# Patient Record
Sex: MALE | Race: WHITE | NOT HISPANIC OR LATINO | URBAN - METROPOLITAN AREA
[De-identification: names, ages, dates, MRNs, and addresses within clinical notes are randomized per-mention and may not be internally consistent; named-entity substitution may affect disease eponyms.]

---

## 2021-06-16 ENCOUNTER — EMERGENCY (EMERGENCY)
Facility: HOSPITAL | Age: 51
LOS: 0 days | Discharge: HOME | End: 2021-06-16
Attending: EMERGENCY MEDICINE
Payer: COMMERCIAL

## 2021-06-16 VITALS
SYSTOLIC BLOOD PRESSURE: 111 MMHG | TEMPERATURE: 98 F | RESPIRATION RATE: 18 BRPM | OXYGEN SATURATION: 99 % | DIASTOLIC BLOOD PRESSURE: 84 MMHG | HEART RATE: 86 BPM

## 2021-06-16 VITALS — HEIGHT: 75 IN | WEIGHT: 143.08 LBS
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
## 2021-06-16 DIAGNOSIS — S60.451A SUPERFICIAL FOREIGN BODY OF LEFT INDEX FINGER, INITIAL ENCOUNTER: ICD-10-CM
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
## 2021-06-16 PROCEDURE — 73140 X-RAY EXAM OF FINGER(S): CPT | Mod: LT

## 2021-06-16 PROCEDURE — 99284 EMERGENCY DEPT VISIT MOD MDM: CPT

## 2021-06-16 PROCEDURE — 99284 EMERGENCY DEPT VISIT MOD MDM: CPT | Mod: 25

## 2021-06-16 PROCEDURE — 73140 X-RAY EXAM OF FINGER(S): CPT | Mod: 26,LT

## 2021-06-16 PROCEDURE — 99283 EMERGENCY DEPT VISIT LOW MDM: CPT

## 2021-06-16 RX ORDER — CEPHALEXIN 500 MG
500 CAPSULE ORAL ONCE
Refills: 0 | Status: COMPLETED | OUTPATIENT
Start: 2021-06-16 | End: 2021-06-16

## 2021-06-16 RX ORDER — CEPHALEXIN 500 MG
1 CAPSULE ORAL
Qty: 28 | Refills: 0
Start: 2021-06-16 | End: 2021-06-22

## 2021-06-16 RX ADMIN — Medication 500 MILLIGRAM(S): at 15:36
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
## 2021-06-16 NOTE — ED STATDOCS - NSFOLLOWUPINSTRUCTIONS_ED_ALL_ED_FT
Follow instruction given to you by the orthopedic team that saw you in the ER.   Take motrin or tylenol for pain.  Take the antibiotic as directed.     Return to the ER for any redness, swelling, fever, purulent discharge or any new or other concerns.       Skin Foreign Body    A skin foreign body is an object that is stuck in the skin. Common objects that get stuck in the skin include:  •Wood (splinter).      •Glass.      •Rock.      •Nails.      •Needles.      •Thorns or cactus spines.      •Fiberglass slivers.      •Fish hooks.      •BBs.      Foreign bodies may damage tissue or cause infection. If the foreign body does not cause any pain or infection, it may be okay to leave it in the skin.    A growth called a granuloma may form around a foreign body that is left in the skin.      What are the causes?    This condition is caused by an object getting lodged under the skin, usually by accident. Children may get a skin foreign body while playing outside. Adults may get a skin foreign body after breaking glass or while working with wood, fiberglass, or stone material. In some cases, the object may get stuck in an open wound after an injury.      What are the signs or symptoms?  Symptoms of this condition include:  •Pain.      •A feeling of something being stuck under the skin.        How is this diagnosed?  This condition is diagnosed based on:  •Your medical history and symptoms.      •A physical exam.    •Imaging tests, such as:  •X-rays.      •CT scans.      •Ultrasounds.          How is this treated?  Treatment for this condition depends on what the foreign body is, where it is, and whether it is causing infection or other symptoms. Treatment may involve:  •Flushing the affected area with a salt-water solution to remove dirt or debris.      •Removing all or part of the object with a needle and metal tweezers. In some cases, an incision may be made in the skin to allow access to the object.      •Waiting to remove the object until it moves closer to the surface of the skin. This may take several days.      •Leaving the object in place. This may be done if the object is not causing any symptoms or if removal will cause more damage to the skin or tissue.      •Taking antibiotic pills or using antibiotic ointment to treat or prevent infection.      •Having a surgical procedure to remove a foreign body that is deep inside the tissue or that has been covered by a granuloma.        Follow these instructions at home:      Wound or incision care    •If the foreign body was removed, follow instructions from your health care provider about how to take care of your wound or incision. Make sure you:  •Wash your hands with soap and water before and after you change your bandage (dressing). If soap and water are not available, use hand .      •Change your dressing as told by your health care provider.      •Leave stitches (sutures), skin glue, or adhesive strips in place. These skin closures may need to stay in place for 2 weeks or longer. If adhesive strip edges start to loosen and curl up, you may trim the loose edges. Do not remove adhesive strips completely unless your health care provider tells you to do that.      •Check your wound or incision every day for signs of infection. This is especially important if the foreign body was left in place in the skin. Check for:  •Redness, swelling, or pain.      •Fluid or blood.      •Pus or a bad smell.      •Warmth.        •If the foreign body was in your lip, you may be directed to rinse your mouth with a salt-water mixture 3–4 times per day or as needed. To make a salt–water mixture, completely dissolve ½–1 tsp (3–6 g) of salt in 1 cup (237 mL) of warm water.      General instructions     •Take over-the-counter and prescription medicines only as told by your health care provider.      •If you were prescribed an antibiotic medicine or ointment, use it as told by your health care provider. Do not stop using the antibiotic even if you start to feel better.      •Keep all follow-up visits as told by your health care provider. This is important.        Contact a health care provider if:    •You develop more pain or other new symptoms around the area where the object entered the skin.      •You have redness, swelling, or pain around your wound or incision.      •You have fluid or blood coming from your wound or incision.      •Your wound or incision feels warm to the touch.      •You have pus or a bad smell coming from your wound or incision.      •You have a fever.        Get help right away if:    •You have severe pain that does not get better with medicine.        Summary    •A skin foreign body is an object that is stuck in the skin. Common objects that get stuck in the skin include wood, glass, rock, thorns, and fiberglass slivers.      •Treatment for this condition depends on what the foreign body is, where it is, and whether it is causing infection or other symptoms.      •Treatment may include removing the foreign body or leaving it in place. It is important to watch the wound or incision for signs of infection, especially if the object was left in place in the skin.      This information is not intended to replace advice given to you by your health care provider. Make sure you discuss any questions you have with your health care provider.
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
## 2021-06-16 NOTE — ED STATDOCS - PATIENT PORTAL LINK FT
You can access the FollowMyHealth Patient Portal offered by [facility] by registering at the following website: [email or web address]. By joining "[employer]"’s FollowMyHealth portal, you will also be able to view your health information using other applications (apps) compatible with our system.
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
## 2021-06-16 NOTE — ED STATDOCS - CARE PROVIDER_API CALL
Deena Mazariegos)  [facility]  [address] [place] [address]  Phone: [phone]  Fax: [phone]  Follow Up Time:

## 2021-06-16 NOTE — ED STATDOCS - CPE ED SKIN NORM
S/P PCI of LAD per Dr Cooper  Continue ASA, Statin, Plavix, BB, ARB, Aldactone  Medical therapy adjusted for OMT for CAD, HTN control  ECHO revealed EF 40%, -WMAs  Dash diet, 2 gm sodium restriction  1500 ml fluid restriction  Will need OP Sleep study, weight loss  FLP pending  Keep K+>4 and Mag >2  Reassess in AM    7/11  -Continue Coreg, Losartan, Bidil, Aldactone  -transition to Effient 10 mg daily from Plavix given risk factors  -Will need OP Sleep study  -FLP pending  -Continue ASA, Statin, Effient, BB, ARB, Bidil, Aldactone  -Continue to optimize medical regimen  -Transfer to telemetry today  -Assess response in AM    7/12  -OK to discharge home today  -Continue no smoking and no etoh use  -Cardiac rehab to be discussed as OP  -DO not return to work x 1 week, likely will need 1 month off of work for now  -Continue ASA, Statin, Coreg, Losartan, Effient, aldactone, Bidil  -Will need OP Sleep study arranged  -Monitor BP at home  -Encourage daily light walking for 30 minutes per day until seen in clinic next week  -NO heavy lifting  -Clinic will arrange follow up next week    normal...

## 2021-06-16 NOTE — ED STATDOCS - SKIN, MLM
+entrance wound to anterior L pointer finger between PIP and DIP with +restricted ROM, +edema, and +palpable pellet

## 2021-06-23 ENCOUNTER — APPOINTMENT (OUTPATIENT)
Dept: ORTHOPEDIC SURGERY | Facility: CLINIC | Age: 51
End: 2021-06-23

## 2021-12-08 NOTE — ED ADULT TRIAGE NOTE - NS ED NURSE DIRECT TO ROOM YN
Patient was given an SSRI/SNRI medication and warned of possible side effects of the medication including potential for increased risk of suicidal thoughts and feelings.  Patient was instructed that if they begin to exhibit any of these effects that they will discontinue the medication immediately and contact our office or the ER ASAP.     Yes

## 2022-01-01 ENCOUNTER — APPOINTMENT (OUTPATIENT)
Dept: RADIATION ONCOLOGY | Facility: CLINIC | Age: 52
End: 2022-01-01

## 2022-01-01 ENCOUNTER — TRANSCRIPTION ENCOUNTER (OUTPATIENT)
Age: 52
End: 2022-01-01

## 2022-01-01 ENCOUNTER — NON-APPOINTMENT (OUTPATIENT)
Age: 52
End: 2022-01-01

## 2022-01-01 ENCOUNTER — OUTPATIENT (OUTPATIENT)
Dept: OUTPATIENT SERVICES | Facility: HOSPITAL | Age: 52
LOS: 1 days | End: 2022-01-01
Payer: COMMERCIAL

## 2022-01-01 ENCOUNTER — APPOINTMENT (OUTPATIENT)
Dept: ORTHOPEDIC SURGERY | Facility: HOSPITAL | Age: 52
End: 2022-01-01

## 2022-01-01 ENCOUNTER — APPOINTMENT (OUTPATIENT)
Dept: THORACIC SURGERY | Facility: CLINIC | Age: 52
End: 2022-01-01

## 2022-01-01 ENCOUNTER — APPOINTMENT (OUTPATIENT)
Dept: THORACIC SURGERY | Facility: HOSPITAL | Age: 52
End: 2022-01-01

## 2022-01-01 ENCOUNTER — OUTPATIENT (OUTPATIENT)
Dept: OUTPATIENT SERVICES | Facility: HOSPITAL | Age: 52
LOS: 1 days | Discharge: HOME | End: 2022-01-01
Payer: COMMERCIAL

## 2022-01-01 ENCOUNTER — INPATIENT (INPATIENT)
Facility: HOSPITAL | Age: 52
LOS: 3 days | Discharge: HOME | DRG: 181 | End: 2022-07-22
Attending: INTERNAL MEDICINE | Admitting: FAMILY MEDICINE
Payer: COMMERCIAL

## 2022-01-01 ENCOUNTER — APPOINTMENT (OUTPATIENT)
Dept: NUCLEAR MEDICINE | Facility: CLINIC | Age: 52
End: 2022-01-01

## 2022-01-01 ENCOUNTER — APPOINTMENT (OUTPATIENT)
Dept: ORTHOPEDIC SURGERY | Facility: CLINIC | Age: 52
End: 2022-01-01

## 2022-01-01 ENCOUNTER — OUTPATIENT (OUTPATIENT)
Dept: INPATIENT UNIT | Facility: HOSPITAL | Age: 52
LOS: 1 days | Discharge: HOME | End: 2022-01-01
Payer: OTHER MISCELLANEOUS

## 2022-01-01 ENCOUNTER — INPATIENT (INPATIENT)
Facility: HOSPITAL | Age: 52
LOS: 11 days | Discharge: HOME | DRG: 180 | End: 2022-09-26
Attending: INTERNAL MEDICINE | Admitting: HOSPITALIST
Payer: COMMERCIAL

## 2022-01-01 ENCOUNTER — INPATIENT (INPATIENT)
Facility: HOSPITAL | Age: 52
LOS: 0 days | Discharge: HOME | DRG: 897 | End: 2022-11-22
Attending: FAMILY MEDICINE | Admitting: FAMILY MEDICINE
Payer: COMMERCIAL

## 2022-01-01 ENCOUNTER — APPOINTMENT (OUTPATIENT)
Dept: ORTHOPEDIC SURGERY | Facility: CLINIC | Age: 52
End: 2022-01-01
Payer: OTHER MISCELLANEOUS

## 2022-01-01 ENCOUNTER — RESULT REVIEW (OUTPATIENT)
Age: 52
End: 2022-01-01

## 2022-01-01 ENCOUNTER — RESULT CHARGE (OUTPATIENT)
Age: 52
End: 2022-01-01

## 2022-01-01 VITALS
RESPIRATION RATE: 18 BRPM | DIASTOLIC BLOOD PRESSURE: 80 MMHG | HEART RATE: 91 BPM | WEIGHT: 140 LBS | OXYGEN SATURATION: 98 % | SYSTOLIC BLOOD PRESSURE: 118 MMHG

## 2022-01-01 VITALS
SYSTOLIC BLOOD PRESSURE: 103 MMHG | TEMPERATURE: 99 F | RESPIRATION RATE: 18 BRPM | OXYGEN SATURATION: 97 % | HEART RATE: 105 BPM | DIASTOLIC BLOOD PRESSURE: 72 MMHG

## 2022-01-01 VITALS
DIASTOLIC BLOOD PRESSURE: 86 MMHG | RESPIRATION RATE: 18 BRPM | HEIGHT: 72 IN | TEMPERATURE: 98 F | OXYGEN SATURATION: 99 % | WEIGHT: 141.32 LBS | HEART RATE: 90 BPM | SYSTOLIC BLOOD PRESSURE: 130 MMHG

## 2022-01-01 VITALS
HEART RATE: 112 BPM | WEIGHT: 154.1 LBS | SYSTOLIC BLOOD PRESSURE: 121 MMHG | OXYGEN SATURATION: 93 % | RESPIRATION RATE: 16 BRPM | HEIGHT: 72 IN | TEMPERATURE: 99 F | DIASTOLIC BLOOD PRESSURE: 78 MMHG

## 2022-01-01 VITALS
HEART RATE: 85 BPM | TEMPERATURE: 98 F | RESPIRATION RATE: 18 BRPM | SYSTOLIC BLOOD PRESSURE: 161 MMHG | OXYGEN SATURATION: 99 % | DIASTOLIC BLOOD PRESSURE: 95 MMHG

## 2022-01-01 VITALS
RESPIRATION RATE: 20 BRPM | DIASTOLIC BLOOD PRESSURE: 98 MMHG | HEART RATE: 102 BPM | SYSTOLIC BLOOD PRESSURE: 162 MMHG | TEMPERATURE: 98 F | OXYGEN SATURATION: 97 % | HEIGHT: 72 IN

## 2022-01-01 VITALS
DIASTOLIC BLOOD PRESSURE: 98 MMHG | OXYGEN SATURATION: 96 % | TEMPERATURE: 97 F | RESPIRATION RATE: 16 BRPM | HEART RATE: 102 BPM | SYSTOLIC BLOOD PRESSURE: 157 MMHG

## 2022-01-01 VITALS
HEART RATE: 94 BPM | OXYGEN SATURATION: 100 % | SYSTOLIC BLOOD PRESSURE: 121 MMHG | DIASTOLIC BLOOD PRESSURE: 78 MMHG | RESPIRATION RATE: 18 BRPM

## 2022-01-01 VITALS — HEIGHT: 72 IN | WEIGHT: 139.99 LBS

## 2022-01-01 VITALS — WEIGHT: 179.9 LBS | HEIGHT: 72 IN

## 2022-01-01 VITALS
OXYGEN SATURATION: 95 % | TEMPERATURE: 98 F | SYSTOLIC BLOOD PRESSURE: 125 MMHG | HEART RATE: 99 BPM | DIASTOLIC BLOOD PRESSURE: 75 MMHG

## 2022-01-01 DIAGNOSIS — F10.90 ALCOHOL USE, UNSPECIFIED, UNCOMPLICATED: ICD-10-CM

## 2022-01-01 DIAGNOSIS — M65.4 RADIAL STYLOID TENOSYNOVITIS [DE QUERVAIN]: ICD-10-CM

## 2022-01-01 DIAGNOSIS — E55.9 VITAMIN D DEFICIENCY, UNSPECIFIED: ICD-10-CM

## 2022-01-01 DIAGNOSIS — F13.939 SEDATIVE, HYPNOTIC OR ANXIOLYTIC USE, UNSPECIFIED WITH WITHDRAWAL, UNSPECIFIED: ICD-10-CM

## 2022-01-01 DIAGNOSIS — E87.6 HYPOKALEMIA: ICD-10-CM

## 2022-01-01 DIAGNOSIS — F17.200 NICOTINE DEPENDENCE, UNSPECIFIED, UNCOMPLICATED: ICD-10-CM

## 2022-01-01 DIAGNOSIS — G89.3 NEOPLASM RELATED PAIN (ACUTE) (CHRONIC): ICD-10-CM

## 2022-01-01 DIAGNOSIS — F10.239 ALCOHOL DEPENDENCE WITH WITHDRAWAL, UNSPECIFIED: ICD-10-CM

## 2022-01-01 DIAGNOSIS — T14.8XXA OTHER INJURY OF UNSPECIFIED BODY REGION, INITIAL ENCOUNTER: Chronic | ICD-10-CM

## 2022-01-01 DIAGNOSIS — C34.90 MALIGNANT NEOPLASM OF UNSPECIFIED PART OF UNSPECIFIED BRONCHUS OR LUNG: ICD-10-CM

## 2022-01-01 DIAGNOSIS — E83.42 HYPOMAGNESEMIA: ICD-10-CM

## 2022-01-01 DIAGNOSIS — F10.10 ALCOHOL ABUSE, UNCOMPLICATED: ICD-10-CM

## 2022-01-01 DIAGNOSIS — F17.210 NICOTINE DEPENDENCE, CIGARETTES, UNCOMPLICATED: ICD-10-CM

## 2022-01-01 DIAGNOSIS — E88.09 OTHER DISORDERS OF PLASMA-PROTEIN METABOLISM, NOT ELSEWHERE CLASSIFIED: ICD-10-CM

## 2022-01-01 DIAGNOSIS — R04.2 HEMOPTYSIS: ICD-10-CM

## 2022-01-01 DIAGNOSIS — R59.1 GENERALIZED ENLARGED LYMPH NODES: ICD-10-CM

## 2022-01-01 DIAGNOSIS — R09.89 OTHER SPECIFIED SYMPTOMS AND SIGNS INVOLVING THE CIRCULATORY AND RESPIRATORY SYSTEMS: ICD-10-CM
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
## 2022-01-01 DIAGNOSIS — I10 ESSENTIAL (PRIMARY) HYPERTENSION: ICD-10-CM

## 2022-01-01 DIAGNOSIS — G90.2 HORNER'S SYNDROME: ICD-10-CM

## 2022-01-01 DIAGNOSIS — Z87.898 PERSONAL HISTORY OF OTHER SPECIFIED CONDITIONS: ICD-10-CM

## 2022-01-01 DIAGNOSIS — E03.9 HYPOTHYROIDISM, UNSPECIFIED: ICD-10-CM

## 2022-01-01 DIAGNOSIS — F19.19 OTHER PSYCHOACTIVE SUBSTANCE ABUSE WITH UNSPECIFIED PSYCHOACTIVE SUBSTANCE-INDUCED DISORDER: ICD-10-CM

## 2022-01-01 DIAGNOSIS — F13.20 SEDATIVE, HYPNOTIC OR ANXIOLYTIC DEPENDENCE, UNCOMPLICATED: ICD-10-CM

## 2022-01-01 DIAGNOSIS — C77.1 SECONDARY AND UNSPECIFIED MALIGNANT NEOPLASM OF INTRATHORACIC LYMPH NODES: ICD-10-CM

## 2022-01-01 DIAGNOSIS — F13.239 SEDATIVE, HYPNOTIC OR ANXIOLYTIC DEPENDENCE WITH WITHDRAWAL, UNSPECIFIED: ICD-10-CM

## 2022-01-01 DIAGNOSIS — Z79.899 OTHER LONG TERM (CURRENT) DRUG THERAPY: ICD-10-CM
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
## 2022-01-01 DIAGNOSIS — E86.0 DEHYDRATION: ICD-10-CM

## 2022-01-01 DIAGNOSIS — F11.23 OPIOID DEPENDENCE WITH WITHDRAWAL: ICD-10-CM

## 2022-01-01 DIAGNOSIS — E87.1 HYPO-OSMOLALITY AND HYPONATREMIA: ICD-10-CM

## 2022-01-01 DIAGNOSIS — Z01.810 ENCOUNTER FOR PREPROCEDURAL CARDIOVASCULAR EXAMINATION: ICD-10-CM

## 2022-01-01 DIAGNOSIS — Z01.818 ENCOUNTER FOR OTHER PREPROCEDURAL EXAMINATION: ICD-10-CM

## 2022-01-01 DIAGNOSIS — Z72.89 OTHER PROBLEMS RELATED TO LIFESTYLE: ICD-10-CM

## 2022-01-01 DIAGNOSIS — C78.6 SECONDARY MALIGNANT NEOPLASM OF RETROPERITONEUM AND PERITONEUM: ICD-10-CM

## 2022-01-01 DIAGNOSIS — T14.8XXA OTHER INJURY OF UNSPECIFIED BODY REGION, INITIAL ENCOUNTER: ICD-10-CM

## 2022-01-01 DIAGNOSIS — D50.9 IRON DEFICIENCY ANEMIA, UNSPECIFIED: ICD-10-CM
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
## 2022-01-01 DIAGNOSIS — D64.9 ANEMIA, UNSPECIFIED: ICD-10-CM

## 2022-01-01 DIAGNOSIS — D84.9 IMMUNODEFICIENCY, UNSPECIFIED: ICD-10-CM
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
## 2022-01-01 DIAGNOSIS — Z92.89 PERSONAL HISTORY OF OTHER MEDICAL TREATMENT: ICD-10-CM

## 2022-01-01 DIAGNOSIS — F41.9 ANXIETY DISORDER, UNSPECIFIED: ICD-10-CM

## 2022-01-01 DIAGNOSIS — K56.609 UNSPECIFIED INTESTINAL OBSTRUCTION, UNSPECIFIED AS TO PARTIAL VERSUS COMPLETE OBSTRUCTION: ICD-10-CM

## 2022-01-01 DIAGNOSIS — Z79.890 HORMONE REPLACEMENT THERAPY: ICD-10-CM

## 2022-01-01 DIAGNOSIS — F11.20 OPIOID DEPENDENCE, UNCOMPLICATED: ICD-10-CM

## 2022-01-01 DIAGNOSIS — J96.01 ACUTE RESPIRATORY FAILURE WITH HYPOXIA: ICD-10-CM

## 2022-01-01 DIAGNOSIS — Z80.7 FAMILY HISTORY OF OTHER MALIGNANT NEOPLASMS OF LYMPHOID, HEMATOPOIETIC AND RELATED TISSUES: ICD-10-CM

## 2022-01-01 DIAGNOSIS — J98.11 ATELECTASIS: ICD-10-CM

## 2022-01-01 DIAGNOSIS — E43 UNSPECIFIED SEVERE PROTEIN-CALORIE MALNUTRITION: ICD-10-CM

## 2022-01-01 DIAGNOSIS — L85.3 XEROSIS CUTIS: ICD-10-CM

## 2022-01-01 DIAGNOSIS — R60.0 LOCALIZED EDEMA: ICD-10-CM

## 2022-01-01 DIAGNOSIS — J18.9 PNEUMONIA, UNSPECIFIED ORGANISM: ICD-10-CM

## 2022-01-01 DIAGNOSIS — M25.532 PAIN IN LEFT WRIST: ICD-10-CM

## 2022-01-01 DIAGNOSIS — R73.9 HYPERGLYCEMIA, UNSPECIFIED: ICD-10-CM

## 2022-01-01 DIAGNOSIS — E86.1 HYPOVOLEMIA: ICD-10-CM

## 2022-01-01 DIAGNOSIS — Z78.9 OTHER SPECIFIED HEALTH STATUS: ICD-10-CM

## 2022-01-01 DIAGNOSIS — F43.20 ADJUSTMENT DISORDER, UNSPECIFIED: ICD-10-CM

## 2022-01-01 DIAGNOSIS — R91.8 OTHER NONSPECIFIC ABNORMAL FINDING OF LUNG FIELD: ICD-10-CM

## 2022-01-01 LAB
24R-OH-CALCIDIOL SERPL-MCNC: <5 NG/ML — LOW (ref 30–80)
A1C WITH ESTIMATED AVERAGE GLUCOSE RESULT: 5.1 % — SIGNIFICANT CHANGE UP (ref 4–5.6)
ADD ON TEST-SPECIMEN IN LAB: SIGNIFICANT CHANGE UP
ALBUMIN SERPL ELPH-MCNC: 2.2 G/DL — LOW (ref 3.3–5)
ALBUMIN SERPL ELPH-MCNC: 2.3 G/DL — LOW (ref 3.3–5)
ALBUMIN SERPL ELPH-MCNC: 2.4 G/DL — LOW (ref 3.3–5)
ALBUMIN SERPL ELPH-MCNC: 2.4 G/DL — LOW (ref 3.3–5)
ALBUMIN SERPL ELPH-MCNC: 2.5 G/DL — LOW (ref 3.3–5)
ALBUMIN SERPL ELPH-MCNC: 2.6 G/DL — LOW (ref 3.3–5)
ALBUMIN SERPL ELPH-MCNC: 2.7 G/DL — LOW (ref 3.3–5)
ALBUMIN SERPL ELPH-MCNC: 2.8 G/DL — LOW (ref 3.3–5)
ALBUMIN SERPL ELPH-MCNC: 3.7 G/DL — SIGNIFICANT CHANGE UP (ref 3.3–5)
ALP SERPL-CCNC: 124 U/L — HIGH (ref 40–120)
ALP SERPL-CCNC: 128 U/L — HIGH (ref 40–120)
ALP SERPL-CCNC: 155 U/L — HIGH (ref 40–120)
ALP SERPL-CCNC: 157 U/L — HIGH (ref 40–120)
ALP SERPL-CCNC: 204 U/L — HIGH (ref 40–120)
ALP SERPL-CCNC: 238 U/L — HIGH (ref 40–120)
ALP SERPL-CCNC: 264 U/L — HIGH (ref 40–120)
ALP SERPL-CCNC: 265 U/L — HIGH (ref 40–120)
ALP SERPL-CCNC: 325 U/L — HIGH (ref 40–120)
ALP SERPL-CCNC: 343 U/L — HIGH (ref 40–120)
ALP SERPL-CCNC: 377 U/L — HIGH (ref 40–120)
ALP SERPL-CCNC: 442 U/L — HIGH (ref 40–120)
ALP SERPL-CCNC: 98 U/L — SIGNIFICANT CHANGE UP (ref 40–120)
ALT FLD-CCNC: 108 U/L — HIGH (ref 12–78)
ALT FLD-CCNC: 20 U/L — SIGNIFICANT CHANGE UP (ref 12–78)
ALT FLD-CCNC: 24 U/L — SIGNIFICANT CHANGE UP (ref 12–78)
ALT FLD-CCNC: 24 U/L — SIGNIFICANT CHANGE UP (ref 12–78)
ALT FLD-CCNC: 25 U/L — SIGNIFICANT CHANGE UP (ref 12–78)
ALT FLD-CCNC: 26 U/L — SIGNIFICANT CHANGE UP (ref 12–78)
ALT FLD-CCNC: 37 U/L — SIGNIFICANT CHANGE UP (ref 12–78)
ALT FLD-CCNC: 39 U/L — SIGNIFICANT CHANGE UP (ref 12–78)
ALT FLD-CCNC: 40 U/L — SIGNIFICANT CHANGE UP (ref 12–78)
ALT FLD-CCNC: 49 U/L — SIGNIFICANT CHANGE UP (ref 12–78)
ALT FLD-CCNC: 51 U/L — SIGNIFICANT CHANGE UP (ref 12–78)
ALT FLD-CCNC: 66 U/L — SIGNIFICANT CHANGE UP (ref 12–78)
ALT FLD-CCNC: 67 U/L — SIGNIFICANT CHANGE UP (ref 12–78)
AMMONIA BLD-MCNC: 19 UMOL/L — SIGNIFICANT CHANGE UP (ref 11–32)
ANION GAP SERPL CALC-SCNC: 10 MMOL/L — SIGNIFICANT CHANGE UP (ref 5–17)
ANION GAP SERPL CALC-SCNC: 12 MMOL/L — SIGNIFICANT CHANGE UP (ref 5–17)
ANION GAP SERPL CALC-SCNC: 13 MMOL/L — SIGNIFICANT CHANGE UP (ref 5–17)
ANION GAP SERPL CALC-SCNC: 19 MMOL/L — HIGH (ref 5–17)
ANION GAP SERPL CALC-SCNC: 3 MMOL/L — LOW (ref 5–17)
ANION GAP SERPL CALC-SCNC: 5 MMOL/L — SIGNIFICANT CHANGE UP (ref 5–17)
ANION GAP SERPL CALC-SCNC: 6 MMOL/L — SIGNIFICANT CHANGE UP (ref 5–17)
ANION GAP SERPL CALC-SCNC: 7 MMOL/L — SIGNIFICANT CHANGE UP (ref 5–17)
APPEARANCE UR: CLEAR — SIGNIFICANT CHANGE UP
APPEARANCE UR: CLEAR — SIGNIFICANT CHANGE UP
APTT BLD: 27.3 SEC — LOW (ref 27.5–35.5)
APTT BLD: 30.1 SEC — SIGNIFICANT CHANGE UP (ref 27.5–35.5)
AST SERPL-CCNC: 12 U/L — LOW (ref 15–37)
AST SERPL-CCNC: 13 U/L — LOW (ref 15–37)
AST SERPL-CCNC: 151 U/L — HIGH (ref 15–37)
AST SERPL-CCNC: 17 U/L — SIGNIFICANT CHANGE UP (ref 15–37)
AST SERPL-CCNC: 18 U/L — SIGNIFICANT CHANGE UP (ref 15–37)
AST SERPL-CCNC: 33 U/L — SIGNIFICANT CHANGE UP (ref 15–37)
AST SERPL-CCNC: 360 U/L — HIGH (ref 15–37)
AST SERPL-CCNC: 39 U/L — HIGH (ref 15–37)
AST SERPL-CCNC: 48 U/L — HIGH (ref 15–37)
AST SERPL-CCNC: 533 U/L — HIGH (ref 15–37)
AST SERPL-CCNC: 63 U/L — HIGH (ref 15–37)
AST SERPL-CCNC: 65 U/L — HIGH (ref 15–37)
AST SERPL-CCNC: 82 U/L — HIGH (ref 15–37)
BASOPHILS # BLD AUTO: 0.01 K/UL — SIGNIFICANT CHANGE UP (ref 0–0.2)
BASOPHILS # BLD AUTO: 0.02 K/UL — SIGNIFICANT CHANGE UP (ref 0–0.2)
BASOPHILS # BLD AUTO: 0.03 K/UL — SIGNIFICANT CHANGE UP (ref 0–0.2)
BASOPHILS # BLD AUTO: 0.06 K/UL — SIGNIFICANT CHANGE UP (ref 0–0.2)
BASOPHILS NFR BLD AUTO: 0.1 % — SIGNIFICANT CHANGE UP (ref 0–2)
BASOPHILS NFR BLD AUTO: 0.2 % — SIGNIFICANT CHANGE UP (ref 0–2)
BASOPHILS NFR BLD AUTO: 0.3 % — SIGNIFICANT CHANGE UP (ref 0–2)
BASOPHILS NFR BLD AUTO: 0.3 % — SIGNIFICANT CHANGE UP (ref 0–2)
BASOPHILS NFR BLD AUTO: 0.4 % — SIGNIFICANT CHANGE UP (ref 0–2)
BASOPHILS NFR BLD AUTO: 0.7 % — SIGNIFICANT CHANGE UP (ref 0–2)
BILIRUB DIRECT SERPL-MCNC: 0.9 MG/DL — HIGH (ref 0–0.3)
BILIRUB DIRECT SERPL-MCNC: 2 MG/DL — HIGH (ref 0–0.3)
BILIRUB INDIRECT FLD-MCNC: 0.6 MG/DL — SIGNIFICANT CHANGE UP (ref 0.2–1)
BILIRUB INDIRECT FLD-MCNC: 0.7 MG/DL — SIGNIFICANT CHANGE UP (ref 0.2–1)
BILIRUB SERPL-MCNC: 0.6 MG/DL — SIGNIFICANT CHANGE UP (ref 0.2–1.2)
BILIRUB SERPL-MCNC: 0.7 MG/DL — SIGNIFICANT CHANGE UP (ref 0.2–1.2)
BILIRUB SERPL-MCNC: 0.8 MG/DL — SIGNIFICANT CHANGE UP (ref 0.2–1.2)
BILIRUB SERPL-MCNC: 0.8 MG/DL — SIGNIFICANT CHANGE UP (ref 0.2–1.2)
BILIRUB SERPL-MCNC: 0.9 MG/DL — SIGNIFICANT CHANGE UP (ref 0.2–1.2)
BILIRUB SERPL-MCNC: 0.9 MG/DL — SIGNIFICANT CHANGE UP (ref 0.2–1.2)
BILIRUB SERPL-MCNC: 1.1 MG/DL — SIGNIFICANT CHANGE UP (ref 0.2–1.2)
BILIRUB SERPL-MCNC: 1.3 MG/DL — HIGH (ref 0.2–1.2)
BILIRUB SERPL-MCNC: 1.5 MG/DL — HIGH (ref 0.2–1.2)
BILIRUB SERPL-MCNC: 2.4 MG/DL — HIGH (ref 0.2–1.2)
BILIRUB SERPL-MCNC: 2.7 MG/DL — HIGH (ref 0.2–1.2)
BILIRUB UR-MCNC: ABNORMAL
BILIRUB UR-MCNC: ABNORMAL
BUN SERPL-MCNC: 10 MG/DL — SIGNIFICANT CHANGE UP (ref 7–23)
BUN SERPL-MCNC: 11 MG/DL — SIGNIFICANT CHANGE UP (ref 7–23)
BUN SERPL-MCNC: 11 MG/DL — SIGNIFICANT CHANGE UP (ref 7–23)
BUN SERPL-MCNC: 12 MG/DL — SIGNIFICANT CHANGE UP (ref 7–23)
BUN SERPL-MCNC: 14 MG/DL — SIGNIFICANT CHANGE UP (ref 7–23)
BUN SERPL-MCNC: 15 MG/DL — SIGNIFICANT CHANGE UP (ref 7–23)
BUN SERPL-MCNC: 15 MG/DL — SIGNIFICANT CHANGE UP (ref 7–23)
BUN SERPL-MCNC: 17 MG/DL — SIGNIFICANT CHANGE UP (ref 7–23)
BUN SERPL-MCNC: 20 MG/DL — SIGNIFICANT CHANGE UP (ref 7–23)
BUN SERPL-MCNC: 22 MG/DL — SIGNIFICANT CHANGE UP (ref 7–23)
BUN SERPL-MCNC: 5 MG/DL — LOW (ref 7–23)
BUN SERPL-MCNC: 6 MG/DL — LOW (ref 7–23)
BUN SERPL-MCNC: 7 MG/DL — SIGNIFICANT CHANGE UP (ref 7–23)
BUN SERPL-MCNC: 8 MG/DL — SIGNIFICANT CHANGE UP (ref 7–23)
BUN SERPL-MCNC: 9 MG/DL — SIGNIFICANT CHANGE UP (ref 7–23)
BUN SERPL-MCNC: 9 MG/DL — SIGNIFICANT CHANGE UP (ref 7–23)
CALCIUM SERPL-MCNC: 8.2 MG/DL — LOW (ref 8.5–10.1)
CALCIUM SERPL-MCNC: 8.3 MG/DL — LOW (ref 8.5–10.1)
CALCIUM SERPL-MCNC: 8.3 MG/DL — LOW (ref 8.5–10.1)
CALCIUM SERPL-MCNC: 8.4 MG/DL — LOW (ref 8.5–10.1)
CALCIUM SERPL-MCNC: 8.5 MG/DL — SIGNIFICANT CHANGE UP (ref 8.5–10.1)
CALCIUM SERPL-MCNC: 8.6 MG/DL — SIGNIFICANT CHANGE UP (ref 8.5–10.1)
CALCIUM SERPL-MCNC: 8.7 MG/DL — SIGNIFICANT CHANGE UP (ref 8.5–10.1)
CALCIUM SERPL-MCNC: 8.8 MG/DL — SIGNIFICANT CHANGE UP (ref 8.5–10.1)
CALCIUM SERPL-MCNC: 8.9 MG/DL — SIGNIFICANT CHANGE UP (ref 8.5–10.1)
CALCIUM SERPL-MCNC: 9 MG/DL — SIGNIFICANT CHANGE UP (ref 8.5–10.1)
CALCIUM SERPL-MCNC: 9.1 MG/DL — SIGNIFICANT CHANGE UP (ref 8.5–10.1)
CALCIUM SERPL-MCNC: 9.8 MG/DL — SIGNIFICANT CHANGE UP (ref 8.5–10.1)
CHLORIDE SERPL-SCNC: 100 MMOL/L — SIGNIFICANT CHANGE UP (ref 96–108)
CHLORIDE SERPL-SCNC: 101 MMOL/L — SIGNIFICANT CHANGE UP (ref 96–108)
CHLORIDE SERPL-SCNC: 102 MMOL/L — SIGNIFICANT CHANGE UP (ref 96–108)
CHLORIDE SERPL-SCNC: 102 MMOL/L — SIGNIFICANT CHANGE UP (ref 96–108)
CHLORIDE SERPL-SCNC: 103 MMOL/L — SIGNIFICANT CHANGE UP (ref 96–108)
CHLORIDE SERPL-SCNC: 104 MMOL/L — SIGNIFICANT CHANGE UP (ref 96–108)
CHLORIDE SERPL-SCNC: 89 MMOL/L — LOW (ref 96–108)
CHLORIDE SERPL-SCNC: 89 MMOL/L — LOW (ref 96–108)
CHLORIDE SERPL-SCNC: 90 MMOL/L — LOW (ref 96–108)
CHLORIDE SERPL-SCNC: 91 MMOL/L — LOW (ref 96–108)
CHLORIDE SERPL-SCNC: 91 MMOL/L — LOW (ref 96–108)
CHLORIDE SERPL-SCNC: 92 MMOL/L — LOW (ref 96–108)
CHLORIDE SERPL-SCNC: 94 MMOL/L — LOW (ref 96–108)
CHLORIDE SERPL-SCNC: 94 MMOL/L — LOW (ref 96–108)
CHLORIDE SERPL-SCNC: 95 MMOL/L — LOW (ref 96–108)
CHLORIDE SERPL-SCNC: 96 MMOL/L — SIGNIFICANT CHANGE UP (ref 96–108)
CHLORIDE SERPL-SCNC: 96 MMOL/L — SIGNIFICANT CHANGE UP (ref 96–108)
CHLORIDE SERPL-SCNC: 99 MMOL/L — SIGNIFICANT CHANGE UP (ref 96–108)
CHOLEST SERPL-MCNC: 150 MG/DL — SIGNIFICANT CHANGE UP
CO2 SERPL-SCNC: 23 MMOL/L — SIGNIFICANT CHANGE UP (ref 22–31)
CO2 SERPL-SCNC: 25 MMOL/L — SIGNIFICANT CHANGE UP (ref 22–31)
CO2 SERPL-SCNC: 26 MMOL/L — SIGNIFICANT CHANGE UP (ref 22–31)
CO2 SERPL-SCNC: 26 MMOL/L — SIGNIFICANT CHANGE UP (ref 22–31)
CO2 SERPL-SCNC: 27 MMOL/L — SIGNIFICANT CHANGE UP (ref 22–31)
CO2 SERPL-SCNC: 28 MMOL/L — SIGNIFICANT CHANGE UP (ref 22–31)
CO2 SERPL-SCNC: 28 MMOL/L — SIGNIFICANT CHANGE UP (ref 22–31)
CO2 SERPL-SCNC: 29 MMOL/L — SIGNIFICANT CHANGE UP (ref 22–31)
CO2 SERPL-SCNC: 30 MMOL/L — SIGNIFICANT CHANGE UP (ref 22–31)
CO2 SERPL-SCNC: 31 MMOL/L — SIGNIFICANT CHANGE UP (ref 22–31)
CO2 SERPL-SCNC: 32 MMOL/L — HIGH (ref 22–31)
CO2 SERPL-SCNC: 33 MMOL/L — HIGH (ref 22–31)
COLOR SPEC: ABNORMAL
COLOR SPEC: YELLOW — SIGNIFICANT CHANGE UP
CREAT SERPL-MCNC: 0.47 MG/DL — LOW (ref 0.5–1.3)
CREAT SERPL-MCNC: 0.5 MG/DL — SIGNIFICANT CHANGE UP (ref 0.5–1.3)
CREAT SERPL-MCNC: 0.52 MG/DL — SIGNIFICANT CHANGE UP (ref 0.5–1.3)
CREAT SERPL-MCNC: 0.53 MG/DL — SIGNIFICANT CHANGE UP (ref 0.5–1.3)
CREAT SERPL-MCNC: 0.54 MG/DL — SIGNIFICANT CHANGE UP (ref 0.5–1.3)
CREAT SERPL-MCNC: 0.55 MG/DL — SIGNIFICANT CHANGE UP (ref 0.5–1.3)
CREAT SERPL-MCNC: 0.56 MG/DL — SIGNIFICANT CHANGE UP (ref 0.5–1.3)
CREAT SERPL-MCNC: 0.59 MG/DL — SIGNIFICANT CHANGE UP (ref 0.5–1.3)
CREAT SERPL-MCNC: 0.62 MG/DL — SIGNIFICANT CHANGE UP (ref 0.5–1.3)
CREAT SERPL-MCNC: 0.63 MG/DL — SIGNIFICANT CHANGE UP (ref 0.5–1.3)
CREAT SERPL-MCNC: 0.63 MG/DL — SIGNIFICANT CHANGE UP (ref 0.5–1.3)
CREAT SERPL-MCNC: 0.66 MG/DL — SIGNIFICANT CHANGE UP (ref 0.5–1.3)
CREAT SERPL-MCNC: 0.67 MG/DL — SIGNIFICANT CHANGE UP (ref 0.5–1.3)
CREAT SERPL-MCNC: 0.68 MG/DL — SIGNIFICANT CHANGE UP (ref 0.5–1.3)
CREAT SERPL-MCNC: 0.77 MG/DL — SIGNIFICANT CHANGE UP (ref 0.5–1.3)
CREAT SERPL-MCNC: 0.79 MG/DL — SIGNIFICANT CHANGE UP (ref 0.5–1.3)
CREAT SERPL-MCNC: 0.81 MG/DL — SIGNIFICANT CHANGE UP (ref 0.5–1.3)
CREAT SERPL-MCNC: 0.82 MG/DL — SIGNIFICANT CHANGE UP (ref 0.5–1.3)
CREAT SERPL-MCNC: 0.84 MG/DL — SIGNIFICANT CHANGE UP (ref 0.5–1.3)
CREAT SERPL-MCNC: 0.86 MG/DL — SIGNIFICANT CHANGE UP (ref 0.5–1.3)
CREAT SERPL-MCNC: 0.92 MG/DL — SIGNIFICANT CHANGE UP (ref 0.5–1.3)
CREAT SERPL-MCNC: 0.92 MG/DL — SIGNIFICANT CHANGE UP (ref 0.5–1.3)
CULTURE RESULTS: SIGNIFICANT CHANGE UP
D DIMER BLD IA.RAPID-MCNC: 440 NG/ML DDU — HIGH
DIFF PNL FLD: ABNORMAL
DIFF PNL FLD: NEGATIVE — SIGNIFICANT CHANGE UP
EGFR: 101 ML/MIN/1.73M2 — SIGNIFICANT CHANGE UP
EGFR: 101 ML/MIN/1.73M2 — SIGNIFICANT CHANGE UP
EGFR: 105 ML/MIN/1.73M2 — SIGNIFICANT CHANGE UP
EGFR: 106 ML/MIN/1.73M2 — SIGNIFICANT CHANGE UP
EGFR: 107 ML/MIN/1.73M2 — SIGNIFICANT CHANGE UP
EGFR: 107 ML/MIN/1.73M2 — SIGNIFICANT CHANGE UP
EGFR: 108 ML/MIN/1.73M2 — SIGNIFICANT CHANGE UP
EGFR: 109 ML/MIN/1.73M2 — SIGNIFICANT CHANGE UP
EGFR: 113 ML/MIN/1.73M2 — SIGNIFICANT CHANGE UP
EGFR: 114 ML/MIN/1.73M2 — SIGNIFICANT CHANGE UP
EGFR: 114 ML/MIN/1.73M2 — SIGNIFICANT CHANGE UP
EGFR: 116 ML/MIN/1.73M2 — SIGNIFICANT CHANGE UP
EGFR: 118 ML/MIN/1.73M2 — SIGNIFICANT CHANGE UP
EGFR: 120 ML/MIN/1.73M2 — SIGNIFICANT CHANGE UP
EGFR: 121 ML/MIN/1.73M2 — SIGNIFICANT CHANGE UP
EGFR: 121 ML/MIN/1.73M2 — SIGNIFICANT CHANGE UP
EGFR: 122 ML/MIN/1.73M2 — SIGNIFICANT CHANGE UP
EGFR: 123 ML/MIN/1.73M2 — SIGNIFICANT CHANGE UP
EGFR: 124 ML/MIN/1.73M2 — SIGNIFICANT CHANGE UP
EGFR: 127 ML/MIN/1.73M2 — SIGNIFICANT CHANGE UP
EOSINOPHIL # BLD AUTO: 0 K/UL — SIGNIFICANT CHANGE UP (ref 0–0.5)
EOSINOPHIL # BLD AUTO: 0.01 K/UL — SIGNIFICANT CHANGE UP (ref 0–0.5)
EOSINOPHIL # BLD AUTO: 0.17 K/UL — SIGNIFICANT CHANGE UP (ref 0–0.5)
EOSINOPHIL # BLD AUTO: 0.22 K/UL — SIGNIFICANT CHANGE UP (ref 0–0.5)
EOSINOPHIL # BLD AUTO: 0.36 K/UL — SIGNIFICANT CHANGE UP (ref 0–0.5)
EOSINOPHIL # BLD AUTO: 0.41 K/UL — SIGNIFICANT CHANGE UP (ref 0–0.5)
EOSINOPHIL NFR BLD AUTO: 0 % — SIGNIFICANT CHANGE UP (ref 0–6)
EOSINOPHIL NFR BLD AUTO: 0.1 % — SIGNIFICANT CHANGE UP (ref 0–6)
EOSINOPHIL NFR BLD AUTO: 2.7 % — SIGNIFICANT CHANGE UP (ref 0–6)
EOSINOPHIL NFR BLD AUTO: 3 % — SIGNIFICANT CHANGE UP (ref 0–6)
EOSINOPHIL NFR BLD AUTO: 4.3 % — SIGNIFICANT CHANGE UP (ref 0–6)
EOSINOPHIL NFR BLD AUTO: 5.5 % — SIGNIFICANT CHANGE UP (ref 0–6)
ESTIMATED AVERAGE GLUCOSE: 100 MG/DL — SIGNIFICANT CHANGE UP (ref 68–114)
ETHANOL SERPL-MCNC: 11 MG/DL — HIGH (ref 0–10)
FLUAV AG NPH QL: SIGNIFICANT CHANGE UP
FLUBV AG NPH QL: SIGNIFICANT CHANGE UP
FOLATE SERPL-MCNC: 13.5 NG/ML — SIGNIFICANT CHANGE UP
GLUCOSE SERPL-MCNC: 100 MG/DL — HIGH (ref 70–99)
GLUCOSE SERPL-MCNC: 102 MG/DL — HIGH (ref 70–99)
GLUCOSE SERPL-MCNC: 106 MG/DL — HIGH (ref 70–99)
GLUCOSE SERPL-MCNC: 110 MG/DL — HIGH (ref 70–99)
GLUCOSE SERPL-MCNC: 110 MG/DL — HIGH (ref 70–99)
GLUCOSE SERPL-MCNC: 116 MG/DL — HIGH (ref 70–99)
GLUCOSE SERPL-MCNC: 119 MG/DL — HIGH (ref 70–99)
GLUCOSE SERPL-MCNC: 120 MG/DL — HIGH (ref 70–99)
GLUCOSE SERPL-MCNC: 120 MG/DL — HIGH (ref 70–99)
GLUCOSE SERPL-MCNC: 122 MG/DL — HIGH (ref 70–99)
GLUCOSE SERPL-MCNC: 124 MG/DL — HIGH (ref 70–99)
GLUCOSE SERPL-MCNC: 125 MG/DL — HIGH (ref 70–99)
GLUCOSE SERPL-MCNC: 133 MG/DL — HIGH (ref 70–99)
GLUCOSE SERPL-MCNC: 135 MG/DL — HIGH (ref 70–99)
GLUCOSE SERPL-MCNC: 136 MG/DL — HIGH (ref 70–99)
GLUCOSE SERPL-MCNC: 142 MG/DL — HIGH (ref 70–99)
GLUCOSE SERPL-MCNC: 143 MG/DL — HIGH (ref 70–99)
GLUCOSE SERPL-MCNC: 150 MG/DL — HIGH (ref 70–99)
GLUCOSE SERPL-MCNC: 158 MG/DL — HIGH (ref 70–99)
GLUCOSE SERPL-MCNC: 170 MG/DL — HIGH (ref 70–99)
GLUCOSE SERPL-MCNC: 208 MG/DL — HIGH (ref 70–99)
GLUCOSE SERPL-MCNC: 91 MG/DL — SIGNIFICANT CHANGE UP (ref 70–99)
GLUCOSE UR QL: NEGATIVE — SIGNIFICANT CHANGE UP
GLUCOSE UR QL: NEGATIVE — SIGNIFICANT CHANGE UP
GRAM STN FLD: SIGNIFICANT CHANGE UP
HCT VFR BLD CALC: 23.5 % — LOW (ref 39–50)
HCT VFR BLD CALC: 24.8 % — LOW (ref 39–50)
HCT VFR BLD CALC: 25 % — LOW (ref 39–50)
HCT VFR BLD CALC: 25.1 % — LOW (ref 39–50)
HCT VFR BLD CALC: 25.3 % — LOW (ref 39–50)
HCT VFR BLD CALC: 25.8 % — LOW (ref 39–50)
HCT VFR BLD CALC: 26.1 % — LOW (ref 39–50)
HCT VFR BLD CALC: 26.1 % — LOW (ref 39–50)
HCT VFR BLD CALC: 27 % — LOW (ref 39–50)
HCT VFR BLD CALC: 27.4 % — LOW (ref 39–50)
HCT VFR BLD CALC: 27.5 % — LOW (ref 39–50)
HCT VFR BLD CALC: 29.8 % — LOW (ref 39–50)
HCT VFR BLD CALC: 30.2 % — LOW (ref 39–50)
HCT VFR BLD CALC: 32.8 % — LOW (ref 39–50)
HCT VFR BLD CALC: 33.5 % — LOW (ref 39–50)
HCT VFR BLD CALC: 34.9 % — LOW (ref 39–50)
HCT VFR BLD CALC: 35 % — LOW (ref 39–50)
HCT VFR BLD CALC: 35.5 % — LOW (ref 39–50)
HCT VFR BLD CALC: 38.6 % — LOW (ref 39–50)
HDLC SERPL-MCNC: 43 MG/DL — SIGNIFICANT CHANGE UP
HGB BLD-MCNC: 10.2 G/DL — LOW (ref 13–17)
HGB BLD-MCNC: 10.8 G/DL — LOW (ref 13–17)
HGB BLD-MCNC: 10.8 G/DL — LOW (ref 13–17)
HGB BLD-MCNC: 11.2 G/DL — LOW (ref 13–17)
HGB BLD-MCNC: 11.8 G/DL — LOW (ref 13–17)
HGB BLD-MCNC: 11.8 G/DL — LOW (ref 13–17)
HGB BLD-MCNC: 13.2 G/DL — SIGNIFICANT CHANGE UP (ref 13–17)
HGB BLD-MCNC: 7.8 G/DL — LOW (ref 13–17)
HGB BLD-MCNC: 7.8 G/DL — LOW (ref 13–17)
HGB BLD-MCNC: 7.9 G/DL — LOW (ref 13–17)
HGB BLD-MCNC: 8.1 G/DL — LOW (ref 13–17)
HGB BLD-MCNC: 8.3 G/DL — LOW (ref 13–17)
HGB BLD-MCNC: 8.4 G/DL — LOW (ref 13–17)
HGB BLD-MCNC: 8.6 G/DL — LOW (ref 13–17)
HGB BLD-MCNC: 8.8 G/DL — LOW (ref 13–17)
HGB BLD-MCNC: 9.1 G/DL — LOW (ref 13–17)
HGB BLD-MCNC: 9.8 G/DL — LOW (ref 13–17)
IMM GRANULOCYTES NFR BLD AUTO: 0.2 % — SIGNIFICANT CHANGE UP (ref 0–0.9)
IMM GRANULOCYTES NFR BLD AUTO: 0.2 % — SIGNIFICANT CHANGE UP (ref 0–0.9)
IMM GRANULOCYTES NFR BLD AUTO: 0.3 % — SIGNIFICANT CHANGE UP (ref 0–1.5)
IMM GRANULOCYTES NFR BLD AUTO: 0.3 % — SIGNIFICANT CHANGE UP (ref 0–1.5)
IMM GRANULOCYTES NFR BLD AUTO: 1 % — SIGNIFICANT CHANGE UP (ref 0–1.5)
IMM GRANULOCYTES NFR BLD AUTO: 1.1 % — SIGNIFICANT CHANGE UP (ref 0–1.5)
INR BLD: 1.01 RATIO — SIGNIFICANT CHANGE UP (ref 0.88–1.16)
INR BLD: 1.09 RATIO — SIGNIFICANT CHANGE UP (ref 0.88–1.16)
IRON SATN MFR SERPL: 11 % — LOW (ref 16–55)
IRON SATN MFR SERPL: 22 UG/DL — LOW (ref 45–165)
KETONES UR-MCNC: ABNORMAL
KETONES UR-MCNC: ABNORMAL
LACTATE SERPL-SCNC: 1 MMOL/L — SIGNIFICANT CHANGE UP (ref 0.7–2)
LACTATE SERPL-SCNC: 3.2 MMOL/L — HIGH (ref 0.7–2)
LEGIONELLA AG UR QL: NEGATIVE — SIGNIFICANT CHANGE UP
LEUKOCYTE ESTERASE UR-ACNC: ABNORMAL
LEUKOCYTE ESTERASE UR-ACNC: ABNORMAL
LIDOCAIN IGE QN: 398 U/L — HIGH (ref 73–393)
LIDOCAIN IGE QN: 98 U/L — SIGNIFICANT CHANGE UP (ref 73–393)
LIPID PNL WITH DIRECT LDL SERPL: 76 MG/DL — SIGNIFICANT CHANGE UP
LYMPHOCYTES # BLD AUTO: 1.11 K/UL — SIGNIFICANT CHANGE UP (ref 1–3.3)
LYMPHOCYTES # BLD AUTO: 1.15 K/UL — SIGNIFICANT CHANGE UP (ref 1–3.3)
LYMPHOCYTES # BLD AUTO: 1.26 K/UL — SIGNIFICANT CHANGE UP (ref 1–3.3)
LYMPHOCYTES # BLD AUTO: 1.45 K/UL — SIGNIFICANT CHANGE UP (ref 1–3.3)
LYMPHOCYTES # BLD AUTO: 1.6 K/UL — SIGNIFICANT CHANGE UP (ref 1–3.3)
LYMPHOCYTES # BLD AUTO: 14.1 % — SIGNIFICANT CHANGE UP (ref 13–44)
LYMPHOCYTES # BLD AUTO: 16 % — SIGNIFICANT CHANGE UP (ref 13–44)
LYMPHOCYTES # BLD AUTO: 16.8 % — SIGNIFICANT CHANGE UP (ref 13–44)
LYMPHOCYTES # BLD AUTO: 18.1 % — SIGNIFICANT CHANGE UP (ref 13–44)
LYMPHOCYTES # BLD AUTO: 2.14 K/UL — SIGNIFICANT CHANGE UP (ref 1–3.3)
LYMPHOCYTES # BLD AUTO: 22 % — SIGNIFICANT CHANGE UP (ref 13–44)
LYMPHOCYTES # BLD AUTO: 25.4 % — SIGNIFICANT CHANGE UP (ref 13–44)
MAGNESIUM SERPL-MCNC: 1.4 MG/DL — LOW (ref 1.6–2.6)
MAGNESIUM SERPL-MCNC: 1.5 MG/DL — LOW (ref 1.6–2.6)
MAGNESIUM SERPL-MCNC: 1.6 MG/DL — SIGNIFICANT CHANGE UP (ref 1.6–2.6)
MAGNESIUM SERPL-MCNC: 1.7 MG/DL — SIGNIFICANT CHANGE UP (ref 1.6–2.6)
MAGNESIUM SERPL-MCNC: 1.8 MG/DL — SIGNIFICANT CHANGE UP (ref 1.6–2.6)
MAGNESIUM SERPL-MCNC: 2 MG/DL — SIGNIFICANT CHANGE UP (ref 1.6–2.6)
MAGNESIUM SERPL-MCNC: 2 MG/DL — SIGNIFICANT CHANGE UP (ref 1.6–2.6)
MAGNESIUM SERPL-MCNC: 2.1 MG/DL — SIGNIFICANT CHANGE UP (ref 1.6–2.6)
MAGNESIUM SERPL-MCNC: 2.2 MG/DL — SIGNIFICANT CHANGE UP (ref 1.6–2.6)
MAGNESIUM SERPL-MCNC: 2.4 MG/DL — SIGNIFICANT CHANGE UP (ref 1.6–2.6)
MAGNESIUM SERPL-MCNC: 2.4 MG/DL — SIGNIFICANT CHANGE UP (ref 1.6–2.6)
MAGNESIUM SERPL-MCNC: 2.6 MG/DL — SIGNIFICANT CHANGE UP (ref 1.6–2.6)
MAGNESIUM SERPL-MCNC: 2.7 MG/DL — HIGH (ref 1.6–2.6)
MCHC RBC-ENTMCNC: 27 PG — SIGNIFICANT CHANGE UP (ref 27–34)
MCHC RBC-ENTMCNC: 27.2 PG — SIGNIFICANT CHANGE UP (ref 27–34)
MCHC RBC-ENTMCNC: 27.5 PG — SIGNIFICANT CHANGE UP (ref 27–34)
MCHC RBC-ENTMCNC: 27.7 PG — SIGNIFICANT CHANGE UP (ref 27–34)
MCHC RBC-ENTMCNC: 27.7 PG — SIGNIFICANT CHANGE UP (ref 27–34)
MCHC RBC-ENTMCNC: 27.9 PG — SIGNIFICANT CHANGE UP (ref 27–34)
MCHC RBC-ENTMCNC: 28 PG — SIGNIFICANT CHANGE UP (ref 27–34)
MCHC RBC-ENTMCNC: 28.1 PG — SIGNIFICANT CHANGE UP (ref 27–34)
MCHC RBC-ENTMCNC: 28.2 PG — SIGNIFICANT CHANGE UP (ref 27–34)
MCHC RBC-ENTMCNC: 28.4 PG — SIGNIFICANT CHANGE UP (ref 27–34)
MCHC RBC-ENTMCNC: 28.4 PG — SIGNIFICANT CHANGE UP (ref 27–34)
MCHC RBC-ENTMCNC: 28.5 PG — SIGNIFICANT CHANGE UP (ref 27–34)
MCHC RBC-ENTMCNC: 28.6 PG — SIGNIFICANT CHANGE UP (ref 27–34)
MCHC RBC-ENTMCNC: 30 PG — SIGNIFICANT CHANGE UP (ref 27–34)
MCHC RBC-ENTMCNC: 31.5 GM/DL — LOW (ref 32–36)
MCHC RBC-ENTMCNC: 31.6 GM/DL — LOW (ref 32–36)
MCHC RBC-ENTMCNC: 32 GM/DL — SIGNIFICANT CHANGE UP (ref 32–36)
MCHC RBC-ENTMCNC: 32 GM/DL — SIGNIFICANT CHANGE UP (ref 32–36)
MCHC RBC-ENTMCNC: 32.2 GM/DL — SIGNIFICANT CHANGE UP (ref 32–36)
MCHC RBC-ENTMCNC: 32.2 GM/DL — SIGNIFICANT CHANGE UP (ref 32–36)
MCHC RBC-ENTMCNC: 32.6 GM/DL — SIGNIFICANT CHANGE UP (ref 32–36)
MCHC RBC-ENTMCNC: 32.9 GM/DL — SIGNIFICANT CHANGE UP (ref 32–36)
MCHC RBC-ENTMCNC: 32.9 GM/DL — SIGNIFICANT CHANGE UP (ref 32–36)
MCHC RBC-ENTMCNC: 33 GM/DL — SIGNIFICANT CHANGE UP (ref 32–36)
MCHC RBC-ENTMCNC: 33.1 GM/DL — SIGNIFICANT CHANGE UP (ref 32–36)
MCHC RBC-ENTMCNC: 33.1 GM/DL — SIGNIFICANT CHANGE UP (ref 32–36)
MCHC RBC-ENTMCNC: 33.2 GM/DL — SIGNIFICANT CHANGE UP (ref 32–36)
MCHC RBC-ENTMCNC: 33.8 GM/DL — SIGNIFICANT CHANGE UP (ref 32–36)
MCHC RBC-ENTMCNC: 33.8 GM/DL — SIGNIFICANT CHANGE UP (ref 32–36)
MCHC RBC-ENTMCNC: 34.2 GM/DL — SIGNIFICANT CHANGE UP (ref 32–36)
MCHC RBC-ENTMCNC: 35.3 GM/DL — SIGNIFICANT CHANGE UP (ref 32–36)
MCV RBC AUTO: 78.9 FL — LOW (ref 80–100)
MCV RBC AUTO: 80.4 FL — SIGNIFICANT CHANGE UP (ref 80–100)
MCV RBC AUTO: 83.9 FL — SIGNIFICANT CHANGE UP (ref 80–100)
MCV RBC AUTO: 84.1 FL — SIGNIFICANT CHANGE UP (ref 80–100)
MCV RBC AUTO: 84.2 FL — SIGNIFICANT CHANGE UP (ref 80–100)
MCV RBC AUTO: 84.3 FL — SIGNIFICANT CHANGE UP (ref 80–100)
MCV RBC AUTO: 84.4 FL — SIGNIFICANT CHANGE UP (ref 80–100)
MCV RBC AUTO: 85.1 FL — SIGNIFICANT CHANGE UP (ref 80–100)
MCV RBC AUTO: 85.1 FL — SIGNIFICANT CHANGE UP (ref 80–100)
MCV RBC AUTO: 85.4 FL — SIGNIFICANT CHANGE UP (ref 80–100)
MCV RBC AUTO: 85.6 FL — SIGNIFICANT CHANGE UP (ref 80–100)
MCV RBC AUTO: 86.1 FL — SIGNIFICANT CHANGE UP (ref 80–100)
MCV RBC AUTO: 86.4 FL — SIGNIFICANT CHANGE UP (ref 80–100)
MCV RBC AUTO: 86.4 FL — SIGNIFICANT CHANGE UP (ref 80–100)
MCV RBC AUTO: 86.7 FL — SIGNIFICANT CHANGE UP (ref 80–100)
MCV RBC AUTO: 87.8 FL — SIGNIFICANT CHANGE UP (ref 80–100)
MCV RBC AUTO: 88.2 FL — SIGNIFICANT CHANGE UP (ref 80–100)
MCV RBC AUTO: 88.3 FL — SIGNIFICANT CHANGE UP (ref 80–100)
MCV RBC AUTO: 88.6 FL — SIGNIFICANT CHANGE UP (ref 80–100)
MONOCYTES # BLD AUTO: 0.42 K/UL — SIGNIFICANT CHANGE UP (ref 0–0.9)
MONOCYTES # BLD AUTO: 0.5 K/UL — SIGNIFICANT CHANGE UP (ref 0–0.9)
MONOCYTES # BLD AUTO: 0.6 K/UL — SIGNIFICANT CHANGE UP (ref 0–0.9)
MONOCYTES # BLD AUTO: 0.83 K/UL — SIGNIFICANT CHANGE UP (ref 0–0.9)
MONOCYTES # BLD AUTO: 0.86 K/UL — SIGNIFICANT CHANGE UP (ref 0–0.9)
MONOCYTES # BLD AUTO: 0.89 K/UL — SIGNIFICANT CHANGE UP (ref 0–0.9)
MONOCYTES NFR BLD AUTO: 10.2 % — SIGNIFICANT CHANGE UP (ref 2–14)
MONOCYTES NFR BLD AUTO: 10.5 % — SIGNIFICANT CHANGE UP (ref 2–14)
MONOCYTES NFR BLD AUTO: 6.6 % — SIGNIFICANT CHANGE UP (ref 2–14)
MONOCYTES NFR BLD AUTO: 6.9 % — SIGNIFICANT CHANGE UP (ref 2–14)
MONOCYTES NFR BLD AUTO: 8 % — SIGNIFICANT CHANGE UP (ref 2–14)
MONOCYTES NFR BLD AUTO: 9.8 % — SIGNIFICANT CHANGE UP (ref 2–14)
NEUTROPHILS # BLD AUTO: 4.54 K/UL — SIGNIFICANT CHANGE UP (ref 1.8–7.4)
NEUTROPHILS # BLD AUTO: 4.9 K/UL — SIGNIFICANT CHANGE UP (ref 1.8–7.4)
NEUTROPHILS # BLD AUTO: 4.97 K/UL — SIGNIFICANT CHANGE UP (ref 1.8–7.4)
NEUTROPHILS # BLD AUTO: 5.18 K/UL — SIGNIFICANT CHANGE UP (ref 1.8–7.4)
NEUTROPHILS # BLD AUTO: 5.85 K/UL — SIGNIFICANT CHANGE UP (ref 1.8–7.4)
NEUTROPHILS # BLD AUTO: 6.67 K/UL — SIGNIFICANT CHANGE UP (ref 1.8–7.4)
NEUTROPHILS NFR BLD AUTO: 59.2 % — SIGNIFICANT CHANGE UP (ref 43–77)
NEUTROPHILS NFR BLD AUTO: 67.4 % — SIGNIFICANT CHANGE UP (ref 43–77)
NEUTROPHILS NFR BLD AUTO: 69.1 % — SIGNIFICANT CHANGE UP (ref 43–77)
NEUTROPHILS NFR BLD AUTO: 71.2 % — SIGNIFICANT CHANGE UP (ref 43–77)
NEUTROPHILS NFR BLD AUTO: 73.8 % — SIGNIFICANT CHANGE UP (ref 43–77)
NEUTROPHILS NFR BLD AUTO: 74.2 % — SIGNIFICANT CHANGE UP (ref 43–77)
NITRITE UR-MCNC: NEGATIVE — SIGNIFICANT CHANGE UP
NITRITE UR-MCNC: NEGATIVE — SIGNIFICANT CHANGE UP
NON HDL CHOLESTEROL: 107 MG/DL — SIGNIFICANT CHANGE UP
NT-PROBNP SERPL-SCNC: 112 PG/ML — SIGNIFICANT CHANGE UP (ref 0–125)
NT-PROBNP SERPL-SCNC: 1980 PG/ML — HIGH (ref 0–125)
PCP SPEC-MCNC: SIGNIFICANT CHANGE UP
PH UR: 6 — SIGNIFICANT CHANGE UP (ref 5–8)
PH UR: 8 — SIGNIFICANT CHANGE UP (ref 5–8)
PHOSPHATE SERPL-MCNC: 1.5 MG/DL — LOW (ref 2.5–4.5)
PHOSPHATE SERPL-MCNC: 1.6 MG/DL — LOW (ref 2.5–4.5)
PHOSPHATE SERPL-MCNC: 2 MG/DL — LOW (ref 2.5–4.5)
PHOSPHATE SERPL-MCNC: 2 MG/DL — LOW (ref 2.5–4.5)
PHOSPHATE SERPL-MCNC: 2.4 MG/DL — LOW (ref 2.5–4.5)
PHOSPHATE SERPL-MCNC: 2.5 MG/DL — SIGNIFICANT CHANGE UP (ref 2.5–4.5)
PHOSPHATE SERPL-MCNC: 2.6 MG/DL — SIGNIFICANT CHANGE UP (ref 2.5–4.5)
PHOSPHATE SERPL-MCNC: 2.7 MG/DL — SIGNIFICANT CHANGE UP (ref 2.5–4.5)
PHOSPHATE SERPL-MCNC: 2.8 MG/DL — SIGNIFICANT CHANGE UP (ref 2.5–4.5)
PHOSPHATE SERPL-MCNC: 3.1 MG/DL — SIGNIFICANT CHANGE UP (ref 2.5–4.5)
PHOSPHATE SERPL-MCNC: 3.2 MG/DL — SIGNIFICANT CHANGE UP (ref 2.5–4.5)
PHOSPHATE SERPL-MCNC: 3.4 MG/DL — SIGNIFICANT CHANGE UP (ref 2.5–4.5)
PLATELET # BLD AUTO: 190 K/UL — SIGNIFICANT CHANGE UP (ref 150–400)
PLATELET # BLD AUTO: 192 K/UL — SIGNIFICANT CHANGE UP (ref 150–400)
PLATELET # BLD AUTO: 195 K/UL — SIGNIFICANT CHANGE UP (ref 150–400)
PLATELET # BLD AUTO: 211 K/UL — SIGNIFICANT CHANGE UP (ref 150–400)
PLATELET # BLD AUTO: 216 K/UL — SIGNIFICANT CHANGE UP (ref 150–400)
PLATELET # BLD AUTO: 219 K/UL — SIGNIFICANT CHANGE UP (ref 150–400)
PLATELET # BLD AUTO: 225 K/UL — SIGNIFICANT CHANGE UP (ref 150–400)
PLATELET # BLD AUTO: 245 K/UL — SIGNIFICANT CHANGE UP (ref 150–400)
PLATELET # BLD AUTO: 272 K/UL — SIGNIFICANT CHANGE UP (ref 150–400)
PLATELET # BLD AUTO: 284 K/UL — SIGNIFICANT CHANGE UP (ref 150–400)
PLATELET # BLD AUTO: 330 K/UL — SIGNIFICANT CHANGE UP (ref 150–400)
PLATELET # BLD AUTO: 336 K/UL — SIGNIFICANT CHANGE UP (ref 150–400)
PLATELET # BLD AUTO: 409 K/UL — HIGH (ref 150–400)
PLATELET # BLD AUTO: 419 K/UL — HIGH (ref 150–400)
PLATELET # BLD AUTO: 489 K/UL — HIGH (ref 150–400)
PLATELET # BLD AUTO: 508 K/UL — HIGH (ref 150–400)
PLATELET # BLD AUTO: 577 K/UL — HIGH (ref 150–400)
POTASSIUM SERPL-MCNC: 3.1 MMOL/L — LOW (ref 3.5–5.3)
POTASSIUM SERPL-MCNC: 3.2 MMOL/L — LOW (ref 3.5–5.3)
POTASSIUM SERPL-MCNC: 3.2 MMOL/L — LOW (ref 3.5–5.3)
POTASSIUM SERPL-MCNC: 3.3 MMOL/L — LOW (ref 3.5–5.3)
POTASSIUM SERPL-MCNC: 3.3 MMOL/L — LOW (ref 3.5–5.3)
POTASSIUM SERPL-MCNC: 3.4 MMOL/L — LOW (ref 3.5–5.3)
POTASSIUM SERPL-MCNC: 3.5 MMOL/L — SIGNIFICANT CHANGE UP (ref 3.5–5.3)
POTASSIUM SERPL-MCNC: 3.6 MMOL/L — SIGNIFICANT CHANGE UP (ref 3.5–5.3)
POTASSIUM SERPL-MCNC: 3.7 MMOL/L — SIGNIFICANT CHANGE UP (ref 3.5–5.3)
POTASSIUM SERPL-MCNC: 3.7 MMOL/L — SIGNIFICANT CHANGE UP (ref 3.5–5.3)
POTASSIUM SERPL-MCNC: 3.8 MMOL/L — SIGNIFICANT CHANGE UP (ref 3.5–5.3)
POTASSIUM SERPL-MCNC: 3.8 MMOL/L — SIGNIFICANT CHANGE UP (ref 3.5–5.3)
POTASSIUM SERPL-MCNC: 4 MMOL/L — SIGNIFICANT CHANGE UP (ref 3.5–5.3)
POTASSIUM SERPL-MCNC: 4.1 MMOL/L — SIGNIFICANT CHANGE UP (ref 3.5–5.3)
POTASSIUM SERPL-MCNC: 4.2 MMOL/L — SIGNIFICANT CHANGE UP (ref 3.5–5.3)
POTASSIUM SERPL-MCNC: 4.3 MMOL/L — SIGNIFICANT CHANGE UP (ref 3.5–5.3)
POTASSIUM SERPL-MCNC: 4.3 MMOL/L — SIGNIFICANT CHANGE UP (ref 3.5–5.3)
POTASSIUM SERPL-MCNC: 4.4 MMOL/L — SIGNIFICANT CHANGE UP (ref 3.5–5.3)
POTASSIUM SERPL-SCNC: 3.1 MMOL/L — LOW (ref 3.5–5.3)
POTASSIUM SERPL-SCNC: 3.2 MMOL/L — LOW (ref 3.5–5.3)
POTASSIUM SERPL-SCNC: 3.2 MMOL/L — LOW (ref 3.5–5.3)
POTASSIUM SERPL-SCNC: 3.3 MMOL/L — LOW (ref 3.5–5.3)
POTASSIUM SERPL-SCNC: 3.3 MMOL/L — LOW (ref 3.5–5.3)
POTASSIUM SERPL-SCNC: 3.4 MMOL/L — LOW (ref 3.5–5.3)
POTASSIUM SERPL-SCNC: 3.5 MMOL/L — SIGNIFICANT CHANGE UP (ref 3.5–5.3)
POTASSIUM SERPL-SCNC: 3.6 MMOL/L — SIGNIFICANT CHANGE UP (ref 3.5–5.3)
POTASSIUM SERPL-SCNC: 3.7 MMOL/L — SIGNIFICANT CHANGE UP (ref 3.5–5.3)
POTASSIUM SERPL-SCNC: 3.7 MMOL/L — SIGNIFICANT CHANGE UP (ref 3.5–5.3)
POTASSIUM SERPL-SCNC: 3.8 MMOL/L — SIGNIFICANT CHANGE UP (ref 3.5–5.3)
POTASSIUM SERPL-SCNC: 3.8 MMOL/L — SIGNIFICANT CHANGE UP (ref 3.5–5.3)
POTASSIUM SERPL-SCNC: 4 MMOL/L — SIGNIFICANT CHANGE UP (ref 3.5–5.3)
POTASSIUM SERPL-SCNC: 4.1 MMOL/L — SIGNIFICANT CHANGE UP (ref 3.5–5.3)
POTASSIUM SERPL-SCNC: 4.2 MMOL/L — SIGNIFICANT CHANGE UP (ref 3.5–5.3)
POTASSIUM SERPL-SCNC: 4.3 MMOL/L — SIGNIFICANT CHANGE UP (ref 3.5–5.3)
POTASSIUM SERPL-SCNC: 4.3 MMOL/L — SIGNIFICANT CHANGE UP (ref 3.5–5.3)
POTASSIUM SERPL-SCNC: 4.4 MMOL/L — SIGNIFICANT CHANGE UP (ref 3.5–5.3)
PROCALCITONIN SERPL-MCNC: 0.18 NG/ML — HIGH (ref 0.02–0.1)
PROT SERPL-MCNC: 5.4 GM/DL — LOW (ref 6–8.3)
PROT SERPL-MCNC: 5.6 GM/DL — LOW (ref 6–8.3)
PROT SERPL-MCNC: 5.8 GM/DL — LOW (ref 6–8.3)
PROT SERPL-MCNC: 6 GM/DL — SIGNIFICANT CHANGE UP (ref 6–8.3)
PROT SERPL-MCNC: 6.1 GM/DL — SIGNIFICANT CHANGE UP (ref 6–8.3)
PROT SERPL-MCNC: 6.2 GM/DL — SIGNIFICANT CHANGE UP (ref 6–8.3)
PROT SERPL-MCNC: 6.2 GM/DL — SIGNIFICANT CHANGE UP (ref 6–8.3)
PROT SERPL-MCNC: 6.4 GM/DL — SIGNIFICANT CHANGE UP (ref 6–8.3)
PROT SERPL-MCNC: 6.5 GM/DL — SIGNIFICANT CHANGE UP (ref 6–8.3)
PROT SERPL-MCNC: 6.6 GM/DL — SIGNIFICANT CHANGE UP (ref 6–8.3)
PROT SERPL-MCNC: 7.1 GM/DL — SIGNIFICANT CHANGE UP (ref 6–8.3)
PROT SERPL-MCNC: 7.4 GM/DL — SIGNIFICANT CHANGE UP (ref 6–8.3)
PROT SERPL-MCNC: 8.2 GM/DL — SIGNIFICANT CHANGE UP (ref 6–8.3)
PROT UR-MCNC: 30 MG/DL
PROT UR-MCNC: NEGATIVE — SIGNIFICANT CHANGE UP
PROTHROM AB SERPL-ACNC: 11.7 SEC — SIGNIFICANT CHANGE UP (ref 10.5–13.4)
PROTHROM AB SERPL-ACNC: 12.6 SEC — SIGNIFICANT CHANGE UP (ref 10.5–13.4)
RAPID RVP RESULT: SIGNIFICANT CHANGE UP
RAPID RVP RESULT: SIGNIFICANT CHANGE UP
RBC # BLD: 2.73 M/UL — LOW (ref 4.2–5.8)
RBC # BLD: 2.8 M/UL — LOW (ref 4.2–5.8)
RBC # BLD: 2.83 M/UL — LOW (ref 4.2–5.8)
RBC # BLD: 2.88 M/UL — LOW (ref 4.2–5.8)
RBC # BLD: 2.94 M/UL — LOW (ref 4.2–5.8)
RBC # BLD: 3.01 M/UL — LOW (ref 4.2–5.8)
RBC # BLD: 3.02 M/UL — LOW (ref 4.2–5.8)
RBC # BLD: 3.03 M/UL — LOW (ref 4.2–5.8)
RBC # BLD: 3.2 M/UL — LOW (ref 4.2–5.8)
RBC # BLD: 3.23 M/UL — LOW (ref 4.2–5.8)
RBC # BLD: 3.25 M/UL — LOW (ref 4.2–5.8)
RBC # BLD: 3.54 M/UL — LOW (ref 4.2–5.8)
RBC # BLD: 3.59 M/UL — LOW (ref 4.2–5.8)
RBC # BLD: 3.8 M/UL — LOW (ref 4.2–5.8)
RBC # BLD: 3.83 M/UL — LOW (ref 4.2–5.8)
RBC # BLD: 4.05 M/UL — LOW (ref 4.2–5.8)
RBC # BLD: 4.23 M/UL — SIGNIFICANT CHANGE UP (ref 4.2–5.8)
RBC # BLD: 4.34 M/UL — SIGNIFICANT CHANGE UP (ref 4.2–5.8)
RBC # BLD: 4.89 M/UL — SIGNIFICANT CHANGE UP (ref 4.2–5.8)
RBC # FLD: 14.8 % — HIGH (ref 10.3–14.5)
RBC # FLD: 14.9 % — HIGH (ref 10.3–14.5)
RBC # FLD: 15 % — HIGH (ref 10.3–14.5)
RBC # FLD: 15.1 % — HIGH (ref 10.3–14.5)
RBC # FLD: 15.3 % — HIGH (ref 10.3–14.5)
RBC # FLD: 15.3 % — HIGH (ref 10.3–14.5)
RBC # FLD: 15.5 % — HIGH (ref 10.3–14.5)
RBC # FLD: 15.6 % — HIGH (ref 10.3–14.5)
RBC # FLD: 15.7 % — HIGH (ref 10.3–14.5)
RBC # FLD: 15.7 % — HIGH (ref 10.3–14.5)
RBC # FLD: 15.8 % — HIGH (ref 10.3–14.5)
RBC # FLD: 15.9 % — HIGH (ref 10.3–14.5)
RBC # FLD: 15.9 % — HIGH (ref 10.3–14.5)
RBC # FLD: 16 % — HIGH (ref 10.3–14.5)
RBC # FLD: 16 % — HIGH (ref 10.3–14.5)
RBC # FLD: 17 % — HIGH (ref 10.3–14.5)
RBC # FLD: 17.2 % — HIGH (ref 10.3–14.5)
RSV RNA NPH QL NAA+NON-PROBE: SIGNIFICANT CHANGE UP
S PNEUM AG UR QL: NEGATIVE — SIGNIFICANT CHANGE UP
SARS-COV-2 RNA SPEC QL NAA+PROBE: SIGNIFICANT CHANGE UP
SODIUM SERPL-SCNC: 130 MMOL/L — LOW (ref 135–145)
SODIUM SERPL-SCNC: 131 MMOL/L — LOW (ref 135–145)
SODIUM SERPL-SCNC: 131 MMOL/L — LOW (ref 135–145)
SODIUM SERPL-SCNC: 132 MMOL/L — LOW (ref 135–145)
SODIUM SERPL-SCNC: 133 MMOL/L — LOW (ref 135–145)
SODIUM SERPL-SCNC: 133 MMOL/L — LOW (ref 135–145)
SODIUM SERPL-SCNC: 134 MMOL/L — LOW (ref 135–145)
SODIUM SERPL-SCNC: 135 MMOL/L — SIGNIFICANT CHANGE UP (ref 135–145)
SODIUM SERPL-SCNC: 136 MMOL/L — SIGNIFICANT CHANGE UP (ref 135–145)
SODIUM SERPL-SCNC: 136 MMOL/L — SIGNIFICANT CHANGE UP (ref 135–145)
SODIUM SERPL-SCNC: 137 MMOL/L — SIGNIFICANT CHANGE UP (ref 135–145)
SP GR SPEC: 1.01 — SIGNIFICANT CHANGE UP (ref 1.01–1.02)
SP GR SPEC: 1.01 — SIGNIFICANT CHANGE UP (ref 1.01–1.02)
SPECIMEN SOURCE: SIGNIFICANT CHANGE UP
T3FREE SERPL-MCNC: 0.55 PG/ML — LOW (ref 2–4.4)
T4 FREE SERPL-MCNC: 0.24 NG/DL — LOW (ref 0.76–1.46)
TIBC SERPL-MCNC: 208 UG/DL — LOW (ref 220–430)
TRIGL SERPL-MCNC: 152 MG/DL — HIGH
TRIGL SERPL-MCNC: 156 MG/DL — HIGH
TROPONIN I, HIGH SENSITIVITY RESULT: 18.15 NG/L — SIGNIFICANT CHANGE UP
TROPONIN I, HIGH SENSITIVITY RESULT: 22.08 NG/L — SIGNIFICANT CHANGE UP
TSH SERPL-MCNC: 99.1 UU/ML — HIGH (ref 0.34–4.82)
UIBC SERPL-MCNC: 186 UG/DL — SIGNIFICANT CHANGE UP (ref 110–370)
UROBILINOGEN FLD QL: 8
UROBILINOGEN FLD QL: 8
VIT B12 SERPL-MCNC: >2000 PG/ML — HIGH (ref 232–1245)
WBC # BLD: 6.1 K/UL — SIGNIFICANT CHANGE UP (ref 3.8–10.5)
WBC # BLD: 6.25 K/UL — SIGNIFICANT CHANGE UP (ref 3.8–10.5)
WBC # BLD: 6.37 K/UL — SIGNIFICANT CHANGE UP (ref 3.8–10.5)
WBC # BLD: 6.43 K/UL — SIGNIFICANT CHANGE UP (ref 3.8–10.5)
WBC # BLD: 6.47 K/UL — SIGNIFICANT CHANGE UP (ref 3.8–10.5)
WBC # BLD: 6.66 K/UL — SIGNIFICANT CHANGE UP (ref 3.8–10.5)
WBC # BLD: 6.86 K/UL — SIGNIFICANT CHANGE UP (ref 3.8–10.5)
WBC # BLD: 6.87 K/UL — SIGNIFICANT CHANGE UP (ref 3.8–10.5)
WBC # BLD: 7.1 K/UL — SIGNIFICANT CHANGE UP (ref 3.8–10.5)
WBC # BLD: 7.27 K/UL — SIGNIFICANT CHANGE UP (ref 3.8–10.5)
WBC # BLD: 7.49 K/UL — SIGNIFICANT CHANGE UP (ref 3.8–10.5)
WBC # BLD: 7.69 K/UL — SIGNIFICANT CHANGE UP (ref 3.8–10.5)
WBC # BLD: 7.72 K/UL — SIGNIFICANT CHANGE UP (ref 3.8–10.5)
WBC # BLD: 7.89 K/UL — SIGNIFICANT CHANGE UP (ref 3.8–10.5)
WBC # BLD: 8.41 K/UL — SIGNIFICANT CHANGE UP (ref 3.8–10.5)
WBC # BLD: 9.03 K/UL — SIGNIFICANT CHANGE UP (ref 3.8–10.5)
WBC # BLD: 9.05 K/UL — SIGNIFICANT CHANGE UP (ref 3.8–10.5)
WBC # BLD: 9.54 K/UL — SIGNIFICANT CHANGE UP (ref 3.8–10.5)
WBC # BLD: 9.57 K/UL — SIGNIFICANT CHANGE UP (ref 3.8–10.5)
WBC # FLD AUTO: 6.1 K/UL — SIGNIFICANT CHANGE UP (ref 3.8–10.5)
WBC # FLD AUTO: 6.25 K/UL — SIGNIFICANT CHANGE UP (ref 3.8–10.5)
WBC # FLD AUTO: 6.37 K/UL — SIGNIFICANT CHANGE UP (ref 3.8–10.5)
WBC # FLD AUTO: 6.43 K/UL — SIGNIFICANT CHANGE UP (ref 3.8–10.5)
WBC # FLD AUTO: 6.47 K/UL — SIGNIFICANT CHANGE UP (ref 3.8–10.5)
WBC # FLD AUTO: 6.66 K/UL — SIGNIFICANT CHANGE UP (ref 3.8–10.5)
WBC # FLD AUTO: 6.86 K/UL — SIGNIFICANT CHANGE UP (ref 3.8–10.5)
WBC # FLD AUTO: 6.87 K/UL — SIGNIFICANT CHANGE UP (ref 3.8–10.5)
WBC # FLD AUTO: 7.1 K/UL — SIGNIFICANT CHANGE UP (ref 3.8–10.5)
WBC # FLD AUTO: 7.27 K/UL — SIGNIFICANT CHANGE UP (ref 3.8–10.5)
WBC # FLD AUTO: 7.49 K/UL — SIGNIFICANT CHANGE UP (ref 3.8–10.5)
WBC # FLD AUTO: 7.69 K/UL — SIGNIFICANT CHANGE UP (ref 3.8–10.5)
WBC # FLD AUTO: 7.72 K/UL — SIGNIFICANT CHANGE UP (ref 3.8–10.5)
WBC # FLD AUTO: 7.89 K/UL — SIGNIFICANT CHANGE UP (ref 3.8–10.5)
WBC # FLD AUTO: 8.41 K/UL — SIGNIFICANT CHANGE UP (ref 3.8–10.5)
WBC # FLD AUTO: 9.03 K/UL — SIGNIFICANT CHANGE UP (ref 3.8–10.5)
WBC # FLD AUTO: 9.05 K/UL — SIGNIFICANT CHANGE UP (ref 3.8–10.5)
WBC # FLD AUTO: 9.54 K/UL — SIGNIFICANT CHANGE UP (ref 3.8–10.5)
WBC # FLD AUTO: 9.57 K/UL — SIGNIFICANT CHANGE UP (ref 3.8–10.5)

## 2022-01-01 PROCEDURE — 99232 SBSQ HOSP IP/OBS MODERATE 35: CPT

## 2022-01-01 PROCEDURE — 84478 ASSAY OF TRIGLYCERIDES: CPT

## 2022-01-01 PROCEDURE — 77334 RADIATION TREATMENT AID(S): CPT

## 2022-01-01 PROCEDURE — 85025 COMPLETE CBC W/AUTO DIFF WBC: CPT

## 2022-01-01 PROCEDURE — 84100 ASSAY OF PHOSPHORUS: CPT

## 2022-01-01 PROCEDURE — 70553 MRI BRAIN STEM W/O & W/DYE: CPT | Mod: 26

## 2022-01-01 PROCEDURE — 36415 COLL VENOUS BLD VENIPUNCTURE: CPT

## 2022-01-01 PROCEDURE — 99239 HOSP IP/OBS DSCHRG MGMT >30: CPT

## 2022-01-01 PROCEDURE — 70498 CT ANGIOGRAPHY NECK: CPT | Mod: 26

## 2022-01-01 PROCEDURE — 99221 1ST HOSP IP/OBS SF/LOW 40: CPT

## 2022-01-01 PROCEDURE — 99285 EMERGENCY DEPT VISIT HI MDM: CPT

## 2022-01-01 PROCEDURE — 81001 URINALYSIS AUTO W/SCOPE: CPT

## 2022-01-01 PROCEDURE — 83735 ASSAY OF MAGNESIUM: CPT

## 2022-01-01 PROCEDURE — 82962 GLUCOSE BLOOD TEST: CPT

## 2022-01-01 PROCEDURE — 93306 TTE W/DOPPLER COMPLETE: CPT

## 2022-01-01 PROCEDURE — 80061 LIPID PANEL: CPT

## 2022-01-01 PROCEDURE — C9113: CPT

## 2022-01-01 PROCEDURE — 77263 THER RADIOLOGY TX PLNG CPLX: CPT

## 2022-01-01 PROCEDURE — 93010 ELECTROCARDIOGRAM REPORT: CPT

## 2022-01-01 PROCEDURE — 80076 HEPATIC FUNCTION PANEL: CPT

## 2022-01-01 PROCEDURE — 71045 X-RAY EXAM CHEST 1 VIEW: CPT | Mod: 26

## 2022-01-01 PROCEDURE — 86923 COMPATIBILITY TEST ELECTRIC: CPT

## 2022-01-01 PROCEDURE — 99406 BEHAV CHNG SMOKING 3-10 MIN: CPT

## 2022-01-01 PROCEDURE — 77295 3-D RADIOTHERAPY PLAN: CPT

## 2022-01-01 PROCEDURE — 82306 VITAMIN D 25 HYDROXY: CPT

## 2022-01-01 PROCEDURE — 83540 ASSAY OF IRON: CPT

## 2022-01-01 PROCEDURE — 94640 AIRWAY INHALATION TREATMENT: CPT

## 2022-01-01 PROCEDURE — 99024 POSTOP FOLLOW-UP VISIT: CPT

## 2022-01-01 PROCEDURE — 93005 ELECTROCARDIOGRAM TRACING: CPT

## 2022-01-01 PROCEDURE — 36430 TRANSFUSION BLD/BLD COMPNT: CPT

## 2022-01-01 PROCEDURE — 83690 ASSAY OF LIPASE: CPT

## 2022-01-01 PROCEDURE — 77290 THER RAD SIMULAJ FIELD CPLX: CPT

## 2022-01-01 PROCEDURE — 99231 SBSQ HOSP IP/OBS SF/LOW 25: CPT

## 2022-01-01 PROCEDURE — 80053 COMPREHEN METABOLIC PANEL: CPT

## 2022-01-01 PROCEDURE — P9016: CPT

## 2022-01-01 PROCEDURE — 99072 ADDL SUPL MATRL&STAF TM PHE: CPT

## 2022-01-01 PROCEDURE — 86850 RBC ANTIBODY SCREEN: CPT

## 2022-01-01 PROCEDURE — 25000 INCISION OF TENDON SHEATH: CPT | Mod: LT

## 2022-01-01 PROCEDURE — 71260 CT THORAX DX C+: CPT | Mod: 26,MA

## 2022-01-01 PROCEDURE — 70450 CT HEAD/BRAIN W/O DYE: CPT | Mod: 26,MD

## 2022-01-01 PROCEDURE — 74183 MRI ABD W/O CNTR FLWD CNTR: CPT | Mod: 26

## 2022-01-01 PROCEDURE — 99222 1ST HOSP IP/OBS MODERATE 55: CPT

## 2022-01-01 PROCEDURE — 99233 SBSQ HOSP IP/OBS HIGH 50: CPT

## 2022-01-01 PROCEDURE — 86901 BLOOD TYPING SEROLOGIC RH(D): CPT

## 2022-01-01 PROCEDURE — 84145 PROCALCITONIN (PCT): CPT

## 2022-01-01 PROCEDURE — 99214 OFFICE O/P EST MOD 30 MIN: CPT

## 2022-01-01 PROCEDURE — 80048 BASIC METABOLIC PNL TOTAL CA: CPT

## 2022-01-01 PROCEDURE — 70450 CT HEAD/BRAIN W/O DYE: CPT | Mod: 26

## 2022-01-01 PROCEDURE — 88341 IMHCHEM/IMCYTCHM EA ADD ANTB: CPT

## 2022-01-01 PROCEDURE — 93970 EXTREMITY STUDY: CPT

## 2022-01-01 PROCEDURE — 86900 BLOOD TYPING SEROLOGIC ABO: CPT

## 2022-01-01 PROCEDURE — 88305 TISSUE EXAM BY PATHOLOGIST: CPT | Mod: 26

## 2022-01-01 PROCEDURE — 70450 CT HEAD/BRAIN W/O DYE: CPT

## 2022-01-01 PROCEDURE — ZZZZZ: CPT

## 2022-01-01 PROCEDURE — 94760 N-INVAS EAR/PLS OXIMETRY 1: CPT

## 2022-01-01 PROCEDURE — 87899 AGENT NOS ASSAY W/OPTIC: CPT

## 2022-01-01 PROCEDURE — 78815 PET IMAGE W/CT SKULL-THIGH: CPT | Mod: 26,PI

## 2022-01-01 PROCEDURE — 12345: CPT | Mod: NC

## 2022-01-01 PROCEDURE — 83036 HEMOGLOBIN GLYCOSYLATED A1C: CPT

## 2022-01-01 PROCEDURE — 0225U NFCT DS DNA&RNA 21 SARSCOV2: CPT

## 2022-01-01 PROCEDURE — 76700 US EXAM ABDOM COMPLETE: CPT | Mod: 26

## 2022-01-01 PROCEDURE — 70496 CT ANGIOGRAPHY HEAD: CPT | Mod: 26

## 2022-01-01 PROCEDURE — 78815 PET IMAGE W/CT SKULL-THIGH: CPT

## 2022-01-01 PROCEDURE — 83550 IRON BINDING TEST: CPT

## 2022-01-01 PROCEDURE — 76700 US EXAM ABDOM COMPLETE: CPT

## 2022-01-01 PROCEDURE — 99223 1ST HOSP IP/OBS HIGH 75: CPT

## 2022-01-01 PROCEDURE — 74018 RADEX ABDOMEN 1 VIEW: CPT | Mod: 26

## 2022-01-01 PROCEDURE — A9579: CPT

## 2022-01-01 PROCEDURE — 84439 ASSAY OF FREE THYROXINE: CPT

## 2022-01-01 PROCEDURE — 84443 ASSAY THYROID STIM HORMONE: CPT

## 2022-01-01 PROCEDURE — 88341 IMHCHEM/IMCYTCHM EA ADD ANTB: CPT | Mod: 26

## 2022-01-01 PROCEDURE — 74177 CT ABD & PELVIS W/CONTRAST: CPT

## 2022-01-01 PROCEDURE — 77470 SPECIAL RADIATION TREATMENT: CPT

## 2022-01-01 PROCEDURE — 77333 RADIATION TREATMENT AID(S): CPT

## 2022-01-01 PROCEDURE — 99215 OFFICE O/P EST HI 40 MIN: CPT | Mod: 25

## 2022-01-01 PROCEDURE — 74018 RADEX ABDOMEN 1 VIEW: CPT

## 2022-01-01 PROCEDURE — 82607 VITAMIN B-12: CPT

## 2022-01-01 PROCEDURE — 82140 ASSAY OF AMMONIA: CPT

## 2022-01-01 PROCEDURE — 99213 OFFICE O/P EST LOW 20 MIN: CPT

## 2022-01-01 PROCEDURE — 85027 COMPLETE CBC AUTOMATED: CPT

## 2022-01-01 PROCEDURE — 71045 X-RAY EXAM CHEST 1 VIEW: CPT

## 2022-01-01 PROCEDURE — U0003: CPT

## 2022-01-01 PROCEDURE — 74177 CT ABD & PELVIS W/CONTRAST: CPT | Mod: 26

## 2022-01-01 PROCEDURE — 88305 TISSUE EXAM BY PATHOLOGIST: CPT

## 2022-01-01 PROCEDURE — 70553 MRI BRAIN STEM W/O & W/DYE: CPT

## 2022-01-01 PROCEDURE — 70498 CT ANGIOGRAPHY NECK: CPT

## 2022-01-01 PROCEDURE — 87040 BLOOD CULTURE FOR BACTERIA: CPT

## 2022-01-01 PROCEDURE — 93970 EXTREMITY STUDY: CPT | Mod: 26

## 2022-01-01 PROCEDURE — 83605 ASSAY OF LACTIC ACID: CPT

## 2022-01-01 PROCEDURE — 71275 CT ANGIOGRAPHY CHEST: CPT | Mod: 26,MA

## 2022-01-01 PROCEDURE — 74183 MRI ABD W/O CNTR FLWD CNTR: CPT

## 2022-01-01 PROCEDURE — 70496 CT ANGIOGRAPHY HEAD: CPT

## 2022-01-01 PROCEDURE — 82248 BILIRUBIN DIRECT: CPT

## 2022-01-01 PROCEDURE — 71275 CT ANGIOGRAPHY CHEST: CPT | Mod: 26,MD

## 2022-01-01 PROCEDURE — 84484 ASSAY OF TROPONIN QUANT: CPT

## 2022-01-01 PROCEDURE — U0005: CPT

## 2022-01-01 PROCEDURE — 87449 NOS EACH ORGANISM AG IA: CPT

## 2022-01-01 PROCEDURE — 84481 FREE ASSAY (FT-3): CPT

## 2022-01-01 PROCEDURE — A9552: CPT

## 2022-01-01 PROCEDURE — 87070 CULTURE OTHR SPECIMN AEROBIC: CPT

## 2022-01-01 PROCEDURE — 77300 RADIATION THERAPY DOSE PLAN: CPT

## 2022-01-01 PROCEDURE — 93306 TTE W/DOPPLER COMPLETE: CPT | Mod: 26

## 2022-01-01 PROCEDURE — C9399: CPT

## 2022-01-01 PROCEDURE — 88342 IMHCHEM/IMCYTCHM 1ST ANTB: CPT | Mod: 26

## 2022-01-01 PROCEDURE — 82746 ASSAY OF FOLIC ACID SERUM: CPT

## 2022-01-01 PROCEDURE — 88342 IMHCHEM/IMCYTCHM 1ST ANTB: CPT

## 2022-01-01 PROCEDURE — 88172 CYTP DX EVAL FNA 1ST EA SITE: CPT

## 2022-01-01 RX ORDER — METOCLOPRAMIDE HCL 10 MG
10 TABLET ORAL EVERY 8 HOURS
Refills: 0 | Status: DISCONTINUED | OUTPATIENT
Start: 2022-01-01 | End: 2022-01-01

## 2022-01-01 RX ORDER — ALPRAZOLAM 0.25 MG
1 TABLET ORAL
Qty: 0 | Refills: 0 | DISCHARGE

## 2022-01-01 RX ORDER — SODIUM CHLORIDE 9 MG/ML
2000 INJECTION INTRAMUSCULAR; INTRAVENOUS; SUBCUTANEOUS ONCE
Refills: 0 | Status: COMPLETED | OUTPATIENT
Start: 2022-01-01 | End: 2022-01-01

## 2022-01-01 RX ORDER — ALBUTEROL 90 UG/1
2 AEROSOL, METERED ORAL EVERY 6 HOURS
Refills: 0 | Status: DISCONTINUED | OUTPATIENT
Start: 2022-01-01 | End: 2022-01-01

## 2022-01-01 RX ORDER — LEVOTHYROXINE SODIUM 125 MCG
25 TABLET ORAL DAILY
Refills: 0 | Status: DISCONTINUED | OUTPATIENT
Start: 2022-01-01 | End: 2022-01-01

## 2022-01-01 RX ORDER — ALPRAZOLAM 0.25 MG
0 TABLET ORAL
Qty: 0 | Refills: 0 | DISCHARGE

## 2022-01-01 RX ORDER — PROCHLORPERAZINE MALEATE 5 MG
1 TABLET ORAL
Qty: 0 | Refills: 0 | DISCHARGE

## 2022-01-01 RX ORDER — PREGABALIN 225 MG/1
1 CAPSULE ORAL
Qty: 0 | Refills: 0 | DISCHARGE

## 2022-01-01 RX ORDER — BUPRENORPHINE AND NALOXONE 2; .5 MG/1; MG/1
1 TABLET SUBLINGUAL DAILY
Refills: 0 | Status: DISCONTINUED | OUTPATIENT
Start: 2022-01-01 | End: 2022-01-01

## 2022-01-01 RX ORDER — LANOLIN ALCOHOL/MO/W.PET/CERES
3 CREAM (GRAM) TOPICAL AT BEDTIME
Refills: 0 | Status: DISCONTINUED | OUTPATIENT
Start: 2022-01-01 | End: 2022-01-01

## 2022-01-01 RX ORDER — SODIUM,POTASSIUM PHOSPHATES 278-250MG
1 POWDER IN PACKET (EA) ORAL DAILY
Refills: 0 | Status: COMPLETED | OUTPATIENT
Start: 2022-01-01 | End: 2022-01-01

## 2022-01-01 RX ORDER — DEXAMETHASONE 0.5 MG/5ML
2 ELIXIR ORAL
Qty: 0 | Refills: 0 | DISCHARGE

## 2022-01-01 RX ORDER — MAGNESIUM OXIDE 400 MG ORAL TABLET 241.3 MG
400 TABLET ORAL EVERY 12 HOURS
Refills: 0 | Status: COMPLETED | OUTPATIENT
Start: 2022-01-01 | End: 2022-01-01

## 2022-01-01 RX ORDER — POTASSIUM PHOSPHATE, MONOBASIC POTASSIUM PHOSPHATE, DIBASIC 236; 224 MG/ML; MG/ML
15 INJECTION, SOLUTION INTRAVENOUS ONCE
Refills: 0 | Status: COMPLETED | OUTPATIENT
Start: 2022-01-01 | End: 2022-01-01

## 2022-01-01 RX ORDER — LISINOPRIL 2.5 MG/1
40 TABLET ORAL DAILY
Refills: 0 | Status: DISCONTINUED | OUTPATIENT
Start: 2022-01-01 | End: 2022-01-01

## 2022-01-01 RX ORDER — SUCRALFATE 1 G
1 TABLET ORAL
Refills: 0 | Status: DISCONTINUED | OUTPATIENT
Start: 2022-01-01 | End: 2022-01-01

## 2022-01-01 RX ORDER — FENTANYL CITRATE 50 UG/ML
50 INJECTION INTRAVENOUS
Refills: 0 | Status: DISCONTINUED | OUTPATIENT
Start: 2022-01-01 | End: 2022-01-01

## 2022-01-01 RX ORDER — ALBUTEROL SULFATE 90 UG/1
108 (90 BASE) INHALANT RESPIRATORY (INHALATION)
Qty: 8 | Refills: 0 | Status: ACTIVE | COMMUNITY
Start: 2022-01-01

## 2022-01-01 RX ORDER — NICOTINE POLACRILEX 2 MG
1 GUM BUCCAL ONCE
Refills: 0 | Status: COMPLETED | OUTPATIENT
Start: 2022-01-01 | End: 2022-01-01

## 2022-01-01 RX ORDER — SODIUM CHLORIDE 9 MG/ML
2000 INJECTION, SOLUTION INTRAVENOUS ONCE
Refills: 0 | Status: COMPLETED | OUTPATIENT
Start: 2022-01-01 | End: 2022-01-01

## 2022-01-01 RX ORDER — NICOTINE POLACRILEX 2 MG
1 GUM BUCCAL DAILY
Refills: 0 | Status: DISCONTINUED | OUTPATIENT
Start: 2022-01-01 | End: 2022-01-01

## 2022-01-01 RX ORDER — KETOROLAC TROMETHAMINE 30 MG/ML
15 SYRINGE (ML) INJECTION EVERY 6 HOURS
Refills: 0 | Status: DISCONTINUED | OUTPATIENT
Start: 2022-01-01 | End: 2022-01-01

## 2022-01-01 RX ORDER — ONDANSETRON 8 MG/1
4 TABLET, FILM COATED ORAL EVERY 8 HOURS
Refills: 0 | Status: DISCONTINUED | OUTPATIENT
Start: 2022-01-01 | End: 2022-01-01

## 2022-01-01 RX ORDER — OXYCODONE HYDROCHLORIDE 5 MG/1
10 TABLET ORAL ONCE
Refills: 0 | Status: DISCONTINUED | OUTPATIENT
Start: 2022-01-01 | End: 2022-01-01

## 2022-01-01 RX ORDER — BUDESONIDE AND FORMOTEROL FUMARATE DIHYDRATE 160; 4.5 UG/1; UG/1
2 AEROSOL RESPIRATORY (INHALATION)
Refills: 0 | Status: DISCONTINUED | OUTPATIENT
Start: 2022-01-01 | End: 2022-01-01

## 2022-01-01 RX ORDER — LEVOTHYROXINE SODIUM 125 MCG
1 TABLET ORAL
Qty: 30 | Refills: 0
Start: 2022-01-01 | End: 2022-01-01

## 2022-01-01 RX ORDER — ELECTROLYTE SOLUTION,INJ
1 VIAL (ML) INTRAVENOUS
Refills: 0 | Status: DISCONTINUED | OUTPATIENT
Start: 2022-01-01 | End: 2022-01-01

## 2022-01-01 RX ORDER — PROCHLORPERAZINE MALEATE 5 MG
10 TABLET ORAL EVERY 6 HOURS
Refills: 0 | Status: DISCONTINUED | OUTPATIENT
Start: 2022-01-01 | End: 2022-01-01

## 2022-01-01 RX ORDER — MAGNESIUM SULFATE 500 MG/ML
2 VIAL (ML) INJECTION ONCE
Refills: 0 | Status: COMPLETED | OUTPATIENT
Start: 2022-01-01 | End: 2022-01-01

## 2022-01-01 RX ORDER — LIDOCAINE 4 G/100G
1 CREAM TOPICAL
Qty: 30 | Refills: 0
Start: 2022-01-01 | End: 2022-01-01

## 2022-01-01 RX ORDER — FERROUS SULFATE 325(65) MG
325 TABLET ORAL DAILY
Refills: 0 | Status: DISCONTINUED | OUTPATIENT
Start: 2022-01-01 | End: 2022-01-01

## 2022-01-01 RX ORDER — IBUPROFEN 200 MG
1 TABLET ORAL
Qty: 0 | Refills: 0 | DISCHARGE

## 2022-01-01 RX ORDER — LISINOPRIL 2.5 MG/1
1 TABLET ORAL
Qty: 0 | Refills: 0 | DISCHARGE

## 2022-01-01 RX ORDER — QUETIAPINE FUMARATE 200 MG/1
1 TABLET, FILM COATED ORAL
Qty: 0 | Refills: 0 | DISCHARGE

## 2022-01-01 RX ORDER — PROCHLORPERAZINE MALEATE 5 MG
10 TABLET ORAL THREE TIMES A DAY
Refills: 0 | Status: DISCONTINUED | OUTPATIENT
Start: 2022-01-01 | End: 2022-01-01

## 2022-01-01 RX ORDER — ONDANSETRON 8 MG/1
4 TABLET, FILM COATED ORAL ONCE
Refills: 0 | Status: COMPLETED | OUTPATIENT
Start: 2022-01-01 | End: 2022-01-01

## 2022-01-01 RX ORDER — ENOXAPARIN SODIUM 100 MG/ML
40 INJECTION SUBCUTANEOUS EVERY 24 HOURS
Refills: 0 | Status: DISCONTINUED | OUTPATIENT
Start: 2022-01-01 | End: 2022-01-01

## 2022-01-01 RX ORDER — BUPRENORPHINE AND NALOXONE 2; .5 MG/1; MG/1
1 TABLET SUBLINGUAL
Qty: 0 | Refills: 0 | DISCHARGE

## 2022-01-01 RX ORDER — ONDANSETRON 8 MG/1
8 TABLET, FILM COATED ORAL
Refills: 0 | Status: DISCONTINUED | OUTPATIENT
Start: 2022-01-01 | End: 2022-01-01

## 2022-01-01 RX ORDER — ALPRAZOLAM 2 MG/1
TABLET ORAL
Refills: 0 | Status: ACTIVE | COMMUNITY

## 2022-01-01 RX ORDER — POTASSIUM CHLORIDE 20 MEQ
10 PACKET (EA) ORAL
Refills: 0 | Status: COMPLETED | OUTPATIENT
Start: 2022-01-01 | End: 2022-01-01

## 2022-01-01 RX ORDER — ACETAMINOPHEN 500 MG
650 TABLET ORAL EVERY 6 HOURS
Refills: 0 | Status: DISCONTINUED | OUTPATIENT
Start: 2022-01-01 | End: 2022-01-01

## 2022-01-01 RX ORDER — POTASSIUM CHLORIDE 20 MEQ
40 PACKET (EA) ORAL ONCE
Refills: 0 | Status: COMPLETED | OUTPATIENT
Start: 2022-01-01 | End: 2022-01-01

## 2022-01-01 RX ORDER — BUPRENORPHINE AND NALOXONE 2; .5 MG/1; MG/1
2 TABLET SUBLINGUAL
Qty: 0 | Refills: 0 | DISCHARGE

## 2022-01-01 RX ORDER — ALPRAZOLAM 0.25 MG
1 TABLET ORAL
Refills: 0 | Status: DISCONTINUED | OUTPATIENT
Start: 2022-01-01 | End: 2022-01-01

## 2022-01-01 RX ORDER — GABAPENTIN 400 MG/1
100 CAPSULE ORAL THREE TIMES A DAY
Refills: 0 | Status: DISCONTINUED | OUTPATIENT
Start: 2022-01-01 | End: 2022-01-01

## 2022-01-01 RX ORDER — SUCRALFATE 1 G
10 TABLET ORAL
Qty: 600 | Refills: 0
Start: 2022-01-01 | End: 2022-01-01

## 2022-01-01 RX ORDER — NICOTINE POLACRILEX 2 MG
1 GUM BUCCAL
Qty: 0 | Refills: 0 | DISCHARGE
Start: 2022-01-01

## 2022-01-01 RX ORDER — I.V. FAT EMULSION 20 G/100ML
0.79 EMULSION INTRAVENOUS
Qty: 50 | Refills: 0 | Status: DISCONTINUED | OUTPATIENT
Start: 2022-01-01 | End: 2022-01-01

## 2022-01-01 RX ORDER — OXYCODONE HYDROCHLORIDE 5 MG/1
5 TABLET ORAL ONCE
Refills: 0 | Status: DISCONTINUED | OUTPATIENT
Start: 2022-01-01 | End: 2022-01-01

## 2022-01-01 RX ORDER — THIAMINE MONONITRATE (VIT B1) 100 MG
100 TABLET ORAL DAILY
Refills: 0 | Status: COMPLETED | OUTPATIENT
Start: 2022-01-01 | End: 2022-01-01

## 2022-01-01 RX ORDER — ASCORBIC ACID 60 MG
500 TABLET,CHEWABLE ORAL DAILY
Refills: 0 | Status: DISCONTINUED | OUTPATIENT
Start: 2022-01-01 | End: 2022-01-01

## 2022-01-01 RX ORDER — LISINOPRIL 2.5 MG/1
5 TABLET ORAL DAILY
Refills: 0 | Status: DISCONTINUED | OUTPATIENT
Start: 2022-01-01 | End: 2022-01-01

## 2022-01-01 RX ORDER — SODIUM CHLORIDE 9 MG/ML
500 INJECTION INTRAMUSCULAR; INTRAVENOUS; SUBCUTANEOUS ONCE
Refills: 0 | Status: COMPLETED | OUTPATIENT
Start: 2022-01-01 | End: 2022-01-01

## 2022-01-01 RX ORDER — BUPRENORPHINE HYDROCHLORIDE, NALOXONE HYDROCHLORIDE 8; 2 MG/1; MG/1
8-2 FILM, SOLUBLE BUCCAL; SUBLINGUAL
Refills: 0 | Status: ACTIVE | COMMUNITY

## 2022-01-01 RX ORDER — SOD,AMMONIUM,POTASSIUM LACTATE
1 CREAM (GRAM) TOPICAL
Refills: 0 | Status: DISCONTINUED | OUTPATIENT
Start: 2022-01-01 | End: 2022-01-01

## 2022-01-01 RX ORDER — FOLIC ACID 0.8 MG
1 TABLET ORAL DAILY
Refills: 0 | Status: DISCONTINUED | OUTPATIENT
Start: 2022-01-01 | End: 2022-01-01

## 2022-01-01 RX ORDER — POTASSIUM PHOSPHATE, MONOBASIC POTASSIUM PHOSPHATE, DIBASIC 236; 224 MG/ML; MG/ML
30 INJECTION, SOLUTION INTRAVENOUS ONCE
Refills: 0 | Status: COMPLETED | OUTPATIENT
Start: 2022-01-01 | End: 2022-01-01

## 2022-01-01 RX ORDER — BUPRENORPHINE AND NALOXONE 2; .5 MG/1; MG/1
1 TABLET SUBLINGUAL
Refills: 0 | Status: DISCONTINUED | OUTPATIENT
Start: 2022-01-01 | End: 2022-01-01

## 2022-01-01 RX ORDER — ASCORBIC ACID 60 MG
1 TABLET,CHEWABLE ORAL
Qty: 0 | Refills: 0 | DISCHARGE

## 2022-01-01 RX ORDER — CHOLECALCIFEROL (VITAMIN D3) 125 MCG
1000 CAPSULE ORAL DAILY
Refills: 0 | Status: DISCONTINUED | OUTPATIENT
Start: 2022-01-01 | End: 2022-01-01

## 2022-01-01 RX ORDER — LACTULOSE 10 G/15ML
10 SOLUTION ORAL ONCE
Refills: 0 | Status: COMPLETED | OUTPATIENT
Start: 2022-01-01 | End: 2022-01-01

## 2022-01-01 RX ORDER — ONDANSETRON 8 MG/1
4 TABLET, FILM COATED ORAL ONCE
Refills: 0 | Status: DISCONTINUED | OUTPATIENT
Start: 2022-01-01 | End: 2022-01-01

## 2022-01-01 RX ORDER — OCTREOTIDE ACETATE 200 UG/ML
150 INJECTION, SOLUTION INTRAVENOUS; SUBCUTANEOUS EVERY 8 HOURS
Refills: 0 | Status: DISCONTINUED | OUTPATIENT
Start: 2022-01-01 | End: 2022-01-01

## 2022-01-01 RX ORDER — ACETAMINOPHEN 500 MG
650 TABLET ORAL ONCE
Refills: 0 | Status: COMPLETED | OUTPATIENT
Start: 2022-01-01 | End: 2022-01-01

## 2022-01-01 RX ORDER — QUETIAPINE 400 MG/1
TABLET, FILM COATED ORAL
Refills: 0 | Status: ACTIVE | COMMUNITY

## 2022-01-01 RX ORDER — LEVOTHYROXINE SODIUM 125 MCG
50 TABLET ORAL DAILY
Refills: 0 | Status: DISCONTINUED | OUTPATIENT
Start: 2022-01-01 | End: 2022-01-01

## 2022-01-01 RX ORDER — OXYCODONE HYDROCHLORIDE 5 MG/1
1 TABLET ORAL
Qty: 5 | Refills: 0
Start: 2022-01-01 | End: 2022-01-01

## 2022-01-01 RX ORDER — INFLUENZA VIRUS VACCINE 15; 15; 15; 15 UG/.5ML; UG/.5ML; UG/.5ML; UG/.5ML
0.5 SUSPENSION INTRAMUSCULAR ONCE
Refills: 0 | Status: DISCONTINUED | OUTPATIENT
Start: 2022-01-01 | End: 2022-01-01

## 2022-01-01 RX ORDER — NICOTINE POLACRILEX 2 MG
2 GUM BUCCAL
Refills: 0 | Status: DISCONTINUED | OUTPATIENT
Start: 2022-01-01 | End: 2022-01-01

## 2022-01-01 RX ORDER — PIPERACILLIN AND TAZOBACTAM 4; .5 G/20ML; G/20ML
3.38 INJECTION, POWDER, LYOPHILIZED, FOR SOLUTION INTRAVENOUS EVERY 8 HOURS
Refills: 0 | Status: COMPLETED | OUTPATIENT
Start: 2022-01-01 | End: 2022-01-01

## 2022-01-01 RX ORDER — DEXTROSE MONOHYDRATE, SODIUM CHLORIDE, AND POTASSIUM CHLORIDE 50; .745; 4.5 G/1000ML; G/1000ML; G/1000ML
1000 INJECTION, SOLUTION INTRAVENOUS
Refills: 0 | Status: DISCONTINUED | OUTPATIENT
Start: 2022-01-01 | End: 2022-01-01

## 2022-01-01 RX ORDER — ALPRAZOLAM 1 MG/1
1 TABLET ORAL
Qty: 90 | Refills: 0 | Status: ACTIVE | COMMUNITY
Start: 2022-01-01

## 2022-01-01 RX ORDER — ALBUTEROL 90 UG/1
2 AEROSOL, METERED ORAL
Qty: 1 | Refills: 0
Start: 2022-01-01 | End: 2022-01-01

## 2022-01-01 RX ORDER — ONDANSETRON 8 MG/1
1 TABLET, FILM COATED ORAL
Qty: 0 | Refills: 0 | DISCHARGE

## 2022-01-01 RX ORDER — LEVOTHYROXINE SODIUM 125 MCG
1 TABLET ORAL
Qty: 0 | Refills: 0 | DISCHARGE

## 2022-01-01 RX ORDER — SODIUM CHLORIDE 9 MG/ML
1000 INJECTION, SOLUTION INTRAVENOUS
Refills: 0 | Status: DISCONTINUED | OUTPATIENT
Start: 2022-01-01 | End: 2022-01-01

## 2022-01-01 RX ORDER — QUETIAPINE FUMARATE 200 MG/1
50 TABLET, FILM COATED ORAL AT BEDTIME
Refills: 0 | Status: DISCONTINUED | OUTPATIENT
Start: 2022-01-01 | End: 2022-01-01

## 2022-01-01 RX ORDER — DEXAMETHASONE 2 MG/1
2 TABLET ORAL
Qty: 15 | Refills: 0 | Status: ACTIVE | COMMUNITY
Start: 2022-01-01

## 2022-01-01 RX ORDER — PREGABALIN 225 MG/1
1000 CAPSULE ORAL DAILY
Refills: 0 | Status: DISCONTINUED | OUTPATIENT
Start: 2022-01-01 | End: 2022-01-01

## 2022-01-01 RX ORDER — ONDANSETRON 8 MG/1
4 TABLET, FILM COATED ORAL EVERY 6 HOURS
Refills: 0 | Status: DISCONTINUED | OUTPATIENT
Start: 2022-01-01 | End: 2022-01-01

## 2022-01-01 RX ORDER — IBUPROFEN 800 MG/1
800 TABLET, FILM COATED ORAL
Qty: 40 | Refills: 0 | Status: ACTIVE | COMMUNITY
Start: 2022-01-01 | End: 1900-01-01

## 2022-01-01 RX ORDER — SODIUM CHLORIDE 9 MG/ML
1000 INJECTION INTRAMUSCULAR; INTRAVENOUS; SUBCUTANEOUS
Refills: 0 | Status: DISCONTINUED | OUTPATIENT
Start: 2022-01-01 | End: 2022-01-01

## 2022-01-01 RX ORDER — LISINOPRIL 2.5 MG/1
10 TABLET ORAL ONCE
Refills: 0 | Status: COMPLETED | OUTPATIENT
Start: 2022-01-01 | End: 2022-01-01

## 2022-01-01 RX ORDER — GABAPENTIN 400 MG/1
300 CAPSULE ORAL EVERY 8 HOURS
Refills: 0 | Status: DISCONTINUED | OUTPATIENT
Start: 2022-01-01 | End: 2022-01-01

## 2022-01-01 RX ORDER — CYCLOBENZAPRINE HYDROCHLORIDE 10 MG/1
10 TABLET, FILM COATED ORAL THREE TIMES A DAY
Refills: 0 | Status: DISCONTINUED | OUTPATIENT
Start: 2022-01-01 | End: 2022-01-01

## 2022-01-01 RX ORDER — TIOTROPIUM BROMIDE 18 UG/1
1 CAPSULE ORAL; RESPIRATORY (INHALATION) DAILY
Refills: 0 | Status: DISCONTINUED | OUTPATIENT
Start: 2022-01-01 | End: 2022-01-01

## 2022-01-01 RX ORDER — GABAPENTIN 400 MG/1
200 CAPSULE ORAL THREE TIMES A DAY
Refills: 0 | Status: DISCONTINUED | OUTPATIENT
Start: 2022-01-01 | End: 2022-01-01

## 2022-01-01 RX ORDER — OXYCODONE AND ACETAMINOPHEN 5; 325 MG/1; MG/1
5-325 TABLET ORAL EVERY 6 HOURS
Qty: 10 | Refills: 0 | Status: COMPLETED | COMMUNITY
Start: 2022-01-01 | End: 2022-01-01

## 2022-01-01 RX ORDER — GABAPENTIN 400 MG/1
300 CAPSULE ORAL THREE TIMES A DAY
Refills: 0 | Status: DISCONTINUED | OUTPATIENT
Start: 2022-01-01 | End: 2022-01-01

## 2022-01-01 RX ORDER — LISINOPRIL 30 MG/1
TABLET ORAL
Refills: 0 | Status: ACTIVE | COMMUNITY

## 2022-01-01 RX ORDER — FOLIC ACID 0.8 MG
1 TABLET ORAL
Qty: 0 | Refills: 0 | DISCHARGE

## 2022-01-01 RX ORDER — POTASSIUM CHLORIDE 20 MEQ
20 PACKET (EA) ORAL
Refills: 0 | Status: COMPLETED | OUTPATIENT
Start: 2022-01-01 | End: 2022-01-01

## 2022-01-01 RX ORDER — KETOROLAC TROMETHAMINE 30 MG/ML
15 SYRINGE (ML) INJECTION ONCE
Refills: 0 | Status: DISCONTINUED | OUTPATIENT
Start: 2022-01-01 | End: 2022-01-01

## 2022-01-01 RX ORDER — LIDOCAINE 4 G/100G
1 CREAM TOPICAL DAILY
Refills: 0 | Status: DISCONTINUED | OUTPATIENT
Start: 2022-01-01 | End: 2022-01-01

## 2022-01-01 RX ORDER — METOCLOPRAMIDE HCL 10 MG
1 TABLET ORAL
Qty: 60 | Refills: 0
Start: 2022-01-01 | End: 2022-01-01

## 2022-01-01 RX ORDER — POTASSIUM CHLORIDE 20 MEQ
40 PACKET (EA) ORAL EVERY 4 HOURS
Refills: 0 | Status: DISCONTINUED | OUTPATIENT
Start: 2022-01-01 | End: 2022-01-01

## 2022-01-01 RX ORDER — ALPRAZOLAM 0.25 MG
1 TABLET ORAL THREE TIMES A DAY
Refills: 0 | Status: DISCONTINUED | OUTPATIENT
Start: 2022-01-01 | End: 2022-01-01

## 2022-01-01 RX ORDER — GABAPENTIN 400 MG/1
1 CAPSULE ORAL
Qty: 60 | Refills: 0
Start: 2022-01-01 | End: 2022-01-01

## 2022-01-01 RX ORDER — BUPRENORPHINE AND NALOXONE 2; .5 MG/1; MG/1
0 TABLET SUBLINGUAL
Qty: 0 | Refills: 0 | DISCHARGE

## 2022-01-01 RX ORDER — ALPRAZOLAM 0.25 MG
0.5 TABLET ORAL EVERY 12 HOURS
Refills: 0 | Status: DISCONTINUED | OUTPATIENT
Start: 2022-01-01 | End: 2022-01-01

## 2022-01-01 RX ORDER — IBUPROFEN 200 MG
200 TABLET ORAL EVERY 8 HOURS
Refills: 0 | Status: DISCONTINUED | OUTPATIENT
Start: 2022-01-01 | End: 2022-01-01

## 2022-01-01 RX ORDER — PIPERACILLIN AND TAZOBACTAM 4; .5 G/20ML; G/20ML
3.38 INJECTION, POWDER, LYOPHILIZED, FOR SOLUTION INTRAVENOUS ONCE
Refills: 0 | Status: COMPLETED | OUTPATIENT
Start: 2022-01-01 | End: 2022-01-01

## 2022-01-01 RX ORDER — AZITHROMYCIN 500 MG/1
500 TABLET, FILM COATED ORAL DAILY
Refills: 0 | Status: COMPLETED | OUTPATIENT
Start: 2022-01-01 | End: 2022-01-01

## 2022-01-01 RX ORDER — NICOTINE POLACRILEX 2 MG
1 GUM BUCCAL
Qty: 30 | Refills: 0
Start: 2022-01-01 | End: 2022-01-01

## 2022-01-01 RX ORDER — VANCOMYCIN HCL 1 G
1000 VIAL (EA) INTRAVENOUS EVERY 8 HOURS
Refills: 0 | Status: DISCONTINUED | OUTPATIENT
Start: 2022-01-01 | End: 2022-01-01

## 2022-01-01 RX ORDER — GABAPENTIN 400 MG/1
1 CAPSULE ORAL
Qty: 0 | Refills: 0 | DISCHARGE
Start: 2022-01-01 | End: 2022-01-01

## 2022-01-01 RX ORDER — POTASSIUM CHLORIDE 20 MEQ
40 PACKET (EA) ORAL EVERY 4 HOURS
Refills: 0 | Status: COMPLETED | OUTPATIENT
Start: 2022-01-01 | End: 2022-01-01

## 2022-01-01 RX ORDER — DIPHENHYDRAMINE HCL 50 MG
25 CAPSULE ORAL ONCE
Refills: 0 | Status: COMPLETED | OUTPATIENT
Start: 2022-01-01 | End: 2022-01-01

## 2022-01-01 RX ORDER — PANTOPRAZOLE SODIUM 20 MG/1
40 TABLET, DELAYED RELEASE ORAL EVERY 12 HOURS
Refills: 0 | Status: DISCONTINUED | OUTPATIENT
Start: 2022-01-01 | End: 2022-01-01

## 2022-01-01 RX ORDER — DEXAMETHASONE 0.5 MG/5ML
1 ELIXIR ORAL
Qty: 15 | Refills: 0
Start: 2022-01-01 | End: 2022-01-01

## 2022-01-01 RX ORDER — POTASSIUM PHOSPHATE, MONOBASIC POTASSIUM PHOSPHATE, DIBASIC 236; 224 MG/ML; MG/ML
30 INJECTION, SOLUTION INTRAVENOUS ONCE
Refills: 0 | Status: DISCONTINUED | OUTPATIENT
Start: 2022-01-01 | End: 2022-01-01

## 2022-01-01 RX ORDER — ERGOCALCIFEROL 1.25 MG/1
50000 CAPSULE ORAL
Refills: 0 | Status: DISCONTINUED | OUTPATIENT
Start: 2022-01-01 | End: 2022-01-01

## 2022-01-01 RX ORDER — DEXAMETHASONE 0.5 MG/5ML
6 ELIXIR ORAL DAILY
Refills: 0 | Status: DISCONTINUED | OUTPATIENT
Start: 2022-01-01 | End: 2022-01-01

## 2022-01-01 RX ADMIN — PIPERACILLIN AND TAZOBACTAM 25 GRAM(S): 4; .5 INJECTION, POWDER, LYOPHILIZED, FOR SOLUTION INTRAVENOUS at 06:19

## 2022-01-01 RX ADMIN — Medication 1 PATCH: at 21:48

## 2022-01-01 RX ADMIN — ENOXAPARIN SODIUM 40 MILLIGRAM(S): 100 INJECTION SUBCUTANEOUS at 17:53

## 2022-01-01 RX ADMIN — Medication 0.5 MILLIGRAM(S): at 04:13

## 2022-01-01 RX ADMIN — BUDESONIDE AND FORMOTEROL FUMARATE DIHYDRATE 2 PUFF(S): 160; 4.5 AEROSOL RESPIRATORY (INHALATION) at 09:12

## 2022-01-01 RX ADMIN — Medication 1 MILLIGRAM(S): at 21:44

## 2022-01-01 RX ADMIN — OCTREOTIDE ACETATE 150 MICROGRAM(S): 200 INJECTION, SOLUTION INTRAVENOUS; SUBCUTANEOUS at 06:07

## 2022-01-01 RX ADMIN — Medication 1 PATCH: at 22:02

## 2022-01-01 RX ADMIN — Medication 325 MILLIGRAM(S): at 09:39

## 2022-01-01 RX ADMIN — QUETIAPINE FUMARATE 50 MILLIGRAM(S): 200 TABLET, FILM COATED ORAL at 21:25

## 2022-01-01 RX ADMIN — PANTOPRAZOLE SODIUM 40 MILLIGRAM(S): 20 TABLET, DELAYED RELEASE ORAL at 22:58

## 2022-01-01 RX ADMIN — TIOTROPIUM BROMIDE 1 CAPSULE(S): 18 CAPSULE ORAL; RESPIRATORY (INHALATION) at 07:52

## 2022-01-01 RX ADMIN — Medication 1 MILLIGRAM(S): at 15:49

## 2022-01-01 RX ADMIN — Medication 15 MILLIGRAM(S): at 05:54

## 2022-01-01 RX ADMIN — Medication 250 MILLIGRAM(S): at 05:05

## 2022-01-01 RX ADMIN — Medication 1 PATCH: at 11:02

## 2022-01-01 RX ADMIN — Medication 650 MILLIGRAM(S): at 04:04

## 2022-01-01 RX ADMIN — Medication 1 MILLIGRAM(S): at 13:54

## 2022-01-01 RX ADMIN — LISINOPRIL 40 MILLIGRAM(S): 2.5 TABLET ORAL at 11:02

## 2022-01-01 RX ADMIN — LIDOCAINE 1 PATCH: 4 CREAM TOPICAL at 09:52

## 2022-01-01 RX ADMIN — CYCLOBENZAPRINE HYDROCHLORIDE 10 MILLIGRAM(S): 10 TABLET, FILM COATED ORAL at 05:29

## 2022-01-01 RX ADMIN — GABAPENTIN 300 MILLIGRAM(S): 400 CAPSULE ORAL at 05:48

## 2022-01-01 RX ADMIN — Medication 50 MILLIGRAM(S): at 22:01

## 2022-01-01 RX ADMIN — Medication 1 MILLIGRAM(S): at 22:26

## 2022-01-01 RX ADMIN — PANTOPRAZOLE SODIUM 40 MILLIGRAM(S): 20 TABLET, DELAYED RELEASE ORAL at 21:55

## 2022-01-01 RX ADMIN — Medication 1.5 MILLIGRAM(S): at 21:53

## 2022-01-01 RX ADMIN — Medication 1 TABLET(S): at 10:46

## 2022-01-01 RX ADMIN — LISINOPRIL 40 MILLIGRAM(S): 2.5 TABLET ORAL at 10:43

## 2022-01-01 RX ADMIN — Medication 1 PATCH: at 09:51

## 2022-01-01 RX ADMIN — Medication 500 MILLIGRAM(S): at 10:26

## 2022-01-01 RX ADMIN — Medication 1 MILLIGRAM(S): at 10:32

## 2022-01-01 RX ADMIN — Medication 1 MILLIGRAM(S): at 09:46

## 2022-01-01 RX ADMIN — DEXTROSE MONOHYDRATE, SODIUM CHLORIDE, AND POTASSIUM CHLORIDE 75 MILLILITER(S): 50; .745; 4.5 INJECTION, SOLUTION INTRAVENOUS at 21:18

## 2022-01-01 RX ADMIN — Medication 1 PATCH: at 11:22

## 2022-01-01 RX ADMIN — PIPERACILLIN AND TAZOBACTAM 25 GRAM(S): 4; .5 INJECTION, POWDER, LYOPHILIZED, FOR SOLUTION INTRAVENOUS at 13:22

## 2022-01-01 RX ADMIN — DEXTROSE MONOHYDRATE, SODIUM CHLORIDE, AND POTASSIUM CHLORIDE 75 MILLILITER(S): 50; .745; 4.5 INJECTION, SOLUTION INTRAVENOUS at 05:16

## 2022-01-01 RX ADMIN — Medication 1.5 MILLIGRAM(S): at 17:24

## 2022-01-01 RX ADMIN — PIPERACILLIN AND TAZOBACTAM 25 GRAM(S): 4; .5 INJECTION, POWDER, LYOPHILIZED, FOR SOLUTION INTRAVENOUS at 06:37

## 2022-01-01 RX ADMIN — CYCLOBENZAPRINE HYDROCHLORIDE 10 MILLIGRAM(S): 10 TABLET, FILM COATED ORAL at 22:48

## 2022-01-01 RX ADMIN — Medication 0.5 MILLIGRAM(S): at 17:52

## 2022-01-01 RX ADMIN — BUPRENORPHINE AND NALOXONE 1 FILM(S): 2; .5 TABLET SUBLINGUAL at 09:14

## 2022-01-01 RX ADMIN — PANTOPRAZOLE SODIUM 40 MILLIGRAM(S): 20 TABLET, DELAYED RELEASE ORAL at 18:22

## 2022-01-01 RX ADMIN — Medication 1 PATCH: at 10:20

## 2022-01-01 RX ADMIN — Medication 1 MILLIGRAM(S): at 10:46

## 2022-01-01 RX ADMIN — Medication 1 PACKET(S): at 14:03

## 2022-01-01 RX ADMIN — Medication 10 MILLIGRAM(S): at 22:57

## 2022-01-01 RX ADMIN — Medication 325 MILLIGRAM(S): at 10:45

## 2022-01-01 RX ADMIN — BUDESONIDE AND FORMOTEROL FUMARATE DIHYDRATE 2 PUFF(S): 160; 4.5 AEROSOL RESPIRATORY (INHALATION) at 20:31

## 2022-01-01 RX ADMIN — Medication 25 MICROGRAM(S): at 05:48

## 2022-01-01 RX ADMIN — Medication 1 PATCH: at 10:43

## 2022-01-01 RX ADMIN — Medication 40 MILLIEQUIVALENT(S): at 17:43

## 2022-01-01 RX ADMIN — BUPRENORPHINE AND NALOXONE 1 FILM(S): 2; .5 TABLET SUBLINGUAL at 10:10

## 2022-01-01 RX ADMIN — Medication 1 GRAM(S): at 05:55

## 2022-01-01 RX ADMIN — GABAPENTIN 300 MILLIGRAM(S): 400 CAPSULE ORAL at 22:53

## 2022-01-01 RX ADMIN — Medication 25 MILLIGRAM(S): at 08:51

## 2022-01-01 RX ADMIN — Medication 15 MILLIGRAM(S): at 20:35

## 2022-01-01 RX ADMIN — Medication 1 PATCH: at 08:32

## 2022-01-01 RX ADMIN — Medication 650 MILLIGRAM(S): at 12:41

## 2022-01-01 RX ADMIN — Medication 1 PATCH: at 10:32

## 2022-01-01 RX ADMIN — PREGABALIN 1000 MICROGRAM(S): 225 CAPSULE ORAL at 10:11

## 2022-01-01 RX ADMIN — OCTREOTIDE ACETATE 150 MICROGRAM(S): 200 INJECTION, SOLUTION INTRAVENOUS; SUBCUTANEOUS at 14:11

## 2022-01-01 RX ADMIN — Medication 1 GRAM(S): at 22:59

## 2022-01-01 RX ADMIN — TIOTROPIUM BROMIDE 1 CAPSULE(S): 18 CAPSULE ORAL; RESPIRATORY (INHALATION) at 08:14

## 2022-01-01 RX ADMIN — Medication 6 MILLIGRAM(S): at 09:51

## 2022-01-01 RX ADMIN — Medication 1 PATCH: at 08:00

## 2022-01-01 RX ADMIN — Medication 25 GRAM(S): at 16:23

## 2022-01-01 RX ADMIN — LIDOCAINE 1 PATCH: 4 CREAM TOPICAL at 08:00

## 2022-01-01 RX ADMIN — Medication 10 MILLIGRAM(S): at 13:40

## 2022-01-01 RX ADMIN — BUDESONIDE AND FORMOTEROL FUMARATE DIHYDRATE 2 PUFF(S): 160; 4.5 AEROSOL RESPIRATORY (INHALATION) at 21:08

## 2022-01-01 RX ADMIN — Medication 50 MILLIGRAM(S): at 13:07

## 2022-01-01 RX ADMIN — QUETIAPINE FUMARATE 50 MILLIGRAM(S): 200 TABLET, FILM COATED ORAL at 21:34

## 2022-01-01 RX ADMIN — Medication 1 PATCH: at 21:53

## 2022-01-01 RX ADMIN — CYCLOBENZAPRINE HYDROCHLORIDE 10 MILLIGRAM(S): 10 TABLET, FILM COATED ORAL at 05:09

## 2022-01-01 RX ADMIN — Medication 325 MILLIGRAM(S): at 09:40

## 2022-01-01 RX ADMIN — Medication 15 MILLIGRAM(S): at 22:53

## 2022-01-01 RX ADMIN — Medication 10 MILLIGRAM(S): at 05:47

## 2022-01-01 RX ADMIN — LIDOCAINE 1 PATCH: 4 CREAM TOPICAL at 10:42

## 2022-01-01 RX ADMIN — Medication 1 GRAM(S): at 22:46

## 2022-01-01 RX ADMIN — Medication 1 GRAM(S): at 18:22

## 2022-01-01 RX ADMIN — Medication 1 TABLET(S): at 11:20

## 2022-01-01 RX ADMIN — LIDOCAINE 1 PATCH: 4 CREAM TOPICAL at 21:57

## 2022-01-01 RX ADMIN — DEXTROSE MONOHYDRATE, SODIUM CHLORIDE, AND POTASSIUM CHLORIDE 75 MILLILITER(S): 50; .745; 4.5 INJECTION, SOLUTION INTRAVENOUS at 10:47

## 2022-01-01 RX ADMIN — BUPRENORPHINE AND NALOXONE 1 FILM(S): 2; .5 TABLET SUBLINGUAL at 09:15

## 2022-01-01 RX ADMIN — Medication 1 MILLIGRAM(S): at 21:34

## 2022-01-01 RX ADMIN — LISINOPRIL 40 MILLIGRAM(S): 2.5 TABLET ORAL at 09:07

## 2022-01-01 RX ADMIN — Medication 15 MILLIGRAM(S): at 11:30

## 2022-01-01 RX ADMIN — LISINOPRIL 40 MILLIGRAM(S): 2.5 TABLET ORAL at 09:38

## 2022-01-01 RX ADMIN — BUDESONIDE AND FORMOTEROL FUMARATE DIHYDRATE 2 PUFF(S): 160; 4.5 AEROSOL RESPIRATORY (INHALATION) at 08:44

## 2022-01-01 RX ADMIN — Medication 2 GRAM(S): at 18:30

## 2022-01-01 RX ADMIN — Medication 50 MILLIGRAM(S): at 08:07

## 2022-01-01 RX ADMIN — Medication 50 MILLIGRAM(S): at 21:43

## 2022-01-01 RX ADMIN — Medication 2 MILLIGRAM(S): at 05:06

## 2022-01-01 RX ADMIN — Medication 500 MILLIGRAM(S): at 09:38

## 2022-01-01 RX ADMIN — Medication 100 MILLIGRAM(S): at 10:10

## 2022-01-01 RX ADMIN — BUPRENORPHINE AND NALOXONE 1 FILM(S): 2; .5 TABLET SUBLINGUAL at 09:38

## 2022-01-01 RX ADMIN — Medication 5 MILLIGRAM(S): at 21:56

## 2022-01-01 RX ADMIN — Medication 1 TABLET(S): at 09:28

## 2022-01-01 RX ADMIN — OCTREOTIDE ACETATE 150 MICROGRAM(S): 200 INJECTION, SOLUTION INTRAVENOUS; SUBCUTANEOUS at 15:19

## 2022-01-01 RX ADMIN — PIPERACILLIN AND TAZOBACTAM 25 GRAM(S): 4; .5 INJECTION, POWDER, LYOPHILIZED, FOR SOLUTION INTRAVENOUS at 22:04

## 2022-01-01 RX ADMIN — Medication 6 MILLIGRAM(S): at 09:52

## 2022-01-01 RX ADMIN — Medication 500 MILLIGRAM(S): at 10:10

## 2022-01-01 RX ADMIN — Medication 500 MILLIGRAM(S): at 09:29

## 2022-01-01 RX ADMIN — BUPRENORPHINE AND NALOXONE 1 FILM(S): 2; .5 TABLET SUBLINGUAL at 09:37

## 2022-01-01 RX ADMIN — PANTOPRAZOLE SODIUM 40 MILLIGRAM(S): 20 TABLET, DELAYED RELEASE ORAL at 11:03

## 2022-01-01 RX ADMIN — PANTOPRAZOLE SODIUM 40 MILLIGRAM(S): 20 TABLET, DELAYED RELEASE ORAL at 10:38

## 2022-01-01 RX ADMIN — Medication 15 MILLIGRAM(S): at 16:04

## 2022-01-01 RX ADMIN — Medication 1 MILLIGRAM(S): at 10:01

## 2022-01-01 RX ADMIN — OCTREOTIDE ACETATE 150 MICROGRAM(S): 200 INJECTION, SOLUTION INTRAVENOUS; SUBCUTANEOUS at 13:03

## 2022-01-01 RX ADMIN — Medication 1 TABLET(S): at 09:42

## 2022-01-01 RX ADMIN — Medication 650 MILLIGRAM(S): at 06:34

## 2022-01-01 RX ADMIN — Medication 50 MILLIGRAM(S): at 05:41

## 2022-01-01 RX ADMIN — Medication 25 MICROGRAM(S): at 05:05

## 2022-01-01 RX ADMIN — Medication 650 MILLIGRAM(S): at 01:16

## 2022-01-01 RX ADMIN — Medication 1 MILLIGRAM(S): at 09:28

## 2022-01-01 RX ADMIN — Medication 1 MILLIGRAM(S): at 15:16

## 2022-01-01 RX ADMIN — POTASSIUM PHOSPHATE, MONOBASIC POTASSIUM PHOSPHATE, DIBASIC 62.5 MILLIMOLE(S): 236; 224 INJECTION, SOLUTION INTRAVENOUS at 17:52

## 2022-01-01 RX ADMIN — Medication 1 PATCH: at 19:50

## 2022-01-01 RX ADMIN — QUETIAPINE FUMARATE 50 MILLIGRAM(S): 200 TABLET, FILM COATED ORAL at 21:18

## 2022-01-01 RX ADMIN — I.V. FAT EMULSION 20.8 GM/KG/DAY: 20 EMULSION INTRAVENOUS at 23:14

## 2022-01-01 RX ADMIN — Medication 15 MILLIGRAM(S): at 14:16

## 2022-01-01 RX ADMIN — Medication 100 MILLIGRAM(S): at 09:40

## 2022-01-01 RX ADMIN — ENOXAPARIN SODIUM 40 MILLIGRAM(S): 100 INJECTION SUBCUTANEOUS at 18:04

## 2022-01-01 RX ADMIN — I.V. FAT EMULSION 20.83 GM/KG/DAY: 20 EMULSION INTRAVENOUS at 22:57

## 2022-01-01 RX ADMIN — Medication 15 MILLIGRAM(S): at 20:20

## 2022-01-01 RX ADMIN — GABAPENTIN 300 MILLIGRAM(S): 400 CAPSULE ORAL at 15:19

## 2022-01-01 RX ADMIN — Medication 30 MILLILITER(S): at 01:00

## 2022-01-01 RX ADMIN — Medication 1 GRAM(S): at 13:40

## 2022-01-01 RX ADMIN — LISINOPRIL 10 MILLIGRAM(S): 2.5 TABLET ORAL at 21:34

## 2022-01-01 RX ADMIN — PIPERACILLIN AND TAZOBACTAM 25 GRAM(S): 4; .5 INJECTION, POWDER, LYOPHILIZED, FOR SOLUTION INTRAVENOUS at 15:49

## 2022-01-01 RX ADMIN — CYCLOBENZAPRINE HYDROCHLORIDE 10 MILLIGRAM(S): 10 TABLET, FILM COATED ORAL at 22:37

## 2022-01-01 RX ADMIN — Medication 10 MILLIGRAM(S): at 13:01

## 2022-01-01 RX ADMIN — Medication 1 MILLIGRAM(S): at 17:51

## 2022-01-01 RX ADMIN — Medication 1 PATCH: at 09:29

## 2022-01-01 RX ADMIN — Medication 1 APPLICATION(S): at 17:53

## 2022-01-01 RX ADMIN — PANTOPRAZOLE SODIUM 40 MILLIGRAM(S): 20 TABLET, DELAYED RELEASE ORAL at 09:52

## 2022-01-01 RX ADMIN — Medication 1 MILLIGRAM(S): at 11:20

## 2022-01-01 RX ADMIN — Medication 1 PATCH: at 08:43

## 2022-01-01 RX ADMIN — Medication 1.5 MILLIGRAM(S): at 13:22

## 2022-01-01 RX ADMIN — QUETIAPINE FUMARATE 50 MILLIGRAM(S): 200 TABLET, FILM COATED ORAL at 23:15

## 2022-01-01 RX ADMIN — Medication 1 TABLET(S): at 09:39

## 2022-01-01 RX ADMIN — BUDESONIDE AND FORMOTEROL FUMARATE DIHYDRATE 2 PUFF(S): 160; 4.5 AEROSOL RESPIRATORY (INHALATION) at 08:31

## 2022-01-01 RX ADMIN — CYCLOBENZAPRINE HYDROCHLORIDE 10 MILLIGRAM(S): 10 TABLET, FILM COATED ORAL at 04:20

## 2022-01-01 RX ADMIN — TIOTROPIUM BROMIDE 1 CAPSULE(S): 18 CAPSULE ORAL; RESPIRATORY (INHALATION) at 08:13

## 2022-01-01 RX ADMIN — PIPERACILLIN AND TAZOBACTAM 25 GRAM(S): 4; .5 INJECTION, POWDER, LYOPHILIZED, FOR SOLUTION INTRAVENOUS at 04:12

## 2022-01-01 RX ADMIN — QUETIAPINE FUMARATE 50 MILLIGRAM(S): 200 TABLET, FILM COATED ORAL at 21:13

## 2022-01-01 RX ADMIN — Medication 1 PATCH: at 19:00

## 2022-01-01 RX ADMIN — CYCLOBENZAPRINE HYDROCHLORIDE 10 MILLIGRAM(S): 10 TABLET, FILM COATED ORAL at 17:14

## 2022-01-01 RX ADMIN — BUDESONIDE AND FORMOTEROL FUMARATE DIHYDRATE 2 PUFF(S): 160; 4.5 AEROSOL RESPIRATORY (INHALATION) at 20:43

## 2022-01-01 RX ADMIN — Medication 1 PATCH: at 07:00

## 2022-01-01 RX ADMIN — Medication 1 MILLIGRAM(S): at 21:56

## 2022-01-01 RX ADMIN — GABAPENTIN 200 MILLIGRAM(S): 400 CAPSULE ORAL at 14:07

## 2022-01-01 RX ADMIN — Medication 1 MILLIGRAM(S): at 22:57

## 2022-01-01 RX ADMIN — BUDESONIDE AND FORMOTEROL FUMARATE DIHYDRATE 2 PUFF(S): 160; 4.5 AEROSOL RESPIRATORY (INHALATION) at 07:57

## 2022-01-01 RX ADMIN — Medication 1 TABLET(S): at 10:24

## 2022-01-01 RX ADMIN — OCTREOTIDE ACETATE 150 MICROGRAM(S): 200 INJECTION, SOLUTION INTRAVENOUS; SUBCUTANEOUS at 08:10

## 2022-01-01 RX ADMIN — TIOTROPIUM BROMIDE 1 CAPSULE(S): 18 CAPSULE ORAL; RESPIRATORY (INHALATION) at 08:44

## 2022-01-01 RX ADMIN — AZITHROMYCIN 500 MILLIGRAM(S): 500 TABLET, FILM COATED ORAL at 09:07

## 2022-01-01 RX ADMIN — OCTREOTIDE ACETATE 150 MICROGRAM(S): 200 INJECTION, SOLUTION INTRAVENOUS; SUBCUTANEOUS at 13:41

## 2022-01-01 RX ADMIN — LIDOCAINE 1 PATCH: 4 CREAM TOPICAL at 09:56

## 2022-01-01 RX ADMIN — Medication 1 MILLIGRAM(S): at 09:50

## 2022-01-01 RX ADMIN — ENOXAPARIN SODIUM 40 MILLIGRAM(S): 100 INJECTION SUBCUTANEOUS at 17:24

## 2022-01-01 RX ADMIN — Medication 650 MILLIGRAM(S): at 18:02

## 2022-01-01 RX ADMIN — Medication 0.5 MILLIGRAM(S): at 13:59

## 2022-01-01 RX ADMIN — LIDOCAINE 1 PATCH: 4 CREAM TOPICAL at 05:57

## 2022-01-01 RX ADMIN — Medication 1 GRAM(S): at 21:56

## 2022-01-01 RX ADMIN — BUDESONIDE AND FORMOTEROL FUMARATE DIHYDRATE 2 PUFF(S): 160; 4.5 AEROSOL RESPIRATORY (INHALATION) at 20:28

## 2022-01-01 RX ADMIN — Medication 1 PATCH: at 09:12

## 2022-01-01 RX ADMIN — Medication 1 PATCH: at 20:00

## 2022-01-01 RX ADMIN — ENOXAPARIN SODIUM 40 MILLIGRAM(S): 100 INJECTION SUBCUTANEOUS at 18:32

## 2022-01-01 RX ADMIN — ONDANSETRON 4 MILLIGRAM(S): 8 TABLET, FILM COATED ORAL at 18:31

## 2022-01-01 RX ADMIN — LIDOCAINE 1 PATCH: 4 CREAM TOPICAL at 20:00

## 2022-01-01 RX ADMIN — Medication 1 PATCH: at 22:20

## 2022-01-01 RX ADMIN — LIDOCAINE 1 PATCH: 4 CREAM TOPICAL at 11:25

## 2022-01-01 RX ADMIN — AZITHROMYCIN 500 MILLIGRAM(S): 500 TABLET, FILM COATED ORAL at 09:59

## 2022-01-01 RX ADMIN — PIPERACILLIN AND TAZOBACTAM 25 GRAM(S): 4; .5 INJECTION, POWDER, LYOPHILIZED, FOR SOLUTION INTRAVENOUS at 05:19

## 2022-01-01 RX ADMIN — PIPERACILLIN AND TAZOBACTAM 200 GRAM(S): 4; .5 INJECTION, POWDER, LYOPHILIZED, FOR SOLUTION INTRAVENOUS at 15:29

## 2022-01-01 RX ADMIN — SODIUM CHLORIDE 2000 MILLILITER(S): 9 INJECTION INTRAMUSCULAR; INTRAVENOUS; SUBCUTANEOUS at 17:37

## 2022-01-01 RX ADMIN — Medication 1 MILLIGRAM(S): at 22:52

## 2022-01-01 RX ADMIN — Medication 325 MILLIGRAM(S): at 10:02

## 2022-01-01 RX ADMIN — Medication 650 MILLIGRAM(S): at 21:44

## 2022-01-01 RX ADMIN — MAGNESIUM OXIDE 400 MG ORAL TABLET 400 MILLIGRAM(S): 241.3 TABLET ORAL at 09:16

## 2022-01-01 RX ADMIN — Medication 1 MILLIGRAM(S): at 22:08

## 2022-01-01 RX ADMIN — GABAPENTIN 300 MILLIGRAM(S): 400 CAPSULE ORAL at 05:27

## 2022-01-01 RX ADMIN — BUPRENORPHINE AND NALOXONE 1 FILM(S): 2; .5 TABLET SUBLINGUAL at 10:48

## 2022-01-01 RX ADMIN — Medication 0.5 MILLIGRAM(S): at 15:18

## 2022-01-01 RX ADMIN — TIOTROPIUM BROMIDE 1 CAPSULE(S): 18 CAPSULE ORAL; RESPIRATORY (INHALATION) at 09:39

## 2022-01-01 RX ADMIN — Medication 25 MILLIGRAM(S): at 13:53

## 2022-01-01 RX ADMIN — Medication 6 MILLIGRAM(S): at 09:43

## 2022-01-01 RX ADMIN — Medication 1 MILLIGRAM(S): at 01:50

## 2022-01-01 RX ADMIN — Medication 40 MILLIEQUIVALENT(S): at 11:54

## 2022-01-01 RX ADMIN — AZITHROMYCIN 500 MILLIGRAM(S): 500 TABLET, FILM COATED ORAL at 09:16

## 2022-01-01 RX ADMIN — PANTOPRAZOLE SODIUM 40 MILLIGRAM(S): 20 TABLET, DELAYED RELEASE ORAL at 10:26

## 2022-01-01 RX ADMIN — Medication 1 PATCH: at 09:41

## 2022-01-01 RX ADMIN — Medication 1 MILLIGRAM(S): at 10:10

## 2022-01-01 RX ADMIN — PIPERACILLIN AND TAZOBACTAM 25 GRAM(S): 4; .5 INJECTION, POWDER, LYOPHILIZED, FOR SOLUTION INTRAVENOUS at 15:36

## 2022-01-01 RX ADMIN — Medication 10 MILLIGRAM(S): at 05:16

## 2022-01-01 RX ADMIN — Medication 0.5 MILLIGRAM(S): at 00:12

## 2022-01-01 RX ADMIN — Medication 1 APPLICATION(S): at 05:41

## 2022-01-01 RX ADMIN — Medication 500 MILLIGRAM(S): at 09:39

## 2022-01-01 RX ADMIN — SODIUM CHLORIDE 75 MILLILITER(S): 9 INJECTION INTRAMUSCULAR; INTRAVENOUS; SUBCUTANEOUS at 21:50

## 2022-01-01 RX ADMIN — AZITHROMYCIN 500 MILLIGRAM(S): 500 TABLET, FILM COATED ORAL at 11:21

## 2022-01-01 RX ADMIN — LIDOCAINE 1 PATCH: 4 CREAM TOPICAL at 23:00

## 2022-01-01 RX ADMIN — Medication 1 APPLICATION(S): at 17:14

## 2022-01-01 RX ADMIN — CYCLOBENZAPRINE HYDROCHLORIDE 10 MILLIGRAM(S): 10 TABLET, FILM COATED ORAL at 04:12

## 2022-01-01 RX ADMIN — OCTREOTIDE ACETATE 150 MICROGRAM(S): 200 INJECTION, SOLUTION INTRAVENOUS; SUBCUTANEOUS at 05:48

## 2022-01-01 RX ADMIN — PREGABALIN 1000 MICROGRAM(S): 225 CAPSULE ORAL at 09:40

## 2022-01-01 RX ADMIN — TIOTROPIUM BROMIDE 1 CAPSULE(S): 18 CAPSULE ORAL; RESPIRATORY (INHALATION) at 08:01

## 2022-01-01 RX ADMIN — CYCLOBENZAPRINE HYDROCHLORIDE 10 MILLIGRAM(S): 10 TABLET, FILM COATED ORAL at 06:37

## 2022-01-01 RX ADMIN — OCTREOTIDE ACETATE 150 MICROGRAM(S): 200 INJECTION, SOLUTION INTRAVENOUS; SUBCUTANEOUS at 21:35

## 2022-01-01 RX ADMIN — GABAPENTIN 100 MILLIGRAM(S): 400 CAPSULE ORAL at 14:20

## 2022-01-01 RX ADMIN — LIDOCAINE 1 PATCH: 4 CREAM TOPICAL at 19:00

## 2022-01-01 RX ADMIN — OCTREOTIDE ACETATE 150 MICROGRAM(S): 200 INJECTION, SOLUTION INTRAVENOUS; SUBCUTANEOUS at 21:58

## 2022-01-01 RX ADMIN — PANTOPRAZOLE SODIUM 40 MILLIGRAM(S): 20 TABLET, DELAYED RELEASE ORAL at 10:44

## 2022-01-01 RX ADMIN — Medication 25 GRAM(S): at 11:53

## 2022-01-01 RX ADMIN — Medication 500 MILLIGRAM(S): at 10:00

## 2022-01-01 RX ADMIN — MAGNESIUM OXIDE 400 MG ORAL TABLET 400 MILLIGRAM(S): 241.3 TABLET ORAL at 10:27

## 2022-01-01 RX ADMIN — PIPERACILLIN AND TAZOBACTAM 25 GRAM(S): 4; .5 INJECTION, POWDER, LYOPHILIZED, FOR SOLUTION INTRAVENOUS at 14:21

## 2022-01-01 RX ADMIN — Medication 5 MILLIGRAM(S): at 09:43

## 2022-01-01 RX ADMIN — Medication 5 MILLIGRAM(S): at 22:54

## 2022-01-01 RX ADMIN — Medication 50 MILLIGRAM(S): at 11:20

## 2022-01-01 RX ADMIN — BUDESONIDE AND FORMOTEROL FUMARATE DIHYDRATE 2 PUFF(S): 160; 4.5 AEROSOL RESPIRATORY (INHALATION) at 09:01

## 2022-01-01 RX ADMIN — QUETIAPINE FUMARATE 50 MILLIGRAM(S): 200 TABLET, FILM COATED ORAL at 21:45

## 2022-01-01 RX ADMIN — LISINOPRIL 40 MILLIGRAM(S): 2.5 TABLET ORAL at 09:39

## 2022-01-01 RX ADMIN — Medication 500 MILLIGRAM(S): at 09:46

## 2022-01-01 RX ADMIN — Medication 1 GRAM(S): at 18:34

## 2022-01-01 RX ADMIN — BUPRENORPHINE AND NALOXONE 1 FILM(S): 2; .5 TABLET SUBLINGUAL at 09:50

## 2022-01-01 RX ADMIN — SODIUM CHLORIDE 500 MILLILITER(S): 9 INJECTION INTRAMUSCULAR; INTRAVENOUS; SUBCUTANEOUS at 13:51

## 2022-01-01 RX ADMIN — Medication 40 MILLIEQUIVALENT(S): at 14:03

## 2022-01-01 RX ADMIN — Medication 1 TABLET(S): at 10:12

## 2022-01-01 RX ADMIN — Medication 20 MILLIEQUIVALENT(S): at 11:45

## 2022-01-01 RX ADMIN — LIDOCAINE 1 PATCH: 4 CREAM TOPICAL at 21:58

## 2022-01-01 RX ADMIN — Medication 15 MILLIGRAM(S): at 15:59

## 2022-01-01 RX ADMIN — GABAPENTIN 300 MILLIGRAM(S): 400 CAPSULE ORAL at 22:57

## 2022-01-01 RX ADMIN — LIDOCAINE 1 PATCH: 4 CREAM TOPICAL at 20:28

## 2022-01-01 RX ADMIN — Medication 2 MILLIGRAM(S): at 09:07

## 2022-01-01 RX ADMIN — PANTOPRAZOLE SODIUM 40 MILLIGRAM(S): 20 TABLET, DELAYED RELEASE ORAL at 22:53

## 2022-01-01 RX ADMIN — Medication 1 MILLIGRAM(S): at 09:39

## 2022-01-01 RX ADMIN — BUDESONIDE AND FORMOTEROL FUMARATE DIHYDRATE 2 PUFF(S): 160; 4.5 AEROSOL RESPIRATORY (INHALATION) at 09:17

## 2022-01-01 RX ADMIN — Medication 25 MICROGRAM(S): at 06:06

## 2022-01-01 RX ADMIN — Medication 2 MILLIGRAM(S): at 01:12

## 2022-01-01 RX ADMIN — MAGNESIUM OXIDE 400 MG ORAL TABLET 400 MILLIGRAM(S): 241.3 TABLET ORAL at 21:23

## 2022-01-01 RX ADMIN — PIPERACILLIN AND TAZOBACTAM 25 GRAM(S): 4; .5 INJECTION, POWDER, LYOPHILIZED, FOR SOLUTION INTRAVENOUS at 22:57

## 2022-01-01 RX ADMIN — Medication 500 MILLIGRAM(S): at 11:16

## 2022-01-01 RX ADMIN — GABAPENTIN 100 MILLIGRAM(S): 400 CAPSULE ORAL at 22:08

## 2022-01-01 RX ADMIN — Medication 1 PACKET(S): at 09:40

## 2022-01-01 RX ADMIN — Medication 650 MILLIGRAM(S): at 13:15

## 2022-01-01 RX ADMIN — Medication 20 MILLIEQUIVALENT(S): at 09:38

## 2022-01-01 RX ADMIN — Medication 200 MILLIGRAM(S): at 22:22

## 2022-01-01 RX ADMIN — Medication 1 APPLICATION(S): at 17:43

## 2022-01-01 RX ADMIN — CYCLOBENZAPRINE HYDROCHLORIDE 10 MILLIGRAM(S): 10 TABLET, FILM COATED ORAL at 21:48

## 2022-01-01 RX ADMIN — Medication 650 MILLIGRAM(S): at 22:44

## 2022-01-01 RX ADMIN — Medication 1 PATCH: at 11:00

## 2022-01-01 RX ADMIN — GABAPENTIN 200 MILLIGRAM(S): 400 CAPSULE ORAL at 00:22

## 2022-01-01 RX ADMIN — Medication 650 MILLIGRAM(S): at 10:32

## 2022-01-01 RX ADMIN — Medication 1 MILLIGRAM(S): at 09:40

## 2022-01-01 RX ADMIN — Medication 0.5 MILLIGRAM(S): at 23:55

## 2022-01-01 RX ADMIN — Medication 1 MILLIGRAM(S): at 14:27

## 2022-01-01 RX ADMIN — QUETIAPINE FUMARATE 50 MILLIGRAM(S): 200 TABLET, FILM COATED ORAL at 21:43

## 2022-01-01 RX ADMIN — Medication 6 MILLIGRAM(S): at 11:03

## 2022-01-01 RX ADMIN — DEXTROSE MONOHYDRATE, SODIUM CHLORIDE, AND POTASSIUM CHLORIDE 75 MILLILITER(S): 50; .745; 4.5 INJECTION, SOLUTION INTRAVENOUS at 09:14

## 2022-01-01 RX ADMIN — Medication 1 PATCH: at 09:43

## 2022-01-01 RX ADMIN — LIDOCAINE 1 PATCH: 4 CREAM TOPICAL at 22:20

## 2022-01-01 RX ADMIN — ENOXAPARIN SODIUM 40 MILLIGRAM(S): 100 INJECTION SUBCUTANEOUS at 17:13

## 2022-01-01 RX ADMIN — Medication 0.5 MILLIGRAM(S): at 21:22

## 2022-01-01 RX ADMIN — Medication 10 MILLIGRAM(S): at 21:35

## 2022-01-01 RX ADMIN — Medication 25 MICROGRAM(S): at 06:37

## 2022-01-01 RX ADMIN — Medication 15 MILLIGRAM(S): at 21:55

## 2022-01-01 RX ADMIN — Medication 1 MILLIGRAM(S): at 21:50

## 2022-01-01 RX ADMIN — Medication 1 APPLICATION(S): at 06:02

## 2022-01-01 RX ADMIN — BUPRENORPHINE AND NALOXONE 1 FILM(S): 2; .5 TABLET SUBLINGUAL at 06:59

## 2022-01-01 RX ADMIN — QUETIAPINE FUMARATE 50 MILLIGRAM(S): 200 TABLET, FILM COATED ORAL at 22:08

## 2022-01-01 RX ADMIN — Medication 25 MICROGRAM(S): at 05:12

## 2022-01-01 RX ADMIN — ENOXAPARIN SODIUM 40 MILLIGRAM(S): 100 INJECTION SUBCUTANEOUS at 17:23

## 2022-01-01 RX ADMIN — DEXTROSE MONOHYDRATE, SODIUM CHLORIDE, AND POTASSIUM CHLORIDE 75 MILLILITER(S): 50; .745; 4.5 INJECTION, SOLUTION INTRAVENOUS at 15:28

## 2022-01-01 RX ADMIN — GABAPENTIN 300 MILLIGRAM(S): 400 CAPSULE ORAL at 05:53

## 2022-01-01 RX ADMIN — Medication 5 MILLIGRAM(S): at 21:34

## 2022-01-01 RX ADMIN — TIOTROPIUM BROMIDE 1 CAPSULE(S): 18 CAPSULE ORAL; RESPIRATORY (INHALATION) at 08:30

## 2022-01-01 RX ADMIN — Medication 1 MILLIGRAM(S): at 10:24

## 2022-01-01 RX ADMIN — SODIUM CHLORIDE 75 MILLILITER(S): 9 INJECTION INTRAMUSCULAR; INTRAVENOUS; SUBCUTANEOUS at 04:20

## 2022-01-01 RX ADMIN — BUPRENORPHINE AND NALOXONE 1 TABLET(S): 2; .5 TABLET SUBLINGUAL at 05:09

## 2022-01-01 RX ADMIN — OCTREOTIDE ACETATE 150 MICROGRAM(S): 200 INJECTION, SOLUTION INTRAVENOUS; SUBCUTANEOUS at 00:22

## 2022-01-01 RX ADMIN — GABAPENTIN 300 MILLIGRAM(S): 400 CAPSULE ORAL at 22:21

## 2022-01-01 RX ADMIN — GABAPENTIN 300 MILLIGRAM(S): 400 CAPSULE ORAL at 13:01

## 2022-01-01 RX ADMIN — BUPRENORPHINE AND NALOXONE 1 TABLET(S): 2; .5 TABLET SUBLINGUAL at 05:29

## 2022-01-01 RX ADMIN — Medication 1 MILLIGRAM(S): at 10:25

## 2022-01-01 RX ADMIN — TIOTROPIUM BROMIDE 1 CAPSULE(S): 18 CAPSULE ORAL; RESPIRATORY (INHALATION) at 09:02

## 2022-01-01 RX ADMIN — Medication 500 MILLIGRAM(S): at 09:50

## 2022-01-01 RX ADMIN — SODIUM CHLORIDE 100 MILLILITER(S): 9 INJECTION INTRAMUSCULAR; INTRAVENOUS; SUBCUTANEOUS at 22:00

## 2022-01-01 RX ADMIN — BUDESONIDE AND FORMOTEROL FUMARATE DIHYDRATE 2 PUFF(S): 160; 4.5 AEROSOL RESPIRATORY (INHALATION) at 20:44

## 2022-01-01 RX ADMIN — SODIUM CHLORIDE 2000 MILLILITER(S): 9 INJECTION, SOLUTION INTRAVENOUS at 16:35

## 2022-01-01 RX ADMIN — QUETIAPINE FUMARATE 50 MILLIGRAM(S): 200 TABLET, FILM COATED ORAL at 21:01

## 2022-01-01 RX ADMIN — GABAPENTIN 300 MILLIGRAM(S): 400 CAPSULE ORAL at 06:06

## 2022-01-01 RX ADMIN — Medication 1 PATCH: at 09:00

## 2022-01-01 RX ADMIN — Medication 50 MILLIGRAM(S): at 05:09

## 2022-01-01 RX ADMIN — ONDANSETRON 4 MILLIGRAM(S): 8 TABLET, FILM COATED ORAL at 15:34

## 2022-01-01 RX ADMIN — Medication 10 MILLIGRAM(S): at 14:13

## 2022-01-01 RX ADMIN — Medication 650 MILLIGRAM(S): at 10:10

## 2022-01-01 RX ADMIN — PREGABALIN 1000 MICROGRAM(S): 225 CAPSULE ORAL at 11:30

## 2022-01-01 RX ADMIN — QUETIAPINE FUMARATE 50 MILLIGRAM(S): 200 TABLET, FILM COATED ORAL at 22:25

## 2022-01-01 RX ADMIN — LIDOCAINE 1 PATCH: 4 CREAM TOPICAL at 10:44

## 2022-01-01 RX ADMIN — Medication 1 PATCH: at 21:47

## 2022-01-01 RX ADMIN — Medication 1 PATCH: at 10:23

## 2022-01-01 RX ADMIN — DEXTROSE MONOHYDRATE, SODIUM CHLORIDE, AND POTASSIUM CHLORIDE 75 MILLILITER(S): 50; .745; 4.5 INJECTION, SOLUTION INTRAVENOUS at 21:23

## 2022-01-01 RX ADMIN — QUETIAPINE FUMARATE 50 MILLIGRAM(S): 200 TABLET, FILM COATED ORAL at 21:53

## 2022-01-01 RX ADMIN — LISINOPRIL 40 MILLIGRAM(S): 2.5 TABLET ORAL at 11:17

## 2022-01-01 RX ADMIN — ERGOCALCIFEROL 50000 UNIT(S): 1.25 CAPSULE ORAL at 09:38

## 2022-01-01 RX ADMIN — Medication 1 MILLIGRAM(S): at 11:19

## 2022-01-01 RX ADMIN — BUDESONIDE AND FORMOTEROL FUMARATE DIHYDRATE 2 PUFF(S): 160; 4.5 AEROSOL RESPIRATORY (INHALATION) at 08:13

## 2022-01-01 RX ADMIN — Medication 1 PATCH: at 10:44

## 2022-01-01 RX ADMIN — BUDESONIDE AND FORMOTEROL FUMARATE DIHYDRATE 2 PUFF(S): 160; 4.5 AEROSOL RESPIRATORY (INHALATION) at 08:01

## 2022-01-01 RX ADMIN — TIOTROPIUM BROMIDE 1 CAPSULE(S): 18 CAPSULE ORAL; RESPIRATORY (INHALATION) at 08:37

## 2022-01-01 RX ADMIN — Medication 15 MILLIGRAM(S): at 21:43

## 2022-01-01 RX ADMIN — PIPERACILLIN AND TAZOBACTAM 25 GRAM(S): 4; .5 INJECTION, POWDER, LYOPHILIZED, FOR SOLUTION INTRAVENOUS at 05:53

## 2022-01-01 RX ADMIN — Medication 10 MILLIGRAM(S): at 21:55

## 2022-01-01 RX ADMIN — LISINOPRIL 40 MILLIGRAM(S): 2.5 TABLET ORAL at 09:47

## 2022-01-01 RX ADMIN — AZITHROMYCIN 500 MILLIGRAM(S): 500 TABLET, FILM COATED ORAL at 09:38

## 2022-01-01 RX ADMIN — Medication 1 MILLIGRAM(S): at 10:44

## 2022-01-01 RX ADMIN — LISINOPRIL 40 MILLIGRAM(S): 2.5 TABLET ORAL at 10:01

## 2022-01-01 RX ADMIN — Medication 6 MILLIGRAM(S): at 10:27

## 2022-01-01 RX ADMIN — Medication 25 MICROGRAM(S): at 05:28

## 2022-01-01 RX ADMIN — TIOTROPIUM BROMIDE 1 CAPSULE(S): 18 CAPSULE ORAL; RESPIRATORY (INHALATION) at 07:56

## 2022-01-01 RX ADMIN — GABAPENTIN 300 MILLIGRAM(S): 400 CAPSULE ORAL at 21:34

## 2022-01-01 RX ADMIN — Medication 1 TABLET(S): at 09:17

## 2022-01-01 RX ADMIN — SODIUM CHLORIDE 100 MILLILITER(S): 9 INJECTION INTRAMUSCULAR; INTRAVENOUS; SUBCUTANEOUS at 23:17

## 2022-01-01 RX ADMIN — Medication 1 PATCH: at 10:11

## 2022-01-01 RX ADMIN — PIPERACILLIN AND TAZOBACTAM 25 GRAM(S): 4; .5 INJECTION, POWDER, LYOPHILIZED, FOR SOLUTION INTRAVENOUS at 21:23

## 2022-01-01 RX ADMIN — Medication 1 EACH: at 22:56

## 2022-01-01 RX ADMIN — BUDESONIDE AND FORMOTEROL FUMARATE DIHYDRATE 2 PUFF(S): 160; 4.5 AEROSOL RESPIRATORY (INHALATION) at 09:41

## 2022-01-01 RX ADMIN — Medication 5 MILLIGRAM(S): at 11:02

## 2022-01-01 RX ADMIN — Medication 10 MILLIGRAM(S): at 22:52

## 2022-01-01 RX ADMIN — Medication 1 PATCH: at 11:24

## 2022-01-01 RX ADMIN — PANTOPRAZOLE SODIUM 40 MILLIGRAM(S): 20 TABLET, DELAYED RELEASE ORAL at 22:56

## 2022-01-01 RX ADMIN — GABAPENTIN 300 MILLIGRAM(S): 400 CAPSULE ORAL at 14:27

## 2022-01-01 RX ADMIN — Medication 15 MILLIGRAM(S): at 12:00

## 2022-01-01 RX ADMIN — Medication 200 MILLIGRAM(S): at 23:10

## 2022-01-01 RX ADMIN — Medication 1 PATCH: at 19:53

## 2022-01-01 RX ADMIN — LISINOPRIL 5 MILLIGRAM(S): 2.5 TABLET ORAL at 17:03

## 2022-01-01 RX ADMIN — Medication 15 MILLIGRAM(S): at 06:06

## 2022-01-01 RX ADMIN — Medication 5 MILLIGRAM(S): at 22:09

## 2022-01-01 RX ADMIN — Medication 1 TABLET(S): at 11:02

## 2022-01-01 RX ADMIN — PIPERACILLIN AND TAZOBACTAM 25 GRAM(S): 4; .5 INJECTION, POWDER, LYOPHILIZED, FOR SOLUTION INTRAVENOUS at 13:59

## 2022-01-01 RX ADMIN — GABAPENTIN 300 MILLIGRAM(S): 400 CAPSULE ORAL at 21:56

## 2022-01-01 RX ADMIN — SODIUM CHLORIDE 1000 MILLILITER(S): 9 INJECTION INTRAMUSCULAR; INTRAVENOUS; SUBCUTANEOUS at 15:49

## 2022-01-01 RX ADMIN — Medication 1 MILLIGRAM(S): at 09:15

## 2022-01-01 RX ADMIN — Medication 650 MILLIGRAM(S): at 17:52

## 2022-01-01 RX ADMIN — LIDOCAINE 1 PATCH: 4 CREAM TOPICAL at 09:14

## 2022-01-01 RX ADMIN — Medication 15 MILLIGRAM(S): at 13:01

## 2022-01-01 RX ADMIN — BUPRENORPHINE AND NALOXONE 1 FILM(S): 2; .5 TABLET SUBLINGUAL at 09:06

## 2022-01-01 RX ADMIN — PIPERACILLIN AND TAZOBACTAM 25 GRAM(S): 4; .5 INJECTION, POWDER, LYOPHILIZED, FOR SOLUTION INTRAVENOUS at 21:22

## 2022-01-01 RX ADMIN — QUETIAPINE FUMARATE 50 MILLIGRAM(S): 200 TABLET, FILM COATED ORAL at 22:53

## 2022-01-01 RX ADMIN — QUETIAPINE FUMARATE 50 MILLIGRAM(S): 200 TABLET, FILM COATED ORAL at 22:58

## 2022-01-01 RX ADMIN — PIPERACILLIN AND TAZOBACTAM 25 GRAM(S): 4; .5 INJECTION, POWDER, LYOPHILIZED, FOR SOLUTION INTRAVENOUS at 21:53

## 2022-01-01 RX ADMIN — CYCLOBENZAPRINE HYDROCHLORIDE 10 MILLIGRAM(S): 10 TABLET, FILM COATED ORAL at 19:35

## 2022-01-01 RX ADMIN — LISINOPRIL 40 MILLIGRAM(S): 2.5 TABLET ORAL at 12:08

## 2022-01-01 RX ADMIN — QUETIAPINE FUMARATE 50 MILLIGRAM(S): 200 TABLET, FILM COATED ORAL at 21:48

## 2022-01-01 RX ADMIN — DEXTROSE MONOHYDRATE, SODIUM CHLORIDE, AND POTASSIUM CHLORIDE 75 MILLILITER(S): 50; .745; 4.5 INJECTION, SOLUTION INTRAVENOUS at 13:25

## 2022-01-01 RX ADMIN — Medication 10 MILLIGRAM(S): at 05:53

## 2022-01-01 RX ADMIN — LISINOPRIL 40 MILLIGRAM(S): 2.5 TABLET ORAL at 15:16

## 2022-01-01 RX ADMIN — Medication 5 MILLIGRAM(S): at 09:50

## 2022-01-01 RX ADMIN — Medication 30 MILLILITER(S): at 16:29

## 2022-01-01 RX ADMIN — Medication 325 MILLIGRAM(S): at 09:17

## 2022-01-01 RX ADMIN — Medication 650 MILLIGRAM(S): at 11:45

## 2022-01-01 RX ADMIN — GABAPENTIN 300 MILLIGRAM(S): 400 CAPSULE ORAL at 13:40

## 2022-01-01 RX ADMIN — Medication 0.5 MILLIGRAM(S): at 01:58

## 2022-01-01 RX ADMIN — BUDESONIDE AND FORMOTEROL FUMARATE DIHYDRATE 2 PUFF(S): 160; 4.5 AEROSOL RESPIRATORY (INHALATION) at 20:12

## 2022-01-01 RX ADMIN — Medication 1 PATCH: at 20:26

## 2022-01-01 RX ADMIN — Medication 1 PATCH: at 09:06

## 2022-01-01 RX ADMIN — GABAPENTIN 100 MILLIGRAM(S): 400 CAPSULE ORAL at 06:37

## 2022-01-01 RX ADMIN — Medication 25 MICROGRAM(S): at 04:50

## 2022-01-01 RX ADMIN — PIPERACILLIN AND TAZOBACTAM 25 GRAM(S): 4; .5 INJECTION, POWDER, LYOPHILIZED, FOR SOLUTION INTRAVENOUS at 05:05

## 2022-01-01 RX ADMIN — Medication 0.5 MILLIGRAM(S): at 09:14

## 2022-01-01 RX ADMIN — Medication 100 MILLIGRAM(S): at 11:18

## 2022-01-01 RX ADMIN — Medication 1 MILLIGRAM(S): at 23:15

## 2022-01-01 RX ADMIN — Medication 15 MILLIGRAM(S): at 05:40

## 2022-01-01 RX ADMIN — Medication 1200 MILLIGRAM(S): at 22:08

## 2022-01-01 RX ADMIN — Medication 25 MICROGRAM(S): at 06:00

## 2022-01-01 RX ADMIN — Medication 1 APPLICATION(S): at 05:30

## 2022-01-01 RX ADMIN — Medication 10 MILLIGRAM(S): at 22:58

## 2022-01-01 RX ADMIN — Medication 1 PATCH: at 09:38

## 2022-01-01 RX ADMIN — OCTREOTIDE ACETATE 150 MICROGRAM(S): 200 INJECTION, SOLUTION INTRAVENOUS; SUBCUTANEOUS at 05:27

## 2022-01-01 RX ADMIN — Medication 25 MICROGRAM(S): at 05:53

## 2022-01-01 RX ADMIN — Medication 1 PATCH: at 09:46

## 2022-01-01 RX ADMIN — Medication 1 PATCH: at 09:40

## 2022-01-01 RX ADMIN — BUPRENORPHINE AND NALOXONE 1 TABLET(S): 2; .5 TABLET SUBLINGUAL at 07:28

## 2022-01-01 RX ADMIN — Medication 1 MILLIGRAM(S): at 09:43

## 2022-01-01 RX ADMIN — Medication 15 MILLIGRAM(S): at 07:04

## 2022-01-01 RX ADMIN — ONDANSETRON 4 MILLIGRAM(S): 8 TABLET, FILM COATED ORAL at 15:57

## 2022-01-01 RX ADMIN — LISINOPRIL 40 MILLIGRAM(S): 2.5 TABLET ORAL at 09:17

## 2022-01-01 RX ADMIN — Medication 1 MILLIGRAM(S): at 09:38

## 2022-01-01 RX ADMIN — OCTREOTIDE ACETATE 150 MICROGRAM(S): 200 INJECTION, SOLUTION INTRAVENOUS; SUBCUTANEOUS at 22:58

## 2022-01-01 RX ADMIN — BUDESONIDE AND FORMOTEROL FUMARATE DIHYDRATE 2 PUFF(S): 160; 4.5 AEROSOL RESPIRATORY (INHALATION) at 07:52

## 2022-01-01 RX ADMIN — Medication 25 MICROGRAM(S): at 04:13

## 2022-01-01 RX ADMIN — BUDESONIDE AND FORMOTEROL FUMARATE DIHYDRATE 2 PUFF(S): 160; 4.5 AEROSOL RESPIRATORY (INHALATION) at 08:28

## 2022-01-01 RX ADMIN — Medication 500 MILLIGRAM(S): at 09:16

## 2022-01-01 RX ADMIN — Medication 50 MICROGRAM(S): at 05:37

## 2022-01-01 RX ADMIN — TIOTROPIUM BROMIDE 1 CAPSULE(S): 18 CAPSULE ORAL; RESPIRATORY (INHALATION) at 09:19

## 2022-01-01 RX ADMIN — Medication 1 PATCH: at 08:06

## 2022-01-01 RX ADMIN — LIDOCAINE 1 PATCH: 4 CREAM TOPICAL at 22:00

## 2022-01-01 RX ADMIN — Medication 100 MILLIEQUIVALENT(S): at 22:02

## 2022-01-01 RX ADMIN — Medication 15 MILLIGRAM(S): at 22:15

## 2022-01-01 RX ADMIN — OCTREOTIDE ACETATE 150 MICROGRAM(S): 200 INJECTION, SOLUTION INTRAVENOUS; SUBCUTANEOUS at 23:13

## 2022-01-01 RX ADMIN — LIDOCAINE 1 PATCH: 4 CREAM TOPICAL at 09:39

## 2022-01-01 RX ADMIN — Medication 2 MILLIGRAM(S): at 11:42

## 2022-01-01 RX ADMIN — GABAPENTIN 300 MILLIGRAM(S): 400 CAPSULE ORAL at 05:37

## 2022-01-01 RX ADMIN — ENOXAPARIN SODIUM 40 MILLIGRAM(S): 100 INJECTION SUBCUTANEOUS at 18:33

## 2022-01-01 RX ADMIN — Medication 1 PATCH: at 09:57

## 2022-01-01 RX ADMIN — LISINOPRIL 40 MILLIGRAM(S): 2.5 TABLET ORAL at 10:24

## 2022-01-01 RX ADMIN — PANTOPRAZOLE SODIUM 40 MILLIGRAM(S): 20 TABLET, DELAYED RELEASE ORAL at 22:09

## 2022-01-01 RX ADMIN — POTASSIUM PHOSPHATE, MONOBASIC POTASSIUM PHOSPHATE, DIBASIC 83.33 MILLIMOLE(S): 236; 224 INJECTION, SOLUTION INTRAVENOUS at 11:42

## 2022-01-01 RX ADMIN — Medication 1 TABLET(S): at 09:46

## 2022-01-01 RX ADMIN — Medication 1 MILLIGRAM(S): at 11:01

## 2022-01-01 RX ADMIN — Medication 650 MILLIGRAM(S): at 00:12

## 2022-01-01 RX ADMIN — Medication 1 PATCH: at 09:35

## 2022-01-01 RX ADMIN — Medication 100 MILLIGRAM(S): at 09:39

## 2022-01-01 RX ADMIN — Medication 1 PATCH: at 19:04

## 2022-01-01 RX ADMIN — Medication 1 TABLET(S): at 09:59

## 2022-01-01 RX ADMIN — PIPERACILLIN AND TAZOBACTAM 25 GRAM(S): 4; .5 INJECTION, POWDER, LYOPHILIZED, FOR SOLUTION INTRAVENOUS at 13:54

## 2022-01-01 RX ADMIN — Medication 0.5 MILLIGRAM(S): at 05:23

## 2022-01-01 RX ADMIN — Medication 1 MILLIGRAM(S): at 13:59

## 2022-01-01 RX ADMIN — GABAPENTIN 300 MILLIGRAM(S): 400 CAPSULE ORAL at 13:59

## 2022-01-01 RX ADMIN — Medication 1 MILLIGRAM(S): at 05:37

## 2022-01-01 RX ADMIN — GABAPENTIN 300 MILLIGRAM(S): 400 CAPSULE ORAL at 14:11

## 2022-01-01 RX ADMIN — Medication 5 MILLIGRAM(S): at 10:44

## 2022-01-01 RX ADMIN — Medication 1 MILLIGRAM(S): at 05:47

## 2022-01-01 RX ADMIN — QUETIAPINE FUMARATE 50 MILLIGRAM(S): 200 TABLET, FILM COATED ORAL at 21:23

## 2022-01-01 RX ADMIN — PIPERACILLIN AND TAZOBACTAM 25 GRAM(S): 4; .5 INJECTION, POWDER, LYOPHILIZED, FOR SOLUTION INTRAVENOUS at 21:18

## 2022-01-01 RX ADMIN — Medication 500 MILLIGRAM(S): at 11:02

## 2022-01-01 RX ADMIN — Medication 1 PATCH: at 09:27

## 2022-01-01 RX ADMIN — Medication 5 MILLIGRAM(S): at 22:57

## 2022-01-01 RX ADMIN — BUPRENORPHINE AND NALOXONE 1 TABLET(S): 2; .5 TABLET SUBLINGUAL at 08:06

## 2022-01-01 RX ADMIN — Medication 5 MILLIGRAM(S): at 10:25

## 2022-01-01 RX ADMIN — Medication 1 MILLIGRAM(S): at 21:43

## 2022-01-01 RX ADMIN — Medication 10 MILLIGRAM(S): at 06:07

## 2022-01-01 RX ADMIN — Medication 15 MILLIGRAM(S): at 06:53

## 2022-01-01 RX ADMIN — Medication 1 PATCH: at 09:47

## 2022-01-01 RX ADMIN — BUDESONIDE AND FORMOTEROL FUMARATE DIHYDRATE 2 PUFF(S): 160; 4.5 AEROSOL RESPIRATORY (INHALATION) at 20:23

## 2022-01-01 RX ADMIN — PIPERACILLIN AND TAZOBACTAM 25 GRAM(S): 4; .5 INJECTION, POWDER, LYOPHILIZED, FOR SOLUTION INTRAVENOUS at 05:59

## 2022-01-01 RX ADMIN — BUPRENORPHINE AND NALOXONE 1 FILM(S): 2; .5 TABLET SUBLINGUAL at 10:09

## 2022-01-01 RX ADMIN — LIDOCAINE 1 PATCH: 4 CREAM TOPICAL at 10:19

## 2022-01-01 RX ADMIN — Medication 15 MILLIGRAM(S): at 22:30

## 2022-01-01 RX ADMIN — ONDANSETRON 4 MILLIGRAM(S): 8 TABLET, FILM COATED ORAL at 03:33

## 2022-01-01 RX ADMIN — Medication 1 MILLIGRAM(S): at 11:02

## 2022-01-01 RX ADMIN — Medication 100 MILLIGRAM(S): at 09:31

## 2022-01-01 RX ADMIN — POTASSIUM PHOSPHATE, MONOBASIC POTASSIUM PHOSPHATE, DIBASIC 62.5 MILLIMOLE(S): 236; 224 INJECTION, SOLUTION INTRAVENOUS at 12:19

## 2022-01-01 RX ADMIN — PREGABALIN 1000 MICROGRAM(S): 225 CAPSULE ORAL at 09:46

## 2022-01-01 RX ADMIN — ENOXAPARIN SODIUM 40 MILLIGRAM(S): 100 INJECTION SUBCUTANEOUS at 18:44

## 2022-01-01 RX ADMIN — GABAPENTIN 200 MILLIGRAM(S): 400 CAPSULE ORAL at 08:10

## 2022-01-01 RX ADMIN — Medication 650 MILLIGRAM(S): at 06:01

## 2022-01-01 RX ADMIN — BUDESONIDE AND FORMOTEROL FUMARATE DIHYDRATE 2 PUFF(S): 160; 4.5 AEROSOL RESPIRATORY (INHALATION) at 08:37

## 2022-01-01 RX ADMIN — ENOXAPARIN SODIUM 40 MILLIGRAM(S): 100 INJECTION SUBCUTANEOUS at 18:35

## 2022-01-01 RX ADMIN — Medication 100 MILLIEQUIVALENT(S): at 00:05

## 2022-01-01 RX ADMIN — Medication 650 MILLIGRAM(S): at 23:00

## 2022-01-01 RX ADMIN — Medication 1 PACKET(S): at 15:16

## 2022-01-01 RX ADMIN — Medication 250 MILLIGRAM(S): at 21:45

## 2022-01-01 RX ADMIN — LACTULOSE 10 GRAM(S): 10 SOLUTION ORAL at 22:09

## 2022-01-01 RX ADMIN — Medication 650 MILLIGRAM(S): at 19:47

## 2022-01-01 RX ADMIN — BUPRENORPHINE AND NALOXONE 1 FILM(S): 2; .5 TABLET SUBLINGUAL at 08:23

## 2022-01-01 RX ADMIN — Medication 1 PATCH: at 19:45

## 2022-01-01 RX ADMIN — PIPERACILLIN AND TAZOBACTAM 25 GRAM(S): 4; .5 INJECTION, POWDER, LYOPHILIZED, FOR SOLUTION INTRAVENOUS at 21:01

## 2022-01-01 RX ADMIN — Medication 1 EACH: at 23:14

## 2022-01-01 RX ADMIN — Medication 1 MILLIGRAM(S): at 20:23

## 2022-01-01 RX ADMIN — Medication 2 MILLIGRAM(S): at 16:51

## 2022-01-01 RX ADMIN — BUPRENORPHINE AND NALOXONE 1 FILM(S): 2; .5 TABLET SUBLINGUAL at 05:59

## 2022-01-01 RX ADMIN — Medication 1 PATCH: at 09:42

## 2022-01-01 RX ADMIN — Medication 1 PATCH: at 18:14

## 2022-01-01 RX ADMIN — Medication 100 MILLIEQUIVALENT(S): at 03:05

## 2022-01-01 RX ADMIN — Medication 1 APPLICATION(S): at 05:09

## 2022-01-01 RX ADMIN — ENOXAPARIN SODIUM 40 MILLIGRAM(S): 100 INJECTION SUBCUTANEOUS at 22:53

## 2022-01-01 RX ADMIN — Medication 15 MILLIGRAM(S): at 23:08

## 2022-01-01 RX ADMIN — OCTREOTIDE ACETATE 150 MICROGRAM(S): 200 INJECTION, SOLUTION INTRAVENOUS; SUBCUTANEOUS at 06:01

## 2022-01-01 RX ADMIN — Medication 1 PATCH: at 09:17

## 2022-01-01 RX ADMIN — Medication 10 MILLIGRAM(S): at 05:27

## 2022-01-01 RX ADMIN — Medication 0.5 MILLIGRAM(S): at 04:50

## 2022-01-01 RX ADMIN — Medication 1 MILLIGRAM(S): at 21:23

## 2022-01-01 RX ADMIN — PREGABALIN 1000 MICROGRAM(S): 225 CAPSULE ORAL at 09:28

## 2022-01-01 RX ADMIN — Medication 650 MILLIGRAM(S): at 10:40

## 2022-01-01 RX ADMIN — Medication 5 MILLIGRAM(S): at 09:38

## 2022-01-01 RX ADMIN — QUETIAPINE FUMARATE 50 MILLIGRAM(S): 200 TABLET, FILM COATED ORAL at 21:56

## 2022-01-01 RX ADMIN — Medication 500 MILLIGRAM(S): at 10:44

## 2022-01-01 RX ADMIN — LISINOPRIL 40 MILLIGRAM(S): 2.5 TABLET ORAL at 09:40

## 2022-01-01 RX ADMIN — Medication 1 MILLIGRAM(S): at 09:48

## 2022-01-01 RX ADMIN — Medication 1 MILLIGRAM(S): at 21:13

## 2022-01-01 RX ADMIN — PANTOPRAZOLE SODIUM 40 MILLIGRAM(S): 20 TABLET, DELAYED RELEASE ORAL at 09:50

## 2022-01-01 RX ADMIN — Medication 1 TABLET(S): at 09:50

## 2022-01-01 RX ADMIN — Medication 2 MILLIGRAM(S): at 21:18

## 2022-01-01 RX ADMIN — Medication 1 MILLIGRAM(S): at 05:16

## 2022-01-01 RX ADMIN — Medication 1.5 MILLIGRAM(S): at 05:55

## 2022-01-01 RX ADMIN — Medication 1 MILLIGRAM(S): at 08:06

## 2022-01-01 RX ADMIN — Medication 40 MILLIEQUIVALENT(S): at 18:35

## 2022-01-01 RX ADMIN — Medication 1000 UNIT(S): at 09:40

## 2022-01-01 RX ADMIN — Medication 15 MILLIGRAM(S): at 05:28

## 2022-01-01 RX ADMIN — PANTOPRAZOLE SODIUM 40 MILLIGRAM(S): 20 TABLET, DELAYED RELEASE ORAL at 21:35

## 2022-01-01 RX ADMIN — Medication 1.5 MILLIGRAM(S): at 01:36

## 2022-01-11 ENCOUNTER — EMERGENCY (EMERGENCY)
Facility: HOSPITAL | Age: 52
LOS: 0 days | Discharge: HOME | End: 2022-01-11
Attending: EMERGENCY MEDICINE
Payer: COMMERCIAL

## 2022-01-11 VITALS — HEIGHT: 75 IN | WEIGHT: 145.06 LBS

## 2022-01-11 VITALS
RESPIRATION RATE: 20 BRPM | OXYGEN SATURATION: 100 % | HEART RATE: 101 BPM | DIASTOLIC BLOOD PRESSURE: 88 MMHG | SYSTOLIC BLOOD PRESSURE: 152 MMHG

## 2022-01-11 DIAGNOSIS — I10 ESSENTIAL (PRIMARY) HYPERTENSION: ICD-10-CM

## 2022-01-11 DIAGNOSIS — M25.539 PAIN IN UNSPECIFIED WRIST: ICD-10-CM

## 2022-01-11 DIAGNOSIS — M65.222 CALCIFIC TENDINITIS, LEFT UPPER ARM: ICD-10-CM

## 2022-01-11 DIAGNOSIS — M25.532 PAIN IN LEFT WRIST: ICD-10-CM

## 2022-01-11 DIAGNOSIS — R00.0 TACHYCARDIA, UNSPECIFIED: ICD-10-CM

## 2022-01-11 PROCEDURE — 93010 ELECTROCARDIOGRAM REPORT: CPT

## 2022-01-11 PROCEDURE — 73110 X-RAY EXAM OF WRIST: CPT | Mod: LT

## 2022-01-11 PROCEDURE — 93005 ELECTROCARDIOGRAM TRACING: CPT

## 2022-01-11 PROCEDURE — 73110 X-RAY EXAM OF WRIST: CPT | Mod: 26,LT

## 2022-01-11 PROCEDURE — 99283 EMERGENCY DEPT VISIT LOW MDM: CPT | Mod: 25

## 2022-01-11 PROCEDURE — 99284 EMERGENCY DEPT VISIT MOD MDM: CPT

## 2022-01-11 RX ORDER — IBUPROFEN 200 MG
1 TABLET ORAL
Qty: 20 | Refills: 0
Start: 2022-01-11 | End: 2022-01-15

## 2022-01-11 RX ORDER — LISINOPRIL 2.5 MG/1
40 TABLET ORAL ONCE
Refills: 0 | Status: COMPLETED | OUTPATIENT
Start: 2022-01-11 | End: 2022-01-11

## 2022-01-11 RX ADMIN — LISINOPRIL 40 MILLIGRAM(S): 2.5 TABLET ORAL at 10:40

## 2022-01-11 NOTE — ED STATDOCS - PATIENT PORTAL LINK FT
You can access the FollowMyHealth Patient Portal offered by [facility] by registering at the following website: [email or web address]. By joining [employer]’s FollowMyHealth portal, you will also be able to view your health information using other applications (apps) compatible with our system.

## 2022-01-11 NOTE — ED STATDOCS - CARE PLAN
Principal Discharge DX:	Wrist pain   1 Principal Discharge DX:	Wrist pain  Secondary Diagnosis:	Tendonitis of wrist, left

## 2022-01-11 NOTE — ED STATDOCS - OBJECTIVE STATEMENT
50 yo M with PMHx of HTN, on lisinopril 50mg, with c/o hit left wirst with a chain 3 weeks ago. went to , no xray, but put on steroid course. No better. No ortho F/U. still huts to flex at the wrist. Pt is rt handed. Did not take his lisinopril today, smoked 3 cigs in way in, thinks that's why his HR is fast.

## 2022-01-11 NOTE — ED STATDOCS - PROGRESS NOTE DETAILS
50 y/o Male presents to ED c/o pain in left wrist for 3 weeks s/p metal chain hit it at work.  Neg open wound.  Pain and swelling increased in area s/p Trauma.  Difficult to lift things, open doors and drive at work.  Went to UC and was given 5 days of Prednisone s relief.  Xrays without Fx.  (+) Mild swelling to proximal L thumb / radial aspect of wrist.  (+) Dequervain's test.  NVI left UE.  Applied bulky dressing to thumb to decrease ROM and ACE.  Refer to Hand.  LYNDA.  Maty Cui PA-C

## 2022-01-11 NOTE — ED STATDOCS - NSFOLLOWUPINSTRUCTIONS_ED_ALL_ED_FT
TENDINITIS - AfterCare(R) Instructions(ER/ED)           Tendinitis    WHAT YOU NEED TO KNOW:    Tendinitis is painful inflammation or breakdown of your tendons. It may also be called tendinopathy. Tendinitis often occurs in the knee, shoulder, ankle, hip, or elbow.    DISCHARGE INSTRUCTIONS:    Medicines:   •Pain medicines such as acetaminophen or NSAIDs may decrease swelling and pain or fever. These medicines are available without a doctor's order. Ask which medicine to take. Ask how much to take and when to take it. Follow directions. Acetaminophen and NSAIDs can cause liver or kidney damage if not taken correctly. If you take blood thinner medicine, always ask your healthcare provider if NSAIDs are safe for you. Always read the medicine label and follow the directions on it before using these medicine.       •Take your medicine as directed. Contact your healthcare provider if you think your medicine is not helping or if you have side effects. Tell him if you are allergic to any medicine. Keep a list of the medicines, vitamins, and herbs you take. Include the amounts, and when and why you take them. Bring the list or the pill bottles to follow-up visits. Carry your medicine list with you in case of an emergency.      Management:   •Rest your tendon as directed to help it heal. Ask your healthcare provider if you need to stop putting weight on your affected area.      •Ice helps decrease swelling and pain. Ice may also help prevent tissue damage. Use an ice pack, or put crushed ice in a plastic bag. Cover it with a towel and place it on the affected area for 10 to 15 minutes every hour or as directed.      •Support devices such as a cane, splint, shoe insert, or brace may help reduce your pain.      •Physical therapy may be ordered by your healthcare provider. This may be used to teach you exercises to help improve movement and strength, and to decrease pain. You may also learn how to improve your posture, and how to lift or exercise correctly.      Prevention:   •Stretch and warm up before you exercise.       •Exercise regularly to strengthen the muscles around your joint. Ease into an exercise routine for the first 3 weeks to prevent another injury. Ask your healthcare provider about the best exercise plan for you. Rest fully between activities.      •Use the right equipment for sports and exercise. Wear braces or tape around weak joints as directed.      Follow up with your healthcare provider as directed: Write down your questions so you remember to ask them during your visits.     Contact your healthcare provider if:   •You have increased pain even after you take medicine.      •You have questions or concerns about your condition or care.      Return to the emergency department if:   •You have increased redness over the joint, or swelling in the joint.      •You suddenly cannot move your joint.         © Copyright [employer] 2022           back to top                          © Copyright [employer] 2022

## 2022-01-11 NOTE — ED ADULT NURSE NOTE - OBJECTIVE STATEMENT
pt presents for evaluation of worsening wrist pain after injury unrelieved by outpatient treatment. reports negative xray by PMD.

## 2022-02-14 ENCOUNTER — APPOINTMENT (OUTPATIENT)
Dept: ORTHOPEDIC SURGERY | Facility: CLINIC | Age: 52
End: 2022-02-14
Payer: OTHER MISCELLANEOUS

## 2022-02-14 ENCOUNTER — NON-APPOINTMENT (OUTPATIENT)
Age: 52
End: 2022-02-14

## 2022-02-14 PROCEDURE — 99204 OFFICE O/P NEW MOD 45 MIN: CPT

## 2022-02-14 NOTE — HISTORY OF PRESENT ILLNESS
[FreeTextEntry1] : 51 year male presents for evaluation of left wrist pain following a work related injury about one month ago. He reports while working, a 50lb chain landed onto the left wrist. He reports significant pain and swelling following the injury. He was seen in urgent care following the injury and recommended conservative tx. He reports worsening of his pain and swelling, which led him to be seen in the ED on 1/11/22. xrays were taken revealing no acute fx. He was recommended use of a wrist immobilizer and provided pain medications/nsaids. He reports since the injury his symptoms have been worsening, with increased pain and swelling localized to the 1st dorsal extensor compartment. He has been working.  [FreeTextEntry2] :  -  - Rigging.  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 Hooking up chains, straps.

## 2022-02-14 NOTE — ASSESSMENT
[Indicate if, in your opinion, the incident that the patient described was the competent medical cause of this injury/illness.] : The incident that the patient described was the competent medical cause of this injury/illness: Yes [Indicate if the patient's complaints are consistent with his/her history of the injury/illness.] : Indicate if the patient's complaints are consistent with his/her history of the injury/illness: Yes [Yes] : Yes, it is consistent [FreeTextEntry1] : 51 year old male presents s/p traumatic injury to the left wrist while working about one month ago, exam findings consistent with dequervains tenosynovitis s/p injury. \par Patient has been informed of the findings, and recommended for a steroid injection today. He received the injection and tolerated the procedure well. He has been recommended for continued use of a wrist immobilizer at this time. He has been recommended to remain out of work for the next week to allow for rest, with return to work effective 2/21/22. He will continue with light dexterity activity. He will follow up four weeks for reevaluation.  [FreeTextEntry5] : 100

## 2022-02-14 NOTE — PHYSICAL EXAM
[de-identified] : Left wrist: \par \par There is a positive Finkelstein on the left wrist which is negative on the opposite wrist. There is swelling and tenderness localized over the first dorsal compartment.of the same wrist without evidence of infection.\par There is a negative grind test bilaterally. Negative tenderness or instability of the MP or IP joint of the thumbs bilaterally. Negative tenderness over the A1 pulleys bilaterally. 5 over 5 strength bilaterally. [de-identified] : The following radiographs were taken at an outside imaging source, read again by me during this patients visit. I reviewed each radiograph in detail with the patient and discussed the findings as highlighted below. \par 3 xray views of the left wrist reveal no acute fx.

## 2022-02-14 NOTE — PHYSICAL EXAM
[de-identified] : Left wrist: \par \par There is a positive Finkelstein on the left wrist which is negative on the opposite wrist. There is swelling and tenderness localized over the first dorsal compartment.of the same wrist without evidence of infection.\par There is a negative grind test bilaterally. Negative tenderness or instability of the MP or IP joint of the thumbs bilaterally. Negative tenderness over the A1 pulleys bilaterally. 5 over 5 strength bilaterally. [de-identified] : The following radiographs were taken at an outside imaging source, read again by me during this patients visit. I reviewed each radiograph in detail with the patient and discussed the findings as highlighted below. \par 3 xray views of the left wrist reveal no acute fx.

## 2022-02-14 NOTE — PROCEDURE
[FreeTextEntry1] : Left wrist steroid injection\par Indication: dequervains\par \par My impression is that the patient is suffering from de Quervain's tenosynovitis. I therefore recommended that the patient undergo a first dorsal compartment injection. The risks benefits and alternatives were discussed with the patient. This included (but was not limited to) subcutaneous atrophy, depigmentation, nerve injury, RSD, infection etc... the patient agreed and under informed consent and sterile conditions 1/2 cc of 1% plain lidocaine and 1 cc of dexamethasone (4mg)  was precisely injected into the first dorsal compartment. It is my hope that this significantly alleviates the symptoms. Should the symptoms not disappear or should they recur then the patient should contact me. All questions were answered.\par

## 2022-03-14 ENCOUNTER — APPOINTMENT (OUTPATIENT)
Dept: ORTHOPEDIC SURGERY | Facility: CLINIC | Age: 52
End: 2022-03-14
Payer: OTHER MISCELLANEOUS

## 2022-03-14 PROCEDURE — 99213 OFFICE O/P EST LOW 20 MIN: CPT

## 2022-03-14 PROCEDURE — 99072 ADDL SUPL MATRL&STAF TM PHE: CPT

## 2022-03-14 NOTE — HISTORY OF PRESENT ILLNESS
[FreeTextEntry1] : 51 year male presents for evaluation of left wrist pain following a work related injury about one month ago. He reports while working, a 50lb chain landed onto the left wrist. He reports significant pain and swelling following the injury. He was seen in urgent care following the injury and recommended conservative tx. He reports worsening of his pain and swelling, which led him to be seen in the ED on 1/11/22. xrays were taken revealing no acute fx. He was recommended use of a wrist immobilizer and provided pain medications/nsaids. He reports since the injury his symptoms have been worsening, with increased pain and swelling localized to the 1st dorsal extensor compartment. He has been working. \par \par 03/14/2022: Patient presents for reevaluation of his left wrist pain following a work related injury. He reports only a 3-day history of improvement following steroid injection he received last visit.  He reports the pain returned after 3 days following the injection while he was out of work at rest.  He denies subsequent injury.  He reports continued swelling localized to the first dorsal extensor compartment of the wrist.  He notes his pain is exacerbated by gripping a steering wheel and turning/twisting activity.  He presents for reevaluation and options for further treatment. [FreeTextEntry2] :  -  - Rigging.  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 Hooking up chains, straps.

## 2022-03-14 NOTE — PHYSICAL EXAM
[de-identified] : Left wrist: \par \par There is a positive Finkelstein on the left wrist which is negative on the opposite wrist. There is moderate to severe swelling and tenderness localized over the first dorsal compartment.of the same wrist without evidence of infection.\par There is a negative grind test bilaterally. Negative tenderness or instability of the MP or IP joint of the thumbs bilaterally. Negative tenderness over the A1 pulleys bilaterally. 5 over 5 strength bilaterally. [de-identified] : The following radiographs were taken at an outside imaging source, read again by me during this patients visit. I reviewed each radiograph in detail with the patient and discussed the findings as highlighted below. \par 3 xray views of the left wrist reveal no acute fx.

## 2022-03-14 NOTE — ASSESSMENT
[Indicate if, in your opinion, the incident that the patient described was the competent medical cause of this injury/illness.] : The incident that the patient described was the competent medical cause of this injury/illness: Yes [Indicate if the patient's complaints are consistent with his/her history of the injury/illness.] : Indicate if the patient's complaints are consistent with his/her history of the injury/illness: Yes [Yes] : Yes, it is consistent [FreeTextEntry1] : 51 year old male presents s/p traumatic injury to the left wrist while working, exam findings consistent with dequervains tenosynovitis s/p injury. \par Patient reports only short term benefit from the steroid injection he received at his last visit.  He continues to have pain and significant swelling of the first dorsal extensor compartment of the wrist.  At this time the patient has been recommended for MRI of the wrist to further evaluate.  He will follow up in the office after the MRI for review of findings and further options for treatment.  In the interim he will continue with bracing and light activity as tolerated.  He will continue with use of anti-inflammatories for pain relief. All questions and concerns have been addressed, and the patient is in agreement with the above plan.  [FreeTextEntry5] : 100

## 2022-03-16 ENCOUNTER — FORM ENCOUNTER (OUTPATIENT)
Age: 52
End: 2022-03-16

## 2022-03-30 NOTE — ADDENDUM
[FreeTextEntry1] : IDestiny assisted with documentation on 03/30/2022  acting as scribe for Dr. Deena Mazariegos.

## 2022-03-30 NOTE — ASSESSMENT
[Indicate if, in your opinion, the incident that the patient described was the competent medical cause of this injury/illness.] : The incident that the patient described was the competent medical cause of this injury/illness: Yes [Indicate if the patient's complaints are consistent with his/her history of the injury/illness.] : Indicate if the patient's complaints are consistent with his/her history of the injury/illness: Yes [Yes] : Yes, it is consistent [FreeTextEntry1] : 51 year old male presents s/p traumatic injury to the left wrist while working, exam findings consistent with dequervains tenosynovitis s/p injury. Patient reports only short term benefit from the steroid injection he received on 2/14/2022. \par \par Nature and history of the condition was discussed at length. Risks and benefits of conservative observation and treatment versus surgical release were discussed in detail.  The patient has elected to proceed with surgical intervention and will be booked accordingly for a left wrist dequervains tenosynovitis tendon release. He will continue with activity as tolerated in the interim. All questions and concerns have been addressed, and the patient is in agreement with the above plan.\par \par All questions and concerns have been addressed, and the patient is in agreement with the above plan. \par   [FreeTextEntry5] : 100

## 2022-03-30 NOTE — HISTORY OF PRESENT ILLNESS
[FreeTextEntry1] : 51 year male presents for evaluation of left wrist pain following a work related injury about one month ago. He reports while working, a 50lb chain landed onto the left wrist. He reports significant pain and swelling following the injury. He was seen in urgent care following the injury and recommended conservative tx. He reports worsening of his pain and swelling, which led him to be seen in the ED on 1/11/22. xrays were taken revealing no acute fx. He was recommended use of a wrist immobilizer and provided pain medications/nsaids. He reports since the injury his symptoms have been worsening, with increased pain and swelling localized to the 1st dorsal extensor compartment. He has been working. \par \par 03/14/2022: Patient presents for reevaluation of his left wrist pain following a work related injury. He reports only a 3-day history of improvement following steroid injection he received last visit.  He reports the pain returned after 3 days following the injection while he was out of work at rest.  He denies subsequent injury.  He reports continued swelling localized to the first dorsal extensor compartment of the wrist.  He notes his pain is exacerbated by gripping a steering wheel and turning/twisting activity.  He presents for reevaluation and options for further treatment.\par \par 03/30/2022: Patient returns for reevaluation of his left wrist pain 2/2 work related injury.  [FreeTextEntry2] :  -  - Rigging.  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 Hooking up chains, straps.

## 2022-03-30 NOTE — PHYSICAL EXAM
[de-identified] : Left wrist: \par \par There is a positive Finkelstein on the left wrist which is negative on the opposite wrist. There is moderate to severe swelling and tenderness localized over the first dorsal compartment.of the same wrist without evidence of infection.\par There is a negative grind test bilaterally. Negative tenderness or instability of the MP or IP joint of the thumbs bilaterally. Negative tenderness over the A1 pulleys bilaterally. 5 over 5 strength bilaterally. [de-identified] :  The following MRI was read today by me during this patients visit. I reviewed the imaging in detail with the patient and discussed the findings as highlighted below.\par MRI of the left wrist dated 3/25/2022 reveals severe de Quervain's tenosynovitis no evidence of flexor or extensor tendon tears.  There is mild partial-thickness tearing of the TFCC with small joint effusion.

## 2022-05-26 NOTE — ASU DISCHARGE PLAN (ADULT/PEDIATRIC) - NS MD DC FALL RISK RISK
For information on Fall & Injury Prevention, visit: [email or web address] OR  [email or web address] OR  https://www.cdc.gov/steadi/patient.html

## 2022-05-26 NOTE — ASU PATIENT PROFILE, ADULT - FALL HARM RISK - UNIVERSAL INTERVENTIONS
Bed in lowest position, wheels locked, appropriate side rails in place/Call bell, personal items and telephone in reach/Instruct patient to call for assistance before getting out of bed or chair/Non-slip footwear when patient is out of bed/[place] to call system/Physically safe environment - no spills, clutter or unnecessary equipment/Purposeful Proactive Rounding/Room/bathroom lighting operational, light cord in reach

## 2022-05-26 NOTE — ASU DISCHARGE PLAN (ADULT/PEDIATRIC) - CARE PROVIDER_API CALL
Deena Mazariegos)  [facility]  [address] [place] [address]  Phone: [phone]  Fax: [phone]  Follow Up Time:

## 2022-05-26 NOTE — ASU DISCHARGE PLAN (ADULT/PEDIATRIC) - ASU DC SPECIAL INSTRUCTIONSFT
1. Keep bandage on for 5 days. Then you can remove and get it wet. Otherwise cover hand with plastic bag for showering.    2. If you have a splint on, keep it on until you see Dr. Mazariegos in the office. Do not get the splint wet at all.    3. Elevate hand as much as possible and wiggle fingers as much as you can, as often as you think of it (wiggle 10 times every commercial  if you are watching TV, or reading a book after every 5 pages read). The exception is if you have a splint you will not be able to wiggle the affected digit. Try to wiggle the free ones though.    4. Walk plenty, no sitting around.    5. See Dr. Maazriegos in the office in about 7-10 days. Call to schedule. You will have you wound checked then, any sutures will be removed, and your splint (if you have one on) will be changed.

## 2022-06-16 PROBLEM — F17.200 NICOTINE DEPENDENCE, UNSPECIFIED, UNCOMPLICATED: Chronic | Status: ACTIVE | Noted: 2022-01-01

## 2022-06-16 PROBLEM — I10 ESSENTIAL (PRIMARY) HYPERTENSION: Chronic | Status: ACTIVE | Noted: 2022-01-01

## 2022-06-21 NOTE — ADDENDUM
[FreeTextEntry1] : IMichelle assisted with documentation on 06/21/2022 acting as scribe for Dr. Deena Mazariegos.

## 2022-06-21 NOTE — HISTORY OF PRESENT ILLNESS
[Clean/Dry/Intact] : clean, dry and intact [Swelling] : swollen [Doing Well] : is doing well [Excellent Pain Control] : has excellent pain control [No Sign of Infection] : is showing no signs of infection [Erythema] : not erythematous [Dehiscence] : not dehisced [de-identified] : Procedure: Left wrist de Quervain's release\par DOS: 5/26/2022 [de-identified] : Patient presents 4 weeks status post left wrist de Quervain's release, doing well. He complains of occasional soreness of the wrist. He reports he has not returned to work. [de-identified] : ROM of the wrist and digits intact.  [de-identified] : No imaging done today. [de-identified] : Patient presents 4 weeks status post left wrist de Quervain's release. He was cleared to return to work on 7/5. Note given today. He will follow up with me in 3 weeks for reassessment.

## 2022-07-18 NOTE — H&P ADULT - NSHPPHYSICALEXAM_GEN_ALL_CORE
Vital Signs Last 24 Hrs  T(C): 36.8 (18 Jul 2022 15:34), Max: 36.8 (18 Jul 2022 15:34)  T(F): 98.3 (18 Jul 2022 15:34), Max: 98.3 (18 Jul 2022 15:34)  HR: 113 (18 Jul 2022 15:34) (113 - 113)  BP: 156/97 (18 Jul 2022 15:34) (156/97 - 156/97)  BP(mean): 115 (18 Jul 2022 15:34) (115 - 115)  RR: 18 (18 Jul 2022 15:34) (18 - 18)  SpO2: 97% (18 Jul 2022 15:34) (97% - 97%)    Parameters below as of 18 Jul 2022 15:34  Patient On (Oxygen Delivery Method): room air

## 2022-07-18 NOTE — ED STATDOCS - CLINICAL SUMMARY MEDICAL DECISION MAKING FREE TEXT BOX
52 M hx HTN on Lisinopril here c/o b/l LE edema for the past 1 month, worsening over the past 2 weeks. pt also notes a lump to the b/l lateral neck.  pt also c/o coughing up blood for the past 2 weeks; states that the phlegm started out as white with some streaks of blood but now mostly bloody about the size of a quarter. pt mildly anxious, slight tremors. VSS. tachycardia, normal BP. not hypoxic. lungs clear. + peripheral edema. plan:  EKG, CXR, labs including d dimer alcohol tox screen, BNP, RVP / covid, coags, Ativan PO, monitor, observe, reassess. consider CT scan, CT vs. CTA chest. 52 M hx HTN on Lisinopril here c/o b/l LE edema for the past 1 month, worsening over the past 2 weeks. pt also notes a lump to the b/l lateral neck.  pt also c/o coughing up blood for the past 2 weeks; states that the phlegm started out as white with some streaks of blood but now mostly bloody about the size of a quarter. pt mildly anxious, slight tremors. VSS. tachycardia, normal BP. not hypoxic. lungs clear. + peripheral edema.   Plan:  EKG, CXR, labs including d dimer alcohol tox screen, BNP, RVP / covid, coags, Ativan PO, monitor, observe, reassess. consider CT scan, CT vs. CTA chest.

## 2022-07-18 NOTE — ED STATDOCS - MUSCULOSKELETAL, MLM
range of motion is not limited and there is no muscle tenderness. 1 to 2+ b/l pretibial edema, no focal tenderness, skin no discoloration

## 2022-07-18 NOTE — ED STATDOCS - OBJECTIVE STATEMENT
52 M hx HTN on Lisinopril here c/o b/l LE edema for the past 1 month, worsening over the past 2 weeks. pt also notes a lump to the b/l lateral neck.  pt also c/o coughing up blood for the past 2 weeks; states that the phlegm started out as white with some streaks of blood but now mostly bloody about the size of a quarter. denies having pain in the legs. no chest pain. no shortness of breath.  pt also notes recent surgery done to left wrist performed by Dr. Mazariegos. no PCP, recent insurance change.  smoker, smoke 1 ppd. last EtOH drink 10 AM today, drinks 1.5 pints a day.

## 2022-07-18 NOTE — H&P ADULT - NSHPRISKHIVSCREEN_GEN_ALL_CORE
Could you put this patient as a f/u on Dr. CHARLES's schedule for next week? He saw this patient with me
Offered and patient declined

## 2022-07-18 NOTE — ED STATDOCS - ATTENDING APP SHARED VISIT CONTRIBUTION OF CARE
right normal/left normal
JOLIE Maddox MD, ED Attending physician:  This was a shared visit with TODD.  I reviewed and verified the documentation and independently performed the documented history/exam/mdm.

## 2022-07-18 NOTE — H&P ADULT - ASSESSMENT
52 year old male w hx HTN, substance use on suboxone, alcohol use, smoker came to ED c/o coughing up blood and bilateral leg swelling 52 year old male w hx HTN, substance use on suboxone, alcohol use, smoker came to ED c/o coughing up blood and bilateral leg swelling    #Hemoptysis  #Lung mass SHAILA  30 pack year smoking hx  #Palpable supraclavicular LN  #Weight loss  Brother , paternal aunt and pt's friend w lung CA hx  1. admit to med  2. trend CBC  3. pulm consult as pt requested rather than CT surgery at this time  4. monitor pulse ox  5. discussed need for tissue dx through bx either using supraclavicular nodes or ough intra thoracic bx      #Alcohol abuse  recently restarted drinking  prior hx Detox and DTs  requested Librium  1. CIWA w high risk and symptom directed  2. Sz precautions  3. Thiamine/folate /MVI  4. check Mg, P  5. SBIRT  6. trend LFTS      #Dehydration/hypovolemia  w contraction alkalosis  #Ketones in urine  1. IV  cc/hr  2. encourage PO      #Hypokalemia 3.1  1. KCl 10 meq IV x 3  2. BMP in AM      #HTN  1. lisinopril 40 mg qd w parameters      #Hx opiate abuse in remission x 16 years  1. continue suboxone BID  2. seroquel q HS      #Tobacco use  1. nicotine patch  2. gum prn      #VTE  document ambulation  no medical Tx as active bleeding        Tried to reach PCP above at 21:00 and was unable to leave a voicemail or contact the service 52 year old male w hx HTN, substance use on suboxone, alcohol use, smoker came to ED c/o coughing up blood and bilateral leg swelling    #Hemoptysis  #Lung mass SHAILA  30 pack year smoking hx  #Palpable supraclavicular LN  #Weight loss  Brother , paternal aunt and pt's friend w lung CA hx  1. admit to med  2. trend CBC  3. pulm consult as pt requested rather than CT surgery at this time  4. monitor pulse ox  5. discussed need for tissue dx through bx either using supraclavicular nodes or ough intra thoracic bx      #Alcohol abuse  recently restarted drinking  prior hx Detox and DTs  requested Librium  1. CIWA w high risk and symptom directed  2. Sz precautions  3. Thiamine/folate /MVI  4. check Mg, P  5. SBIRT  6. trend LFTS      #Dehydration/hypovolemia  w contraction alkalosis  #Ketones in urine  1. IV  cc/hr  2. encourage PO      #Hypokalemia 3.1  1. KCl 10 meq IV x 3  2. BMP in AM      #HTN  1. lisinopril 40 mg qd w parameters      #Hx opiate abuse in remission x 16 years  1. continue suboxone BID  2. seroquel q HS  3. alprazolam BID prn (from home)      #Tobacco use  1. nicotine patch  2. gum prn      #VTE  document ambulation  no medical Tx as active bleeding        Tried to reach PCP above at 21:00 and was unable to leave a voicemail or contact the service 52 year old male w hx HTN, substance use on suboxone, alcohol use, smoker came to ED c/o coughing up blood and bilateral leg swelling    #Hemoptysis  #Lung mass SHAILA  30 pack year smoking hx  #Palpable supraclavicular LN  #Weight loss  Brother , paternal aunt and pt's friend w lung CA hx  1. admit to med  2. trend CBC  3. pulm consult as pt requested rather than CT surgery at this time  4. monitor pulse ox  5. discussed need for tissue dx through bx either using supraclavicular nodes or ough intra thoracic bx      #Alcohol abuse  recently restarted drinking  prior hx Detox and DTs  requested Librium  1. CIWA w high risk and symptom directed  2. Sz precautions  3. Thiamine/folate /MVI  4. check Mg, P  5. SBIRT  6. trend LFTS      #Dehydration/hypovolemia  w contraction alkalosis  #Ketones in urine  1. IV  cc/hr  2. encourage PO      #Hypokalemia 3.1  1. KCl 10 meq IV x 3  2. BMP in AM    #Bilateral leg edema  1. dopplers      #Xerosis  1. lachydrin      #HTN  1. lisinopril 40 mg qd w parameters      #Hx opiate abuse in remission x 16 years  1. continue suboxone BID  2. seroquel q HS  3. alprazolam BID prn (from home)      #Tobacco use  1. nicotine patch  2. gum prn      #VTE  document ambulation  no medical Tx as active bleeding        Tried to reach PCP above at 21:00 and was unable to leave a voicemail or contact the service

## 2022-07-18 NOTE — ED ADULT NURSE NOTE - OBJECTIVE STATEMENT
Patient presents to ED c/o bilateral swelling of the neck, occasional hemoptysis, and bilateral lower extremity edema. Patient denies any pain or discomfort at this time. Patient breathing even and unlabored. Patient denies chest pain, SOB. Patient reports feeling fatigued at times. Safety maintained.

## 2022-07-18 NOTE — H&P ADULT - RESPIRATORY
no respiratory distress/respirations non-labored/diminished breath sounds, L/diminished breath sounds, R

## 2022-07-18 NOTE — ED STATDOCS - PROGRESS NOTE DETAILS
pt comes tot he ER for productive cough 3 weeks with blood noted yesterday  not SOB, positive weight loss of 10-15 pounds over the past month and swelling to both legs for the past year. Positive smoker of 1 ppd and heavy drinker

## 2022-07-18 NOTE — PHARMACOTHERAPY INTERVENTION NOTE - COMMENTS
Medication history complete reviewed medications with patient and confirmed with doctor first med hx.

## 2022-07-18 NOTE — H&P ADULT - NSHPLABSRESULTS_GEN_ALL_CORE
CT w IV contrast   INTERPRETATION:  Clinical information: Cough.    CT scan of the chest was obtained following administration of intravenous   contrast. Approximately 90 cc of Omnipaque 350 was administered and 10 cc   was discarded.    Multiple lymph nodes are present in the neck bilaterally, left   supraclavicular region and the left axilla. One of the largest lymph node   is noted in the left supraclavicular region and measures approximately   3.2 x 2.2 cm.    Numerous lymph nodes are present in the anterior mediastinum, pretracheal   space, AP window, subcarinal region and the hilar regions bilaterally.   Few of the lymph nodes demonstrate central low attenuation suggestive of   necrosis. One of the largest lymph node is noted in the subcarinal region   and measures approximately 2.3 x 2 cm.    Heart is normal in size. No pericardial effusion is noted.    No endobronchial lesions are noted. An approximately 7 x 3.7 cm mass is   noted in the left upper lobe. An additional 1.4 cm nodule is noted in the   right middle lobe. Few ill-defined opacities in the right upper lobe are   of uncertain etiology. No pleural effusions are noted.    Below the diaphragm, visualized portions of the abdomen demonstrate   diffuse decreased attenuation of liver suggestive of fatty infiltration.    Degenerative changes of the spine are noted.    IMPRESSION: There is a 7 cm mass in the left upper lobe. Primary   consideration is lung carcinoma.    Extensive adenopathy is noted within the mediastinum, left hilum, neck,   left supraclavicular region and the left axilla as described above.    1.4 cm nodule is noted in the right middle lobe.    --- End of Report ---

## 2022-07-18 NOTE — H&P ADULT - HISTORY OF PRESENT ILLNESS
52 year old male w hx HTN, substance use on suboxone, alcohol use, smoker came to ED c/o coughing blood    Pt states that he started to cough up blood mixed w mucous for past 2 weeks  size is about quarter  denied fever or chills or SOB  denied pain  has lost 10 lbs in past 2 weeks    Last drank yesterday.  Restarted drinking after comp injury and surgery to L wrist .  "I had the time".  Before coming to ED he felt shaky so took alprazolam.  Has had hx of alcohol detox in the past w DTs. 52 year old male w hx HTN, substance use on suboxone, alcohol use, smoker came to ED c/o coughing up blood and bilateral leg swelling    Pt states that he started to cough up blood mixed w mucous for past 2-3 weeks  amount is about quarter-sized  denied fever or chills or SOB  denied pain  has lost 10 lbs in past 3 weeks  appetite is good when he eats usual 2 meals a day; other times not as good and eats only once a day  +developed lower leg swelling that has been constant    Last drank yesterday.  Restarted drinking after comp injury and surgery to L wrist .  "I had the time".  Before coming to ED he felt shaky so took alprazolam.  Has had hx of alcohol detox in the past w DTs. Librium was used at the time      n ED /97       T 98.3     97%      PAST MEDICAL HX  Alcohol abuse hx of detox  HTN (hypertension)   Opiate/substance abuse hx on suboxone  .     PAST SURGICAL HX  Injury due to foreign body right wrist.  Left wrist de Quervain's release 2022    FAMILY HX  +HTN : Brother  Lymph node CA : Mother who  when pt was 6 months old  Lung CA : Brother who smoked:  age 58, Paternal aunt who smoked      SOCIAL HX  Smokes 1 ppd x 30 = 30 pack year hx  Alcohol : had stopped drinking x 5 years and then resumed during time off from initial comp injury then surgery  Opiate hx : sober x 16 years on suboxone    Works doing road work construction

## 2022-07-19 NOTE — CONSULT NOTE ADULT - SUBJECTIVE AND OBJECTIVE BOX
Surgeon: Mukul     Consult requesting by: Theresa     HISTORY OF PRESENT ILLNESS:  52 year old male w hx HTN, substance use on suboxone, alcohol use, smoker came to ED c/o coughing up blood and bilateral leg swelling    Pt states that he started to cough up blood mixed w mucous for past 2-3 weeks  amount is about quarter-sized  denied fever or chills or SOB  denied pain  has lost 10 lbs in past 3 weeks  appetite is good when he eats usual 2 meals a day; other times not as good and eats only once a day  +developed lower leg swelling that has been constant    Last drank yesterday.  Restarted drinking after comp injury and surgery to L wrist .  "I had the time".  Before coming to ED he felt shaky so took alprazolam.  Has had hx of alcohol detox in the past w DTs. Librium was used at the time  H&P        Pt seen at bedside.  No issues.  Pt denies any hemoptysis.  Plan for FB EBUS Thursday      PAST MEDICAL & SURGICAL HISTORY:  HTN (hypertension)      Smoker      Injury due to foreign body  right wrist          MEDICATIONS  (STANDING):  ammonium lactate 12% Lotion 1 Application(s) Topical two times a day  ascorbic acid 500 milliGRAM(s) Oral daily  buprenorphine 8 mG/naloxone 2 mG SL  Tablet 1 Tablet(s) SubLingual <User Schedule>  chlordiazePOXIDE 50 milliGRAM(s) Oral every 8 hours  chlordiazePOXIDE   Oral   cyanocobalamin 1000 MICROGram(s) Oral daily  folic acid 1 milliGRAM(s) Oral daily  lisinopril 40 milliGRAM(s) Oral daily  multivitamin 1 Tablet(s) Oral daily  nicotine - 21 mG/24Hr(s) Patch 1 Patch Transdermal daily  ondansetron Injectable 8 milliGRAM(s) IV Push two times a day  potassium chloride   Powder 40 milliEquivalent(s) Oral every 4 hours  potassium phosphate / sodium phosphate Powder (PHOS-NaK) 1 Packet(s) Oral daily  QUEtiapine 50 milliGRAM(s) Oral at bedtime  sodium chloride 0.9%. 1000 milliLiter(s) (100 mL/Hr) IV Continuous <Continuous>  thiamine 100 milliGRAM(s) Oral daily    MEDICATIONS  (PRN):  acetaminophen     Tablet .. 650 milliGRAM(s) Oral every 6 hours PRN Temp greater or equal to 38C (100.4F), Mild Pain (1 - 3)  ALPRAZolam 1 milliGRAM(s) Oral two times a day PRN anxiety  aluminum hydroxide/magnesium hydroxide/simethicone Suspension 30 milliLiter(s) Oral every 4 hours PRN Dyspepsia  chlordiazePOXIDE 50 milliGRAM(s) Oral every 1 hour PRN Symptom-triggered: each CIWA -Ar score 8 or GREATER  LORazepam   Injectable 2 milliGRAM(s) IV Push once PRN To control seizure activity  nicotine  Polacrilex Gum 2 milliGRAM(s) Oral every 3 hours PRN craving    Antiplatelet therapy:    none                        Last dose/amt:    Allergies    No Known Allergies    Intolerances        SOCIAL HISTORY:  Smoker: [x ] Yes  [ ] No        PACK YEARS:    1 PPD                      WHEN QUIT?  ETOH use: [ x] Yes  [ ] No              FREQUENCY / QUANTITY:  Ilicit Drug use:  [ x] Yes  [ ] No currently on Suboxone   Occupation:    Live with: self   Assisted device use:    FAMILY HISTORY:      Review of Systems  CONSTITUTIONAL:  Fevers[ ] chills[ ] sweats[ ] fatigue[ ] weight loss[x ] weight gain [ ]                                     NEGATIVE [ ]   NEURO:  parathesias[ ] seizures [ ]  syncope [ ]  confusion [ ]                                                                                NEGATIVE[x ]   EYES: glasses[ ]  blurry vision[ ]  discharge[ ] pain[ ] glaucoma [ ]                                                                          NEGATIVE[x ]   ENMT:  difficulty hearing [ ]  vertigo[x ]  dysphagia[ ] epistaxis[ ] recent dental work [ ]                                    NEGATIVE[ ]   CV:  chest pain[ ] palpitations[ ] DRUMMOND [ ] diaphoresis [ ]                                                                                           NEGATIVE[ ]   RESPIRATORY:  wheezing[ ] SOB[ ] cough [x ] sputum[ x] hemoptysis[x ]                                                                  NEGATIVE[ ]   GI:  nausea[ ]  vomiting [ ]  diarrhea[ ] constipation [ ] melena [ ]                                                                      NEGATIVE[ x]   : hematuria[ ]  dysuria[ ] urgency[ ] incontinence[ ]                                                                                            NEGATIVE[x ]   MUSCULOSKELETAL  arthritis[ ]  joint swelling [ ] muscle weakness [ ]                                                                NEGATIVEx[x ]   SKIN/BREAST:  rash[ ] itching [ ]  hair loss[ ] masses[ ]                                                                                              NEGATIVE[x ]   PSYCH:  dementia [ ] depression [ ] anxiety[ ]                                                                                                               NEGATIVE[x ]   HEME/LYMPH:  bruises easily[ ] enlarged lymph nodes[ ] tender lymph nodes[ ]                                               NEGATIVE[x ]   ENDOCRINE:  cold intolerance[ ] heat intolerance[ ] polydipsia[ ]                                                                          NEGATIVE[x ]     PHYSICAL EXAM  Vital Signs Last 24 Hrs  T(C): 36.4 (2022 12:06), Max: 37.1 (2022 22:25)  T(F): 97.6 (2022 12:06), Max: 98.8 (2022 22:25)  HR: 104 (2022 12:06) (101 - 120)  BP: 128/85 (2022 12:06) (109/68 - 157/95)  BP(mean): 115 (2022 15:34) (115 - 115)  RR: 18 (2022 12:06) (18 - 18)  SpO2: 92% (2022 12:06) (92% - 98%)    Parameters below as of 2022 12:06  Patient On (Oxygen Delivery Method): room air        CONSTITUTIONAL:                                                                          WNL[ ] unkept   Neuro: WNL[x ] Normal exam oriented to person/place & time with no focal motor or sensory  deficits. Other                     Eyes: WNL[ x]   Normal exam of conjunctiva & lids, pupils equally reactive. Other     ENT: WNL[ ]    Normal exam of nasal/oral mucosa with absence of cyanosis. Other dry mucosa, rotten teeth   Neck: WNL[x  Normal exam of jugular veins, trachea & thyroid. Other  Chest: WNL[ ] Normal lung exam with good air movement absence of wheezes, rales, or rhonchi: Other     decreased b/l with mild crackles                                                                      CV:  Auscultation: normal [x ] S3[ ] S4[ ] Irregular [ ] Rub[ ] Clicks[ ]    Murmurs none:[ x]systolic [ ]  diastolic [ ] holosystolic [ ]  Carotids: No Bruits[ x] Other                 Abdominal Aorta: normal [ ] nonpalpable[ ]Other                                                                                      GI:           WNL[x ] Normal exam of abdomen, liver & spleen with no noted masses or tenderness. Other                                                                                                        Extremities: WNL[ ] Normal no evidence of cyanosis or deformity Edema: none[x ]trace[ ]1+[ ]2+[ ]3+[ ]4+[ ]                                                            LABS:                        11.2   7.49  )-----------( 211      ( 2022 08:54 )             35.0     07-19    134<L>  |  95<L>  |  6<L>  ----------------------------<  100<H>  3.3<L>   |  29  |  0.47<L>    Ca    8.9      2022 08:54  Phos  2.4     07  Mg     1.5         TPro  7.4  /  Alb  2.8<L>  /  TBili  0.8  /  DBili  x   /  AST  48<H>  /  ALT  37  /  AlkPhos  204<H>  -18    PT/INR - ( 2022 17:07 )   PT: 12.6 sec;   INR: 1.09 ratio         PTT - ( 2022 17:07 )  PTT:30.1 sec  Urinalysis Basic - ( 2022 17:07 )    Color: Naila / Appearance: Clear / S.015 / pH: x  Gluc: x / Ketone: Large  / Bili: Small / Urobili: 8   Blood: x / Protein: 30 mg/dL / Nitrite: Negative   Leuk Esterase: Trace / RBC: 3-5 /HPF / WBC 3-5   Sq Epi: x / Non Sq Epi: Occasional / Bacteria: Occasional        < from: CT Chest w/ IV Cont (22 @ 17:31) >  CT scan of the chest was obtained following administration of intravenous   contrast. Approximately 90 cc of Omnipaque 350 was administered and 10 cc   was discarded.    Multiple lymph nodes are present in the neck bilaterally, left   supraclavicular region and the left axilla. One of the largest lymph node   is noted in the left supraclavicular region and measures approximately   3.2 x 2.2 cm.    Numerous lymph nodes are present in the anterior mediastinum, pretracheal   space, AP window, subcarinal region and the hilar regions bilaterally.   Few of the lymph nodes demonstrate central low attenuation suggestive of   necrosis. One of the largest lymph node is noted in the subcarinal region   and measures approximately 2.3 x 2 cm.    Heart is normal in size. No pericardial effusion is noted.    No endobronchial lesions are noted. An approximately7 x 3.7 cm mass is   noted in the left upper lobe. An additional 1.4 cm nodule is noted in the   right middle lobe. Few ill-defined opacities in the right upper lobe are   of uncertain etiology. No pleural effusions are noted.    Below the diaphragm,visualized portions of the abdomen demonstrate   diffuse decreased attenuation of liver suggestive of fatty infiltration.    Degenerative changes of the spine are noted.    IMPRESSION: There is a 7 cm mass in the left upper lobe. Primary   consideration is lung carcinoma.    Extensive adenopathy is noted within the mediastinum, left hilum, neck,   left supraclavicular region and the left axilla as described above.    1.4 cm nodule is noted in the right middle lobe.    < end of copied text >

## 2022-07-19 NOTE — CONSULT NOTE ADULT - SUBJECTIVE AND OBJECTIVE BOX
Patient is a 52y old  Male who presents with a chief complaint of Hemoptysis (18 Jul 2022 20:56)      HPI:  52 year old male w hx HTN, substance use on suboxone, alcohol use, smoker came to ED c/o coughing up blood and bilateral leg swelling  Pt states that he started to cough up blood mixed w mucous for past 2-3 weeks  amount is about quarter-sized  denied fever or chills or SOB  denied pain  has lost 10 lbs in past 3 weeks  appetite is good when he eats usual 2 meals a day; other times not as good and eats only once a day  +developed lower leg swelling that has been constant  CXR revealed SHAILA mass, followed by CT with contrast-  There is a 7 cm mass in the left upper lobe. Extensive adenopathy is noted within the mediastinum, left hilum, neck,   left supraclavicular region and the left axilla as described above. 1.4 cm nodule is noted in the right middle lobe.  Strong family history of lung cancer  Not seen pulmonology in the past      Patient is a 52y old  Male who presents with a chief complaint of Hemoptysis (2022 20:56)      HPI:  52 year old male w hx HTN, substance use on suboxone, alcohol use, smoker came to ED c/o coughing up blood and bilateral leg swelling  Pt states that he started to cough up blood mixed w mucous for past 2-3 weeks  amount is about quarter-sized  denied fever or chills or SOB  denied pain  has lost 10 lbs in past 3 weeks  appetite is good when he eats usual 2 meals a day; other times not as good and eats only once a day  +developed lower leg swelling that has been constant  CXR revealed SHAILA mass, followed by CT with contrast-  There is a 7 cm mass in the left upper lobe. Extensive adenopathy is noted within the mediastinum, left hilum, neck,   left supraclavicular region and the left axilla as described above. 1.4 cm nodule is noted in the right middle lobe.  Strong family history of lung cancer  Not seen pulmonology in the past   To be evaluated by CTS       n ED /97       T 98.3     97%      PAST MEDICAL HX  Alcohol abuse hx of detox  HTN (hypertension)   Opiate/substance abuse hx on suboxone  .     PAST SURGICAL HX  Injury due to foreign body right wrist.  Left wrist de Quervain's release 2022    FAMILY HX  +HTN : Brother  Lymph node CA : Mother who  when pt was 6 months old  Lung CA : Brother who smoked:  age 58, Paternal aunt who smoked      SOCIAL HX  Smokes 1 ppd x 30 = 30 pack year hx  Alcohol : had stopped drinking x 5 years and then resumed during time off from initial comp injury then surgery  Opiate hx : sober x 16 years on suboxone    Works doing road work construction   (2022 20:56)      PAST MEDICAL & SURGICAL HISTORY:  HTN (hypertension)      Smoker      Injury due to foreign body  right wrist          PREVIOUS DIAGNOSTIC TESTING:      MEDICATIONS  (STANDING):  ammonium lactate 12% Lotion 1 Application(s) Topical two times a day  ascorbic acid 500 milliGRAM(s) Oral daily  buprenorphine 8 mG/naloxone 2 mG SL  Tablet 1 Tablet(s) SubLingual <User Schedule>  chlordiazePOXIDE 50 milliGRAM(s) Oral every 8 hours  chlordiazePOXIDE   Oral   cyanocobalamin 1000 MICROGram(s) Oral daily  folic acid 1 milliGRAM(s) Oral daily  lisinopril 40 milliGRAM(s) Oral daily  multivitamin 1 Tablet(s) Oral daily  nicotine - 21 mG/24Hr(s) Patch 1 Patch Transdermal daily  ondansetron Injectable 8 milliGRAM(s) IV Push two times a day  potassium phosphate / sodium phosphate Powder (PHOS-NaK) 1 Packet(s) Oral daily  QUEtiapine 50 milliGRAM(s) Oral at bedtime  sodium chloride 0.9%. 1000 milliLiter(s) (100 mL/Hr) IV Continuous <Continuous>  thiamine 100 milliGRAM(s) Oral daily    MEDICATIONS  (PRN):  acetaminophen     Tablet .. 650 milliGRAM(s) Oral every 6 hours PRN Temp greater or equal to 38C (100.4F), Mild Pain (1 - 3)  ALPRAZolam 1 milliGRAM(s) Oral two times a day PRN anxiety  aluminum hydroxide/magnesium hydroxide/simethicone Suspension 30 milliLiter(s) Oral every 4 hours PRN Dyspepsia  chlordiazePOXIDE 50 milliGRAM(s) Oral every 1 hour PRN Symptom-triggered: each CIWA -Ar score 8 or GREATER  cyclobenzaprine 10 milliGRAM(s) Oral three times a day PRN Muscle Spasm  LORazepam   Injectable 2 milliGRAM(s) IV Push once PRN To control seizure activity  nicotine  Polacrilex Gum 2 milliGRAM(s) Oral every 3 hours PRN craving      FAMILY HISTORY:      SOCIAL HISTORY:  ***    REVIEW OF SYSTEM:  Pertinent items are noted in HPI.  Constitutional negative for chills, fevers, sweats and weight loss  throat, and face:  negative for epistaxis, nasal congestion, sore throat and   tinnitus  Respiratory: negative for cough, dyspnea on exertion, pleuritic chest pain  and wheezing  Cardiovascular:  negative for chest pain, dyspnea and palpitations  Gastrointestinal: negative for abdominal pain, diarrhea, nausea and vomiting  Genitourinary: negative for dysuria, frequency and urinary incontinence  Skin:  negative for redness, rash, pruritus, swelling, dryness and   fissures  Hematologic/lymphatic: negative for bleeding and easy bruising  Musculoskeletal: negative for arthralgias, back pain and muscle weakness  Neurological: negative for dizziness, headaches, seizures and tremors  Behavioral/Psych:  negative for mood change, depression, suicidal attempts    Allergic/Immunologic: negative for anaphylaxis, angioedema and urticaria    Vital Signs Last 24 Hrs  T(C): 36.7 (:41), Max: 36.8 (2022 08:00)  T(F): 98 (:41), Max: 98.2 (2022 08:00)  HR: 93 (:41) (92 - 120)  BP: 138/82 (:41) (109/68 - 138/82)  BP(mean): --  RR: 18 (:41) (18 - 18)  SpO2: 94% (:) (92% - 96%)    Parameters below as of :41  Patient On (Oxygen Delivery Method): room air        I&O's Summary    PHYSICAL EXAM  General Appearance: cooperative, no acute distress,   HEENT: PERRL, conjunctiva clear, EOM's intact, non injected pharynx, no exudate, TM   normal  Neck: Supple, , no adenopathy, thyroid: not enlarged, no carotid bruit or JVD  Back: Symmetric, no  tenderness,no soft tissue tenderness  Lungs: Clear to auscultation bilateral,no adventitious breath sounds, normal   expiratory phase  Heart: Regular rate and rhythm, S1, S2 normal, no murmur, rub or gallop  Abdomen: Soft, non-tender, bowel sounds active , no hepatosplenomegaly  Extremities: no cyanosis or edema, no joint swelling  Skin: Skin color, texture normal, no rashes   Neurologic: Alert and oriented X3 , cranial nerves intact, sensory and motor normal,    ECG:    LABS:                          11.2   7.49  )-----------( 211      ( 2022 08:54 )             35.0     0719    134<L>  |  95<L>  |  6<L>  ----------------------------<  100<H>  3.3<L>   |  29  |  0.47<L>    Ca    8.9      2022 08:54  Phos  2.4     07-  Mg     1.5     -    TPro  7.4  /  Alb  2.8<L>  /  TBili  0.8  /  DBili  x   /  AST  48<H>  /  ALT  37  /  AlkPhos  204<H>  07-18          Pro BNP  112 07-18 @ 17:07  D Dimer  440 07-18 @ 17:07    PT/INR - ( 2022 17:07 )   PT: 12.6 sec;   INR: 1.09 ratio         PTT - ( 2022 17:07 )  PTT:30.1 sec  Urinalysis Basic - ( 2022 17:07 )    Color: Naila / Appearance: Clear / S.015 / pH: x  Gluc: x / Ketone: Large  / Bili: Small / Urobili: 8   Blood: x / Protein: 30 mg/dL / Nitrite: Negative   Leuk Esterase: Trace / RBC: 3-5 /HPF / WBC 3-5   Sq Epi: x / Non Sq Epi: Occasional / Bacteria: Occasional            RADIOLOGY & ADDITIONAL STUDIES:

## 2022-07-19 NOTE — PATIENT PROFILE ADULT - FUNCTIONAL ASSESSMENT - BASIC MOBILITY 6.
4-calculated by average/Not able to assess (calculate score using [facility] averaging method)

## 2022-07-19 NOTE — PATIENT PROFILE ADULT - FALL HARM RISK - HARM RISK INTERVENTIONS
Assistance with ambulation/Assistance OOB with selected safe patient handling equipment/Communicate Risk of Fall with Harm to all staff/Discuss with provider need for PT consult/Monitor for mental status changes/Monitor gait and stability/Provide patient with walking aids - walker, cane, crutches/Reinforce activity limits and safety measures with patient and family/Tailored Fall Risk Interventions/Toileting schedule using arm’s reach rule for commode and bathroom/Use of alarms - bed, chair and/or voice tab/Visual Cue: Yellow wristband and red socks/Bed in lowest position, wheels locked, appropriate side rails in place/Call bell, personal items and telephone in reach/Instruct patient to call for assistance before getting out of bed or chair/Non-slip footwear when patient is out of bed/[place] to call system/Physically safe environment - no spills, clutter or unnecessary equipment/Purposeful Proactive Rounding/Room/bathroom lighting operational, light cord in reach

## 2022-07-20 NOTE — CONSULT NOTE ADULT - ASSESSMENT
52 year old male w hx HTN, substance use on suboxone, alcohol use, smoker came to ED c/o coughing up blood and bilateral leg swelling found to have a 7 cm mass SHAILA and RML nodule    Plan   FB/EBUS on Thursday   will preop tomorrow   cont care as per primary team   DW Dr Gilman 
Lung cancer, most likely non-small cell, presenting as hemoptysis in a 51 y/o with a 30-40 pack-year smoking history, on suboxone for substance abuse disorder, with 7 cm SHAILA mass and extensive mediastinal, hilar, cervical, supraclavicular and axillary adenopathy.  Baseline serum albumin 2.8, alk phos 204. No evidence of liver mets on abd CT.  For bronch/EBUS 7/21 to establish tissue diagnosis.  Foundation and PDL-1 testing will be requested.  Patient is not a surgical candidate based on  initial CT results - a PET/CT will be required as outpatient to complete staging.    Discussed in a broad sense the use of chemotherapy and immunotherapy agents in the treatment of lung cancer - further details of treatment plan will follow once we have a path report.  
1) Lung Mass  2) Lymphadenopathy  3) 30 Pack year smoking history  4) Hemoptysis  5) Abnormal CT Chest    52 year old male w hx HTN, substance use on suboxone, alcohol use, smoker came to ED c/o coughing up blood and bilateral leg swelling  Pt states that he started to cough up blood mixed w mucous for past 2-3 weeks  amount is about quarter-sized  denied fever or chills or SOB  denied pain  has lost 10 lbs in past 3 weeks  appetite is good when he eats usual 2 meals a day; other times not as good and eats only once a day  +developed lower leg swelling that has been constant  CXR revealed SHAILA mass, followed by CT with contrast-  There is a 7 cm mass in the left upper lobe. Extensive adenopathy is noted within the mediastinum, left hilum, neck,   left supraclavicular region and the left axilla as described above. 1.4 cm nodule is noted in the right middle lobe.  Strong family history of lung cancer  Not seen pulmonology in the past   To be evaluated by CTS - discussed with them to undergo bronchoscopy +/- EBUS  Will need supraclavicular LN biopsy as well since this would give us the furthest staging if positive (Stage IIIB)  Follow up CT Abdomen/Pelvis- discussed with patient, primary service, CTS.

## 2022-07-20 NOTE — PROVIDER CONTACT NOTE (OTHER) - SITUATION
notified Dr Ospina's office of admission please fax over discharge paperwork once patient is discharged to [phone]
Spoke to Liliam from the office. They are aware

## 2022-07-20 NOTE — CONSULT NOTE ADULT - SUBJECTIVE AND OBJECTIVE BOX
52 year old male w hx HTN, substance use on suboxone, alcohol use, smoker came to ED c/o coughing up blood and bilateral leg swelling  Pt states that he started to cough up blood mixed w mucous for past 2-3 weeks  amount is about quarter-sized  has lost 10 lbs in past 3 weeks  appetite is good   +developed lower leg swelling that has been constant  Restarted drinking after comp injury and surgery to L wrist .  "I had the time".  Before coming to ED he felt shaky so took alprazolam.  Has had hx of alcohol detox in the past w DTs. Librium was used at the time    Admission chest CT:  IMPRESSION: There is a 7 cm mass in the left upper lobe. Primary   consideration is lung carcinoma.    Extensive adenopathy is noted within the mediastinum, left hilum, neck,   left supraclavicular region and the left axilla as described above.    1.4 cm nodule is noted in the right middle lobe.    Scheduled for bronch/EBUS with Dr. Gimlan 7/21.    Meds Notes  · 	ALPRAZolam 1 mg oral tablet: Last Dose Taken: 18-Jul-2022 AM, 1 tab(s) orally 2 times a day, As Needed  · 	buprenorphine-naloxone 8 mg-2 mg sublingual tablet: Last Dose Taken: 18-Jul-2022              Lisinopril    EXAM:    Alert, oriented  Nodes - bilateral firm suproclavicular adenopathy  Chest - decreased BS SHAILA  Cor - WNL  Abd - no organomegaly  Neuro - nonfocal    LAB:                           10.8   6.47  )-----------( 195      ( 20 Jul 2022 08:42 )             32.8     20 Jul 2022 08:42    136    |  102    |  5      ----------------------------<  120    3.8     |  27     |  0.52     Ca    8.6        20 Jul 2022 08:42  Phos  2.0       20 Jul 2022 08:42  Mg     1.8       20 Jul 2022 08:42    TPro  7.4    /  Alb  2.8    /  TBili  0.8    /  DBili  x      /  AST  48     /  ALT  37     /  AlkPhos  204    18 Jul 2022 17:07    LIVER FUNCTIONS - ( 18 Jul 2022 17:07 )  Alb: 2.8 g/dL / Pro: 7.4 gm/dL / ALK PHOS: 204 U/L / ALT: 37 U/L / AST: 48 U/L / GGT: x           PT/INR - ( 18 Jul 2022 17:07 )   PT: 12.6 sec;   INR: 1.09 ratio         PTT - ( 18 Jul 2022 17:07 )  PTT:30.1 sec  CAPILLARY BLOOD GLUCOSE

## 2022-07-21 NOTE — BRIEF OPERATIVE NOTE - NSICDXBRIEFPROCEDURE_GEN_ALL_CORE_FT
PROCEDURES:  Flexible bronchoscopy 21-Jul-2022 11:54:09  Luis Knowles, with fine needle aspiration 21-Jul-2022 11:54:19  Luis Knowles

## 2022-07-22 PROBLEM — M65.4 RADIAL STYLOID TENOSYNOVITIS [DE QUERVAIN]: Status: RESOLVED | Noted: 2022-02-14 | Resolved: 2022-01-01

## 2022-07-22 PROBLEM — Z01.818 PREOPERATIVE CLEARANCE: Status: RESOLVED | Noted: 2022-01-01 | Resolved: 2022-01-01

## 2022-07-22 NOTE — PROGRESS NOTE ADULT - ASSESSMENT
52 year old male w hx HTN, substance use on suboxone, alcohol use, smoker came to ED c/o coughing up blood and bilateral leg swelling found to have a 7 cm mass SHAILA and RML nodule    Plan   FB/EBUS tomorrow  NPO P midnight   COVID neg   cont care as per primary team   DW Dr Gilman 
52 year old male w hx HTN, substance use on suboxone, alcohol use, smoker came to ED c/o coughing up blood and bilateral leg swelling found to have a 7 cm mass SHAILA and RML nodule    Plan   s/p FB/EBUS  today   pt may still experience some hemoptysis   cleared from a thoracic standpoint for discharge  Pt  will follow up with Dr Gilman on 8/3  - Office will call patient   cont care as per primary team   DW Dr Gilman 
52 year old male w hx HTN, substance use on suboxone, alcohol use, smoker came to ED c/o coughing up blood and bilateral leg swelling found to have a 7 cm mass SHAILA and RML nodule 7/22 FB EBUS    Plan   OR Path pending   pt may still experience some hemoptysis   cleared from a thoracic standpoint for discharge  Pt  will follow up with Dr Gilman on 8/3  - Office will call patient   cont care as per primary team   heme/onc, pulm consult appreciated   DW Dr Gilman

## 2022-07-22 NOTE — DISCHARGE NOTE NURSING/CASE MANAGEMENT/SOCIAL WORK - PATIENT PORTAL LINK FT
You can access the FollowMyHealth Patient Portal offered by [facility] by registering at the following website: [email or web address]. By joining [employer]’s FollowMyHealth portal, you will also be able to view your health information using other applications (apps) compatible with our system.

## 2022-07-22 NOTE — DISCHARGE NOTE PROVIDER - NSDCCPCAREPLAN_GEN_ALL_CORE_FT
PRINCIPAL DISCHARGE DIAGNOSIS  Diagnosis: Lung mass  Assessment and Plan of Treatment: Follow up with:  Dr. Gilman thoracic surgery  Dr. Shepherd oncologist  Dr Hidalgo (PCP)  Stop smoking  nicotine patch sent to pharmacy  Avoid alcohol use due to fatty liver  No need for "Water pill" at this time.

## 2022-07-22 NOTE — DISCHARGE NOTE NURSING/CASE MANAGEMENT/SOCIAL WORK - NSDCPEWEB_GEN_ALL_CORE
[facility] for Tobacco Control website --- [email or web address] website --- [email or web address]

## 2022-07-22 NOTE — DISCHARGE NOTE PROVIDER - CARE PROVIDERS DIRECT ADDRESSES
[email or web address],DirectAddress_Unknown,DirectAddress_Unknown

## 2022-07-22 NOTE — DISCHARGE NOTE PROVIDER - PROVIDER TOKENS
PROVIDER:[TOKEN:[[id number]],FOLLOWUP:[1 week]],PROVIDER:[TOKEN:[[id number]],FOLLOWUP:[1 week]],FREE:[LAST:[Rocky],FIRST:[Thomas],PHONE:[(   )    -],FAX:[(   )    -],ADDRESS:[[place] [address]]],PROVIDER:[TOKEN:[[id number]],FOLLOWUP:[1 week]]

## 2022-07-22 NOTE — DISCHARGE NOTE PROVIDER - CARE PROVIDER_API CALL
Rosa Gilman)  Thoracic and Cardiac Surgery  [address] [place] [address]  Phone: [phone]  Fax: [phone]  Follow Up Time: 1 week    Srinivas Shepherd)  Hematology; Internal Medicine; Medical Oncology  [address] [place] [address]  Phone: [phone]  Fax: [phone]  Follow Up Time: 1 week    [place] [address]  Phone: (   )    -  Fax: (   )    -  Follow Up Time:     Bhaskar Akbar  INTERNAL MEDICINE  [address] [place] [address]  Phone: [phone]  Fax: [phone]  Follow Up Time: 1 week

## 2022-07-22 NOTE — PROGRESS NOTE ADULT - SUBJECTIVE AND OBJECTIVE BOX
52 year old male w hx HTN, substance use on suboxone, alcohol use, smoker came to ED c/o coughing up blood and bilateral leg swelling  Pt states that he started to cough up blood mixed w mucous for past 2-3 weeks  amount is about quarter-sized  has lost 10 lbs in past 3 weeks  appetite is good   +developed lower leg swelling that has been constant  Restarted drinking after comp injury and surgery to L wrist .  "I had the time".  Before coming to ED he felt shaky so took alprazolam.  Has had hx of alcohol detox in the past w DTs. Librium was used at the time      22: Patient seen and examined. No hemoptysis since admission.   22: No new complaints. Bronchoscopy tomorrow        Vital Signs Last 24 Hrs  T(C): 36.2 (2022 13:16), Max: 36.8 (2022 16:03)  T(F): 97.2 (2022 13:16), Max: 98.2 (2022 16:03)  HR: 101 (2022 13:16) (92 - 107)  BP: 115/72 (2022 13:16) (112/70 - 138/82)  BP(mean): --  RR: 18 (2022 13:16) (18 - 18)  SpO2: 94% (2022 13:16) (93% - 96%)    Parameters below as of 2022 13:16  Patient On (Oxygen Delivery Method): room air            Physical Exam:  · Constitutional	no distress  · Constitutional Comments	pt examined while sitting in results waiting chair 10  · Eyes	PERRL; conjunctiva clear  · ENMT Comments	dry lips  · Respiratory	no respiratory distress; respirations non-labored; diminished breath sounds, L; diminished breath sounds, R  · Cardiovascular	regular rate and rhythm; no murmur; pedal edema  · Pedal Edema Severity	2+  · Pedal Edema Type	non-pitting  · Gastrointestinal	soft; nontender; nondistended; normal active bowel sounds  · Genitourinary Comments	did not evaluate  · Mental Status	alert O x 3  · Motor	REEVES  · Neurological Comments	tremulous  · Skin	warm and dry                                        10.8   6.47  )-----------( 195      ( 2022 08:42 )             32.8     2022 08:42    136    |  102    |  5      ----------------------------<  120    3.8     |  27     |  0.52     Ca    8.6        2022 08:42  Phos  2.0       2022 08:42  Mg     1.8       2022 08:42    TPro  7.4    /  Alb  2.8    /  TBili  0.8    /  DBili  x      /  AST  48     /  ALT  37     /  AlkPhos  204    2022 17:07    LIVER FUNCTIONS - ( 2022 17:07 )  Alb: 2.8 g/dL / Pro: 7.4 gm/dL / ALK PHOS: 204 U/L / ALT: 37 U/L / AST: 48 U/L / GGT: x           PT/INR - ( 2022 17:07 )   PT: 12.6 sec;   INR: 1.09 ratio         PTT - ( 2022 17:07 )  PTT:30.1 sec  CAPILLARY BLOOD GLUCOSE            Urinalysis Basic - ( 2022 17:07 )    Color: Naila / Appearance: Clear / S.015 / pH: x  Gluc: x / Ketone: Large  / Bili: Small / Urobili: 8   Blood: x / Protein: 30 mg/dL / Nitrite: Negative   Leuk Esterase: Trace / RBC: 3-5 /HPF / WBC 3-5   Sq Epi: x / Non Sq Epi: Occasional / Bacteria: Occasional            MEDICATIONS  (STANDING):  ammonium lactate 12% Lotion 1 Application(s) Topical two times a day  ascorbic acid 500 milliGRAM(s) Oral daily  buprenorphine 8 mG/naloxone 2 mG SL  Tablet 1 Tablet(s) SubLingual <User Schedule>  chlordiazePOXIDE 50 milliGRAM(s) Oral every 8 hours  chlordiazePOXIDE   Oral   cyanocobalamin 1000 MICROGram(s) Oral daily  folic acid 1 milliGRAM(s) Oral daily  lisinopril 40 milliGRAM(s) Oral daily  multivitamin 1 Tablet(s) Oral daily  nicotine - 21 mG/24Hr(s) Patch 1 Patch Transdermal daily  ondansetron Injectable 8 milliGRAM(s) IV Push two times a day  potassium chloride   Powder 40 milliEquivalent(s) Oral every 4 hours  potassium phosphate / sodium phosphate Powder (PHOS-NaK) 1 Packet(s) Oral daily  QUEtiapine 50 milliGRAM(s) Oral at bedtime  sodium chloride 0.9%. 1000 milliLiter(s) (100 mL/Hr) IV Continuous <Continuous>  thiamine 100 milliGRAM(s) Oral daily    MEDICATIONS  (PRN):  acetaminophen     Tablet .. 650 milliGRAM(s) Oral every 6 hours PRN Temp greater or equal to 38C (100.4F), Mild Pain (1 - 3)  ALPRAZolam 1 milliGRAM(s) Oral two times a day PRN anxiety  aluminum hydroxide/magnesium hydroxide/simethicone Suspension 30 milliLiter(s) Oral every 4 hours PRN Dyspepsia  chlordiazePOXIDE 50 milliGRAM(s) Oral every 1 hour PRN Symptom-triggered: each CIWA -Ar score 8 or GREATER  LORazepam   Injectable 2 milliGRAM(s) IV Push once PRN To control seizure activity  nicotine  Polacrilex Gum 2 milliGRAM(s) Oral every 3 hours PRN craving            Assessment and Plan:   · VTE Risk Assessment	VTE Assessment already completed for this visit    Assessment:  · Assessment	  52 year old male w hx HTN, substance use on suboxone, alcohol use, smoker came to ED c/o coughing up blood and bilateral leg swelling    #Hemoptysis  #Lung mass SHAILA  30 pack year smoking hx  #Palpable supraclavicular LN  #Weight loss  Brother , paternal aunt and pt's friend w lung CA hx  -Pulmonary eval appreciated  -Needs tissue biopsy  -CTS eval appreciated. Possible FB, EBUS tomorrow  -Oncology eval, Dr Aragon    #Alcohol abuse  recently restarted drinking  prior hx Detox and DTs  requested Librium  1. CIWA w high risk and symptom directed  2. Sz precautions  3. Thiamine/folate /MVI    #Dehydration/hypovolemia  w contraction alkalosis  #Ketones in urine  Encourage PO    #Hypokalemia   Replaced    #Bilateral leg edema  1. dopplers negative for DVT    #HTN  1. lisinopril 40 mg qd w parameters    #Hx opiate abuse in remission x 16 years  1. continue suboxone BID  2. seroquel q HS  3. alprazolam BID prn (from home)    #Tobacco use  1. nicotine patch  2. gum prn    #VTE  document ambulation    
  Subjective:  pt in bed NAD no issues overnight .  Denied hemoptysis     T(C): 36.7 (07-22-22 @ 08:25), Max: 36.8 (07-21-22 @ 12:30)  HR: 99 (07-22-22 @ 08:25) (95 - 127)  BP: 125/75 (07-22-22 @ 08:25) (111/79 - 131/81)  ABP: --  ABP(mean): --  RR: 18 (07-21-22 @ 23:36) (14 - 32)  SpO2: 95% (07-22-22 @ 08:25) (91% - 100%) RA   Wt(kg): --  CVP(mm Hg): --  CO: --  CI: --  PA: --                                              Tele: SR             07-22    137  |  104  |  6<L>  ----------------------------<  110<H>  3.7   |  28  |  0.66    Ca    8.7      22 Jul 2022 08:48                                 10.8   6.87  )-----------( 219      ( 22 Jul 2022 08:48 )             33.5                 CAPILLARY BLOOD GLUCOSE               Exam   Neuro:  Alert Awake NAD  Pulm:  decreased at bases b/l   CV: RRR S1 S2   Abd:  Soft NT ND   Extremities: warm             Assessment:  52yMale    with PAST MEDICAL & SURGICAL HISTORY:  HTN (hypertension)      Smoker      Injury due to foreign body  right wrist            Plan:  
52 year old male w hx HTN, substance use on suboxone, alcohol use, smoker came to ED c/o coughing up blood and bilateral leg swelling  Pt states that he started to cough up blood mixed w mucous for past 2-3 weeks  amount is about quarter-sized  has lost 10 lbs in past 3 weeks  appetite is good   +developed lower leg swelling that has been constant  Restarted drinking after comp injury and surgery to L wrist .  "I had the time".  Before coming to ED he felt shaky so took alprazolam.  Has had hx of alcohol detox in the past w DTs. Librium was used at the time      07/19/22: Patient seen and examined. No hemoptysis since admission.         Vital Signs Last 24 Hrs  T(C): 36.4 (19 Jul 2022 12:06), Max: 37.1 (18 Jul 2022 22:25)  T(F): 97.6 (19 Jul 2022 12:06), Max: 98.8 (18 Jul 2022 22:25)  HR: 104 (19 Jul 2022 12:06) (101 - 120)  BP: 128/85 (19 Jul 2022 12:06) (109/68 - 157/95)  BP(mean): 115 (18 Jul 2022 15:34) (115 - 115)  RR: 18 (19 Jul 2022 12:06) (18 - 18)  SpO2: 92% (19 Jul 2022 12:06) (92% - 98%)    Parameters below as of 19 Jul 2022 12:06  Patient On (Oxygen Delivery Method): room air            Physical Exam:  · Constitutional	no distress  · Constitutional Comments	pt examined while sitting in results waiting chair 10  · Eyes	PERRL; conjunctiva clear  · ENMT Comments	dry lips  · Respiratory	no respiratory distress; respirations non-labored; diminished breath sounds, L; diminished breath sounds, R  · Cardiovascular	regular rate and rhythm; no murmur; pedal edema  · Pedal Edema Severity	2+  · Pedal Edema Type	non-pitting  · Gastrointestinal	soft; nontender; nondistended; normal active bowel sounds  · Genitourinary Comments	did not evaluate  · Mental Status	alert O x 3  · Motor	REEVES  · Neurological Comments	tremulous  · Skin	warm and dry                            11.2   7.49  )-----------( 211      ( 19 Jul 2022 08:54 )             35.0     19 Jul 2022 08:54    134    |  95     |  6      ----------------------------<  100    3.3     |  29     |  0.47     Ca    8.9        19 Jul 2022 08:54  Phos  2.4       19 Jul 2022 08:54  Mg     1.5       19 Jul 2022 08:54    TPro  7.4    /  Alb  2.8    /  TBili  0.8    /  DBili  x      /  AST  48     /  ALT  37     /  AlkPhos  204    18 Jul 2022 17:07    LIVER FUNCTIONS - ( 18 Jul 2022 17:07 )  Alb: 2.8 g/dL / Pro: 7.4 gm/dL / ALK PHOS: 204 U/L / ALT: 37 U/L / AST: 48 U/L / GGT: x           PT/INR - ( 18 Jul 2022 17:07 )   PT: 12.6 sec;   INR: 1.09 ratio         PTT - ( 18 Jul 2022 17:07 )  PTT:30.1 sec  CAPILLARY BLOOD GLUCOSE        MEDICATIONS  (STANDING):  ammonium lactate 12% Lotion 1 Application(s) Topical two times a day  ascorbic acid 500 milliGRAM(s) Oral daily  buprenorphine 8 mG/naloxone 2 mG SL  Tablet 1 Tablet(s) SubLingual <User Schedule>  chlordiazePOXIDE 50 milliGRAM(s) Oral every 8 hours  chlordiazePOXIDE   Oral   cyanocobalamin 1000 MICROGram(s) Oral daily  folic acid 1 milliGRAM(s) Oral daily  lisinopril 40 milliGRAM(s) Oral daily  multivitamin 1 Tablet(s) Oral daily  nicotine - 21 mG/24Hr(s) Patch 1 Patch Transdermal daily  ondansetron Injectable 8 milliGRAM(s) IV Push two times a day  potassium chloride   Powder 40 milliEquivalent(s) Oral every 4 hours  potassium phosphate / sodium phosphate Powder (PHOS-NaK) 1 Packet(s) Oral daily  QUEtiapine 50 milliGRAM(s) Oral at bedtime  sodium chloride 0.9%. 1000 milliLiter(s) (100 mL/Hr) IV Continuous <Continuous>  thiamine 100 milliGRAM(s) Oral daily    MEDICATIONS  (PRN):  acetaminophen     Tablet .. 650 milliGRAM(s) Oral every 6 hours PRN Temp greater or equal to 38C (100.4F), Mild Pain (1 - 3)  ALPRAZolam 1 milliGRAM(s) Oral two times a day PRN anxiety  aluminum hydroxide/magnesium hydroxide/simethicone Suspension 30 milliLiter(s) Oral every 4 hours PRN Dyspepsia  chlordiazePOXIDE 50 milliGRAM(s) Oral every 1 hour PRN Symptom-triggered: each CIWA -Ar score 8 or GREATER  LORazepam   Injectable 2 milliGRAM(s) IV Push once PRN To control seizure activity  nicotine  Polacrilex Gum 2 milliGRAM(s) Oral every 3 hours PRN craving            Assessment and Plan:   · VTE Risk Assessment	VTE Assessment already completed for this visit    Assessment:  · Assessment	  52 year old male w hx HTN, substance use on suboxone, alcohol use, smoker came to ED c/o coughing up blood and bilateral leg swelling    #Hemoptysis  #Lung mass SHAILA  30 pack year smoking hx  #Palpable supraclavicular LN  #Weight loss  Brother , paternal aunt and pt's friend w lung CA hx  -Pulmonary eval appreciated  -Needs tissue biopsy  -CTS eval  -Oncology eval    #Alcohol abuse  recently restarted drinking  prior hx Detox and DTs  requested Librium  1. CIWA w high risk and symptom directed  2. Sz precautions  3. Thiamine/folate /MVI    #Dehydration/hypovolemia  w contraction alkalosis  #Ketones in urine  1. IV  cc/hr  2. encourage PO    #Hypokalemia   Replacing, follow  #Bilateral leg edema  1. dopplers negative for DVT    #HTN  1. lisinopril 40 mg qd w parameters    #Hx opiate abuse in remission x 16 years  1. continue suboxone BID  2. seroquel q HS  3. alprazolam BID prn (from home)    #Tobacco use  1. nicotine patch  2. gum prn    #VTE  document ambulation    
52 year old male w hx HTN, substance use on suboxone, alcohol use, smoker came to ED c/o coughing up blood and bilateral leg swelling  Pt states that he started to cough up blood mixed w mucous for past 2-3 weeks  amount is about quarter-sized  has lost 10 lbs in past 3 weeks  appetite is good   +developed lower leg swelling that has been constant  Restarted drinking after comp injury and surgery to L wrist .  "I had the time".  Before coming to ED he felt shaky so took alprazolam.  Has had hx of alcohol detox in the past w DTs. Librium was used at the time      07/19/22: Patient seen and examined. No hemoptysis since admission.   07/20/22: No new complaints. Bronchoscopy tomorrow.  07/21/22: S/P FB and EBUS in am. Discussed with Dr Gilman, have enough tissue for biopsy.       Vital Signs Last 24 Hrs  T(C): 36.6 (21 Jul 2022 12:49), Max: 36.8 (21 Jul 2022 12:30)  T(F): 97.8 (21 Jul 2022 12:49), Max: 98.2 (21 Jul 2022 12:30)  HR: 96 (21 Jul 2022 12:49) (93 - 127)  BP: 126/87 (21 Jul 2022 12:49) (111/79 - 148/92)  BP(mean): --  RR: 17 (21 Jul 2022 12:49) (14 - 32)  SpO2: 96% (21 Jul 2022 12:49) (91% - 100%)    Parameters below as of 21 Jul 2022 12:49  Patient On (Oxygen Delivery Method): room air                Physical Exam:  · Constitutional	no distress  · Constitutional Comments	pt examined while sitting in results waiting chair 10  · Eyes	PERRL; conjunctiva clear  · ENMT Comments	dry lips  · Respiratory	no respiratory distress; respirations non-labored; diminished breath sounds, L; diminished breath sounds, R  · Cardiovascular	regular rate and rhythm; no murmur; pedal edema  · Pedal Edema Severity	2+  · Pedal Edema Type	non-pitting  · Gastrointestinal	soft; nontender; nondistended; normal active bowel sounds  · Genitourinary Comments	did not evaluate  · Mental Status	alert O x 3  · Motor	REEVES  · Neurological Comments	tremulous  · Skin	warm and dry                                                       10.8   6.47  )-----------( 195      ( 20 Jul 2022 08:42 )             32.8     20 Jul 2022 08:42    136    |  102    |  5      ----------------------------<  120    3.8     |  27     |  0.52     Ca    8.6        20 Jul 2022 08:42  Phos  2.0       20 Jul 2022 08:42  Mg     1.8       20 Jul 2022 08:42              MEDICATIONS  (STANDING):  ammonium lactate 12% Lotion 1 Application(s) Topical two times a day  ascorbic acid 500 milliGRAM(s) Oral daily  buprenorphine 8 mG/naloxone 2 mG SL  Tablet 1 Tablet(s) SubLingual <User Schedule>  chlordiazePOXIDE 50 milliGRAM(s) Oral every 8 hours  chlordiazePOXIDE   Oral   cyanocobalamin 1000 MICROGram(s) Oral daily  folic acid 1 milliGRAM(s) Oral daily  lisinopril 40 milliGRAM(s) Oral daily  multivitamin 1 Tablet(s) Oral daily  nicotine - 21 mG/24Hr(s) Patch 1 Patch Transdermal daily  ondansetron Injectable 8 milliGRAM(s) IV Push two times a day  potassium chloride   Powder 40 milliEquivalent(s) Oral every 4 hours  potassium phosphate / sodium phosphate Powder (PHOS-NaK) 1 Packet(s) Oral daily  QUEtiapine 50 milliGRAM(s) Oral at bedtime  sodium chloride 0.9%. 1000 milliLiter(s) (100 mL/Hr) IV Continuous <Continuous>  thiamine 100 milliGRAM(s) Oral daily    MEDICATIONS  (PRN):  acetaminophen     Tablet .. 650 milliGRAM(s) Oral every 6 hours PRN Temp greater or equal to 38C (100.4F), Mild Pain (1 - 3)  ALPRAZolam 1 milliGRAM(s) Oral two times a day PRN anxiety  aluminum hydroxide/magnesium hydroxide/simethicone Suspension 30 milliLiter(s) Oral every 4 hours PRN Dyspepsia  chlordiazePOXIDE 50 milliGRAM(s) Oral every 1 hour PRN Symptom-triggered: each CIWA -Ar score 8 or GREATER  LORazepam   Injectable 2 milliGRAM(s) IV Push once PRN To control seizure activity  nicotine  Polacrilex Gum 2 milliGRAM(s) Oral every 3 hours PRN craving            Assessment and Plan:   · VTE Risk Assessment	VTE Assessment already completed for this visit    Assessment:  · Assessment	  52 year old male w hx HTN, substance use on suboxone, alcohol use, smoker came to ED c/o coughing up blood and bilateral leg swelling    #Hemoptysis  #Lung mass SHAILA  30 pack year smoking hx  #Palpable supraclavicular LN  #Weight loss    -Pulmonary eval appreciated  -CTS eval appreciated. S/P  FB, EBUS today, follow biopsy   -Oncology eval appreciated  -Needs outpatient PET scan for staging     #Alcohol abuse  recently restarted drinking  prior hx Detox and DTs  requested Librium  1. CIWA w high risk and symptom directed  2. Sz precautions  3. Thiamine/folate /MVI    #Dehydration/hypovolemia  w contraction alkalosis  Resolved with IV fluid  Encourage PO    #Hypokalemia   Replaced    #Bilateral leg edema  1. dopplers negative for DVT    #HTN  1. lisinopril 40 mg qd w parameters    #Hx opiate abuse in remission x 16 years  1. continue suboxone BID  2. seroquel q HS  3. alprazolam BID prn (from home)    #Tobacco use  1. nicotine patch  2. gum prn    #VTE  document ambulation    Possible home tomorrow  Follow up with Dr Gilman, Dr Akbar and Dr capps      
  Subjective:  Pt in bed NAD no issues overnight Plan for FB/EBUS tomorrow     T(C): 36.7 (07-20-22 @ 07:41), Max: 36.8 (07-19-22 @ 16:03)  HR: 93 (07-20-22 @ 07:41) (92 - 107)  BP: 138/82 (07-20-22 @ 07:41) (112/70 - 138/82)  ABP: --  ABP(mean): --  RR: 18 (07-20-22 @ 07:41) (18 - 18)  SpO2: 94% (07-20-22 @ 07:41) (92% - 96%) RA   Wt(kg): --  CVP(mm Hg): --  CO: --  CI: --  PA: --                                              Tele: SR             07-20    136  |  102  |  5<L>  ----------------------------<  120<H>  3.8   |  27  |  0.52    Ca    8.6      20 Jul 2022 08:42  Phos  2.0     07-20  Mg     1.8     07-20    TPro  7.4  /  Alb  2.8<L>  /  TBili  0.8  /  DBili  x   /  AST  48<H>  /  ALT  37  /  AlkPhos  204<H>  07-18                               10.8   6.47  )-----------( 195      ( 20 Jul 2022 08:42 )             32.8        PT/INR - ( 18 Jul 2022 17:07 )   PT: 12.6 sec;   INR: 1.09 ratio         PTT - ( 18 Jul 2022 17:07 )  PTT:30.1 sec         CAPILLARY BLOOD GLUCOSE               CXR: < from: Xray Chest 1 View- PORTABLE-Urgent (Xray Chest 1 View- PORTABLE-Urgent .) (07.18.22 @ 16:36) >  COMPARISON: None available.    Heart size is within normal limits.    There is a sizable mass in the left upper lobe merging with the left   hilum. Findings are likely consistent with cancer.    Consider CT chest with IV dye.    IMPRESSION: Left upper lobe mass merging the left hilum likely cancer.   Consider CT with IV dye. A note was posted on the [facility] ED discrepancy   site at 4:41 PM.    < end of copied text >          Exam   Neuro: Alert Awake NAD  Pulm: decreased at bases b/l   CV: RRR   Abd: soft   Extremities:  warm          Assessment:  52yMale    with PAST MEDICAL & SURGICAL HISTORY:  HTN (hypertension)      Smoker      Injury due to foreign body  right wrist            Plan:  
  Subjective:  Pt in bed. s/p FB EBUS today     T(C): 36.6 (07-21-22 @ 12:49), Max: 36.7 (07-20-22 @ 20:04)  HR: 96 (07-21-22 @ 12:49) (93 - 127)  BP: 126/87 (07-21-22 @ 12:49) (111/79 - 148/92)  ABP: --  ABP(mean): --  RR: 15 (07-21-22 @ 12:00) (14 - 32)  SpO2: 96% (07-21-22 @ 12:49) (91% - 98%) 2 L NC   Wt(kg): --  CVP(mm Hg): --  CO: --  CI: --  PA: --                                              Tele: SR             07-20    136  |  102  |  5<L>  ----------------------------<  120<H>  3.8   |  27  |  0.52    Ca    8.6      20 Jul 2022 08:42  Phos  2.0     07-20  Mg     1.8     07-20                                 10.8   6.47  )-----------( 195      ( 20 Jul 2022 08:42 )             32.8                 CAPILLARY BLOOD GLUCOSE               CXR:  < from: CT Chest w/ IV Cont (07.18.22 @ 17:31) >    IMPRESSION: There is a 7 cm mass in the left upper lobe. Primary   consideration is lung carcinoma.    Extensive adenopathy is noted within the mediastinum, left hilum, neck,   left supraclavicular region and the left axilla as described above.    1.4 cm nodule is noted in the right middle lobe.    < end of copied text >          Exam   Neuro:  Alert Awake NAD  Pulm:  decreased at bases b/l   CV: RRR S1 S2   Abd:  Soft NT ND   Extremities: warm         Assessment:  52yMale    with PAST MEDICAL & SURGICAL HISTORY:  HTN (hypertension)      Smoker      Injury due to foreign body  right wrist            Plan:

## 2022-07-22 NOTE — DISCHARGE NOTE PROVIDER - NSDCMRMEDTOKEN_GEN_ALL_CORE_FT
ALPRAZolam 1 mg oral tablet: 1 tab(s) orally 2 times a day, As Needed  buprenorphine-naloxone 8 mg-2 mg sublingual tablet: 1 tab(s) sublingual 2 times a day  ibuprofen 200 mg oral tablet: 1 tab(s) orally every 6 hours, As Needed  lisinopril 40 mg oral tablet: 1 tab(s) orally once a day  nicotine 21 mg/24 hr transdermal film, extended release: 1 patch transdermal once a day   QUEtiapine 50 mg oral tablet: 1 tab(s) orally once a day (at bedtime)  Vitamin B-12 1000 mcg oral tablet: 1 tab(s) orally once a day  Vitamin C 500 mg oral tablet: 1 tab(s) orally once a day

## 2022-07-22 NOTE — DISCHARGE NOTE PROVIDER - HOSPITAL COURSE
CC  Hemoptysis    HPI:   52 year old male w hx HTN, substance use on suboxone, alcohol use, smoker came to ED c/o coughing up blood and bilateral leg swelling.  Pt states that he started to cough up blood mixed w mucous for past 2-3 weeks, amount is about quarter-sized. Lost 10 lbs in past 3 weeks.  Developed lower leg swelling that has been constant.  Restarted drinking after comp injury and surgery to L wrist .  "I had the time".  Before coming to ED he felt shaky so took alprazolam. Has had hx of alcohol detox in the past w DTs. Librium was used at the time.    Hospital course:  Pt admitted for hemoptysis, found to have left upper lobe lung mass with significant adenopathy.  Pt worked up further, underwent Flexible bronchoscopy, EBUS, with fine needle aspiration 21-Jul-2022 11:54:19.  While hospitalized pt placed on CIWA protocol for history of alcohol abuse.  He was given IVFs for dehydration.  In terms of leg edema his dopplers were negative for DVT.  Pt otherwise maintained on home meds.  Leg edema improved during hospitalization.  Pt to follow up closely with a new PCP, thoracic and oncologist as out patient.  Resources were provided to patient.    REVIEW OF SYSTEMS: All other review of systems is negative unless indicated above.    Vital Signs Last 24 Hrs  T(C): 36.7 (22 Jul 2022 08:25), Max: 36.8 (21 Jul 2022 23:36)  T(F): 98.1 (22 Jul 2022 08:25), Max: 98.3 (21 Jul 2022 23:36)  HR: 99 (22 Jul 2022 08:25) (95 - 99)  BP: 125/75 (22 Jul 2022 08:25) (125/75 - 131/81)  BP(mean): --  RR: 18 (21 Jul 2022 23:36) (17 - 18)  SpO2: 95% (22 Jul 2022 08:25) (95% - 97%)    Parameters below as of 22 Jul 2022 08:25  Patient On (Oxygen Delivery Method): room air      PHYSICAL EXAM:    Constitutional: NAD, awake and alert, well-developed  HEENT: PERR, EOMI, Normal Hearing, MMM  Neck: Soft and supple  Respiratory: Breath sounds are clear bilaterally, No wheezing, rales or rhonchi  Cardiovascular: S1 and S2, regular rate and rhythm, no Murmurs, gallops or rubs  Gastrointestinal: Bowel Sounds present, soft, nontender, nondistended, no guarding, no rebound  Extremities: No peripheral edema  Neurological: A/O x 3, no focal deficits in my limited exam      med/labs: Reviewed and interpreted       Assessment/Plan: 52 year old male w hx HTN, substance use on suboxone, alcohol use, smoker came to ED c/o coughing up blood and bilateral leg swelling    #Hemoptysis due to Lung mass SHAILA, probable lung cancer.   30 pack year smoking hx  #Palpable supraclavicular LN  #Weight loss    -Pulmonary eval appreciated  -CTS eval appreciated. S/P  FB, EBUS today, follow biopsy outpatient   -Oncology eval appreciated  -Needs outpatient PET scan for staging     #Alcohol abuse: Stable  -s/p CIWA  -s/p thiamine/folate /MVI for probable alcohol induced thiamine deficiency     #Dehydration/hypovolemia/hypokalemia: Resolved s/p IVFs and repletion    #Bilateral leg edema: Multifactorial due to poor nutrition, possible malignancy, alcohol abuse: Swelling better. dopplers negative for DVT.    #HTN  1. lisinopril 40 mg qd w parameters    #Hx opiate abuse in remission x 16 years  1. continue suboxone BID  2. seroquel q HS  3. alprazolam BID prn (from home)    #Tobacco use  1. nicotine patch  2. gum prn    #VTE  document ambulation    d/c home with close out patient follow up    Attending Statement: 40 minutes spent on total encounter and discharge planning.

## 2022-07-22 NOTE — DISCHARGE NOTE NURSING/CASE MANAGEMENT/SOCIAL WORK - NSDCPEFALRISK_GEN_ALL_CORE
For information on Fall & Injury Prevention, visit: [email or web address] OR  [email or web address] OR  https://www.cdc.gov/steadi/patient.html

## 2022-07-22 NOTE — DISCHARGE NOTE NURSING/CASE MANAGEMENT/SOCIAL WORK - NSDCPEEMAIL_GEN_ALL_CORE
[facility] email [email or web address]

## 2022-08-17 PROBLEM — F17.210 CIGARETTE SMOKER: Status: ACTIVE | Noted: 2022-01-01

## 2022-08-17 PROBLEM — Z72.89 ALCOHOL USE: Status: ACTIVE | Noted: 2022-01-01

## 2022-08-17 PROBLEM — Z80.7 FAMILY HISTORY OF HODGKIN'S LYMPHOMA: Status: ACTIVE | Noted: 2022-01-01

## 2022-08-17 PROBLEM — Z78.9 DOES NOT USE ILLICIT DRUGS: Status: ACTIVE | Noted: 2022-01-01

## 2022-08-17 NOTE — REVIEW OF SYSTEMS
[Cough] : cough [Negative] : Heme/Lymph [Feeling Poorly] : not feeling poorly [Feeling Tired] : not feeling tired [Chest Pain] : no chest pain [Palpitations] : no palpitations [Shortness Of Breath] : no shortness of breath [SOB on Exertion] : no shortness of breath during exertion

## 2022-08-17 NOTE — DATA REVIEWED
[FreeTextEntry1] : PET Imaging from [facility] on 08/13/22\par \par \par \par \par \par \par \par \par COSTA MELO\par \par Accession:                             [id number]\par \par Collected Date/Time:                   7/21/2022 09:33 EDT\par Received Date/Time:                    7/21/2022 10:56 EDT\par \par Surgical Pathology Report - Auth (Verified)\par \par Specimen(s) Submitted\par 1  EBUS biopsy subcarina level 7\par \par Final Diagnosis\par Tissue designated "Subcarina level 7 (EBUS biopsy):\par - Scanty fragments of poorly differentiated non-small cell carcinoma consistent with adenocarcinoma.\par - See comment .\par \par Verified by: KURTIS STEEN M.D.\par (Electronic Signature)\par Reported on: 07/25/22 15:09 EDT, [address] [facility]\par Phone: [phone]   Fax: [phone]\par

## 2022-08-17 NOTE — CONSULT LETTER
[Dear  ___] : Dear  [unfilled], [Courtesy Letter:] : I had the pleasure of seeing your patient, [unfilled], in my office today. [Please see my note below.] : Please see my note below. [Consult Closing:] : Thank you very much for allowing me to participate in the care of this patient.  If you have any questions, please do not hesitate to contact me. [Sincerely,] : Sincerely, [FreeTextEntry2] : Dr Hagan [FreeTextEntry3] : Rosa Gilman MD\par Cardiothoracic Surgery\par [facility]\par [address] \[place] [address]\par \par

## 2022-08-17 NOTE — ASSESSMENT
[FreeTextEntry1] : PLAN:\par - Oncology Referral to Dr Srinivas Shepherd (has been seen as a inpatient) \par - Encourage smoking cessation\par - Encourage Nutrition and supplementation\par - MRI of the brain with and with out contrast (patient requested Martha) \par \par \par \par \par \par \par \par \par I, Luis Gerber NP am scribing for and in the presence of Dr. Gilman the following sections HISTORY OF PRESENT ILLNESS, PAST MEDICAL/FAMILY/SOCIAL HISTORY; REVIEW OF SYSTEMS; VITAL SIGNS; PHYSICAL EXAM; DISPOSITION.\par \par "I personally performed the services described in the documentation, reviewed the documentation recorded by the scribe in my presence and accurately and completely records my words and actions."\par

## 2022-08-17 NOTE — HISTORY OF PRESENT ILLNESS
[FreeTextEntry1] : Mr. MELO is a 52 year old male who presented to [facility] with hemoptysis x 3 weeks as well as a 10 pound weight loss. CT Chest demonstrates a large left upper lobe mass with enlarged  AP window lymph nodes and subcarinal lymph nodeHis past medical history includes HTN, substance use on suboxone, alcohol use s/p detox with DTs, smoker.\par \par He is status post Flexible Bronchoscopy, EBUS with FNA Biopsy of subcarinal lymph node on 7/18/22. Pathology reveals scanty fragments of poorly differentiated non-small cell carcinoma consistent with adenocarcinoma.\par \par Mr. Melo presents today for hospital follow up and review of pathology. Unfortunately, PET scan shows what appears to be metastatic disease. Findings include: left upper lobe mass, right pulmonary nodule, multiple mediastinal and supraclavicular nodes that are positive. He denies any shortness of breath or hemoptysis. \par \par

## 2022-08-18 NOTE — ED ADULT NURSE NOTE - BRAND OF COVID-19 VACCINATION
Physical Therapy  Visit Type: treatment  Precautions:  Medical precautions:  fall risk; standard precautions.  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 Patient seen on 3N nursing unit.    Lines:     Basic: telemetry and external urinary catheter      Lines in chart and on patient reviewed, precautions maintained throughout session.  Lower Extremity:    Left:  weight bearing: as tolerated.  Safety Measures: bed alarm and chair alarm    SUBJECTIVE  Patient agreed to participate in therapy this date.  Patient verbally agrees to allow the following to be present during session: spouse  Pt states that her spouse has admitted himself to the ED at [facility] for food poisoning and she is not sure if her will be able to take her home today.  Patient / Family Goal: maximize function and return home    Pain     Location: left hip    At onset of session (out of 10): 5  Ice applied at end of session.      OBJECTIVE   Level of consciousness: alert    Oriented to person, place, time and situation     Arousal alertness: appropriate responses to stimuli    Affect/Behavior: cooperative  Patient activity tolerance: 1 to 1 activity to rest  Functional Communication/Cognition    Overall status:  Within functional limits  Balance (trials measured in sec unless otherwise indicted)    Sitting: Static: stand by assist and contact guard/touching/steadying assist back support and back unsupported    Standing - Firm Surface - Eyes Open: Static: contact guard/touching/steadying assist and minimal assist double upper extremity support    Bed Mobility:        Supine to sit: supervision  Training completed:    Tasks: supine to sit    Education details: body mechanics, patient safety and patient requires additional training    Extra time for bed mobility; head of bed elevated and pt used bed railings to assist.  (Pt's bed at home is adjustable).  Transfers:    Assistive devices: 2-wheeled walker, gait belt and 1 person    Sit to stand: with verbal cues and supervision    Stand to sit: with verbal cues and supervision    Toilet: stand by assist and with verbal cues  Training completed:    Tasks: sit to stand, stand to sit and toilet    Education details: body mechanics, patient safety and patient requires additional training    Cues for safe sequencing with approaching the bed or toilet to sit.    Gait/Ambulation:     Assistance: supervision   Assistive device: 2-wheeled walker, gait belt and 1 person    Distance (ft): 200; 20    Pattern: step through    Type: antalgic, decreased chava and step through    Deviation in foot placement: wide base of support    Surface: even  Training Completed:    Tasks: gait training on level surfaces    Education details: body mechanics, patient safety and patient requires additional training    Very decreased chava.  Pt needs verbal cues to look forward, correct flexed hip posture and to keep attention to ambulation task as she can be easily distracted with stopping to talk while walking.  Stair Mobility:      Curb ambulation: stand by assist  Training completed:    Tasks: curb training    Education details: patient safety, body mechanics and patient requires additional training          Interventions     Supine    Lower Extremity: Left: heel slides, ankle pumps and quad sets, AAROM, 10 reps, 1 sets  Seated    Lower Extremity: Bilateral: knee extensions and knee flexion, AROM, 10 reps, 1 sets  Training provided: activity tolerance, bed mobility training, functional ambulation, transfer training, gait training, HEP training, safety training and use of assistive device    Rehab Safety  Therapist donned the following PPE for this patient encounter:  Gloves, Surgical Mask    Therapy aide or other healthcare professional present during this patient encounter:   Yes with same PPE as therapist  noted above    The patient was wearing a mask during this session:  No    Therapist with patient for approx 59 minutes.    Skilled input: Verbal instruction/cues, tactile instruction/cues and posture correction  Verbal Consent: Writer verbally educated and received verbal consent for hand placement, positioning of patient, and techniques to be performed today from patient        ASSESSMENT   Impairments: activity tolerance, strength, pain, safety awareness, endurance and range of motion  Functional Limitations: all functional mobility      Today's treatment focused on gait and curb training.  The patient is demonstrating good progress as evidenced by increased ambulation distance and safe curb demonstration by the patient. If the pt goes home with a friend, PT will give the pt a RW to take home.         Discharge Recommendations    Recommendation for Discharge: PT IL: Patient is appropriate for Physical Therapy 1-3 times per week, Patient requires  intermittent assistance to perform mobility and/or ADLs safely           PT/OT Mobility Equipment for Discharge: two commodes for home (for bathroom and another for bedside)         Progress: progressing toward goals    • Skilled therapy is required to address these limitations in attempt to maximize the patient's independence.     • Predicted patient presentation: Low (stable) - Patient comorbidities and complexities, as defined above, will have little effect on progress for prescribed plan of care.    Education Provided:   Learning Assessment:  - Primary learner: patient  - Are they ready to learn: yes  - Preferred learning style: verbal and demonstration  - Barriers to learning: physical handicap  Education provided during session:  - Receiving Education: patient  - Results of above outlined education: Needs reinforcement and Demonstrates understanding    Patient at End of Session:   Location: in chair  Safety measures: alarm system in place/re-engaged, call light  within reach and lines intact  Handoff to: nurse    PLAN   Suggestions for next session as indicated:     PT Frequency: Twice a day       Interventions: balance, bed mobility, gait training, functional transfer training, strengthening, stairs retraining and safety education  Agreement to plan and goals: patient agrees with goals and treatment plan        GOALS  Review Date: 8/26/2022  Long Term Goals: (to be met by time of discharge from hospital)  Sit to supine: Patient will complete sit to supine supervision.  Status: progressing/ongoing  Supine to sit: Patient will complete supine to sit supervision.  Status: progressing/ongoing  Sit to stand: Patient will complete sit to stand transfer with gait belt and 2-wheeled walker, modified independent.   Status: progressing/ongoing  Stand to sit: Patient will complete stand to sit transfer with gait belt and 2-wheeled walker, modified independent.   Status: progressing/ongoing  Ambulation (even): Patient will ambulate on even surface for 200 feet with gait belt and 2-wheeled walker, modified independent.   Status: progressing/ongoing  Curb step: Patient will ambulate curb step with gait belt and 2-wheeled walker, modified independent.   Status: progressing/ongoing  Home program: patient independent with home program as instructed.   Status: progressing/ongoing    Documented in the chart in the following areas: Prior Level of Function. Assessment.      Therapy procedure time and total treatment time can be found documented on the Time Entry flowsheet   Zachariah

## 2022-09-14 NOTE — ED ADULT NURSE NOTE - OBJECTIVE STATEMENT
pt presents to ED s/p vomiting blood, decreased Po intake x3 days. pt recently dx with stage 4 lung ca a few weeks ago. pt in no acute distress. will ctm

## 2022-09-14 NOTE — PHARMACOTHERAPY INTERVENTION NOTE - COMMENTS
Medication history complete. Medications and allergies reviewed with patient and confirmed with . Patient states that he received a B12 shot twoweeks ago for scheduled alimta, that was scheduled for 9/16. B12 shot directed to be given seven days prior to alimta per .

## 2022-09-14 NOTE — H&P ADULT - NSICDXPASTMEDICALHX_GEN_ALL_CORE_FT
PAST MEDICAL HISTORY:  Admits to alcohol use     HTN (hypertension)     Lung cancer 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)    Smoker     Substance abuse       Lung cancer

## 2022-09-14 NOTE — H&P ADULT - NSHPREVIEWOFSYSTEMS_GEN_ALL_CORE
Constitutional: negative for fatigue, negative for fever, negative for chills, negative for decreased appetite.  Skin: negative for rashes, negative for open wounds, negative for jaundice.   Eyes: negative for blurry vision, negative for double vision.   Ears, nose, throat: negative for ear pain, negative for nasal congestion, negative for sore throat, negative for lymph node swelling.   Cardiovascular: negative for chest pain, negative for palpitations, negative for lower extremity swelling.   Respiratory: negative for shortness of breath, negative for wheezing, positive for cough, positive for hemoptysis   Gastrointestinal: negative for abdominal pain, negative for nausea, negative for vomiting, negative for diarrhea, negative for constipation, negative for blood in the stool, negative for black tarry stools.   Genitourinary: negative for burning on urination, negative for urinary urgency or frequency, negative for blood in the urine.   Endocrine: negative for cold intolerance, negative for heat intolerance, negative for increased thirst.   Hematologic: negative for easy bruising or bleeding.   Musculoskeletal: negative for muscle/joint pain, negative for decreased range of motion.   Neurological: negative for dizziness, negative for headaches, negative for loss of consciousness, negative for motor weakness, negative for sensory deficits.   Psychiatric: negative for depression, negative for anxiety.

## 2022-09-14 NOTE — ED PROVIDER NOTE - CARE PLAN
1 Principal Discharge DX:	Hemoptysis  Secondary Diagnosis:	PNA (pneumonia)  Secondary Diagnosis:	Acute respiratory failure with hypoxia

## 2022-09-14 NOTE — ED PROVIDER NOTE - CLINICAL SUMMARY MEDICAL DECISION MAKING FREE TEXT BOX
51 y/o male with PMHx of HTN and lung ca presents to ED with hemocytics. Will obtain CT chest, blood work, and reassess closely.

## 2022-09-14 NOTE — ED PROVIDER NOTE - PHYSICAL EXAMINATION
Constitutional: cachectic appearing male laying in bed  Eyes: PERRLA EOMI  Head: Normocephalic atraumatic  Mouth: MMM  Cardiac: regular rate   Resp: Lungs CTAB  GI: Abd s/nt/nd, no rebound or guarding.  Neuro: awake, alert, moving all extremities, cranial nerves 2-12 intact, sensation intact, no dysmetria.  Skin: No rashes

## 2022-09-14 NOTE — ED PROVIDER NOTE - OBJECTIVE STATEMENT
51 y/o male with PMHx of HTN and lung ca presents to ED with complaints of coughing up blood for the past 3 days with stage 4 lung ca, ,nausea, loss of appetite, and vomiting.

## 2022-09-14 NOTE — PATIENT PROFILE ADULT - NSPROPOAURINARYCATHETER_GEN_A_NUR
What Type Of Note Output Would You Prefer (Optional)?: Bullet Format
What Is The Reason For Today's Visit?: Full Body Skin Examination
What Is The Reason For Today's Visit? (Being Monitored For X): concerning skin lesions on an annual basis
no

## 2022-09-14 NOTE — H&P ADULT - NSHPPHYSICALEXAM_GEN_ALL_CORE
ICU Vital Signs Last 24 Hrs  T(C): 37.2 (14 Sep 2022 20:19), Max: 37.2 (14 Sep 2022 20:19)  T(F): 98.9 (14 Sep 2022 20:19), Max: 98.9 (14 Sep 2022 20:19)  HR: 98 (14 Sep 2022 20:19) (85 - 98)  BP: 148/98 (14 Sep 2022 20:19) (125/78 - 148/98)  BP(mean): 110 (14 Sep 2022 19:36) (110 - 110)  RR: 17 (14 Sep 2022 20:19) (17 - 20)  SpO2: 93% (14 Sep 2022 20:19) (91% - 94%)    O2 Parameters below as of 14 Sep 2022 20:19  Patient On (Oxygen Delivery Method): nasal cannula  O2 Flow (L/min): 2    General: Awake and alert, cooperative with exam. No acute distress.   Skin: Warm, dry, and pink.   Eyes: Pupils equal and reactive to light. Extraocular eye movements intact. No conjunctival injection, discharge, or scleral icterus.   HEENT: Atraumatic, normocephalic. Moist mucus membranes.   Cardiology: Normal S1, S2. No murmurs, rubs, or gallops. Regular rate and rhythm.   Respiratory: Rhonchi of the left lung fields. No accessory muscle use. Normal chest expansion.   Gastrointestinal: Positive bowel sounds. Soft, non-tender, non-distended. No guarding, rigidity, or rebound tenderness. No hepatosplenomegaly.   Musculoskeletal: 5/5 motor strength in all extremities. Normal range of motion.   Extremities: No peripheral edema bilaterally. Dorsalis pedis pulses 2+ bilaterally.   Neurological: A+Ox3 (person, place, and time). Cranial nerves 2-12 intact. Normal speech. No facial droop. No focal neurological deficits.   Psychiatric: Normal affect. Normal mood.

## 2022-09-14 NOTE — H&P ADULT - NSICDXFAMILYHX_GEN_ALL_CORE_FT
FAMILY HISTORY:  Father  Still living? Unknown  FH: rheumatic fever, Age at diagnosis: Age Unknown

## 2022-09-14 NOTE — H&P ADULT - ASSESSMENT
51 y/o M presents with hemoptysis     1. Acute hypoxic respiratory distress secondary to PNA vs. pneumonitis/alevolar hemorrhage  - Admit to med/surg   - SpO2 91% on room air  - c/w supplemental O2 to maintain SpO2 > 92%   - Does not meet SIRS criteria   - s/p Zosyn in the ER; c/w Zosyn and Vancomycin   - Trend WBC, monitor for temperatures  - c/w NS at 75 ml/hr overnight (monitor for fluid overload)   - ID consult - Dr. Archibald   - Pulmonology consult - Dr. Akbar     2. Normocytic anemia   - Ramon 10.2, monitor     3. Elevated lactic acid   - Lactic acid 3.2 -> will reflex     4. Hypoaklemia   - K+ 3.1, s/p 40 meq of KCl   - f/u AM K+ and Mg levels     5. Hyperglycemia   - Glucose 135, monitor closely   - Ordered HbA1c     6. Elevated alkaline phosphatase, elevated AST likely secondary to alcohol use   - Alkaline phosphatase 325, AST 82, trend   - CIWA scale   - Symptom triggered librium   - Thiamine, Folate, Multivitamin   - c/w IVF at 75 ml/hr     7. Hypoalbuminemia   - Albumin 2.7  - Monitor closely     8. History of HTN, substance use on suboxone, alcohol use, smoker, 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)   - c/w home medications; verified with pt at the bedside   - Counseled on smoking cessation, nicotine patch provided     DVT ppx: Lovenox 40 mg subcutaneous daily   Code status: Full code (Pt agrees to chest compressions and intubation if required).

## 2022-09-14 NOTE — ED PROVIDER NOTE - NS ED ROS FT
Constitutional: No fever or chills  Eyes: No visual changes  HEENT: No throat pain  CV: No chest pain  Resp: No SOB. + coughing up blood  GI: No abd pain. +nausea, +vomiting, +loss of appetite  : No dysuria  MSK: No musculoskeletal pain  Skin: No rash  Neuro: No headache

## 2022-09-14 NOTE — H&P ADULT - HISTORY OF PRESENT ILLNESS
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 He states that he coughs so much that it causes his to vomit. Reports nausea, constipation, dehydration Denies fevers, chills, chest pain, SOB, abdominal pain, N/V, diarrhea/constipation. Pt was due to start chemotherapy this Friday.     Prior admission:   - 7/22/22: coughing up blood -> left upper lobe lung mass -> EBUS, was on CIWA for alcohol withdrawal     ER course: SpO2 91% on room air. Labs: Hb 10.2, lactate 3.2, Na 134, K+ 3.1, glucose 135, albumin 2.7, alkaline phosphatase 325, AST 82. EKG: NSR with HR 82 bpm,  ms, no ST segment changes, T wave inversions in II/III/AVF present on prior EKG (personally reviewed).     Imaging:   - CTA: No pulmonary embolus. No contrast extravasation. Approximately 7.7 x 4.6 cm left upper lobe mass is stable. Increased interlobular septal thickening, patchy groundglass and solid opacities within left upper lobe and left lower lobe which can represent pneumonia, pneumonitis/alveolar hemorrhage. Stable 1.6 cm spiculated nodule within right middle lobe.     Pt was given Zosyn, 2500 ml of NS, Libirum, Zofran. He is being admitted to med/surg for further management.

## 2022-09-14 NOTE — ED ADULT NURSE NOTE - NSIMPLEMENTINTERV_GEN_ALL_ED
Implemented All Universal Safety Interventions:  [place] to call system. Call bell, personal items and telephone within reach. Instruct patient to call for assistance. Room bathroom lighting operational. Non-slip footwear when patient is off stretcher. Physically safe environment: no spills, clutter or unnecessary equipment. Stretcher in lowest position, wheels locked, appropriate side rails in place.

## 2022-09-14 NOTE — ED ADULT TRIAGE NOTE - CHIEF COMPLAINT QUOTE
PT presents to er with complaints of coughing up blood for the past 3 days with stage 4 lung ca, decreased appetite.

## 2022-09-14 NOTE — H&P ADULT - NSHPSOCIALHISTORY_GEN_ALL_CORE
Lives by himself. Independent with ADLs and IADLs. Has not smoked cigarettes in the last 7 days, pt trying to quit. Drinks 3-4 beers a night. Has a past history of drug abuse on pain killers.

## 2022-09-14 NOTE — ED ADULT NURSE REASSESSMENT NOTE - NS ED NURSE REASSESS COMMENT FT1
Assumed care from Te FRANK. monitors and oxygen in place. Safety measures maintained and established. Plan od care discussed with patient. No acute distress noted at this time. Pt is clear for admission.

## 2022-09-14 NOTE — PATIENT PROFILE ADULT - FALL HARM RISK - HARM RISK INTERVENTIONS
Assistance with ambulation/Assistance OOB with selected safe patient handling equipment/Communicate Risk of Fall with Harm to all staff/Discuss with provider need for PT consult/Monitor for mental status changes/Monitor gait and stability/Provide patient with walking aids - walker, cane, crutches/Reinforce activity limits and safety measures with patient and family/Tailored Fall Risk Interventions/Toileting schedule using arm’s reach rule for commode and bathroom/Use of alarms - bed, chair and/or voice tab/Visual Cue: Yellow wristband and red socks/Bed in lowest position, wheels locked, appropriate side rails in place/Call bell, personal items and telephone in reach/Instruct patient to call for assistance before getting out of bed or chair/Non-slip footwear when patient is out of bed/[place] to call system/Physically safe environment - no spills, clutter or unnecessary equipment/Purposeful Proactive Rounding/Room/bathroom lighting operational, light cord in reach

## 2022-09-15 NOTE — DIETITIAN INITIAL EVALUATION ADULT - HEIGHT FOR BMI (INCHES)
Patient:   ELBERT SCALES            MRN: [id number]            FIN: [id number]              Age:   69 years     Sex:  FEMALE     :  54   Associated Diagnoses:   None   Author:   ARIC HUGGINS     Subjective:   Ms. Scales was seen and examined this morning.  Objective:  Vitals   Vitals between:   2020 08:19:48   TO   2020 08:19:48                   LAST RESULT MINIMUM MAXIMUM  Temperature 36.5 36.2 36.8  Heart Rate 62 60 76  Respiratory Rate 16 16 16  NISBP           123 120 157  NIDBP           79 59 94  NIMBP           92 75 110  SpO2                    98 98 100   Physical exam:  General: Obese female in no acute distress  Head: Normocephalic atraumatic  Eyes: Tramaine  Ears, nose, throat: Throat with prominent tonsils, narrow airway  Neck: No masses appreciated  Chest: Normal breathing sounds, no stridor noted  Cardiac:  S1-S2  Abdomen: Soft nontender normal bowel sounds  Musculoskeletal: Normal range of motion  Neurologic: No focal neurological deficits  Vascular: No edema no rashes  Skin: no rashes appreciated  Psychiatric: Normal mood and affect  Labs between:  2020 08:19 to 2020 08:19  CBC:                 WBC  HgB  Hct  Plt  MCV  RDW   2020 (H) 11.5  13.0  40.5  270  91.6  12.4   2020 6.9  13.1  41.1  246  91.9  12.4   DIFF:                 Seg  Neutroph//ABS  Lymph//ABS  Mono//ABS  EOS/ABS  2020 NOT APPLICABLE  81 // (H) 9.2  14 // 1.7 5 // 0.6 0 // (L) 0.0  2020 NOT APPLICABLE  39 // 2.7 43 // 3.0 10 // 0.7 7 // 0.5  BMP:                 Na  Cl  BUN  Glu   2020 139  104  20  (H) 118                              K  CO2  Cr  Ca                              4.8  25  0.82  9.1   BMP:                 Na  Cl  BUN  Glu   2020 139  104  17  95                              K  CO2  Cr  Ca                              4.5  27  0.88  9.7   Other Chem:             Mg  Phos  Triglycerides  GGTP  DirectBili                                                             Radiology:    Result title:  CT NECK SOFT TISSUE W CON  Result status:  Final  Verified by:  MIKE, RANDA on 02/13/2020 3:51  IMPRESSION:1.  Prominent bilateral palatine tonsils with as well as the bilateral vocal folds with narrowing of the airway.  No fluid collection.        Assessment and Plan:  #Peritonsillar swelling with narrowing of the airway  #odynophagia  -No signs of respiratory compromise  -CT soft tissue neck with the above findings  -crp 1.3  -ENT to see  -IV Benadryl and IV dexamethasone given  -will start Unasyn 3g qph  -Per ED NP strep negative  -does not meet sepsis criteria, but will get blood cutures as a precaution prior to starting abx.  # Mild Intermittent Asthma-stable  -prn albuterol  #Rheumatoid arthritis  #fibromyalgia  - appears stable  -tramdol for pain  #GERD  -PPI ordered  DVT prophylaxis -  Notified PCP,    CODE STATUS: FC  Discussed with Dr. Mavis Apple MD   Internal Medicine PGY-3  Please message through [employer]   0

## 2022-09-15 NOTE — CONSULT NOTE ADULT - ASSESSMENT
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 He states that he coughs so much that it causes him to vomit. Reports nausea, constipation, dehydration Denies fevers, chills at home. Pt was due to start chemotherapy this Friday. In ER he received zosyn IV and vancomycin IV.     1. Left side pneumonia ?postobstructive pneumonia. Lung Ca. Immunocompromised host.   -hemoptysis ?related to underlying lung CA vs infection  -obtain BC x 2, urine c.s, sputum c/s  -start zosyn 3.375 gm IV q8h  -reason for abx use and side effects reviewed with patient; monitor BMP and vancomycin trough levels   -respiratory care  -old chart reviewed to assess prior cultures  -monitor temps  -f/u CBC  -supportive care  2. 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 He states that he coughs so much that it causes him to vomit. Reports nausea, constipation, dehydration Denies fevers, chills at home. Pt was due to start chemotherapy this Friday. In ER he received zosyn IV and vancomycin IV.     1. Left side pneumonia ?postobstructive pneumonia. Lung Ca. Immunocompromised host.   -hemoptysis ?related to underlying lung CA vs infection  -obtain BC x 2, urine c.s, sputum c/s  -start zosyn 3.375 gm IV q8h nad azithromycin 500 mg PO qd  -reason for abx use and side effects reviewed with patient; monitor BMP and vancomycin trough levels   -respiratory care  -old chart reviewed to assess prior cultures  -monitor temps  -f/u CBC  -supportive care  2. Other issues:   -care per medicine    d/w medicine

## 2022-09-15 NOTE — DIETITIAN NUTRITION RISK NOTIFICATION - ADDITIONAL COMMENTS/DIETITIAN RECOMMENDATIONS
1) add ensure plus TID 2) consider checking vitamin D 25OH level and supplement prn 3) monitor BM: if > 3 days without BM, add bowel regimen 4) in v/o malnutrition, add 300mg thiamine 5) daily wt checks to track/trend changes 6) add MVI with minerals daily to ensure 100% RDI met

## 2022-09-15 NOTE — CONSULT NOTE ADULT - SUBJECTIVE AND OBJECTIVE BOX
52-year-old male with  - longstanding history tobacco, alcohol abuse Substance abuse on Suboxone  -Had apparently stopped drinking and was routinely attending  -July 18, 2022 presented with 2 to 3 weeks of intermittent hemoptysis, significant bilateral lower extremity edema  -CT scan chest with 7 cm mass left upper lobe, extensive mediastinal and left hilar, supraclavicular, Left axillary lymphadenopathy,  - July 21 bronchoscopy/EBUS confirmed poorly differentiated adenocarcinoma of lung primary    PET CT scan also obtained and confirmed extensive disease;  Was not a surgical candidate    Plan was to initiate palliative treatment with carboplatin, Alimta, and immunotherapy/Keytruda    Due to stress over diagnosis patient has started abusing alcohol again and admits to at least 2 pints of vodka a day    Now presents to emergency room with recurrent hemoptysis And clearly under the influence of alcohol          CT ANGIO CHEST PULM ART [facility]                          PROCEDURE DATE:  09/14/2022    INDICATION, CLINICAL INFORMATION: metastatic poorly differentiated   non-small cell carcinoma, Hemoptysis  TECHNIQUE: CT pulmonary angiogram of the chest . Uneventful   administration of 90 cc Omnipaque 350. Coronal, sagittal and 3-D/MIP   images were reconstructed/reviewed.  COMPARISON: 7/18/2022 chest CT. 8/13/2022 PET CT.    FINDINGS:    PULMONARY VESSELS: No pulmonary embolus. No evidence of contrast   extravasation represent active hemorrhage.    HEART AND VASCULATURE: Heart size is normal. No pericardial effusion. No   aortic aneurysm or dissection.    LUNGS, AIRWAYS, PLEURA: Patent central airways. Approximately 7.7 x 4.6   cm left upper lobe cavitary mass is stable. Increased interlobular septal   thickening, patchy groundglass and solid opacities within left upper lobe   and left lower lobe which can represent pneumonia or pneumonitis.Stable   1.6 cm spiculated nodule within right middle lobe. No pleural effusions   or pneumothorax.    MEDIASTINUM: Stable mediastinal and hilar lymph nodes for reference: 1.8   cm left hilar lymph node and 2 cm short axis subcarinal lymph node.    UPPER ABDOMEN: Hepatic steatosis.    BONES AND SOFT TISSUES: No aggressive osseous lesion. Stable enlarged   left axillary lymph nodes.    LOWER NECK: Within normal limits.    IMPRESSION:    No pulmonary embolus. No contrast extravasation.    Approximately 7.7 x 4.6 cm left upper lobe mass is stable. Increased   interlobular septal thickening, patchy groundglass and solid opacities   within left upper lobe and left lower lobe which can represent pneumonia,   pneumonitis/alveolar hemorrhage.

## 2022-09-15 NOTE — DIETITIAN INITIAL EVALUATION ADULT - PERTINENT MEDS FT
MEDICATIONS  (STANDING):  ALPRAZolam 1 milliGRAM(s) Oral two times a day  ascorbic acid 500 milliGRAM(s) Oral daily  budesonide  80 MICROgram(s)/formoterol 4.5 MICROgram(s) Inhaler 2 Puff(s) Inhalation two times a day  buprenorphine 8 mG/naloxone 2 mG SL Film 1 Film(s) SubLingual daily  cyanocobalamin 1000 MICROGram(s) Oral daily  enoxaparin Injectable 40 milliGRAM(s) SubCutaneous every 24 hours  folic acid 1 milliGRAM(s) Oral daily  influenza   Vaccine 0.5 milliLiter(s) IntraMuscular once  lidocaine   4% Patch 1 Patch Transdermal daily  lisinopril 40 milliGRAM(s) Oral daily  multivitamin 1 Tablet(s) Oral daily  nicotine - 21 mG/24Hr(s) Patch 1 Patch Transdermal daily  piperacillin/tazobactam IVPB.. 3.375 Gram(s) IV Intermittent every 8 hours  QUEtiapine 50 milliGRAM(s) Oral at bedtime  sodium chloride 0.9%. 1000 milliLiter(s) (75 mL/Hr) IV Continuous <Continuous>  thiamine 100 milliGRAM(s) Oral daily  tiotropium 18 MICROgram(s) Capsule 1 Capsule(s) Inhalation daily  vancomycin  IVPB 1000 milliGRAM(s) IV Intermittent every 8 hours    MEDICATIONS  (PRN):  acetaminophen     Tablet .. 650 milliGRAM(s) Oral every 6 hours PRN Temp greater or equal to 38C (100.4F), Mild Pain (1 - 3)  ALBUTerol    90 MICROgram(s) HFA Inhaler 2 Puff(s) Inhalation every 6 hours PRN Bronchospasm  aluminum hydroxide/magnesium hydroxide/simethicone Suspension 30 milliLiter(s) Oral every 4 hours PRN Dyspepsia  benzonatate 100 milliGRAM(s) Oral every 8 hours PRN Cough  chlordiazePOXIDE 50 milliGRAM(s) Oral every 1 hour PRN CIWA-Ar score 8 or greater  chlordiazePOXIDE 100 milliGRAM(s) Oral every 1 hour PRN Symptom-triggered: each CIWA -Ar score 8 or GREATER  cyclobenzaprine 10 milliGRAM(s) Oral three times a day PRN Muscle Spasm  guaiFENesin ER 1200 milliGRAM(s) Oral every 12 hours PRN Cough  melatonin 3 milliGRAM(s) Oral at bedtime PRN Insomnia  ondansetron Injectable 4 milliGRAM(s) IV Push every 8 hours PRN Nausea and/or Vomiting  prochlorperazine   Tablet 10 milliGRAM(s) Oral three times a day PRN nausea

## 2022-09-15 NOTE — DIETITIAN INITIAL EVALUATION ADULT - PERTINENT LABORATORY DATA
09-15    131<L>  |  92<L>  |  12  ----------------------------<  158<H>  3.2<L>   |  33<H>  |  0.50    Ca    8.6      15 Sep 2022 06:32  Phos  1.6     09-15  Mg     1.8     09-15    TPro  5.8<L>  /  Alb  2.2<L>  /  TBili  2.4<H>  /  DBili  x   /  AST  360<H>  /  ALT  66  /  AlkPhos  377<H>  09-15   09-15    131<L>  |  92<L>  |  12  ----------------------------<  158<H>  3.2<L>   |  33<H>  |  0.50    Ca    8.6      15 Sep 2022 06:32  Phos  1.6     09-15  Mg     1.8     09-15    TPro  5.8<L>  /  Alb  2.2<L>  /  TBili  2.4<H>  /  DBili  x   /  AST  360<H>  /  ALT  66  /  AlkPhos  377<H>  09-15

## 2022-09-15 NOTE — CONSULT NOTE ADULT - SUBJECTIVE AND OBJECTIVE BOX
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 He states that he coughs so much that it causes his to vomit. Reports nausea, constipation, dehydration Denies fevers, chills, chest pain, SOB, abdominal pain, N/V, diarrhea/constipation. Pt was due to start chemotherapy this Friday.   7/22/22: coughing up blood -> left upper lobe lung mass -> EBUS, was on CIWA for alcohol withdrawal   CTA: No pulmonary embolus. No contrast extravasation. Approximately 7.7 x 4.6 cm left upper lobe mass is stable. Increased interlobular septal thickening, patchy groundglass and solid opacities within left upper lobe and left lower lobe which can represent pneumonia, pneumonitis/alveolar hemorrhage. Stable 1.6 cm spiculated nodule within right middle lobe.   Pt was given Zosyn, 2500 ml of NS, Libirum, Zofran. He is being admitted to med/surg for further management.  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)      Injury due to foreign body  right wrist          PREVIOUS DIAGNOSTIC TESTING:      MEDICATIONS  (STANDING):  ALPRAZolam 1 milliGRAM(s) Oral two times a day  ascorbic acid 500 milliGRAM(s) Oral daily  budesonide  80 MICROgram(s)/formoterol 4.5 MICROgram(s) Inhaler 2 Puff(s) Inhalation two times a day  buprenorphine 8 mG/naloxone 2 mG SL Film 1 Film(s) SubLingual daily  cyanocobalamin 1000 MICROGram(s) Oral daily  enoxaparin Injectable 40 milliGRAM(s) SubCutaneous every 24 hours  folic acid 1 milliGRAM(s) Oral daily  influenza   Vaccine 0.5 milliLiter(s) IntraMuscular once  lidocaine   4% Patch 1 Patch Transdermal daily  lisinopril 40 milliGRAM(s) Oral daily  multivitamin 1 Tablet(s) Oral daily  nicotine - 21 mG/24Hr(s) Patch 1 Patch Transdermal daily  piperacillin/tazobactam IVPB.. 3.375 Gram(s) IV Intermittent every 8 hours  potassium phosphate IVPB 30 milliMole(s) IV Intermittent once  QUEtiapine 50 milliGRAM(s) Oral at bedtime  sodium chloride 0.9%. 1000 milliLiter(s) (75 mL/Hr) IV Continuous <Continuous>  thiamine 100 milliGRAM(s) Oral daily  tiotropium 18 MICROgram(s) Capsule 1 Capsule(s) Inhalation daily  vancomycin  IVPB 1000 milliGRAM(s) IV Intermittent every 8 hours    MEDICATIONS  (PRN):  acetaminophen     Tablet .. 650 milliGRAM(s) Oral every 6 hours PRN Temp greater or equal to 38C (100.4F), Mild Pain (1 - 3)  ALBUTerol    90 MICROgram(s) HFA Inhaler 2 Puff(s) Inhalation every 6 hours PRN Bronchospasm  aluminum hydroxide/magnesium hydroxide/simethicone Suspension 30 milliLiter(s) Oral every 4 hours PRN Dyspepsia  benzonatate 100 milliGRAM(s) Oral every 8 hours PRN Cough  chlordiazePOXIDE 50 milliGRAM(s) Oral every 1 hour PRN CIWA-Ar score 8 or greater  chlordiazePOXIDE 100 milliGRAM(s) Oral every 1 hour PRN Symptom-triggered: each CIWA -Ar score 8 or GREATER  cyclobenzaprine 10 milliGRAM(s) Oral three times a day PRN Muscle Spasm  guaiFENesin ER 1200 milliGRAM(s) Oral every 12 hours PRN Cough  melatonin 3 milliGRAM(s) Oral at bedtime PRN Insomnia  ondansetron Injectable 4 milliGRAM(s) IV Push every 8 hours PRN Nausea and/or Vomiting  prochlorperazine   Tablet 10 milliGRAM(s) Oral three times a day PRN nausea      FAMILY HISTORY:  FH: rheumatic fever (Father)        SOCIAL HISTORY:  ***    REVIEW OF SYSTEM:  Pertinent items are noted in HPI.  Constitutional negative for chills, fevers, sweats and weight loss  throat, and face:  negative for epistaxis, nasal congestion, sore throat and   tinnitus  Respiratory: negative for cough, dyspnea on exertion, pleuritic chest pain  and wheezing  Cardiovascular:  negative for chest pain, dyspnea and palpitations  Gastrointestinal: negative for abdominal pain, diarrhea, nausea and vomiting  Genitourinary: negative for dysuria, frequency and urinary incontinence  Skin:  negative for redness, rash, pruritus, swelling, dryness and   fissures  Hematologic/lymphatic: negative for bleeding and easy bruising  Musculoskeletal: negative for arthralgias, back pain and muscle weakness  Neurological: negative for dizziness, headaches, seizures and tremors  Behavioral/Psych:  negative for mood change, depression, suicidal attempts    Allergic/Immunologic: negative for anaphylaxis, angioedema and urticaria    Vital Signs Last 24 Hrs  T(C): 37 (15 Sep 2022 08:28), Max: 37.2 (14 Sep 2022 20:19)  T(F): 98.6 (15 Sep 2022 08:28), Max: 98.9 (14 Sep 2022 20:19)  HR: 86 (15 Sep 2022 08:28) (84 - 99)  BP: 132/89 (15 Sep 2022 08:28) (110/72 - 149/91)  BP(mean): 110 (14 Sep 2022 19:36) (110 - 110)  RR: 16 (15 Sep 2022 08:28) (16 - 20)  SpO2: 92% (15 Sep 2022 08:28) (91% - 94%)    Parameters below as of 15 Sep 2022 08:28  Patient On (Oxygen Delivery Method): nasal cannula  O2 Flow (L/min): 3      I&O's Summary    PHYSICAL EXAM  General Appearance: cooperative, no acute distress,   HEENT: PERRL, conjunctiva clear, EOM's intact, non injected pharynx, no exudate, TM   normal  Neck: Supple, , no adenopathy, thyroid: not enlarged, no carotid bruit or JVD  Back: Symmetric, no  tenderness,no soft tissue tenderness  Lungs: Clear to auscultation bilateral,no adventitious breath sounds, normal   expiratory phase  Heart: Regular rate and rhythm, S1, S2 normal, no murmur, rub or gallop  Abdomen: Soft, non-tender, bowel sounds active , no hepatosplenomegaly  Extremities: no cyanosis or edema, no joint swelling  Skin: Skin color, texture normal, no rashes   Neurologic: Alert and oriented X3 , cranial nerves intact, sensory and motor normal,    ECG:    LABS:                          9.1    6.37  )-----------( 219      ( 15 Sep 2022 06:32 )             27.4     09-15    131<L>  |  92<L>  |  12  ----------------------------<  158<H>  3.2<L>   |  33<H>  |  0.50    Ca    8.6      15 Sep 2022 06:32  Phos  1.6     09-15  Mg     1.8     09-15    TPro  5.8<L>  /  Alb  2.2<L>  /  TBili  2.4<H>  /  DBili  x   /  AST  360<H>  /  ALT  66  /  AlkPhos  377<H>  09-15            PT/INR - ( 14 Sep 2022 13:15 )   PT: 11.7 sec;   INR: 1.01 ratio         PTT - ( 14 Sep 2022 13:15 )  PTT:27.3 sec          RADIOLOGY & ADDITIONAL STUDIES:     51 y/o M with PMH HTN, substance use on suboxone, alcohol use, smoker, 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 He states that he coughs so much that it causes his to vomit. Reports nausea, constipation, dehydration Denies fevers, chills, chest pain, SOB, abdominal pain, N/V, diarrhea/constipation. Pt was due to start chemotherapy this Friday.   7/22/22: coughing up blood -> left upper lobe lung mass -> EBUS, was on CIWA for alcohol withdrawal   CTA: No pulmonary embolus. No contrast extravasation. Approximately 7.7 x 4.6 cm left upper lobe mass is stable. Increased interlobular septal thickening, patchy groundglass and solid opacities within left upper lobe and left lower lobe which can represent pneumonia, pneumonitis/alveolar hemorrhage. Stable 1.6 cm spiculated nodule within right middle lobe.   Pt was given Zosyn, 2500 ml of NS, Libirum, Zofran. He is being admitted to med/surg for further management.  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)      Injury due to foreign body  right wrist          PREVIOUS DIAGNOSTIC TESTING:      MEDICATIONS  (STANDING):  ALPRAZolam 1 milliGRAM(s) Oral two times a day  ascorbic acid 500 milliGRAM(s) Oral daily  budesonide  80 MICROgram(s)/formoterol 4.5 MICROgram(s) Inhaler 2 Puff(s) Inhalation two times a day  buprenorphine 8 mG/naloxone 2 mG SL Film 1 Film(s) SubLingual daily  cyanocobalamin 1000 MICROGram(s) Oral daily  enoxaparin Injectable 40 milliGRAM(s) SubCutaneous every 24 hours  folic acid 1 milliGRAM(s) Oral daily  influenza   Vaccine 0.5 milliLiter(s) IntraMuscular once  lidocaine   4% Patch 1 Patch Transdermal daily  lisinopril 40 milliGRAM(s) Oral daily  multivitamin 1 Tablet(s) Oral daily  nicotine - 21 mG/24Hr(s) Patch 1 Patch Transdermal daily  piperacillin/tazobactam IVPB.. 3.375 Gram(s) IV Intermittent every 8 hours  potassium phosphate IVPB 30 milliMole(s) IV Intermittent once  QUEtiapine 50 milliGRAM(s) Oral at bedtime  sodium chloride 0.9%. 1000 milliLiter(s) (75 mL/Hr) IV Continuous <Continuous>  thiamine 100 milliGRAM(s) Oral daily  tiotropium 18 MICROgram(s) Capsule 1 Capsule(s) Inhalation daily  vancomycin  IVPB 1000 milliGRAM(s) IV Intermittent every 8 hours    MEDICATIONS  (PRN):  acetaminophen     Tablet .. 650 milliGRAM(s) Oral every 6 hours PRN Temp greater or equal to 38C (100.4F), Mild Pain (1 - 3)  ALBUTerol    90 MICROgram(s) HFA Inhaler 2 Puff(s) Inhalation every 6 hours PRN Bronchospasm  aluminum hydroxide/magnesium hydroxide/simethicone Suspension 30 milliLiter(s) Oral every 4 hours PRN Dyspepsia  benzonatate 100 milliGRAM(s) Oral every 8 hours PRN Cough  chlordiazePOXIDE 50 milliGRAM(s) Oral every 1 hour PRN CIWA-Ar score 8 or greater  chlordiazePOXIDE 100 milliGRAM(s) Oral every 1 hour PRN Symptom-triggered: each CIWA -Ar score 8 or GREATER  cyclobenzaprine 10 milliGRAM(s) Oral three times a day PRN Muscle Spasm  guaiFENesin ER 1200 milliGRAM(s) Oral every 12 hours PRN Cough  melatonin 3 milliGRAM(s) Oral at bedtime PRN Insomnia  ondansetron Injectable 4 milliGRAM(s) IV Push every 8 hours PRN Nausea and/or Vomiting  prochlorperazine   Tablet 10 milliGRAM(s) Oral three times a day PRN nausea      FAMILY HISTORY:  FH: rheumatic fever (Father)        SOCIAL HISTORY:  ***    REVIEW OF SYSTEM:  hemoptysis   Vital Signs Last 24 Hrs  T(C): 37 (15 Sep 2022 08:28), Max: 37.2 (14 Sep 2022 20:19)  T(F): 98.6 (15 Sep 2022 08:28), Max: 98.9 (14 Sep 2022 20:19)  HR: 86 (15 Sep 2022 08:28) (84 - 99)  BP: 132/89 (15 Sep 2022 08:28) (110/72 - 149/91)  BP(mean): 110 (14 Sep 2022 19:36) (110 - 110)  RR: 16 (15 Sep 2022 08:28) (16 - 20)  SpO2: 92% (15 Sep 2022 08:28) (91% - 94%)    Parameters below as of 15 Sep 2022 08:28  Patient On (Oxygen Delivery Method): nasal cannula  O2 Flow (L/min): 3      I&O's Summary    PHYSICAL EXAM  General Appearance: cooperative, no acute distress,   HEENT: PERRL, conjunctiva clear, EOM's intact, non injected pharynx, no exudate, TM   normal  Neck: Supple, , no adenopathy, thyroid: not enlarged, no carotid bruit or JVD  Back: Symmetric, no  tenderness,no soft tissue tenderness  Lungs: Coarse bilaterally   Heart: Regular rate and rhythm, S1, S2 normal, no murmur, rub or gallop  Abdomen: Soft, non-tender, bowel sounds active , no hepatosplenomegaly  Extremities: no cyanosis or edema, no joint swelling  Skin: Skin color, texture normal, no rashes   Neurologic: Alert and oriented X3 , cranial nerves intact, sensory and motor normal,    ECG:    LABS:                          9.1    6.37  )-----------( 219      ( 15 Sep 2022 06:32 )             27.4     09-15    131<L>  |  92<L>  |  12  ----------------------------<  158<H>  3.2<L>   |  33<H>  |  0.50    Ca    8.6      15 Sep 2022 06:32  Phos  1.6     09-15  Mg     1.8     09-15    TPro  5.8<L>  /  Alb  2.2<L>  /  TBili  2.4<H>  /  DBili  x   /  AST  360<H>  /  ALT  66  /  AlkPhos  377<H>  09-15            PT/INR - ( 14 Sep 2022 13:15 )   PT: 11.7 sec;   INR: 1.01 ratio         PTT - ( 14 Sep 2022 13:15 )  PTT:27.3 sec          RADIOLOGY & ADDITIONAL STUDIES:

## 2022-09-15 NOTE — DIETITIAN INITIAL EVALUATION ADULT - OTHER INFO
51yo male with PMH significant for HTN, substance use on suboxone, ETOH abuse, smoker, non-small cell adenocarcinoma of the lung (dx 7/21/22) p/w coughing up blood for 3 days, coughing so much it causes him to vomit.  admitted for acute hypoxic resp distress 2/2 PNA vs pneumonitis/alevolar hemorrhage, normocytic anemia, elevated lactic acid, hypokalemia, elevated alkaline phosphatase, elevated AST 2/2 ETOH use.  full code.    *wt hx per EMR; 154.1# (5/26/22); wt per bedscale on 9/15/22 of 132#.  wt loss of 22# (14%) x 4 mo's; clinically significant.  NFPE revealed severe fat, moderate muscle wasting.  continues to have coughing and vomiting with blood, unable to tolerate much PO diet.  requesting ensure vanilla.

## 2022-09-15 NOTE — CONSULT NOTE ADULT - SUBJECTIVE AND OBJECTIVE BOX
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 He states that he coughs so much that it causes him to vomit. Reports nausea, constipation, dehydration Denies fevers, chills at home. Pt was due to start chemotherapy this Friday. In ER he received zosyn IV and vancomycin IV.     Prior admission:   - 7/22/22: coughing up blood -> left upper lobe lung mass -> EBUS, was on CIWA for alcohol withdrawal     ER course: SpO2 91% on room air. Labs: Hb 10.2, lactate 3.2, Na 134, K+ 3.1, glucose 135, albumin 2.7, alkaline phosphatase 325, AST 82. EKG: NSR with HR 82 bpm,  ms, no ST segment changes, T wave inversions in II/III/AVF present on prior EKG (personally reviewed).     PMH: as above  PSH: as above  Meds: per reconciliation sheet, noted below  MEDICATIONS  (STANDING):  ALPRAZolam 1 milliGRAM(s) Oral two times a day  ascorbic acid 500 milliGRAM(s) Oral daily  budesonide  80 MICROgram(s)/formoterol 4.5 MICROgram(s) Inhaler 2 Puff(s) Inhalation two times a day  buprenorphine 8 mG/naloxone 2 mG SL Film 1 Film(s) SubLingual daily  cyanocobalamin 1000 MICROGram(s) Oral daily  enoxaparin Injectable 40 milliGRAM(s) SubCutaneous every 24 hours  folic acid 1 milliGRAM(s) Oral daily  influenza   Vaccine 0.5 milliLiter(s) IntraMuscular once  lidocaine   4% Patch 1 Patch Transdermal daily  lisinopril 40 milliGRAM(s) Oral daily  multivitamin 1 Tablet(s) Oral daily  nicotine - 21 mG/24Hr(s) Patch 1 Patch Transdermal daily  piperacillin/tazobactam IVPB.. 3.375 Gram(s) IV Intermittent every 8 hours  potassium phosphate IVPB 30 milliMole(s) IV Intermittent once  QUEtiapine 50 milliGRAM(s) Oral at bedtime  sodium chloride 0.9%. 1000 milliLiter(s) (75 mL/Hr) IV Continuous <Continuous>  thiamine 100 milliGRAM(s) Oral daily  tiotropium 18 MICROgram(s) Capsule 1 Capsule(s) Inhalation daily  vancomycin  IVPB 1000 milliGRAM(s) IV Intermittent every 8 hours    MEDICATIONS  (PRN):  acetaminophen     Tablet .. 650 milliGRAM(s) Oral every 6 hours PRN Temp greater or equal to 38C (100.4F), Mild Pain (1 - 3)  ALBUTerol    90 MICROgram(s) HFA Inhaler 2 Puff(s) Inhalation every 6 hours PRN Bronchospasm  aluminum hydroxide/magnesium hydroxide/simethicone Suspension 30 milliLiter(s) Oral every 4 hours PRN Dyspepsia  benzonatate 100 milliGRAM(s) Oral every 8 hours PRN Cough  chlordiazePOXIDE 50 milliGRAM(s) Oral every 1 hour PRN CIWA-Ar score 8 or greater  chlordiazePOXIDE 100 milliGRAM(s) Oral every 1 hour PRN Symptom-triggered: each CIWA -Ar score 8 or GREATER  cyclobenzaprine 10 milliGRAM(s) Oral three times a day PRN Muscle Spasm  guaiFENesin ER 1200 milliGRAM(s) Oral every 12 hours PRN Cough  melatonin 3 milliGRAM(s) Oral at bedtime PRN Insomnia  ondansetron Injectable 4 milliGRAM(s) IV Push every 8 hours PRN Nausea and/or Vomiting  prochlorperazine   Tablet 10 milliGRAM(s) Oral three times a day PRN nausea    Allergies    No Known Allergies    Intolerances      Social: smoking 1/2 ppd, alcohol socially, no illegal drugs; no recent travel, no exposure to TB  FAMILY HISTORY:  FH: rheumatic fever (Father)      no history of premature cardiovascular disease in first degree relatives    ROS: the patient denies fever, no chills, no HA, no seizures, no dizziness, no sore throat, no nasal congestion, no blurry vision, no CP, no palpitations, has SOB, has cough, no abdominal pain, no diarrhea, no N/V, no dysuria, no leg pain, no claudication, no rash, no joint aches, no rectal pain or bleeding, no night sweats; has increased weakness  All other systems reviewed and are negative    Vital Signs Last 24 Hrs  T(C): 37 (15 Sep 2022 08:28), Max: 37.2 (14 Sep 2022 20:19)  T(F): 98.6 (15 Sep 2022 08:28), Max: 98.9 (14 Sep 2022 20:19)  HR: 86 (15 Sep 2022 08:28) (84 - 99)  BP: 132/89 (15 Sep 2022 08:28) (110/72 - 149/91)  BP(mean): 110 (14 Sep 2022 19:36) (110 - 110)  RR: 16 (15 Sep 2022 08:28) (16 - 20)  SpO2: 92% (15 Sep 2022 08:28) (91% - 94%)    Parameters below as of 15 Sep 2022 08:28  Patient On (Oxygen Delivery Method): nasal cannula  O2 Flow (L/min): 3    Daily Height in cm: 182.88 (14 Sep 2022 12:20)    Daily     PE:    Constitutional:  No acute distress  HEENT: NC/AT, EOMI, PERRLA, conjunctivae clear; ears and nose atraumatic; pharynx benign  Neck: supple; thyroid not palpable  Back: no tenderness  Respiratory: respiratory effort normal; crackles at bases  Cardiovascular: S1S2 regular, no murmurs  Abdomen: soft, not tender, not distended, positive BS; no liver or spleen organomegaly  Genitourinary: no suprapubic tenderness  Lymphatic: no LN palpable  Musculoskeletal: no muscle tenderness, no joint swelling or tenderness  Extremities: no pedal edema  Neurological/ Psychiatric: AxOx3, judgement and insight normal; moving all extremities  Skin: no rashes; no palpable lesions    Labs: all available labs reviewed                        9.1    6.37  )-----------( 219      ( 15 Sep 2022 06:32 )             27.4     09-15    131<L>  |  92<L>  |  12  ----------------------------<  158<H>  3.2<L>   |  33<H>  |  0.50    Ca    8.6      15 Sep 2022 06:32  Phos  1.6     09-15  Mg     1.8     09-15    TPro  5.8<L>  /  Alb  2.2<L>  /  TBili  2.4<H>  /  DBili  x   /  AST  360<H>  /  ALT  66  /  AlkPhos  377<H>  09-15     LIVER FUNCTIONS - ( 15 Sep 2022 06:32 )  Alb: 2.2 g/dL / Pro: 5.8 gm/dL / ALK PHOS: 377 U/L / ALT: 66 U/L / AST: 360 U/L / GGT: x           COVID-19 PCR: NotDetec (09-14-22 @ 13:16)    Radiology: all available radiological tests reviewed    < from: CT Angio Chest PE Protocol w/ IV Cont (09.14.22 @ 13:31) >  No pulmonary embolus. No contrast extravasation.    Approximately 7.7 x 4.6 cm left upper lobe mass is stable. Increased   interlobular septal thickening, patchy groundglass and solid opacities   within left upper lobe and left lower lobe which can represent pneumonia, pneumonitis/alveolar hemorrhage.  < end of copied text >      Advanced directives addressed: full resuscitation

## 2022-09-15 NOTE — CONSULT NOTE ADULT - ASSESSMENT
Impression  Young/ 52-year-old male With stage IV adenocarcinoma of the lung  History of substance abuse alcohol abuse  Now admitted with recurrent hemoptysis, possible pneumonia, alcohol relapse    Plan  Treatment of pneumonia as per pulmonary medicine, infectious disease, medicine    If bleeding is determined to be from endobronchial cancer may require limited radiation therapy    Aggressive alcoholic detox program    Once he is stopped abusing alcohol, Can then proceed with palliative systemic chemo/immunotherapy.  All of these treatments have potential significant toxicities,All of which could be Even worse and life-threatening in someone in an alcoholic stupor unable to seek appropriate supportive care.    Above reviewed briefly with the patient and hospitalist who agree with above assessment

## 2022-09-15 NOTE — CONSULT NOTE ADULT - ASSESSMENT
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 He states that he coughs so much that it causes his to vomit. Reports nausea, constipation, dehydration Denies fevers, chills, chest pain, SOB, abdominal pain, N/V, diarrhea/constipation. Pt was due to start chemotherapy this Friday.   7/22/22: coughing up blood -> left upper lobe lung mass -> EBUS, was on CIWA for alcohol withdrawal   CTA: No pulmonary embolus. No contrast extravasation. Approximately 7.7 x 4.6 cm left upper lobe mass is stable. Increased interlobular septal thickening, patchy groundglass and solid opacities within left upper lobe and left lower lobe which can represent pneumonia, pneumonitis/alveolar hemorrhage. Stable 1.6 cm spiculated nodule within right middle lobe.   Pt was given Zosyn, 2500 ml of NS, Libirum, Zofran. He is being admitted to med/surg for further management.  (14 Sep 2022 21:12)  Agree with treating for pneumonia  he will not be able to receive immunotherapy until he has deemed to be abstinent from ETOH. This has been discussed extensively with the patient  Will continue to monitor H/H  No massive hemoptysis occurring at the present time, scant, likely 2/2 to pneumonia/cancer  Will monitor closely

## 2022-09-15 NOTE — PROGRESS NOTE ADULT - SUBJECTIVE AND OBJECTIVE BOX
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 He states that he coughs so much that it causes his to vomit. Reports nausea, constipation, dehydration. Denies fevers, chills, chest pain, SOB, abdominal pain. Pt was due to start chemotherapy this Friday.   ER course: SpO2 91% on room air. Labs: Hb 10.2, lactate 3.2, Na 134, K+ 3.1, glucose 135, albumin 2.7, alkaline phosphatase 325, AST 82. EKG: NSR with HR 82 bpm,  ms, no ST segment changes, T wave inversions in II/III/AVF present on prior EKG (personally reviewed). Pt was given Zosyn, 2500 ml of NS, Libirum, Zofran. He is being admitted to med/surg for further management.     Interval History: 9/15/22 pt seen and examined at bedside, diaphoretic, slightly tremulous, anxious. Last drink 9/14/22 at 6am, admits to drinking 2 pints of Vodka daily. + cough, + DRUMMOND, + hemoptysis. + weakness and fatigue. Denies CP, palps, HA, dizziness, lightheadedness, n/v/d, constipation, dysuria, fever or chills.     REVIEW OF SYSTEMS: All other review of systems is negative unless indicated above.    PE:  Vital Signs Last 24 Hrs: all reviewed   T(C): 36.7 (15 Sep 2022 12:30), Max: 37.2 (14 Sep 2022 20:19)  T(F): 98 (15 Sep 2022 12:30), Max: 98.9 (14 Sep 2022 20:19)  HR: 86 (15 Sep 2022 12:30) (84 - 99)  BP: 107/82 (15 Sep 2022 12:30) (107/82 - 149/91)  BP(mean): 110 (14 Sep 2022 19:36) (110 - 110)  RR: 17 (15 Sep 2022 12:30) (16 - 18)  SpO2: 96% (15 Sep 2022 12:30) (92% - 96%)  Parameters below as of 15 Sep 2022 12:30  Patient On (Oxygen Delivery Method): nasal cannula  O2 Flow (L/min): 2    Constitutional: NAD, awake and alert  HEENT: EOMI  Neck: Soft and supple, No JVD  Respiratory: Breath sounds are diminished bilaterally, No wheezing, rales or rhonchi  Cardiovascular: S1 and S2, regular rate and rhythm, no Murmurs  Gastrointestinal: Bowel Sounds present, soft, nontender, nondistended  Extremities: No peripheral edema  Vascular: 2+ peripheral pulses  Neurological: A/O x 3, no focal deficits  Musculoskeletal: 5/5 strength b/l upper and lower extremities  Skin: No rashes    LABS: All Labs Reviewed:                        9.1    6.37  )-----------( 219      ( 15 Sep 2022 06:32 )             27.4     09-15    130<L>  |  90<L>  |  10  ----------------------------<  150<H>  3.2<L>   |  33<H>  |  0.56    Ca    8.3<L>      15 Sep 2022 12:07  Phos  1.5     09-15  Mg     1.6     09-15    TPro  6.0  /  Alb  2.4<L>  /  TBili  2.7<H>  /  DBili  2.0<H>  /  AST  533<H>  /  ALT  108<H>  /  AlkPhos  442<H>  09-15    PT/INR - ( 14 Sep 2022 13:15 )   PT: 11.7 sec;   INR: 1.01 ratio    PTT - ( 14 Sep 2022 13:15 )  PTT:27.3 sec  Troponin I, High Sensitivity (09.14.22 @ 13:15) 13.69-->(09.15.22 @ 09:06)  18.15  Lactate, Blood (09.14.22 @ 15:42) 3.2 mmol/L --> (09.14.22 @ 18:18) 2.2 mmol/L -->(09.15.22 @ 09:06) 1.0 mmol/L   Procalcitonin, Serum (09.14.22 @ 18:15)  0.18  Thyroid Stimulating Hormone, Serum (09.15.22 @ 06:32) 99.10 uU/mL   Free Thyroxine, Serum (09.15.22 @ 06:32) 0.24 ng/dL   Micro:    Legionella pneumophila Antigen, Urine (09.15.22 @ 02:00) Negative    Blood Culture Pending    RADIOLOGY/EKG: all reviewed     12 Lead ECG (09.14.22 @ 13:00)   Ventricular Rate 82 BPM  Atrial Rate 82 BPM  P-R Interval 122 ms  QRS Duration 100 ms  Q-T Interval 420 ms  QTC Calculation(Bazett) 490 ms  P Axis 81 degrees  R Axis 86 degrees  T Axis -64 degrees  Normal sinus rhythm  ST & T wave abnormality, consider inferior ischemia  Prolonged QT  Abnormal ECG    CT Angio Chest PE Protocol w/ IV Cont (09.14.22 @ 13:31)   FINDINGS:  PULMONARY VESSELS: No pulmonary embolus. No evidence of contrast extravasation represent active hemorrhage.  HEART AND VASCULATURE: Heart size is normal. No pericardial effusion. No aortic aneurysm or dissection.  LUNGS, AIRWAYS, PLEURA: Patent central airways. Approximately 7.7 x 4.6 cm left upper lobe cavitary mass is stable. Increased interlobular septal thickening, patchy groundglass and solid opacities within left upper lobe and left lower lobe which can represent pneumonia or pneumonitis. Stable 1.6 cm spiculated nodule within right middle lobe. No pleural effusions or pneumothorax.  MEDIASTINUM: Stable mediastinal and hilar lymph nodes for reference: 1.8 cm left hilar lymph node and 2 cm short axis subcarinal lymph node.  UPPER ABDOMEN: Hepatic steatosis.  BONES AND SOFT TISSUES: No aggressive osseous lesion. Stable enlarged left axillary lymph nodes.  LOWER NECK: Within normal limits.  IMPRESSION:  No pulmonary embolus. No contrast extravasation.  Approximately 7.7 x 4.6 cm left upper lobe mass is stable. Increased interlobular septal thickening, patchy groundglass and solid opacities within left upper lobe and left lower lobe which can represent pneumonia, pneumonitis/alveolar hemorrhage.    Meds:  MEDICATIONS  (STANDING):  ascorbic acid 500 milliGRAM(s) Oral daily  azithromycin   Tablet 500 milliGRAM(s) Oral daily  budesonide  80 MICROgram(s)/formoterol 4.5 MICROgram(s) Inhaler 2 Puff(s) Inhalation two times a day  buprenorphine 8 mG/naloxone 2 mG SL Film 1 Film(s) SubLingual daily  cyanocobalamin 1000 MICROGram(s) Oral daily  enoxaparin Injectable 40 milliGRAM(s) SubCutaneous every 24 hours  folic acid 1 milliGRAM(s) Oral daily  influenza   Vaccine 0.5 milliLiter(s) IntraMuscular once  lidocaine   4% Patch 1 Patch Transdermal daily  lisinopril 40 milliGRAM(s) Oral daily  LORazepam     Tablet   Oral   LORazepam     Tablet 2 milliGRAM(s) Oral every 4 hours  multivitamin 1 Tablet(s) Oral daily  nicotine - 21 mG/24Hr(s) Patch 1 Patch Transdermal daily  piperacillin/tazobactam IVPB.. 3.375 Gram(s) IV Intermittent every 8 hours  QUEtiapine 50 milliGRAM(s) Oral at bedtime  sodium chloride 0.9%. 1000 milliLiter(s) (75 mL/Hr) IV Continuous <Continuous>  thiamine 100 milliGRAM(s) Oral daily  tiotropium 18 MICROgram(s) Capsule 1 Capsule(s) Inhalation daily    MEDICATIONS  (PRN):  acetaminophen     Tablet .. 650 milliGRAM(s) Oral every 6 hours PRN Temp greater or equal to 38C (100.4F), Mild Pain (1 - 3)  ALBUTerol    90 MICROgram(s) HFA Inhaler 2 Puff(s) Inhalation every 6 hours PRN Bronchospasm  aluminum hydroxide/magnesium hydroxide/simethicone Suspension 30 milliLiter(s) Oral every 4 hours PRN Dyspepsia  benzonatate 100 milliGRAM(s) Oral every 8 hours PRN Cough  cyclobenzaprine 10 milliGRAM(s) Oral three times a day PRN Muscle Spasm  guaiFENesin ER 1200 milliGRAM(s) Oral every 12 hours PRN Cough  melatonin 3 milliGRAM(s) Oral at bedtime PRN Insomnia  ondansetron Injectable 4 milliGRAM(s) IV Push every 8 hours PRN Nausea and/or Vomiting  prochlorperazine   Tablet 10 milliGRAM(s) Oral three times a day PRN nausea

## 2022-09-15 NOTE — DIETITIAN NUTRITION RISK NOTIFICATION - TREATMENT: THE FOLLOWING DIET HAS BEEN RECOMMENDED
Diet, DASH/TLC:   Sodium & Cholesterol Restricted  Supplement Feeding Modality:  Oral  Ensure Enlive Cans or Servings Per Day:  1       Frequency:  Three Times a day (09-15-22 @ 08:45) [Pending Verification By Attending]  Diet, DASH/TLC:   Sodium & Cholesterol Restricted (09-14-22 @ 18:15) [Active]

## 2022-09-16 NOTE — CONSULT NOTE ADULT - ASSESSMENT
53yo male with lung cancer    elevated liver enzymes from etoh  do not think biliary disease is causing his elevated liver enzymes - he has not pain    would not pursue further evaluation for biliary disease    expect liver enzymes will continue to improve  will not follow daily  please call with questions

## 2022-09-16 NOTE — PROGRESS NOTE ADULT - SUBJECTIVE AND OBJECTIVE BOX
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 He states that he coughs so much that it causes his to vomit. Reports nausea, constipation, dehydration. Denies fevers, chills, chest pain, SOB, abdominal pain. Pt was due to start chemotherapy this Friday.   ER course: SpO2 91% on room air. Labs: Hb 10.2, lactate 3.2, Na 134, K+ 3.1, glucose 135, albumin 2.7, alkaline phosphatase 325, AST 82. EKG: NSR with HR 82 bpm,  ms, no ST segment changes, T wave inversions in II/III/AVF present on prior EKG (personally reviewed). Pt was given Zosyn, 2500 ml of NS, Libirum, Zofran. He is being admitted to med/surg for further management.     Interval History: 9/16/22 pt seen and examined at bedside, EtOH withdrawal symptoms improved.+ cough, + DRUMMOND, improving, no longer having hemoptysis. + weakness and fatigue. Denies CP, palps, HA, dizziness, lightheadedness, n/v/d, constipation, dysuria, fever or chills.     REVIEW OF SYSTEMS: All other review of systems is negative unless indicated above.    PE:  Vital Signs Last 24 Hrs: all reviewed   T(C): 37.1 (16 Sep 2022 12:52), Max: 37.2 (15 Sep 2022 23:59)  T(F): 98.7 (16 Sep 2022 12:52), Max: 99 (15 Sep 2022 23:59)  HR: 96 (16 Sep 2022 12:52) (74 - 106)  BP: 123/90 (16 Sep 2022 12:52) (102/73 - 133/96)  BP(mean): 92 (15 Sep 2022 23:59) (92 - 92)  RR: 18 (16 Sep 2022 12:52) (16 - 18)  SpO2: 97% (16 Sep 2022 12:52) (92% - 97%)  Parameters below as of 16 Sep 2022 12:52  Patient On (Oxygen Delivery Method): nasal cannula    Constitutional: NAD, awake and alert  HEENT: EOMI  Neck: Soft and supple, No JVD  Respiratory: Breath sounds are diminished bilaterally, No wheezing, rales or rhonchi  Cardiovascular: S1 and S2, regular rate and rhythm, no Murmurs  Gastrointestinal: Bowel Sounds present, soft, nontender, nondistended  Extremities: No peripheral edema  Vascular: 2+ peripheral pulses  Neurological: A/O x 3, no focal deficits  Musculoskeletal: 5/5 strength b/l upper and lower extremities  Skin: No rashes    LABS: All Labs Reviewed:                        8.8    6.86  )-----------( 190      ( 16 Sep 2022 07:01 )             27.0   09-16    135  |  96  |  8   ----------------------------<  122<H>  3.1<L>   |  32<H>  |  0.53    Ca    8.4<L>      16 Sep 2022 07:01  Phos  2.0     09-16  Mg     1.8     09-16    TPro  5.4<L>  /  Alb  2.2<L>  /  TBili  0.9  /  DBili  x   /  AST  151<H>  /  ALT  67  /  AlkPhos  343<H>  09-16    PT/INR - ( 14 Sep 2022 13:15 )   PT: 11.7 sec;   INR: 1.01 ratio    PTT - ( 14 Sep 2022 13:15 )  PTT:27.3 sec  Troponin I, High Sensitivity (09.14.22 @ 13:15) 13.69-->(09.15.22 @ 09:06)  18.15  Lactate, Blood (09.14.22 @ 15:42) 3.2 mmol/L --> (09.14.22 @ 18:18) 2.2 mmol/L -->(09.15.22 @ 09:06) 1.0 mmol/L   Procalcitonin, Serum (09.14.22 @ 18:15)  0.18  Thyroid Stimulating Hormone, Serum (09.15.22 @ 06:32) 99.10 uU/mL   Free Thyroxine, Serum (09.15.22 @ 06:32) 0.24 ng/dL   Vitamin D, 25-Hydroxy (09.16.22 @ 07:01) <5.0  Iron with Total Binding Capacity in AM (09.16.22 @ 07:01)   Iron - Total Binding Capacity.: 208 ug/dL   % Saturation, Iron: 11 %   Iron Total, Serum: 22 ug/dL   Unsaturated Iron Binding Capacity: 186 ug/dL   Lipid Profile in AM (09.16.22 @ 07:01)   Cholesterol, Serum: 150 mg/dL   Triglycerides, Serum: 156 mg/dL   HDL Cholesterol, Serum: 43 mg/dL   Non HDL Cholesterol: 107  Vitamin B12, Serum in AM (09.16.22 @ 07:01)  >2000  Ammonia, Serum (09.16.22 @ 07:01)  19 umol/L   Folate, Serum in AM (09.16.22 @ 07:01) 13.5 ng/mL   Micro:    Legionella pneumophila Antigen, Urine (09.15.22 @ 02:00) Negative  Culture - Sputum . (09.15.22 @ 10:15) Rare Gram Positive Rods seen per oil power field Rare Gram positive cocci in pairs seen per oil power field   Culture - Blood (09.15.22 @ 09:06) No growth to date.   RADIOLOGY/EKG: all reviewed     12 Lead ECG (09.14.22 @ 13:00)   Ventricular Rate 82 BPM  Atrial Rate 82 BPM  P-R Interval 122 ms  QRS Duration 100 ms  Q-T Interval 420 ms  QTC Calculation(Bazett) 490 ms  P Axis 81 degrees  R Axis 86 degrees  T Axis -64 degrees  Normal sinus rhythm  ST & T wave abnormality, consider inferior ischemia  Prolonged QT  Abnormal ECG    12 Lead ECG (09.15.22 @ 19:40)   Ventricular Rate 97 BPM  Atrial Rate 97 BPM  P-R Interval 126 ms  QRS Duration 82 ms  Q-T Interval 362 ms  QTC Calculation(Bazett) 459 ms  P Axis 82 degrees  R Axis 80 degrees  T Axis 75 degrees  Diagnosis Line Normal sinus rhythm  ST & T wave abnormality, consider anterolateral ischemia  Abnormal ECG    CT Angio Chest PE Protocol w/ IV Cont (09.14.22 @ 13:31)   FINDINGS:  PULMONARY VESSELS: No pulmonary embolus. No evidence of contrast extravasation represent active hemorrhage.  HEART AND VASCULATURE: Heart size is normal. No pericardial effusion. No aortic aneurysm or dissection.  LUNGS, AIRWAYS, PLEURA: Patent central airways. Approximately 7.7 x 4.6 cm left upper lobe cavitary mass is stable. Increased interlobular septal thickening, patchy groundglass and solid opacities within left upper lobe and left lower lobe which can represent pneumonia or pneumonitis. Stable 1.6 cm spiculated nodule within right middle lobe. No pleural effusions or pneumothorax.  MEDIASTINUM: Stable mediastinal and hilar lymph nodes for reference: 1.8 cm left hilar lymph node and 2 cm short axis subcarinal lymph node.  UPPER ABDOMEN: Hepatic steatosis.  BONES AND SOFT TISSUES: No aggressive osseous lesion. Stable enlarged left axillary lymph nodes.  LOWER NECK: Within normal limits.  IMPRESSION:  No pulmonary embolus. No contrast extravasation.  Approximately 7.7 x 4.6 cm left upper lobe mass is stable. Increased interlobular septal thickening, patchy groundglass and solid opacities within left upper lobe and left lower lobe which can represent pneumonia, pneumonitis/alveolar hemorrhage.    US Abdomen Complete (US Abdomen Complete .) (09.15.22 @ 16:01)   FINDINGS:  Liver: Enlarged with steatosis.  Bile ducts: Normal caliber. Common bile duct measures 11 mm.  Gallbladder: Distended with sludge and stones.  Pancreas: Visualized portions are within normal limits. Pancreatic duct is upper limits ofnormal in size measuring 3 mm.  Spleen: 10.5 cm. Within normal limits. Adjacent splenule.  Right kidney: 11.1 cm. No hydronephrosis.  Left kidney: 10.7 cm. No hydronephrosis.  Ascites: None.  Aorta and IVC: Visualized portions are within normal limits.  IMPRESSION:  Hepatomegaly and steatosis.  Gallbladder sludge and stones with a dilated extrahepatic biliary tree.   MRI/MRCP is advised for further evaluation.  Pancreatic duct is upper limits of normal and can also be better assessed at the time of MRI.    Meds:  MEDICATIONS  (STANDING):  ascorbic acid 500 milliGRAM(s) Oral daily  azithromycin   Tablet 500 milliGRAM(s) Oral daily  budesonide  80 MICROgram(s)/formoterol 4.5 MICROgram(s) Inhaler 2 Puff(s) Inhalation two times a day  buprenorphine 8 mG/naloxone 2 mG SL Film 1 Film(s) SubLingual daily  cyanocobalamin 1000 MICROGram(s) Oral daily  enoxaparin Injectable 40 milliGRAM(s) SubCutaneous every 24 hours  folic acid 1 milliGRAM(s) Oral daily  influenza   Vaccine 0.5 milliLiter(s) IntraMuscular once  levothyroxine 25 MICROGram(s) Oral daily  lidocaine   4% Patch 1 Patch Transdermal daily  lisinopril 40 milliGRAM(s) Oral daily  LORazepam     Tablet   Oral   LORazepam     Tablet 1.5 milliGRAM(s) Oral every 4 hours  multivitamin 1 Tablet(s) Oral daily  nicotine - 21 mG/24Hr(s) Patch 1 Patch Transdermal daily  piperacillin/tazobactam IVPB.. 3.375 Gram(s) IV Intermittent every 8 hours  QUEtiapine 50 milliGRAM(s) Oral at bedtime  sodium chloride 0.9% with potassium chloride 20 mEq/L 1000 milliLiter(s) (75 mL/Hr) IV Continuous <Continuous>  thiamine 100 milliGRAM(s) Oral daily  tiotropium 18 MICROgram(s) Capsule 1 Capsule(s) Inhalation daily    MEDICATIONS  (PRN):  acetaminophen     Tablet .. 650 milliGRAM(s) Oral every 6 hours PRN Temp greater or equal to 38C (100.4F), Mild Pain (1 - 3)  ALBUTerol    90 MICROgram(s) HFA Inhaler 2 Puff(s) Inhalation every 6 hours PRN Bronchospasm  aluminum hydroxide/magnesium hydroxide/simethicone Suspension 30 milliLiter(s) Oral every 4 hours PRN Dyspepsia  benzonatate 100 milliGRAM(s) Oral every 8 hours PRN Cough  cyclobenzaprine 10 milliGRAM(s) Oral three times a day PRN Muscle Spasm  guaiFENesin ER 1200 milliGRAM(s) Oral every 12 hours PRN Cough  melatonin 3 milliGRAM(s) Oral at bedtime PRN Insomnia  ondansetron Injectable 4 milliGRAM(s) IV Push every 8 hours PRN Nausea and/or Vomiting  prochlorperazine   Tablet 10 milliGRAM(s) Oral three times a day PRN nausea

## 2022-09-16 NOTE — PROGRESS NOTE ADULT - ASSESSMENT
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 He states that he coughs so much that it causes him to vomit. Reports nausea, constipation, dehydration Denies fevers, chills at home. Pt was due to start chemotherapy this Friday. In ER he received zosyn IV and vancomycin IV.     1. Left side pneumonia ?postobstructive pneumonia. Lung Ca. Immunocompromised host.   -hemoptysis ?related to underlying lung CA vs infection  -BC x 2, urine c.s, sputum c/s noted  -on zosyn 3.375 gm IV q8h nad azithromycin 500 mg PO qd # 2  -obtain vancomycin trough level   -respiratory care  -continue abx coverage   -f/u cultures  -monitor temps  -f/u CBC  -supportive care  2. Other issues:   -care per medicine    d/w medicine

## 2022-09-16 NOTE — PROGRESS NOTE ADULT - SUBJECTIVE AND OBJECTIVE BOX
Date of service: 09-16-22 @ 08:33    Sitting in bed in NAD  Has dry cough  Hemoptysis improving    ROS: no fever or chills; denies dizziness, no HA, no abdominal pain, no diarrhea or constipation; no dysuria, no legs pain, no rashes    MEDICATIONS  (STANDING):  ascorbic acid 500 milliGRAM(s) Oral daily  azithromycin   Tablet 500 milliGRAM(s) Oral daily  budesonide  80 MICROgram(s)/formoterol 4.5 MICROgram(s) Inhaler 2 Puff(s) Inhalation two times a day  buprenorphine 8 mG/naloxone 2 mG SL Film 1 Film(s) SubLingual daily  cyanocobalamin 1000 MICROGram(s) Oral daily  enoxaparin Injectable 40 milliGRAM(s) SubCutaneous every 24 hours  folic acid 1 milliGRAM(s) Oral daily  influenza   Vaccine 0.5 milliLiter(s) IntraMuscular once  levothyroxine 25 MICROGram(s) Oral daily  lidocaine   4% Patch 1 Patch Transdermal daily  lisinopril 40 milliGRAM(s) Oral daily  LORazepam     Tablet   Oral   LORazepam     Tablet 2 milliGRAM(s) Oral every 4 hours  LORazepam     Tablet 1.5 milliGRAM(s) Oral every 4 hours  multivitamin 1 Tablet(s) Oral daily  nicotine - 21 mG/24Hr(s) Patch 1 Patch Transdermal daily  piperacillin/tazobactam IVPB.. 3.375 Gram(s) IV Intermittent every 8 hours  QUEtiapine 50 milliGRAM(s) Oral at bedtime  sodium chloride 0.9% with potassium chloride 20 mEq/L 1000 milliLiter(s) (75 mL/Hr) IV Continuous <Continuous>  thiamine 100 milliGRAM(s) Oral daily  tiotropium 18 MICROgram(s) Capsule 1 Capsule(s) Inhalation daily    Vital Signs Last 24 Hrs  T(C): 37 (16 Sep 2022 05:10), Max: 37.2 (15 Sep 2022 23:59)  T(F): 98.6 (16 Sep 2022 05:10), Max: 99 (15 Sep 2022 23:59)  HR: 99 (16 Sep 2022 05:10) (74 - 106)  BP: 115/88 (16 Sep 2022 05:10) (102/73 - 135/96)  BP(mean): 92 (15 Sep 2022 23:59) (92 - 92)  RR: 16 (16 Sep 2022 05:10) (16 - 18)  SpO2: 95% (16 Sep 2022 05:10) (92% - 96%)    Parameters below as of 16 Sep 2022 05:10  Patient On (Oxygen Delivery Method): nasal cannula  O2 Flow (L/min): 2       Physical exam:    Constitutional:  No acute distress  HEENT: NC/AT, EOMI, PERRLA, conjunctivae clear; ears and nose atraumatic; pharynx benign  Neck: supple; thyroid not palpable  Back: no tenderness  Respiratory: respiratory effort normal; crackles at bases  Cardiovascular: S1S2 regular, no murmurs  Abdomen: soft, not tender, not distended, positive BS; no liver or spleen organomegaly  Genitourinary: no suprapubic tenderness  Lymphatic: no LN palpable  Musculoskeletal: no muscle tenderness, no joint swelling or tenderness  Extremities: no pedal edema  Neurological/ Psychiatric: AxOx3, judgement and insight normal; moving all extremities  Skin: no rashes; no palpable lesions    Labs: all available labs reviewed                        9.1    6.37  )-----------( 219      ( 15 Sep 2022 06:32 )             27.4     09-15    131<L>  |  92<L>  |  12  ----------------------------<  158<H>  3.2<L>   |  33<H>  |  0.50    Ca    8.6      15 Sep 2022 06:32  Phos  1.6     09-15  Mg     1.8     09-15    TPro  5.8<L>  /  Alb  2.2<L>  /  TBili  2.4<H>  /  DBili  x   /  AST  360<H>  /  ALT  66  /  AlkPhos  377<H>  09-15     LIVER FUNCTIONS - ( 15 Sep 2022 06:32 )  Alb: 2.2 g/dL / Pro: 5.8 gm/dL / ALK PHOS: 377 U/L / ALT: 66 U/L / AST: 360 U/L / GGT: x           COVID-19 PCR: NotDetec (09-14-22 @ 13:16)    Radiology: all available radiological tests reviewed    < from: CT Angio Chest PE Protocol w/ IV Cont (09.14.22 @ 13:31) >  No pulmonary embolus. No contrast extravasation.    Approximately 7.7 x 4.6 cm left upper lobe mass is stable. Increased   interlobular septal thickening, patchy groundglass and solid opacities   within left upper lobe and left lower lobe which can represent pneumonia, pneumonitis/alveolar hemorrhage.  < end of copied text >      Advanced directives addressed: full resuscitation

## 2022-09-16 NOTE — PROGRESS NOTE ADULT - SUBJECTIVE AND OBJECTIVE BOX
52-year-old male with metastatic lung cancer  Alcohol intoxication    Admitted with 3 days hemoptysis, inebriated  Upper lobe infiltrate      Left upper lobe infiltrate with   multiple possible causes cancer  Pneumonia  Bleeding    Given Zosyn for possible pneumonia    Plan  Continue treatment of pneumonia as per medicine/pulmonary/ ID Service    Continue alcohol detoxification program  Once off alcohol for at least 2 weeks, Will address systemic immunotherapy As outpatient    Consider radiation therapy consultation, As hemoptysis may be related to endobronchial lesion from lung cancer  And if endobronchial lesion present this could also lead to postobstructive pneumonia  This needs to be reviewed with pulmonary  service as well service.     as well will need social work consultation/intervention to help with support system during alcohol withdrawal    Above reviewed with patient who understands importance of alcohol abstinence

## 2022-09-16 NOTE — PROGRESS NOTE ADULT - ASSESSMENT
51 y/o M presents with hemoptysis     Hemoptysis & Dyspnea  Acute hypoxemic respiratory distress secondary to CAP vs. Pneumonitis / alveolar hemorrhage  NSMLC Adenocarcinoma   - CTA: No pulmonary embolus; Approximately 7.7 x 4.6 cm left upper lobe mass is stable; Increased interlobular septal thickening, patchy groundglass and solid opacities within left upper lobe and left lower lobe which can represent pneumonia, pneumonitis/alveolar hemorrhage  - Lactate 3.2-->2.2-->1.0  - Continue supplemental O2 to maintain SpO2 > 92%   - s/p Zosyn & Vanco in the ER; Continue Zosyn, Started on Azithromycin by ID   - Continue Albuterol PRN, Symbicort, & Spiriva  - Tessalon Pearle PRN   - ID consult - Dr. Archibald   - Pulmonology consult - Dr. Akbar, no role for steroids   - Oncology consult- Dr. Trammell     Acute EtOH Withdrawal  - Drinks 2 pints of Vodka Daily, last drink 9/14/22 at 6am  - Ativan Taper/CIWA Protocol  -  Consult  - May require IP Substance Abuse Rehab Prior to Cancer Tx.  - HOLD Xanax while on Ativan Taper   - Thiamine, Folate, Multivitamin     Electrolyte Abnormality   - Due to EtOH Consumption  - Supplement Lytes as needed  - Trend BMP  - Calcium corrected for albumin 9.8     EKG Abnormality  - ST & T wave abnormality, consider inferior ischemia  - Repeat EKG no change   - Troponin negative x 2   -     Hypothyroidism, new  - TSH low, Free T4 high  - Start low dose Synthroid  - F/U OP in 4 weeks for repeat TFTs   - From NM PET/CT Onc FDG Skull to Thigh, Initial (08.13.22 @ 13:45): There is an approximately symmetric enlargement of bilateral thyroid lobes with heterogeneous FDG avidity, significantly more prominent on the left with SUV 37.2 (image 70). Physiologic FDG activity in visualized brain, major salivary glands, and neck muscles.    Elevated alkaline phosphatase, Transaminitis, Hyperbilirubinemia, likely secondary to alcohol use   Hx of Liver Steatosis   Hypertriglyceridemia   - US abdomen demonstrates Hepatomegaly and steatosis; Gallbladder sludge and stones with a dilated extrahepatic biliary tree  - MRI/MRCP completed, pending official read  - Seen by GI, Dr. Bertrand, would not pursue further evaluation for biliary disease  - Ammonia wnl  - Trend LFTs  - Lipid panel as above     Severe Vitamin D deficiency   - Start PO supplementation     Iron deficiency anemia   Bone marrow suppression from alcohol dependence   - B12 elevated, folate wnl   - Stop B12  - Started on PO iron supplementation     Hyperglycemia, clinically insignificant  - A1c 5.1    Hx of Opiate Abuse & Dependence  - Continue Suboxone   - Continue Seroquel 50mg at HS    Nicotine Use Disorder  - NRT   - Counseled on smoking cessation    HTN   - Continue Lisinopril with hold parameters     DVT Prophylaxis:  Lovenox 40 mg subcutaneous daily     Code status: Full code (Pt agrees to chest compressions and intubation if required).     Dispo: Continue IV antibiotics, f/u MRCP

## 2022-09-16 NOTE — CONSULT NOTE ADULT - SUBJECTIVE AND OBJECTIVE BOX
Patient is a 52y old  Male who presents with a chief complaint of Hemoptysis (16 Sep 2022 08:32)      HPI:  51 y/o M with PMH HTN, substance use on suboxone, alcohol use, smoker, 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 He states that he coughs so much that it causes his to vomit. Reports nausea, constipation, dehydration Denies fevers, chills, chest pain, SOB, abdominal pain, N/V, diarrhea/constipation. Pt was due to start chemotherapy this Friday.     Prior admission:   - 7/22/22: coughing up blood -> left upper lobe lung mass -> EBUS, was on CIWA for alcohol withdrawal     ER course: SpO2 91% on room air. Labs: Hb 10.2, lactate 3.2, Na 134, K+ 3.1, glucose 135, albumin 2.7, alkaline phosphatase 325, AST 82. EKG: NSR with HR 82 bpm,  ms, no ST segment changes, T wave inversions in II/III/AVF present on prior EKG (personally reviewed).     Imaging:   - CTA: No pulmonary embolus. No contrast extravasation. Approximately 7.7 x 4.6 cm left upper lobe mass is stable. Increased interlobular septal thickening, patchy groundglass and solid opacities within left upper lobe and left lower lobe which can represent pneumonia, pneumonitis/alveolar hemorrhage. Stable 1.6 cm spiculated nodule within right middle lobe.   Pt was given Zosyn, 2500 ml of NS, Libirum, Zofran. He is being admitted to med/surg for further management.  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)      Injury due to foreign body  right wrist          MEDICATIONS  (STANDING):  ascorbic acid 500 milliGRAM(s) Oral daily  azithromycin   Tablet 500 milliGRAM(s) Oral daily  budesonide  80 MICROgram(s)/formoterol 4.5 MICROgram(s) Inhaler 2 Puff(s) Inhalation two times a day  buprenorphine 8 mG/naloxone 2 mG SL Film 1 Film(s) SubLingual daily  cyanocobalamin 1000 MICROGram(s) Oral daily  enoxaparin Injectable 40 milliGRAM(s) SubCutaneous every 24 hours  folic acid 1 milliGRAM(s) Oral daily  influenza   Vaccine 0.5 milliLiter(s) IntraMuscular once  levothyroxine 25 MICROGram(s) Oral daily  lidocaine   4% Patch 1 Patch Transdermal daily  lisinopril 40 milliGRAM(s) Oral daily  LORazepam     Tablet   Oral   LORazepam     Tablet 1.5 milliGRAM(s) Oral every 4 hours  multivitamin 1 Tablet(s) Oral daily  nicotine - 21 mG/24Hr(s) Patch 1 Patch Transdermal daily  piperacillin/tazobactam IVPB.. 3.375 Gram(s) IV Intermittent every 8 hours  potassium phosphate IVPB 15 milliMole(s) IV Intermittent once  QUEtiapine 50 milliGRAM(s) Oral at bedtime  sodium chloride 0.9% with potassium chloride 20 mEq/L 1000 milliLiter(s) (75 mL/Hr) IV Continuous <Continuous>  thiamine 100 milliGRAM(s) Oral daily  tiotropium 18 MICROgram(s) Capsule 1 Capsule(s) Inhalation daily    MEDICATIONS  (PRN):  acetaminophen     Tablet .. 650 milliGRAM(s) Oral every 6 hours PRN Temp greater or equal to 38C (100.4F), Mild Pain (1 - 3)  ALBUTerol    90 MICROgram(s) HFA Inhaler 2 Puff(s) Inhalation every 6 hours PRN Bronchospasm  aluminum hydroxide/magnesium hydroxide/simethicone Suspension 30 milliLiter(s) Oral every 4 hours PRN Dyspepsia  benzonatate 100 milliGRAM(s) Oral every 8 hours PRN Cough  cyclobenzaprine 10 milliGRAM(s) Oral three times a day PRN Muscle Spasm  guaiFENesin ER 1200 milliGRAM(s) Oral every 12 hours PRN Cough  melatonin 3 milliGRAM(s) Oral at bedtime PRN Insomnia  ondansetron Injectable 4 milliGRAM(s) IV Push every 8 hours PRN Nausea and/or Vomiting  prochlorperazine   Tablet 10 milliGRAM(s) Oral three times a day PRN nausea      Allergies    No Known Allergies    Intolerances        SOCIAL HISTORY:  etoh abuse as above, tob as above  FAMILY HISTORY:  FH: rheumatic fever (Father)        REVIEW OF SYSTEMS:    CONSTITUTIONAL: No weakness, fevers or chills  EYES/ENT: No visual changes;  No vertigo or throat pain   NECK: No pain or stiffness  RESPIRATORY: No cough, wheezing, hemoptysis; No shortness of breath  CARDIOVASCULAR: No chest pain or palpitations  GASTROINTESTINAL: No abdominal or epigastric pain. No nausea, vomiting, or hematemesis; No diarrhea or constipation. No melena or hematochezia.  GENITOURINARY: No dysuria, frequency or hematuria  NEUROLOGICAL: No numbness or weakness  SKIN: No itching, burning, rashes, or lesions   PSYCH: Normal mood and affect  All other review of systems is negative unless indicated above.    Vital Signs Last 24 Hrs  T(C): 37.1 (16 Sep 2022 08:45), Max: 37.2 (15 Sep 2022 23:59)  T(F): 98.8 (16 Sep 2022 08:45), Max: 99 (15 Sep 2022 23:59)  HR: 97 (16 Sep 2022 08:45) (74 - 106)  BP: 127/89 (16 Sep 2022 08:45) (102/73 - 133/96)  BP(mean): 92 (15 Sep 2022 23:59) (92 - 92)  RR: 18 (16 Sep 2022 08:45) (16 - 18)  SpO2: 94% (16 Sep 2022 08:45) (92% - 96%)    Parameters below as of 16 Sep 2022 08:45  Patient On (Oxygen Delivery Method): nasal cannula  O2 Flow (L/min): 2      PHYSICAL EXAM:    Constitutional: NAD  HEENT: MMM  Neck: No LAD  Respiratory: CTA  Cardiovascular: S1 and S2  Gastrointestinal: BS+, soft, NT/ND  Extremities: No peripheral edema  Neurological: no focal deficits  Psychiatric: Normal mood, normal affect  Skin: No rashes    LABS:                        8.8    6.86  )-----------( 190      ( 16 Sep 2022 07:01 )             27.0     09-16    135  |  96  |  8   ----------------------------<  122<H>  3.1<L>   |  32<H>  |  0.53    Ca    8.4<L>      16 Sep 2022 07:01  Phos  2.0     09-16  Mg     1.8     09-16    TPro  5.4<L>  /  Alb  2.2<L>  /  TBili  0.9  /  DBili  x   /  AST  151<H>  /  ALT  67  /  AlkPhos  343<H>  09-16    PT/INR - ( 14 Sep 2022 13:15 )   PT: 11.7 sec;   INR: 1.01 ratio         PTT - ( 14 Sep 2022 13:15 )  PTT:27.3 sec  LIVER FUNCTIONS - ( 16 Sep 2022 07:01 )  Alb: 2.2 g/dL / Pro: 5.4 gm/dL / ALK PHOS: 343 U/L / ALT: 67 U/L / AST: 151 U/L / GGT: x             RADIOLOGY & ADDITIONAL STUDIES:

## 2022-09-17 NOTE — PROGRESS NOTE ADULT - SUBJECTIVE AND OBJECTIVE BOX
Patient comfortable in bed  Still with cough with shahrzad hemoptysis/small amounts  blood/ dark red sputum    Afebrile    Decreased breath sounds left lung     CT Angio Chest PE Protocol w/ IV Cont (09.14.22 @ 13:31) >    IMPRESSION:  No pulmonary embolus. No contrast extravasation.    Approximately 7.7 x 4.6 cm left upper lobe mass is stable. Increased   interlobular septal thickening, patchy groundglass and solid opacities   within left upper lobe and left lower lobe which can represent pneumonia,   pneumonitis/alveolar hemorrhage.       MR MRCP w/wo IV Cont (09.16.22 @ 11:44) >    IMPRESSION:  Borderline common duct without obstructing lesion.  Pancreas divisum.  Marked hepatic steatosis.        < end of copied text >

## 2022-09-17 NOTE — PROGRESS NOTE ADULT - SUBJECTIVE AND OBJECTIVE BOX
CHIEF COMPLAINT: alcohol withdrawal, hemoptysis     HPI: 52M w/ PMHx of HTN, substance use on Suboxone, EtOH abuse and dependence, active smoker, 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) presented to [facility] for coughing up blood for the last 3 days (size of a quarter). He states that he coughs so much that it causes his to vomit. Reports nausea, constipation, dehydration. Denies fevers, chills, chest pain, SOB, abdominal pain. Pt was due to start chemotherapy this Friday.   ER course: SpO2 91% on room air. Labs: Hb 10.2, lactate 3.2, Na 134, K+ 3.1, glucose 135, albumin 2.7, alkaline phosphatase 325, AST 82. EKG: NSR with HR 82 bpm,  ms, no ST segment changes, T wave inversions in II/III/AVF present on prior EKG (personally reviewed). Pt was given Zosyn, 2500 ml of NS, Libirum, Zofran. He is being admitted to med/surg for further management.     Interval History: 9/17/22 pt seen and examined at bedside, EtOH withdrawal symptoms improved.+ cough, + DRUMMOND, + scant episodes of hemoptysis. + weakness and fatigue. Denies CP, palps, HA, dizziness, lightheadedness, n/v/d, constipation, dysuria, fever or chills.     REVIEW OF SYSTEMS: All other review of systems is negative unless indicated above.    PE:  Vitals reviewed for last 24 hours  T(C): 36.8 (09-17-22 @ 15:47), Max: 36.9 (09-17-22 @ 14:17)  T(F): 98.3 (09-17-22 @ 15:47), Max: 98.4 (09-17-22 @ 14:17)  HR: 89 (09-17-22 @ 15:47) (80 - 109)  BP: 138/88 (09-17-22 @ 15:47) (105/73 - 145/105)  RR: 18 (09-17-22 @ 15:47) (17 - 18)  SpO2: 99% (09-17-22 @ 15:47) (95% - 99%)  Wt(kg): --    Constitutional: NAD, awake and alert  HEENT: EOMI  Neck: Soft and supple, No JVD  Respiratory: Breath sounds are diminished bilaterally, No wheezing, rales or rhonchi  Cardiovascular: S1 and S2, regular rate and rhythm, no Murmurs  Gastrointestinal: Bowel Sounds present, soft, nontender, nondistended  Extremities: No peripheral edema  Vascular: 2+ peripheral pulses  Neurological: A/O x 3, no focal deficits  Musculoskeletal: 5/5 strength b/l upper and lower extremities  Skin: No rashes    LABS: All Labs Reviewed:  09-17    135  |  99  |  5<L>  ----------------------------<  106<H>  3.4<L>   |  29  |  0.54    Ca    8.2<L>      17 Sep 2022 06:16  Phos  2.0     09-16  Mg     1.8     09-17    TPro  5.6<L>  /  Alb  2.2<L>  /  TBili  0.8  /  DBili  x   /  AST  65<H>  /  ALT  51  /  AlkPhos  264<H>  09-17                            8.3    6.25  )-----------( 192      ( 17 Sep 2022 06:16 )             25.1             LIVER FUNCTIONS - ( 17 Sep 2022 06:16 )  Alb: 2.2 g/dL / Pro: 5.6 gm/dL / ALK PHOS: 264 U/L / ALT: 51 U/L / AST: 65 U/L / GGT: x                    Troponin I, High Sensitivity (09.14.22 @ 13:15) 13.69-->(09.15.22 @ 09:06)  18.15  Lactate, Blood (09.14.22 @ 15:42) 3.2 mmol/L --> (09.14.22 @ 18:18) 2.2 mmol/L -->(09.15.22 @ 09:06) 1.0 mmol/L   Procalcitonin, Serum (09.14.22 @ 18:15)  0.18  Thyroid Stimulating Hormone, Serum (09.15.22 @ 06:32) 99.10 uU/mL   Free Thyroxine, Serum (09.15.22 @ 06:32) 0.24 ng/dL   Vitamin D, 25-Hydroxy (09.16.22 @ 07:01) <5.0  Iron with Total Binding Capacity in AM (09.16.22 @ 07:01)   Iron - Total Binding Capacity.: 208 ug/dL   % Saturation, Iron: 11 %   Iron Total, Serum: 22 ug/dL   Unsaturated Iron Binding Capacity: 186 ug/dL   Lipid Profile in AM (09.16.22 @ 07:01)   Cholesterol, Serum: 150 mg/dL   Triglycerides, Serum: 156 mg/dL   HDL Cholesterol, Serum: 43 mg/dL   Non HDL Cholesterol: 107  Vitamin B12, Serum in AM (09.16.22 @ 07:01)  >2000  Ammonia, Serum (09.16.22 @ 07:01)  19 umol/L   Folate, Serum in AM (09.16.22 @ 07:01) 13.5 ng/mL   Micro:    Legionella pneumophila Antigen, Urine (09.15.22 @ 02:00) Negative  Culture - Sputum . (09.15.22 @ 10:15) Rare Gram Positive Rods seen per oil power field Rare Gram positive cocci in pairs seen per oil power field   Culture - Blood (09.15.22 @ 09:06) No growth to date.   RADIOLOGY/EKG: all reviewed     12 Lead ECG (09.14.22 @ 13:00)   Ventricular Rate 82 BPM  Atrial Rate 82 BPM  P-R Interval 122 ms  QRS Duration 100 ms  Q-T Interval 420 ms  QTC Calculation(Bazett) 490 ms  P Axis 81 degrees  R Axis 86 degrees  T Axis -64 degrees  Normal sinus rhythm  ST & T wave abnormality, consider inferior ischemia  Prolonged QT  Abnormal ECG    12 Lead ECG (09.15.22 @ 19:40)   Ventricular Rate 97 BPM  Atrial Rate 97 BPM  P-R Interval 126 ms  QRS Duration 82 ms  Q-T Interval 362 ms  QTC Calculation(Bazett) 459 ms  P Axis 82 degrees  R Axis 80 degrees  T Axis 75 degrees  Diagnosis Line Normal sinus rhythm  ST & T wave abnormality, consider anterolateral ischemia  Abnormal ECG    CT Angio Chest PE Protocol w/ IV Cont (09.14.22 @ 13:31)   FINDINGS:  PULMONARY VESSELS: No pulmonary embolus. No evidence of contrast extravasation represent active hemorrhage.  HEART AND VASCULATURE: Heart size is normal. No pericardial effusion. No aortic aneurysm or dissection.  LUNGS, AIRWAYS, PLEURA: Patent central airways. Approximately 7.7 x 4.6 cm left upper lobe cavitary mass is stable. Increased interlobular septal thickening, patchy groundglass and solid opacities within left upper lobe and left lower lobe which can represent pneumonia or pneumonitis. Stable 1.6 cm spiculated nodule within right middle lobe. No pleural effusions or pneumothorax.  MEDIASTINUM: Stable mediastinal and hilar lymph nodes for reference: 1.8 cm left hilar lymph node and 2 cm short axis subcarinal lymph node.  UPPER ABDOMEN: Hepatic steatosis.  BONES AND SOFT TISSUES: No aggressive osseous lesion. Stable enlarged left axillary lymph nodes.  LOWER NECK: Within normal limits.  IMPRESSION:  No pulmonary embolus. No contrast extravasation.  Approximately 7.7 x 4.6 cm left upper lobe mass is stable. Increased interlobular septal thickening, patchy groundglass and solid opacities within left upper lobe and left lower lobe which can represent pneumonia, pneumonitis/alveolar hemorrhage.    US Abdomen Complete (US Abdomen Complete .) (09.15.22 @ 16:01)   FINDINGS:  Liver: Enlarged with steatosis.  Bile ducts: Normal caliber. Common bile duct measures 11 mm.  Gallbladder: Distended with sludge and stones.  Pancreas: Visualized portions are within normal limits. Pancreatic duct is upper limits ofnormal in size measuring 3 mm.  Spleen: 10.5 cm. Within normal limits. Adjacent splenule.  Right kidney: 11.1 cm. No hydronephrosis.  Left kidney: 10.7 cm. No hydronephrosis.  Ascites: None.  Aorta and IVC: Visualized portions are within normal limits.  IMPRESSION:  Hepatomegaly and steatosis.  Gallbladder sludge and stones with a dilated extrahepatic biliary tree.   MRI/MRCP is advised for further evaluation.  Pancreatic duct is upper limits of normal and can also be better assessed at the time of MRI.    Meds:  MEDICATIONS  (STANDING):  ascorbic acid 500 milliGRAM(s) Oral daily  azithromycin   Tablet 500 milliGRAM(s) Oral daily  budesonide  80 MICROgram(s)/formoterol 4.5 MICROgram(s) Inhaler 2 Puff(s) Inhalation two times a day  buprenorphine 8 mG/naloxone 2 mG SL Film 1 Film(s) SubLingual daily  cyanocobalamin 1000 MICROGram(s) Oral daily  enoxaparin Injectable 40 milliGRAM(s) SubCutaneous every 24 hours  folic acid 1 milliGRAM(s) Oral daily  influenza   Vaccine 0.5 milliLiter(s) IntraMuscular once  levothyroxine 25 MICROGram(s) Oral daily  lidocaine   4% Patch 1 Patch Transdermal daily  lisinopril 40 milliGRAM(s) Oral daily  LORazepam     Tablet   Oral   LORazepam     Tablet 1.5 milliGRAM(s) Oral every 4 hours  multivitamin 1 Tablet(s) Oral daily  nicotine - 21 mG/24Hr(s) Patch 1 Patch Transdermal daily  piperacillin/tazobactam IVPB.. 3.375 Gram(s) IV Intermittent every 8 hours  QUEtiapine 50 milliGRAM(s) Oral at bedtime  sodium chloride 0.9% with potassium chloride 20 mEq/L 1000 milliLiter(s) (75 mL/Hr) IV Continuous <Continuous>  thiamine 100 milliGRAM(s) Oral daily  tiotropium 18 MICROgram(s) Capsule 1 Capsule(s) Inhalation daily    MEDICATIONS  (PRN):  acetaminophen     Tablet .. 650 milliGRAM(s) Oral every 6 hours PRN Temp greater or equal to 38C (100.4F), Mild Pain (1 - 3)  ALBUTerol    90 MICROgram(s) HFA Inhaler 2 Puff(s) Inhalation every 6 hours PRN Bronchospasm  aluminum hydroxide/magnesium hydroxide/simethicone Suspension 30 milliLiter(s) Oral every 4 hours PRN Dyspepsia  benzonatate 100 milliGRAM(s) Oral every 8 hours PRN Cough  cyclobenzaprine 10 milliGRAM(s) Oral three times a day PRN Muscle Spasm  guaiFENesin ER 1200 milliGRAM(s) Oral every 12 hours PRN Cough  melatonin 3 milliGRAM(s) Oral at bedtime PRN Insomnia  ondansetron Injectable 4 milliGRAM(s) IV Push every 8 hours PRN Nausea and/or Vomiting  prochlorperazine   Tablet 10 milliGRAM(s) Oral three times a day PRN nausea

## 2022-09-17 NOTE — PROGRESS NOTE ADULT - ASSESSMENT
IMP   metastatic non-small cell carcinoma of the lung  Alcohol abuse    Hemoptysis  Increased LFTs    Plan  Continue alcoholic detox    Review ongoing hemoptysis, Possible obstructive pneumonia with pulmonary service;    Is there endobronchial lesions leading to persistent hemoptysis and would he benefit from radiation therapy?      Once he has  stopped alcohol for 1 to 2 weeks can then initiate systemic immunotherapy    Reviewed with hospitalist, and then with the patient

## 2022-09-17 NOTE — PROGRESS NOTE ADULT - SUBJECTIVE AND OBJECTIVE BOX
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 He states that he coughs so much that it causes his to vomit. Reports nausea, constipation, dehydration Denies fevers, chills, chest pain, SOB, abdominal pain, N/V, diarrhea/constipation. Pt was due to start chemotherapy this Friday.   7/22/22: coughing up blood -> left upper lobe lung mass -> EBUS, was on CIWA for alcohol withdrawal   CTA: No pulmonary embolus. No contrast extravasation. Approximately 7.7 x 4.6 cm left upper lobe mass is stable. Increased interlobular septal thickening, patchy groundglass and solid opacities within left upper lobe and left lower lobe which can represent pneumonia, pneumonitis/alveolar hemorrhage. Stable 1.6 cm spiculated nodule within right middle lobe.   Pt was given Zosyn, 2500 ml of NS, Libirum, Zofran. He is being admitted to med/surg for further management.  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)      Injury due to foreign body  right wrist          MEDICATIONS  (STANDING):  ascorbic acid 500 milliGRAM(s) Oral daily  azithromycin   Tablet 500 milliGRAM(s) Oral daily  budesonide  80 MICROgram(s)/formoterol 4.5 MICROgram(s) Inhaler 2 Puff(s) Inhalation two times a day  buprenorphine 8 mG/naloxone 2 mG SL Film 1 Film(s) SubLingual daily  enoxaparin Injectable 40 milliGRAM(s) SubCutaneous every 24 hours  ergocalciferol 89745 Unit(s) Oral every week  ferrous    sulfate 325 milliGRAM(s) Oral daily  folic acid 1 milliGRAM(s) Oral daily  influenza   Vaccine 0.5 milliLiter(s) IntraMuscular once  levothyroxine 25 MICROGram(s) Oral daily  lidocaine   4% Patch 1 Patch Transdermal daily  lisinopril 40 milliGRAM(s) Oral daily  LORazepam     Tablet   Oral   LORazepam     Tablet 0.5 milliGRAM(s) Oral every 4 hours  magnesium oxide 400 milliGRAM(s) Oral every 12 hours  multivitamin 1 Tablet(s) Oral daily  nicotine - 21 mG/24Hr(s) Patch 1 Patch Transdermal daily  piperacillin/tazobactam IVPB.. 3.375 Gram(s) IV Intermittent every 8 hours  QUEtiapine 50 milliGRAM(s) Oral at bedtime  sodium chloride 0.9% with potassium chloride 20 mEq/L 1000 milliLiter(s) (75 mL/Hr) IV Continuous <Continuous>  tiotropium 18 MICROgram(s) Capsule 1 Capsule(s) Inhalation daily    MEDICATIONS  (PRN):  acetaminophen     Tablet .. 650 milliGRAM(s) Oral every 6 hours PRN Temp greater or equal to 38C (100.4F), Mild Pain (1 - 3)  ALBUTerol    90 MICROgram(s) HFA Inhaler 2 Puff(s) Inhalation every 6 hours PRN Bronchospasm  aluminum hydroxide/magnesium hydroxide/simethicone Suspension 30 milliLiter(s) Oral every 4 hours PRN Dyspepsia  benzonatate 100 milliGRAM(s) Oral every 8 hours PRN Cough  cyclobenzaprine 10 milliGRAM(s) Oral three times a day PRN Muscle Spasm  guaiFENesin ER 1200 milliGRAM(s) Oral every 12 hours PRN Cough  melatonin 3 milliGRAM(s) Oral at bedtime PRN Insomnia  ondansetron Injectable 4 milliGRAM(s) IV Push every 8 hours PRN Nausea and/or Vomiting  prochlorperazine   Tablet 10 milliGRAM(s) Oral three times a day PRN nausea      FAMILY HISTORY:  FH: rheumatic fever (Father)        SOCIAL HISTORY:  ***    REVIEW OF SYSTEM:  hemoptysis   Vital Signs Last 24 Hrs  T(C): 37 (15 Sep 2022 08:28), Max: 37.2 (14 Sep 2022 20:19)  T(F): 98.6 (15 Sep 2022 08:28), Max: 98.9 (14 Sep 2022 20:19)  HR: 86 (15 Sep 2022 08:28) (84 - 99)  BP: 132/89 (15 Sep 2022 08:28) (110/72 - 149/91)  BP(mean): 110 (14 Sep 2022 19:36) (110 - 110)  RR: 16 (15 Sep 2022 08:28) (16 - 20)  SpO2: 92% (15 Sep 2022 08:28) (91% - 94%)    Parameters below as of 15 Sep 2022 08:28  Patient On (Oxygen Delivery Method): nasal cannula  O2 Flow (L/min): 3      I&O's Summary    PHYSICAL EXAM  General Appearance: cooperative, no acute distress,   HEENT: PERRL, conjunctiva clear, EOM's intact, non injected pharynx, no exudate, TM   normal  Neck: Supple, , no adenopathy, thyroid: not enlarged, no carotid bruit or JVD  Back: Symmetric, no  tenderness,no soft tissue tenderness  Lungs: Coarse bilaterally   Heart: Regular rate and rhythm, S1, S2 normal, no murmur, rub or gallop  Abdomen: Soft, non-tender, bowel sounds active , no hepatosplenomegaly  Extremities: no cyanosis or edema, no joint swelling  Skin: Skin color, texture normal, no rashes   Neurologic: Alert and oriented X3 , cranial nerves intact, sensory and motor normal,    ECG:    LABS:                          9.1    6.37  )-----------( 219      ( 15 Sep 2022 06:32 )             27.4     09-15    131<L>  |  92<L>  |  12  ----------------------------<  158<H>  3.2<L>   |  33<H>  |  0.50    Ca    8.6      15 Sep 2022 06:32  Phos  1.6     09-15  Mg     1.8     09-15    TPro  5.8<L>  /  Alb  2.2<L>  /  TBili  2.4<H>  /  DBili  x   /  AST  360<H>  /  ALT  66  /  AlkPhos  377<H>  09-15            PT/INR - ( 14 Sep 2022 13:15 )   PT: 11.7 sec;   INR: 1.01 ratio         PTT - ( 14 Sep 2022 13:15 )  PTT:27.3 sec          RADIOLOGY & ADDITIONAL STUDIES:

## 2022-09-17 NOTE — PROGRESS NOTE ADULT - ASSESSMENT
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 He states that he coughs so much that it causes his to vomit. Reports nausea, constipation, dehydration Denies fevers, chills, chest pain, SOB, abdominal pain, N/V, diarrhea/constipation. Pt was due to start chemotherapy this Friday.   7/22/22: coughing up blood -> left upper lobe lung mass -> EBUS, was on CIWA for alcohol withdrawal   CTA: No pulmonary embolus. No contrast extravasation. Approximately 7.7 x 4.6 cm left upper lobe mass is stable. Increased interlobular septal thickening, patchy groundglass and solid opacities within left upper lobe and left lower lobe which can represent pneumonia, pneumonitis/alveolar hemorrhage. Stable 1.6 cm spiculated nodule within right middle lobe.   Pt was given Zosyn, 2500 ml of NS, Libirum, Zofran. He is being admitted to med/surg for further management.  (14 Sep 2022 21:12)  Agree with treating for pneumonia  he will not be able to receive immunotherapy until he has deemed to be abstinent from ETOH. This has been discussed extensively with the patient  Will continue to monitor H/H  No massive hemoptysis occurring at the present time, scant, likely 2/2 to pneumonia/cancer  Will discuss possibility of radiation with oncology service. No bronchoscopic intervention recommended but would discuss further with CTS.   Will monitor closely

## 2022-09-17 NOTE — PROGRESS NOTE ADULT - ASSESSMENT
51 y/o M presents with hemoptysis     Acute hypoxemic respiratory failure  and Hemoptysis due to   probable post-obstructive Pneumonia , alveolar hemorrhage, Pneumonitis ,  suspected gram negative rods super-imposed on   SHAILA NSCLC Adenocarcinoma   - CTA: No pulmonary embolus; Approximately 7.7 x 4.6 cm left upper lobe mass is stable; Increased interlobular septal thickening, patchy groundglass and solid opacities within left upper lobe and left lower lobe which can represent pneumonia, pneumonitis/alveolar hemorrhage  - Lactate 3.2-->2.2-->1.0  - Continue supplemental O2 to maintain SpO2 > 92%   - s/p Zosyn & Vanco in the ER; Continue Zosyn, Started on Azithromycin by ID   - Continue Albuterol PRN, Symbicort, & Spiriva  - Tessalon Pearle PRN   - ID consult - Dr. Archibald   - Pulmonology consult - Dr. Akbar, no role for steroids   - Oncology consult- Dr. Trammell - ? role of radiation therapy   - rad onc consult    Acute EtOH Withdrawal  - Drinks 2 pints of Vodka Daily, last drink 9/14/22 at 6am  - Ativan Taper/CIWA Protocol - CIWA score low 0-1  - SW Consult  - May require IP Substance Abuse Rehab Prior to Cancer Tx.  - HOLD Xanax while on Ativan Taper   - Thiamine, Folate, Multivitamin     Hypokalemia, Hypophosphatemia    - Due to EtOH Consumption  - Supplement Lytes as needed  - Trend BMP  - Calcium corrected for albumin 9.8     Abnormal EKG    - ST & T wave abnormality, consider inferior ischemia  - Repeat EKG no change   - Troponin negative x 2 , repeat in am  -   - 2 d echo - EF wnl     Hypothyroidism, new  - TSH low, Free T4 high  - Start low dose Synthroid  - F/U OP in 4 weeks for repeat TFTs   - From NM PET/CT Onc FDG Skull to Thigh, Initial (08.13.22 @ 13:45): There is an approximately symmetric enlargement of bilateral thyroid lobes with heterogeneous FDG avidity, significantly more prominent on the left with SUV 37.2 (image 70). Physiologic FDG activity in visualized brain, major salivary glands, and neck muscles.    Abnormal LFTs due to Hepatic steatosis and ETOH use   mild hypertriglyceridemia   - US abdomen demonstrates Hepatomegaly and steatosis; Gallbladder sludge and stones with a dilated extrahepatic biliary tree  - MRI/MRCP Borderline common duct without obstructing lesion. Pancreas divisum. Marked hepatic steatosis.  - Seen by GI, Dr. Bertrand, would not pursue further evaluation for biliary disease  - Ammonia wnl  - Trend LFTs  - Lipid panel as above     Severe Vitamin D deficiency   - Start PO supplementation     Iron deficiency anemia   Bone marrow suppression from alcohol dependence   - B12 elevated, folate wnl   - Stop B12  - Started on PO iron supplementation     Hx of Opiate Abuse & Dependence  - Continue Suboxone   - Continue Seroquel 50mg at HS    Nicotine Use Disorder  - NRT   - Counseled on smoking cessation    HTN   - Continue Lisinopril with hold parameters     DVT Prophylaxis:  Lovenox 40 mg subcutaneous daily     Code status: Full code (Pt agrees to chest compressions and intubation if required).     Dispo: Continue IV antibiotics,  PT daily, d/c planning

## 2022-09-18 NOTE — PROGRESS NOTE ADULT - SUBJECTIVE AND OBJECTIVE BOX
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 He states that he coughs so much that it causes his to vomit. Reports nausea, constipation, dehydration Denies fevers, chills, chest pain, SOB, abdominal pain, N/V, diarrhea/constipation. Pt was due to start chemotherapy this Friday.   7/22/22: coughing up blood -> left upper lobe lung mass -> EBUS, was on CIWA for alcohol withdrawal   CTA: No pulmonary embolus. No contrast extravasation. Approximately 7.7 x 4.6 cm left upper lobe mass is stable. Increased interlobular septal thickening, patchy groundglass and solid opacities within left upper lobe and left lower lobe which can represent pneumonia, pneumonitis/alveolar hemorrhage. Stable 1.6 cm spiculated nodule within right middle lobe.   Pt was given Zosyn, 2500 ml of NS, Libirum, Zofran. He is being admitted to med/surg for further management.  (14 Sep 2022 21:12)    9/18  No acute pulmonary events occurred overnight  Hemoptysis episodes have not occurred in the last 24 hours       MEDICATIONS  (STANDING):  ascorbic acid 500 milliGRAM(s) Oral daily  azithromycin   Tablet 500 milliGRAM(s) Oral daily  budesonide  80 MICROgram(s)/formoterol 4.5 MICROgram(s) Inhaler 2 Puff(s) Inhalation two times a day  buprenorphine 8 mG/naloxone 2 mG SL Film 1 Film(s) SubLingual daily  enoxaparin Injectable 40 milliGRAM(s) SubCutaneous every 24 hours  ergocalciferol 64315 Unit(s) Oral every week  ferrous    sulfate 325 milliGRAM(s) Oral daily  folic acid 1 milliGRAM(s) Oral daily  influenza   Vaccine 0.5 milliLiter(s) IntraMuscular once  levothyroxine 25 MICROGram(s) Oral daily  lidocaine   4% Patch 1 Patch Transdermal daily  lisinopril 40 milliGRAM(s) Oral daily  LORazepam     Tablet   Oral   LORazepam     Tablet 0.5 milliGRAM(s) Oral every 4 hours  magnesium oxide 400 milliGRAM(s) Oral every 12 hours  multivitamin 1 Tablet(s) Oral daily  nicotine - 21 mG/24Hr(s) Patch 1 Patch Transdermal daily  piperacillin/tazobactam IVPB.. 3.375 Gram(s) IV Intermittent every 8 hours  QUEtiapine 50 milliGRAM(s) Oral at bedtime  sodium chloride 0.9% with potassium chloride 20 mEq/L 1000 milliLiter(s) (75 mL/Hr) IV Continuous <Continuous>  tiotropium 18 MICROgram(s) Capsule 1 Capsule(s) Inhalation daily    MEDICATIONS  (PRN):  acetaminophen     Tablet .. 650 milliGRAM(s) Oral every 6 hours PRN Temp greater or equal to 38C (100.4F), Mild Pain (1 - 3)  ALBUTerol    90 MICROgram(s) HFA Inhaler 2 Puff(s) Inhalation every 6 hours PRN Bronchospasm  aluminum hydroxide/magnesium hydroxide/simethicone Suspension 30 milliLiter(s) Oral every 4 hours PRN Dyspepsia  benzonatate 100 milliGRAM(s) Oral every 8 hours PRN Cough  cyclobenzaprine 10 milliGRAM(s) Oral three times a day PRN Muscle Spasm  guaiFENesin ER 1200 milliGRAM(s) Oral every 12 hours PRN Cough  melatonin 3 milliGRAM(s) Oral at bedtime PRN Insomnia  ondansetron Injectable 4 milliGRAM(s) IV Push every 8 hours PRN Nausea and/or Vomiting  prochlorperazine   Tablet 10 milliGRAM(s) Oral three times a day PRN nausea      Vital Signs Last 24 Hrs  T(C): 36.7 (17 Sep 2022 21:05), Max: 36.9 (17 Sep 2022 14:17)  T(F): 98.1 (17 Sep 2022 21:05), Max: 98.4 (17 Sep 2022 14:17)  HR: 87 (17 Sep 2022 21:05) (87 - 95)  BP: 139/97 (17 Sep 2022 21:05) (128/85 - 145/105)  BP(mean): 101 (17 Sep 2022 10:19) (101 - 114)  RR: 18 (17 Sep 2022 21:05) (17 - 18)  SpO2: 99% (17 Sep 2022 21:05) (96% - 99%)    Parameters below as of 17 Sep 2022 21:05  Patient On (Oxygen Delivery Method): nasal cannula  O2 Flow (L/min): 2      I&O's Summary    PHYSICAL EXAM  General Appearance: cooperative, no acute distress,   HEENT: PERRL, conjunctiva clear, EOM's intact, non injected pharynx, no exudate, TM   normal  Neck: Supple, , no adenopathy, thyroid: not enlarged, no carotid bruit or JVD  Back: Symmetric, no  tenderness,no soft tissue tenderness  Lungs: Coarse bilaterally   Heart: Regular rate and rhythm, S1, S2 normal, no murmur, rub or gallop  Abdomen: Soft, non-tender, bowel sounds active , no hepatosplenomegaly  Extremities: no cyanosis or edema, no joint swelling  Skin: Skin color, texture normal, no rashes   Neurologic: Alert and oriented X3 , cranial nerves intact, sensory and motor normal,    ECG:    LABS:                          9.1    6.37  )-----------( 219      ( 15 Sep 2022 06:32 )             27.4     09-15    131<L>  |  92<L>  |  12  ----------------------------<  158<H>  3.2<L>   |  33<H>  |  0.50    Ca    8.6      15 Sep 2022 06:32  Phos  1.6     09-15  Mg     1.8     09-15    TPro  5.8<L>  /  Alb  2.2<L>  /  TBili  2.4<H>  /  DBili  x   /  AST  360<H>  /  ALT  66  /  AlkPhos  377<H>  09-15            PT/INR - ( 14 Sep 2022 13:15 )   PT: 11.7 sec;   INR: 1.01 ratio         PTT - ( 14 Sep 2022 13:15 )  PTT:27.3 sec          RADIOLOGY & ADDITIONAL STUDIES:

## 2022-09-18 NOTE — PROGRESS NOTE ADULT - SUBJECTIVE AND OBJECTIVE BOX
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 He states that he coughs so much that it causes his to vomit. Reports nausea, constipation, dehydration. Denies fevers, chills, chest pain, SOB, abdominal pain. Pt was due to start chemotherapy this Friday.   ER course: SpO2 91% on room air. Labs: Hb 10.2, lactate 3.2, Na 134, K+ 3.1, glucose 135, albumin 2.7, alkaline phosphatase 325, AST 82. EKG: NSR with HR 82 bpm,  ms, no ST segment changes, T wave inversions in II/III/AVF present on prior EKG (personally reviewed). Pt was given Zosyn, 2500 ml of NS, Libirum, Zofran. He is being admitted to med/surg for further management.     Interval History: 9/18/22 pt seen and examined at bedside, EtOH withdrawal symptoms improved.+ cough, + DRUMMOND, + scant episodes of hemoptysis. + weakness and fatigue. Denies CP, palps, HA, dizziness, lightheadedness, n/v/d, constipation, dysuria, fever or chills.     REVIEW OF SYSTEMS: All other review of systems is negative unless indicated above.    PE:  Vitals reviewed for last 24 hours  T(C): 36.4 (09-18-22 @ 15:15), Max: 36.8 (09-18-22 @ 13:18)  T(F): 97.6 (09-18-22 @ 15:15), Max: 98.3 (09-18-22 @ 13:18)  HR: 87 (09-18-22 @ 15:15) (87 - 99)  BP: 134/97 (09-18-22 @ 15:15) (127/82 - 139/97)  RR: 18 (09-18-22 @ 15:15) (18 - 18)  SpO2: 99% (09-18-22 @ 15:15) (98% - 100%)  Wt(kg): --    Constitutional: NAD, awake and alert  HEENT: EOMI  Neck: Soft and supple, No JVD  Respiratory: Breath sounds are diminished bilaterally, No wheezing, rales or rhonchi  Cardiovascular: S1 and S2, regular rate and rhythm, no Murmurs  Gastrointestinal: Bowel Sounds present, soft, nontender, nondistended  Extremities: No peripheral edema  Vascular: 2+ peripheral pulses  Neurological: A/O x 3, no focal deficits  Musculoskeletal: 5/5 strength b/l upper and lower extremities  Skin: No rashes    LABS: All Labs Reviewed:  09-18    132<L>  |  102  |  6<L>  ----------------------------<  142<H>  4.0   |  23  |  0.63    Ca    8.9      18 Sep 2022 08:36  Phos  2.7     09-18  Mg     2.0     09-18    TPro  6.4  /  Alb  2.5<L>  /  TBili  0.9  /  DBili  x   /  AST  39<H>  /  ALT  40  /  AlkPhos  238<H>  09-18                            9.1    9.57  )-----------( 216      ( 18 Sep 2022 08:36 )             27.5             LIVER FUNCTIONS - ( 18 Sep 2022 08:36 )  Alb: 2.5 g/dL / Pro: 6.4 gm/dL / ALK PHOS: 238 U/L / ALT: 40 U/L / AST: 39 U/L / GGT: x                   09-17    135  |  99  |  5<L>  ----------------------------<  106<H>  3.4<L>   |  29  |  0.54    Ca    8.2<L>      17 Sep 2022 06:16  Phos  2.0     09-16  Mg     1.8     09-17    TPro  5.6<L>  /  Alb  2.2<L>  /  TBili  0.8  /  DBili  x   /  AST  65<H>  /  ALT  51  /  AlkPhos  264<H>  09-17                            8.3    6.25  )-----------( 192      ( 17 Sep 2022 06:16 )             25.1             LIVER FUNCTIONS - ( 17 Sep 2022 06:16 )  Alb: 2.2 g/dL / Pro: 5.6 gm/dL / ALK PHOS: 264 U/L / ALT: 51 U/L / AST: 65 U/L / GGT: x                    Troponin I, High Sensitivity (09.14.22 @ 13:15) 13.69-->(09.15.22 @ 09:06)  18.15  Lactate, Blood (09.14.22 @ 15:42) 3.2 mmol/L --> (09.14.22 @ 18:18) 2.2 mmol/L -->(09.15.22 @ 09:06) 1.0 mmol/L   Procalcitonin, Serum (09.14.22 @ 18:15)  0.18  Thyroid Stimulating Hormone, Serum (09.15.22 @ 06:32) 99.10 uU/mL   Free Thyroxine, Serum (09.15.22 @ 06:32) 0.24 ng/dL   Vitamin D, 25-Hydroxy (09.16.22 @ 07:01) <5.0  Iron with Total Binding Capacity in AM (09.16.22 @ 07:01)   Iron - Total Binding Capacity.: 208 ug/dL   % Saturation, Iron: 11 %   Iron Total, Serum: 22 ug/dL   Unsaturated Iron Binding Capacity: 186 ug/dL   Lipid Profile in AM (09.16.22 @ 07:01)   Cholesterol, Serum: 150 mg/dL   Triglycerides, Serum: 156 mg/dL   HDL Cholesterol, Serum: 43 mg/dL   Non HDL Cholesterol: 107  Vitamin B12, Serum in AM (09.16.22 @ 07:01)  >2000  Ammonia, Serum (09.16.22 @ 07:01)  19 umol/L   Folate, Serum in AM (09.16.22 @ 07:01) 13.5 ng/mL   Micro:    Legionella pneumophila Antigen, Urine (09.15.22 @ 02:00) Negative  Culture - Sputum . (09.15.22 @ 10:15) Rare Gram Positive Rods seen per oil power field Rare Gram positive cocci in pairs seen per oil power field   Culture - Blood (09.15.22 @ 09:06) No growth to date.   RADIOLOGY/EKG: all reviewed     12 Lead ECG (09.14.22 @ 13:00)   Ventricular Rate 82 BPM  Atrial Rate 82 BPM  P-R Interval 122 ms  QRS Duration 100 ms  Q-T Interval 420 ms  QTC Calculation(Bazett) 490 ms  P Axis 81 degrees  R Axis 86 degrees  T Axis -64 degrees  Normal sinus rhythm  ST & T wave abnormality, consider inferior ischemia  Prolonged QT  Abnormal ECG    12 Lead ECG (09.15.22 @ 19:40)   Ventricular Rate 97 BPM  Atrial Rate 97 BPM  P-R Interval 126 ms  QRS Duration 82 ms  Q-T Interval 362 ms  QTC Calculation(Bazett) 459 ms  P Axis 82 degrees  R Axis 80 degrees  T Axis 75 degrees  Diagnosis Line Normal sinus rhythm  ST & T wave abnormality, consider anterolateral ischemia  Abnormal ECG    CT Angio Chest PE Protocol w/ IV Cont (09.14.22 @ 13:31)   FINDINGS:  PULMONARY VESSELS: No pulmonary embolus. No evidence of contrast extravasation represent active hemorrhage.  HEART AND VASCULATURE: Heart size is normal. No pericardial effusion. No aortic aneurysm or dissection.  LUNGS, AIRWAYS, PLEURA: Patent central airways. Approximately 7.7 x 4.6 cm left upper lobe cavitary mass is stable. Increased interlobular septal thickening, patchy groundglass and solid opacities within left upper lobe and left lower lobe which can represent pneumonia or pneumonitis. Stable 1.6 cm spiculated nodule within right middle lobe. No pleural effusions or pneumothorax.  MEDIASTINUM: Stable mediastinal and hilar lymph nodes for reference: 1.8 cm left hilar lymph node and 2 cm short axis subcarinal lymph node.  UPPER ABDOMEN: Hepatic steatosis.  BONES AND SOFT TISSUES: No aggressive osseous lesion. Stable enlarged left axillary lymph nodes.  LOWER NECK: Within normal limits.  IMPRESSION:  No pulmonary embolus. No contrast extravasation.  Approximately 7.7 x 4.6 cm left upper lobe mass is stable. Increased interlobular septal thickening, patchy groundglass and solid opacities within left upper lobe and left lower lobe which can represent pneumonia, pneumonitis/alveolar hemorrhage.    US Abdomen Complete (US Abdomen Complete .) (09.15.22 @ 16:01)   FINDINGS:  Liver: Enlarged with steatosis.  Bile ducts: Normal caliber. Common bile duct measures 11 mm.  Gallbladder: Distended with sludge and stones.  Pancreas: Visualized portions are within normal limits. Pancreatic duct is upper limits ofnormal in size measuring 3 mm.  Spleen: 10.5 cm. Within normal limits. Adjacent splenule.  Right kidney: 11.1 cm. No hydronephrosis.  Left kidney: 10.7 cm. No hydronephrosis.  Ascites: None.  Aorta and IVC: Visualized portions are within normal limits.  IMPRESSION:  Hepatomegaly and steatosis.  Gallbladder sludge and stones with a dilated extrahepatic biliary tree.   MRI/MRCP is advised for further evaluation.  Pancreatic duct is upper limits of normal and can also be better assessed at the time of MRI.    Meds:  MEDICATIONS  (STANDING):  ascorbic acid 500 milliGRAM(s) Oral daily  azithromycin   Tablet 500 milliGRAM(s) Oral daily  budesonide  80 MICROgram(s)/formoterol 4.5 MICROgram(s) Inhaler 2 Puff(s) Inhalation two times a day  buprenorphine 8 mG/naloxone 2 mG SL Film 1 Film(s) SubLingual daily  cyanocobalamin 1000 MICROGram(s) Oral daily  enoxaparin Injectable 40 milliGRAM(s) SubCutaneous every 24 hours  folic acid 1 milliGRAM(s) Oral daily  influenza   Vaccine 0.5 milliLiter(s) IntraMuscular once  levothyroxine 25 MICROGram(s) Oral daily  lidocaine   4% Patch 1 Patch Transdermal daily  lisinopril 40 milliGRAM(s) Oral daily  LORazepam     Tablet   Oral   LORazepam     Tablet 1.5 milliGRAM(s) Oral every 4 hours  multivitamin 1 Tablet(s) Oral daily  nicotine - 21 mG/24Hr(s) Patch 1 Patch Transdermal daily  piperacillin/tazobactam IVPB.. 3.375 Gram(s) IV Intermittent every 8 hours  QUEtiapine 50 milliGRAM(s) Oral at bedtime  sodium chloride 0.9% with potassium chloride 20 mEq/L 1000 milliLiter(s) (75 mL/Hr) IV Continuous <Continuous>  thiamine 100 milliGRAM(s) Oral daily  tiotropium 18 MICROgram(s) Capsule 1 Capsule(s) Inhalation daily    MEDICATIONS  (PRN):  acetaminophen     Tablet .. 650 milliGRAM(s) Oral every 6 hours PRN Temp greater or equal to 38C (100.4F), Mild Pain (1 - 3)  ALBUTerol    90 MICROgram(s) HFA Inhaler 2 Puff(s) Inhalation every 6 hours PRN Bronchospasm  aluminum hydroxide/magnesium hydroxide/simethicone Suspension 30 milliLiter(s) Oral every 4 hours PRN Dyspepsia  benzonatate 100 milliGRAM(s) Oral every 8 hours PRN Cough  cyclobenzaprine 10 milliGRAM(s) Oral three times a day PRN Muscle Spasm  guaiFENesin ER 1200 milliGRAM(s) Oral every 12 hours PRN Cough  melatonin 3 milliGRAM(s) Oral at bedtime PRN Insomnia  ondansetron Injectable 4 milliGRAM(s) IV Push every 8 hours PRN Nausea and/or Vomiting  prochlorperazine   Tablet 10 milliGRAM(s) Oral three times a day PRN nausea

## 2022-09-18 NOTE — PROGRESS NOTE ADULT - ASSESSMENT
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 He states that he coughs so much that it causes his to vomit. Reports nausea, constipation, dehydration Denies fevers, chills, chest pain, SOB, abdominal pain, N/V, diarrhea/constipation. Pt was due to start chemotherapy this Friday.   7/22/22: coughing up blood -> left upper lobe lung mass -> EBUS, was on CIWA for alcohol withdrawal   CTA: No pulmonary embolus. No contrast extravasation. Approximately 7.7 x 4.6 cm left upper lobe mass is stable. Increased interlobular septal thickening, patchy groundglass and solid opacities within left upper lobe and left lower lobe which can represent pneumonia, pneumonitis/alveolar hemorrhage. Stable 1.6 cm spiculated nodule within right middle lobe.   Pt was given Zosyn, 2500 ml of NS, Libirum, Zofran. He is being admitted to med/surg for further management.  (14 Sep 2022 21:12)  Agree with treating for pneumonia  he will not be able to receive immunotherapy until he has deemed to be abstinent from ETOH. This has been discussed extensively with the patient  Will continue to monitor H/H  No massive hemoptysis occurring at the present time, scant, likely 2/2 to pneumonia/cancer  Will discuss possibility of radiation with oncology service. No bronchoscopic intervention recommended but would discuss further with CTS if hemoptysis persists or Hgb drops further .   Will monitor closely

## 2022-09-18 NOTE — PROGRESS NOTE ADULT - ASSESSMENT
51 y/o M presents with hemoptysis     Acute hypoxemic respiratory failure  and Hemoptysis due to   probable post-obstructive Pneumonia , alveolar hemorrhage, Pneumonitis ,  suspected gram negative rods super-imposed on   SHAILA NSCLC Adenocarcinoma   - CTA: No pulmonary embolus; Approximately 7.7 x 4.6 cm left upper lobe mass is stable; Increased interlobular septal thickening, patchy groundglass and solid opacities within left upper lobe and left lower lobe which can represent pneumonia, pneumonitis/alveolar hemorrhage  - Lactate 3.2-->2.2-->1.0  - Continue supplemental O2 to maintain SpO2 > 92%   - s/p Zosyn & Vanco in the ER; Continue Zosyn, Started on Azithromycin by ID   - Continue Albuterol PRN, Symbicort, & Spiriva  - Tessalon Pearle PRN   - ID consult - Dr. Archibald   - Pulmonology consult - Dr. Akbar, no role for steroids   - Oncology consult- Dr. Trammell - ? role of radiation therapy   - rad onc consult - pending   - O2 on ambulation 9/18 - no need for home O2    Acute EtOH Withdrawal  - Drinks 2 pints of Vodka Daily, last drink 9/14/22 at 6am  - Ativan Taper/CIWA Protocol - CIWA score low 0-1  - SW Consult  - May require IP Substance Abuse Rehab Prior to Cancer Tx.  - HOLD Xanax while on Ativan Taper   - Thiamine, Folate, Multivitamin     Hypokalemia, Hypophosphatemia  , resolved   - Due to EtOH Consumption  - Supplement Lytes as needed  - Trend BMP  - Calcium corrected for albumin 9.8     Abnormal EKG    - ST & T wave abnormality, consider inferior ischemia  - Repeat EKG no change   - Troponin negative x 2 , repeat in am  -   - 2 d echo - EF wnl     Hypothyroidism, new  - TSH low, Free T4 high  - Start low dose Synthroid  - F/U OP in 4 weeks for repeat TFTs   - From NM PET/CT Onc FDG Skull to Thigh, Initial (08.13.22 @ 13:45): There is an approximately symmetric enlargement of bilateral thyroid lobes with heterogeneous FDG avidity, significantly more prominent on the left with SUV 37.2 (image 70). Physiologic FDG activity in visualized brain, major salivary glands, and neck muscles.    Abnormal LFTs due to Hepatic steatosis and ETOH use   mild hypertriglyceridemia   - US abdomen demonstrates Hepatomegaly and steatosis; Gallbladder sludge and stones with a dilated extrahepatic biliary tree  - MRI/MRCP Borderline common duct without obstructing lesion. Pancreas divisum. Marked hepatic steatosis.  - Seen by GI, Dr. Bertrand, would not pursue further evaluation for biliary disease  - Ammonia wnl  - Trend LFTs  - Lipid panel as above     Severe Vitamin D deficiency   - Start PO supplementation     Iron deficiency anemia   Bone marrow suppression from alcohol dependence   - B12 elevated, folate wnl   - Stop B12  - Started on PO iron supplementation     Hx of Opiate Abuse & Dependence  - Continue Suboxone   - Continue Seroquel 50mg at HS    Nicotine Use Disorder  - NRT   - Counseled on smoking cessation    HTN   - Continue Lisinopril with hold parameters     DVT Prophylaxis:  Lovenox 40 mg subcutaneous daily     Code status: Full code (Pt agrees to chest compressions and intubation if required).     Dispo: Continue IV antibiotics,  PT daily, d/c planning

## 2022-09-19 NOTE — PROGRESS NOTE ADULT - SUBJECTIVE AND OBJECTIVE BOX
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 He states that he coughs so much that it causes his to vomit. Reports nausea, constipation, dehydration. Denies fevers, chills, chest pain, SOB, abdominal pain. Pt was due to start chemotherapy this Friday.   ER course: SpO2 91% on room air. Labs: Hb 10.2, lactate 3.2, Na 134, K+ 3.1, glucose 135, albumin 2.7, alkaline phosphatase 325, AST 82. EKG: NSR with HR 82 bpm,  ms, no ST segment changes, T wave inversions in II/III/AVF present on prior EKG (personally reviewed). Pt was given Zosyn, 2500 ml of NS, Libirum, Zofran. He is being admitted to med/surg for further management.     Interval History: 9/19/22 pt seen and examined at bedside, EtOH withdrawal symptoms improved, although still anxious. + cough, no longer experiencing DRUMMOND, no longer having hemoptysis. + weakness and fatigue. + left shoulder/back pain. Denies CP, palps, HA, dizziness, lightheadedness, n/v/d, constipation, dysuria, fever or chills.     REVIEW OF SYSTEMS: All other review of systems is negative unless indicated above.    PE:  Vital Signs Last 24 Hrs: all reviewed   T(C): 36.9 (19 Sep 2022 08:18), Max: 36.9 (18 Sep 2022 17:46)  T(F): 98.4 (19 Sep 2022 08:18), Max: 98.4 (18 Sep 2022 17:46)  HR: 90 (19 Sep 2022 13:00) (87 - 99)  BP: 139/96 (19 Sep 2022 13:00) (134/97 - 145/98)  RR: 17 (19 Sep 2022 13:00) (17 - 18)  SpO2: 99% (19 Sep 2022 13:00) (95% - 99%)  Parameters below as of 19 Sep 2022 13:00  Patient On (Oxygen Delivery Method): room air    Constitutional: NAD, awake and alert  HEENT: EOMI  Neck: Soft and supple, No JVD  Respiratory: Breath sounds are diminished bilaterally, No wheezing, rales or rhonchi  Cardiovascular: S1 and S2, regular rate and rhythm, no Murmurs  Gastrointestinal: Bowel Sounds present, soft, nontender, nondistended  Extremities: No peripheral edema  Vascular: 2+ peripheral pulses  Neurological: A/O x 3, no focal deficits  Musculoskeletal: 5/5 strength b/l upper and lower extremities  Skin: No rashes    LABS: All Labs Reviewed:                        9.1    9.03  )-----------( 245      ( 19 Sep 2022 06:24 )             25.8   09-19    132<L>  |  100  |  7   ----------------------------<  110<H>  4.3   |  25  |  0.68    Ca    9.1      19 Sep 2022 06:24  Phos  2.7     09-18  Mg     2.0     09-18    TPro  6.4  /  Alb  2.5<L>  /  TBili  0.9  /  DBili  x   /  AST  39<H>  /  ALT  40  /  AlkPhos  238<H>  09-18    PT/INR - ( 14 Sep 2022 13:15 )   PT: 11.7 sec;   INR: 1.01 ratio    PTT - ( 14 Sep 2022 13:15 )  PTT:27.3 sec  Troponin I, High Sensitivity (09.14.22 @ 13:15) 13.69-->(09.15.22 @ 09:06)  18.15  Lactate, Blood (09.14.22 @ 15:42) 3.2 mmol/L --> (09.14.22 @ 18:18) 2.2 mmol/L -->(09.15.22 @ 09:06) 1.0 mmol/L   Procalcitonin, Serum (09.14.22 @ 18:15)  0.18  Thyroid Stimulating Hormone, Serum (09.15.22 @ 06:32) 99.10 uU/mL   Free Thyroxine, Serum (09.15.22 @ 06:32) 0.24 ng/dL   Vitamin D, 25-Hydroxy (09.16.22 @ 07:01) <5.0  Iron with Total Binding Capacity in AM (09.16.22 @ 07:01)   Iron - Total Binding Capacity.: 208 ug/dL   % Saturation, Iron: 11 %   Iron Total, Serum: 22 ug/dL   Unsaturated Iron Binding Capacity: 186 ug/dL   Lipid Profile in AM (09.16.22 @ 07:01)   Cholesterol, Serum: 150 mg/dL   Triglycerides, Serum: 156 mg/dL   HDL Cholesterol, Serum: 43 mg/dL   Non HDL Cholesterol: 107  Vitamin B12, Serum in AM (09.16.22 @ 07:01)  >2000  Ammonia, Serum (09.16.22 @ 07:01)  19 umol/L   Folate, Serum in AM (09.16.22 @ 07:01) 13.5 ng/mL   Micro:    Legionella pneumophila Antigen, Urine (09.15.22 @ 02:00) Negative  Culture - Sputum . (09.15.22 @ 10:15) Rare Gram Positive Rods seen per oil power field Rare Gram positive cocci in pairs seen per oil power field   Culture - Blood (09.15.22 @ 09:06) No growth to date.   RADIOLOGY/EKG: all reviewed     12 Lead ECG (09.14.22 @ 13:00)   Ventricular Rate 82 BPM  Atrial Rate 82 BPM  P-R Interval 122 ms  QRS Duration 100 ms  Q-T Interval 420 ms  QTC Calculation(Bazett) 490 ms  P Axis 81 degrees  R Axis 86 degrees  T Axis -64 degrees  Normal sinus rhythm  ST & T wave abnormality, consider inferior ischemia  Prolonged QT  Abnormal ECG    12 Lead ECG (09.15.22 @ 19:40)   Ventricular Rate 97 BPM  Atrial Rate 97 BPM  P-R Interval 126 ms  QRS Duration 82 ms  Q-T Interval 362 ms  QTC Calculation(Bazett) 459 ms  P Axis 82 degrees  R Axis 80 degrees  T Axis 75 degrees  Diagnosis Line Normal sinus rhythm  ST & T wave abnormality, consider anterolateral ischemia  Abnormal ECG    CT Angio Chest PE Protocol w/ IV Cont (09.14.22 @ 13:31)   FINDINGS:  PULMONARY VESSELS: No pulmonary embolus. No evidence of contrast extravasation represent active hemorrhage.  HEART AND VASCULATURE: Heart size is normal. No pericardial effusion. No aortic aneurysm or dissection.  LUNGS, AIRWAYS, PLEURA: Patent central airways. Approximately 7.7 x 4.6 cm left upper lobe cavitary mass is stable. Increased interlobular septal thickening, patchy groundglass and solid opacities within left upper lobe and left lower lobe which can represent pneumonia or pneumonitis. Stable 1.6 cm spiculated nodule within right middle lobe. No pleural effusions or pneumothorax.  MEDIASTINUM: Stable mediastinal and hilar lymph nodes for reference: 1.8 cm left hilar lymph node and 2 cm short axis subcarinal lymph node.  UPPER ABDOMEN: Hepatic steatosis.  BONES AND SOFT TISSUES: No aggressive osseous lesion. Stable enlarged left axillary lymph nodes.  LOWER NECK: Within normal limits.  IMPRESSION:  No pulmonary embolus. No contrast extravasation.  Approximately 7.7 x 4.6 cm left upper lobe mass is stable. Increased interlobular septal thickening, patchy groundglass and solid opacities within left upper lobe and left lower lobe which can represent pneumonia, pneumonitis/alveolar hemorrhage.    US Abdomen Complete (US Abdomen Complete .) (09.15.22 @ 16:01)   FINDINGS:  Liver: Enlarged with steatosis.  Bile ducts: Normal caliber. Common bile duct measures 11 mm.  Gallbladder: Distended with sludge and stones.  Pancreas: Visualized portions are within normal limits. Pancreatic duct is upper limits ofnormal in size measuring 3 mm.  Spleen: 10.5 cm. Within normal limits. Adjacent splenule.  Right kidney: 11.1 cm. No hydronephrosis.  Left kidney: 10.7 cm. No hydronephrosis.  Ascites: None.  Aorta and IVC: Visualized portions are within normal limits.  IMPRESSION:  Hepatomegaly and steatosis.  Gallbladder sludge and stones with a dilated extrahepatic biliary tree.   MRI/MRCP is advised for further evaluation.  Pancreatic duct is upper limits of normal and can also be better assessed at the time of MRI.    MR MRCP w/wo IV Cont (09.16.22 @ 11:44)   FINDINGS:  LOWER CHEST: Left lower lobe consolidation similar to recent chest CT. Small bilateral pleural effusions, larger on the left  LIVER: Marked steatosis.  BILE DUCTS: Borderline common duct measuring 8 mm. No choledocholithiasis identified.  GALLBLADDER: No gallstones visualized.  SPLEEN: Within normal limits.  PANCREAS: Pancreas divisum  ADRENALS: Within normal limits.  KIDNEYS/URETERS: Within normal limits.  VISUALIZED PORTIONS:  BOWEL: Within normal limits.  PERITONEUM: No ascites.  VESSELS: Within normal limits.  RETROPERITONEUM/LYMPH NODES: No lymphadenopathy.  ABDOMINAL WALL: Within normal limits.  BONES: Within normal limits.  IMPRESSION:  Borderline common duct without obstructing lesion.  Pancreas divisum.  Marked hepatic steatosis.    TTE Echo Complete w/o Contrast w/ Doppler (09.16.22 @ 11:08)   Impression  Summary  Minimally reduced left ventricular systolic function.  Estimated left ventricular ejection fraction is 50 %.  Normal appearing left atrium.  Normal appearing right atrium.  Normal appearing right ventricle structure and function.  Normal aortic valve structure and function.  Normal appearing mitral valve structure and function.   Mild (1+) mitral regurgitation is present.  EA reversal of the mitral inflow consistent with reduced compliance of the left ventricle.  Trace tricuspid valve regurgitation is present.  Normal appearing pulmonic valve structure and function.  No evidence of pericardial effusion.    Meds:  MEDICATIONS  (STANDING):  ascorbic acid 500 milliGRAM(s) Oral daily  budesonide  80 MICROgram(s)/formoterol 4.5 MICROgram(s) Inhaler 2 Puff(s) Inhalation two times a day  buprenorphine 8 mG/naloxone 2 mG SL Film 1 Film(s) SubLingual daily  enoxaparin Injectable 40 milliGRAM(s) SubCutaneous every 24 hours  ergocalciferol 28824 Unit(s) Oral every week  ferrous    sulfate 325 milliGRAM(s) Oral daily  folic acid 1 milliGRAM(s) Oral daily  influenza   Vaccine 0.5 milliLiter(s) IntraMuscular once  levothyroxine 25 MICROGram(s) Oral daily  lidocaine   4% Patch 1 Patch Transdermal daily  lisinopril 40 milliGRAM(s) Oral daily  multivitamin 1 Tablet(s) Oral daily  nicotine - 21 mG/24Hr(s) Patch 1 Patch Transdermal daily  piperacillin/tazobactam IVPB.. 3.375 Gram(s) IV Intermittent every 8 hours  QUEtiapine 50 milliGRAM(s) Oral at bedtime  tiotropium 18 MICROgram(s) Capsule 1 Capsule(s) Inhalation daily    MEDICATIONS  (PRN):  acetaminophen     Tablet .. 650 milliGRAM(s) Oral every 6 hours PRN Temp greater or equal to 38C (100.4F), Mild Pain (1 - 3)  ALBUTerol    90 MICROgram(s) HFA Inhaler 2 Puff(s) Inhalation every 6 hours PRN Bronchospasm  ALPRAZolam 0.5 milliGRAM(s) Oral every 12 hours PRN anxiety  aluminum hydroxide/magnesium hydroxide/simethicone Suspension 30 milliLiter(s) Oral every 4 hours PRN Dyspepsia  benzonatate 100 milliGRAM(s) Oral every 8 hours PRN Cough  cyclobenzaprine 10 milliGRAM(s) Oral three times a day PRN Muscle Spasm  guaiFENesin ER 1200 milliGRAM(s) Oral every 12 hours PRN Cough  melatonin 3 milliGRAM(s) Oral at bedtime PRN Insomnia  ondansetron Injectable 4 milliGRAM(s) IV Push every 8 hours PRN Nausea and/or Vomiting  prochlorperazine   Tablet 10 milliGRAM(s) Oral three times a day PRN nausea

## 2022-09-19 NOTE — PROGRESS NOTE ADULT - ASSESSMENT
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 He states that he coughs so much that it causes him to vomit. Reports nausea, constipation, dehydration Denies fevers, chills at home. Pt was due to start chemotherapy this Friday. In ER he received zosyn IV and vancomycin IV.     1. Left side pneumonia ?postobstructive pneumonia. Lung Ca. Immunocompromised host.   -hemoptysis ?related to underlying lung CA vs infection  -BC x 2, urine c.s, sputum c/s noted  -on zosyn 3.375 gm IV q8h nad azithromycin 500 mg PO qd # 5  -obtain vancomycin trough level   -respiratory care  -d/c azithromycin  -continue abx coverage; plan to change to PO if ready for discharge  -f/u cultures  -monitor temps  -f/u CBC  -supportive care  2. Other issues:   -care per medicine    d/w medicine

## 2022-09-19 NOTE — CONSULT NOTE ADULT - ASSESSMENT
53 y/o male, hx of substance abuse, with newly diagnosed locally advanced adenoca of left lung with metastases to hilar, mediastinal, supraclavicular nodes, multiple neck nodes, and contralateral lung.  He initially presented with hemoptysis in July 2022 and continues to have intermittent hemoptysis.  Currently also has postobstructive pneumonia being treated with antibiotics.  Has not yet started systemic therapy as he was undergoing staging workup.  Recommend palliative RT to intrathoracic disease (primary tumor and likely endobronchial component) given the multiple recurring episodes of hemoptysis.  RT can palliate both the bleeding and the obstruction.   Elio was given my card to contact me upon discharge so we can start RT as an outpatient.  Will discuss and coordinate with Dr. Trammell.

## 2022-09-19 NOTE — CONSULT NOTE ADULT - SUBJECTIVE AND OBJECTIVE BOX
52 year old male who presented to [facility] with hemoptysis x 3 weeks as well as a 10 pound weight loss in July 2022.  CT Chest demonstrated a large 7 cm left upper lobe mass with enlarged AP window lymph nodes and subcarinal lymph node.  His past medical history includes HTN, substance use on suboxone, alcohol use s/p detox with DTs, smoker.  He is status post Flexible Bronchoscopy, EBUS with FNA Biopsy of subcarinal lymph node on 7/18/22. Pathology reveals scanty fragments of poorly differentiated non-small cell carcinoma consistent with adenocarcinoma.  Patient was d/c’d and followed up with Dr. Trammell for Medical Oncology eval.  PET scan 8-13-22 shows left upper lobe mass, right pulmonary nodule, multiple mediastinal and supraclavicular nodes that are positive.   Patient was scheduled to start chemotx last week, but was admitted once again to  on 9-14-22 because of cough, SOB, and hemoptysis.  He was found to have postobstructive pneumonia in [place] and started on Zosyn.  He notes a 10 lb weight loss over the past one month.  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]  MEDICATIONS  (STANDING):  ascorbic acid 500 milliGRAM(s) Oral daily  azithromycin   Tablet 500 milliGRAM(s) Oral daily  budesonide  80 MICROgram(s)/formoterol 4.5 MICROgram(s) Inhaler 2 Puff(s) Inhalation two times a day  buprenorphine 8 mG/naloxone 2 mG SL Film 1 Film(s) SubLingual daily  cyanocobalamin 1000 MICROGram(s) Oral daily  enoxaparin Injectable 40 milliGRAM(s) SubCutaneous every 24 hours  folic acid 1 milliGRAM(s) Oral daily  influenza   Vaccine 0.5 milliLiter(s) IntraMuscular once  levothyroxine 25 MICROGram(s) Oral daily  lidocaine   4% Patch 1 Patch Transdermal daily  lisinopril 40 milliGRAM(s) Oral daily  LORazepam     Tablet   Oral   LORazepam     Tablet 1.5 milliGRAM(s) Oral every 4 hours  multivitamin 1 Tablet(s) Oral daily  nicotine - 21 mG/24Hr(s) Patch 1 Patch Transdermal daily  piperacillin/tazobactam IVPB.. 3.375 Gram(s) IV Intermittent every 8 hours  QUEtiapine 50 milliGRAM(s) Oral at bedtime  sodium chloride 0.9% with potassium chloride 20 mEq/L 1000 milliLiter(s) (75 mL/Hr) IV Continuous <Continuous>  thiamine 100 milliGRAM(s) Oral daily  tiotropium 18 MICROgram(s) Capsule 1 Capsule(s) Inhalation daily    MEDICATIONS  (PRN):  acetaminophen     Tablet .. 650 milliGRAM(s) Oral every 6 hours PRN Temp greater or equal to 38C (100.4F), Mild Pain (1 - 3)  ALBUTerol    90 MICROgram(s) HFA Inhaler 2 Puff(s) Inhalation every 6 hours PRN Bronchospasm  aluminum hydroxide/magnesium hydroxide/simethicone Suspension 30 milliLiter(s) Oral every 4 hours PRN Dyspepsia  benzonatate 100 milliGRAM(s) Oral every 8 hours PRN Cough  cyclobenzaprine 10 milliGRAM(s) Oral three times a day PRN Muscle Spasm  guaiFENesin ER 1200 milliGRAM(s) Oral every 12 hours PRN Cough  melatonin 3 milliGRAM(s) Oral at bedtime PRN Insomnia  ondansetron Injectable 4 milliGRAM(s) IV Push every 8 hours PRN Nausea and/or Vomiting  prochlorperazine   Tablet 10 milliGRAM(s) Oral three times a day PRN nausea    T(C): 36.4 (09-18-22 @ 15:15), Max: 36.8 (09-18-22 @ 13:18)  T(F): 97.6 (09-18-22 @ 15:15), Max: 98.3 (09-18-22 @ 13:18)  HR: 87 (09-18-22 @ 15:15) (87 - 99)  BP: 134/97 (09-18-22 @ 15:15) (127/82 - 139/97)  RR: 18 (09-18-22 @ 15:15) (18 - 18)  SpO2: 99% (09-18-22 @ 15:15) (98% - 100%)  Wt(kg): --    EXAM:  Constitutional: NAD, awake and alert.  KS is 80.  HEENT: EOMI  Neck: Soft and supple, No JVD  Respiratory: Breath sounds are diminished bilaterally, No wheezing, rales or rhonchi  Cardiovascular: S1 and S2, regular rate and rhythm, no Murmurs  Gastrointestinal: Bowel Sounds present, soft, nontender, nondistended  Extremities: No peripheral edema  Vascular: 2+ peripheral pulses  Neurological: A/O x 3, no focal deficits  Musculoskeletal: 5/5 strength b/l upper and lower extremities  Skin: No rashes    LABS:  132<L>  |  102  |  6<L>  ----------------------------<  142<H>  4.0   |  23  |  0.63    Ca    8.9      18 Sep 2022 08:36  Phos  2.7     09-18  Mg     2.0     09-18  TPro  6.4  /  Alb  2.5<L>  /  TBili  0.9  /  DBili  x   /  AST  39<H>  /  ALT  40  /  AlkPhos  238<H>                          9.1    9.57  )-----------( 216      ( 18 Sep 2022 08:36 )             27.5     CT Angio Chest PE Protocol w/ IV Cont (09.14.22 @ 13:31)   FINDINGS:  PULMONARY VESSELS: No pulmonary embolus. No evidence of contrast extravasation represent active hemorrhage.  HEART AND VASCULATURE: Heart size is normal. No pericardial effusion. No aortic aneurysm or dissection.  LUNGS, AIRWAYS, PLEURA: Patent central airways. Approximately 7.7 x 4.6 cm left upper lobe cavitary mass is stable. Increased interlobular septal thickening, patchy groundglass and solid opacities within left upper lobe and left lower lobe which can represent pneumonia or pneumonitis. Stable 1.6 cm spiculated nodule within right middle lobe. No pleural effusions or pneumothorax.  MEDIASTINUM: Stable mediastinal and hilar lymph nodes for reference: 1.8 cm left hilar lymph node and 2 cm short axis subcarinal lymph node.  UPPER ABDOMEN: Hepatic steatosis.  BONES AND SOFT TISSUES: No aggressive osseous lesion. Stable enlarged left axillary lymph nodes.  LOWER NECK: Within normal limits.  IMPRESSION:  No pulmonary embolus. No contrast extravasation.  Approximately 7.7 x 4.6 cm left upper lobe mass is stable. Increased interlobular septal thickening, patchy groundglass and solid opacities within left upper lobe and left lower lobe which can represent pneumonia, pneumonitis/alveolar hemorrhage.

## 2022-09-19 NOTE — PROGRESS NOTE ADULT - ASSESSMENT
53 y/o M presents with hemoptysis     Acute hypoxemic respiratory failure and Hemoptysis secondary to probable post-obstructive pneumonia, alveolar hemorrhage, Pneumonitis superimposed on  SHAILA NSMLC Adenocarcinoma   - CTA: No pulmonary embolus; Approximately 7.7 x 4.6 cm left upper lobe mass is stable; Increased interlobular septal thickening, patchy groundglass and solid opacities within left upper lobe and left lower lobe which can represent pneumonia, pneumonitis/alveolar hemorrhage  - Lactate 3.2-->2.2-->1.0  - Continue supplemental O2 to maintain SpO2 > 92%   - s/p Zosyn & Vanco in the ER; s/p  Azithromycin; Continue Zosyn  - Continue Albuterol PRN, Symbicort, & Spiriva  - Tessalon Pearle PRN   - ID consult - Dr. Archibald   - Pulmonology consult - Dr. Akbar, no role for steroids   - Oncology consult- Dr. Trammell   - Rad/Onc consult pending     Acute EtOH Withdrawal  - Drinks 2 pints of Vodka Daily, last drink 9/14/22 at 6am  - s/p Ativan Taper/CIWA Protocol  - SW Consult  - May require IP Substance Abuse Rehab Prior to Cancer Tx.  - Resume Xanax PRN at lower dose, 0.5mg PO q12h   - Folate and Multivitamin     Electrolyte Abnormality   - Due to EtOH Consumption  - Supplement Lytes as needed  - Trend BMP  - Calcium corrected for albumin 9.8     EKG Abnormality  - ST & T wave abnormality, consider inferior ischemia  - Repeat EKG no change   - Troponin negative x 2   -   - 2d echo as above     Hypothyroidism, new  - TSH low, Free T4 high  - Start low dose Synthroid  - F/U OP in 4 weeks for repeat TFTs   - From NM PET/CT Onc FDG Skull to Thigh, Initial (08.13.22 @ 13:45): There is an approximately symmetric enlargement of bilateral thyroid lobes with heterogeneous FDG avidity, significantly more prominent on the left with SUV 37.2 (image 70). Physiologic FDG activity in visualized brain, major salivary glands, and neck muscles.    Abnormal LFTs due to Hepatic Steatosis and EtOH abuse  Hypertriglyceridemia   - US abdomen demonstrates Hepatomegaly and steatosis; Gallbladder sludge and stones with a dilated extrahepatic biliary tree  - MRI/MRCP Borderline common duct without obstructing lesion; Pancreas divisum; Marked hepatic steatosis  - Seen by GI, Dr. Purow, would not pursue further evaluation for biliary disease  - Ammonia wnl  - Trend LFTs  - Lipid panel as above     Severe Vitamin D deficiency   - Start PO supplementation     Iron deficiency anemia   Bone marrow suppression from alcohol dependence   - B12 elevated, folate wnl   - Stop B12  - Started on PO iron supplementation     Hyperglycemia, clinically insignificant  - A1c 5.1    Hx of Opiate Abuse & Dependence  - Continue Suboxone   - Continue Seroquel 50mg at HS    Nicotine Use Disorder  - NRT   - Counseled on smoking cessation    HTN   - Continue Lisinopril with hold parameters     DVT Prophylaxis:  Lovenox 40 mg subcutaneous daily     Code status: Full code (Pt agrees to chest compressions and intubation if required).     Dispo: Continue IV antibiotics, PT, f/u rad/onc recs, likely DC home tomorrow.

## 2022-09-20 NOTE — PROGRESS NOTE ADULT - ASSESSMENT
51 y/o M presents with hemoptysis     Acute hypoxemic respiratory failure and Hemoptysis secondary to probable post-obstructive pneumonia, alveolar hemorrhage, Pneumonitis superimposed on  SHAILA NSMLC Adenocarcinoma   - CTA: No pulmonary embolus; Approximately 7.7 x 4.6 cm left upper lobe mass is stable; Increased interlobular septal thickening, patchy groundglass and solid opacities within left upper lobe and left lower lobe which can represent pneumonia, pneumonitis/alveolar hemorrhage  - Lactate 3.2-->2.2-->1.0  - Continue supplemental O2 to maintain SpO2 > 92%   - s/p Zosyn & Vanco in the ER; s/p  Azithromycin; Continue Zosyn; when ready for DC Ceftin 500mg BID x 5 days   - Continue Albuterol PRN, Symbicort, & Spiriva  - Tessalon Pearle PRN   - ID consult - Dr. Archibald   - Pulmonology consult - Dr. Akbar, no role for steroids   - Oncology consult- Dr. Trammell   - Rad/Onc consult Dr. Rose, pt to f/u OP    Acute EtOH Withdrawal  - Drinks 2 pints of Vodka Daily, last drink 9/14/22 at 6am  - s/p Ativan Taper/CIWA Protocol  - SW Consult  - May require IP Substance Abuse Rehab Prior to Cancer Tx.; Pt refusing   - Resume Xanax PRN at lower dose, 0.5mg PO q12h; 9/20 changed back to 1mg BID as pt symptomatic   - Folate and Multivitamin   - Started on Gabapentin 100mg TID  - Psych consulted      Hyponatremia   - Likely due to hypovolemia from vomiting   - IVF NS at 65ml/hr x 24h     Electrolyte Abnormality   - Due to EtOH Consumption  - Supplement Lytes as needed  - Trend BMP  - Calcium corrected for albumin 9.8     EKG Abnormality  - ST & T wave abnormality, consider inferior ischemia  - Repeat EKG no change   - Troponin negative x 2   -   - 2d echo as above     Hypothyroidism, new  - TSH low, Free T4 high  - Start low dose Synthroid  - F/U OP in 4 weeks for repeat TFTs   - From NM PET/CT Onc FDG Skull to Thigh, Initial (08.13.22 @ 13:45): There is an approximately symmetric enlargement of bilateral thyroid lobes with heterogeneous FDG avidity, significantly more prominent on the left with SUV 37.2 (image 70). Physiologic FDG activity in visualized brain, major salivary glands, and neck muscles.    Abnormal LFTs due to Hepatic Steatosis and EtOH abuse  Hypertriglyceridemia   - US abdomen demonstrates Hepatomegaly and steatosis; Gallbladder sludge and stones with a dilated extrahepatic biliary tree  - MRI/MRCP Borderline common duct without obstructing lesion; Pancreas divisum; Marked hepatic steatosis  - Seen by GI, Dr. Bertrand, would not pursue further evaluation for biliary disease  - Ammonia wnl  - Trend LFTs  - Lipid panel as above     Severe Vitamin D deficiency   - Start PO supplementation     Iron deficiency anemia   Bone marrow suppression from alcohol dependence   - B12 elevated, folate wnl   - Stop B12  - Started on PO iron supplementation     Hyperglycemia, clinically insignificant  - A1c 5.1    Hx of Opiate Abuse & Dependence  - Continue Suboxone   - Continue Seroquel 50mg at HS    Nicotine Use Disorder  - NRT   - Counseled on smoking cessation    HTN   - Continue Lisinopril with hold parameters     DVT Prophylaxis:  Lovenox 40 mg subcutaneous daily     Code status: Full code (Pt agrees to chest compressions and intubation if required).     Dispo: Continue IV antibiotics, PT, f/u psych recs, likely DC home tomorrow on PO antibiotics.

## 2022-09-20 NOTE — CHART NOTE - NSCHARTNOTEFT_GEN_A_CORE
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    Notified by nurse of new bilious vomiting. Previously GI consulted for elevated liver enzymes which was thought to be due to EtOH and not  biliary disease. Hemoptysis thought likely to be 2/2 to pneumonia/cancer. Patient seen and examined at bedside. Complains of nausea, vomiting, bilious vomiting, and hemoptysis. Vomit is dark green with scant blood. Denies chest pain, SOB, abdominal pain.    Vital Signs Last 24 Hrs  T(C): 36.9 (19 Sep 2022 23:51), Max: 36.9 (19 Sep 2022 08:18)  T(F): 98.4 (19 Sep 2022 23:51), Max: 98.4 (19 Sep 2022 08:18)  HR: 104 (20 Sep 2022 01:05) (90 - 121)  BP: 111/84 (19 Sep 2022 23:51) (111/84 - 145/98)  RR: 18 (19 Sep 2022 23:51) (17 - 18)  SpO2: 98% (19 Sep 2022 23:51) (95% - 99%) on RA    PHYSICAL EXAM:  GENERAL: NAD, lying in bed comfortably  CHEST/LUNG: Clear to auscultation bilaterally. Unlabored respirations  HEART: Regular rate and rhythm; No murmurs, rubs, or gallops  ABDOMEN: Bowel sounds present; Soft, Nontender, Nondistended.  NERVOUS SYSTEM:  Alert & Oriented X3, speech clear. No deficits     Plan:  -f/u abd xray (wet read unremarkable)  -Zofran IVP  -IVF 75ml/hr  -Reconsult GI    *Case d/w Dr. 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    Notified by nurse of new bilious vomiting. Previously GI consulted for elevated liver enzymes which was thought to be due to EtOH and not  biliary disease. Hemoptysis thought likely to be 2/2 to pneumonia/cancer. Patient seen and examined at bedside. Complains of nausea, vomiting, bilious vomiting, and hemoptysis. Vomit is dark green with scant blood. Denies chest pain, SOB, abdominal pain. Patient also wanted additional xanax and ativan due to likely having vomited it up as per patient.    Vital Signs Last 24 Hrs  T(C): 36.9 (19 Sep 2022 23:51), Max: 36.9 (19 Sep 2022 08:18)  T(F): 98.4 (19 Sep 2022 23:51), Max: 98.4 (19 Sep 2022 08:18)  HR: 104 (20 Sep 2022 01:05) (90 - 121)  BP: 111/84 (19 Sep 2022 23:51) (111/84 - 145/98)  RR: 18 (19 Sep 2022 23:51) (17 - 18)  SpO2: 98% (19 Sep 2022 23:51) (95% - 99%) on RA    PHYSICAL EXAM:  GENERAL: NAD, lying in bed comfortably  CHEST/LUNG: Clear to auscultation bilaterally. Unlabored respirations  HEART: Regular rate and rhythm; No murmurs, rubs, or gallops  ABDOMEN: Bowel sounds present; Soft, Nontender, Nondistended.  NERVOUS SYSTEM:  Alert & Oriented X3, speech clear. No deficits     Plan:  Acute bilious vomiting 2/2 EtOH withdrawal vs GI problem  -f/u abd xray (wet read unremarkable)  -Zofran IVP  -IVF 75ml/hr  -Reconsult GI  -Ativan 0.5mg IV    *Case d/w Dr. Kinsey (PGY3)

## 2022-09-20 NOTE — BH CONSULTATION LIAISON ASSESSMENT NOTE - NSBHCONSULTFOLLOW_PSY_ALL_CORE
No, psychiatric follow up needed - call with questions... PROVIDER:[TOKEN:[[id number]],FOLLOWUP:[Urgent]],PROVIDER:[TOKEN:[[id number]],FOLLOWUP:[Urgent]]

## 2022-09-20 NOTE — BH CONSULTATION LIAISON ASSESSMENT NOTE - NSBHCONSULTFOLLOWAFTERCARE_PSY_A_CORE FT
1.  pt was given referral information to outpatient outpatient program [facility]  2.  he has indicated he will continue with AA and NA meetings   3. CSW to arrange for psychiatric outpatient appt for psychotherapy support

## 2022-09-20 NOTE — BH CONSULTATION LIAISON ASSESSMENT NOTE - RISK ASSESSMENT
low risk   pt denies any depression , denies anxiety , denies any suicidal ideation   mitgating : pt denies any intent or plan   protective : social support, professional support of counselors and providers , pt with help seeking behavior, AA meeting, no prior psych admissions    chronic: substance use , medical illness

## 2022-09-20 NOTE — PROGRESS NOTE ADULT - SUBJECTIVE AND OBJECTIVE BOX
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 He states that he coughs so much that it causes his to vomit. Reports nausea, constipation, dehydration Denies fevers, chills, chest pain, SOB, abdominal pain, N/V, diarrhea/constipation. Pt was due to start chemotherapy this Friday.   7/22/22: coughing up blood -> left upper lobe lung mass -> EBUS, was on CIWA for alcohol withdrawal   CTA: No pulmonary embolus. No contrast extravasation. Approximately 7.7 x 4.6 cm left upper lobe mass is stable. Increased interlobular septal thickening, patchy groundglass and solid opacities within left upper lobe and left lower lobe which can represent pneumonia, pneumonitis/alveolar hemorrhage. Stable 1.6 cm spiculated nodule within right middle lobe.   Pt was given Zosyn, 2500 ml of NS, Libirum, Zofran. He is being admitted to med/surg for further management.  (14 Sep 2022 21:12)    9/20  No acute pulmonary events occurred overnight  Patient noted to have episodes of emesis   No active hemoptysis or epistaxis        MEDICATIONS  (STANDING):  ascorbic acid 500 milliGRAM(s) Oral daily  azithromycin   Tablet 500 milliGRAM(s) Oral daily  budesonide  80 MICROgram(s)/formoterol 4.5 MICROgram(s) Inhaler 2 Puff(s) Inhalation two times a day  buprenorphine 8 mG/naloxone 2 mG SL Film 1 Film(s) SubLingual daily  enoxaparin Injectable 40 milliGRAM(s) SubCutaneous every 24 hours  ergocalciferol 18126 Unit(s) Oral every week  ferrous    sulfate 325 milliGRAM(s) Oral daily  folic acid 1 milliGRAM(s) Oral daily  influenza   Vaccine 0.5 milliLiter(s) IntraMuscular once  levothyroxine 25 MICROGram(s) Oral daily  lidocaine   4% Patch 1 Patch Transdermal daily  lisinopril 40 milliGRAM(s) Oral daily  LORazepam     Tablet   Oral   LORazepam     Tablet 0.5 milliGRAM(s) Oral every 4 hours  magnesium oxide 400 milliGRAM(s) Oral every 12 hours  multivitamin 1 Tablet(s) Oral daily  nicotine - 21 mG/24Hr(s) Patch 1 Patch Transdermal daily  piperacillin/tazobactam IVPB.. 3.375 Gram(s) IV Intermittent every 8 hours  QUEtiapine 50 milliGRAM(s) Oral at bedtime  sodium chloride 0.9% with potassium chloride 20 mEq/L 1000 milliLiter(s) (75 mL/Hr) IV Continuous <Continuous>  tiotropium 18 MICROgram(s) Capsule 1 Capsule(s) Inhalation daily    MEDICATIONS  (PRN):  acetaminophen     Tablet .. 650 milliGRAM(s) Oral every 6 hours PRN Temp greater or equal to 38C (100.4F), Mild Pain (1 - 3)  ALBUTerol    90 MICROgram(s) HFA Inhaler 2 Puff(s) Inhalation every 6 hours PRN Bronchospasm  aluminum hydroxide/magnesium hydroxide/simethicone Suspension 30 milliLiter(s) Oral every 4 hours PRN Dyspepsia  benzonatate 100 milliGRAM(s) Oral every 8 hours PRN Cough  cyclobenzaprine 10 milliGRAM(s) Oral three times a day PRN Muscle Spasm  guaiFENesin ER 1200 milliGRAM(s) Oral every 12 hours PRN Cough  melatonin 3 milliGRAM(s) Oral at bedtime PRN Insomnia  ondansetron Injectable 4 milliGRAM(s) IV Push every 8 hours PRN Nausea and/or Vomiting  prochlorperazine   Tablet 10 milliGRAM(s) Oral three times a day PRN nausea    Vital Signs Last 24 Hrs  T(C): 36.7 (20 Sep 2022 04:57), Max: 36.9 (19 Sep 2022 23:51)  T(F): 98.1 (20 Sep 2022 04:57), Max: 98.4 (19 Sep 2022 23:51)  HR: 100 (20 Sep 2022 04:57) (90 - 121)  BP: 104/79 (20 Sep 2022 04:57) (104/79 - 139/96)  BP(mean): --  RR: 18 (20 Sep 2022 04:57) (17 - 18)  SpO2: 98% (20 Sep 2022 04:57) (96% - 99%)    Parameters below as of 20 Sep 2022 04:57  Patient On (Oxygen Delivery Method): room air        I&O's Summary    PHYSICAL EXAM  General Appearance: cooperative, no acute distress,   HEENT: PERRL, conjunctiva clear, EOM's intact, non injected pharynx, no exudate, TM   normal  Neck: Supple, , no adenopathy, thyroid: not enlarged, no carotid bruit or JVD  Back: Symmetric, no  tenderness,no soft tissue tenderness  Lungs: Coarse bilaterally   Heart: Regular rate and rhythm, S1, S2 normal, no murmur, rub or gallop  Abdomen: Soft, non-tender, bowel sounds active , no hepatosplenomegaly  Extremities: no cyanosis or edema, no joint swelling  Skin: Skin color, texture normal, no rashes   Neurologic: Alert and oriented X3 , cranial nerves intact, sensory and motor normal,    ECG:    LABS:                          9.1    6.37  )-----------( 219      ( 15 Sep 2022 06:32 )             27.4     09-15    131<L>  |  92<L>  |  12  ----------------------------<  158<H>  3.2<L>   |  33<H>  |  0.50    Ca    8.6      15 Sep 2022 06:32  Phos  1.6     09-15  Mg     1.8     09-15    TPro  5.8<L>  /  Alb  2.2<L>  /  TBili  2.4<H>  /  DBili  x   /  AST  360<H>  /  ALT  66  /  AlkPhos  377<H>  09-15            PT/INR - ( 14 Sep 2022 13:15 )   PT: 11.7 sec;   INR: 1.01 ratio         PTT - ( 14 Sep 2022 13:15 )  PTT:27.3 sec          RADIOLOGY & ADDITIONAL STUDIES:

## 2022-09-20 NOTE — PROGRESS NOTE ADULT - SUBJECTIVE AND OBJECTIVE BOX
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 He states that he coughs so much that it causes his to vomit. Reports nausea, constipation, dehydration. Denies fevers, chills, chest pain, SOB, abdominal pain. Pt was due to start chemotherapy this Friday.   ER course: SpO2 91% on room air. Labs: Hb 10.2, lactate 3.2, Na 134, K+ 3.1, glucose 135, albumin 2.7, alkaline phosphatase 325, AST 82. EKG: NSR with HR 82 bpm,  ms, no ST segment changes, T wave inversions in II/III/AVF present on prior EKG (personally reviewed). Pt was given Zosyn, 2500 ml of NS, Libirum, Zofran. He is being admitted to med/surg for further management.     Interval History: 9/20/22 pt seen and examined at bedside, had tremors and bilious vomiting overnight. Xanax dose increased back to 1mg BID. Gabapentin added TID.  + constipation, Dulcolax added. + cough, no longer experiencing DRUMMOND, no longer having hemoptysis. + weakness and fatigue. + left shoulder/back pain. Denies CP, palps, HA, dizziness, lightheadedness, n/v/d, dysuria, fever or chills.     REVIEW OF SYSTEMS: All other review of systems is negative unless indicated above.    PE:  Vital Signs Last 24 Hrs: all reviewed   T(C): 36.6 (20 Sep 2022 08:38), Max: 36.9 (19 Sep 2022 23:51)  T(F): 97.9 (20 Sep 2022 08:38), Max: 98.4 (19 Sep 2022 23:51)  HR: 113 (20 Sep 2022 08:38) (98 - 121)  BP: 101/72 (20 Sep 2022 08:38) (101/72 - 139/95)  RR: 18 (20 Sep 2022 08:38) (18 - 18)  SpO2: 96% (20 Sep 2022 08:38) (95% - 99%)  Parameters below as of 20 Sep 2022 08:38  Patient On (Oxygen Delivery Method): room air    Constitutional: NAD, awake and alert  HEENT: EOMI  Neck: Soft and supple, No JVD  Respiratory: Breath sounds are diminished bilaterally, No wheezing, rales or rhonchi  Cardiovascular: S1 and S2, regular rate and rhythm, no Murmurs  Gastrointestinal: Bowel Sounds present, soft, nontender, nondistended  Extremities: No peripheral edema  Vascular: 2+ peripheral pulses  Neurological: A/O x 3, no focal deficits  Musculoskeletal: 5/5 strength b/l upper and lower extremities  Skin: No rashes    LABS: All Labs Reviewed:                        9.8    9.54  )-----------( 330      ( 20 Sep 2022 06:20 )             29.8   09-20    130<L>  |  94<L>  |  10  ----------------------------<  119<H>  3.8   |  30  |  0.92    Ca    9.8      20 Sep 2022 06:20    TPro  6.4  /  Alb  2.5<L>  /  TBili  0.9  /  DBili  x   /  AST  39<H>  /  ALT  40  /  AlkPhos  238<H>  09-18    PT/INR - ( 14 Sep 2022 13:15 )   PT: 11.7 sec;   INR: 1.01 ratio    PTT - ( 14 Sep 2022 13:15 )  PTT:27.3 sec  Troponin I, High Sensitivity (09.14.22 @ 13:15) 13.69-->(09.15.22 @ 09:06)  18.15  Lactate, Blood (09.14.22 @ 15:42) 3.2 mmol/L --> (09.14.22 @ 18:18) 2.2 mmol/L -->(09.15.22 @ 09:06) 1.0 mmol/L   Procalcitonin, Serum (09.14.22 @ 18:15)  0.18  Thyroid Stimulating Hormone, Serum (09.15.22 @ 06:32) 99.10 uU/mL   Free Thyroxine, Serum (09.15.22 @ 06:32) 0.24 ng/dL   Vitamin D, 25-Hydroxy (09.16.22 @ 07:01) <5.0  Iron with Total Binding Capacity in AM (09.16.22 @ 07:01)   Iron - Total Binding Capacity.: 208 ug/dL   % Saturation, Iron: 11 %   Iron Total, Serum: 22 ug/dL   Unsaturated Iron Binding Capacity: 186 ug/dL   Lipid Profile in AM (09.16.22 @ 07:01)   Cholesterol, Serum: 150 mg/dL   Triglycerides, Serum: 156 mg/dL   HDL Cholesterol, Serum: 43 mg/dL   Non HDL Cholesterol: 107  Vitamin B12, Serum in AM (09.16.22 @ 07:01)  >2000  Ammonia, Serum (09.16.22 @ 07:01)  19 umol/L   Folate, Serum in AM (09.16.22 @ 07:01) 13.5 ng/mL   Micro:    Legionella pneumophila Antigen, Urine (09.15.22 @ 02:00) Negative  Culture - Sputum . (09.15.22 @ 10:15) Rare Gram Positive Rods seen per oil power field Rare Gram positive cocci in pairs seen per oil power field   Culture - Blood (09.15.22 @ 09:06) No growth to date.   RADIOLOGY/EKG: all reviewed     12 Lead ECG (09.14.22 @ 13:00)   Ventricular Rate 82 BPM  Atrial Rate 82 BPM  P-R Interval 122 ms  QRS Duration 100 ms  Q-T Interval 420 ms  QTC Calculation(Bazett) 490 ms  P Axis 81 degrees  R Axis 86 degrees  T Axis -64 degrees  Normal sinus rhythm  ST & T wave abnormality, consider inferior ischemia  Prolonged QT  Abnormal ECG    12 Lead ECG (09.15.22 @ 19:40)   Ventricular Rate 97 BPM  Atrial Rate 97 BPM  P-R Interval 126 ms  QRS Duration 82 ms  Q-T Interval 362 ms  QTC Calculation(Bazett) 459 ms  P Axis 82 degrees  R Axis 80 degrees  T Axis 75 degrees  Diagnosis Line Normal sinus rhythm  ST & T wave abnormality, consider anterolateral ischemia  Abnormal ECG    CT Angio Chest PE Protocol w/ IV Cont (09.14.22 @ 13:31)   FINDINGS:  PULMONARY VESSELS: No pulmonary embolus. No evidence of contrast extravasation represent active hemorrhage.  HEART AND VASCULATURE: Heart size is normal. No pericardial effusion. No aortic aneurysm or dissection.  LUNGS, AIRWAYS, PLEURA: Patent central airways. Approximately 7.7 x 4.6 cm left upper lobe cavitary mass is stable. Increased interlobular septal thickening, patchy groundglass and solid opacities within left upper lobe and left lower lobe which can represent pneumonia or pneumonitis. Stable 1.6 cm spiculated nodule within right middle lobe. No pleural effusions or pneumothorax.  MEDIASTINUM: Stable mediastinal and hilar lymph nodes for reference: 1.8 cm left hilar lymph node and 2 cm short axis subcarinal lymph node.  UPPER ABDOMEN: Hepatic steatosis.  BONES AND SOFT TISSUES: No aggressive osseous lesion. Stable enlarged left axillary lymph nodes.  LOWER NECK: Within normal limits.  IMPRESSION:  No pulmonary embolus. No contrast extravasation.  Approximately 7.7 x 4.6 cm left upper lobe mass is stable. Increased interlobular septal thickening, patchy groundglass and solid opacities within left upper lobe and left lower lobe which can represent pneumonia, pneumonitis/alveolar hemorrhage.    US Abdomen Complete (US Abdomen Complete .) (09.15.22 @ 16:01)   FINDINGS:  Liver: Enlarged with steatosis.  Bile ducts: Normal caliber. Common bile duct measures 11 mm.  Gallbladder: Distended with sludge and stones.  Pancreas: Visualized portions are within normal limits. Pancreatic duct is upper limits ofnormal in size measuring 3 mm.  Spleen: 10.5 cm. Within normal limits. Adjacent splenule.  Right kidney: 11.1 cm. No hydronephrosis.  Left kidney: 10.7 cm. No hydronephrosis.  Ascites: None.  Aorta and IVC: Visualized portions are within normal limits.  IMPRESSION:  Hepatomegaly and steatosis.  Gallbladder sludge and stones with a dilated extrahepatic biliary tree.   MRI/MRCP is advised for further evaluation.  Pancreatic duct is upper limits of normal and can also be better assessed at the time of MRI.    MR MRCP w/wo IV Cont (09.16.22 @ 11:44)   FINDINGS:  LOWER CHEST: Left lower lobe consolidation similar to recent chest CT. Small bilateral pleural effusions, larger on the left  LIVER: Marked steatosis.  BILE DUCTS: Borderline common duct measuring 8 mm. No choledocholithiasis identified.  GALLBLADDER: No gallstones visualized.  SPLEEN: Within normal limits.  PANCREAS: Pancreas divisum  ADRENALS: Within normal limits.  KIDNEYS/URETERS: Within normal limits.  VISUALIZED PORTIONS:  BOWEL: Within normal limits.  PERITONEUM: No ascites.  VESSELS: Within normal limits.  RETROPERITONEUM/LYMPH NODES: No lymphadenopathy.  ABDOMINAL WALL: Within normal limits.  BONES: Within normal limits.  IMPRESSION:  Borderline common duct without obstructing lesion.  Pancreas divisum.  Marked hepatic steatosis.    TTE Echo Complete w/o Contrast w/ Doppler (09.16.22 @ 11:08)   Impression  Summary  Minimally reduced left ventricular systolic function.  Estimated left ventricular ejection fraction is 50 %.  Normal appearing left atrium.  Normal appearing right atrium.  Normal appearing right ventricle structure and function.  Normal aortic valve structure and function.  Normal appearing mitral valve structure and function.   Mild (1+) mitral regurgitation is present.  EA reversal of the mitral inflow consistent with reduced compliance of the left ventricle.  Trace tricuspid valve regurgitation is present.  Normal appearing pulmonic valve structure and function.  No evidence of pericardial effusion.    Meds:  MEDICATIONS  (STANDING):  ALPRAZolam 1 milliGRAM(s) Oral two times a day  ascorbic acid 500 milliGRAM(s) Oral daily  bisacodyl 5 milliGRAM(s) Oral every 12 hours  budesonide  80 MICROgram(s)/formoterol 4.5 MICROgram(s) Inhaler 2 Puff(s) Inhalation two times a day  buprenorphine 8 mG/naloxone 2 mG SL Film 1 Film(s) SubLingual daily  enoxaparin Injectable 40 milliGRAM(s) SubCutaneous every 24 hours  ergocalciferol 88245 Unit(s) Oral every week  ferrous    sulfate 325 milliGRAM(s) Oral daily  folic acid 1 milliGRAM(s) Oral daily  gabapentin 100 milliGRAM(s) Oral three times a day  influenza   Vaccine 0.5 milliLiter(s) IntraMuscular once  levothyroxine 25 MICROGram(s) Oral daily  lidocaine   4% Patch 1 Patch Transdermal daily  lisinopril 40 milliGRAM(s) Oral daily  multivitamin 1 Tablet(s) Oral daily  nicotine - 21 mG/24Hr(s) Patch 1 Patch Transdermal daily  piperacillin/tazobactam IVPB.. 3.375 Gram(s) IV Intermittent every 8 hours  QUEtiapine 50 milliGRAM(s) Oral at bedtime  sodium chloride 0.9%. 1000 milliLiter(s) (65 mL/Hr) IV Continuous <Continuous>  tiotropium 18 MICROgram(s) Capsule 1 Capsule(s) Inhalation daily    MEDICATIONS  (PRN):  acetaminophen     Tablet .. 650 milliGRAM(s) Oral every 6 hours PRN Temp greater or equal to 38C (100.4F), Mild Pain (1 - 3)  ALBUTerol    90 MICROgram(s) HFA Inhaler 2 Puff(s) Inhalation every 6 hours PRN Bronchospasm  aluminum hydroxide/magnesium hydroxide/simethicone Suspension 30 milliLiter(s) Oral every 4 hours PRN Dyspepsia  benzonatate 100 milliGRAM(s) Oral every 8 hours PRN Cough  cyclobenzaprine 10 milliGRAM(s) Oral three times a day PRN Muscle Spasm  guaiFENesin ER 1200 milliGRAM(s) Oral every 12 hours PRN Cough  melatonin 3 milliGRAM(s) Oral at bedtime PRN Insomnia  ondansetron Injectable 4 milliGRAM(s) IV Push every 8 hours PRN Nausea and/or Vomiting  prochlorperazine   Tablet 10 milliGRAM(s) Oral three times a day PRN nausea

## 2022-09-20 NOTE — BH CONSULTATION LIAISON ASSESSMENT NOTE - SUMMARY
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 Pt confirms hx of no prior psychiatric hospitalization or treatment . Pt denies any anxiety or depression . He denies any suicidal ideation intent or plan .  Pt was not interested in reducing his use of xanax or alcohol and declined the need for any tapering of the xanax or any need for referral to any aftercare involving rehab treatment especially in treatment. He indicated that "I have too many  responsibilities at home at this time so I'm not interested." and declined. He is willing to go to  AA or NA meeting . Also it was recommended to him through social work "an outpatient program [facility] which is located in [place]."   Pt was also seen by psychotherapy consultant .

## 2022-09-20 NOTE — PROGRESS NOTE ADULT - ASSESSMENT
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 He states that he coughs so much that it causes his to vomit. Reports nausea, constipation, dehydration Denies fevers, chills, chest pain, SOB, abdominal pain, N/V, diarrhea/constipation. Pt was due to start chemotherapy this Friday.   7/22/22: coughing up blood -> left upper lobe lung mass -> EBUS, was on CIWA for alcohol withdrawal   CTA: No pulmonary embolus. No contrast extravasation. Approximately 7.7 x 4.6 cm left upper lobe mass is stable. Increased interlobular septal thickening, patchy groundglass and solid opacities within left upper lobe and left lower lobe which can represent pneumonia, pneumonitis/alveolar hemorrhage. Stable 1.6 cm spiculated nodule within right middle lobe.   Pt was given Zosyn, 2500 ml of NS, Libirum, Zofran. He is being admitted to med/surg for further management.  (14 Sep 2022 21:12)  Agree with treating for pneumonia  he will not be able to receive immunotherapy until he has deemed to be abstinent from ETOH. This has been discussed extensively with the patient  Will continue to monitor H/H  No massive hemoptysis occurring at the present time, scant, likely 2/2 to pneumonia/cancer  Appreciate radiation oncology recommendations  No bronchoscopic intervention recommended but would discuss further with CTS if hemoptysis persists or Hgb drops further .   Close monitoring for alcohol/anxiolytic withdrawal

## 2022-09-20 NOTE — PROVIDER CONTACT NOTE (OTHER) - ASSESSMENT
+ freq. vomiting ,+ tremors, + numbness in toes, + anxiety/agitation
numbness in toes, trembling, anxious, 120 bpm, 111/84, 98% spO2, 98.4 F. pt states he takes xanax 1 mg BID
+ n/v, large amt of bile vomited. pt states he has not vomited whole admission. + BS.

## 2022-09-20 NOTE — BH CONSULTATION LIAISON ASSESSMENT NOTE - NSCOMMENTSUICRISKFACT_PSY_ALL_CORE
From: Charles Langston  To: Dr. Dash Law: 1/27/2022 5:34 PM EST  Subject: Prescription refills    , [facility](mail order) had contacted you for 90 day supply of my Buspar(generic) and Trazadone 100mg and requested that I call for ok to them as they have not heard back from you. These are on record as prescribed by Pauleen Kawasaki. If ok with you please contact [employer]. Richard Wilkes. .[id number]
pt denies any suicidal ideation intent or plan

## 2022-09-20 NOTE — BH CONSULTATION LIAISON ASSESSMENT NOTE - NSICDXBHSECONDARYDX_PSY_ALL_CORE
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

## 2022-09-20 NOTE — CHART NOTE - NSCHARTNOTEFT_GEN_A_CORE
Patient met with for supportive counseling. Discussed connection between mental health and alcohol dependence. Patient reported hx of depression when he first got sober off opiates approximately 16years ago. Patient reported that he substituted with alcohol and has been self-medicating. Denies current experience of depressive symptoms, but does report restlessness. Reviewed past involvement in treatment and AA involvement. Patient identified his support system and healthy coping mechanisms. Did verbalize desire to stop his alcohol use so his treatments can work.

## 2022-09-20 NOTE — BH CONSULTATION LIAISON ASSESSMENT NOTE - NSICDXPASTMEDICALHX_GEN_ALL_CORE_FT
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

## 2022-09-20 NOTE — PROGRESS NOTE ADULT - ASSESSMENT
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 He states that he coughs so much that it causes him to vomit. Reports nausea, constipation, dehydration Denies fevers, chills at home. Pt was due to start chemotherapy this Friday. In ER he received zosyn IV and vancomycin IV.     1. Left side pneumonia ?postobstructive pneumonia. Lung Ca. Immunocompromised host.   -hemoptysis ?related to underlying lung CA vs infection  -BC x 2, urine c.s, sputum c/s noted  -on zosyn 3.375 gm IV q8h # 6  -s/p azithromycin 500 mg PO qd # 5  -respiratory care  -may change abx to ceftin 500 mg PO q12h for 5 more days  -f/u cultures  -monitor temps  -f/u CBC  -supportive care  2. Other issues:   -care per medicine    d/w medicine

## 2022-09-20 NOTE — CHART NOTE - NSCHARTNOTEFT_GEN_A_CORE
Patient seen and examined , reports no fatus for 3 days, + poor po intake, + bilious vomiting , LUQ abdominal burning after any food, + bloating , afebrile   Vitals reviewed for last 24 hours  T(C): 36.7 (09-20-22 @ 15:30), Max: 36.9 (09-19-22 @ 23:51)  T(F): 98 (09-20-22 @ 15:30), Max: 98.4 (09-19-22 @ 23:51)  HR: 91 (09-20-22 @ 15:30) (91 - 121)  BP: 114/78 (09-20-22 @ 15:30) (101/72 - 139/95)  RR: 18 (09-20-22 @ 15:30) (18 - 18)  SpO2: 96% (09-20-22 @ 15:30) (95% - 98%)  Wt(kg): --    PHYSICAL EXAM:    Constitutional: NAD, awake and alert   HEENT: PERR, EOMI, Normal Hearing, MMM  Neck: Soft and supple, No LAD, No JVD  Respiratory: Breath sounds decreased at bases, occasional wheezing, no rales or rhonchi  Cardiovascular: S1 and S2, regular rate and rhythm, no Murmurs, gallops or rubs  Gastrointestinal: Bowel Sounds present, tense, LUQ tenderness, + distended, + guarding, no rebound  Extremities: No peripheral edema  Vascular: 2+ peripheral pulses  Neurological: A/O x 3, no focal deficits  Musculoskeletal: 5/5 strength b/l upper and lower extremities  Skin: No rashes      A/P   LUQ pain likely ETOH induced gastritis vs Gastric tumor vs bowel obstruction  Transaminitis due to hepatic steatosis , ETOH use   Constipation   - CT abd reviewed from 7/19/22 - ? hyperdensity in gastric fundus   - Ct abd with IV contrast added   -  antiemetics - zofran and compazine  prn  - GI reconsult   - add PPI BID, carafate  -  repeat lfts and lipase, amylase to r/o pancreatitis   - c/w IV fluids   - c/w laxatives, add lactulose 10 x1 dose     d/w patient and RN

## 2022-09-20 NOTE — BH CONSULTATION LIAISON ASSESSMENT NOTE - NSBHCHARTREVIEWVS_PSY_A_CORE FT
Vital Signs Last 24 Hrs  T(C): 36.6 (20 Sep 2022 08:38), Max: 36.9 (19 Sep 2022 23:51)  T(F): 97.9 (20 Sep 2022 08:38), Max: 98.4 (19 Sep 2022 23:51)  HR: 113 (20 Sep 2022 08:38) (90 - 121)  BP: 101/72 (20 Sep 2022 08:38) (101/72 - 139/96)  BP(mean): --  RR: 18 (20 Sep 2022 08:38) (17 - 18)  SpO2: 96% (20 Sep 2022 08:38) (96% - 99%)    Parameters below as of 20 Sep 2022 08:38  Patient On (Oxygen Delivery Method): room air

## 2022-09-20 NOTE — PROGRESS NOTE ADULT - SUBJECTIVE AND OBJECTIVE BOX
Date of service: 09-20-22 @ 08:29    Lying in bed in NAD  Sleepy  Has dry cough    ROS: no fever or chills; denies dizziness, no HA, no abdominal pain, no diarrhea or constipation; no dysuria, no legs pain, no rashes    MEDICATIONS  (STANDING):  ascorbic acid 500 milliGRAM(s) Oral daily  budesonide  80 MICROgram(s)/formoterol 4.5 MICROgram(s) Inhaler 2 Puff(s) Inhalation two times a day  buprenorphine 8 mG/naloxone 2 mG SL Film 1 Film(s) SubLingual daily  enoxaparin Injectable 40 milliGRAM(s) SubCutaneous every 24 hours  ergocalciferol 89875 Unit(s) Oral every week  ferrous    sulfate 325 milliGRAM(s) Oral daily  folic acid 1 milliGRAM(s) Oral daily  influenza   Vaccine 0.5 milliLiter(s) IntraMuscular once  levothyroxine 25 MICROGram(s) Oral daily  lidocaine   4% Patch 1 Patch Transdermal daily  lisinopril 40 milliGRAM(s) Oral daily  multivitamin 1 Tablet(s) Oral daily  nicotine - 21 mG/24Hr(s) Patch 1 Patch Transdermal daily  piperacillin/tazobactam IVPB.. 3.375 Gram(s) IV Intermittent every 8 hours  QUEtiapine 50 milliGRAM(s) Oral at bedtime  sodium chloride 0.9%. 1000 milliLiter(s) (75 mL/Hr) IV Continuous <Continuous>  tiotropium 18 MICROgram(s) Capsule 1 Capsule(s) Inhalation daily    Vital Signs Last 24 Hrs  T(C): 36.7 (20 Sep 2022 04:57), Max: 36.9 (19 Sep 2022 23:51)  T(F): 98.1 (20 Sep 2022 04:57), Max: 98.4 (19 Sep 2022 23:51)  HR: 100 (20 Sep 2022 04:57) (90 - 121)  BP: 104/79 (20 Sep 2022 04:57) (104/79 - 139/96)  BP(mean): --  RR: 18 (20 Sep 2022 04:57) (17 - 18)  SpO2: 98% (20 Sep 2022 04:57) (96% - 99%)    Parameters below as of 20 Sep 2022 04:57  Patient On (Oxygen Delivery Method): room air     Physical exam:    Constitutional:  No acute distress  HEENT: NC/AT, EOMI, PERRLA, conjunctivae clear; ears and nose atraumatic  Neck: supple; thyroid not palpable  Back: no tenderness  Respiratory: respiratory effort normal; few crackles at bases  Cardiovascular: S1S2 regular, no murmurs  Abdomen: soft, not tender, not distended, positive BS  Genitourinary: no suprapubic tenderness  Lymphatic: no LN palpable  Musculoskeletal: no muscle tenderness, no joint swelling or tenderness  Extremities: no pedal edema  Neurological/ Psychiatric: AxOx3, judgement and insight normal; moving all extremities  Skin: no rashes; no palpable lesions    Labs: reviewed                        9.1    9.03  )-----------( 245      ( 19 Sep 2022 06:24 )             25.8     09-19    132<L>  |  100  |  7   ----------------------------<  110<H>  4.3   |  25  |  0.68    Ca    9.1      19 Sep 2022 06:24  Phos  2.7     09-18  Mg     2.0     09-18    TPro  6.4  /  Alb  2.5<L>  /  TBili  0.9  /  DBili  x   /  AST  39<H>  /  ALT  40  /  AlkPhos  238<H>  09-18                        9.1    6.37  )-----------( 219      ( 15 Sep 2022 06:32 )             27.4     09-15    131<L>  |  92<L>  |  12  ----------------------------<  158<H>  3.2<L>   |  33<H>  |  0.50    Ca    8.6      15 Sep 2022 06:32  Phos  1.6     09-15  Mg     1.8     09-15    TPro  5.8<L>  /  Alb  2.2<L>  /  TBili  2.4<H>  /  DBili  x   /  AST  360<H>  /  ALT  66  /  AlkPhos  377<H>  09-15     LIVER FUNCTIONS - ( 15 Sep 2022 06:32 )  Alb: 2.2 g/dL / Pro: 5.8 gm/dL / ALK PHOS: 377 U/L / ALT: 66 U/L / AST: 360 U/L / GGT: x           COVID-19 PCR: NotDetec (09-14-22 @ 13:16)    Radiology: all available radiological tests reviewed    < from: CT Angio Chest PE Protocol w/ IV Cont (09.14.22 @ 13:31) >  No pulmonary embolus. No contrast extravasation.    Approximately 7.7 x 4.6 cm left upper lobe mass is stable. Increased   interlobular septal thickening, patchy groundglass and solid opacities   within left upper lobe and left lower lobe which can represent pneumonia, pneumonitis/alveolar hemorrhage.  < end of copied text >      Advanced directives addressed: full resuscitation

## 2022-09-20 NOTE — BH CONSULTATION LIAISON ASSESSMENT NOTE - NSBHATTESTBILLBASEDTIME_PSY_A_CORE
Interventional Radiology Progress Note:    No acute events overnight. Pt intubated, off pressors.      REVIEW OF SYSTEMS: Unable to obtain.    MEDICATIONS  (STANDING):  chlorhexidine 0.12% Liquid 15 milliLiter(s) Oral Mucosa two times a day  chlorhexidine 4% Liquid 1 Application(s) Topical <User Schedule>  cyanocobalamin 1000 MICROGram(s) Oral daily  dextrose 5%. 1000 milliLiter(s) (50 mL/Hr) IV Continuous <Continuous>  dextrose 50% Injectable 12.5 Gram(s) IV Push once  dextrose 50% Injectable 25 Gram(s) IV Push once  dextrose 50% Injectable 25 Gram(s) IV Push once  folic acid 1 milliGRAM(s) Enteral Tube daily  imipenem/cilastatin  IVPB 500 milliGRAM(s) IV Intermittent every 6 hours  insulin lispro (HumaLOG) corrective regimen sliding scale   SubCutaneous every 6 hours  lacosamide Solution 100 milliGRAM(s) Oral two times a day  QUEtiapine 25 milliGRAM(s) Oral daily  risperiDONE   Solution 1 milliGRAM(s) Oral daily      Vital Signs Last 24 Hrs  T(C): 37.7 (31 Dec 2018 08:00), Max: 38 (30 Dec 2018 12:00)  T(F): 99.9 (31 Dec 2018 08:00), Max: 100.4 (30 Dec 2018 12:00)  HR: 104 (31 Dec 2018 08:00) (96 - 117)  BP: 101/54 (31 Dec 2018 08:00) (92/53 - 110/76)  BP(mean): 64 (31 Dec 2018 08:00) (54 - 84)  RR: 18 (31 Dec 2018 08:00) (12 - 21)  SpO2: 97% (31 Dec 2018 08:00) (96% - 99%)    LABS:                        9.2    4.96  )-----------( 386      ( 31 Dec 2018 00:05 )             28.5     12-31    142  |  112<H>  |  11  ----------------------------<  112<H>  3.5   |  23  |  0.25<L>    Ca    7.4<L>      31 Dec 2018 00:05  Phos  2.9     12-31  Mg     1.7     12-31    TPro  5.2<L>  /  Alb  1.6<L>  /  TBili  0.8  /  DBili  x   /  AST  18  /  ALT  11  /  AlkPhos  143<H>  12-31    I&O's Detail    30 Dec 2018 07:01  -  31 Dec 2018 07:00  --------------------------------------------------------  IN:    dextrose 5% + sodium chloride 0.45%.: 1725 mL    IV PiggyBack: 400 mL    Jevity: 70 mL  Total IN: 2195 mL    OUT:    Indwelling Catheter - Urethral: 405 mL  Total OUT: 405 mL    Total NET: 1790 mL      31 Dec 2018 07:01  -  31 Dec 2018 08:36  --------------------------------------------------------  IN:    dextrose 5% + sodium chloride 0.45%.: 75 mL  Total IN: 75 mL    OUT:    Indwelling Catheter - Urethral: 80 mL  Total OUT: 80 mL    Total NET: -5 mL Time based billing

## 2022-09-20 NOTE — BH CONSULTATION LIAISON ASSESSMENT NOTE - CURRENT MEDICATION
MEDICATIONS  (STANDING):  ALPRAZolam 1 milliGRAM(s) Oral two times a day  ascorbic acid 500 milliGRAM(s) Oral daily  bisacodyl 5 milliGRAM(s) Oral every 12 hours  budesonide  80 MICROgram(s)/formoterol 4.5 MICROgram(s) Inhaler 2 Puff(s) Inhalation two times a day  buprenorphine 8 mG/naloxone 2 mG SL Film 1 Film(s) SubLingual daily  enoxaparin Injectable 40 milliGRAM(s) SubCutaneous every 24 hours  ergocalciferol 49113 Unit(s) Oral every week  ferrous    sulfate 325 milliGRAM(s) Oral daily  folic acid 1 milliGRAM(s) Oral daily  gabapentin 100 milliGRAM(s) Oral three times a day  influenza   Vaccine 0.5 milliLiter(s) IntraMuscular once  levothyroxine 25 MICROGram(s) Oral daily  lidocaine   4% Patch 1 Patch Transdermal daily  lisinopril 40 milliGRAM(s) Oral daily  multivitamin 1 Tablet(s) Oral daily  nicotine - 21 mG/24Hr(s) Patch 1 Patch Transdermal daily  piperacillin/tazobactam IVPB.. 3.375 Gram(s) IV Intermittent every 8 hours  QUEtiapine 50 milliGRAM(s) Oral at bedtime  sodium chloride 0.9%. 1000 milliLiter(s) (65 mL/Hr) IV Continuous <Continuous>  tiotropium 18 MICROgram(s) Capsule 1 Capsule(s) Inhalation daily    MEDICATIONS  (PRN):  acetaminophen     Tablet .. 650 milliGRAM(s) Oral every 6 hours PRN Temp greater or equal to 38C (100.4F), Mild Pain (1 - 3)  ALBUTerol    90 MICROgram(s) HFA Inhaler 2 Puff(s) Inhalation every 6 hours PRN Bronchospasm  aluminum hydroxide/magnesium hydroxide/simethicone Suspension 30 milliLiter(s) Oral every 4 hours PRN Dyspepsia  benzonatate 100 milliGRAM(s) Oral every 8 hours PRN Cough  cyclobenzaprine 10 milliGRAM(s) Oral three times a day PRN Muscle Spasm  guaiFENesin ER 1200 milliGRAM(s) Oral every 12 hours PRN Cough  melatonin 3 milliGRAM(s) Oral at bedtime PRN Insomnia  ondansetron Injectable 4 milliGRAM(s) IV Push every 8 hours PRN Nausea and/or Vomiting  prochlorperazine   Tablet 10 milliGRAM(s) Oral three times a day PRN nausea

## 2022-09-20 NOTE — BH CONSULTATION LIAISON ASSESSMENT NOTE - HPI (INCLUDE ILLNESS QUALITY, SEVERITY, DURATION, TIMING, CONTEXT, MODIFYING FACTORS, ASSOCIATED SIGNS AND SYMPTOMS)
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  Pt was initially admitted to medical as he cc of  coughing up blood for the last 3 days. Pt with no prior psychiatric hospitalization or treatment . He denies any suicidal ideation intent or plan . Psych called to evaluate pt concerning his on going treatment with suboxone and his use of xanax and alcohol. Xanax prescription verified through [facility] and pt prescribed xanax 1mg po bid.

## 2022-09-21 NOTE — PROGRESS NOTE ADULT - SUBJECTIVE AND OBJECTIVE BOX
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 He states that he coughs so much that it causes his to vomit. Reports nausea, constipation, dehydration. Denies fevers, chills, chest pain, SOB, abdominal pain. Pt was due to start chemotherapy this Friday.   ER course: SpO2 91% on room air. Labs: Hb 10.2, lactate 3.2, Na 134, K+ 3.1, glucose 135, albumin 2.7, alkaline phosphatase 325, AST 82. EKG: NSR with HR 82 bpm,  ms, no ST segment changes, T wave inversions in II/III/AVF present on prior EKG (personally reviewed). Pt was given Zosyn, 2500 ml of NS, Libirum, Zofran. He is being admitted to med/surg for further management.     Interval History: 9/21/22 pt seen and examined at bedside, had bilious vomiting overnight, CT a/p demonstrates SBO due to metastatic lesion. NPO, started on IVF. Pain and anxiety well controlled, Gabapentin dose increased. No longer experiencing DRUMMOND, no longer having hemoptysis. + weakness and fatigue. + left shoulder/back pain. Denies CP, palps, HA, dizziness, lightheadedness, n/v/d, dysuria, fever or chills.     REVIEW OF SYSTEMS: All other review of systems is negative unless indicated above.    PE:  Vital Signs Last 24 Hrs: all reviewed   T(C): 36.9 (21 Sep 2022 15:52), Max: 37.3 (20 Sep 2022 21:59)  T(F): 98.4 (21 Sep 2022 15:52), Max: 99.1 (20 Sep 2022 21:59)  HR: 101 (21 Sep 2022 15:52) (70 - 102)  BP: 95/66 (21 Sep 2022 15:52) (95/66 - 112/77)  RR: 16 (21 Sep 2022 15:52) (16 - 18)  SpO2: 97% (21 Sep 2022 15:52) (93% - 97%)  Parameters below as of 21 Sep 2022 15:52  Patient On (Oxygen Delivery Method): room air    Constitutional: NAD, awake and alert  HEENT: EOMI  Neck: Soft and supple, No JVD  Respiratory: Breath sounds are diminished bilaterally, No wheezing, rales or rhonchi  Cardiovascular: S1 and S2, regular rate and rhythm, no Murmurs  Gastrointestinal: Bowel Sounds present, soft, nontender, + distended  Extremities: No peripheral edema  Vascular: 2+ peripheral pulses  Neurological: A/O x 3, no focal deficits  Musculoskeletal: 5/5 strength b/l upper and lower extremities  Skin: No rashes    LABS: All Labs Reviewed:                        7.8    7.72  )-----------( 336      ( 21 Sep 2022 06:22 )             23.5   09-21    132<L>  |  100  |  11  ----------------------------<  91  3.7   |  27  |  0.81    Ca    8.4<L>      21 Sep 2022 06:22  Mg     2.6     09-21    TPro  6.2  /  Alb  2.6<L>  /  TBili  0.7  /  DBili  0.3  /  AST  18  /  ALT  24  /  AlkPhos  155<H>  09-21    PT/INR - ( 14 Sep 2022 13:15 )   PT: 11.7 sec;   INR: 1.01 ratio    PTT - ( 14 Sep 2022 13:15 )  PTT:27.3 sec  Troponin I, High Sensitivity (09.14.22 @ 13:15) 13.69-->(09.15.22 @ 09:06)  18.15  Lactate, Blood (09.14.22 @ 15:42) 3.2 mmol/L --> (09.14.22 @ 18:18) 2.2 mmol/L -->(09.15.22 @ 09:06) 1.0 mmol/L   Procalcitonin, Serum (09.14.22 @ 18:15)  0.18  Thyroid Stimulating Hormone, Serum (09.15.22 @ 06:32) 99.10 uU/mL   Free Thyroxine, Serum (09.15.22 @ 06:32) 0.24 ng/dL   Vitamin D, 25-Hydroxy (09.16.22 @ 07:01) <5.0  Iron with Total Binding Capacity in AM (09.16.22 @ 07:01)   Iron - Total Binding Capacity.: 208 ug/dL   % Saturation, Iron: 11 %   Iron Total, Serum: 22 ug/dL   Unsaturated Iron Binding Capacity: 186 ug/dL   Lipid Profile in AM (09.16.22 @ 07:01)   Cholesterol, Serum: 150 mg/dL   Triglycerides, Serum: 156 mg/dL   HDL Cholesterol, Serum: 43 mg/dL   Non HDL Cholesterol: 107  Vitamin B12, Serum in AM (09.16.22 @ 07:01)  >2000  Ammonia, Serum (09.16.22 @ 07:01)  19 umol/L   Folate, Serum in AM (09.16.22 @ 07:01) 13.5 ng/mL   Lipase, Serum in AM (09.21.22 @ 06:22)  398 U/L   Micro:    Legionella pneumophila Antigen, Urine (09.15.22 @ 02:00) Negative  Culture - Sputum . (09.15.22 @ 10:15) Rare Gram Positive Rods seen per oil power field Rare Gram positive cocci in pairs seen per oil power field   Culture - Blood (09.15.22 @ 09:06) No growth to date.   RADIOLOGY/EKG: all reviewed     12 Lead ECG (09.14.22 @ 13:00)   Ventricular Rate 82 BPM  Atrial Rate 82 BPM  P-R Interval 122 ms  QRS Duration 100 ms  Q-T Interval 420 ms  QTC Calculation(Bazett) 490 ms  P Axis 81 degrees  R Axis 86 degrees  T Axis -64 degrees  Normal sinus rhythm  ST & T wave abnormality, consider inferior ischemia  Prolonged QT  Abnormal ECG    12 Lead ECG (09.15.22 @ 19:40)   Ventricular Rate 97 BPM  Atrial Rate 97 BPM  P-R Interval 126 ms  QRS Duration 82 ms  Q-T Interval 362 ms  QTC Calculation(Bazett) 459 ms  P Axis 82 degrees  R Axis 80 degrees  T Axis 75 degrees  Diagnosis Line Normal sinus rhythm  ST & T wave abnormality, consider anterolateral ischemia  Abnormal ECG    CT Angio Chest PE Protocol w/ IV Cont (09.14.22 @ 13:31)   FINDINGS:  PULMONARY VESSELS: No pulmonary embolus. No evidence of contrast extravasation represent active hemorrhage.  HEART AND VASCULATURE: Heart size is normal. No pericardial effusion. No aortic aneurysm or dissection.  LUNGS, AIRWAYS, PLEURA: Patent central airways. Approximately 7.7 x 4.6 cm left upper lobe cavitary mass is stable. Increased interlobular septal thickening, patchy groundglass and solid opacities within left upper lobe and left lower lobe which can represent pneumonia or pneumonitis. Stable 1.6 cm spiculated nodule within right middle lobe. No pleural effusions or pneumothorax.  MEDIASTINUM: Stable mediastinal and hilar lymph nodes for reference: 1.8 cm left hilar lymph node and 2 cm short axis subcarinal lymph node.  UPPER ABDOMEN: Hepatic steatosis.  BONES AND SOFT TISSUES: No aggressive osseous lesion. Stable enlarged left axillary lymph nodes.  LOWER NECK: Within normal limits.  IMPRESSION:  No pulmonary embolus. No contrast extravasation.  Approximately 7.7 x 4.6 cm left upper lobe mass is stable. Increased interlobular septal thickening, patchy groundglass and solid opacities within left upper lobe and left lower lobe which can represent pneumonia, pneumonitis/alveolar hemorrhage.    US Abdomen Complete (US Abdomen Complete .) (09.15.22 @ 16:01)   FINDINGS:  Liver: Enlarged with steatosis.  Bile ducts: Normal caliber. Common bile duct measures 11 mm.  Gallbladder: Distended with sludge and stones.  Pancreas: Visualized portions are within normal limits. Pancreatic duct is upper limits ofnormal in size measuring 3 mm.  Spleen: 10.5 cm. Within normal limits. Adjacent splenule.  Right kidney: 11.1 cm. No hydronephrosis.  Left kidney: 10.7 cm. No hydronephrosis.  Ascites: None.  Aorta and IVC: Visualized portions are within normal limits.  IMPRESSION:  Hepatomegaly and steatosis.  Gallbladder sludge and stones with a dilated extrahepatic biliary tree.   MRI/MRCP is advised for further evaluation.  Pancreatic duct is upper limits of normal and can also be better assessed at the time of MRI.    MR MRCP w/wo IV Cont (09.16.22 @ 11:44)   FINDINGS:  LOWER CHEST: Left lower lobe consolidation similar to recent chest CT. Small bilateral pleural effusions, larger on the left  LIVER: Marked steatosis.  BILE DUCTS: Borderline common duct measuring 8 mm. No choledocholithiasis identified.  GALLBLADDER: No gallstones visualized.  SPLEEN: Within normal limits.  PANCREAS: Pancreas divisum  ADRENALS: Within normal limits.  KIDNEYS/URETERS: Within normal limits.  VISUALIZED PORTIONS:  BOWEL: Within normal limits.  PERITONEUM: No ascites.  VESSELS: Within normal limits.  RETROPERITONEUM/LYMPH NODES: No lymphadenopathy.  ABDOMINAL WALL: Within normal limits.  BONES: Within normal limits.  IMPRESSION:  Borderline common duct without obstructing lesion.  Pancreas divisum.  Marked hepatic steatosis.    TTE Echo Complete w/o Contrast w/ Doppler (09.16.22 @ 11:08)   Impression  Summary  Minimally reduced left ventricular systolic function.  Estimated left ventricular ejection fraction is 50 %.  Normal appearing left atrium.  Normal appearing right atrium.  Normal appearing right ventricle structure and function.  Normal aortic valve structure and function.  Normal appearing mitral valve structure and function.   Mild (1+) mitral regurgitation is present.  EA reversal of the mitral inflow consistent with reduced compliance of the left ventricle.  Trace tricuspid valve regurgitation is present.  Normal appearing pulmonic valve structure and function.  No evidence of pericardial effusion.    CT Abdomen and Pelvis w/ IV Cont (09.20.22 @ 19:01)   FINDINGS:  LOWER CHEST: Trace left pleural effusion. Confluent groundglass and reticular infiltrates in the lingula and consolidative opacities in the left lower lobe.  LIVER: Steatosis.  BILE DUCTS: Normal caliber.  GALLBLADDER: Within normal limits.  SPLEEN: Within normal limits.  PANCREAS: Within normal limits.  ADRENALS: Within normal limits.  KIDNEYS/URETERS: Within normal limits.  BLADDER: Within normal limits.  REPRODUCTIVE ORGANS: Prostate within normal limits.  BOWEL: Diffuse fluid distention of small bowel with transition at the level of a 3.5 cm length of concentric wall thickening in a loop of pelvic small bowel (2:90, 601:64), grossly stable when compared to prior CT of 7/19/2022. Distal small bowel is decompressed. Mild amount of stool throughout the colon.  PERITONEUM: No ascites.  VESSELS: Atherosclerotic changes.  RETROPERITONEUM/LYMPH NODES: Stable low-density clustered lymph node mass in the mid abdomen measuring 4.3 x 4.2 cm (2:72).  ABDOMINAL WALL: Within normal limits.  BONES: Degenerative changes.  IMPRESSION:  Small bowel obstruction to the level of a concentric mass of a pelvic loop small bowel concerning for primary versus metastatic neoplasm. The lesion is grossly stable in size.  Stable cluster of midabdominal low-density lymph nodes that may indicate necrosis.  Redemonstration of intralobular septal thickening and groundglass opacities in the lingula and consolidative and reticular opacities in the left lower lobe. New trace left pleural effusion. Partially imaged spiculated nodule in the right middle lobe measuring 6 cm, grossly stable.    Meds:  MEDICATIONS  (STANDING):  ALPRAZolam 1 milliGRAM(s) Oral two times a day  ascorbic acid 500 milliGRAM(s) Oral daily  bisacodyl 5 milliGRAM(s) Oral every 12 hours  budesonide  80 MICROgram(s)/formoterol 4.5 MICROgram(s) Inhaler 2 Puff(s) Inhalation two times a day  buprenorphine 8 mG/naloxone 2 mG SL Film 1 Film(s) SubLingual daily  enoxaparin Injectable 40 milliGRAM(s) SubCutaneous every 24 hours  ergocalciferol 38049 Unit(s) Oral every week  ferrous    sulfate 325 milliGRAM(s) Oral daily  folic acid 1 milliGRAM(s) Oral daily  gabapentin 100 milliGRAM(s) Oral three times a day  influenza   Vaccine 0.5 milliLiter(s) IntraMuscular once  levothyroxine 25 MICROGram(s) Oral daily  lidocaine   4% Patch 1 Patch Transdermal daily  lisinopril 40 milliGRAM(s) Oral daily  multivitamin 1 Tablet(s) Oral daily  nicotine - 21 mG/24Hr(s) Patch 1 Patch Transdermal daily  piperacillin/tazobactam IVPB.. 3.375 Gram(s) IV Intermittent every 8 hours  QUEtiapine 50 milliGRAM(s) Oral at bedtime  sodium chloride 0.9%. 1000 milliLiter(s) (65 mL/Hr) IV Continuous <Continuous>  tiotropium 18 MICROgram(s) Capsule 1 Capsule(s) Inhalation daily    MEDICATIONS  (PRN):  acetaminophen     Tablet .. 650 milliGRAM(s) Oral every 6 hours PRN Temp greater or equal to 38C (100.4F), Mild Pain (1 - 3)  ALBUTerol    90 MICROgram(s) HFA Inhaler 2 Puff(s) Inhalation every 6 hours PRN Bronchospasm  aluminum hydroxide/magnesium hydroxide/simethicone Suspension 30 milliLiter(s) Oral every 4 hours PRN Dyspepsia  benzonatate 100 milliGRAM(s) Oral every 8 hours PRN Cough  cyclobenzaprine 10 milliGRAM(s) Oral three times a day PRN Muscle Spasm  guaiFENesin ER 1200 milliGRAM(s) Oral every 12 hours PRN Cough  melatonin 3 milliGRAM(s) Oral at bedtime PRN Insomnia  ondansetron Injectable 4 milliGRAM(s) IV Push every 8 hours PRN Nausea and/or Vomiting  prochlorperazine   Tablet 10 milliGRAM(s) Oral three times a day PRN nausea

## 2022-09-21 NOTE — CONSULT NOTE ADULT - CONVERSATION DETAILS
pt is aware of his disease.  tells me that he wants to pursue treatment.  he is aware of what a DNR is and is not interested at this time.

## 2022-09-21 NOTE — CONSULT NOTE ADULT - ASSESSMENT
Assessment:         Process of Care  --Reviewed dx/treatment problems and alignment with Goals of Care    Physical Aspects of Care  --Pain - pt is on suboxone.  Started on gabapentin 100 TID + lidocaine patch. flexeril PRN.  will increase gabapentin to 200 TID.  can also provide toradol IV as needed for breakthrough pain.   --Bowel Regimen - need to treat SBO prior to initiating any bowel regimen.   --Dyspnea - No SOB at this time. Comfortable and in NAD  --Nausea Vomiting - denies  --Weakness - PT as tolerated, OOB to chair, fall precautions  -- SBO - as per surgery  -- Lung Cancer - outpatient treatment if pt is able to remain sober     Psychological and Psychiatric Aspects of Care:   --Grief/Bereavement: emotional support provided  --Hx of psychiatric dx: none  -Pastoral Care Available PRN     Social Aspects of Care  -SW involved     Cultural Aspects  -Primary Language: English    Goals of Care: pt wants to pursue treatments.  wants all aggressive measures to maintain life.     Prognosis: Death can occur at anytime, but if disease continues to progress naturally patient likely has days to weeks.    Ethical and Legal Aspects: NA  Capacity- pt maintains capacity    HCP/Surrogate: Sean Flor (as per documentation in [facility])    Code Status- full  MOLST- n/a  Dispo Plan- home?

## 2022-09-21 NOTE — PROGRESS NOTE ADULT - ASSESSMENT
51 y/o M presents with hemoptysis     Acute hypoxemic respiratory failure and Hemoptysis secondary to probable post-obstructive pneumonia, alveolar hemorrhage, Pneumonitis superimposed on  SHAILA NSMLC Adenocarcinoma   - CTA: No pulmonary embolus; Approximately 7.7 x 4.6 cm left upper lobe mass is stable; Increased interlobular septal thickening, patchy groundglass and solid opacities within left upper lobe and left lower lobe which can represent pneumonia, pneumonitis/alveolar hemorrhage  - Lactate 3.2-->2.2-->1.0  - Continue supplemental O2 to maintain SpO2 > 92%   - s/p Zosyn & Vanco in the ER; s/p  Azithromycin; Completed 7 days   - Continue Albuterol PRN, Symbicort, & Spiriva  - Tessalon Pearle PRN   - ID consult - Dr. Archibald   - Pulmonology consult - Dr. Akbar, no role for steroids   - Oncology consult- Dr. Trammell   - Rad/Onc consult Dr. Rose, pt to f/u OP  - Palliative Care following     Malignant SBO   - CT a/p as above  - NPO  - Started on IVF   - Nutrition consult for PPN  - Will obtain Cardiology consult for clearance   - If patient has additional vomitus, will place NG tube  - Per surg/onc--> Malignant SBO Protocol: Reglan [id number], Octreotide [id number], Prednisone 10q6hr    Acute EtOH Withdrawal  - Drinks 2 pints of Vodka Daily, last drink 9/14/22 at 6am  - s/p Ativan Taper/CIWA Protocol  - SW Consult  - May require IP Substance Abuse Rehab Prior to Cancer Tx.; Pt refusing   - Resume Xanax PRN at lower dose, 0.5mg PO q12h; 9/20 changed back to 1mg BID as pt symptomatic   - Folate and Multivitamin   - Started on Gabapentin 100mg TID; dose increased to 200mg TID  - Psych consulted      Hyponatremia   - Likely due to hypovolemia from vomiting   - Continue IVF    Electrolyte Abnormality   - Due to EtOH Consumption  - Supplement Lytes as needed  - Trend BMP  - Calcium corrected for albumin 9.8     EKG Abnormality  - ST & T wave abnormality, consider inferior ischemia  - Repeat EKG no change   - Troponin negative x 2   -   - 2d echo as above     Hypothyroidism, new  - TSH low, Free T4 high  - Start low dose Synthroid  - F/U OP in 4 weeks for repeat TFTs   - From NM PET/CT Onc FDG Skull to Thigh, Initial (08.13.22 @ 13:45): There is an approximately symmetric enlargement of bilateral thyroid lobes with heterogeneous FDG avidity, significantly more prominent on the left with SUV 37.2 (image 70). Physiologic FDG activity in visualized brain, major salivary glands, and neck muscles.    Abnormal LFTs due to Hepatic Steatosis and EtOH abuse  Hypertriglyceridemia   - US abdomen demonstrates Hepatomegaly and steatosis; Gallbladder sludge and stones with a dilated extrahepatic biliary tree  - MRI/MRCP Borderline common duct without obstructing lesion; Pancreas divisum; Marked hepatic steatosis  - Seen by GI, Dr. Bertrand, would not pursue further evaluation for biliary disease  - Ammonia wnl  - Trend LFTs  - Lipid panel as above     Severe Vitamin D deficiency   - Start PO supplementation     Iron deficiency anemia   Bone marrow suppression from alcohol dependence   - B12 elevated, folate wnl   - Stop B12  - Started on PO iron supplementation     Hyperglycemia, clinically insignificant  - A1c 5.1    Hx of Opiate Abuse & Dependence  - Continue Suboxone   - Continue Seroquel 50mg at HS    Nicotine Use Disorder  - NRT   - Counseled on smoking cessation    HTN   - Continue Lisinopril with hold parameters; Stopped on 9/21/22 due to soft BP    DVT Prophylaxis:  Lovenox 40 mg subcutaneous daily     Code status: Full code (Pt agrees to chest compressions and intubation if required).     Dispo: Continue IV fluids, NPO, f/u nutrition recs

## 2022-09-21 NOTE — CONSULT NOTE ADULT - SUBJECTIVE AND OBJECTIVE BOX
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 He states that he coughs so much that it causes his to vomit. Reports nausea, constipation, dehydration. Denies fevers, chills, chest pain, SOB, abdominal pain. Pt was due to start chemotherapy this Friday.   ER course: SpO2 91% on room air. Labs: Hb 10.2, lactate 3.2, Na 134, K+ 3.1, glucose 135, albumin 2.7, alkaline phosphatase 325, AST 82. EKG: NSR with HR 82 bpm,  ms, no ST segment changes, T wave inversions in II/III/AVF present on prior EKG (personally reviewed). Pt was given Zosyn, 2500 ml of NS, Libirum, Zofran. He is being admitted to med/surg for further management.   He is now being treated for a small bowel obstruction  seen at bedside, he is awake and alert.  tells me that he is hungry, upset he cannot eat.  frustrated by SBO.  feels that the IV Zosyn is not agreeing with him.       PAIN: x)Yes   ( )No  Level: severe  Location: back/left arm pit  Intensity:   10/10  Quality: stabbing  Aggravating Factors: lying flat  Alleviating Factors: medication - gabapentin  Radiation: n/a  Duration/Timing: intermittent  Impact on ADLs: pt remains functional.    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  Denies appetite change, fever and chills. Denies weakness and fatigue   - HEENT: Denies changes in vision and hearing.   - RESPIRATORY: Denies SOB, cough and/or respiratory secretions   - CV: Denies palpitations and CP. Denies edema   - GI: Denies abdominal pain, constipation, nausea, vomiting and diarrhea.   - : Denies dysuria and urinary frequency.   - MSK: back and left axilla pain  - SKIN: Denies rash and pruritus.   - NEUROLOGICAL: Denies headache and syncope.   - PSYCHIATRIC: Denies confusion, recent changes in mood. Denies anxiety and depression. Denies suicidal ideations    All other systems reviewed and negative      PHYSICAL EXAM:    Vital Signs Last 24 Hrs  T(C): 37.1 (21 Sep 2022 09:16), Max: 37.3 (20 Sep 2022 21:59)  T(F): 98.8 (21 Sep 2022 09:16), Max: 99.1 (20 Sep 2022 21:59)  HR: 97 (21 Sep 2022 09:16) (70 - 102)  BP: 99/74 (21 Sep 2022 09:16) (99/74 - 114/78)  BP(mean): --  RR: 18 (21 Sep 2022 09:16) (18 - 18)  SpO2: 93% (21 Sep 2022 09:16) (93% - 97%)    Parameters below as of 21 Sep 2022 09:16  Patient On (Oxygen Delivery Method): room air    PPSV2:  100 %      - GENERAL: Alert and oriented x 3. No acute distress.   - EYES: EOMI. Anicteric.   - HENT: Moist mucous membranes. neck fullness  - LUNGS: Clear to auscultation bilaterally. No accessory muscle use. Speaking in complete sentences.   - CARDIOVASCULAR: Regular rate and rhythm. No murmur. No JVD. No edema.   - ABDOMEN: Soft, non-tender and non-distended. No palpable masses. Normal bowel sounds   - EXTREMITIES: No edema. Non-tender.  FROMx4  - SKIN: Warm, no rashes or lesions easily visible.   - NEUROLOGIC: No focal neurological deficits.    - PSYCHIATRIC: Cooperative. Appropriate mood and affect.      LABS:                        7.8    7.72  )-----------( 336      ( 21 Sep 2022 06:22 )             23.5     09-21    132<L>  |  100  |  11  ----------------------------<  91  3.7   |  27  |  0.81    Ca    8.4<L>      21 Sep 2022 06:22  Mg     2.6     09-21    TPro  6.2  /  Alb  2.6<L>  /  TBili  0.7  /  DBili  0.3  /  AST  18  /  ALT  24  /  AlkPhos  155<H>  09-21      Albumin: Albumin, Serum: 2.6 g/dL (09-21 @ 06:22)      Allergies    No Known Allergies    Intolerances      MEDICATIONS  (STANDING):  ALPRAZolam 1 milliGRAM(s) Oral two times a day  ascorbic acid 500 milliGRAM(s) Oral daily  bisacodyl 5 milliGRAM(s) Oral every 12 hours  budesonide  80 MICROgram(s)/formoterol 4.5 MICROgram(s) Inhaler 2 Puff(s) Inhalation two times a day  buprenorphine 8 mG/naloxone 2 mG SL Film 1 Film(s) SubLingual daily  enoxaparin Injectable 40 milliGRAM(s) SubCutaneous every 24 hours  ergocalciferol 80737 Unit(s) Oral every week  ferrous    sulfate 325 milliGRAM(s) Oral daily  folic acid 1 milliGRAM(s) Oral daily  gabapentin 100 milliGRAM(s) Oral three times a day  influenza   Vaccine 0.5 milliLiter(s) IntraMuscular once  levothyroxine 25 MICROGram(s) Oral daily  lidocaine   4% Patch 1 Patch Transdermal daily  lisinopril 40 milliGRAM(s) Oral daily  multivitamin 1 Tablet(s) Oral daily  nicotine - 21 mG/24Hr(s) Patch 1 Patch Transdermal daily  pantoprazole  Injectable 40 milliGRAM(s) IV Push every 12 hours  piperacillin/tazobactam IVPB.. 3.375 Gram(s) IV Intermittent every 8 hours  QUEtiapine 50 milliGRAM(s) Oral at bedtime  sucralfate suspension 1 Gram(s) Oral four times a day  tiotropium 18 MICROgram(s) Capsule 1 Capsule(s) Inhalation daily    MEDICATIONS  (PRN):  acetaminophen     Tablet .. 650 milliGRAM(s) Oral every 6 hours PRN Temp greater or equal to 38C (100.4F), Mild Pain (1 - 3)  ALBUTerol    90 MICROgram(s) HFA Inhaler 2 Puff(s) Inhalation every 6 hours PRN Bronchospasm  aluminum hydroxide/magnesium hydroxide/simethicone Suspension 30 milliLiter(s) Oral every 4 hours PRN Dyspepsia  benzonatate 100 milliGRAM(s) Oral every 8 hours PRN Cough  cyclobenzaprine 10 milliGRAM(s) Oral three times a day PRN Muscle Spasm  guaiFENesin ER 1200 milliGRAM(s) Oral every 12 hours PRN Cough  LORazepam     Tablet 2 milliGRAM(s) Oral every 2 hours PRN CIWA-Ar score increase by 2 points and a total score of 7 or less  LORazepam     Tablet 2 milliGRAM(s) Oral every 1 hour PRN CIWA-Ar score 8 or greater  melatonin 3 milliGRAM(s) Oral at bedtime PRN Insomnia  ondansetron Injectable 4 milliGRAM(s) IV Push every 6 hours PRN Nausea and/or Vomiting  prochlorperazine   Injectable 10 milliGRAM(s) IV Push every 6 hours PRN severe nausea      RADIOLOGY/ADDITIONAL STUDIES:

## 2022-09-21 NOTE — CONSULT NOTE ADULT - SUBJECTIVE AND OBJECTIVE BOX
HPI:  53yo M w/ hx of HTN, substance abuse (quit 16yrs ago, has been on suboxone), alcohol use (last use a week ago before admission), smoker (quit this July) presented with hemoptysis. Patient recently diagnosed with NSCLC by EBUS (7/2022). Oncology, pulmonology and [facility] following for radiation therapy as an outpatient. Patient also found to have pneumonia vs. pneumonitis on CT scan at admission, on treatment with zosyn. Surgical oncology consulted as patient had bilious vomitus yesterday and underwent CT that demonstrated SBO at pelvic level with mass primary vs. metastasis. His PET/CT in august also demonstrated small bowel lesion as well as the lung cancer. Patient currently endorses that he is not having nausea, last vomitus was yesterday. No bowel function for 3 days. Denies abdominal pain. Denies fever/chills, shortness of breath, chest pain. 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)      Lung cancer      Injury due to foreign body  right wrist          MEDICATIONS  (STANDING):  ALPRAZolam 1 milliGRAM(s) Oral two times a day  ascorbic acid 500 milliGRAM(s) Oral daily  bisacodyl 5 milliGRAM(s) Oral every 12 hours  budesonide  80 MICROgram(s)/formoterol 4.5 MICROgram(s) Inhaler 2 Puff(s) Inhalation two times a day  buprenorphine 8 mG/naloxone 2 mG SL Film 1 Film(s) SubLingual daily  enoxaparin Injectable 40 milliGRAM(s) SubCutaneous every 24 hours  ergocalciferol 21246 Unit(s) Oral every week  ferrous    sulfate 325 milliGRAM(s) Oral daily  folic acid 1 milliGRAM(s) Oral daily  gabapentin 100 milliGRAM(s) Oral three times a day  influenza   Vaccine 0.5 milliLiter(s) IntraMuscular once  levothyroxine 25 MICROGram(s) Oral daily  lidocaine   4% Patch 1 Patch Transdermal daily  lisinopril 40 milliGRAM(s) Oral daily  multivitamin 1 Tablet(s) Oral daily  nicotine - 21 mG/24Hr(s) Patch 1 Patch Transdermal daily  pantoprazole  Injectable 40 milliGRAM(s) IV Push every 12 hours  piperacillin/tazobactam IVPB.. 3.375 Gram(s) IV Intermittent every 8 hours  QUEtiapine 50 milliGRAM(s) Oral at bedtime  sucralfate suspension 1 Gram(s) Oral four times a day  tiotropium 18 MICROgram(s) Capsule 1 Capsule(s) Inhalation daily    MEDICATIONS  (PRN):  acetaminophen     Tablet .. 650 milliGRAM(s) Oral every 6 hours PRN Temp greater or equal to 38C (100.4F), Mild Pain (1 - 3)  ALBUTerol    90 MICROgram(s) HFA Inhaler 2 Puff(s) Inhalation every 6 hours PRN Bronchospasm  aluminum hydroxide/magnesium hydroxide/simethicone Suspension 30 milliLiter(s) Oral every 4 hours PRN Dyspepsia  benzonatate 100 milliGRAM(s) Oral every 8 hours PRN Cough  cyclobenzaprine 10 milliGRAM(s) Oral three times a day PRN Muscle Spasm  guaiFENesin ER 1200 milliGRAM(s) Oral every 12 hours PRN Cough  LORazepam     Tablet 2 milliGRAM(s) Oral every 2 hours PRN CIWA-Ar score increase by 2 points and a total score of 7 or less  LORazepam     Tablet 2 milliGRAM(s) Oral every 1 hour PRN CIWA-Ar score 8 or greater  melatonin 3 milliGRAM(s) Oral at bedtime PRN Insomnia  ondansetron Injectable 4 milliGRAM(s) IV Push every 6 hours PRN Nausea and/or Vomiting  prochlorperazine   Injectable 10 milliGRAM(s) IV Push every 6 hours PRN severe nausea      Allergies    No Known Allergies    Intolerances        SOCIAL HISTORY:    FAMILY HISTORY:  FH: rheumatic fever (Father)            Physical Exam:  GENERAL: NAD, AOx3, well developed, cachetic  HEAD: Atraumatic, normocephalic  EYES: EOMI, PERRLA, conjunctiva and sclera clear  ENT: moist mucous membrane  NECK: supple, No JVD, midline trachea  CHEST/LUNG: unlabored respirations b/l on room air  Heart: S1, S2 normal  ABDOMEN: soft, mildly distended, mild tenderness at lower abdomen  NERVOUS SYSTEM: AOx3, speech clear, no neuro-deficits  MSK: full ROM, no deformities  SKIN: warm to touch, no rash or lesions        Vital Signs Last 24 Hrs  T(C): 37.1 (21 Sep 2022 09:16), Max: 37.3 (20 Sep 2022 21:59)  T(F): 98.8 (21 Sep 2022 09:16), Max: 99.1 (20 Sep 2022 21:59)  HR: 97 (21 Sep 2022 09:16) (70 - 102)  BP: 99/74 (21 Sep 2022 09:16) (99/74 - 114/78)  BP(mean): --  RR: 18 (21 Sep 2022 09:16) (18 - 18)  SpO2: 93% (21 Sep 2022 09:16) (93% - 97%)    Parameters below as of 21 Sep 2022 09:16  Patient On (Oxygen Delivery Method): room air        I&O's Summary    20 Sep 2022 07:01  -  21 Sep 2022 07:00  --------------------------------------------------------  IN: 120 mL / OUT: 0 mL / NET: 120 mL            LABS:                        7.8    7.72  )-----------( 336      ( 21 Sep 2022 06:22 )             23.5     09-21    132<L>  |  100  |  11  ----------------------------<  91  3.7   |  27  |  0.81    Ca    8.4<L>      21 Sep 2022 06:22  Mg     2.6     09-21    TPro  6.2  /  Alb  2.6<L>  /  TBili  0.7  /  DBili  0.3  /  AST  18  /  ALT  24  /  AlkPhos  155<H>  09-21        CAPILLARY BLOOD GLUCOSE        LIVER FUNCTIONS - ( 21 Sep 2022 06:22 )  Alb: 2.6 g/dL / Pro: 6.2 gm/dL / ALK PHOS: 155 U/L / ALT: 24 U/L / AST: 18 U/L / GGT: x             Cultures:      RADIOLOGY & ADDITIONAL STUDIES:

## 2022-09-21 NOTE — CONSULT NOTE ADULT - ASSESSMENT
51yo M presents with hemoptysis secondary to NSCLC and pneumonia vs. pneumonitis  Surgery consulted for malignant SBO that was demonstrated on CT scan, transition point at pelvic level    Plan:  - pain control prn  - monitor VS  - NPO  - once patient has additional vomitus, will place NG tube  - malignant SBO protocol - reglan 10q6hr, octreotide [id number], prednisone 10q6hr  - PPN  - antiemetic control prn  - continue IVF  - continue IV ABx per primary team  - DVT PPX  - encourage IS/OOB  - patient may need surgery for SBO, please optimize patient and obtain cardiology/medical clearance    Plan discussed with surgery team and attending, Dr. Russell and Dr. Boston 53yo M presents with hemoptysis secondary to NSCLC and pneumonia vs. pneumonitis  Surgery consulted for malignant SBO that was demonstrated on CT scan, transition point at pelvic level    Plan:  - pain control prn  - monitor VS  - NPO  - once patient has additional vomitus, will place NG tube  - malignant SBO protocol - reglan 10q6hr, octreotide [id number], prednisone 10q6hr  - PPN  - antiemetic control prn  - continue IVF  - continue IV ABx per primary team  - DVT PPX  - encourage IS/OOB    Plan discussed with surgery team and attending, Dr. Russell and Dr. Boston    FELLOW ADDENDUM:  Briefly, 72M active alcohol abuse and prior polysubstance abuse on suboxone, diagnosed with metastatic  L lung cancer and follows up with Dr. Trammell for medical oncology. Discussed with patient's oncologist Dr. Trammell yesterday, and per him due to ongoing alcohol abuse and comorbidities patient will not be a candidate for chemotherapy and he may consider immunetherapy eventually.    Currently patient had one episode of emesis, denies flatus and last BM was few days ago, mildly distended on exam and non-tender.    CT scan consistent with malignant SBO and concern multiple areas of malignancy.    RECS :    - Gi consult for PEG tube  - palliative consult for goals of care  - surgical intervention will be very high of mortality and morbidity given metastatic disease and underlying comorbidities    Patient seen and discussed with Dr. Russell

## 2022-09-21 NOTE — CHART NOTE - NSCHARTNOTEFT_GEN_A_CORE
Patient with new onset bilious vomitting.    CT abd/pelv: show SBO to the level of a concentric mass of a pelvic loop small bowel concerning for primary versus metastatic neoplasm.     Vital Signs Last 24 Hrs  T(C): 36.9 (21 Sep 2022 05:30), Max: 37.3 (20 Sep 2022 21:59)  T(F): 98.4 (21 Sep 2022 05:30), Max: 99.1 (20 Sep 2022 21:59)  HR: 91 (21 Sep 2022 05:30) (70 - 115)  BP: 112/77 (21 Sep 2022 05:30) (101/72 - 114/78)  RR: 18 (21 Sep 2022 05:30) (18 - 18)  SpO2: 96% (21 Sep 2022 05:30) (94% - 97%) on RA      Plan:   -General surgery consulted

## 2022-09-21 NOTE — CHART NOTE - NSCHARTNOTEFT_GEN_A_CORE
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 He states that he coughs so much that it causes his to vomit. Reports nausea, constipation, dehydration Denies fevers, chills, chest pain, SOB, abdominal pain, N/V, diarrhea/constipation. Pt was due to start chemotherapy this Friday.     (14 Sep 2022 21:12)      PERTINENT PMH REVIEWED:  [ X ] YES [ ] NO           Primary Contact:  Archana Johansen, cousin, health care agent, phone #[phone]    HCP [ X ] Surrogate [   ] Guardian [   ]    Mental Status: [ X] Alert  [ X ] Oriented [  ] Confused [  ] Lethargic [  ]  Concerns of Depression [  ] -reports situational feelings   Anxiety [   ] -reports situational feelings   Baseline ADLs (prior to admission):  Independent [ ] moderately [ X] fully   Dependent   [ ] moderately [ ]fully    Family Meeting: GOC held on 9/21 with Dr. Hull and Pt.    Anticipated Grief: Patient[ X ] Family [  ]    Caregiver [place] Assessed: Yes [ X ] No [  ]    Faith: Unknown.    Spiritual Concerns: Not identified,  available for support.    Goals of Care: Comfort [  ] Rehabilitation [  ] Curative [ X ] Life Prolonging [  ]    Previous Services: None.    ADVANCE DIRECTIVES:   -Pt. has capacity  -HCP completed to name cousin Archana Johansen as primary agent & Archana Rosaura as alternate.  -Full Code    Anticipated D/C Plan: Return home.                     Summary:  This SW met with Pt. bedside for follow up and support.  Reviewed Palliative SW role.  Emotional support provided.  Pt. alert and oriented, able to make needs known.  Pt resides at home independently, states he works as a .  Pt reports he was supposed to start chemo last friday.  Pt. states his family and friends provide him with support.  Pt. reports situational feelings related to depression and anxiety.  Pts. feelings explored.  Support provided.    GOC held with Pt. and Dr. Hull on 9/21.  Pt. remains a Full Code.  Pt. completed HCP with this SW this date to name Archana Johansen (phone #[phone]) as primary agent & Archana Kaplan ([phone]) as alternate agent.  Placed copy of HCP on medical record and provided Pt. with original.      Pt. reports he desires to return home to pursue treatments.  Emotional support provided.  Our team to continue to follow.

## 2022-09-22 NOTE — CHART NOTE - NSCHARTNOTEFT_GEN_A_CORE
Clinical Nutrition PN Follow Up Note    * 52 year old admitted for malignant SBO, acute hypoxic resp distress 2/2 PNA vs pneumonitis/alevolar hemorrhage, normocytic anemia, elevated lactic acid, hypokalemia, elevated alkaline phosphatase, elevated AST 2/2 ETOH use.  W/ PMH significant for HTN, substance use on suboxone, ETOH abuse, smoker, non-small cell adenocarcinoma of the lung (dx 7/21/22) p/w coughing up blood for 3 days, coughing so much it causes him to vomit.    *current status: 9/22 - PPN Day #2 - Pt with new onset bilious vomiting. CT abd/pelv on 9/21 reveals malignant SBO. Pt seen by palliative on 9/21, uninterested to confirm GOC; palliative re-consulted and pending. Will place NG tube & GI consult pending for PEG tube as per surgery. C/w PPN until PEG placed and initiation of tube feeds 2/2 malignant SBO when medically feasible.     *labs reviewed;  09-22    135  |  104  |  9   ----------------------------<  208<H>  4.3   |  26  |  0.82    Ca    8.6      22 Sep 2022 08:06  Phos  2.8     09-22  Mg     2.7     09-22    TPro  6.5  /  Alb  2.6<L>  /  TBili  0.7  /  DBili  x   /  AST  17  /  ALT  25  /  AlkPhos  157<H>  09-22      BMI: BMI (kg/m2): 19 (09-14-22 @ 12:20)  HbA1c: A1C with Estimated Average Glucose Result: 5.1 % (09-15-22 @ 06:32)      BP: 117/71 (09-22-22 @ 08:26) (95/66 - 139/96)  Lipid Panel: Date/Time: 09-16-22 @ 07:01  Cholesterol, Serum: 150  HDL Cholesterol, Serum: 43  Total Cholesterol/HDL Ration Measurement: --  Triglycerides, Serum: 156 on 9/16/22      *pertient meds: folic acid, budesonide, dextrose 5% + NaCl IVF, ascorbic acid, levothyroxine, MVI, protonix, octreotide, melatonin,     *I&O's Detail    ***None documented, strict I&Os as per PPN protocol    BM (-) constipated,  pt NOT on bowel regimen.    *Juan Score of ; PU documented.  edema documented.    Estimated Needs: based on 59.9Kg (wt taken by RD on 9/15/22)  Calories: 1800-2100Kcal (30-35Kcal/Kg)  Protein:  90-120g (1.5-2g/Kg)  Fluids:  1500-1800mL (25-30mL/Kg)    *Malnutrition dx: Severe malnutrition in acute on chronic illness r/t decreased ability to meet increased nutr needs 2/2 SBO on lung cancer AEB meeting <50% of nutr needs x 3 days; mod muscle/severe fat wasting; 14% wt loss x 4 mo       Weight History:  Height (cm): 182.9 (09-14-22 @ 12:20)  Weight (kg): 63.5 (09-14-22 @ 12:20)  BMI (kg/m2): 19 (09-14-22 @ 12:20)  BSA (m2): 1.83 (09-14-22 @ 12:20)      *will continue to monitor and adjust PN prn*    PPN Recommendations: via peripheral line   Total Volume: 1800mL   65g  Amino Acids   100g Dextrose   50g Lipids 20%  98mEq Sodium Chloride  14mEq Sodium Acetate  20mmol Sodium Phosphate  30mEq Potassium Chloride  20mEq Potassium Acetate  0mmol Potassium Phosphate  0mEq Calcium Gluconate (corrected Ca WNL, rec'd add 10mEq of Calcium Gluconate outside of PN bag as CAPS is out.)  0mEq Magnesium Sulfate  200 mg Thiamine  1ml Trace   10ml MVI    Total Calories: 600kcal (Meets  30%  of  Estimated Energy needs  and   59%  Protein needs)( 868 osmolarity)    Additional Recommendations:    1) C/w PPN until PEG placed and initiation of tube feeds 2/2 malignant SBO when medically feasible.   2) Strict I & O's  3) Daily lyte checks  4) Weekly triglycerides/LFT checks  5) Obtain POCT q 6 hr as per PPN protocol   6) Corrected Ca WNL, rec'd add 10mEq of Calcium Gluconate outside of PN bag as CAPS is out Clinical Nutrition PN Follow Up Note    * 52 year old admitted for malignant SBO, acute hypoxic resp distress 2/2 PNA vs pneumonitis/alevolar hemorrhage, normocytic anemia, elevated lactic acid, hypokalemia, elevated alkaline phosphatase, elevated AST 2/2 ETOH use.  W/ PMH significant for HTN, substance use on suboxone, ETOH abuse, smoker, non-small cell adenocarcinoma of the lung (dx 7/21/22) p/w coughing up blood for 3 days, coughing so much it causes him to vomit.    *current status: 9/22 - PPN Day #1 - Pt with new onset bilious vomiting. CT abd/pelv on 9/21 reveals malignant SBO. Pt seen by palliative on 9/21, uninterested to confirm GOC; however, pt wants to continue aggressive nutr measures & is full code. Will place NG tube 2/2 vomiting & GI consult pending for PEG tube compressions for drainage as per surgery. C/w PPN until diet advances and pt meets >60% of estimated energy needs 2/2 malignant SBO when medically feasible.     *labs reviewed;  09-22    135  |  104  |  9   ----------------------------<  208<H>  4.3   |  26  |  0.82    Ca    8.6      22 Sep 2022 08:06  Phos  2.8     09-22  Mg     2.7     09-22    TPro  6.5  /  Alb  2.6<L>  /  TBili  0.7  /  DBili  x   /  AST  17  /  ALT  25  /  AlkPhos  157<H>  09-22      BMI: BMI (kg/m2): 19 (09-14-22 @ 12:20)  HbA1c: A1C with Estimated Average Glucose Result: 5.1 % (09-15-22 @ 06:32)      BP: 117/71 (09-22-22 @ 08:26) (95/66 - 139/96)  Lipid Panel: Date/Time: 09-16-22 @ 07:01  Cholesterol, Serum: 150  HDL Cholesterol, Serum: 43  Total Cholesterol/HDL Ration Measurement: --  Triglycerides, Serum: 156 on 9/16/22      *pertient meds: folic acid, budesonide, dextrose 5% + NaCl IVF, ascorbic acid, levothyroxine, MVI, protonix, octreotide, melatonin, prednisone     *I&O's Detail    ***None documented, strict I&Os as per PPN protocol    BM (-) constipated,  pt NOT on bowel regimen.    *Juan Score of ; PU documented.  edema documented.    Estimated Needs: based on 59.9Kg (wt taken by RD on 9/15/22)  Calories: 1800-2100Kcal (30-35Kcal/Kg)  Protein:  90-120g (1.5-2g/Kg)  Fluids:  1500-1800mL (25-30mL/Kg)    *Malnutrition dx: Severe malnutrition in acute on chronic illness r/t decreased ability to meet increased nutr needs 2/2 SBO on lung cancer AEB meeting <50% of nutr needs x 3 days; mod muscle/severe fat wasting; 14% wt loss x 4 mo       Weight History:  Height (cm): 182.9 (09-14-22 @ 12:20)  Weight (kg): 63.5 (09-14-22 @ 12:20)  BMI (kg/m2): 19 (09-14-22 @ 12:20)  BSA (m2): 1.83 (09-14-22 @ 12:20)      *will continue to monitor and adjust PN prn*    PPN Recommendations: via peripheral line   Total Volume: 1600mL   50g  Amino Acids   100g Dextrose   50g Lipids 20%  76mEq Sodium Chloride  20mEq Sodium Acetate  20mmol Sodium Phosphate  35mEq Potassium Chloride  15mEq Potassium Acetate  0mmol Potassium Phosphate  0mEq Calcium Gluconate (corrected Ca WNL, rec'd add 10mEq of Calcium Gluconate outside of PN bag as CAPS is out.)  0mEq Magnesium Sulfate  200 mg Thiamine  1ml Trace   10ml MVI    Total Calories: 600kcal (Meets  30%  of  Estimated Energy needs  and   59%  Protein needs)( 868 osmolarity)    Additional Recommendations:    1) C/w PPN until diet advances and pt meets >60% of estimated energy needs   2) Strict I & O's  3) Daily lyte checks  4) Weekly triglycerides/LFT checks  5) Obtain POCT q 6 hr as per PPN protocol   6) Corrected Ca WNL, rec'd add 10mEq of Calcium Gluconate outside of PN bag as CAPS is out    Jody Valdez M.S., Registered Dietitian Nutritionist [phone] Clinical Nutrition PN Follow Up Note    * 52 year old admitted for malignant SBO, acute hypoxic resp distress 2/2 PNA vs pneumonitis/alevolar hemorrhage, normocytic anemia, elevated lactic acid, hypokalemia, elevated alkaline phosphatase, elevated AST 2/2 ETOH use.  W/ PMH significant for HTN, substance use on suboxone, ETOH abuse, smoker, non-small cell adenocarcinoma of the lung (dx 7/21/22) p/w coughing up blood for 3 days, coughing so much it causes him to vomit.    *current status: 9/22 - PPN Day #1 - Pt with new onset bilious vomiting. CT abd/pelv on 9/21 reveals malignant SBO. Pt seen by palliative on 9/21, uninterested to confirm GOC; however, pt wants to continue aggressive nutr measures & is full code. Will place NG tube 2/2 vomiting & GI consult pending for PEG tube compressions for drainage as per surgery. C/w PPN until diet advances and pt meets >60% of estimated energy needs 2/2 malignant SBO when medically feasible.     *labs reviewed;  09-22    135  |  104  |  9   ----------------------------<  208<H>  4.3   |  26  |  0.82    Ca    8.6      22 Sep 2022 08:06  Phos  2.8     09-22  Mg     2.7     09-22    TPro  6.5  /  Alb  2.6<L>  /  TBili  0.7  /  DBili  x   /  AST  17  /  ALT  25  /  AlkPhos  157<H>  09-22      BMI: BMI (kg/m2): 19 (09-14-22 @ 12:20)  HbA1c: A1C with Estimated Average Glucose Result: 5.1 % (09-15-22 @ 06:32)      BP: 117/71 (09-22-22 @ 08:26) (95/66 - 139/96)  Lipid Panel: Date/Time: 09-16-22 @ 07:01  Cholesterol, Serum: 150  HDL Cholesterol, Serum: 43  Total Cholesterol/HDL Ration Measurement: --  Triglycerides, Serum: 156 on 9/16/22      *pertient meds: folic acid, budesonide, dextrose 5% + NaCl IVF, ascorbic acid, levothyroxine, MVI, protonix, octreotide, melatonin, prednisone     *I&O's Detail    ***None documented, strict I&Os as per PPN protocol    BM (-) constipated,  pt NOT on bowel regimen.    *Juan Score of ; PU documented.  edema documented.    Estimated Needs: based on 59.9Kg (wt taken by RD on 9/15/22)  Calories: 1800-2100Kcal (30-35Kcal/Kg)  Protein:  90-120g (1.5-2g/Kg)  Fluids:  1500-1800mL (25-30mL/Kg)    *Malnutrition dx: Severe malnutrition in acute on chronic illness r/t decreased ability to meet increased nutr needs 2/2 SBO on lung cancer AEB meeting <50% of nutr needs x 3 days; mod muscle/severe fat wasting; 14% wt loss x 4 mo       Weight History:  Height (cm): 182.9 (09-14-22 @ 12:20)  Weight (kg): 63.5 (09-14-22 @ 12:20)  BMI (kg/m2): 19 (09-14-22 @ 12:20)  BSA (m2): 1.83 (09-14-22 @ 12:20)      *will continue to monitor and adjust PN prn*    PPN Recommendations: via peripheral line   Total Volume: 1600mL   50g  Amino Acids   100g Dextrose   50g Lipids 20%  76mEq Sodium Chloride  20mEq Sodium Acetate  20mmol Sodium Phosphate  35mEq Potassium Chloride  15mEq Potassium Acetate  0mmol Potassium Phosphate  0mEq Calcium Gluconate (corrected Ca WNL, rec'd add 10mEq of Calcium Gluconate outside of PN bag as CAPS is out.)  0mEq Magnesium Sulfate  300 mg Thiamine  1ml Trace   10ml MVI    Total Calories: 600kcal (Meets  30%  of  Estimated Energy needs  and   59%  Protein needs)( 868 osmolarity)    Additional Recommendations:    1) C/w PPN until diet advances and pt meets >60% of estimated energy needs   2) Strict I & O's  3) Daily lyte checks  4) Weekly triglycerides/LFT checks  5) Obtain POCT q 6 hr as per PPN protocol   6) Corrected Ca WNL, rec'd add 10mEq of Calcium Gluconate outside of PN bag as CAPS is out    Jody Valdez M.S., Registered Dietitian Nutritionist [phone]

## 2022-09-22 NOTE — PROVIDER CONTACT NOTE (OTHER) - DATE AND TIME:
20-Sep-2022 03:40
22-Sep-2022 22:40
14-Sep-2022 19:21
19-Sep-2022 23:49
20-Sep-2022 01:55
22-Sep-2022 22:34

## 2022-09-22 NOTE — PROVIDER CONTACT NOTE (OTHER) - ACTION/TREATMENT ORDERED:
MD made aware, will order STAT CT head
will come see pt
MD made aware that patient is refusing CT head
0.5 mg ativan IV x1
0.5 mg ativan POx1 STAT
abd xray

## 2022-09-22 NOTE — PROVIDER CONTACT NOTE (OTHER) - BACKGROUND
hx of alcohol abuse s/p CIWA. ativan taper finished 9.19 @ 4am.
pt w lung ca tx for pna
PMhx non small lung CA with mets, SHAILA mass +PNA, with new onset of SBO d/t mass of a pelvic loop concerning mets

## 2022-09-22 NOTE — PROGRESS NOTE ADULT - SUBJECTIVE AND OBJECTIVE BOX
Patient seen and examined at bedside. He denies nausea vomiting or bowel function as of yet. No abdominal pain. Vitals reviewed.     MEDICATIONS  (STANDING):  ascorbic acid 500 milliGRAM(s) Oral daily  bisacodyl 5 milliGRAM(s) Oral every 12 hours  budesonide  80 MICROgram(s)/formoterol 4.5 MICROgram(s) Inhaler 2 Puff(s) Inhalation two times a day  buprenorphine 8 mG/naloxone 2 mG SL Film 1 Film(s) SubLingual daily  dexAMETHasone  Injectable 6 milliGRAM(s) IV Push daily  dextrose 5% + sodium chloride 0.9% with potassium chloride 20 mEq/L 1000 milliLiter(s) (75 mL/Hr) IV Continuous <Continuous>  enoxaparin Injectable 40 milliGRAM(s) SubCutaneous every 24 hours  folic acid 1 milliGRAM(s) Oral daily  gabapentin 200 milliGRAM(s) Oral three times a day  influenza   Vaccine 0.5 milliLiter(s) IntraMuscular once  levothyroxine 25 MICROGram(s) Oral daily  lidocaine   4% Patch 1 Patch Transdermal daily  metoclopramide Injectable 10 milliGRAM(s) IV Push every 8 hours  multivitamin 1 Tablet(s) Oral daily  nicotine - 21 mG/24Hr(s) Patch 1 Patch Transdermal daily  octreotide  Injectable 150 MICROGram(s) IV Push every 8 hours  pantoprazole  Injectable 40 milliGRAM(s) IV Push every 12 hours  QUEtiapine 50 milliGRAM(s) Oral at bedtime  sucralfate suspension 1 Gram(s) Oral four times a day  tiotropium 18 MICROgram(s) Capsule 1 Capsule(s) Inhalation daily    MEDICATIONS  (PRN):  acetaminophen     Tablet .. 650 milliGRAM(s) Oral every 6 hours PRN Temp greater or equal to 38C (100.4F), Mild Pain (1 - 3)  ALBUTerol    90 MICROgram(s) HFA Inhaler 2 Puff(s) Inhalation every 6 hours PRN Bronchospasm  aluminum hydroxide/magnesium hydroxide/simethicone Suspension 30 milliLiter(s) Oral every 4 hours PRN Dyspepsia  benzonatate 100 milliGRAM(s) Oral every 8 hours PRN Cough  cyclobenzaprine 10 milliGRAM(s) Oral three times a day PRN Muscle Spasm  guaiFENesin ER 1200 milliGRAM(s) Oral every 12 hours PRN Cough  ketorolac   Injectable 15 milliGRAM(s) IV Push every 6 hours PRN Severe Pain (7 - 10)  LORazepam     Tablet 2 milliGRAM(s) Oral every 2 hours PRN CIWA-Ar score increase by 2 points and a total score of 7 or less  LORazepam     Tablet 2 milliGRAM(s) Oral every 1 hour PRN CIWA-Ar score 8 or greater  melatonin 3 milliGRAM(s) Oral at bedtime PRN Insomnia  ondansetron Injectable 4 milliGRAM(s) IV Push every 6 hours PRN Nausea and/or Vomiting  prochlorperazine   Injectable 10 milliGRAM(s) IV Push every 6 hours PRN severe nausea    Vital Signs Last 24 Hrs  T(C): 37.2 (22 Sep 2022 08:26), Max: 37.2 (22 Sep 2022 08:26)  T(F): 98.9 (22 Sep 2022 08:26), Max: 98.9 (22 Sep 2022 08:26)  HR: 93 (22 Sep 2022 08:26) (90 - 101)  BP: 117/71 (22 Sep 2022 08:26) (95/66 - 117/71)  BP(mean): --  RR: 18 (22 Sep 2022 08:26) (16 - 18)  SpO2: 96% (22 Sep 2022 08:26) (93% - 97%)    Parameters below as of 22 Sep 2022 08:26  Patient On (Oxygen Delivery Method): room air                            7.8    7.10  )-----------( 419      ( 22 Sep 2022 08:06 )             24.8   09-22    135  |  104  |  9   ----------------------------<  208<H>  4.3   |  26  |  0.82    Ca    8.6      22 Sep 2022 08:06  Phos  2.8     09-22  Mg     2.7     09-22    TPro  6.5  /  Alb  2.6<L>  /  TBili  0.7  /  DBili  x   /  AST  17  /  ALT  25  /  AlkPhos  157<H>  09-22      Physical Exam:  GENERAL: NAD, AOx3, well developed, cachetic  HEAD: Atraumatic, normocephalic  EYES: EOMI, PERRLA, conjunctiva and sclera clear  ENT: moist mucous membrane  NECK: supple, No JVD, midline trachea  CHEST/LUNG: unlabored respirations b/l on room air  Heart: S1, S2 normal  ABDOMEN: soft, mildly distended, mild tenderness at lower abdomen  NERVOUS SYSTEM: AOx3, speech clear, no neuro-deficits  MSK: full ROM, no deformities  SKIN: warm to touch, no rash or lesions

## 2022-09-22 NOTE — PROGRESS NOTE ADULT - ASSESSMENT
51yo M presents with hemoptysis secondary to NSCLC and pneumonia vs. pneumonitis  Surgery consulted for malignant SBO that was demonstrated on CT scan, transition point at pelvic level    Plan:  - pain control prn  - monitor VS  - NPO  - once patient has additional vomitus, will place NG tube  - continue malignant SBO protocol - reglan 10q6hr, octreotide [id number], prednisone 10q6hr  - PPN  - antiemetic control prn  - continue IVF  - continue IV ABx per primary team  - DVT PPX  - encourage IS/OOB  - consult palliative care   - Consult GI for PEG tube placement

## 2022-09-22 NOTE — PROGRESS NOTE ADULT - ASSESSMENT
53 y/o M presents with hemoptysis     Acute hypoxemic respiratory failure and Hemoptysis secondary to probable post-obstructive pneumonia, alveolar hemorrhage, Pneumonitis superimposed on  Metastatic SHAILA NSMLC Adenocarcinoma   - CTA: No pulmonary embolus; Approximately 7.7 x 4.6 cm left upper lobe mass is stable; Increased interlobular septal thickening, patchy groundglass and solid opacities within left upper lobe and left lower lobe which can represent pneumonia, pneumonitis/alveolar hemorrhage  - Lactate 3.2-->2.2-->1.0  - Continue supplemental O2 to maintain SpO2 > 92%   - s/p Zosyn & Vanco in the ER; s/p  Azithromycin; Completed 7 days   - Continue Albuterol PRN, Symbicort, & Spiriva  - Tessalon Pearle PRN   - ID consult - Dr. Archibald   - Pulmonology consult - Dr. Akbar, no role for steroids   - Oncology consult- Dr. Trammell   - Rad/Onc consult Dr. Rose, pt to f/u OP  - Palliative Care following     Malignant SBO   - CT a/p as above  - Not a candidate for resection  - started on clear liquid diet and PPN/Lipids   - DC IVF   - Nutrition consult following  - Per surg/onc--> Malignant SBO Protocol: Reglan [id number], Octreotide [id number], Decadron 6mg IV push qd   - If patient unable to tolerate CLD then will reconsult GI for venting PEG    Acute EtOH Withdrawal  - Drinks 2 pints of Vodka Daily, last drink 9/14/22 at 6am  - s/p Ativan Taper/CIWA Protocol  - SW Consult  - May require IP Substance Abuse Rehab Prior to Cancer Tx.; Pt refusing   - Resume Xanax PRN at lower dose, 0.5mg PO q12h; 9/20 changed back to 1mg BID as pt symptomatic   - Folate and Multivitamin   - Started on Gabapentin 100mg TID; dose increased to 200mg TID; increased to 300mg TID  - Psych consulted      Hyponatremia   - Likely due to hypovolemia from vomiting   - s/p IVF    Electrolyte Abnormality   - Due to EtOH Consumption  - Supplement Lytes as needed  - Trend BMP  - Calcium corrected for albumin 9.8     EKG Abnormality  - ST & T wave abnormality, consider inferior ischemia  - Repeat EKG no change   - Troponin negative x 2   -   - 2d echo as above     Hypothyroidism, new  - TSH low, Free T4 high  - Start low dose Synthroid  - F/U OP in 4 weeks for repeat TFTs   - From NM PET/CT Onc FDG Skull to Thigh, Initial (08.13.22 @ 13:45): There is an approximately symmetric enlargement of bilateral thyroid lobes with heterogeneous FDG avidity, significantly more prominent on the left with SUV 37.2 (image 70). Physiologic FDG activity in visualized brain, major salivary glands, and neck muscles.    Abnormal LFTs due to Hepatic Steatosis and EtOH abuse  Hypertriglyceridemia   - US abdomen demonstrates Hepatomegaly and steatosis; Gallbladder sludge and stones with a dilated extrahepatic biliary tree  - MRI/MRCP Borderline common duct without obstructing lesion; Pancreas divisum; Marked hepatic steatosis  - Seen by GI, Dr. Bertrand, would not pursue further evaluation for biliary disease  - Ammonia wnl  - Trend LFTs  - Lipid panel as above     Severe Vitamin D deficiency   - Start PO supplementation     Iron deficiency anemia   Bone marrow suppression from alcohol dependence   - B12 elevated, folate wnl   - Stop B12  - Started on PO iron supplementation   - Transfused today for HGB 7.8    Hyperglycemia, clinically insignificant  - A1c 5.1    Hx of Opiate Abuse & Dependence  - Continue Suboxone   - Continue Seroquel 50mg at HS    Nicotine Use Disorder  - NRT   - Counseled on smoking cessation    HTN   - Continue Lisinopril with hold parameters; Stopped on 9/21/22 due to soft BP    DVT Prophylaxis:  Lovenox 40 mg subcutaneous daily     Code status: Full code (Pt agrees to chest compressions and intubation if required).     Daily conversations with patient, Onc, surg onc and palliative care; per onc pt is not a candidate for chemo/immunotherapy, SBO is unresectable, may require venting PEG for decompression if he does not tolerate CLD. Hospice philosophy explained today with patient and his HCP Archana.     Dispo: clear liquid diet, PPN/Lipids, pain and anxiety management

## 2022-09-22 NOTE — PROVIDER CONTACT NOTE (OTHER) - SITUATION
pt CIWA completed but would be scoring 14, if active
pt c/o trembling
pt vomited third time tonight. bilious with quarter sized clot. pt requesting to speak w 
Answering service aware of consult.
Pt c/o of new onset let eye droopiness and new Left eye floaters in vision
pt vomited large amt of bile x2

## 2022-09-22 NOTE — PROGRESS NOTE ADULT - ASSESSMENT
Assessment:     52 year old with complicated history admitted for hemoptysis due to malignancy    Process of Care  --Reviewed dx/treatment problems and alignment with Goals of Care    Physical Aspects of Care  --Pain - pt is on suboxone.  Started on gabapentin 200 TID + lidocaine patch. flexeril PRN.  will increase gabapentin to 300 TID.  Toradol IV as needed for breakthrough pain.   --Bowel Regimen - need to treat SBO prior to initiating any bowel regimen.   --Dyspnea - No SOB at this time. Comfortable and in NAD  --Nausea Vomiting - denies  --Weakness - PT as tolerated, OOB to chair, fall precautions  -- SBO - as per onc/surgery - pt reported passing flatus today  -- Lung Cancer - pending discussion with Onc to discuss prognosis/treatments.      Psychological and Psychiatric Aspects of Care:   --Grief/Bereavement: emotional support provided  --Hx of psychiatric dx: none  -Pastoral Care Available PRN     Social Aspects of Care  -SW involved     Cultural Aspects  -Primary Language: English    Goals of Care: pt wants to pursue treatments.  wants all aggressive measures to maintain life.  will readdress with patient after his discussion with oncology.     Ethical and Legal Aspects: NA  Capacity- pt maintains capacity    HCP/Surrogate: Archana (cousin)    Code Status- full  MOLST- n/a  Dispo Plan- home?

## 2022-09-22 NOTE — CONSULT NOTE ADULT - SUBJECTIVE AND OBJECTIVE BOX
CHIEF COMPLAINT: "I can't eat."    HPI:  53 y/o man with a history of recently diagnosed metastatic lung cancer, hypertension, substance abuse on Suboxone, alcohol & tobacco abuse, who presented to the ER on 9/14/22 with hemoptysis with hospitalization complicated by a small bowel obstruction -- cardiology consult requested for pre-op clearance.  Mr. Tanner denies any past history of heart disease.  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  He reports past uncomplicated L wrist surgery.  He complains of 1-2 days of nausea, intermittent vomiting, and inability to eat; no abdominal discomfort.  Hemoptysis has resolved.    PAST MEDICAL & SURGICAL HISTORY:  HTN (hypertension)  Smoker  Substance abuse  Admits to alcohol use  Lung cancer 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)  Injury due to foreign body - right wrist    SOCIAL HISTORY:   Alcohol: Yes  Smoking: Yes  Drug Use: Past  Marital Status:     FAMILY HISTORY: Rheumatic fever (Father); "lymph nose cancer" (Mother)    MEDICATIONS  (STANDING):  ascorbic acid 500 milliGRAM(s) Oral daily  bisacodyl 5 milliGRAM(s) Oral every 12 hours  budesonide  80 MICROgram(s)/formoterol 4.5 MICROgram(s) Inhaler 2 Puff(s) Inhalation two times a day  buprenorphine 8 mG/naloxone 2 mG SL Film 1 Film(s) SubLingual daily  dexAMETHasone  Injectable 6 milliGRAM(s) IV Push daily  dextrose 5% + sodium chloride 0.9% with potassium chloride 20 mEq/L 1000 milliLiter(s) (75 mL/Hr) IV Continuous <Continuous>  enoxaparin Injectable 40 milliGRAM(s) SubCutaneous every 24 hours  folic acid 1 milliGRAM(s) Oral daily  gabapentin 200 milliGRAM(s) Oral three times a day  influenza   Vaccine 0.5 milliLiter(s) IntraMuscular once  levothyroxine 25 MICROGram(s) Oral daily  lidocaine   4% Patch 1 Patch Transdermal daily  metoclopramide Injectable 10 milliGRAM(s) IV Push every 8 hours  multivitamin 1 Tablet(s) Oral daily  nicotine - 21 mG/24Hr(s) Patch 1 Patch Transdermal daily  octreotide  Injectable 150 MICROGram(s) IV Push every 8 hours  pantoprazole  Injectable 40 milliGRAM(s) IV Push every 12 hours  QUEtiapine 50 milliGRAM(s) Oral at bedtime  sucralfate suspension 1 Gram(s) Oral four times a day  tiotropium 18 MICROgram(s) Capsule 1 Capsule(s) Inhalation daily    MEDICATIONS  (PRN):  acetaminophen     Tablet .. 650 milliGRAM(s) Oral every 6 hours PRN Temp greater or equal to 38C (100.4F), Mild Pain (1 - 3)  ALBUTerol    90 MICROgram(s) HFA Inhaler 2 Puff(s) Inhalation every 6 hours PRN Bronchospasm  aluminum hydroxide/magnesium hydroxide/simethicone Suspension 30 milliLiter(s) Oral every 4 hours PRN Dyspepsia  benzonatate 100 milliGRAM(s) Oral every 8 hours PRN Cough  cyclobenzaprine 10 milliGRAM(s) Oral three times a day PRN Muscle Spasm  guaiFENesin ER 1200 milliGRAM(s) Oral every 12 hours PRN Cough  ketorolac   Injectable 15 milliGRAM(s) IV Push every 6 hours PRN Severe Pain (7 - 10)  LORazepam     Tablet 2 milliGRAM(s) Oral every 2 hours PRN CIWA-Ar score increase by 2 points and a total score of 7 or less  LORazepam     Tablet 2 milliGRAM(s) Oral every 1 hour PRN CIWA-Ar score 8 or greater  melatonin 3 milliGRAM(s) Oral at bedtime PRN Insomnia  ondansetron Injectable 4 milliGRAM(s) IV Push every 6 hours PRN Nausea and/or Vomiting  prochlorperazine   Injectable 10 milliGRAM(s) IV Push every 6 hours PRN severe nausea    Allergies: No Known Allergies    REVIEW OF SYSTEMS:  CONSTITUTIONAL: No fevers or chills  Eyes: No visual changes  NECK: No pain or stiffness  RESPIRATORY: Recent hemoptysis; No shortness of breath  CARDIOVASCULAR: No chest pain or palpitations  GASTROINTESTINAL: +  nausea, + vomiting  GENITOURINARY: No dysuria, frequency or hematuria  NEUROLOGICAL: No numbness.  SKIN: No itching or rash  All other review of systems is negative unless indicated above    VITAL SIGNS:   Vital Signs Last 24 Hrs  T(C): 36.9 (21 Sep 2022 20:26), Max: 37.1 (21 Sep 2022 09:16)  T(F): 98.4 (21 Sep 2022 20:26), Max: 98.8 (21 Sep 2022 09:16)  HR: 98 (21 Sep 2022 20:26) (97 - 102)  BP: 112/71 (21 Sep 2022 20:26) (95/66 - 112/71)  RR: 16 (21 Sep 2022 20:26) (16 - 18)  SpO2: 93% (21 Sep 2022 20:26) (93% - 97%)    PHYSICAL EXAM:  Constitutional: NAD, awake and alert  HEENT: No oral cyanosis.  Pulmonary: Non-labored,  No wheezing, rales or rhonchi  Cardiovascular: S1 and S2, regular rate and rhythm  Gastrointestinal: Abdomen is soft, nontender.   Lymph: No peripheral edema.   Neurological: Alert, no focal deficits  Skin: No rashes.  Psych:  Mood & affect appropriate    LABS:                      7.8    7.72  )-----------( 336      ( 21 Sep 2022 06:22 )             23.5                         9.8    9.54  )-----------( 330      ( 20 Sep 2022 06:20 )             29.8     132    |  100    |  11     ----------------------------<  91     3.7     |  27     |  0.81     12 Lead ECG (09.15.22 @ 19:40):  Normal sinus rhythm  ST & T wave abnormality, consider anterolateral ischemia  Abnormal ECG    TTE Echo Complete w/o Contrast w/ Doppler (09.16.22 @ 11:08):   Minimally reduced left ventricular systolic function.  Estimated left ventricular ejection fraction is 50 %.   EA reversal of the mitral inflow consistent with reduced compliance of the left ventricle.   Normal appearing left atrium.   Normal appearing right atrium.   Normal appearing right ventricle structure and function.   Normal aortic valve structure and function.   Normal appearing mitral valve structure and function.  Mild (1+) mitral regurgitation is present.   Trace tricuspid valve regurgitation is present.   Normal appearing pulmonic valve structure and function.   No evidence of pericardial effusion.    CT Abdomen and Pelvis w/ IV Cont (09.20.22 @ 19:01):  Small bowel obstruction to the level of a concentric mass of a pelvic loop small bowel concerning for primary versus metastatic neoplasm. The lesion is grossly stable in size.  Stable cluster of midabdominal low-density lymph nodes that may indicate   necrosis. Redemonstration of intralobular septal thickening and groundglass opacities in the lingula and consolidative and reticular opacities in the left lower lobe. New trace left pleural effusion. Partially imaged spiculated nodule in the right middle lobe measuring 6 cm, grossly stable.    CT Angio Chest PE Protocol w/ IV Cont (09.14.22 @ 13:31):  No pulmonary embolus. No contrast extravasation. Approximately 7.7 x 4.6 cm left upper lobe mass is stable. Increased interlobular septal thickening, patchy groundglass and solid opacities within left upper lobe and left lower lobe which can represent pneumonia, pneumonitis/alveolar hemorrhage.

## 2022-09-22 NOTE — PROGRESS NOTE ADULT - SUBJECTIVE AND OBJECTIVE BOX
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 He states that he coughs so much that it causes his to vomit. Reports nausea, constipation, dehydration. Denies fevers, chills, chest pain, SOB, abdominal pain. Pt was due to start chemotherapy this Friday.   ER course: SpO2 91% on room air. Labs: Hb 10.2, lactate 3.2, Na 134, K+ 3.1, glucose 135, albumin 2.7, alkaline phosphatase 325, AST 82. EKG: NSR with HR 82 bpm,  ms, no ST segment changes, T wave inversions in II/III/AVF present on prior EKG (personally reviewed). Pt was given Zosyn, 2500 ml of NS, Libirum, Zofran. He is being admitted to med/surg for further management.     Interval History: 9/22/22 pt seen and examined at bedside, has not had any vomiting, + flatus, diet advanced to clears, started on PPN/Lipids. Pain and anxiety well controlled, Gabapentin dose increased again today. No longer experiencing DRUMMOND, no longer having hemoptysis. + weakness and fatigue. + left shoulder/back pain. Denies CP, palps, HA, dizziness, lightheadedness, n/v/d, dysuria, fever or chills.     REVIEW OF SYSTEMS: All other review of systems is negative unless indicated above.    PE:  Vital Signs Last 24 Hrs: all reviewed   T(C): 36.9 (22 Sep 2022 15:46), Max: 37.2 (22 Sep 2022 08:26)  T(F): 98.5 (22 Sep 2022 15:46), Max: 98.9 (22 Sep 2022 08:26)  HR: 83 (22 Sep 2022 15:46) (83 - 98)  BP: 109/74 (22 Sep 2022 15:46) (109/74 - 132/88)  RR: 18 (22 Sep 2022 15:46) (16 - 18)  SpO2: 96% (22 Sep 2022 15:46) (93% - 96%)  Parameters below as of 22 Sep 2022 15:46  Patient On (Oxygen Delivery Method): room air    Constitutional: NAD, awake and alert  HEENT: EOMI  Neck: Soft and supple, No JVD  Respiratory: Breath sounds are diminished bilaterally, No wheezing, rales or rhonchi  Cardiovascular: S1 and S2, regular rate and rhythm, no Murmurs  Gastrointestinal: Bowel Sounds present, soft, nontender, + distended  Extremities: No peripheral edema  Vascular: 2+ peripheral pulses  Neurological: A/O x 3, no focal deficits  Musculoskeletal: 5/5 strength b/l upper and lower extremities  Skin: No rashes    LABS: All Labs Reviewed:                        7.8    7.10  )-----------( 419      ( 22 Sep 2022 08:06 )             24.8   09-22    135  |  104  |  9   ----------------------------<  208<H>  4.3   |  26  |  0.82    Ca    8.6      22 Sep 2022 08:06  Phos  2.8     09-22  Mg     2.7     09-22    TPro  6.5  /  Alb  2.6<L>  /  TBili  0.7  /  DBili  x   /  AST  17  /  ALT  25  /  AlkPhos  157<H>  09-22    PT/INR - ( 14 Sep 2022 13:15 )   PT: 11.7 sec;   INR: 1.01 ratio    PTT - ( 14 Sep 2022 13:15 )  PTT:27.3 sec  Troponin I, High Sensitivity (09.14.22 @ 13:15) 13.69-->(09.15.22 @ 09:06)  18.15  Lactate, Blood (09.14.22 @ 15:42) 3.2 mmol/L --> (09.14.22 @ 18:18) 2.2 mmol/L -->(09.15.22 @ 09:06) 1.0 mmol/L   Procalcitonin, Serum (09.14.22 @ 18:15)  0.18  Thyroid Stimulating Hormone, Serum (09.15.22 @ 06:32) 99.10 uU/mL   Free Thyroxine, Serum (09.15.22 @ 06:32) 0.24 ng/dL   Vitamin D, 25-Hydroxy (09.16.22 @ 07:01) <5.0  Iron with Total Binding Capacity in AM (09.16.22 @ 07:01)   Iron - Total Binding Capacity.: 208 ug/dL   % Saturation, Iron: 11 %   Iron Total, Serum: 22 ug/dL   Unsaturated Iron Binding Capacity: 186 ug/dL   Lipid Profile in AM (09.16.22 @ 07:01)   Cholesterol, Serum: 150 mg/dL   Triglycerides, Serum: 156 mg/dL   HDL Cholesterol, Serum: 43 mg/dL   Non HDL Cholesterol: 107  Vitamin B12, Serum in AM (09.16.22 @ 07:01)  >2000  Ammonia, Serum (09.16.22 @ 07:01)  19 umol/L   Folate, Serum in AM (09.16.22 @ 07:01) 13.5 ng/mL   Lipase, Serum in AM (09.21.22 @ 06:22)  398 U/L   Micro:    Legionella pneumophila Antigen, Urine (09.15.22 @ 02:00) Negative  Culture - Sputum . (09.15.22 @ 10:15) Rare Gram Positive Rods seen per oil power field Rare Gram positive cocci in pairs seen per oil power field   Culture - Blood (09.15.22 @ 09:06) No growth to date.   RADIOLOGY/EKG: all reviewed     12 Lead ECG (09.14.22 @ 13:00)   Ventricular Rate 82 BPM  Atrial Rate 82 BPM  P-R Interval 122 ms  QRS Duration 100 ms  Q-T Interval 420 ms  QTC Calculation(Bazett) 490 ms  P Axis 81 degrees  R Axis 86 degrees  T Axis -64 degrees  Normal sinus rhythm  ST & T wave abnormality, consider inferior ischemia  Prolonged QT  Abnormal ECG    12 Lead ECG (09.15.22 @ 19:40)   Ventricular Rate 97 BPM  Atrial Rate 97 BPM  P-R Interval 126 ms  QRS Duration 82 ms  Q-T Interval 362 ms  QTC Calculation(Bazett) 459 ms  P Axis 82 degrees  R Axis 80 degrees  T Axis 75 degrees  Diagnosis Line Normal sinus rhythm  ST & T wave abnormality, consider anterolateral ischemia  Abnormal ECG    CT Angio Chest PE Protocol w/ IV Cont (09.14.22 @ 13:31)   FINDINGS:  PULMONARY VESSELS: No pulmonary embolus. No evidence of contrast extravasation represent active hemorrhage.  HEART AND VASCULATURE: Heart size is normal. No pericardial effusion. No aortic aneurysm or dissection.  LUNGS, AIRWAYS, PLEURA: Patent central airways. Approximately 7.7 x 4.6 cm left upper lobe cavitary mass is stable. Increased interlobular septal thickening, patchy groundglass and solid opacities within left upper lobe and left lower lobe which can represent pneumonia or pneumonitis. Stable 1.6 cm spiculated nodule within right middle lobe. No pleural effusions or pneumothorax.  MEDIASTINUM: Stable mediastinal and hilar lymph nodes for reference: 1.8 cm left hilar lymph node and 2 cm short axis subcarinal lymph node.  UPPER ABDOMEN: Hepatic steatosis.  BONES AND SOFT TISSUES: No aggressive osseous lesion. Stable enlarged left axillary lymph nodes.  LOWER NECK: Within normal limits.  IMPRESSION:  No pulmonary embolus. No contrast extravasation.  Approximately 7.7 x 4.6 cm left upper lobe mass is stable. Increased interlobular septal thickening, patchy groundglass and solid opacities within left upper lobe and left lower lobe which can represent pneumonia, pneumonitis/alveolar hemorrhage.    US Abdomen Complete (US Abdomen Complete .) (09.15.22 @ 16:01)   FINDINGS:  Liver: Enlarged with steatosis.  Bile ducts: Normal caliber. Common bile duct measures 11 mm.  Gallbladder: Distended with sludge and stones.  Pancreas: Visualized portions are within normal limits. Pancreatic duct is upper limits ofnormal in size measuring 3 mm.  Spleen: 10.5 cm. Within normal limits. Adjacent splenule.  Right kidney: 11.1 cm. No hydronephrosis.  Left kidney: 10.7 cm. No hydronephrosis.  Ascites: None.  Aorta and IVC: Visualized portions are within normal limits.  IMPRESSION:  Hepatomegaly and steatosis.  Gallbladder sludge and stones with a dilated extrahepatic biliary tree.   MRI/MRCP is advised for further evaluation.  Pancreatic duct is upper limits of normal and can also be better assessed at the time of MRI.    MR MRCP w/wo IV Cont (09.16.22 @ 11:44)   FINDINGS:  LOWER CHEST: Left lower lobe consolidation similar to recent chest CT. Small bilateral pleural effusions, larger on the left  LIVER: Marked steatosis.  BILE DUCTS: Borderline common duct measuring 8 mm. No choledocholithiasis identified.  GALLBLADDER: No gallstones visualized.  SPLEEN: Within normal limits.  PANCREAS: Pancreas divisum  ADRENALS: Within normal limits.  KIDNEYS/URETERS: Within normal limits.  VISUALIZED PORTIONS:  BOWEL: Within normal limits.  PERITONEUM: No ascites.  VESSELS: Within normal limits.  RETROPERITONEUM/LYMPH NODES: No lymphadenopathy.  ABDOMINAL WALL: Within normal limits.  BONES: Within normal limits.  IMPRESSION:  Borderline common duct without obstructing lesion.  Pancreas divisum.  Marked hepatic steatosis.    TTE Echo Complete w/o Contrast w/ Doppler (09.16.22 @ 11:08)   Impression  Summary  Minimally reduced left ventricular systolic function.  Estimated left ventricular ejection fraction is 50 %.  Normal appearing left atrium.  Normal appearing right atrium.  Normal appearing right ventricle structure and function.  Normal aortic valve structure and function.  Normal appearing mitral valve structure and function.   Mild (1+) mitral regurgitation is present.  EA reversal of the mitral inflow consistent with reduced compliance of the left ventricle.  Trace tricuspid valve regurgitation is present.  Normal appearing pulmonic valve structure and function.  No evidence of pericardial effusion.    CT Abdomen and Pelvis w/ IV Cont (09.20.22 @ 19:01)   FINDINGS:  LOWER CHEST: Trace left pleural effusion. Confluent groundglass and reticular infiltrates in the lingula and consolidative opacities in the left lower lobe.  LIVER: Steatosis.  BILE DUCTS: Normal caliber.  GALLBLADDER: Within normal limits.  SPLEEN: Within normal limits.  PANCREAS: Within normal limits.  ADRENALS: Within normal limits.  KIDNEYS/URETERS: Within normal limits.  BLADDER: Within normal limits.  REPRODUCTIVE ORGANS: Prostate within normal limits.  BOWEL: Diffuse fluid distention of small bowel with transition at the level of a 3.5 cm length of concentric wall thickening in a loop of pelvic small bowel (2:90, 601:64), grossly stable when compared to prior CT of 7/19/2022. Distal small bowel is decompressed. Mild amount of stool throughout the colon.  PERITONEUM: No ascites.  VESSELS: Atherosclerotic changes.  RETROPERITONEUM/LYMPH NODES: Stable low-density clustered lymph node mass in the mid abdomen measuring 4.3 x 4.2 cm (2:72).  ABDOMINAL WALL: Within normal limits.  BONES: Degenerative changes.  IMPRESSION:  Small bowel obstruction to the level of a concentric mass of a pelvic loop small bowel concerning for primary versus metastatic neoplasm. The lesion is grossly stable in size.  Stable cluster of midabdominal low-density lymph nodes that may indicate necrosis.  Redemonstration of intralobular septal thickening and groundglass opacities in the lingula and consolidative and reticular opacities in the left lower lobe. New trace left pleural effusion. Partially imaged spiculated nodule in the right middle lobe measuring 6 cm, grossly stable.    Meds:  MEDICATIONS  (STANDING):  ALPRAZolam 1 milliGRAM(s) Oral two times a day  ascorbic acid 500 milliGRAM(s) Oral daily  bisacodyl 5 milliGRAM(s) Oral every 12 hours  budesonide  80 MICROgram(s)/formoterol 4.5 MICROgram(s) Inhaler 2 Puff(s) Inhalation two times a day  buprenorphine 8 mG/naloxone 2 mG SL Film 1 Film(s) SubLingual daily  dexAMETHasone  Injectable 6 milliGRAM(s) IV Push daily  enoxaparin Injectable 40 milliGRAM(s) SubCutaneous every 24 hours  fat emulsion (Fish Oil and Plant Based) 20% Infusion 0.787 Gm/kG/Day (20.8 mL/Hr) IV Continuous <Continuous>  folic acid 1 milliGRAM(s) Oral daily  gabapentin 300 milliGRAM(s) Oral every 8 hours  influenza   Vaccine 0.5 milliLiter(s) IntraMuscular once  levothyroxine 25 MICROGram(s) Oral daily  lidocaine   4% Patch 1 Patch Transdermal daily  metoclopramide Injectable 10 milliGRAM(s) IV Push every 8 hours  multivitamin 1 Tablet(s) Oral daily  nicotine - 21 mG/24Hr(s) Patch 1 Patch Transdermal daily  octreotide  Injectable 150 MICROGram(s) IV Push every 8 hours  pantoprazole  Injectable 40 milliGRAM(s) IV Push every 12 hours  Parenteral Nutrition - Adult 1 Each (67 mL/Hr) TPN Continuous <Continuous>  QUEtiapine 50 milliGRAM(s) Oral at bedtime  sucralfate suspension 1 Gram(s) Oral four times a day  tiotropium 18 MICROgram(s) Capsule 1 Capsule(s) Inhalation daily    MEDICATIONS  (PRN):  acetaminophen     Tablet .. 650 milliGRAM(s) Oral every 6 hours PRN Temp greater or equal to 38C (100.4F), Mild Pain (1 - 3)  ALBUTerol    90 MICROgram(s) HFA Inhaler 2 Puff(s) Inhalation every 6 hours PRN Bronchospasm  aluminum hydroxide/magnesium hydroxide/simethicone Suspension 30 milliLiter(s) Oral every 4 hours PRN Dyspepsia  benzonatate 100 milliGRAM(s) Oral every 8 hours PRN Cough  cyclobenzaprine 10 milliGRAM(s) Oral three times a day PRN Muscle Spasm  guaiFENesin ER 1200 milliGRAM(s) Oral every 12 hours PRN Cough  ketorolac   Injectable 15 milliGRAM(s) IV Push every 6 hours PRN Severe Pain (7 - 10)  melatonin 3 milliGRAM(s) Oral at bedtime PRN Insomnia  ondansetron Injectable 4 milliGRAM(s) IV Push every 6 hours PRN Nausea and/or Vomiting  prochlorperazine   Injectable 10 milliGRAM(s) IV Push every 6 hours PRN severe nausea

## 2022-09-22 NOTE — PROVIDER CONTACT NOTE (OTHER) - REASON
Consult
Refusing STAT CT head for new onset of symtopms
Pt vomited third time. pt requesting to see pt
pt CIWA completed but would be scoring 14, if active
pt vomited large amt of bile x2
C/O new onset left eye droopiness and left eye floaters
pt c/o trembling

## 2022-09-22 NOTE — CONSULT NOTE ADULT - PROBLEM SELECTOR RECOMMENDATION 9
Mr Tanner has no past history of heart disease.  Predictors of perioperative cardiac risk include abnormal ECG (nonspecific T wave abnormality) and echocardiogram with low-normal LV systolic function & mild diastolic dysfunction.  However, he reports a good baseline functional status and no exertional symptoms.  SBO is occurring in the setting of metastatic lung cancer (treatment has not yet been started) and polysubstance abuse.  No cardiac contraindication to surgery for SBO if necessary from oncological / surgical standpoint.  Consider repeating Hgb and PRBC transfusion if HGB remains < 8.

## 2022-09-22 NOTE — PROGRESS NOTE ADULT - SUBJECTIVE AND OBJECTIVE BOX
Subjective:  seen at bedside this afternoon starting at 1230pm.  pt is accompanied by his cousin and alternate HCP Archana Kaplan. he was told just a few minutes prior by the primary team that his cancer was no longer treatable.  He and his proxy are upset but also tell me that his oncologist will be coming in tomorrow to speak with him about his disease.  I encouarged him not to jump to conclusions until he get the prognosis from his oncologist whom he knows and has been treating him.     We did speak about the home hospice program and I answered a number of questions about the program.  I reminded him that he was still full code and we will continue to pursue all measures to preserve life, including intubation and CPR.  I encouraged him to speak with his HCPs (aka the [facility]) about his wishes, and if/when a DNR would be appropriate.  Pt and family verbalized appreciation for my time and we will meet up again tomorrow after he meets with oncology to discuss further options  HCP was updated yesterday by Palliative SW  pt did also tell me that he passed flatus today.  his pain is slightly improved on gabapentin, requesting higher dose as the pain relief is short and he would prefer more relief.  he did not take ayn toradol PRNs, so I informed him that he does have that option for severe pain.   denies any nausea or vomiting after taking gabapentin    PAIN: (x)Yes   ( )No  - same as previous, only slightly improved.   DYSPNEA: ( ) Yes  (x) No      Review of Systems:    - CONSTITUTIONAL: Denies appetite change, fever and chills. Denies weakness and fatigue   - HEENT: Denies changes in vision and hearing.   - RESPIRATORY: Denies SOB, cough and/or respiratory secretions   - CV: Denies palpitations and CP. Denies edema   - GI: Denies abdominal pain, constipation, nausea, vomiting and diarrhea.   - : Denies dysuria and urinary frequency.   - MSK: + back/axilla pain  - SKIN: Denies rash and pruritus.   - NEUROLOGICAL: Denies headache and syncope.   - PSYCHIATRIC: + Anxiety, Denies confusion. Denies depression. Denies suicidal ideations    All other systems reviewed and negative    PHYSICAL EXAM:    Vital Signs Last 24 Hrs  T(C): 37.2 (22 Sep 2022 08:26), Max: 37.2 (22 Sep 2022 08:26)  T(F): 98.9 (22 Sep 2022 08:26), Max: 98.9 (22 Sep 2022 08:26)  HR: 93 (22 Sep 2022 08:26) (90 - 101)  BP: 117/71 (22 Sep 2022 08:26) (95/66 - 117/71)  BP(mean): --  RR: 18 (22 Sep 2022 08:26) (16 - 18)  SpO2: 96% (22 Sep 2022 08:26) (93% - 97%)    Parameters below as of 22 Sep 2022 08:26  Patient On (Oxygen Delivery Method): room air    General:  - GENERAL: Alert and oriented x 3. No acute distress.   - EYES: EOMI. Anicteric.   - HENT: Moist mucous membranes. neck fullness  - LUNGS: Clear to auscultation bilaterally. No accessory muscle use. Speaking in complete sentences.   - CARDIOVASCULAR: Regular rate and rhythm. No murmur. No JVD. No edema.   - ABDOMEN: Soft, non-tender and non-distended. No palpable masses. Normal bowel sounds   - EXTREMITIES: No edema. Non-tender.  FROM  - SKIN: Warm, no rashes or lesions on visible skin.   - NEUROLOGIC: No focal neurological deficits.    - PSYCHIATRIC: Cooperative. Appropriate mood and affect.  emotional at times      LABS:                        7.8    7.10  )-----------( 419      ( 22 Sep 2022 08:06 )             24.8     09-22    135  |  104  |  9   ----------------------------<  208<H>  4.3   |  26  |  0.82    Ca    8.6      22 Sep 2022 08:06  Phos  2.8     09-22  Mg     2.7     09-22    TPro  6.5  /  Alb  2.6<L>  /  TBili  0.7  /  DBili  x   /  AST  17  /  ALT  25  /  AlkPhos  157<H>  09-22      Albumin: Albumin, Serum: 2.6 g/dL (09-22 @ 08:06)      Allergies    No Known Allergies    Intolerances      MEDICATIONS  (STANDING):  ascorbic acid 500 milliGRAM(s) Oral daily  bisacodyl 5 milliGRAM(s) Oral every 12 hours  budesonide  80 MICROgram(s)/formoterol 4.5 MICROgram(s) Inhaler 2 Puff(s) Inhalation two times a day  buprenorphine 8 mG/naloxone 2 mG SL Film 1 Film(s) SubLingual daily  dexAMETHasone  Injectable 6 milliGRAM(s) IV Push daily  dextrose 5% + sodium chloride 0.9% with potassium chloride 20 mEq/L 1000 milliLiter(s) (75 mL/Hr) IV Continuous <Continuous>  enoxaparin Injectable 40 milliGRAM(s) SubCutaneous every 24 hours  fat emulsion (Fish Oil and Plant Based) 20% Infusion 0.787 Gm/kG/Day (20.8 mL/Hr) IV Continuous <Continuous>  folic acid 1 milliGRAM(s) Oral daily  gabapentin 200 milliGRAM(s) Oral three times a day  influenza   Vaccine 0.5 milliLiter(s) IntraMuscular once  levothyroxine 25 MICROGram(s) Oral daily  lidocaine   4% Patch 1 Patch Transdermal daily  metoclopramide Injectable 10 milliGRAM(s) IV Push every 8 hours  multivitamin 1 Tablet(s) Oral daily  nicotine - 21 mG/24Hr(s) Patch 1 Patch Transdermal daily  octreotide  Injectable 150 MICROGram(s) IV Push every 8 hours  pantoprazole  Injectable 40 milliGRAM(s) IV Push every 12 hours  Parenteral Nutrition - Adult 1 Each (67 mL/Hr) TPN Continuous <Continuous>  QUEtiapine 50 milliGRAM(s) Oral at bedtime  sucralfate suspension 1 Gram(s) Oral four times a day  tiotropium 18 MICROgram(s) Capsule 1 Capsule(s) Inhalation daily    MEDICATIONS  (PRN):  acetaminophen     Tablet .. 650 milliGRAM(s) Oral every 6 hours PRN Temp greater or equal to 38C (100.4F), Mild Pain (1 - 3)  ALBUTerol    90 MICROgram(s) HFA Inhaler 2 Puff(s) Inhalation every 6 hours PRN Bronchospasm  aluminum hydroxide/magnesium hydroxide/simethicone Suspension 30 milliLiter(s) Oral every 4 hours PRN Dyspepsia  benzonatate 100 milliGRAM(s) Oral every 8 hours PRN Cough  cyclobenzaprine 10 milliGRAM(s) Oral three times a day PRN Muscle Spasm  guaiFENesin ER 1200 milliGRAM(s) Oral every 12 hours PRN Cough  ketorolac   Injectable 15 milliGRAM(s) IV Push every 6 hours PRN Severe Pain (7 - 10)  LORazepam     Tablet 2 milliGRAM(s) Oral every 2 hours PRN CIWA-Ar score increase by 2 points and a total score of 7 or less  LORazepam     Tablet 2 milliGRAM(s) Oral every 1 hour PRN CIWA-Ar score 8 or greater  melatonin 3 milliGRAM(s) Oral at bedtime PRN Insomnia  ondansetron Injectable 4 milliGRAM(s) IV Push every 6 hours PRN Nausea and/or Vomiting  prochlorperazine   Injectable 10 milliGRAM(s) IV Push every 6 hours PRN severe nausea      RADIOLOGY:

## 2022-09-22 NOTE — PROGRESS NOTE ADULT - ATTENDING COMMENTS
I discuss the goals of care with Dr Cardenas. The patient is not a good candidate for chemotherapy, He has a partial SBO but there are several site of small bowel involvement. The risks of having a major morbidity or mortality following surgery are high since the most severe obstruction site is very proximal and is secondary to a large mesenteric tumor. This would be a difficult resection or bypass.  I have tried to explain to the patient that cure is not possible and that the best palliation would be decompression of the stomach. At this point he wants to eat and refuses an NGT. I agreed to clear liquids with the understanding that when he vomits he would be best served by a PEG tube.

## 2022-09-23 NOTE — PROGRESS NOTE ADULT - ASSESSMENT
1. metastatic NSCLC  PDL-1 90%  patient is not currently a candidate for chemotherapy or immunotherapy given SBO  if his SBO resolves or he is palliated with PEG ( if symptoms worsen), then he may be a candidate for palliative immunotherapy as outpauline [facility] primary oncologist Dr Trammell    2. SBO at multiple levels  patient has mesenteric mets on PET CT  no plans for surgery at this time   may need palliative PEG    3. 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 ETOH intoxication  now dry  LFTs imrpoved

## 2022-09-23 NOTE — CHART NOTE - NSCHARTNOTEFT_GEN_A_CORE
This SW met Pt. bedside for follow up and support.  Palliative SW role explained.  Emotional support provided.  Pt. appeared in good spirits this date.  Pt. reports he is in good spirits and his mood has improved since our last conversation on 9/21 however Pt. met with Dr. Hull on 9/22 expressing situational frustrations and sadness.  Pts feelings explored.  Support provided.      Pt. shared what he has been told by the primary team including oncologist recommendations given to him this morning.  Pt. states he will be following up with treatments outpatient upon discharge.  Pt. states his plan is to return home on Monday if the weekend goes well.  Pt. discussed his clear liquid diet secondary to obstruction and potential venting peg if he does not tolerate diet.  We discussed the philosophy of hospice in the event Pt. may need in the future.  Hospice services at home reviewed with Pt.  At this time Pt. desires to participate in treatments available for palliative measures at home, we discussed returning home with palliative home care or home hospice if able.     Plan to be further determined.  Emotional support provided.  Our team to continue to follow.

## 2022-09-23 NOTE — CHART NOTE - NSCHARTNOTEFT_GEN_A_CORE
Clinical Nutrition PN Follow Up Note    * 52 year old admitted for malignant SBO, acute hypoxic resp distress 2/2 PNA vs pneumonitis/alevolar hemorrhage, normocytic anemia, elevated lactic acid, hypokalemia, elevated alkaline phosphatase, elevated AST 2/2 ETOH use.  W/ PMH significant for HTN, substance use on suboxone, ETOH abuse, smoker, non-small cell adenocarcinoma of the lung (dx 7/21/22) p/w coughing up blood for 3 days, coughing so much it causes him to vomit.    *PPN started on 9/22 due to new onset bilious vomiting; CT abd/pelv on 9/21 reveals malignant SBO.  Pt seen by palliative on 9/21, uninterested to confirm GOC; however, pt wants to continue aggressive nutr measures & is full code.    *current status: 9/23: Day #2 of PPN.  pt pending possible palliative venting PEG if unable to tolerate clears despite full code and all aggressive measures in GOC.  overnight, pt with new onset of Lt eye droopiness and new Lt eye floaters in vision.    *labs reviewed; hyponatremia, will increase sodium in PN bag.  magnesium is remains at upper limit, will keep out of PN bag.  bilirubin total WNL.   POCT WNL.  triglycerides within limits for lipids.  vitamin D very low, not yet on supplementation.   corrected Ca elevated, DO NOT supplement calcium outside of PN bag at this time.   09-23    134<L>  |  104  |  10  ----------------------------<  136<H>  4.1   |  27  |  0.86    Ca    8.7      23 Sep 2022 05:45  Phos  3.1     09-23  Mg     2.4     09-23    TPro  6.1  /  Alb  2.3<L>  /  TBili  0.7  /  DBili  x   /  AST  12<L>  /  ALT  24  /  AlkPhos  128<H>  09-23    HbA1c: A1C with Estimated Average Glucose Result: 5.1 % (09-15-22 @ 06:32)  Lipid Panel: Date/Time: 09-16-22 @ 07:01  Cholesterol, Serum: 150  HDL Cholesterol, Serum: 43  Total Cholesterol/HDL Ration Measurement: --  Triglycerides, Serum: 156 on 9/16/22  Vitamin B12, Serum: >2000 pg/mL (09-16-22 @ 07:01)  Folate, Serum: 13.5 ng/mL (09-16-22 @ 07:01)  Iron Total, Serum: 22 ug/dL (09-16-22 @ 07:01)  Vitamin D, 25-Hydroxy: <5.0 ng/mL (09-16-22 @ 07:01)    POCT Blood Glucose.: 144 mg/dL (23 Sep 2022 06:03)  POCT Blood Glucose.: 111 mg/dL (22 Sep 2022 23:10)      *pertinent meds: folic acid, budesonide, dextrose 5% + NaCl IVF, ascorbic acid, levothyroxine, MVI, protonix, octreotide, melatonin, prednisone     *I&O's Detail    ***None documented, strict I&Os as per PPN protocol    BM (-) constipated last documented BM on 9/18,  pt NOT on bowel regimen.    *Juan Score of 21; no PI documented. no edema documented.    Estimated Needs: based on 59.9Kg (wt taken by RD on 9/15/22)  Calories: 1800-2100Kcal (30-35Kcal/Kg)  Protein:  90-120g (1.5-2g/Kg)  Fluids:  1500-1800mL (25-30mL/Kg)    *Malnutrition dx: Severe malnutrition in acute on chronic illness r/t decreased ability to meet increased nutr needs 2/2 SBO on lung cancer AEB meeting <50% of nutr needs x 3 days; mod muscle/severe fat wasting; 14% wt loss x 4 mo       Weight History:  Height (cm): 182.9 (09-14-22 @ 12:20)  Weight (kg): 63.5 (09-14-22 @ 12:20)  BMI (kg/m2): 19 (09-14-22 @ 12:20)  BSA (m2): 1.83 (09-14-22 @ 12:20)      *will continue to monitor and adjust PN prn*    PPN Recommendations: via peripheral line   Total Volume: 1600mL  50g  Amino Acids  100g Dextrose  50g Lipids 20%  81 mEq Sodium Chloride  25 mEq Sodium Acetate  20 mmol Sodium Phosphate  35 mEq Potassium Chloride  15 mEq Potassium Acetate  0 mmol Potassium Phosphate  0 mEq Calcium Gluconate ( corrected Ca elevated, DO NOT supplement calcium outside of PN bag at this time. )  0 mEq Magnesium Sulfate  300 mg Thiamine  1ml Trace   10ml MVI    Total Calories: 600kcal (Meets  30%  of  Estimated Energy needs  and   59%  Protein needs)( 870 osmolarity)    Additional Recommendations:    1) C/w PPN until diet advances and pt meets >60% of estimated energy needs   2) Strict I & O's  3) Daily lyte checks  4) Weekly triglycerides/LFT checks  5) Obtain POCT q 6 hr as per PPN protocol   6) Corrected Ca WNL, rec'd add 10mEq of Calcium Gluconate outside of PN bag as CAPS is out    *will monitor and adjust treatement plan prn*  Ginny Umanzor MA, RDN, CDN, CNSC [phone]  Nutrition

## 2022-09-23 NOTE — PROGRESS NOTE ADULT - ASSESSMENT
51 y/o M presents with hemoptysis     Acute hypoxemic respiratory failure and Hemoptysis secondary to probable post-obstructive pneumonia, alveolar hemorrhage, Pneumonitis superimposed on  Metastatic SHAILA NSMLC Adenocarcinoma   - CTA: No pulmonary embolus; Approximately 7.7 x 4.6 cm left upper lobe mass is stable; Increased interlobular septal thickening, patchy groundglass and solid opacities within left upper lobe and left lower lobe which can represent pneumonia, pneumonitis/alveolar hemorrhage  - Lactate 3.2-->2.2-->1.0  - Continue supplemental O2 to maintain SpO2 > 92%   - s/p Zosyn & Vanco in the ER; s/p  Azithromycin; Completed 7 days   - Continue Albuterol PRN, Symbicort, & Spiriva  - Tessalon Pearle PRN   - ID consult - Dr. Archibald   - Pulmonology consult - Dr. Akbar, no role for steroids   - Oncology consult- Dr. Trammell   - Rad/Onc consult Dr. Rose, pt to f/u OP  - Palliative Care following     Malignant SBO   - CT a/p as above  - Not a candidate for resection  - started on clear liquid diet and PPN/Lipids, diet will not be advanced further  - Nutrition consult following  - Per surg/onc--> Malignant SBO Protocol: Reglan [id number], Octreotide [id number], Decadron 6mg IV push qd   - If patient unable to tolerate CLD then will reconsult GI for venting PEG    Left eye droop, r/o Gerardo's Syndrome  - CT head negative  - MRI w/wo IV contrast to assess for mets   - Neurology Consult     Acute EtOH Withdrawal  - Drinks 2 pints of Vodka Daily, last drink 9/14/22 at 6am  - s/p Ativan Taper/CIWA Protocol  - SW Consult  - May require IP Substance Abuse Rehab Prior to Cancer Tx.; Pt refusing   - Resume Xanax PRN at lower dose, 0.5mg PO q12h; 9/20 changed back to 1mg BID as pt symptomatic   - Folate and Multivitamin   - Started on Gabapentin 100mg TID; dose increased to 200mg TID; increased to 300mg TID  - Psych consulted      Hyponatremia   - Likely due to hypovolemia from vomiting   - s/p IVF    Electrolyte Abnormality   - Due to EtOH Consumption  - Supplement Lytes as needed  - Trend BMP  - Calcium corrected for albumin 9.8     EKG Abnormality  - ST & T wave abnormality, consider inferior ischemia  - Repeat EKG no change   - Troponin negative x 2   -   - 2d echo as above     Hypothyroidism, new  - TSH low, Free T4 high  - Start low dose Synthroid  - F/U OP in 4 weeks for repeat TFTs   - From NM PET/CT Onc FDG Skull to Thigh, Initial (08.13.22 @ 13:45): There is an approximately symmetric enlargement of bilateral thyroid lobes with heterogeneous FDG avidity, significantly more prominent on the left with SUV 37.2 (image 70). Physiologic FDG activity in visualized brain, major salivary glands, and neck muscles.    Abnormal LFTs due to Hepatic Steatosis and EtOH abuse  Hypertriglyceridemia   - US abdomen demonstrates Hepatomegaly and steatosis; Gallbladder sludge and stones with a dilated extrahepatic biliary tree  - MRI/MRCP Borderline common duct without obstructing lesion; Pancreas divisum; Marked hepatic steatosis  - Seen by GI, Dr. Bertrand, would not pursue further evaluation for biliary disease  - Ammonia wnl  - Trend LFTs  - Lipid panel as above     Severe Vitamin D deficiency   - Start PO supplementation     Iron deficiency anemia   Bone marrow suppression from alcohol dependence   - B12 elevated, folate wnl   - Stop B12  - Started on PO iron supplementation   - Transfused today for HGB 7.8    Hyperglycemia, clinically insignificant  - A1c 5.1    Hx of Opiate Abuse & Dependence  - Continue Suboxone   - Continue Seroquel 50mg at HS    Nicotine Use Disorder  - NRT   - Counseled on smoking cessation    HTN   - Continue Lisinopril with hold parameters; Stopped on 9/21/22 due to soft BP    DVT Prophylaxis:  Lovenox 40 mg subcutaneous daily     Code status: Full code (Pt agrees to chest compressions and intubation if required).     Daily conversations with patient, Onc, surg onc and palliative care; per onc pt is not a candidate for chemo/immunotherapy, SBO is unresectable, may require venting PEG for decompression if he does not tolerate CLD. Hospice philosophy explained today with patient and his HCP Archana.     9/23 pt agreeable to DC home on hospice with likely palliative OP RT    Dispo: clear liquid diet, PPN/Lipids, pain and anxiety management, f/u MRI and neurology recs

## 2022-09-23 NOTE — PROGRESS NOTE ADULT - ASSESSMENT
Assessment:     52 year old with complicated history admitted for hemoptysis due to malignancy    Process of Care  --Reviewed dx/treatment problems and alignment with Goals of Care    Physical Aspects of Care  --Pain - pt is on suboxone.  Gabapentin 300 TID + lidocaine patch. flexeril PRN.  encouraged flexeril for muscle spasm. Toradol IV as needed for breakthrough pain.   --Bowel Regimen - need to treat SBO prior to initiating any bowel regimen.   --Dyspnea - No SOB at this time. Comfortable and in NAD  --Nausea Vomiting - denies  --Weakness - PT as tolerated, OOB to chair, fall precautions  -- SBO - as per onc/surgery - pt tolerating CLD  -- Lung Cancer - to get outpatient radiation.      Psychological and Psychiatric Aspects of Care:   --Grief/Bereavement: emotional support provided  --Hx of psychiatric dx: none  -Pastoral Care Available PRN     Social Aspects of Care  -SW involved     Cultural Aspects  -Primary Language: English    Goals of Care: pt wants to pursue treatments.  wants all aggressive measures to maintain life.  will readdress with patient after his discussion with oncology.     Ethical and Legal Aspects: NA  Capacity- pt maintains capacity    HCP/Surrogate: Archana (cousin)    Code Status- full  MOLST- n/a  Dispo Plan- home.  suggest Palliative Homecare referral.  this way, when radiation is complete, pt can be referred to hospice from home.  Assessment:     52 year old with complicated history admitted for hemoptysis due to malignancy    Process of Care  --Reviewed dx/treatment problems and alignment with Goals of Care    Physical Aspects of Care  --Pain - pt is on suboxone.  Gabapentin 300 TID + lidocaine patch. flexeril PRN.  encouraged flexeril for muscle spasm. Toradol IV as needed for breakthrough pain.   --Bowel Regimen - need to treat SBO prior to initiating any bowel regimen.   --Dyspnea - No SOB at this time. Comfortable and in NAD  --Nausea Vomiting - denies  --Weakness - PT as tolerated, OOB to chair, fall precautions  -- SBO - as per onc/surgery - pt tolerating CLD  -- Lung Cancer - to get outpatient radiation.      Psychological and Psychiatric Aspects of Care:   --Grief/Bereavement: emotional support provided  --Hx of psychiatric dx: none  -Pastoral Care Available PRN     Social Aspects of Care  -SW involved     Cultural Aspects  -Primary Language: English    Goals of Care: pt wants to pursue treatments.  wants all aggressive measures to maintain life.  will readdress with patient after his discussion with oncology.     Ethical and Legal Aspects: NA  Capacity- pt maintains capacity    HCP/Surrogate: Archana (cousin)    Code Status- full  MOLST- n/a  Dispo Plan- home.  There might be a way to enroll patient in both hospice AND receive XRT, but my team has NOT been able to confirm this yet.  I will be out of the hospital on Monday, the palliative care team will follow up with the medical team, hopefully with a more definitive answer.

## 2022-09-23 NOTE — PROGRESS NOTE ADULT - SUBJECTIVE AND OBJECTIVE BOX
The patient was seen and examined.    stage 4 lung cancer  admitted with ETOH inebriation    found to have SBO at multiple levels. S/B surgery. no plans for surgery now  may need palliative gastrostomy if SBO causes vomiting as per GI and surgery  tolerating liquids  no vomiting  wants to go home  on PPN  found to have drooping left eye yesterday  CT brain non contrast negtive    no further hemoptysis                 Home Medications:  ALPRAZolam 1 mg oral tablet: 1 tab(s) orally 2 times a day (14 Sep 2022 20:11)  cyanocobalamin 1000 mcg oral tablet: 1 tab(s) orally once a day (14 Sep 2022 20:11)  cyanocobalamin 1000 mcg/mL injectable solution: inject 1ml into the muscle once for 1 dose to be given 7 days prior to first alimta infusion. Patient states that he has not started alimta. (14 Sep 2022 20:11)  dexamethasone 4 mg oral tablet: 2 tab(s) orally once a day, As Needed before chemotherapy  (14 Sep 2022 20:11)  folic acid 1 mg oral tablet: 1 tab(s) orally once a day (14 Sep 2022 20:11)  lisinopril 40 mg oral tablet: 1 tab(s) orally once a day (14 Sep 2022 20:11)  ondansetron 8 mg oral tablet: As Needed, patient states that he has not strated this medication (14 Sep 2022 20:11)  prochlorperazine 10 mg oral tablet: As Needed, patient states that he has not started this medication (14 Sep 2022 20:11)  QUEtiapine 50 mg oral tablet: 1 tab(s) orally once a day (at bedtime) (14 Sep 2022 20:11)  Suboxone 8 mg-2 mg sublingual film: 1 film(s) sublingual once a day (14 Sep 2022 20:11)  Trelegy Ellipta 200 mcg-62.5 mcg-25 mcg/inh inhalation powder: 1 puff(s) inhaled once a day (in the evening) (14 Sep 2022 20:11)  Vitamin C 500 mg oral tablet: 1 tab(s) orally once a day (14 Sep 2022 20:11)      Medications:  MEDICATIONS  (STANDING):  ALPRAZolam 1 milliGRAM(s) Oral two times a day  ascorbic acid 500 milliGRAM(s) Oral daily  bisacodyl 5 milliGRAM(s) Oral every 12 hours  budesonide  80 MICROgram(s)/formoterol 4.5 MICROgram(s) Inhaler 2 Puff(s) Inhalation two times a day  buprenorphine 8 mG/naloxone 2 mG SL Film 1 Film(s) SubLingual daily  dexAMETHasone  Injectable 6 milliGRAM(s) IV Push daily  enoxaparin Injectable 40 milliGRAM(s) SubCutaneous every 24 hours  fat emulsion (Fish Oil and Plant Based) 20% Infusion 0.787 Gm/kG/Day (20.8 mL/Hr) IV Continuous <Continuous>  fat emulsion (Fish Oil and Plant Based) 20% Infusion 0.79 Gm/kG/Day (20.83 mL/Hr) IV Continuous <Continuous>  folic acid 1 milliGRAM(s) Oral daily  gabapentin 300 milliGRAM(s) Oral every 8 hours  influenza   Vaccine 0.5 milliLiter(s) IntraMuscular once  levothyroxine 25 MICROGram(s) Oral daily  lidocaine   4% Patch 1 Patch Transdermal daily  metoclopramide Injectable 10 milliGRAM(s) IV Push every 8 hours  multivitamin 1 Tablet(s) Oral daily  nicotine - 21 mG/24Hr(s) Patch 1 Patch Transdermal daily  octreotide  Injectable 150 MICROGram(s) IV Push every 8 hours  pantoprazole  Injectable 40 milliGRAM(s) IV Push every 12 hours  Parenteral Nutrition - Adult 1 Each (67 mL/Hr) TPN Continuous <Continuous>  Parenteral Nutrition - Adult 1 Each (67 mL/Hr) TPN Continuous <Continuous>  QUEtiapine 50 milliGRAM(s) Oral at bedtime  sucralfate suspension 1 Gram(s) Oral four times a day  tiotropium 18 MICROgram(s) Capsule 1 Capsule(s) Inhalation daily    MEDICATIONS  (PRN):  acetaminophen     Tablet .. 650 milliGRAM(s) Oral every 6 hours PRN Temp greater or equal to 38C (100.4F), Mild Pain (1 - 3)  ALBUTerol    90 MICROgram(s) HFA Inhaler 2 Puff(s) Inhalation every 6 hours PRN Bronchospasm  aluminum hydroxide/magnesium hydroxide/simethicone Suspension 30 milliLiter(s) Oral every 4 hours PRN Dyspepsia  benzonatate 100 milliGRAM(s) Oral every 8 hours PRN Cough  cyclobenzaprine 10 milliGRAM(s) Oral three times a day PRN Muscle Spasm  guaiFENesin ER 1200 milliGRAM(s) Oral every 12 hours PRN Cough  ketorolac   Injectable 15 milliGRAM(s) IV Push every 6 hours PRN Severe Pain (7 - 10)  melatonin 3 milliGRAM(s) Oral at bedtime PRN Insomnia  ondansetron Injectable 4 milliGRAM(s) IV Push every 6 hours PRN Nausea and/or Vomiting  prochlorperazine   Injectable 10 milliGRAM(s) IV Push every 6 hours PRN severe nausea    enoxaparin Injectable 40 milliGRAM(s) SubCutaneous every 24 hours    ROS  left eye drooping  cough  abdominal discomfort  no vomiting  passed gas but no BMs yet          PHYSICAL EXAM:  Vital Signs Last 24 Hrs  T(C): 36.8 (23 Sep 2022 08:01), Max: 37.6 (22 Sep 2022 21:48)  T(F): 98.3 (23 Sep 2022 08:01), Max: 99.6 (22 Sep 2022 21:48)  HR: 85 (23 Sep 2022 08:01) (76 - 90)  BP: 133/97 (23 Sep 2022 08:01) (109/74 - 133/97)  BP(mean): --  RR: 16 (23 Sep 2022 08:01) (16 - 18)  SpO2: 98% (23 Sep 2022 08:01) (95% - 98%)    Parameters below as of 23 Sep 2022 08:01  Patient On (Oxygen Delivery Method): room air      Gen:   thin  chest   decreased air entry left upper lobe  neuro  left ptosis, miosis and enophthalmos ? Horners  abdomen soft      Labs:                        7.9    6.10  )-----------( 409      ( 23 Sep 2022 05:45 )             25.0     09-23    134<L>  |  104  |  10  ----------------------------<  136<H>  4.1   |  27  |  0.86    Ca    8.7      23 Sep 2022 05:45  Phos  3.1     09-23  Mg     2.4     09-23    TPro  6.1  /  Alb  2.3<L>  /  TBili  0.7  /  DBili  x   /  AST  12<L>  /  ALT  24  /  AlkPhos  128<H>  09-23      LIVER FUNCTIONS - ( 23 Sep 2022 05:45 )  Alb: 2.3 g/dL / Pro: 6.1 gm/dL / ALK PHOS: 128 U/L / ALT: 24 U/L / AST: 12 U/L / GGT: x           Other Labs:  Cultures:     Pathology:  non small cell lung ca  poorly differentiated adenocarcinoma  PDL-1 90%  no actionable mutation    Imaging Studies:    ACC: [id number] EXAM:  CT ABDOMEN AND PELVIS IC                          PROCEDURE DATE:  09/20/2022          INTERPRETATION:  CLINICAL INFORMATION: Abdominal pain    COMPARISON: CT abdomen pelvis 7/19/2022    CONTRAST/COMPLICATIONS:  IV Contrast: Omnipaque 350  90 cc administered   10 cc discarded  Oral Contrast: NONE  Complications: None reported at time of study completion    PROCEDURE:  CT of the Abdomen and Pelvis was performed.  Sagittal and coronal reformats were performed.    FINDINGS:  LOWER CHEST: Trace left pleural effusion. Confluent groundglass and   reticular infiltrates in the lingula and consolidative opacities in the   left lower lobe.    LIVER: Steatosis.  BILE DUCTS: Normal caliber.  GALLBLADDER: Within normal limits.  SPLEEN: Within normal limits.  PANCREAS: Within normal limits.  ADRENALS: Within normal limits.  KIDNEYS/URETERS: Within normal limits.    BLADDER: Within normal limits.  REPRODUCTIVE ORGANS: Prostate within normal limits.    BOWEL: Diffuse fluid distention of small bowel with transition at the   level of a 3.5 cm length of concentric wall thickening in a loop of   pelvic small bowel (2:90, 601:64), grossly stable when compared to prior   CT of 7/19/2022. Distal small bowel is decompressed. Mild amount of stool   throughout the colon.  PERITONEUM: No ascites.  VESSELS: Atherosclerotic changes.  RETROPERITONEUM/LYMPH NODES: Stable low-density clustered lymph node mass   in the mid abdomen measuring 4.3 x 4.2 cm (2:72).  ABDOMINAL WALL: Within normal limits.  BONES: Degenerative changes.    IMPRESSION:    Small bowel obstruction to the level of a concentric mass of a pelvic   loop small bowel concerning for primary versus metastatic neoplasm. The   lesion is grossly stable in size.    Stable cluster of midabdominal low-density lymph nodes that may indicate   necrosis.    Redemonstration of intralobular septal thickening and groundglass   opacities in the lingula and consolidative and reticular opacities in the   left lower lobe. New trace left pleural effusion. Partially imaged   spiculated nodule in the right middle lobe measuring 6 cm, grossly stable.        --- End of Report ---        ACC: [id number] EXAM:  CT ANGIO CHEST PULM ART [facility]                          PROCEDURE DATE:  09/14/2022          INTERPRETATION:  ACC: [id number]  INDICATION, CLINICAL INFORMATION: metastatic poorly differentiated   non-small cell carcinoma, Hemoptysis  TECHNIQUE: CT pulmonary angiogram of the chest . Uneventful   administration of 90 cc Omnipaque 350. Coronal, sagittal and 3-D/MIP   images were reconstructed/reviewed.  COMPARISON: 7/18/2022 chest CT. 8/13/2022 PET CT.    FINDINGS:    PULMONARY VESSELS: No pulmonary embolus. No evidence of contrast   extravasation represent active hemorrhage.    HEART AND VASCULATURE: Heart size is normal. No pericardial effusion. No   aortic aneurysm or dissection.    LUNGS, AIRWAYS, PLEURA: Patent central airways. Approximately 7.7 x 4.6   cm left upper lobe cavitary mass is stable. Increased interlobular septal   thickening, patchy groundglass and solid opacities within left upper lobe   and left lower lobe which can represent pneumonia or pneumonitis. Stable   1.6 cm spiculated nodule within right middle lobe. No pleural effusions   or pneumothorax.    MEDIASTINUM: Stable mediastinal and hilar lymph nodes for reference: 1.8   cm left hilar lymph node and 2 cm short axis subcarinal lymph node.    UPPER ABDOMEN: Hepatic steatosis.    BONES AND SOFT TISSUES: No aggressive osseous lesion. Stable enlarged   left axillary lymph nodes.    LOWER NECK: Within normal limits.    IMPRESSION:    No pulmonary embolus. No contrast extravasation.    Approximately 7.7 x 4.6 cm left upper lobe mass is stable. Increased   interlobular septal thickening, patchy groundglass and solid opacities   within left upper lobe and left lower lobe which can represent pneumonia,   pneumonitis/alveolar hemorrhage.    --- End of Report ---            JUDY STOCKTON MD; Attending Radiologist        ACC: [id number] EXAM:  MR MRCP [facility]                          PROCEDURE DATE:  09/16/2022          INTERPRETATION:  CLINICAL INFORMATION: 52-year-old man with history of   ethanol abuse, elevated LFTs, and metastatic lung cancer. Distended   gallbladder and common duct on ultrasound.    COMPARISON: Ultrasound 09/15/2022. CT abdomen 07/19/2022    CONTRAST/COMPLICATIONS:  IV Contrast: Gadavist  6.5 cc administered   1.0 cc discarded  Oral Contrast: NONE  Complications: None reported at time of study completion    PROCEDURE:  MRI of the abdomen was performed.      FINDINGS:  LOWER CHEST: Left lower lobe consolidation similar to recent chest CT.   Small bilateral pleural effusions, larger on the left    LIVER: Marked steatosis.  BILE DUCTS: Borderline common duct measuring 8 mm. No choledocholithiasis   identified.  GALLBLADDER: No gallstones visualized.  SPLEEN: Within normal limits.  PANCREAS: Pancreas divisum  ADRENALS: Within normal limits.  KIDNEYS/URETERS: Within normal limits.    VISUALIZED PORTIONS:  BOWEL: Within normal limits.  PERITONEUM: No ascites.  VESSELS: Within normal limits.  RETROPERITONEUM/LYMPH NODES: No lymphadenopathy.  ABDOMINAL WALL: Within normal limits.  BONES: Within normal limits.    IMPRESSION:  Borderline common duct without obstructing lesion.  Pancreas divisum.  Marked hepatic steatosis.        --- End of Report ---            ARABELLA FIELD MD; Attending Radiologist   The patient was seen and examined.    stage 4 lung cancer  admitted with ETOH inebriation    found to have SBO at multiple levels. S/B surgery. no plans for surgery now  may need palliative gastrostomy if SBO causes vomiting as per GI and surgery  tolerating liquids  no vomiting  wants to go home  on PPN  found to have drooping left eye yesterday  CT brain non contrast negtive    no further hemoptysis                 Home Medications:  ALPRAZolam 1 mg oral tablet: 1 tab(s) orally 2 times a day (14 Sep 2022 20:11)  cyanocobalamin 1000 mcg oral tablet: 1 tab(s) orally once a day (14 Sep 2022 20:11)  cyanocobalamin 1000 mcg/mL injectable solution: inject 1ml into the muscle once for 1 dose to be given 7 days prior to first alimta infusion. Patient states that he has not started alimta. (14 Sep 2022 20:11)  dexamethasone 4 mg oral tablet: 2 tab(s) orally once a day, As Needed before chemotherapy  (14 Sep 2022 20:11)  folic acid 1 mg oral tablet: 1 tab(s) orally once a day (14 Sep 2022 20:11)  lisinopril 40 mg oral tablet: 1 tab(s) orally once a day (14 Sep 2022 20:11)  ondansetron 8 mg oral tablet: As Needed, patient states that he has not strated this medication (14 Sep 2022 20:11)  prochlorperazine 10 mg oral tablet: As Needed, patient states that he has not started this medication (14 Sep 2022 20:11)  QUEtiapine 50 mg oral tablet: 1 tab(s) orally once a day (at bedtime) (14 Sep 2022 20:11)  Suboxone 8 mg-2 mg sublingual film: 1 film(s) sublingual once a day (14 Sep 2022 20:11)  Trelegy Ellipta 200 mcg-62.5 mcg-25 mcg/inh inhalation powder: 1 puff(s) inhaled once a day (in the evening) (14 Sep 2022 20:11)  Vitamin C 500 mg oral tablet: 1 tab(s) orally once a day (14 Sep 2022 20:11)      Medications:  MEDICATIONS  (STANDING):  ALPRAZolam 1 milliGRAM(s) Oral two times a day  ascorbic acid 500 milliGRAM(s) Oral daily  bisacodyl 5 milliGRAM(s) Oral every 12 hours  budesonide  80 MICROgram(s)/formoterol 4.5 MICROgram(s) Inhaler 2 Puff(s) Inhalation two times a day  buprenorphine 8 mG/naloxone 2 mG SL Film 1 Film(s) SubLingual daily  dexAMETHasone  Injectable 6 milliGRAM(s) IV Push daily  enoxaparin Injectable 40 milliGRAM(s) SubCutaneous every 24 hours  fat emulsion (Fish Oil and Plant Based) 20% Infusion 0.787 Gm/kG/Day (20.8 mL/Hr) IV Continuous <Continuous>  fat emulsion (Fish Oil and Plant Based) 20% Infusion 0.79 Gm/kG/Day (20.83 mL/Hr) IV Continuous <Continuous>  folic acid 1 milliGRAM(s) Oral daily  gabapentin 300 milliGRAM(s) Oral every 8 hours  influenza   Vaccine 0.5 milliLiter(s) IntraMuscular once  levothyroxine 25 MICROGram(s) Oral daily  lidocaine   4% Patch 1 Patch Transdermal daily  metoclopramide Injectable 10 milliGRAM(s) IV Push every 8 hours  multivitamin 1 Tablet(s) Oral daily  nicotine - 21 mG/24Hr(s) Patch 1 Patch Transdermal daily  octreotide  Injectable 150 MICROGram(s) IV Push every 8 hours  pantoprazole  Injectable 40 milliGRAM(s) IV Push every 12 hours  Parenteral Nutrition - Adult 1 Each (67 mL/Hr) TPN Continuous <Continuous>  Parenteral Nutrition - Adult 1 Each (67 mL/Hr) TPN Continuous <Continuous>  QUEtiapine 50 milliGRAM(s) Oral at bedtime  sucralfate suspension 1 Gram(s) Oral four times a day  tiotropium 18 MICROgram(s) Capsule 1 Capsule(s) Inhalation daily    MEDICATIONS  (PRN):  acetaminophen     Tablet .. 650 milliGRAM(s) Oral every 6 hours PRN Temp greater or equal to 38C (100.4F), Mild Pain (1 - 3)  ALBUTerol    90 MICROgram(s) HFA Inhaler 2 Puff(s) Inhalation every 6 hours PRN Bronchospasm  aluminum hydroxide/magnesium hydroxide/simethicone Suspension 30 milliLiter(s) Oral every 4 hours PRN Dyspepsia  benzonatate 100 milliGRAM(s) Oral every 8 hours PRN Cough  cyclobenzaprine 10 milliGRAM(s) Oral three times a day PRN Muscle Spasm  guaiFENesin ER 1200 milliGRAM(s) Oral every 12 hours PRN Cough  ketorolac   Injectable 15 milliGRAM(s) IV Push every 6 hours PRN Severe Pain (7 - 10)  melatonin 3 milliGRAM(s) Oral at bedtime PRN Insomnia  ondansetron Injectable 4 milliGRAM(s) IV Push every 6 hours PRN Nausea and/or Vomiting  prochlorperazine   Injectable 10 milliGRAM(s) IV Push every 6 hours PRN severe nausea    enoxaparin Injectable 40 milliGRAM(s) SubCutaneous every 24 hours    ROS  left eye drooping  cough  abdominal discomfort  no vomiting  passed gas but no BMs yet          PHYSICAL EXAM:  Vital Signs Last 24 Hrs  T(C): 36.8 (23 Sep 2022 08:01), Max: 37.6 (22 Sep 2022 21:48)  T(F): 98.3 (23 Sep 2022 08:01), Max: 99.6 (22 Sep 2022 21:48)  HR: 85 (23 Sep 2022 08:01) (76 - 90)  BP: 133/97 (23 Sep 2022 08:01) (109/74 - 133/97)  BP(mean): --  RR: 16 (23 Sep 2022 08:01) (16 - 18)  SpO2: 98% (23 Sep 2022 08:01) (95% - 98%)    Parameters below as of 23 Sep 2022 08:01  Patient On (Oxygen Delivery Method): room air      Gen:   thin  chest   decreased air entry left upper lobe  neuro  left ptosis, miosis and enophthalmos ? Horners  abdomen soft      Labs:                        7.9    6.10  )-----------( 409      ( 23 Sep 2022 05:45 )             25.0     09-23    134<L>  |  104  |  10  ----------------------------<  136<H>  4.1   |  27  |  0.86    Ca    8.7      23 Sep 2022 05:45  Phos  3.1     09-23  Mg     2.4     09-23    TPro  6.1  /  Alb  2.3<L>  /  TBili  0.7  /  DBili  x   /  AST  12<L>  /  ALT  24  /  AlkPhos  128<H>  09-23      LIVER FUNCTIONS - ( 23 Sep 2022 05:45 )  Alb: 2.3 g/dL / Pro: 6.1 gm/dL / ALK PHOS: 128 U/L / ALT: 24 U/L / AST: 12 U/L / GGT: x           Other Labs:  Cultures:     Pathology:  non small cell lung ca  poorly differentiated adenocarcinoma  PDL-1 90%  no actionable mutation    Imaging Studies:    ACC: [id number] EXAM:  CT ABDOMEN AND PELVIS IC                          PROCEDURE DATE:  09/20/2022          INTERPRETATION:  CLINICAL INFORMATION: Abdominal pain    COMPARISON: CT abdomen pelvis 7/19/2022    CONTRAST/COMPLICATIONS:  IV Contrast: Omnipaque 350  90 cc administered   10 cc discarded  Oral Contrast: NONE  Complications: None reported at time of study completion    PROCEDURE:  CT of the Abdomen and Pelvis was performed.  Sagittal and coronal reformats were performed.    FINDINGS:  LOWER CHEST: Trace left pleural effusion. Confluent groundglass and   reticular infiltrates in the lingula and consolidative opacities in the   left lower lobe.    LIVER: Steatosis.  BILE DUCTS: Normal caliber.  GALLBLADDER: Within normal limits.  SPLEEN: Within normal limits.  PANCREAS: Within normal limits.  ADRENALS: Within normal limits.  KIDNEYS/URETERS: Within normal limits.    BLADDER: Within normal limits.  REPRODUCTIVE ORGANS: Prostate within normal limits.    BOWEL: Diffuse fluid distention of small bowel with transition at the   level of a 3.5 cm length of concentric wall thickening in a loop of   pelvic small bowel (2:90, 601:64), grossly stable when compared to prior   CT of 7/19/2022. Distal small bowel is decompressed. Mild amount of stool   throughout the colon.  PERITONEUM: No ascites.  VESSELS: Atherosclerotic changes.  RETROPERITONEUM/LYMPH NODES: Stable low-density clustered lymph node mass   in the mid abdomen measuring 4.3 x 4.2 cm (2:72).  ABDOMINAL WALL: Within normal limits.  BONES: Degenerative changes.    IMPRESSION:    Small bowel obstruction to the level of a concentric mass of a pelvic   loop small bowel concerning for primary versus metastatic neoplasm. The   lesion is grossly stable in size.    Stable cluster of midabdominal low-density lymph nodes that may indicate   necrosis.    Redemonstration of intralobular septal thickening and groundglass   opacities in the lingula and consolidative and reticular opacities in the   left lower lobe. New trace left pleural effusion. Partially imaged   spiculated nodule in the right middle lobe measuring 6 cm, grossly stable.        --- End of Report ---        ACC: [id number] EXAM:  CT ANGIO CHEST PULM ART [facility]                          PROCEDURE DATE:  09/14/2022          INTERPRETATION:  ACC: [id number]  INDICATION, CLINICAL INFORMATION: metastatic poorly differentiated   non-small cell carcinoma, Hemoptysis  TECHNIQUE: CT pulmonary angiogram of the chest . Uneventful   administration of 90 cc Omnipaque 350. Coronal, sagittal and 3-D/MIP   images were reconstructed/reviewed.  COMPARISON: 7/18/2022 chest CT. 8/13/2022 PET CT.    FINDINGS:    PULMONARY VESSELS: No pulmonary embolus. No evidence of contrast   extravasation represent active hemorrhage.    HEART AND VASCULATURE: Heart size is normal. No pericardial effusion. No   aortic aneurysm or dissection.    LUNGS, AIRWAYS, PLEURA: Patent central airways. Approximately 7.7 x 4.6   cm left upper lobe cavitary mass is stable. Increased interlobular septal   thickening, patchy groundglass and solid opacities within left upper lobe   and left lower lobe which can represent pneumonia or pneumonitis. Stable   1.6 cm spiculated nodule within right middle lobe. No pleural effusions   or pneumothorax.    MEDIASTINUM: Stable mediastinal and hilar lymph nodes for reference: 1.8   cm left hilar lymph node and 2 cm short axis subcarinal lymph node.    UPPER ABDOMEN: Hepatic steatosis.    BONES AND SOFT TISSUES: No aggressive osseous lesion. Stable enlarged   left axillary lymph nodes.    LOWER NECK: Within normal limits.    IMPRESSION:    No pulmonary embolus. No contrast extravasation.    Approximately 7.7 x 4.6 cm left upper lobe mass is stable. Increased   interlobular septal thickening, patchy groundglass and solid opacities   within left upper lobe and left lower lobe which can represent pneumonia,   pneumonitis/alveolar hemorrhage.    --- End of Report ---            JUDY STOCKTON MD; Attending Radiologist        ACC: [id number] EXAM:  MR MRCP [facility]                          PROCEDURE DATE:  09/16/2022          INTERPRETATION:  CLINICAL INFORMATION: 52-year-old man with history of   ethanol abuse, elevated LFTs, and metastatic lung cancer. Distended   gallbladder and common duct on ultrasound.    COMPARISON: Ultrasound 09/15/2022. CT abdomen 07/19/2022    CONTRAST/COMPLICATIONS:  IV Contrast: Gadavist  6.5 cc administered   1.0 cc discarded  Oral Contrast: NONE  Complications: None reported at time of study completion    PROCEDURE:  MRI of the abdomen was performed.      FINDINGS:  LOWER CHEST: Left lower lobe consolidation similar to recent chest CT.   Small bilateral pleural effusions, larger on the left    LIVER: Marked steatosis.  BILE DUCTS: Borderline common duct measuring 8 mm. No choledocholithiasis   identified.  GALLBLADDER: No gallstones visualized.  SPLEEN: Within normal limits.  PANCREAS: Pancreas divisum  ADRENALS: Within normal limits.  KIDNEYS/URETERS: Within normal limits.    VISUALIZED PORTIONS:  BOWEL: Within normal limits.  PERITONEUM: No ascites.  VESSELS: Within normal limits.  RETROPERITONEUM/LYMPH NODES: No lymphadenopathy.  ABDOMINAL WALL: Within normal limits.  BONES: Within normal limits.    IMPRESSION:  Borderline common duct without obstructing lesion.  Pancreas divisum.  Marked hepatic steatosis.        --- End of Report ---            ARABELLA FIELD MD; Attending Radiologist

## 2022-09-23 NOTE — PROGRESS NOTE ADULT - SUBJECTIVE AND OBJECTIVE BOX
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 He states that he coughs so much that it causes his to vomit. Reports nausea, constipation, dehydration. Denies fevers, chills, chest pain, SOB, abdominal pain. Pt was due to start chemotherapy this Friday.   ER course: SpO2 91% on room air. Labs: Hb 10.2, lactate 3.2, Na 134, K+ 3.1, glucose 135, albumin 2.7, alkaline phosphatase 325, AST 82. EKG: NSR with HR 82 bpm,  ms, no ST segment changes, T wave inversions in II/III/AVF present on prior EKG (personally reviewed). Pt was given Zosyn, 2500 ml of NS, Libirum, Zofran. He is being admitted to med/surg for further management.     Interval History: 9/23/22 pt seen and examined at bedside, has not had any vomiting, + flatus, continues on clears &  PPN/Lipids. Pain and anxiety well controlled.  No longer experiencing DRUMMOND, no longer having hemoptysis. + weakness and fatigue. + left shoulder/back pain. Denies CP, palps, HA, dizziness, lightheadedness, n/v/d, dysuria, fever or chills.     REVIEW OF SYSTEMS: All other review of systems is negative unless indicated above.    PE:  Vital Signs Last 24 Hrs: all reviewed   T(C): 36.7 (23 Sep 2022 15:19), Max: 37.6 (22 Sep 2022 21:48)  T(F): 98.1 (23 Sep 2022 15:19), Max: 99.6 (22 Sep 2022 21:48)  HR: 82 (23 Sep 2022 15:19) (76 - 85)  BP: 124/93 (23 Sep 2022 15:19) (115/71 - 133/97)  RR: 18 (23 Sep 2022 15:19) (16 - 18)  SpO2: 98% (23 Sep 2022 15:19) (95% - 98%)  Parameters below as of 23 Sep 2022 15:19  Patient On (Oxygen Delivery Method): room air    Constitutional: NAD, awake and alert  HEENT: EOMI  Neck: Soft and supple, No JVD  Respiratory: Breath sounds are diminished bilaterally, No wheezing, rales or rhonchi  Cardiovascular: S1 and S2, regular rate and rhythm, no Murmurs  Gastrointestinal: Bowel Sounds present, soft, nontender, + distended  Extremities: No peripheral edema  Vascular: 2+ peripheral pulses  Neurological: A/O x 3, no focal deficits  Musculoskeletal: 5/5 strength b/l upper and lower extremities  Skin: No rashes    LABS: All Labs Reviewed:                        7.9    6.10  )-----------( 409      ( 23 Sep 2022 05:45 )             25.0   09-23    134<L>  |  104  |  10  ----------------------------<  136<H>  4.1   |  27  |  0.86    Ca    8.7      23 Sep 2022 05:45  Phos  3.1     09-23  Mg     2.4     09-23    TPro  6.1  /  Alb  2.3<L>  /  TBili  0.7  /  DBili  x   /  AST  12<L>  /  ALT  24  /  AlkPhos  128<H>  09-23    PT/INR - ( 14 Sep 2022 13:15 )   PT: 11.7 sec;   INR: 1.01 ratio    PTT - ( 14 Sep 2022 13:15 )  PTT:27.3 sec  Troponin I, High Sensitivity (09.14.22 @ 13:15) 13.69-->(09.15.22 @ 09:06)  18.15  Lactate, Blood (09.14.22 @ 15:42) 3.2 mmol/L --> (09.14.22 @ 18:18) 2.2 mmol/L -->(09.15.22 @ 09:06) 1.0 mmol/L   Procalcitonin, Serum (09.14.22 @ 18:15)  0.18  Thyroid Stimulating Hormone, Serum (09.15.22 @ 06:32) 99.10 uU/mL   Free Thyroxine, Serum (09.15.22 @ 06:32) 0.24 ng/dL   Vitamin D, 25-Hydroxy (09.16.22 @ 07:01) <5.0  Iron with Total Binding Capacity in AM (09.16.22 @ 07:01)   Iron - Total Binding Capacity.: 208 ug/dL   % Saturation, Iron: 11 %   Iron Total, Serum: 22 ug/dL   Unsaturated Iron Binding Capacity: 186 ug/dL   Lipid Profile in AM (09.16.22 @ 07:01)   Cholesterol, Serum: 150 mg/dL   Triglycerides, Serum: 156 mg/dL   HDL Cholesterol, Serum: 43 mg/dL   Non HDL Cholesterol: 107  Vitamin B12, Serum in AM (09.16.22 @ 07:01)  >2000  Ammonia, Serum (09.16.22 @ 07:01)  19 umol/L   Folate, Serum in AM (09.16.22 @ 07:01) 13.5 ng/mL   Lipase, Serum in AM (09.21.22 @ 06:22)  398 U/L   Micro:    Legionella pneumophila Antigen, Urine (09.15.22 @ 02:00) Negative  Culture - Sputum . (09.15.22 @ 10:15) Rare Gram Positive Rods seen per oil power field Rare Gram positive cocci in pairs seen per oil power field   Culture - Blood (09.15.22 @ 09:06) No growth to date.   RADIOLOGY/EKG: all reviewed     12 Lead ECG (09.14.22 @ 13:00)   Ventricular Rate 82 BPM  Atrial Rate 82 BPM  P-R Interval 122 ms  QRS Duration 100 ms  Q-T Interval 420 ms  QTC Calculation(Bazett) 490 ms  P Axis 81 degrees  R Axis 86 degrees  T Axis -64 degrees  Normal sinus rhythm  ST & T wave abnormality, consider inferior ischemia  Prolonged QT  Abnormal ECG    12 Lead ECG (09.15.22 @ 19:40)   Ventricular Rate 97 BPM  Atrial Rate 97 BPM  P-R Interval 126 ms  QRS Duration 82 ms  Q-T Interval 362 ms  QTC Calculation(Bazett) 459 ms  P Axis 82 degrees  R Axis 80 degrees  T Axis 75 degrees  Diagnosis Line Normal sinus rhythm  ST & T wave abnormality, consider anterolateral ischemia  Abnormal ECG    CT Angio Chest PE Protocol w/ IV Cont (09.14.22 @ 13:31)   FINDINGS:  PULMONARY VESSELS: No pulmonary embolus. No evidence of contrast extravasation represent active hemorrhage.  HEART AND VASCULATURE: Heart size is normal. No pericardial effusion. No aortic aneurysm or dissection.  LUNGS, AIRWAYS, PLEURA: Patent central airways. Approximately 7.7 x 4.6 cm left upper lobe cavitary mass is stable. Increased interlobular septal thickening, patchy groundglass and solid opacities within left upper lobe and left lower lobe which can represent pneumonia or pneumonitis. Stable 1.6 cm spiculated nodule within right middle lobe. No pleural effusions or pneumothorax.  MEDIASTINUM: Stable mediastinal and hilar lymph nodes for reference: 1.8 cm left hilar lymph node and 2 cm short axis subcarinal lymph node.  UPPER ABDOMEN: Hepatic steatosis.  BONES AND SOFT TISSUES: No aggressive osseous lesion. Stable enlarged left axillary lymph nodes.  LOWER NECK: Within normal limits.  IMPRESSION:  No pulmonary embolus. No contrast extravasation.  Approximately 7.7 x 4.6 cm left upper lobe mass is stable. Increased interlobular septal thickening, patchy groundglass and solid opacities within left upper lobe and left lower lobe which can represent pneumonia, pneumonitis/alveolar hemorrhage.    US Abdomen Complete (US Abdomen Complete .) (09.15.22 @ 16:01)   FINDINGS:  Liver: Enlarged with steatosis.  Bile ducts: Normal caliber. Common bile duct measures 11 mm.  Gallbladder: Distended with sludge and stones.  Pancreas: Visualized portions are within normal limits. Pancreatic duct is upper limits ofnormal in size measuring 3 mm.  Spleen: 10.5 cm. Within normal limits. Adjacent splenule.  Right kidney: 11.1 cm. No hydronephrosis.  Left kidney: 10.7 cm. No hydronephrosis.  Ascites: None.  Aorta and IVC: Visualized portions are within normal limits.  IMPRESSION:  Hepatomegaly and steatosis.  Gallbladder sludge and stones with a dilated extrahepatic biliary tree.   MRI/MRCP is advised for further evaluation.  Pancreatic duct is upper limits of normal and can also be better assessed at the time of MRI.    MR MRCP w/wo IV Cont (09.16.22 @ 11:44)   FINDINGS:  LOWER CHEST: Left lower lobe consolidation similar to recent chest CT. Small bilateral pleural effusions, larger on the left  LIVER: Marked steatosis.  BILE DUCTS: Borderline common duct measuring 8 mm. No choledocholithiasis identified.  GALLBLADDER: No gallstones visualized.  SPLEEN: Within normal limits.  PANCREAS: Pancreas divisum  ADRENALS: Within normal limits.  KIDNEYS/URETERS: Within normal limits.  VISUALIZED PORTIONS:  BOWEL: Within normal limits.  PERITONEUM: No ascites.  VESSELS: Within normal limits.  RETROPERITONEUM/LYMPH NODES: No lymphadenopathy.  ABDOMINAL WALL: Within normal limits.  BONES: Within normal limits.  IMPRESSION:  Borderline common duct without obstructing lesion.  Pancreas divisum.  Marked hepatic steatosis.    TTE Echo Complete w/o Contrast w/ Doppler (09.16.22 @ 11:08)   Impression  Summary  Minimally reduced left ventricular systolic function.  Estimated left ventricular ejection fraction is 50 %.  Normal appearing left atrium.  Normal appearing right atrium.  Normal appearing right ventricle structure and function.  Normal aortic valve structure and function.  Normal appearing mitral valve structure and function.   Mild (1+) mitral regurgitation is present.  EA reversal of the mitral inflow consistent with reduced compliance of the left ventricle.  Trace tricuspid valve regurgitation is present.  Normal appearing pulmonic valve structure and function.  No evidence of pericardial effusion.    CT Abdomen and Pelvis w/ IV Cont (09.20.22 @ 19:01)   FINDINGS:  LOWER CHEST: Trace left pleural effusion. Confluent groundglass and reticular infiltrates in the lingula and consolidative opacities in the left lower lobe.  LIVER: Steatosis.  BILE DUCTS: Normal caliber.  GALLBLADDER: Within normal limits.  SPLEEN: Within normal limits.  PANCREAS: Within normal limits.  ADRENALS: Within normal limits.  KIDNEYS/URETERS: Within normal limits.  BLADDER: Within normal limits.  REPRODUCTIVE ORGANS: Prostate within normal limits.  BOWEL: Diffuse fluid distention of small bowel with transition at the level of a 3.5 cm length of concentric wall thickening in a loop of pelvic small bowel (2:90, 601:64), grossly stable when compared to prior CT of 7/19/2022. Distal small bowel is decompressed. Mild amount of stool throughout the colon.  PERITONEUM: No ascites.  VESSELS: Atherosclerotic changes.  RETROPERITONEUM/LYMPH NODES: Stable low-density clustered lymph node mass in the mid abdomen measuring 4.3 x 4.2 cm (2:72).  ABDOMINAL WALL: Within normal limits.  BONES: Degenerative changes.  IMPRESSION:  Small bowel obstruction to the level of a concentric mass of a pelvic loop small bowel concerning for primary versus metastatic neoplasm. The lesion is grossly stable in size.  Stable cluster of midabdominal low-density lymph nodes that may indicate necrosis.  Redemonstration of intralobular septal thickening and groundglass opacities in the lingula and consolidative and reticular opacities in the left lower lobe. New trace left pleural effusion. Partially imaged spiculated nodule in the right middle lobe measuring 6 cm, grossly stable.    CT Head No Cont (09.23.22 @ 08:08)   FINDINGS:  BRAIN:  The brain  demonstrates no abnormal attenuation.   Cerebral cortical gray-white matter differentiation is maintained.  No acute cerebral cortical infarct is seen.  No intracranial hemorrhage is found.  No mass effect is found in the brain.  CSF SPACES:  The ventricles, sulci and basal cisterns  appear mildly dilated reflecting diffuse brain volume loss.   No differential cerebral cortical atrophy is recognized.  VESSELS: Intracranial vasculature is also significant for asymmetric atherosclerotic calcification within the cavernous segment of the right internal carotid artery.  HEAD AND NECK STRUCTURES:  The orbits are unremarkable.  The included paranasal sinuses  remain clear.  The left frontal sinus is hypoplastic.  The nasal septum has shallow deviation left to right.  The nasopharynx is symmetric.  The central skull base is intact.  The temporal bones demonstrate patent petrous air cells.  The calvarium appears unremarkable.  IMPRESSION:  Unremarkable head CT.   No evidence of infarction.  MR may provide higher sensitivity imaging evaluation for the clinical indication of acute infarction.    Meds:  MEDICATIONS  (STANDING):  ALPRAZolam 1 milliGRAM(s) Oral two times a day  ascorbic acid 500 milliGRAM(s) Oral daily  bisacodyl 5 milliGRAM(s) Oral every 12 hours  budesonide  80 MICROgram(s)/formoterol 4.5 MICROgram(s) Inhaler 2 Puff(s) Inhalation two times a day  buprenorphine 8 mG/naloxone 2 mG SL Film 1 Film(s) SubLingual daily  dexAMETHasone  Injectable 6 milliGRAM(s) IV Push daily  enoxaparin Injectable 40 milliGRAM(s) SubCutaneous every 24 hours  fat emulsion (Fish Oil and Plant Based) 20% Infusion 0.79 Gm/kG/Day (20.83 mL/Hr) IV Continuous <Continuous>  folic acid 1 milliGRAM(s) Oral daily  gabapentin 300 milliGRAM(s) Oral every 8 hours  influenza   Vaccine 0.5 milliLiter(s) IntraMuscular once  levothyroxine 25 MICROGram(s) Oral daily  lidocaine   4% Patch 1 Patch Transdermal daily  metoclopramide Injectable 10 milliGRAM(s) IV Push every 8 hours  multivitamin 1 Tablet(s) Oral daily  nicotine - 21 mG/24Hr(s) Patch 1 Patch Transdermal daily  octreotide  Injectable 150 MICROGram(s) IV Push every 8 hours  pantoprazole  Injectable 40 milliGRAM(s) IV Push every 12 hours  Parenteral Nutrition - Adult 1 Each (67 mL/Hr) TPN Continuous <Continuous>  Parenteral Nutrition - Adult 1 Each (67 mL/Hr) TPN Continuous <Continuous>  QUEtiapine 50 milliGRAM(s) Oral at bedtime  sucralfate suspension 1 Gram(s) Oral four times a day  tiotropium 18 MICROgram(s) Capsule 1 Capsule(s) Inhalation daily    MEDICATIONS  (PRN):  acetaminophen     Tablet .. 650 milliGRAM(s) Oral every 6 hours PRN Temp greater or equal to 38C (100.4F), Mild Pain (1 - 3)  ALBUTerol    90 MICROgram(s) HFA Inhaler 2 Puff(s) Inhalation every 6 hours PRN Bronchospasm  aluminum hydroxide/magnesium hydroxide/simethicone Suspension 30 milliLiter(s) Oral every 4 hours PRN Dyspepsia  benzonatate 100 milliGRAM(s) Oral every 8 hours PRN Cough  cyclobenzaprine 10 milliGRAM(s) Oral three times a day PRN Muscle Spasm  guaiFENesin ER 1200 milliGRAM(s) Oral every 12 hours PRN Cough  ketorolac   Injectable 15 milliGRAM(s) IV Push every 6 hours PRN Severe Pain (7 - 10)  melatonin 3 milliGRAM(s) Oral at bedtime PRN Insomnia  ondansetron Injectable 4 milliGRAM(s) IV Push every 6 hours PRN Nausea and/or Vomiting  prochlorperazine   Injectable 10 milliGRAM(s) IV Push every 6 hours PRN severe nausea

## 2022-09-23 NOTE — PROGRESS NOTE ADULT - SUBJECTIVE AND OBJECTIVE BOX
Subjective:    Review of Systems:    - CONSTITUTIONAL: Denies appetite change, fever and chills. Denies weakness and fatigue   - HEENT: Denies changes in vision and hearing.   - RESPIRATORY: Denies SOB, cough and/or respiratory secretions   - CV: Denies palpitations and CP. Denies edema   - GI: Denies abdominal pain, constipation, nausea, vomiting and diarrhea.   - : Denies dysuria and urinary frequency.   - MSK: back pain, today described as muscle spasm like pain.   - SKIN: Denies rash and pruritus.   - NEUROLOGICAL: Denies headache and syncope.   - PSYCHIATRIC: Denies confusion, recent changes in mood. Denies anxiety and depression. Denies suicidal ideations    All other systems reviewed and negative        PHYSICAL EXAM:    Vital Signs Last 24 Hrs  T(C): 36.8 (23 Sep 2022 08:01), Max: 37.6 (22 Sep 2022 21:48)  T(F): 98.3 (23 Sep 2022 08:01), Max: 99.6 (22 Sep 2022 21:48)  HR: 85 (23 Sep 2022 08:01) (76 - 90)  BP: 133/97 (23 Sep 2022 08:01) (109/74 - 133/97)  BP(mean): --  RR: 16 (23 Sep 2022 08:01) (16 - 18)  SpO2: 98% (23 Sep 2022 08:01) (95% - 98%)    Parameters below as of 23 Sep 2022 08:01  Patient On (Oxygen Delivery Method): room air    General:  - GENERAL: Alert and oriented x 3. No acute distress.   - EYES: EOMI. Anicteric.   - HENT: Moist mucous membranes. neck fullness  - LUNGS: Clear to auscultation bilaterally. No accessory muscle use. Speaking in complete sentences.   - CARDIOVASCULAR: Regular rate and rhythm. No murmur. No JVD. No edema.   - ABDOMEN: Soft, non-tender and non-distended. No palpable masses. Normal bowel sounds   - EXTREMITIES: No edema. Non-tender.    - SKIN: Warm, no rashes or lesions on visible skin.   - NEUROLOGIC: No focal neurological deficits.    - PSYCHIATRIC: Cooperative. Appropriate mood and affect.      LABS:                        7.9    6.10  )-----------( 409      ( 23 Sep 2022 05:45 )             25.0     09-23    134<L>  |  104  |  10  ----------------------------<  136<H>  4.1   |  27  |  0.86    Ca    8.7      23 Sep 2022 05:45  Phos  3.1     09-23  Mg     2.4     09-23    TPro  6.1  /  Alb  2.3<L>  /  TBili  0.7  /  DBili  x   /  AST  12<L>  /  ALT  24  /  AlkPhos  128<H>  09-23      Albumin: Albumin, Serum: 2.3 g/dL (09-23 @ 05:45)      Allergies    No Known Allergies    Intolerances      MEDICATIONS  (STANDING):  ALPRAZolam 1 milliGRAM(s) Oral two times a day  ascorbic acid 500 milliGRAM(s) Oral daily  bisacodyl 5 milliGRAM(s) Oral every 12 hours  budesonide  80 MICROgram(s)/formoterol 4.5 MICROgram(s) Inhaler 2 Puff(s) Inhalation two times a day  buprenorphine 8 mG/naloxone 2 mG SL Film 1 Film(s) SubLingual daily  dexAMETHasone  Injectable 6 milliGRAM(s) IV Push daily  enoxaparin Injectable 40 milliGRAM(s) SubCutaneous every 24 hours  fat emulsion (Fish Oil and Plant Based) 20% Infusion 0.787 Gm/kG/Day (20.8 mL/Hr) IV Continuous <Continuous>  fat emulsion (Fish Oil and Plant Based) 20% Infusion 0.79 Gm/kG/Day (20.83 mL/Hr) IV Continuous <Continuous>  folic acid 1 milliGRAM(s) Oral daily  gabapentin 300 milliGRAM(s) Oral every 8 hours  influenza   Vaccine 0.5 milliLiter(s) IntraMuscular once  levothyroxine 25 MICROGram(s) Oral daily  lidocaine   4% Patch 1 Patch Transdermal daily  metoclopramide Injectable 10 milliGRAM(s) IV Push every 8 hours  multivitamin 1 Tablet(s) Oral daily  nicotine - 21 mG/24Hr(s) Patch 1 Patch Transdermal daily  octreotide  Injectable 150 MICROGram(s) IV Push every 8 hours  pantoprazole  Injectable 40 milliGRAM(s) IV Push every 12 hours  Parenteral Nutrition - Adult 1 Each (67 mL/Hr) TPN Continuous <Continuous>  Parenteral Nutrition - Adult 1 Each (67 mL/Hr) TPN Continuous <Continuous>  QUEtiapine 50 milliGRAM(s) Oral at bedtime  sucralfate suspension 1 Gram(s) Oral four times a day  tiotropium 18 MICROgram(s) Capsule 1 Capsule(s) Inhalation daily    MEDICATIONS  (PRN):  acetaminophen     Tablet .. 650 milliGRAM(s) Oral every 6 hours PRN Temp greater or equal to 38C (100.4F), Mild Pain (1 - 3)  ALBUTerol    90 MICROgram(s) HFA Inhaler 2 Puff(s) Inhalation every 6 hours PRN Bronchospasm  aluminum hydroxide/magnesium hydroxide/simethicone Suspension 30 milliLiter(s) Oral every 4 hours PRN Dyspepsia  benzonatate 100 milliGRAM(s) Oral every 8 hours PRN Cough  cyclobenzaprine 10 milliGRAM(s) Oral three times a day PRN Muscle Spasm  guaiFENesin ER 1200 milliGRAM(s) Oral every 12 hours PRN Cough  ketorolac   Injectable 15 milliGRAM(s) IV Push every 6 hours PRN Severe Pain (7 - 10)  melatonin 3 milliGRAM(s) Oral at bedtime PRN Insomnia  ondansetron Injectable 4 milliGRAM(s) IV Push every 6 hours PRN Nausea and/or Vomiting  prochlorperazine   Injectable 10 milliGRAM(s) IV Push every 6 hours PRN severe nausea      RADIOLOGY:   Subjective:  seen at bedside today, along with MONROE Alvarez.  pt is in better spirits today.  Believes that he is going to be receiving radiation treatment as outpatient. tells me no more chemotherapy is being offered. He does acknowledge that this is not curative in nature  pain better controlled today with increased gabapentin.  Describes a muscle spasm/tightness in his back.   he is tolerating medications and clear liquid diet.     Review of Systems:    - CONSTITUTIONAL: Denies appetite change, fever and chills. Denies weakness and fatigue   - HEENT: Denies changes in vision and hearing.   - RESPIRATORY: Denies SOB, cough and/or respiratory secretions   - CV: Denies palpitations and CP. Denies edema   - GI: Denies abdominal pain, constipation, nausea, vomiting and diarrhea.   - : Denies dysuria and urinary frequency.   - MSK: back pain, today described as muscle spasm like pain.   - SKIN: Denies rash and pruritus.   - NEUROLOGICAL: Denies headache and syncope.   - PSYCHIATRIC: Denies confusion, recent changes in mood. Denies anxiety and depression. Denies suicidal ideations    All other systems reviewed and negative        PHYSICAL EXAM:    Vital Signs Last 24 Hrs  T(C): 36.8 (23 Sep 2022 08:01), Max: 37.6 (22 Sep 2022 21:48)  T(F): 98.3 (23 Sep 2022 08:01), Max: 99.6 (22 Sep 2022 21:48)  HR: 85 (23 Sep 2022 08:01) (76 - 90)  BP: 133/97 (23 Sep 2022 08:01) (109/74 - 133/97)  BP(mean): --  RR: 16 (23 Sep 2022 08:01) (16 - 18)  SpO2: 98% (23 Sep 2022 08:01) (95% - 98%)    Parameters below as of 23 Sep 2022 08:01  Patient On (Oxygen Delivery Method): room air    General:  - GENERAL: Alert and oriented x 3. No acute distress.   - EYES: EOMI. Anicteric.   - HENT: Moist mucous membranes. neck fullness  - LUNGS: Clear to auscultation bilaterally. No accessory muscle use. Speaking in complete sentences.   - CARDIOVASCULAR: Regular rate and rhythm. No murmur. No JVD. No edema.   - ABDOMEN: Soft, non-tender and non-distended. No palpable masses. Normal bowel sounds   - EXTREMITIES: No edema. Non-tender.    - SKIN: Warm, no rashes or lesions on visible skin.   - NEUROLOGIC: No focal neurological deficits.    - PSYCHIATRIC: Cooperative. Appropriate mood and affect.      LABS:                        7.9    6.10  )-----------( 409      ( 23 Sep 2022 05:45 )             25.0     09-23    134<L>  |  104  |  10  ----------------------------<  136<H>  4.1   |  27  |  0.86    Ca    8.7      23 Sep 2022 05:45  Phos  3.1     09-23  Mg     2.4     09-23    TPro  6.1  /  Alb  2.3<L>  /  TBili  0.7  /  DBili  x   /  AST  12<L>  /  ALT  24  /  AlkPhos  128<H>  09-23      Albumin: Albumin, Serum: 2.3 g/dL (09-23 @ 05:45)      Allergies    No Known Allergies    Intolerances      MEDICATIONS  (STANDING):  ALPRAZolam 1 milliGRAM(s) Oral two times a day  ascorbic acid 500 milliGRAM(s) Oral daily  bisacodyl 5 milliGRAM(s) Oral every 12 hours  budesonide  80 MICROgram(s)/formoterol 4.5 MICROgram(s) Inhaler 2 Puff(s) Inhalation two times a day  buprenorphine 8 mG/naloxone 2 mG SL Film 1 Film(s) SubLingual daily  dexAMETHasone  Injectable 6 milliGRAM(s) IV Push daily  enoxaparin Injectable 40 milliGRAM(s) SubCutaneous every 24 hours  fat emulsion (Fish Oil and Plant Based) 20% Infusion 0.787 Gm/kG/Day (20.8 mL/Hr) IV Continuous <Continuous>  fat emulsion (Fish Oil and Plant Based) 20% Infusion 0.79 Gm/kG/Day (20.83 mL/Hr) IV Continuous <Continuous>  folic acid 1 milliGRAM(s) Oral daily  gabapentin 300 milliGRAM(s) Oral every 8 hours  influenza   Vaccine 0.5 milliLiter(s) IntraMuscular once  levothyroxine 25 MICROGram(s) Oral daily  lidocaine   4% Patch 1 Patch Transdermal daily  metoclopramide Injectable 10 milliGRAM(s) IV Push every 8 hours  multivitamin 1 Tablet(s) Oral daily  nicotine - 21 mG/24Hr(s) Patch 1 Patch Transdermal daily  octreotide  Injectable 150 MICROGram(s) IV Push every 8 hours  pantoprazole  Injectable 40 milliGRAM(s) IV Push every 12 hours  Parenteral Nutrition - Adult 1 Each (67 mL/Hr) TPN Continuous <Continuous>  Parenteral Nutrition - Adult 1 Each (67 mL/Hr) TPN Continuous <Continuous>  QUEtiapine 50 milliGRAM(s) Oral at bedtime  sucralfate suspension 1 Gram(s) Oral four times a day  tiotropium 18 MICROgram(s) Capsule 1 Capsule(s) Inhalation daily    MEDICATIONS  (PRN):  acetaminophen     Tablet .. 650 milliGRAM(s) Oral every 6 hours PRN Temp greater or equal to 38C (100.4F), Mild Pain (1 - 3)  ALBUTerol    90 MICROgram(s) HFA Inhaler 2 Puff(s) Inhalation every 6 hours PRN Bronchospasm  aluminum hydroxide/magnesium hydroxide/simethicone Suspension 30 milliLiter(s) Oral every 4 hours PRN Dyspepsia  benzonatate 100 milliGRAM(s) Oral every 8 hours PRN Cough  cyclobenzaprine 10 milliGRAM(s) Oral three times a day PRN Muscle Spasm  guaiFENesin ER 1200 milliGRAM(s) Oral every 12 hours PRN Cough  ketorolac   Injectable 15 milliGRAM(s) IV Push every 6 hours PRN Severe Pain (7 - 10)  melatonin 3 milliGRAM(s) Oral at bedtime PRN Insomnia  ondansetron Injectable 4 milliGRAM(s) IV Push every 6 hours PRN Nausea and/or Vomiting  prochlorperazine   Injectable 10 milliGRAM(s) IV Push every 6 hours PRN severe nausea      RADIOLOGY:

## 2022-09-24 NOTE — CONSULT NOTE ADULT - PROVIDER SPECIALTY LIST ADULT
Heme/Onc
Surgery
Palliative Care
Rad Onc
Infectious Disease
Pulmonology
Gastroenterology
Cardiology
Neurology

## 2022-09-24 NOTE — CONSULT NOTE ADULT - CONSULT REASON
Radiation therapy
elevated liver enzymes
hemoptysis
Gerardo syndrome
goc
Dyspnea
malignant SBO
Metastatic lung cancer  Pneumonia  Hemoptysis  Alcoholism
Asked to see patient for pre-op cardiac exam

## 2022-09-24 NOTE — CONSULT NOTE ADULT - CONSULT REQUESTED BY NAME
DES Barahona
IM
Dr. Pride
hospitalist medicine team
Dr Pride
Dr Pride
Hospitalist
Dr. desir
Dr. Pride

## 2022-09-24 NOTE — PROGRESS NOTE ADULT - ASSESSMENT
1. metastatic NSCLC  PDL-1 90%  SBO seem to be resolving with conservative therapy'  plan  if he continues to improve, can be discharged and start immunotherapy as outpatient  given very high PDL-1 of 90%, single agent immunotherapy may be appropriate    2. SBO at multiple levels  patient has mesenteric mets on PET CT  no plans for surgery at this time   Improving    3. 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 ETOH intoxication  now dry  LFTs improved  patient advised to not resume any alcohol consumption as he cannot be treated with any systemic therapy if he does

## 2022-09-24 NOTE — PROGRESS NOTE ADULT - SUBJECTIVE AND OBJECTIVE BOX
The patient was seen and examined.    stage 4 lung cancer  admitted with ETOH inebriation    found to have SBO at multiple levels. S/B surgery. no plans for surgery now  may need palliative gastrostomy if SBO causes vomiting as per GI and surgery  tolerating liquids  no vomiting  Had BM mian  wants to go home  on PPN  found to have drooping left eye yesterday  CT brain non contrast negtive  MRI brain negative    no further hemoptysis             MEDICATIONS  (STANDING):  ALPRAZolam 1 milliGRAM(s) Oral two times a day  ascorbic acid 500 milliGRAM(s) Oral daily  bisacodyl 5 milliGRAM(s) Oral every 12 hours  budesonide  80 MICROgram(s)/formoterol 4.5 MICROgram(s) Inhaler 2 Puff(s) Inhalation two times a day  buprenorphine 8 mG/naloxone 2 mG SL Film 1 Film(s) SubLingual daily  dexAMETHasone  Injectable 6 milliGRAM(s) IV Push daily  enoxaparin Injectable 40 milliGRAM(s) SubCutaneous every 24 hours  fat emulsion (Fish Oil and Plant Based) 20% Infusion 0.79 Gm/kG/Day (20.83 mL/Hr) IV Continuous <Continuous>  folic acid 1 milliGRAM(s) Oral daily  gabapentin 300 milliGRAM(s) Oral every 8 hours  influenza   Vaccine 0.5 milliLiter(s) IntraMuscular once  levothyroxine 25 MICROGram(s) Oral daily  lidocaine   4% Patch 1 Patch Transdermal daily  metoclopramide Injectable 10 milliGRAM(s) IV Push every 8 hours  multivitamin 1 Tablet(s) Oral daily  nicotine - 21 mG/24Hr(s) Patch 1 Patch Transdermal daily  octreotide  Injectable 150 MICROGram(s) IV Push every 8 hours  pantoprazole  Injectable 40 milliGRAM(s) IV Push every 12 hours  Parenteral Nutrition - Adult 1 Each (67 mL/Hr) TPN Continuous <Continuous>  QUEtiapine 50 milliGRAM(s) Oral at bedtime  sucralfate suspension 1 Gram(s) Oral four times a day  tiotropium 18 MICROgram(s) Capsule 1 Capsule(s) Inhalation daily    MEDICATIONS  (PRN):  acetaminophen     Tablet .. 650 milliGRAM(s) Oral every 6 hours PRN Temp greater or equal to 38C (100.4F), Mild Pain (1 - 3)  ALBUTerol    90 MICROgram(s) HFA Inhaler 2 Puff(s) Inhalation every 6 hours PRN Bronchospasm  aluminum hydroxide/magnesium hydroxide/simethicone Suspension 30 milliLiter(s) Oral every 4 hours PRN Dyspepsia  benzonatate 100 milliGRAM(s) Oral every 8 hours PRN Cough  cyclobenzaprine 10 milliGRAM(s) Oral three times a day PRN Muscle Spasm  guaiFENesin ER 1200 milliGRAM(s) Oral every 12 hours PRN Cough  ketorolac   Injectable 15 milliGRAM(s) IV Push every 6 hours PRN Severe Pain (7 - 10)  melatonin 3 milliGRAM(s) Oral at bedtime PRN Insomnia  ondansetron Injectable 4 milliGRAM(s) IV Push every 6 hours PRN Nausea and/or Vomiting  prochlorperazine   Injectable 10 milliGRAM(s) IV Push every 6 hours PRN severe nausea      ROS  left eye drooping  cough  abdominal discomfort  no vomiting  Had a bowel movement today        Vital Signs Last 24 Hrs  T(C): 36.7 (24 Sep 2022 09:17), Max: 37.2 (23 Sep 2022 20:33)  T(F): 98 (24 Sep 2022 09:17), Max: 98.9 (23 Sep 2022 20:33)  HR: 89 (24 Sep 2022 09:17) (73 - 89)  BP: 145/99 (24 Sep 2022 09:17) (124/93 - 150/99)  BP(mean): --  RR: 18 (24 Sep 2022 09:17) (18 - 18)  SpO2: 94% (24 Sep 2022 09:17) (94% - 98%)    Parameters below as of 24 Sep 2022 09:17  Patient On (Oxygen Delivery Method): room air          Gen:   thin  chest   decreased air entry left upper lobe  hard left supraclavicular lymph nodes+  neuro  left ptosis, miosis and enophthalmos CW Gerardo,s syndrome  abdomen soft                          8.1    6.66  )-----------( 489      ( 24 Sep 2022 03:31 )             25.3     09-24    133<L>  |  100  |  11  ----------------------------<  143<H>  4.2   |  27  |  0.77    Ca    8.4<L>      24 Sep 2022 03:33  Phos  3.4     09-24  Mg     2.0     09-24    TPro  6.2  /  Alb  2.4<L>  /  TBili  0.6  /  DBili  x   /  AST  13<L>  /  ALT  20  /  AlkPhos  124<H>  09-24    LIVER FUNCTIONS - ( 24 Sep 2022 03:33 )  Alb: 2.4 g/dL / Pro: 6.2 gm/dL / ALK PHOS: 124 U/L / ALT: 20 U/L / AST: 13 U/L / GGT: x               Other Labs:  Cultures:     Pathology:  non small cell lung ca  poorly differentiated adenocarcinoma  PDL-1 90%  no actionable mutation    Imaging Studies:    ACC: [id number] EXAM:  CT ABDOMEN AND PELVIS IC                          PROCEDURE DATE:  09/20/2022          INTERPRETATION:  CLINICAL INFORMATION: Abdominal pain    COMPARISON: CT abdomen pelvis 7/19/2022    CONTRAST/COMPLICATIONS:  IV Contrast: Omnipaque 350  90 cc administered   10 cc discarded  Oral Contrast: NONE  Complications: None reported at time of study completion    PROCEDURE:  CT of the Abdomen and Pelvis was performed.  Sagittal and coronal reformats were performed.    FINDINGS:  LOWER CHEST: Trace left pleural effusion. Confluent groundglass and   reticular infiltrates in the lingula and consolidative opacities in the   left lower lobe.    LIVER: Steatosis.  BILE DUCTS: Normal caliber.  GALLBLADDER: Within normal limits.  SPLEEN: Within normal limits.  PANCREAS: Within normal limits.  ADRENALS: Within normal limits.  KIDNEYS/URETERS: Within normal limits.    BLADDER: Within normal limits.  REPRODUCTIVE ORGANS: Prostate within normal limits.    BOWEL: Diffuse fluid distention of small bowel with transition at the   level of a 3.5 cm length of concentric wall thickening in a loop of   pelvic small bowel (2:90, 601:64), grossly stable when compared to prior   CT of 7/19/2022. Distal small bowel is decompressed. Mild amount of stool   throughout the colon.  PERITONEUM: No ascites.  VESSELS: Atherosclerotic changes.  RETROPERITONEUM/LYMPH NODES: Stable low-density clustered lymph node mass   in the mid abdomen measuring 4.3 x 4.2 cm (2:72).  ABDOMINAL WALL: Within normal limits.  BONES: Degenerative changes.    IMPRESSION:    Small bowel obstruction to the level of a concentric mass of a pelvic   loop small bowel concerning for primary versus metastatic neoplasm. The   lesion is grossly stable in size.    Stable cluster of midabdominal low-density lymph nodes that may indicate   necrosis.    Redemonstration of intralobular septal thickening and groundglass   opacities in the lingula and consolidative and reticular opacities in the   left lower lobe. New trace left pleural effusion. Partially imaged   spiculated nodule in the right middle lobe measuring 6 cm, grossly stable.        --- End of Report ---        ACC: [id number] EXAM:  CT ANGIO CHEST PULM ART [facility]                          PROCEDURE DATE:  09/14/2022          INTERPRETATION:  ACC: [id number]  INDICATION, CLINICAL INFORMATION: metastatic poorly differentiated   non-small cell carcinoma, Hemoptysis  TECHNIQUE: CT pulmonary angiogram of the chest . Uneventful   administration of 90 cc Omnipaque 350. Coronal, sagittal and 3-D/MIP   images were reconstructed/reviewed.  COMPARISON: 7/18/2022 chest CT. 8/13/2022 PET CT.    FINDINGS:    PULMONARY VESSELS: No pulmonary embolus. No evidence of contrast   extravasation represent active hemorrhage.    HEART AND VASCULATURE: Heart size is normal. No pericardial effusion. No   aortic aneurysm or dissection.    LUNGS, AIRWAYS, PLEURA: Patent central airways. Approximately 7.7 x 4.6   cm left upper lobe cavitary mass is stable. Increased interlobular septal   thickening, patchy groundglass and solid opacities within left upper lobe   and left lower lobe which can represent pneumonia or pneumonitis. Stable   1.6 cm spiculated nodule within right middle lobe. No pleural effusions   or pneumothorax.    MEDIASTINUM: Stable mediastinal and hilar lymph nodes for reference: 1.8   cm left hilar lymph node and 2 cm short axis subcarinal lymph node.    UPPER ABDOMEN: Hepatic steatosis.    BONES AND SOFT TISSUES: No aggressive osseous lesion. Stable enlarged   left axillary lymph nodes.    LOWER NECK: Within normal limits.    IMPRESSION:    No pulmonary embolus. No contrast extravasation.    Approximately 7.7 x 4.6 cm left upper lobe mass is stable. Increased   interlobular septal thickening, patchy groundglass and solid opacities   within left upper lobe and left lower lobe which can represent pneumonia,   pneumonitis/alveolar hemorrhage.    --- End of Report ---            JUDY STOCKTON MD; Attending Radiologist        ACC: [id number] EXAM:  MR MRCP [facility]                          PROCEDURE DATE:  09/16/2022          INTERPRETATION:  CLINICAL INFORMATION: 52-year-old man with history of   ethanol abuse, elevated LFTs, and metastatic lung cancer. Distended   gallbladder and common duct on ultrasound.    COMPARISON: Ultrasound 09/15/2022. CT abdomen 07/19/2022    CONTRAST/COMPLICATIONS:  IV Contrast: Gadavist  6.5 cc administered   1.0 cc discarded  Oral Contrast: NONE  Complications: None reported at time of study completion    PROCEDURE:  MRI of the abdomen was performed.      FINDINGS:  LOWER CHEST: Left lower lobe consolidation similar to recent chest CT.   Small bilateral pleural effusions, larger on the left    LIVER: Marked steatosis.  BILE DUCTS: Borderline common duct measuring 8 mm. No choledocholithiasis   identified.  GALLBLADDER: No gallstones visualized.  SPLEEN: Within normal limits.  PANCREAS: Pancreas divisum  ADRENALS: Within normal limits.  KIDNEYS/URETERS: Within normal limits.    VISUALIZED PORTIONS:  BOWEL: Within normal limits.  PERITONEUM: No ascites.  VESSELS: Within normal limits.  RETROPERITONEUM/LYMPH NODES: No lymphadenopathy.  ABDOMINAL WALL: Within normal limits.  BONES: Within normal limits.    IMPRESSION:  Borderline common duct without obstructing lesion.  Pancreas divisum.  Marked hepatic steatosis.        --- End of Report ---      MRI brain negative for mets

## 2022-09-24 NOTE — CHART NOTE - NSCHARTNOTEFT_GEN_A_CORE
Dietitian BRIEF Note 9/24    PPN started on 9/22 due to new onset bilious vomiting; CT abd/pelv on 9/21 reveals malignant SBO.  Pt being followed by palliative and surgery, no surgical intervention at this time. Advanced to CLD, diet will not be advanced further as per NP note on 9/23. Possible palliative venting PEG placement if unable to tolerate CLD, will d/c PPN.     RD will continue to monitor PO intake, labs, hydration, and wt prn.  Laura Alicia, MS, RDN, [facility] [phone]  Nutrition

## 2022-09-24 NOTE — CONSULT NOTE ADULT - SUBJECTIVE AND OBJECTIVE BOX
Patient is a 52y old  Male who presents with a chief complaint of Hemoptysis (24 Sep 2022 09:28)      CC: left eyelid ptosis    HPI:  51 yo man admitted 9/14/22 with lung adenocarcinoma, coughing up blood, with vomiting, dehydration, and possible ETOH withdrawal.  In addition had SBO possibly due to metastatic lesion on abd CT.  Noted yesterday to have left eyelid ptosis.  Has left upper lobe lung tumor, and left neck lymphadenopathy. No visual symptoms, no diplopia.  No headache.       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)  Lung cancer  Injury due to foreign body  right wrist      FAMILY HISTORY:  FH: rheumatic fever (Father)    Social Hx:  Nonsmoker, no drug or alcohol use    MEDICATIONS  (STANDING):  ALPRAZolam 1 milliGRAM(s) Oral two times a day  ascorbic acid 500 milliGRAM(s) Oral daily  bisacodyl 5 milliGRAM(s) Oral every 12 hours  budesonide  80 MICROgram(s)/formoterol 4.5 MICROgram(s) Inhaler 2 Puff(s) Inhalation two times a day  buprenorphine 8 mG/naloxone 2 mG SL Film 1 Film(s) SubLingual daily  dexAMETHasone  Injectable 6 milliGRAM(s) IV Push daily  enoxaparin Injectable 40 milliGRAM(s) SubCutaneous every 24 hours  folic acid 1 milliGRAM(s) Oral daily  gabapentin 300 milliGRAM(s) Oral every 8 hours  influenza   Vaccine 0.5 milliLiter(s) IntraMuscular once  levothyroxine 25 MICROGram(s) Oral daily  lidocaine   4% Patch 1 Patch Transdermal daily  lisinopril 5 milliGRAM(s) Oral daily  metoclopramide Injectable 10 milliGRAM(s) IV Push every 8 hours  multivitamin 1 Tablet(s) Oral daily  nicotine - 21 mG/24Hr(s) Patch 1 Patch Transdermal daily  octreotide  Injectable 150 MICROGram(s) IV Push every 8 hours  pantoprazole  Injectable 40 milliGRAM(s) IV Push every 12 hours  Parenteral Nutrition - Adult 1 Each (67 mL/Hr) TPN Continuous <Continuous>  QUEtiapine 50 milliGRAM(s) Oral at bedtime  sucralfate suspension 1 Gram(s) Oral four times a day  tiotropium 18 MICROgram(s) Capsule 1 Capsule(s) Inhalation daily       Allergies  No Known Allergies    ROS: Pertinent positives in HPI, all other ROS were reviewed and are negative.      Vital Signs Last 24 Hrs  T(C): 36.7 (24 Sep 2022 16:25), Max: 37.2 (23 Sep 2022 20:33)  T(F): 98.1 (24 Sep 2022 16:25), Max: 98.9 (23 Sep 2022 20:33)  HR: 73 (24 Sep 2022 16:25) (73 - 89)  BP: 133/91 (24 Sep 2022 16:25) (131/95 - 150/99)  BP(mean): --  RR: 18 (24 Sep 2022 16:25) (18 - 18)  SpO2: 95% (24 Sep 2022 16:25) (94% - 98%)    Parameters below as of 24 Sep 2022 16:25  Patient On (Oxygen Delivery Method): room air      Neurological exam:  HF: A x O x 3. Appropriately interactive, normal affect. Speech fluent, No Aphasia or paraphasic errors. Naming /repetition intact   CN: +left pupil miotic, +left eyelid ptosis, no anhidrosis.  EOMI, VFF, facial sensation normal, no NLFD, tongue midline, Palate moves equally, SCM equal bilaterally  Motor: No pronator drift, Strength 5/5 in all 4 ext, normal bulk and tone, no tremor, rigidity or bradykinesia.    Sens: Intact to light touch  Reflexes: Symmetric and normal . 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    Recommend:  1. CTA head/neck c/s contrast to rule out compression of carotid artery and ensure patency  2. Treatment of underlying tumor as per oncology    Arun Arriaga MD  Neurology Attending Physician               Patient is a 52y old  Male who presents with a chief complaint of Hemoptysis (24 Sep 2022 09:28)      CC: left eyelid ptosis    HPI:  53 yo man admitted 9/14/22 with lung adenocarcinoma, coughing up blood, with vomiting, dehydration, and possible ETOH withdrawal.  In addition had SBO possibly due to metastatic lesion on abd CT.  Noted yesterday to have left eyelid ptosis.  Has left upper lobe lung tumor, and left neck lymphadenopathy. No visual symptoms, no diplopia.  No headache.       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)  Lung cancer  Injury due to foreign body  right wrist      FAMILY HISTORY:  FH: rheumatic fever (Father)    Social Hx:  Nonsmoker, no drug or alcohol use    MEDICATIONS  (STANDING):  ALPRAZolam 1 milliGRAM(s) Oral two times a day  ascorbic acid 500 milliGRAM(s) Oral daily  bisacodyl 5 milliGRAM(s) Oral every 12 hours  budesonide  80 MICROgram(s)/formoterol 4.5 MICROgram(s) Inhaler 2 Puff(s) Inhalation two times a day  buprenorphine 8 mG/naloxone 2 mG SL Film 1 Film(s) SubLingual daily  dexAMETHasone  Injectable 6 milliGRAM(s) IV Push daily  enoxaparin Injectable 40 milliGRAM(s) SubCutaneous every 24 hours  folic acid 1 milliGRAM(s) Oral daily  gabapentin 300 milliGRAM(s) Oral every 8 hours  influenza   Vaccine 0.5 milliLiter(s) IntraMuscular once  levothyroxine 25 MICROGram(s) Oral daily  lidocaine   4% Patch 1 Patch Transdermal daily  lisinopril 5 milliGRAM(s) Oral daily  metoclopramide Injectable 10 milliGRAM(s) IV Push every 8 hours  multivitamin 1 Tablet(s) Oral daily  nicotine - 21 mG/24Hr(s) Patch 1 Patch Transdermal daily  octreotide  Injectable 150 MICROGram(s) IV Push every 8 hours  pantoprazole  Injectable 40 milliGRAM(s) IV Push every 12 hours  Parenteral Nutrition - Adult 1 Each (67 mL/Hr) TPN Continuous <Continuous>  QUEtiapine 50 milliGRAM(s) Oral at bedtime  sucralfate suspension 1 Gram(s) Oral four times a day  tiotropium 18 MICROgram(s) Capsule 1 Capsule(s) Inhalation daily       Allergies  No Known Allergies    ROS: Pertinent positives in HPI, all other ROS were reviewed and are negative.      Vital Signs Last 24 Hrs  T(C): 36.7 (24 Sep 2022 16:25), Max: 37.2 (23 Sep 2022 20:33)  T(F): 98.1 (24 Sep 2022 16:25), Max: 98.9 (23 Sep 2022 20:33)  HR: 73 (24 Sep 2022 16:25) (73 - 89)  BP: 133/91 (24 Sep 2022 16:25) (131/95 - 150/99)  BP(mean): --  RR: 18 (24 Sep 2022 16:25) (18 - 18)  SpO2: 95% (24 Sep 2022 16:25) (94% - 98%)    Parameters below as of 24 Sep 2022 16:25  Patient On (Oxygen Delivery Method): room air      Neurological exam:  HF: A x O x 3. Appropriately interactive, normal affect. Speech fluent, No Aphasia or paraphasic errors. Naming /repetition intact   CN: +left pupil miotic, +left eyelid ptosis, no anhidrosis.  EOMI, VFF, facial sensation normal, no NLFD, tongue midline, Palate moves equally, SCM equal bilaterally  Motor: No pronator drift, Strength 5/5 in all 4 ext, normal bulk and tone, no tremor, rigidity or bradykinesia.    Sens: Intact to light touch  Reflexes: Symmetric and normal . BJ 2+, BR 2+, KJ 2+, AJ 2+, downgoing toes b/l  Coord:  No FNFA, dysmetria    Labs:   09-24    133<L>  |  100  |  11  ----------------------------<  143<H>  4.2   |  27  |  0.77    Ca    8.4<L>      24 Sep 2022 03:33  Phos  3.4     09-24  Mg     2.0     09-24    TPro  6.2  /  Alb  2.4<L>  /  TBili  0.6  /  DBili  x   /  AST  13<L>  /  ALT  20  /  AlkPhos  124<H>  09-24 09-23 Chol -- LDL -- HDL -- Trig 152<H>, 09-16 Chol 150 LDL -- HDL 43 Trig 156<H>                          8.1    6.66  )-----------( 489      ( 24 Sep 2022 03:31 )             25.3         A/P:   53 yo man with a left Gerardo's syndrome, 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    Recommend:  1. CTA head/neck c/s contrast to rule out compression of carotid artery and ensure patency  2. Treatment of underlying tumor as per oncology    Arun Arriaga MD  Neurology Attending Physician

## 2022-09-24 NOTE — PROGRESS NOTE ADULT - ASSESSMENT
53 y/o M presents with hemoptysis     Acute hypoxemic respiratory failure and Hemoptysis secondary to probable post-obstructive pneumonia, alveolar hemorrhage, Pneumonitis superimposed on  Metastatic SHAILA NSMLC Adenocarcinoma   - CTA: No pulmonary embolus; Approximately 7.7 x 4.6 cm left upper lobe mass is stable; Increased interlobular septal thickening, patchy groundglass and solid opacities within left upper lobe and left lower lobe which can represent pneumonia, pneumonitis/alveolar hemorrhage  - Lactate 3.2-->2.2-->1.0  - Continue supplemental O2 to maintain SpO2 > 92%   - s/p Zosyn & Vanco in the ER; s/p  Azithromycin; Completed 7 days   - Continue Albuterol PRN, Symbicort, & Spiriva  - Tessalon Pearle PRN   - ID consult - Dr. Archibald   - Pulmonology consult - Dr. Akbar, no role for steroids   - Oncology consult- discussed with Dr Cardenas, consider immunotherapy as OP   - Rad/Onc consult Dr. 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   - MR Brain neg for mets     Acute EtOH Withdrawal  - Drinks 2 pints of Vodka Daily, last drink 9/14/22 at 6am  - s/p Ativan Taper/CIWA Protocol  - SW Consult  - May require IP Substance Abuse Rehab Prior to Cancer Tx.; Pt refusing   - Resume Xanax PRN at lower dose, 0.5mg PO q12h; 9/20 changed back to 1mg BID as pt symptomatic   - Folate and Multivitamin   - Started on Gabapentin 100mg TID; dose increased to 200mg TID; increased to 300mg TID  - Psych consulted      Hyponatremia   - Likely due to hypovolemia from vomiting   - s/p IVF    Electrolyte Abnormality   - Due to EtOH Consumption  - Supplement Lytes as needed  - Trend BMP  - Calcium corrected for albumin 9.8     EKG Abnormality  - ST & T wave abnormality, consider inferior ischemia  - Repeat EKG no change   - Troponin negative x 2   -   - 2d echo as above     Hypothyroidism, new  - TSH low, Free T4 high  - Start low dose Synthroid  - F/U OP in 4 weeks for repeat TFTs   - From NM PET/CT Onc FDG Skull to Thigh, Initial (08.13.22 @ 13:45): There is an approximately symmetric enlargement of bilateral thyroid lobes with heterogeneous FDG avidity, significantly more prominent on the left with SUV 37.2 (image 70). Physiologic FDG activity in visualized brain, major salivary glands, and neck muscles.    Abnormal LFTs due to Hepatic Steatosis and EtOH abuse  Hypertriglyceridemia   - US abdomen demonstrates Hepatomegaly and steatosis; Gallbladder sludge and stones with a dilated extrahepatic biliary tree  - MRI/MRCP Borderline common duct without obstructing lesion; Pancreas divisum; Marked hepatic steatosis  - Seen by GI, Dr. Bertrand, would not pursue further evaluation for biliary disease  - Ammonia wnl  - Trend LFTs  - Lipid panel as above     Severe Vitamin D deficiency   - Start PO supplementation     Iron deficiency anemia   Bone marrow suppression from alcohol dependence   - B12 elevated, folate wnl   - Stop B12  - Started on PO iron supplementation   - Transfused today for HGB 7.8    Hyperglycemia, clinically insignificant  - A1c 5.1    Hx of Opiate Abuse & Dependence  - Continue Suboxone   - Continue Seroquel 50mg at HS    Nicotine Use Disorder  - NRT   - Counseled on smoking cessation    HTN   - Continue Lisinopril with hold parameters; Stopped on 9/21/22 due to soft BP    DVT Prophylaxis:  Lovenox 40 mg subcutaneous daily     Code status: Full code (Pt agrees to chest compressions and intubation if required).     9/23-9/24:  pt agreeable to DC home on hospice with likely palliative OP RT but if he abstains from etoh is also a candidate for Immunotherapy per Dr Cardenas   Dispo: FLD, PPN/Lipids, pain and anxiety management

## 2022-09-24 NOTE — PROGRESS NOTE ADULT - SUBJECTIVE AND OBJECTIVE BOX
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 He states that he coughs so much that it causes his to vomit. Reports nausea, constipation, dehydration. Denies fevers, chills, chest pain, SOB, abdominal pain. Pt was due to start chemotherapy this Friday.   ER course: SpO2 91% on room air. Labs: Hb 10.2, lactate 3.2, Na 134, K+ 3.1, glucose 135, albumin 2.7, alkaline phosphatase 325, AST 82. EKG: NSR with HR 82 bpm,  ms, no ST segment changes, T wave inversions in II/III/AVF present on prior EKG (personally reviewed). Pt was given Zosyn, 2500 ml of NS, Libirum, Zofran. He is being admitted to med/surg for further management.       9/24 - no cp palps sob abdo pain had a bm, ambulating in hallway     PE:  T(F): 98.6 (24 Sep 2022 18:42), Max: 98.6 (24 Sep 2022 18:42)  HR: 82 (24 Sep 2022 18:42) (73 - 89)  BP: 129/95 (24 Sep 2022 18:42) (129/95 - 145/99)  RR: 18 (24 Sep 2022 18:42) (18 - 18)  SpO2: 96% (24 Sep 2022 18:42) (94% - 98%)room air  Constitutional: NAD, awake and alert  HEENT: EOMI, left eye ptosis, enophthalmos   Neck: Soft and supple, No JVD  Respiratory: Breath sounds are diminished bilaterally, No wheezing, rales or rhonchi  Cardiovascular: S1 and S2, regular rate and rhythm, no Murmurs  Gastrointestinal: Bowel Sounds present, soft, nontender, + distended  Extremities: No peripheral edema  Vascular: 2+ peripheral pulses  Neurological: A/O x 3, no focal deficits  Musculoskeletal: 5/5 strength b/l upper and lower extremities  Skin: No rashes    Micro:  Legionella pneumophila Antigen, Urine (09.15.22 @ 02:00) NegativeCulture - Sputum . (09.15.22 @ 10:15) Rare Gram Positive Rods seen per oil power field Rare Gram positive cocci in pairs seen per oil power field Culture - Blood (09.15.22 @ 09:06) No growth to date.       RADIOLOGY/EKG: all reviewed   MR BRAIN rev by me - no mets   CT CHEST - no PE; SHAILA mass, spiculated     LABS: All Labs Reviewed:                     8.1    6.66  )-----------( 489      ( 24 Sep 2022 03:31 )             25.3   133<L>  |  100  |  11  ----------------------------<  143<H>  4.2   |  27  |  0.77  Ca    8.4<L>      24 Sep 2022 03:33  Phos  3.4     09-24  Mg     2.0     09-24  TPro  6.2  /  Alb  2.4<L>  /  TBili  0.6  /  DBili  x   /  AST  13<L>  /  ALT  20  /  AlkPhos  124<H>  09-24        acetaminophen     Tablet .. 650 milliGRAM(s) Oral every 6 hours PRN  ALBUTerol    90 MICROgram(s) HFA Inhaler 2 Puff(s) Inhalation every 6 hours PRN  ALPRAZolam 1 milliGRAM(s) Oral two times a day  aluminum hydroxide/magnesium hydroxide/simethicone Suspension 30 milliLiter(s) Oral every 4 hours PRN  ascorbic acid 500 milliGRAM(s) Oral daily  benzonatate 100 milliGRAM(s) Oral every 8 hours PRN  bisacodyl 5 milliGRAM(s) Oral every 12 hours  budesonide  80 MICROgram(s)/formoterol 4.5 MICROgram(s) Inhaler 2 Puff(s) Inhalation two times a day  buprenorphine 8 mG/naloxone 2 mG SL Film 1 Film(s) SubLingual daily  cyclobenzaprine 10 milliGRAM(s) Oral three times a day PRN  dexAMETHasone  Injectable 6 milliGRAM(s) IV Push daily  enoxaparin Injectable 40 milliGRAM(s) SubCutaneous every 24 hours  folic acid 1 milliGRAM(s) Oral daily  gabapentin 300 milliGRAM(s) Oral every 8 hours  guaiFENesin ER 1200 milliGRAM(s) Oral every 12 hours PRN  influenza   Vaccine 0.5 milliLiter(s) IntraMuscular once  ketorolac   Injectable 15 milliGRAM(s) IV Push every 6 hours PRN  levothyroxine 25 MICROGram(s) Oral daily  lidocaine   4% Patch 1 Patch Transdermal daily  lisinopril 5 milliGRAM(s) Oral daily  melatonin 3 milliGRAM(s) Oral at bedtime PRN  metoclopramide Injectable 10 milliGRAM(s) IV Push every 8 hours  multivitamin 1 Tablet(s) Oral daily  nicotine - 21 mG/24Hr(s) Patch 1 Patch Transdermal daily  octreotide  Injectable 150 MICROGram(s) IV Push every 8 hours  ondansetron Injectable 4 milliGRAM(s) IV Push every 6 hours PRN  pantoprazole  Injectable 40 milliGRAM(s) IV Push every 12 hours  Parenteral Nutrition - Adult 1 Each TPN Continuous <Continuous>  prochlorperazine   Injectable 10 milliGRAM(s) IV Push every 6 hours PRN  QUEtiapine 50 milliGRAM(s) Oral at bedtime  sucralfate suspension 1 Gram(s) Oral four times a day  tiotropium 18 MICROgram(s) Capsule 1 Capsule(s) Inhalation daily

## 2022-09-24 NOTE — CONSULT NOTE ADULT - CONSULT REQUESTED DATE/TIME
15-Sep-2022 08:59
15-Sep-2022 09:38
22-Sep-2022 07:14
19-Sep-2022
21-Sep-2022 10:27
24-Sep-2022
16-Sep-2022 11:05
15-Sep-2022
21-Sep-2022 11:47

## 2022-09-25 NOTE — PROGRESS NOTE ADULT - ASSESSMENT
1. metastatic NSCLC  PDL-1 90%  SBO seem to be resolving with conservative therapy'  plan  if he continues to improve, can be discharged and start immunotherapy as outpatient  given very high PDL-1 of 90%, single agent immunotherapy may be appropriate    2. SBO at multiple levels  patient has mesenteric mets on PET CT  no plans for surgery at this time   resolved  ?  change IV reglan to po    3. 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 ETOH intoxication  now dry  LFTs improved  patient advised to not resume any alcohol consumption as he cannot be treated with any systemic therapy if he does

## 2022-09-25 NOTE — PROGRESS NOTE ADULT - SUBJECTIVE AND OBJECTIVE BOX
The patient was seen and examined.    stage 4 lung cancer  admitted with ETOH inebriation    found to have SBO at multiple levels. S/B surgery. 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  CT neck recommended  feeling well    no further hemoptysis     MEDICATIONS  (STANDING):  ALPRAZolam 1 milliGRAM(s) Oral two times a day  ascorbic acid 500 milliGRAM(s) Oral daily  bisacodyl 5 milliGRAM(s) Oral every 12 hours  budesonide  80 MICROgram(s)/formoterol 4.5 MICROgram(s) Inhaler 2 Puff(s) Inhalation two times a day  buprenorphine 8 mG/naloxone 2 mG SL Film 1 Film(s) SubLingual daily  dexAMETHasone  Injectable 6 milliGRAM(s) IV Push daily  enoxaparin Injectable 40 milliGRAM(s) SubCutaneous every 24 hours  folic acid 1 milliGRAM(s) Oral daily  gabapentin 300 milliGRAM(s) Oral every 8 hours  influenza   Vaccine 0.5 milliLiter(s) IntraMuscular once  levothyroxine 25 MICROGram(s) Oral daily  lidocaine   4% Patch 1 Patch Transdermal daily  lisinopril 40 milliGRAM(s) Oral daily  metoclopramide Injectable 10 milliGRAM(s) IV Push every 8 hours  multivitamin 1 Tablet(s) Oral daily  nicotine - 21 mG/24Hr(s) Patch 1 Patch Transdermal daily  octreotide  Injectable 150 MICROGram(s) IV Push every 8 hours  pantoprazole  Injectable 40 milliGRAM(s) IV Push every 12 hours  QUEtiapine 50 milliGRAM(s) Oral at bedtime  sucralfate suspension 1 Gram(s) Oral four times a day  tiotropium 18 MICROgram(s) Capsule 1 Capsule(s) Inhalation daily    MEDICATIONS  (PRN):  acetaminophen     Tablet .. 650 milliGRAM(s) Oral every 6 hours PRN Temp greater or equal to 38C (100.4F), Mild Pain (1 - 3)  ALBUTerol    90 MICROgram(s) HFA Inhaler 2 Puff(s) Inhalation every 6 hours PRN Bronchospasm  aluminum hydroxide/magnesium hydroxide/simethicone Suspension 30 milliLiter(s) Oral every 4 hours PRN Dyspepsia  benzonatate 100 milliGRAM(s) Oral every 8 hours PRN Cough  cyclobenzaprine 10 milliGRAM(s) Oral three times a day PRN Muscle Spasm  guaiFENesin ER 1200 milliGRAM(s) Oral every 12 hours PRN Cough  ketorolac   Injectable 15 milliGRAM(s) IV Push every 6 hours PRN Severe Pain (7 - 10)  melatonin 3 milliGRAM(s) Oral at bedtime PRN Insomnia  ondansetron Injectable 4 milliGRAM(s) IV Push every 6 hours PRN Nausea and/or Vomiting  prochlorperazine   Injectable 10 milliGRAM(s) IV Push every 6 hours PRN severe nausea          ROS  left eye drooping  cough  abdominal discomfort resolved  no vomiting  Had a bowel movements      Vital Signs Last 24 Hrs  T(C): 36.9 (25 Sep 2022 09:30), Max: 37 (24 Sep 2022 18:42)  T(F): 98.5 (25 Sep 2022 09:30), Max: 98.6 (24 Sep 2022 18:42)  HR: 88 (25 Sep 2022 13:52) (75 - 88)  BP: 125/90 (25 Sep 2022 13:52) (125/90 - 157/101)  BP(mean): --  RR: 18 (25 Sep 2022 09:30) (18 - 18)  SpO2: 95% (25 Sep 2022 09:30) (95% - 97%)    Parameters below as of 25 Sep 2022 09:30  Patient On (Oxygen Delivery Method): room air              Gen:   thin  chest   decreased air entry left upper lobe  hard left supraclavicular lymph nodes+  neuro  left ptosis, miosis and enophthalmos CW Gerardo,s syndrome  abdomen soft                                     8.6    8.41  )-----------( 577      ( 25 Sep 2022 06:10 )             26.1     09-25    135  |  101  |  14  ----------------------------<  116<H>  4.0   |  27  |  0.79    Ca    8.9      25 Sep 2022 06:10  Phos  3.4     09-24  Mg     2.1     09-25    TPro  6.2  /  Alb  2.4<L>  /  TBili  0.6  /  DBili  x   /  AST  13<L>  /  ALT  20  /  AlkPhos  124<H>  09-24    LIVER FUNCTIONS - ( 24 Sep 2022 03:33 )  Alb: 2.4 g/dL / Pro: 6.2 gm/dL / ALK PHOS: 124 U/L / ALT: 20 U/L / AST: 13 U/L / GGT: x                   Other Labs:  Cultures:     Pathology:  non small cell lung ca  poorly differentiated adenocarcinoma  PDL-1 90%  no actionable mutation    Imaging Studies:    ACC: [id number] EXAM:  CT ABDOMEN AND PELVIS IC                          PROCEDURE DATE:  09/20/2022          INTERPRETATION:  CLINICAL INFORMATION: Abdominal pain    COMPARISON: CT abdomen pelvis 7/19/2022    CONTRAST/COMPLICATIONS:  IV Contrast: Omnipaque 350  90 cc administered   10 cc discarded  Oral Contrast: NONE  Complications: None reported at time of study completion    PROCEDURE:  CT of the Abdomen and Pelvis was performed.  Sagittal and coronal reformats were performed.    FINDINGS:  LOWER CHEST: Trace left pleural effusion. Confluent groundglass and   reticular infiltrates in the lingula and consolidative opacities in the   left lower lobe.    LIVER: Steatosis.  BILE DUCTS: Normal caliber.  GALLBLADDER: Within normal limits.  SPLEEN: Within normal limits.  PANCREAS: Within normal limits.  ADRENALS: Within normal limits.  KIDNEYS/URETERS: Within normal limits.    BLADDER: Within normal limits.  REPRODUCTIVE ORGANS: Prostate within normal limits.    BOWEL: Diffuse fluid distention of small bowel with transition at the   level of a 3.5 cm length of concentric wall thickening in a loop of   pelvic small bowel (2:90, 601:64), grossly stable when compared to prior   CT of 7/19/2022. Distal small bowel is decompressed. Mild amount of stool   throughout the colon.  PERITONEUM: No ascites.  VESSELS: Atherosclerotic changes.  RETROPERITONEUM/LYMPH NODES: Stable low-density clustered lymph node mass   in the mid abdomen measuring 4.3 x 4.2 cm (2:72).  ABDOMINAL WALL: Within normal limits.  BONES: Degenerative changes.    IMPRESSION:    Small bowel obstruction to the level of a concentric mass of a pelvic   loop small bowel concerning for primary versus metastatic neoplasm. The   lesion is grossly stable in size.    Stable cluster of midabdominal low-density lymph nodes that may indicate   necrosis.    Redemonstration of intralobular septal thickening and groundglass   opacities in the lingula and consolidative and reticular opacities in the   left lower lobe. New trace left pleural effusion. Partially imaged   spiculated nodule in the right middle lobe measuring 6 cm, grossly stable.        --- End of Report ---        ACC: [id number] EXAM:  CT ANGIO CHEST PULM ART [facility]                          PROCEDURE DATE:  09/14/2022          INTERPRETATION:  ACC: [id number]  INDICATION, CLINICAL INFORMATION: metastatic poorly differentiated   non-small cell carcinoma, Hemoptysis  TECHNIQUE: CT pulmonary angiogram of the chest . Uneventful   administration of 90 cc Omnipaque 350. Coronal, sagittal and 3-D/MIP   images were reconstructed/reviewed.  COMPARISON: 7/18/2022 chest CT. 8/13/2022 PET CT.    FINDINGS:    PULMONARY VESSELS: No pulmonary embolus. No evidence of contrast   extravasation represent active hemorrhage.    HEART AND VASCULATURE: Heart size is normal. No pericardial effusion. No   aortic aneurysm or dissection.    LUNGS, AIRWAYS, PLEURA: Patent central airways. Approximately 7.7 x 4.6   cm left upper lobe cavitary mass is stable. Increased interlobular septal   thickening, patchy groundglass and solid opacities within left upper lobe   and left lower lobe which can represent pneumonia or pneumonitis. Stable   1.6 cm spiculated nodule within right middle lobe. No pleural effusions   or pneumothorax.    MEDIASTINUM: Stable mediastinal and hilar lymph nodes for reference: 1.8   cm left hilar lymph node and 2 cm short axis subcarinal lymph node.    UPPER ABDOMEN: Hepatic steatosis.    BONES AND SOFT TISSUES: No aggressive osseous lesion. Stable enlarged   left axillary lymph nodes.    LOWER NECK: Within normal limits.    IMPRESSION:    No pulmonary embolus. No contrast extravasation.    Approximately 7.7 x 4.6 cm left upper lobe mass is stable. Increased   interlobular septal thickening, patchy groundglass and solid opacities   within left upper lobe and left lower lobe which can represent pneumonia,   pneumonitis/alveolar hemorrhage.    --- End of Report ---            JUDY STOCKTON MD; Attending Radiologist        ACC: [id number] EXAM:  MR MRCP [facility]                          PROCEDURE DATE:  09/16/2022          INTERPRETATION:  CLINICAL INFORMATION: 52-year-old man with history of   ethanol abuse, elevated LFTs, and metastatic lung cancer. Distended   gallbladder and common duct on ultrasound.    COMPARISON: Ultrasound 09/15/2022. CT abdomen 07/19/2022    CONTRAST/COMPLICATIONS:  IV Contrast: Gadavist  6.5 cc administered   1.0 cc discarded  Oral Contrast: NONE  Complications: None reported at time of study completion    PROCEDURE:  MRI of the abdomen was performed.      FINDINGS:  LOWER CHEST: Left lower lobe consolidation similar to recent chest CT.   Small bilateral pleural effusions, larger on the left    LIVER: Marked steatosis.  BILE DUCTS: Borderline common duct measuring 8 mm. No choledocholithiasis   identified.  GALLBLADDER: No gallstones visualized.  SPLEEN: Within normal limits.  PANCREAS: Pancreas divisum  ADRENALS: Within normal limits.  KIDNEYS/URETERS: Within normal limits.    VISUALIZED PORTIONS:  BOWEL: Within normal limits.  PERITONEUM: No ascites.  VESSELS: Within normal limits.  RETROPERITONEUM/LYMPH NODES: No lymphadenopathy.  ABDOMINAL WALL: Within normal limits.  BONES: Within normal limits.    IMPRESSION:  Borderline common duct without obstructing lesion.  Pancreas divisum.  Marked hepatic steatosis.        --- End of Report ---      MRI brain negative for mets

## 2022-09-25 NOTE — PROGRESS NOTE ADULT - ASSESSMENT
53 y/o M presents with hemoptysis     Acute hypoxemic respiratory failure and Hemoptysis secondary to probable post-obstructive pneumonia, alveolar hemorrhage, Pneumonitis superimposed on  Metastatic SHAILA NSMLC Adenocarcinoma   - CTA: No pulmonary embolus; Approximately 7.7 x 4.6 cm left upper lobe mass is stable; Increased interlobular septal thickening, patchy groundglass and solid opacities within left upper lobe and left lower lobe which can represent pneumonia, pneumonitis/alveolar hemorrhage  - Lactate 3.2-->2.2-->1.0  - Continue supplemental O2 to maintain SpO2 > 92%   - s/p Zosyn & Vanco in the ER; s/p  Azithromycin; Completed 7 days   - Continue Albuterol PRN, Symbicort, & Spiriva  - Tessalon Pearle PRN   - ID consult - Dr. Archibald   - Pulmonology consult - Dr. Akbar, no role for steroids   - Oncology consult- discussed with Dr Richardson, consider immunotherapy as OP   - Rad/Onc consult Dr. 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   - MR Brain neg for mets     Acute EtOH Withdrawal  - Drinks 2 pints of Vodka Daily, last drink 9/14/22 at 6am  - s/p Ativan Taper/CIWA Protocol  - May require IP Substance Abuse Rehab Prior to Cancer Tx.; Pt refusing   - Resume Xanax PRN at lower dose, 0.5mg PO q12h; 9/20 changed back to 1mg BID as pt symptomatic   - Folate and Multivitamin   - Started on Gabapentin 100mg TID; dose increased to 200mg TID; increased to 300mg TID  - Psych consulted      Hyponatremia   - Likely due to hypovolemia from vomiting   - s/p IVF    Electrolyte Abnormality   - Due to EtOH Consumption  - Supplement Lytes as needed  - Trend BMP  - Calcium corrected for albumin 9.8     EKG Abnormality  - ST & T wave abnormality, consider inferior ischemia  - Repeat EKG no change   - Troponin negative x 2   -   - 2d echo as above     Hypothyroidism, new  - TSH low, Free T4 high  - Start low dose Synthroid  - F/U OP in 4 weeks for repeat TFTs   - From NM PET/CT Onc FDG Skull to Thigh, Initial (08.13.22 @ 13:45): There is an approximately symmetric enlargement of bilateral thyroid lobes with heterogeneous FDG avidity, significantly more prominent on the left with SUV 37.2 (image 70). Physiologic FDG activity in visualized brain, major salivary glands, and neck muscles.    Abnormal LFTs due to Hepatic Steatosis and EtOH abuse  Hypertriglyceridemia   - US abdomen demonstrates Hepatomegaly and steatosis; Gallbladder sludge and stones with a dilated extrahepatic biliary tree  - MRI/MRCP Borderline common duct without obstructing lesion; Pancreas divisum; Marked hepatic steatosis  - Seen by GI, Dr. Bertrand, would not pursue further evaluation for biliary disease  - Ammonia wnl  - Trend LFTs  - Lipid panel as above     Severe Vitamin D deficiency   - Start PO supplementation     Iron deficiency anemia   Bone marrow suppression from alcohol dependence   - B12 elevated, folate wnl   - Stop B12  - Started on PO iron supplementation   - Transfused today for HGB 7.8    Hyperglycemia, clinically insignificant  - A1c 5.1    Hx of Opiate Abuse & Dependence  - Continue Suboxone   - Continue Seroquel 50mg at HS    Nicotine Use Disorder  - NRT   - Counseled on smoking cessation    HTN   - Resume Lisinopril 40 daily     DVT Prophylaxis:  Lovenox 40 mg subcutaneous daily     Code status: Full code (Pt agrees to chest compressions and intubation if required).     9/25:  On further testing patient is a candidate for Keytruda and is expected to have a good response   Counseled to abstain from etoh and see Giovanny Trammell/Theresa  See Dr Rose as well   No plans for hospice at this time     Dispo: FLD, Off PPN  53 y/o M presents with hemoptysis     Acute hypoxemic respiratory failure and Hemoptysis secondary to probable post-obstructive pneumonia, alveolar hemorrhage, Pneumonitis superimposed on  Metastatic SHAILA NSMLC Adenocarcinoma   - CTA: No pulmonary embolus; Approximately 7.7 x 4.6 cm left upper lobe mass is stable; Increased interlobular septal thickening, patchy groundglass and solid opacities within left upper lobe and left lower lobe which can represent pneumonia, pneumonitis/alveolar hemorrhage  - Lactate 3.2-->2.2-->1.0  - Continue supplemental O2 to maintain SpO2 > 92%   - s/p Zosyn & Vanco in the ER; s/p  Azithromycin; Completed 7 days   - Continue Albuterol PRN, Symbicort, & Spiriva  - Tessalon Pearle PRN   - ID consult - Dr. Archibald   - Pulmonology consult - Dr. Akbar, no role for steroids   - Oncology consult- discussed with Dr Richardson, consider immunotherapy as OP   - Rad/Onc consult Dr. 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   - MR Brain neg for mets   - Neuro recs CTA H&N , ordered, but can be done as OP     Acute EtOH Withdrawal  - Drinks 2 pints of Vodka Daily, last drink 9/14/22 at 6am  - s/p Ativan Taper/CIWA Protocol  - May require IP Substance Abuse Rehab Prior to Cancer Tx.; Pt refusing   - Resume Xanax PRN at lower dose, 0.5mg PO q12h; 9/20 changed back to 1mg BID as pt symptomatic   - Folate and Multivitamin   - Started on Gabapentin 100mg TID; dose increased to 200mg TID; increased to 300mg TID  - Psych consulted      Hyponatremia   - Likely due to hypovolemia from vomiting   - s/p IVF    Electrolyte Abnormality   - Due to EtOH Consumption  - Supplement Lytes as needed  - Trend BMP  - Calcium corrected for albumin 9.8     EKG Abnormality  - ST & T wave abnormality, consider inferior ischemia  - Repeat EKG no change   - Troponin negative x 2   -   - 2d echo as above     Hypothyroidism, new  - TSH low, Free T4 high  - Start low dose Synthroid  - F/U OP in 4 weeks for repeat TFTs   - From NM PET/CT Onc FDG Skull to Thigh, Initial (08.13.22 @ 13:45): There is an approximately symmetric enlargement of bilateral thyroid lobes with heterogeneous FDG avidity, significantly more prominent on the left with SUV 37.2 (image 70). Physiologic FDG activity in visualized brain, major salivary glands, and neck muscles.    Abnormal LFTs due to Hepatic Steatosis and EtOH abuse  Hypertriglyceridemia   - US abdomen demonstrates Hepatomegaly and steatosis; Gallbladder sludge and stones with a dilated extrahepatic biliary tree  - MRI/MRCP Borderline common duct without obstructing lesion; Pancreas divisum; Marked hepatic steatosis  - Seen by GI, Dr. Bertrand, would not pursue further evaluation for biliary disease  - Ammonia wnl  - Trend LFTs  - Lipid panel as above     Severe Vitamin D deficiency   - Start PO supplementation     Iron deficiency anemia   Bone marrow suppression from alcohol dependence   - B12 elevated, folate wnl   - Stop B12  - Started on PO iron supplementation   - Transfused today for HGB 7.8    Hyperglycemia, clinically insignificant  - A1c 5.1    Hx of Opiate Abuse & Dependence  - Continue Suboxone   - Continue Seroquel 50mg at HS    Nicotine Use Disorder  - NRT   - Counseled on smoking cessation    HTN   - Resume Lisinopril 40 daily     DVT Prophylaxis:  Lovenox 40 mg subcutaneous daily     Code status: Full code (Pt agrees to chest compressions and intubation if required).     9/25:  On further testing patient is a candidate for Keytruda and is expected to have a good response   Counseled to abstain from etoh and see Giovanny Trammell/Theresa  See Dr Rose as well   No plans for hospice at this time     Dispo: FLD, Off PPN, DC home tmr and see Oncology asap

## 2022-09-25 NOTE — CHART NOTE - NSCHARTNOTESELECT_GEN_ALL_CORE
Dietitian BRIEF Note
Dietitian PPN F/U
Dietitian f/u PN Note
Event Note
Supportive Counseling/Event Note
Supportive Counseling/Event Note
Dietitian BRIEF Note
Event Note

## 2022-09-25 NOTE — PROGRESS NOTE ADULT - NUTRITIONAL ASSESSMENT
This patient has been assessed with a concern for Malnutrition and has been determined to have a diagnosis/diagnoses of Severe protein-calorie malnutrition and Underweight (BMI < 19).    This patient is being managed with:   Diet DASH/TLC-  Sodium & Cholesterol Restricted  Supplement Feeding Modality:  Oral  Ensure Enlive Cans or Servings Per Day:  1       Frequency:  Three Times a day  Entered: Sep 15 2022  8:45AM    Diet DASH/TLC-  Sodium & Cholesterol Restricted  Entered: Sep 14 2022  6:07PM    The following pending diet order is being considered for treatment of Severe protein-calorie malnutrition and Underweight (BMI < 19):null
This patient has been assessed with a concern for Malnutrition and has been determined to have a diagnosis/diagnoses of Severe protein-calorie malnutrition and Underweight (BMI < 19).    This patient is being managed with:   Parenteral Nutrition - Adult-  Entered: Sep 22 2022 10:00PM    fat emulsion (Fish Oil and Plant Based) 20% Infusion-[Known as SMOFLIPID 20% Infusion]  50 Gram(s) in IV Solution 250 milliLiter(s) infuse at 20.8 mL/Hr  Dose Rate: 0.787 Gm/kG/Day Infuse Over: 12 Hours; Stop After 12 Hours  Administration Instructions: Use 1.2 micron in-line filter  Entered: Sep 22 2022 10:00PM    Diet Clear Liquid-  Entered: Sep 22 2022 12:17PM    
This patient has been assessed with a concern for Malnutrition and has been determined to have a diagnosis/diagnoses of Severe protein-calorie malnutrition and Underweight (BMI < 19).    This patient is being managed with:   Diet Full Liquid-  Entered: Sep 24 2022  9:20AM    
This patient has been assessed with a concern for Malnutrition and has been determined to have a diagnosis/diagnoses of Severe protein-calorie malnutrition and Underweight (BMI < 19).    This patient is being managed with:   Parenteral Nutrition - Adult-  Entered: Sep 23 2022 10:00PM    fat emulsion (Fish Oil and Plant Based) 20% Infusion-[Known as SMOFLIPID 20% Infusion]  50 Gram(s) in IV Solution 250 milliLiter(s) infuse at 20.83 mL/Hr  Dose Rate: 0.79 Gm/kG/Day Infuse Over: 12 Hours; Stop After 12 Hours  Administration Instructions: Use 1.2 micron in-line filter  Entered: Sep 23 2022 10:00PM    Parenteral Nutrition - Adult-  Entered: Sep 22 2022 10:00PM    Diet Clear Liquid-  Entered: Sep 22 2022 12:17PM    
This patient has been assessed with a concern for Malnutrition and has been determined to have a diagnosis/diagnoses of Severe protein-calorie malnutrition and Underweight (BMI < 19).    This patient is being managed with:   Diet DASH/TLC-  Sodium & Cholesterol Restricted  Supplement Feeding Modality:  Oral  Ensure Enlive Cans or Servings Per Day:  1       Frequency:  Three Times a day  Entered: Sep 15 2022  8:45AM    Diet DASH/TLC-  Sodium & Cholesterol Restricted  Entered: Sep 14 2022  6:07PM    The following pending diet order is being considered for treatment of Severe protein-calorie malnutrition and Underweight (BMI < 19):null
This patient has been assessed with a concern for Malnutrition and has been determined to have a diagnosis/diagnoses of Severe protein-calorie malnutrition and Underweight (BMI < 19).    This patient is being managed with:   Diet Full Liquid-  Entered: Sep 24 2022  9:20AM    Parenteral Nutrition - Adult-  Entered: Sep 23 2022 10:00PM    
This patient has been assessed with a concern for Malnutrition and has been determined to have a diagnosis/diagnoses of Severe protein-calorie malnutrition and Underweight (BMI < 19).    This patient is being managed with:   Diet NPO-  Entered: Sep 21 2022  8:55AM

## 2022-09-25 NOTE — PROGRESS NOTE ADULT - PROVIDER SPECIALTY LIST ADULT
Peggybroderick Meyer is a 47 year old female presenting with   Chief Complaint   Patient presents with   • Consultation     vaginal issues     Patient would like communication of their results via:      Daryl     Hospitalist

## 2022-09-25 NOTE — PROGRESS NOTE ADULT - SUBJECTIVE AND OBJECTIVE BOX
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 He states that he coughs so much that it causes his to vomit. Reports nausea, constipation, dehydration. Denies fevers, chills, chest pain, SOB, abdominal pain. Pt was due to start chemotherapy this Friday.   ER course: SpO2 91% on room air. Labs: Hb 10.2, lactate 3.2, Na 134, K+ 3.1, glucose 135, albumin 2.7, alkaline phosphatase 325, AST 82. EKG: NSR with HR 82 bpm,  ms, no ST segment changes, T wave inversions in II/III/AVF present on prior EKG (personally reviewed). Pt was given Zosyn, 2500 ml of NS, Libirum, Zofran. He is being admitted to med/surg for further management.       9/25 - no cp palps sob abdo pain, having BMS, off PPN and eval on FLD;  ambulating in hallway     PE:  T(F): 98.5 (25 Sep 2022 09:30), Max: 98.6 (24 Sep 2022 21:04)  HR: 88 (25 Sep 2022 13:52) (75 - 88)  BP: 125/90 (25 Sep 2022 13:52) (125/90 - 157/101)  RR: 18 (25 Sep 2022 09:30) (18 - 18)  SpO2: 95% (25 Sep 2022 09:30) (95% - 97%)  Parameters below as of 25 Sep 2022 09:30  Patient On (Oxygen Delivery Method): room air  Constitutional: NAD, awake and alert  HEENT: EOMI, left eye ptosis, enophthalmos   Neck: Soft and supple, No JVD  Respiratory: Breath sounds are diminished bilaterally, No wheezing, rales or rhonchi  Cardiovascular: S1 and S2, regular rate and rhythm, no Murmurs  Gastrointestinal: Bowel Sounds present, soft, nontender, + distended  Extremities: No peripheral edema  Vascular: 2+ peripheral pulses  Neurological: A/O x 3, no focal deficits  Musculoskeletal: 5/5 strength b/l upper and lower extremities  Skin: No rashes    Micro:  Legionella pneumophila Antigen, Urine (09.15.22 @ 02:00) NegativeCulture - Sputum . (09.15.22 @ 10:15) Rare Gram Positive Rods seen per oil power field Rare Gram positive cocci in pairs seen per oil power field Culture - Blood (09.15.22 @ 09:06) No growth to date.       RADIOLOGY/EKG: all reviewed   MR BRAIN rev by me - no mets   CT CHEST - no PE; SHAILA mass, spiculated     LABS: All Labs Reviewed:                     8.6    8.41  )-----------( 577      ( 25 Sep 2022 06:10 )             26.1  135  |  101  |  14  ----------------------------<  116<H>  4.0   |  27  |  0.79  Ca    8.9      25 Sep 2022 06:10  Phos  3.4     09-24  Mg     2.1     09-25  TPro  6.2  /  Alb  2.4<L>  /  TBili  0.6  /  DBili  x   /  AST  13<L>  /  ALT  20  /  AlkPhos  124<H>  09-24      MEDS:   acetaminophen     Tablet .. 650 milliGRAM(s) Oral every 6 hours PRN  ALBUTerol    90 MICROgram(s) HFA Inhaler 2 Puff(s) Inhalation every 6 hours PRN  ALPRAZolam 1 milliGRAM(s) Oral two times a day  aluminum hydroxide/magnesium hydroxide/simethicone Suspension 30 milliLiter(s) Oral every 4 hours PRN  ascorbic acid 500 milliGRAM(s) Oral daily  benzonatate 100 milliGRAM(s) Oral every 8 hours PRN  bisacodyl 5 milliGRAM(s) Oral every 12 hours  budesonide  80 MICROgram(s)/formoterol 4.5 MICROgram(s) Inhaler 2 Puff(s) Inhalation two times a day  buprenorphine 8 mG/naloxone 2 mG SL Film 1 Film(s) SubLingual daily  cyclobenzaprine 10 milliGRAM(s) Oral three times a day PRN  dexAMETHasone  Injectable 6 milliGRAM(s) IV Push daily  enoxaparin Injectable 40 milliGRAM(s) SubCutaneous every 24 hours  folic acid 1 milliGRAM(s) Oral daily  gabapentin 300 milliGRAM(s) Oral every 8 hours  guaiFENesin ER 1200 milliGRAM(s) Oral every 12 hours PRN  influenza   Vaccine 0.5 milliLiter(s) IntraMuscular once  ketorolac   Injectable 15 milliGRAM(s) IV Push every 6 hours PRN  levothyroxine 25 MICROGram(s) Oral daily  lidocaine   4% Patch 1 Patch Transdermal daily  lisinopril 40 milliGRAM(s) Oral daily  melatonin 3 milliGRAM(s) Oral at bedtime PRN  metoclopramide Injectable 10 milliGRAM(s) IV Push every 8 hours  multivitamin 1 Tablet(s) Oral daily  nicotine - 21 mG/24Hr(s) Patch 1 Patch Transdermal daily  octreotide  Injectable 150 MICROGram(s) IV Push every 8 hours  ondansetron Injectable 4 milliGRAM(s) IV Push every 6 hours PRN  pantoprazole  Injectable 40 milliGRAM(s) IV Push every 12 hours  prochlorperazine   Injectable 10 milliGRAM(s) IV Push every 6 hours PRN  QUEtiapine 50 milliGRAM(s) Oral at bedtime  sucralfate suspension 1 Gram(s) Oral four times a day  tiotropium 18 MICROgram(s) Capsule 1 Capsule(s) Inhalation daily

## 2022-09-25 NOTE — CHART NOTE - NSCHARTNOTEFT_GEN_A_CORE
Dietitian BRIEF Note 9/25/22    RD spoke w/ MD Pride today, PPN was re-ordered last night but pt did not receive as it was too late for [facility] to receive the order. Will d/c PPN. Pt is on full liquids and still pending possible venting PEG. Will undergo immunotherapy outpatient.    RD will continue to monitor PO intake, labs, hydration, and wt prn.  Laura Alicia MS, RDN, [facility] [phone]  Nutrition

## 2022-09-26 NOTE — DISCHARGE NOTE PROVIDER - NSDCMRMEDTOKEN_GEN_ALL_CORE_FT
ALPRAZolam 1 mg oral tablet: 1 tab(s) orally 2 times a day  cyanocobalamin 1000 mcg oral tablet: 1 tab(s) orally once a day  cyanocobalamin 1000 mcg/mL injectable solution: inject 1ml into the muscle once for 1 dose to be given 7 days prior to first alimta infusion. Patient states that he has not started alimta.  dexamethasone 4 mg oral tablet: 2 tab(s) orally once a day, As Needed before chemotherapy   folic acid 1 mg oral tablet: 1 tab(s) orally once a day  lisinopril 40 mg oral tablet: 1 tab(s) orally once a day  nicotine 21 mg/24 hr transdermal film, extended release: 1 patch transdermal once a day   ondansetron 8 mg oral tablet: As Needed, patient states that he has not strated this medication  prochlorperazine 10 mg oral tablet: As Needed, patient states that he has not started this medication  QUEtiapine 50 mg oral tablet: 1 tab(s) orally once a day (at bedtime)  Suboxone 8 mg-2 mg sublingual film: 1 film(s) sublingual once a day  Trelegy Ellipta 200 mcg-62.5 mcg-25 mcg/inh inhalation powder: 1 puff(s) inhaled once a day (in the evening)  Vitamin C 500 mg oral tablet: 1 tab(s) orally once a day   albuterol 90 mcg/inh inhalation aerosol: 2 puff(s) inhaled every 6 hours, As needed, Bronchospasm  ALPRAZolam 1 mg oral tablet: 1 tab(s) orally 2 times a day  dexamethasone 2 mg oral tablet: 1 tab(s) orally once a day and taper dose as per your oncologist   gabapentin 300 mg oral capsule: 1 cap(s) orally 2 times a day   levothyroxine 25 mcg (0.025 mg) oral tablet: 1 tab(s) orally once a day  lidocaine 4% topical film: Apply topically to affected area once a day   lisinopril 40 mg oral tablet: 1 tab(s) orally once a day  nicotine 21 mg/24 hr transdermal film, extended release: 1 patch transdermal once a day   QUEtiapine 50 mg oral tablet: 1 tab(s) orally once a day (at bedtime)  Reglan 10 mg oral tablet: 1 tab(s) orally every 8 hours   Suboxone 8 mg-2 mg sublingual film: 1 film(s) sublingual once a day  sucralfate 1 g/10 mL oral suspension: 10 milliliter(s) orally 4 times a day  Trelegy Ellipta 200 mcg-62.5 mcg-25 mcg/inh inhalation powder: 1 puff(s) inhaled once a day (in the evening)

## 2022-09-26 NOTE — DISCHARGE NOTE NURSING/CASE MANAGEMENT/SOCIAL WORK - NSDCPEFALRISK_GEN_ALL_CORE
For information on Fall & Injury Prevention, visit: [email or web address] OR  [email or web address] OR  https://www.cdc.gov/steadi/patient.html

## 2022-09-26 NOTE — PROGRESS NOTE ADULT - ASSESSMENT
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

## 2022-09-26 NOTE — DISCHARGE NOTE PROVIDER - CARE PROVIDER_API CALL
Anirudh Trammell  HEMATOLOGY  [address], [address] [place] [address]  Phone: [phone]  Fax: [phone]  Follow Up Time: 1-3 days    Nadira Rose)  Radiation Oncology  [address], [place] [address]  Phone: [phone]  Fax: [phone]  Follow Up Time: 1-3 days

## 2022-09-26 NOTE — DISCHARGE NOTE NURSING/CASE MANAGEMENT/SOCIAL WORK - NSDCPEWEB_GEN_ALL_CORE
[facility] for Tobacco Control website --- [email or web address] website --- [email or web address]

## 2022-09-26 NOTE — DISCHARGE NOTE PROVIDER - PROVIDER TOKENS
PROVIDER:[TOKEN:[[id number]],FOLLOWUP:[1-3 days]],PROVIDER:[TOKEN:[[id number]],FOLLOWUP:[1-3 days]]

## 2022-09-26 NOTE — DISCHARGE NOTE PROVIDER - NSDCCPCAREPLAN_GEN_ALL_CORE_FT
PRINCIPAL DISCHARGE DIAGNOSIS  Diagnosis: Hemoptysis  Assessment and Plan of Treatment: due to lung cancer.  see Dr Trammell for immunoptherapy   please take your medications as prescribed and get refills before you run out.  The steroids have to be re-evaluated in two weeks - discuss with your oncologist about tapering them off.  Please stay away from smoking and alcohol use as discussed.

## 2022-09-26 NOTE — DISCHARGE NOTE PROVIDER - HOSPITAL COURSE
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 for coughing up blood for the last 3 days (size of a quarter). He states that he coughs so much that it causes his to vomit. Reports nausea, constipation, dehydration. Denies fevers, chills, chest pain, SOB, abdominal pain. Pt was due to start chemotherapy this Friday. ER course: SpO2 91% on room air. Labs: Hb 10.2, lactate 3.2, Na 134, K+ 3.1, glucose 135, albumin 2.7, alkaline phosphatase 325, AST 82. EKG: NSR with HR 82 bpm,  ms, no ST segment changes, T wave inversions in II/III/AVF present on prior EKG (personally reviewed). Pt was given Zosyn, 2500 ml of NS, Libirum, Zofran. He is being admitted to med/surg for further management.     Acute hypoxemic respiratory failure and Hemoptysis secondary to probable post-obstructive pneumonia, alveolar hemorrhage, Pneumonitis superimposed on  Metastatic SHAILA NSMLC Adenocarcinoma   - CTA: No pulmonary embolus; Approximately 7.7 x 4.6 cm left upper lobe mass is stable; Increased interlobular septal thickening, patchy groundglass and solid opacities within left upper lobe and left lower lobe which can represent pneumonia, pneumonitis/alveolar hemorrhage  - Lactate 3.2-->2.2-->1.0  - Continue supplemental O2 to maintain SpO2 > 92%   - s/p Zosyn & Vanco in the ER; s/p  Azithromycin; Completed 7 days   - Continue Albuterol PRN, Symbicort, & Spiriva  - Tessalon Pearle PRN   - ID consult - Dr. Archibald   - Pulmonology consult - Dr. Akbar, no role for steroids   - Oncology consult- discussed with Dr Richardson, consider immunotherapy as OP   - Rad/Onc consult Dr. 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   - MR Brain neg for mets   - Neuro recs CTA H&N , ordered, but can be done as OP     Acute EtOH Withdrawal  - Drinks 2 pints of Vodka Daily, last drink 9/14/22 at 6am  - s/p Ativan Taper/CIWA Protocol  - May require IP Substance Abuse Rehab Prior to Cancer Tx.; Pt refusing   - Resume Xanax PRN at lower dose, 0.5mg PO q12h; 9/20 changed back to 1mg BID as pt symptomatic   - Folate and Multivitamin   - Started on Gabapentin 100mg TID; dose increased to 200mg TID; increased to 300mg TID  - Psych consulted      Hyponatremia   - Likely due to hypovolemia from vomiting   - s/p IVF    Electrolyte Abnormality   - Due to EtOH Consumption  - Supplement Lytes as needed  - Trend BMP  - Calcium corrected for albumin 9.8     EKG Abnormality  - ST & T wave abnormality, consider inferior ischemia  - Repeat EKG no change   - Troponin negative x 2   -   - 2d echo as above     Hypothyroidism, new  - TSH low, Free T4 high  - Start low dose Synthroid  - F/U OP in 4 weeks for repeat TFTs   - From NM PET/CT Onc FDG Skull to Thigh, Initial (08.13.22 @ 13:45): There is an approximately symmetric enlargement of bilateral thyroid lobes with heterogeneous FDG avidity, significantly more prominent on the left with SUV 37.2 (image 70). Physiologic FDG activity in visualized brain, major salivary glands, and neck muscles.    Abnormal LFTs due to Hepatic Steatosis and EtOH abuse  Hypertriglyceridemia   - US abdomen demonstrates Hepatomegaly and steatosis; Gallbladder sludge and stones with a dilated extrahepatic biliary tree  - MRI/MRCP Borderline common duct without obstructing lesion; Pancreas divisum; Marked hepatic steatosis  - Seen by GI, Dr. Bertrand, would not pursue further evaluation for biliary disease  - Ammonia wnl  - Trend LFTs  - Lipid panel as above     Severe Vitamin D deficiency   - Start PO supplementation     Iron deficiency anemia   Bone marrow suppression from alcohol dependence   - B12 elevated, folate wnl   - Stop B12  - Started on PO iron supplementation   - Transfused today for HGB 7.8    Hyperglycemia, clinically insignificant  - A1c 5.1    Hx of Opiate Abuse & Dependence  - Continue Suboxone   - Continue Seroquel 50mg at HS    Nicotine Use Disorder  - NRT   - Counseled on smoking cessation    HTN   - Resume Lisinopril 40 daily            HPI: 52M w/ PMHx of HTN, substance use on Suboxone, EtOH abuse and dependence, active smoker, 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 He states that he coughs so much that it causes his to vomit. Reports nausea, constipation, dehydration. Denies fevers, chills, chest pain, SOB, abdominal pain. Pt was due to start chemotherapy this Friday.   HOSPITAL COURSE:   Acute hypoxemic respiratory failure and Hemoptysis secondary to probable post-obstructive pneumonia, alveolar hemorrhage, Pneumonitis superimposed on  Metastatic SHAILA NSMLC Adenocarcinoma; Gerrado's syndrome  Iron deficiency anemia ; Bone marrow suppression from alcohol dependence - sp PRBCs  - s/p supplemental O2 to maintain SpO2 > 92%;  s/p Zosyn & Vanco in the ER; s/p  Azithromycin; Completed 7 days; Continue Albuterol PRN, Symbicort, & Spiriva  - Oncology consult- discussed with Dr Richardson, to get immunotherapy as OP   - Rad/Onc consult Dr. Rose, pt to f/u OP  Malignant SBO Not a candidate for resection  - started on clear liquid diet and PPN/Lipids, advance to FLD as tolerated , dc PPN    Per surg/onc--> Malignant SBO Protocol: Reglan [id number], Octreotide [id number], Decadron 6mg IV push qd   Acute EtOH Withdrawal s/p Ativan Taper/CIWA Protocol  Abnormal LFTs due to Hepatic Steatosis and EtOH abuse  Hypothyroidism, new- Start low dose Synthroid  Hx of Opiate Abuse & Dependence Continue Suboxone Continue Seroquel 50mg at HS  Nicotine Use Disorder NRT Counseled on smoking cessation  HTN  - Resume Lisinopril 40 daily     OTHER DETAILS:  T(F): 98.6 (26 Sep 2022 15:12), Max: 99.3 (25 Sep 2022 23:08)  HR: 95 (26 Sep 2022 15:12) (80 - 100)  BP: 108/69 (26 Sep 2022 15:12) (108/69 - 144/101)  RR: 18 (26 Sep 2022 15:12) (16 - 18)  SpO2: 96% (26 Sep 2022 15:12) (96% - 97%) room air  Constitutional: NAD, awake and alert  HEENT: EOMI, left eye ptosis, enophthalmos   Neck: Soft and supple, No JVD  Respiratory: Breath sounds are diminished bilaterally, No wheezing, rales or rhonchi  Cardiovascular: S1 and S2, regular rate and rhythm, no Murmurs  Gastrointestinal: Bowel Sounds present, soft, nontender, + distended  Extremities: No peripheral edema  Vascular: 2+ peripheral pulses  Neurological: A/O x 3, no focal deficits  Musculoskeletal: 5/5 strength b/l upper and lower extremities    LABS: All Labs Reviewed:                      8.4    7.69  )-----------( 508      ( 26 Sep 2022 07:47 )             26.1   136  |  103  |  22  ----------------------------<  120<H>  4.4   |  28  |  0.92    RADIOLOGY/EKG:  < from: CT Abdomen and Pelvis w/ IV Cont (09.20.22 @ 19:01) >  Small bowel obstruction to the level of a concentric mass of a pelvic   loop small bowel concerning for primary versus metastatic neoplasm. The   lesion is grossly stable in size. Stable cluster of midabdominal low-density lymph nodes that may indicate   necrosis. Redemonstration of intralobular septal thickening and groundglass   opacities in the lingula and consolidative and reticular opacities in the   left lower lobe. New trace left pleural effusion. Partially imaged   spiculated nodule in the right middle lobe measuring 6 cm, grossly stable.    time spent on discharge - 55 mins

## 2022-09-26 NOTE — PROGRESS NOTE ADULT - REASON FOR ADMISSION
Hemoptysis

## 2022-09-26 NOTE — DISCHARGE NOTE PROVIDER - DETAILS OF MALNUTRITION DIAGNOSIS/DIAGNOSES
This patient has been assessed with a concern for Malnutrition and was treated during this hospitalization for the following Nutrition diagnosis/diagnoses:     -  09/15/2022: Severe protein-calorie malnutrition   -  09/15/2022: Underweight (BMI < 19)

## 2022-09-26 NOTE — PROGRESS NOTE ADULT - SUBJECTIVE AND OBJECTIVE BOX
INTERVAL HISTORY:    Patient with recently dx stage IV lung cancer, adenocarcinoma, PDL1 elevated  ETOH abuse  Admitted with ETOH  excess; during admission SBO, JOHNSON SBO multiple levels/ not a surgical candidate; Tolerating clears  Eager for DC  Has L ptosis / Meiosis ; possible Gerardo's     CT Angio Neck w/ IV Cont (09.25.22 @ 18:32) >    CTA neck:    Partially visualized large left lung mass.    Large multifocal necrotic adenopathy within the neck involving the   bilateral thyroid region, the left supraclavicular region, and the left   greater than right deep cervical and accessory chain lymph nodes.    Allergies    No Known Allergies    Intolerances        MEDICATIONS  (STANDING):  ALPRAZolam 1 milliGRAM(s) Oral two times a day  ascorbic acid 500 milliGRAM(s) Oral daily  bisacodyl 5 milliGRAM(s) Oral every 12 hours  budesonide  80 MICROgram(s)/formoterol 4.5 MICROgram(s) Inhaler 2 Puff(s) Inhalation two times a day  buprenorphine 8 mG/naloxone 2 mG SL Film 1 Film(s) SubLingual daily  dexAMETHasone  Injectable 6 milliGRAM(s) IV Push daily  enoxaparin Injectable 40 milliGRAM(s) SubCutaneous every 24 hours  folic acid 1 milliGRAM(s) Oral daily  gabapentin 300 milliGRAM(s) Oral every 8 hours  influenza   Vaccine 0.5 milliLiter(s) IntraMuscular once  levothyroxine 25 MICROGram(s) Oral daily  lidocaine   4% Patch 1 Patch Transdermal daily  lisinopril 40 milliGRAM(s) Oral daily  metoclopramide Injectable 10 milliGRAM(s) IV Push every 8 hours  multivitamin 1 Tablet(s) Oral daily  nicotine - 21 mG/24Hr(s) Patch 1 Patch Transdermal daily  octreotide  Injectable 150 MICROGram(s) IV Push every 8 hours  pantoprazole  Injectable 40 milliGRAM(s) IV Push every 12 hours  QUEtiapine 50 milliGRAM(s) Oral at bedtime  sucralfate suspension 1 Gram(s) Oral four times a day  tiotropium 18 MICROgram(s) Capsule 1 Capsule(s) Inhalation daily    MEDICATIONS  (PRN):  acetaminophen     Tablet .. 650 milliGRAM(s) Oral every 6 hours PRN Temp greater or equal to 38C (100.4F), Mild Pain (1 - 3)  ALBUTerol    90 MICROgram(s) HFA Inhaler 2 Puff(s) Inhalation every 6 hours PRN Bronchospasm  aluminum hydroxide/magnesium hydroxide/simethicone Suspension 30 milliLiter(s) Oral every 4 hours PRN Dyspepsia  benzonatate 100 milliGRAM(s) Oral every 8 hours PRN Cough  cyclobenzaprine 10 milliGRAM(s) Oral three times a day PRN Muscle Spasm  guaiFENesin ER 1200 milliGRAM(s) Oral every 12 hours PRN Cough  ketorolac   Injectable 15 milliGRAM(s) IV Push every 6 hours PRN Severe Pain (7 - 10)  melatonin 3 milliGRAM(s) Oral at bedtime PRN Insomnia  ondansetron Injectable 4 milliGRAM(s) IV Push every 6 hours PRN Nausea and/or Vomiting  prochlorperazine   Injectable 10 milliGRAM(s) IV Push every 6 hours PRN severe nausea      Vital Signs Last 24 Hrs  T(C): 36.5 (26 Sep 2022 09:24), Max: 37.4 (25 Sep 2022 23:08)  T(F): 97.7 (26 Sep 2022 09:24), Max: 99.3 (25 Sep 2022 23:08)  HR: 95 (26 Sep 2022 09:24) (80 - 95)  BP: 113/73 (26 Sep 2022 09:24) (113/73 - 144/101)  BP(mean): --  RR: 16 (26 Sep 2022 09:24) (16 - 18)  SpO2: 96% (26 Sep 2022 09:24) (96% - 97%)    Parameters below as of 26 Sep 2022 09:24  Patient On (Oxygen Delivery Method): room air        PHYSICAL EXAM:    GENERAL: NAD,   HEAD:  Atraumatic, Normocephalic  Ptosis   NECK: Supple, No JVD, Normal thyroid  NERVOUS SYSTEM:    CHEST/LUNG: Clear to percussion bilaterally; No rales, rhonchi,   HEART: Regular rate and rhythm;   ABDOMEN: Soft, Nontender.  EXTREMITIES:   edema:-  LYMPH: No lymphadenopathy noted        LABS:                        8.4    7.69  )-----------( 508      ( 26 Sep 2022 07:47 )             26.1     09-26    136  |  103  |  22  ----------------------------<  120<H>  4.4   |  28  |  0.92    Ca    8.7      26 Sep 2022 07:47  Mg     2.2     09-26        RADIOLOGY & ADDITIONAL STUDIES:      < from: CT Abdomen and Pelvis w/ IV Cont (09.20.22 @ 19:01) >    ACC: [id number] EXAM:  CT ABDOMEN AND PELVIS IC                          PROCEDURE DATE:  09/20/2022          INTERPRETATION:  CLINICAL INFORMATION: Abdominal pain    COMPARISON: CT abdomen pelvis 7/19/2022    CONTRAST/COMPLICATIONS:  IV Contrast: Omnipaque 350  90 cc administered   10 cc discarded  Oral Contrast: NONE  Complications: None reported at time of study completion    PROCEDURE:  CT of the Abdomen and Pelvis was performed.  Sagittal and coronal reformats were performed.    FINDINGS:  LOWER CHEST: Trace left pleural effusion. Confluent groundglass and   reticular infiltrates in the lingula and consolidative opacities in the   left lower lobe.    LIVER: Steatosis.  BILE DUCTS: Normal caliber.  GALLBLADDER: Within normal limits.  SPLEEN: Within normal limits.  PANCREAS: Within normal limits.  ADRENALS: Within normal limits.  KIDNEYS/URETERS: Within normal limits.    BLADDER: Within normal limits.  REPRODUCTIVE ORGANS: Prostate within normal limits.    BOWEL: Diffuse fluid distention of small bowel with transition at the   level of a 3.5 cm length of concentric wall thickening in a loop of   pelvic small bowel (2:90, 601:64), grossly stable when compared to prior   CT of 7/19/2022. Distal small bowel is decompressed. Mild amount of stool   throughout the colon.  PERITONEUM: No ascites.  VESSELS: Atherosclerotic changes.  RETROPERITONEUM/LYMPH NODES: Stable low-density clustered lymph node mass   in the mid abdomen measuring 4.3 x 4.2 cm (2:72).  ABDOMINAL WALL: Within normal limits.  BONES: Degenerative changes.    IMPRESSION:    Small bowel obstruction to the level of a concentric mass of a pelvic   loop small bowel concerning for primary versus metastatic neoplasm. The   lesion is grossly stable in size.    Stable cluster of midabdominal low-density lymph nodes that may indicate   necrosis.    Redemonstration of intralobular septal thickening and groundglass   opacities in the lingula and consolidative and reticular opacities in the   left lower lobe. New trace left pleural effusion. Partially imaged   spiculated nodule in the right middle lobe measuring 6 cm, grossly stable.      < end of copied text >    < from: CT Angio Chest PE Protocol w/ IV Cont (09.14.22 @ 13:31) >  ACC: [id number] EXAM:  CT ANGIO CHEST PULM ART [facility]                          PROCEDURE DATE:  09/14/2022          INTERPRETATION:  ACC: [id number]  INDICATION, CLINICAL INFORMATION: metastatic poorly differentiated   non-small cell carcinoma, Hemoptysis  TECHNIQUE: CT pulmonary angiogram of the chest . Uneventful   administration of 90 cc Omnipaque 350. Coronal, sagittal and 3-D/MIP   images were reconstructed/reviewed.  COMPARISON: 7/18/2022 chest CT. 8/13/2022 PET CT.    FINDINGS:    PULMONARY VESSELS: No pulmonary embolus. No evidence of contrast   extravasation represent active hemorrhage.    HEART AND VASCULATURE: Heart size is normal. No pericardial effusion. No   aortic aneurysm or dissection.    LUNGS, AIRWAYS, PLEURA: Patent central airways. Approximately 7.7 x 4.6   cm left upper lobe cavitary mass is stable. Increased interlobular septal   thickening, patchy groundglass and solid opacities within left upper lobe   and left lower lobe which can represent pneumonia or pneumonitis.Stable   1.6 cm spiculated nodule within right middle lobe. No pleural effusions   or pneumothorax.    MEDIASTINUM: Stable mediastinal and hilar lymph nodes for reference: 1.8   cm left hilar lymph node and 2 cm short axis subcarinal lymph node.    UPPER ABDOMEN: Hepatic steatosis.    BONES AND SOFT TISSUES: No aggressive osseous lesion. Stable enlarged   left axillary lymph nodes.    LOWER NECK: Within normal limits.    IMPRESSION:    No pulmonary embolus. No contrast extravasation.    Approximately 7.7 x 4.6 cm left upper lobe mass is stable. Increased   interlobular septal thickening, patchy groundglass and solid opacities   within left upper lobe and left lower lobe which can represent pneumonia,   pneumonitis/alveolar hemorrhage.    --- End of Report ---      < from: NM PET/CT Onc FDG Skull to Thigh, Inital (08.13.22 @ 13:45) >  EXAM: [id number] - PETCT [facility] ONC FDG INIT  - ORDERED BY:  MONROE GILLETTE      PROCEDURE DATE:  08/13/2022           INTERPRETATION:  PROCEDURE:  PET/CT SKULL BASE-MID THIGH IMAGING    CLINICAL INFORMATION: 52-year-old male with 7 cm mass in the left upper   lobe and recent biopsy-proven metastatic poorly differentiated non-small   cell carcinoma involving subcarinal lymph node. PET/CT is done as part of   initial treatment strategy evaluation.    RADIOPHARMACEUTICAL:  11.6 mCi F-18, FDG, I.V.    TECHNIQUE:  Fasting blood sugar measured prior to injection of   radiopharmaceutical was 122 mg/dl. Following intravenous injection of the   radiopharmaceutical and an uptake period of approximately 54 minutes,   FDG-PET/CT was obtained on a Siemens Biograph mCT 64 PET-CT scanner from   skull base to mid thigh. Oral contrast was given during the uptake   period. CT was performed during shallow respiration. The CT protocol was   optimized for PET attenuation correction and anatomic localization. The   CT protocol was not designed to produce and cannot replace   state-of-the-art diagnostic CT images with specific imaging protocols for   different body parts and indications. Images were reviewed on a dedicated   workstation using multiplanar reconstruction.    The standardized uptake values (SUV) are normalized to patient body   weight and indicate the highest activity concentration (SUVmax) in a   given disease site. All image numbers refer to axial image number.    COMPARISON:  No prior PET/CT.    OTHER STUDIES USED FOR CORRELATION: CT of abdomen and pelvis from   7/19/2022 and chest CT from 7/18/2022.    FINDINGS:    HEAD/NECK: FDG avid 1.1 x 1.4 cm soft tissue in the mid anterior floor of   mouth (SUV 8.8; image 39). Multiple FDG avid bilateral cervical lymph   nodes in the levels II to V distribution in bilateral supraclavicular   lymph nodes. Reference 0.9 x 0.9 cm left level 3 node (SUV 7.4; image   43). A 0.9 x 0.9 cm left level IIb node (SUV 6.5; image 42). A 1.1 x 0.7  cm right level IIA node (SUV 4.4; image 37). A 1.1 x 1.3 cm right   supraclavicular node (SUV 5.8; image 56).Conglomerate left   supraclavicular nodes measuring 3.8 x 2.7 cm (SUV 9.2; image 69).    Asymmetric FDG avidity in the vocal cord, with increased activity on the   right (SUV 7.1; image 59) absent activity on the left.    There is an approximately symmetric enlargement of bilateral thyroid   lobes with heterogeneous FDG avidity, significantly more prominent on the   left with SUV 37.2 (image 70). Physiologic FDG activity in visualized   brain, major salivary glands, and neck muscles.    CHEST: FDG avid anterior and bilateral mediastinal and bilateral   perihilar lymph nodes. Reference lymph nodes:  - Prevascular 1.8 x 1.5 cm (SUV 6.8; image 95).  -AP window 1.8 x 1.5 cm (SUV 8.9; image 97)  -Pretracheal 1.7 x 1.2 cm (SUV 7.0; image 97)  -Right hilar/peribronchial, approximately 1.7 x 2.0 cm (SUV 7.5; image 113  -Partially necrotic subcarinal node measures approximately 3 x 1.9 cm  activity in the solid portion with SUV 9.4 (image 104).  -Left perihilar, difficult to delineate (SUV 6.3; image 111).    LUNGS: FDG avid centrally photopenic approximately 6.8 x 3.5 cm left   upper lung mass (SUV 31.2; image 92). The additional 1.4 x 1.6 cm right   middle lobe nodule demonstrates SUV 14.4 (image 124). Groundglass and   reticular opacity involving the lingula demonstrates background FDG   avidity with SUV up to 1.5 (image 115).    PLEURA/PERICARDIUM: No abnormal FDG activity. No effusion.    HEPATOBILIARY/PANCREAS:  Physiologic FDG activity. Liver SUV mean is 1.5.   Hepatic steatosis.    SPLEEN: Physiologic FDG activity. Normal in size.    ADRENAL GLANDS: No abnormal FDG activity. No nodule.    KIDNEYS/URINARY BLADDER: Physiologic excreted FDG activity.    REPRODUCTIVE ORGANS: No abnormal FDG activity.    ABDOMINOPELVIC NODES: No enlarged or FDG-avid lymph node.    BOWEL/PERITONEUM/MESENTERY: Nonspecific mild gastric wall hypermetabolism   (SUV 3.8; image 149) with no corresponding abnormality on CT.    Nonspecific heterogeneous FDG avidity mapping to segments of small bowel   in the midabdomen (SUV 8.0; image 214) and left lower abdomen (SUV 6.3;   image 231) with no definite abnormalities on CT.    BONES/SOFT TISSUES: No abnormal FDG activity.    IMPRESSION: Abnormal FDG-PET/CT scan.    1. FDG avid left upper lung mass compatible with malignancy. Additional   FDG avid right middle lobe nodule may represent metastasis versus   synchronous malignancy.    2. FDG avid mediastinal and bilateral perihilar lymph nodes, compatible   with metastases.    FDG avid bilateral cervical and supraclavicular lymph nodes, suspicious   for metastases. FDG avid nodule in the mid anterior floor of the mouth,   possibly additional metastatic lymph node. Please correlate clinically   and with ultrasound and possible FNA biopsy as indicated.    3. Enlarged bilateral thyroid lobes with heterogeneous tracer activity,   significantly more prominent in the left lobe. Differentials include   malignancy. Please correlate clinically or with ultrasound and possible   FNA biopsy.    4. Nonspecific heterogeneous FDG avidity mapping to segments of small   bowel. Please correlate clinically. Suggest correlation with CT   enterographyfor further evaluation as warranted.    5. Mild gastric wall hypermetabolism, physiologic versus inflammatory.    6. Nonspecific asymmetric activity in the vocal cord, with absent   activity on the left, raising the possibility of left vocal cord   paralysis.    7. hepatic steatosis.    --- End of Report ---          < end of copied text >

## 2022-09-26 NOTE — DISCHARGE NOTE NURSING/CASE MANAGEMENT/SOCIAL WORK - PATIENT PORTAL LINK FT
You can access the FollowMyHealth Patient Portal offered by [facility] by registering at the following website: [email or web address]. By joining [employer]’s FollowMyHealth portal, you will also be able to view your health information using other applications (apps) compatible with our system.

## 2022-10-21 PROBLEM — C34.90 MALIGNANT NEOPLASM OF UNSPECIFIED PART OF UNSPECIFIED BRONCHUS OR LUNG: Chronic | Status: ACTIVE | Noted: 2022-01-01

## 2022-10-21 PROBLEM — Z78.9 OTHER SPECIFIED HEALTH STATUS: Chronic | Status: ACTIVE | Noted: 2022-01-01

## 2022-10-21 PROBLEM — F19.10 OTHER PSYCHOACTIVE SUBSTANCE ABUSE, UNCOMPLICATED: Chronic | Status: ACTIVE | Noted: 2022-01-01

## 2022-11-09 NOTE — VITALS
[Maximal Pain Intensity: 5/10] : 5/10 [80: Normal activity with effort; some signs or symptoms of disease.] : 80: Normal activity with effort; some signs or symptoms of disease.

## 2022-11-10 NOTE — HISTORY OF PRESENT ILLNESS
[FreeTextEntry1] : This is a 52 year old make who presented to  with hemoptysis x 3 weeks as well as a 10 pound weight loss in July 2022. CT Chest demonstrated a large 7 cm left upper lobe mass with enlarged AP window lymph nodes and subcarinal lymph nodes. His past medical history includes HTN, substance use on Suboxone, alcohol use s/p detox with DTs, smoker. \par \par He is status post flexible bronchoscopy, EBUS with FNA biopsy of subcarinal lymph node on 7/18/22. pathology reveals scanty fragments of poorly differentiated non-small cell carcinoma consistent with adenocarcinoma. PDL-1 high at 80%.  Patient was d/c'd and followed up with Dr. Trammell for Medical Oncology eval. \par \par PET scan 8-13-22 shows left upper lobe mass, right pulmonary nodule, multiple mediastinal and supraclavicular nodes that are positive. Also showed FDG avidity in thyroid, left vocal cord, stomach, and small bowel.\par \par Patient was scheduled to start immunotx, but was admitted once again to  on 9-14-22 because of cough, SOB, and hemoptysis. He was found to have postobstructive pneumonia in SHAIL[place] and started on Zosyn. Patietn also found to have small bowel obstruction. He notes a 10 pound weight loss over the past month.\par \par He received 2 units of PRBC at  on 10/24/22 due to anemia. \par \par He is currently undergoing Keytruda with Dr. Trammell s/p cycle 3. He is also receiving Venofer for anemia. \par \par He is feeling well. Denies SOB or coughing. Denies any further hemoptysis. Pain noted in left upper posterior chest near shoulder blade. Taking Advil. Seeing pain management next month. He presents to discuss the role of radiation therapy in his care.

## 2022-11-10 NOTE — DISEASE MANAGEMENT
[Clinical] : TNM Stage: c [IV] : IV [FreeTextEntry4] : adenoca SHAILA lung [TTNM] : 3 [NTNM] : 3 [MTNM] : 1

## 2022-11-10 NOTE — PHYSICAL EXAM
[Thin] : thin [Normal] : oriented to person, place and time, the affect was normal, the mood was normal and not anxious [de-identified] : Cervical and supraclav LN's decreasing. [de-identified] : Tender to percussion over left upper posterior chest wall.

## 2022-11-21 NOTE — ED ADULT TRIAGE NOTE - CHIEF COMPLAINT QUOTE
Pt arrives to ED complaining of nausea, vomiting, dizziness for two days. Pt with history of stage IV lung cancer. Pt recently ran out of xanax/suboxone two days ago and has not taken since.

## 2022-11-21 NOTE — ED ADULT NURSE REASSESSMENT NOTE - NS ED NURSE REASSESS COMMENT FT1
Resting comfortably in stretcher s/p medication administration. IVF running. CT's done. Awaiting results. Updated on plan of care, all questions answered.

## 2022-11-21 NOTE — H&P ADULT - NSHPLABSRESULTS_GEN_ALL_CORE
COMPARISON: CT chest 9/14/2022.    FINDINGS:    PULMONARY ANGIOGRAM:  No pulmonary embolism. Encased left upper lobe   segmental branches by a lung mass.    LUNGS/AIRWAYS/PLEURA: 6 cm left upper lobe mass that occludes the apical   posterior segmental bronchus and tracks to the left hilum, prior 7 cm.   Decreased groundglass and increased linear densities surrounding the   mass, likely scarring. Decreased tree-in-bud and consolidation in the   left lower lobe. Unchanged few ill-defined small opacities in the right   upper lobe (for example, 2-28). Decreased 1.3 cm nodule in the medial   segment of the middle lobe (2-81). 2 new ill-defined nodules up to 1.3 cm   in the middle lobe (2-82, 2-95).    LYMPH NODES/MEDIASTINUM: Increased multiple thyroid lesions, one of which   tracks posteriorly and is inseparable from the proximal esophagus (2-9).   Otherwise, decreased left axillary, left supraclavicular and   intrathoracic lymphadenopathy.    HEART/VASCULATURE: Normal heart size. Unremarkable pericardium. Normal   caliber aorta.    UPPER ABDOMEN: Fatty liver.    BONES/SOFT TISSUES: Unremarkable.      IMPRESSION:    No pulmonary embolism.    Decreased left upper lobe mass with endobronchial component that occludes   the apicoposterior segmental bronchus.    Decreased left upper lobe groundglass and increased linear scarring   adjacent to the mass, likely related to treatment.    Two indeterminate middle lobe nodules.  A third middle lobe nodule and   opacities in the left lower lobe are decreased.    Increased thyroid lesions, one of which is inseparable from the proximal   esophagus.    Decreased left supraclavicular, left axillary, and intrathoracic   lymphadenopathy.    --- End of Report ---

## 2022-11-21 NOTE — H&P ADULT - ASSESSMENT
52 year old male w stage 4 lung cancer, HTN, smoker, substance abuse presents to  ED c/o n/v, dizziness, hemoptysis       #Acute benzodiazepine withdrawl  #Suboxone withdrawl  w dizziness, nausea and vomiting  better after ativan IV  1. admit to med  2. restart xanax TID  3. restart suboxone  4. zofran prn  5. s/p NS 2000 cc      #Hemoptysis  #Stage 4 lung CA  CT does show area prox to SHAILA mass that can appear to be atelectasis vs infiltrate and decreased LN L supraclavicular, L axillary and intrathoracic  was previously treated in  for post osbstructive PNA  1. monitor amount  2. continue inhalers  3. oncology consult  4. pt was to start RT today        #Hypomagnesemia  1. s/p Mg 2g IV  2. repeat Mg in AM  3. check P      #HTN  1. lisinopril      #Hypothyroid  does have increased lesions in thyroid w one tracking posteriorly to prox esophagus  1. continue levothyroxine 52 year old male w stage 4 lung cancer, HTN, smoker, substance abuse presents to  ED c/o n/v, dizziness, hemoptysis       #Acute benzodiazepine withdrawl  #Suboxone withdrawl  #Hx Substance abuse  w dizziness, nausea and vomiting  better after ativan IV  1. admit to med  2. restart xanax TID  3. restart suboxone  4. zofran prn  5. s/p NS 2000 cc      #Hemoptysis  #Stage 4 lung CA  CT does show area prox to SHAILA mass that can appear to be atelectasis vs infiltrate and decreased LN L supraclavicular, L axillary and intrathoracic  was previously treated in  for post osbstructive PNA  1. monitor amount  2. continue inhalers  3. oncology consult  4. pt was to start RT today        #Hypomagnesemia  1. s/p Mg 2g IV  2. repeat Mg in AM  3. check P      #HTN  1. lisinopril      #Hypothyroid  does have increased lesions in thyroid w one tracking posteriorly to prox esophagus  1. continue levothyroxine      #MISC  1. continue quetiapine      #Tobacco use  accepts patch  1. nicotine patch ordered      #VTE  held it due to report of hemoptysis        60 minutes 52 year old male w stage 4 NSCLC, HTN, smoker, substance abuse presents to  ED c/o n/v, dizziness, hemoptysis       #Acute benzodiazepine withdrawl  #Suboxone withdrawl  #Hx Substance abuse  w dizziness, nausea and vomiting  better after ativan IV  1. admit to med  2. restart xanax TID  3. restart suboxone  4. zofran prn  5. s/p NS 2000 cc      #Hemoptysis  #Stage 4 NSCLC   CT does show area prox to SHAILA mass that can appear to be atelectasis vs infiltrate and decreased LN L supraclavicular, L axillary and intrathoracic  was previously treated in  for post osbstructive PNA  1. monitor amount  2. continue inhalers  3. oncology consult  4. pt was to start RT today        #Hypomagnesemia  has short AL interval  differant  than what was on EKG 09- w NSR  1. s/p Mg 2g IV  2. repeat Mg in AM  3. check P  4. repeat EKG in AM      #HTN  1. lisinopril      #Hypothyroid  does have increased lesions in thyroid w one tracking posteriorly to prox esophagus  1. continue levothyroxine      #MISC  1. continue quetiapine      #Tobacco use  accepts patch  1. nicotine patch ordered      #VTE  held it due to report of hemoptysis        60 minutes

## 2022-11-21 NOTE — H&P ADULT - HISTORY OF PRESENT ILLNESS
52 year old male w stage 4 lung cancer, HTN, smoker, substance abuse presents to  ED c/o n/v, dizziness, hemoptysis     Symptoms x2 days  Ran out of Xanax/Suboxone 2 days ago  Felt feverish but did not have a temp  Has not been able to eat for 2 days  coughing up quarter sized amounts of blood also x 2 days ryley in AM  usually not the rest of the day  denied chest pain or abd pain      In ED /98      RR 20   T 97.5   97% sat RA  NS 2000 cc  lorazepam 1 mg IV  zofran 4 mg IV  head CT no acute changes  CT chest SHAILA mass    pt feels better      PAST MEDICAL HX  Admits to alcohol use   HTN (hypertension)   Lung cancer 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)  Smoker   Substance abuse     PAST SURGICAL HX  Injury due to foreign body right wrist.     FAMILY HX  rheumatic fever : Father      SOCIAL HX  still smoking 3-4 cig qd  rare alcohol 52 year old male w stage 4 lung cancer, HTN, smoker, substance abuse presents to  ED c/o n/v, dizziness, hemoptysis     Symptoms x2 days  Ran out of Xanax/Suboxone 2 days ago  Felt feverish but did not have a temp  Has not been able to eat for 2 days  coughing up quarter sized amounts of blood also x 2 days ryley in AM  usually not the rest of the day  denied chest pain or abd pain      In ED /98      RR 20   T 97.5   97% sat RA  NS 2000 cc  lorazepam 1 mg IV  zofran 4 mg IV  head CT no acute changes  CT chest SHAILA mass    pt feels better      PAST MEDICAL HX  Alcohol use   HTN (hypertension)   Hx acute hypoxic resp failure due to post obstructive PNA SHAILA, LLL   Lung cancer 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)  Smoker   Substance abuse     PAST SURGICAL HX  Injury due to foreign body right wrist.     FAMILY HX  rheumatic fever : Father      SOCIAL HX  still smoking 3-4 cig qd  rare alcohol 52 year old male w stage 4 NSCLC lung cancer, HTN, active smoker, substance abuse on suboxone presents to  ED c/o n/v, dizziness, hemoptysis     Symptoms x2 days  Ran out of Xanax/Suboxone 2 days ago  Felt feverish but did not have a temp  Has not been able to eat for 2 days due to nausea and vomiting (NBNB)  coughing up quarter sized amounts of blood also x 2 days ryley in AM  usually not the rest of the day  denied chest pain or abd pain      In ED /98      RR 20   T 97.5   97% sat RA  NS 2000 cc  lorazepam 1 mg IV  zofran 4 mg IV  head CT no acute changes  CT chest SHAILA mass    pt feels better      PAST MEDICAL HX  Alcohol use   HTN (hypertension)   Hx acute hypoxic resp failure due to post obstructive PNA SHAILA, LLL   Lung cancer 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)  Smoker   Substance abuse     PAST SURGICAL HX  Injury due to foreign body right wrist.     FAMILY HX  rheumatic fever : Father      SOCIAL HX  still smoking 3-4 cig qd  rare alcohol

## 2022-11-21 NOTE — ED PROVIDER NOTE - NS_ ATTENDINGSCRIBEDETAILS _ED_A_ED_FT
I Simon Sarmiento MD saw and examined the patient. Scribe documented for me and under my supervision. I have modified the scribe's documentation where necessary to reflect my history, physical exam and other relevant documentations pertinent to the care of the patient.

## 2022-11-21 NOTE — ED ADULT NURSE NOTE - OBJECTIVE STATEMENT
Hasn't taken Xanax or Suboxone since Saturday, usually takes Suboxone daily and Xanax 3x/day. Complaining of withdrawal symptoms. Intermittent dizziness, coughing up scant blood, generalized weakness, nausea. Hasn't taken Xanax or Suboxone since Saturday, usually takes Suboxone daily and Xanax 3x/day. Complaining of withdrawal symptoms. Intermittent dizziness, coughing up scant blood, generalized weakness, nausea. Hx stage4 lung ca.

## 2022-11-21 NOTE — ED ADULT NURSE REASSESSMENT NOTE - NS ED NURSE REASSESS COMMENT FT1
Self ambulating to bathroom with no distress. Report given to 1N, patient awaiting transport. Self ambulating to bathroom with no distress. Ate dinner. Resting comfortably. Report given to 1N, patient awaiting transport.

## 2022-11-21 NOTE — ED PROVIDER NOTE - CLINICAL SUMMARY MEDICAL DECISION MAKING FREE TEXT BOX
Pt with stage 4 lung cancer here for n/v, cough and hemoptysis. High risk for PNA, worsening cancer, and PE. Will CT labs and admit. Pt with stage 4 lung cancer here for n/v, cough and hemoptysis. High risk for PNA, worsening cancer, and PE. Will CT labs and admit.    Torsten RASHID: Patient stopped taking his benzodiazepine two days ago, no scripts, feeling gittery, racing of the heart, feeling like having withdrawal, also with hx of stage IV lung CA, with hemoptysis today that is new for patient, CTA is negative for PE or PNA, patient not safe for discharge will admit for further inpatient care. Spoke with Dr. Cárdenas, hospitalist admission appreciated. Dr. Cárdenas wishes for Dr. Trammell to be involved in the care of the patient, call placed for Dr Trammell. Hospitalist admission appreciated.

## 2022-11-21 NOTE — PATIENT PROFILE ADULT - FALL HARM RISK - HARM RISK INTERVENTIONS
Assistance with ambulation/Assistance OOB with selected safe patient handling equipment/Communicate Risk of Fall with Harm to all staff/Discuss with provider need for PT consult/Monitor for mental status changes/Monitor gait and stability/Provide patient with walking aids - walker, cane, crutches/Reinforce activity limits and safety measures with patient and family/Tailored Fall Risk Interventions/Toileting schedule using arm’s reach rule for commode and bathroom/Use of alarms - bed, chair and/or voice tab/Visual Cue: Yellow wristband and red socks/Bed in lowest position, wheels locked, appropriate side rails in place/Call bell, personal items and telephone in reach/Instruct patient to call for assistance before getting out of bed or chair/Non-slip footwear when patient is out of bed/[place] to call system/Physically safe environment - no spills, clutter or unnecessary equipment/Purposeful Proactive Rounding/Room/bathroom lighting operational, light cord in reach

## 2022-11-21 NOTE — H&P ADULT - NSHPPHYSICALEXAM_GEN_ALL_CORE
Vital Signs Last 24 Hrs  T(C): 36.4 (21 Nov 2022 14:45), Max: 36.4 (21 Nov 2022 14:45)  T(F): 97.5 (21 Nov 2022 14:45), Max: 97.5 (21 Nov 2022 14:45)  HR: 102 (21 Nov 2022 14:45) (102 - 102)  BP: 162/98 (21 Nov 2022 14:45) (162/98 - 162/98)  BP(mean): --  RR: 20 (21 Nov 2022 14:45) (20 - 20)  SpO2: 97% (21 Nov 2022 14:45) (97% - 97%)    Parameters below as of 21 Nov 2022 14:45  Patient On (Oxygen Delivery Method): room air

## 2022-11-21 NOTE — ED PROVIDER NOTE - PROGRESS NOTE DETAILS
Torsten RASHID: Patient stopped taking his benzodiazepine two days ago, no scripts, feeling gittery, racing of the heart, feeling like having withdrawal, also with hx of stage IV lung CA, with hemoptysis today that is new for patient, CTA is negative for PE or PNA, patient not safe for discharge will admit for further inpatient care. Spoke with Dr. Cárdenas, hospitalist admission appreciated. Dr. Cárdenas wishes for Dr. Trammell to be involved in the care of the patient, call placed for Dr Trammell. Hospitalist admission appreciated.

## 2022-11-21 NOTE — ED ADULT NURSE REASSESSMENT NOTE - NS ED NURSE REASSESS COMMENT FT1
Resting comfortably, no distress noted. VS remain stable. Dinner ordered. Awaiting bed placement. Updated on plan of care, all questions answered.

## 2022-11-21 NOTE — ED PROVIDER NOTE - OBJECTIVE STATEMENT
53 y/o male with a PMHx of HTN, stage 4 lung cancer, smoker, substance abuse presents to the ED c/o n/v, dizziness, hemoptysis x2 days. Pt ran out of Xanax/Suboxone two days ago and has not taken since. Denies abd pain, CP. No other complaints at this time. Oncologist: Dr. Trammell.

## 2022-11-22 NOTE — DISCHARGE NOTE PROVIDER - NSDCMRMEDTOKEN_GEN_ALL_CORE_FT
albuterol 90 mcg/inh inhalation aerosol: 2 puff(s) inhaled every 6 hours, As needed, Bronchospasm  ALPRAZolam 1 mg oral tablet: 1 tab(s) orally 3 times a day  ascorbic acid 500 mg oral tablet: 1 tab(s) orally once a day  ferrous sulfate 325 mg (65 mg elemental iron) oral tablet: 1 tab(s) orally once a day  gabapentin 300 mg oral capsule: 1 cap(s) orally 3 times a day  levothyroxine 50 mcg (0.05 mg) oral tablet: 1 tab(s) orally once a day    lisinopril 40 mg oral tablet: 1 tab(s) orally once a day  nicotine 7 mg/24 hr transdermal film, extended release: 1 patch transdermal once a day  QUEtiapine 50 mg oral tablet: 1 tab(s) orally once a day (at bedtime)  Reglan 10 mg oral tablet: 1 tab(s) orally every 8 hours, As Needed  Suboxone 8 mg-2 mg sublingual film: 1 film(s) sublingual once a day  Trelegy Ellipta 200 mcg-62.5 mcg-25 mcg/inh inhalation powder: 1 puff(s) inhaled once a day (in the evening)  Vitamin D3 25 mcg (1000 intl units) oral tablet: 1 tab(s) orally once a day

## 2022-11-22 NOTE — DISCHARGE NOTE PROVIDER - HOSPITAL COURSE
CC: weakness  HPI: 52M w/ PMHx of stage IV NSCLC, malignant sbo, HTN, EtOH abuse,  substance abuse on suboxone , active cigarette smoker presents to ED with c/o n/v, dizziness, hemoptysis   Symptoms x2 days, Ran out of Xanax/Suboxone 2 days ago, Felt feverish but did not have a temp, Has not been able to eat for 2 days due to nausea and vomiting (NBNB), coughing up quarter sized amounts of blood also x 2 days ryley in AM, usually not the rest of the day, denied chest pain or abd pain  Interval History: 11/22/22 pt seen and examined at the bedside reports feeling better after Suboxone, IV Ativan, and IVF. DC home today w/ f/u OP.   REVIEW OF SYSTEMS: All other review of systems is negative unless indicated above.  PE:  Vital Signs Last 24 Hrs: all reviewed   T(C): 36.8 (22 Nov 2022 08:57), Max: 37.1 (21 Nov 2022 20:44)  T(F): 98.3 (22 Nov 2022 08:57), Max: 98.8 (21 Nov 2022 20:44)  HR: 94 (22 Nov 2022 13:56) (64 - 94)  BP: 121/78 (22 Nov 2022 13:56) (104/66 - 135/96)  RR: 18 (22 Nov 2022 13:56) (17 - 18)  SpO2: 100% (22 Nov 2022 13:56) (95% - 100%)  Parameters below as of 22 Nov 2022 13:56  Patient On (Oxygen Delivery Method): room air  GENERAL: NAD, able to lie flat in bed  HEAD:  Atraumatic, Normocephalic  EYES: EOMI, PERRLA, normal sclera  ENT: Moist mucous membranes  NECK: Supple, right neck nodule,    CHEST/LUNG: diminished to auscultation bilaterally; No rales, rhonchi, wheezing, or rubs. Unlabored respirations  HEART: Regular rate and rhythm; No murmurs, rubs, or gallops  ABDOMEN: Bowel sounds present; Soft, Nontender, Nondistended  EXTREMITIES:  no pitting bilaterally  NERVOUS SYSTEM:  Alert & Oriented X3, speech clear. No focal motor or sensory deficits  MSK: FROM all 4 extremities, full and equal strength  SKIN: No rashes or lesions  HOSPITAL COURSE:  Acute benzodiazepine withdrawal  Suboxone withdrawal  Hx Substance abuse  w dizziness, nausea and vomiting  better after ativan IV   restart xanax TID  restart Suboxone  Zofran prn   s/p NS 2000 cc  Hemoptysis  Stage 4 NSCLC   CT does show area prox to SHAILA mass that can appear to be atelectasis vs infiltrate and decreased LN L supraclavicular, L axillary and intrathoracic  was previously treated in  for post obstructive PNA  continue inhalers  seen by oncology, Dr. Trammell, has been receiving Immunotherapy, responding well, pt to f/u in office in one week   pt was to start RT today  Hypomagnesemia  has short NC interval  different  than what was on EKG 09- w NSR   s/p Mg 2g IV   repeat Mg in AM wnl  troponin negative   HTN  lisinopril  Hypothyroid  does have increased lesions in thyroid w one tracking posteriorly to prox esophagus  continue levothyroxine  Tobacco use  accepts patch  nicotine patch ordered  Disposition: discharge to home in stable condition  Final diagnosis, treatment plan, and follow-up recommendations were discussed and explained to the patient. The patient was given an opportunity to ask questions concerning the diagnosis and treatment plan. The patient acknowledged understanding of the diagnosis, treatment, and follow-up recommendations. The patient was advised to seek urgent care upon discharge if worsening symptoms develop prior to scheduled follow-up. Time spent on discharge included time with the patient, and also coordinating discharge care as outlined below.

## 2022-11-22 NOTE — DISCHARGE NOTE PROVIDER - CARE PROVIDERS DIRECT ADDRESSES
,DirectAddress_Unknown,DirectAddress_Unknown,DirectAddress_Unknown,[email or web address]

## 2022-11-22 NOTE — DISCHARGE NOTE PROVIDER - NSDCCPCAREPLAN_GEN_ALL_CORE_FT
PRINCIPAL DISCHARGE DIAGNOSIS  Diagnosis: Benzodiazepine withdrawal  Assessment and Plan of Treatment: Acute benzodiazepine withdrawal  Acute Suboxone withdrawal  Follow up with your PCP within 1-3 days to go over the details of your hospitalization, bring this document with you   your prescriptions at [facility]  Take Suboxone and Xanax AS PRESCRIBED  If you develop fever, chills, worsening shortness of breath, bloody sputum, return to ER      SECONDARY DISCHARGE DIAGNOSES  Diagnosis: Stage 4 lung cancer  Assessment and Plan of Treatment: Stage 4 NSCLC   Continue inhalers as directed  Follow up with Dr. Trammell in a week  Follow up with Dr. Rose in 1-3 days for your scheduled radiation therapy  Follow up with Dr Akbar routinely       Diagnosis: HTN (hypertension)  Assessment and Plan of Treatment: Continue Lisinopril    Diagnosis: Hypothyroidism  Assessment and Plan of Treatment: Continue Synthroid  Follow up with your PCP for repeat Thyroid Function Tests    Diagnosis: Nicotine dependence  Assessment and Plan of Treatment: STOP SMOKING  Use over the counter nicotine patches or gum as directed

## 2022-11-22 NOTE — DISCHARGE NOTE NURSING/CASE MANAGEMENT/SOCIAL WORK - NSDCPEWEB_GEN_ALL_CORE
[facility] for Tobacco Control website --- [email or web address] website --- [email or web address]

## 2022-11-22 NOTE — DISCHARGE NOTE PROVIDER - NSDCCPTREATMENT_GEN_ALL_CORE_FT
PRINCIPAL PROCEDURE  Procedure: CTA chest w/w/o contrast  Findings and Treatment: IMPRESSION:  No pulmonary embolism.  Decreased left upper lobe mass with endobronchial component that occludes   the apicoposterior segmental bronchus.  Decreased left upper lobe groundglass and increased linear scarring   adjacent to the mass, likely related to treatment.  Two indeterminate middle lobe nodules.  A third middle lobe nodule and   opacities in the left lower lobe are decreased.  Increased thyroid lesions, one of which is inseparable from the proximal   esophagus.  Decreased left supraclavicular, left axillary, and intrathoracic   lymphadenopathy.

## 2022-11-22 NOTE — DISCHARGE NOTE NURSING/CASE MANAGEMENT/SOCIAL WORK - PATIENT PORTAL LINK FT
You can access the FollowMyHealth Patient Portal offered by [facility] by registering at the following website: [email or web address]. By joining [employer]’s FollowMyHealth portal, you will also be able to view your health information using other applications (apps) compatible with our system.

## 2022-11-22 NOTE — DISCHARGE NOTE PROVIDER - CARE PROVIDER_API CALL
Anirudh Trammell  HEMATOLOGY  [address] [place] [address]  Phone: [phone]  Fax: [phone]  Follow Up Time: 1 week    HOLLY ZHAO  Family Medicine  [address] [place] [address]  Phone: ([phone]  Fax: ()-  Follow Up Time: 1-3 days    Bhaskar Akbar  INTERNAL MEDICINE  [address] [place] [address]  Phone: [phone]  Fax: [phone]  Follow Up Time: 1 week    Nadira Rose)  Radiation Oncology  [address], [place] [address]  Phone: [phone]  Fax: [phone]  Follow Up Time: 1-3 days

## 2022-11-22 NOTE — DISCHARGE NOTE PROVIDER - ATTENDING DISCHARGE PHYSICAL EXAMINATION:
PE:  Vital Signs Last 24 Hrs: all reviewed   T(C): 36.8 (22 Nov 2022 08:57), Max: 37.1 (21 Nov 2022 20:44)  T(F): 98.3 (22 Nov 2022 08:57), Max: 98.8 (21 Nov 2022 20:44)  HR: 94 (22 Nov 2022 13:56) (64 - 94)  BP: 121/78 (22 Nov 2022 13:56) (104/66 - 135/96)  RR: 18 (22 Nov 2022 13:56) (17 - 18)  SpO2: 100% (22 Nov 2022 13:56) (95% - 100%)  Parameters below as of 22 Nov 2022 13:56  Patient On (Oxygen Delivery Method): room air  GENERAL: NAD, able to lie flat in bed  HEAD:  Atraumatic, Normocephalic  EYES: EOMI, PERRLA, normal sclera  ENT: Moist mucous membranes  NECK: Supple, right neck nodule,    CHEST/LUNG: diminished to auscultation bilaterally; No rales, rhonchi, wheezing, or rubs. Unlabored respirations  HEART: Regular rate and rhythm; No murmurs, rubs, or gallops  ABDOMEN: Bowel sounds present; Soft, Nontender, Nondistended  EXTREMITIES:  no pitting bilaterally  NERVOUS SYSTEM:  Alert & Oriented X3, speech clear. No focal motor or sensory deficits  MSK: FROM all 4 extremities, full and equal strength  SKIN: No rashes or lesions

## 2022-11-22 NOTE — DISCHARGE NOTE NURSING/CASE MANAGEMENT/SOCIAL WORK - NSDCPEFALRISK_GEN_ALL_CORE
For information on Fall & Injury Prevention, visit: [email or web address] OR  [email or web address] OR  https://www.cdc.gov/steadi/patient.html

## 2022-11-22 NOTE — DISCHARGE NOTE PROVIDER - PROVIDER TOKENS
PROVIDER:[TOKEN:[[id number]],FOLLOWUP:[1 week]],PROVIDER:[TOKEN:[[id number]],FOLLOWUP:[1-3 days]],PROVIDER:[TOKEN:[[id number]],FOLLOWUP:[1 week]],PROVIDER:[TOKEN:[[id number]],FOLLOWUP:[1-3 days]]

## 2022-11-22 NOTE — DISCHARGE NOTE NURSING/CASE MANAGEMENT/SOCIAL WORK - NSDCPEEMAIL_GEN_ALL_CORE
[facility] for Tobacco Control email [email or web address]

## 2023-01-01 ENCOUNTER — TRANSCRIPTION ENCOUNTER (OUTPATIENT)
Age: 53
End: 2023-01-01

## 2023-01-01 ENCOUNTER — INPATIENT (INPATIENT)
Facility: HOSPITAL | Age: 53
LOS: 30 days | Discharge: NURSING, REHAB OR LONG-TERM CARE | End: 2023-03-17
Attending: STUDENT IN AN ORGANIZED HEALTH CARE EDUCATION/TRAINING PROGRAM | Admitting: STUDENT IN AN ORGANIZED HEALTH CARE EDUCATION/TRAINING PROGRAM
Payer: COMMERCIAL

## 2023-01-01 ENCOUNTER — APPOINTMENT (OUTPATIENT)
Dept: RADIATION ONCOLOGY | Facility: CLINIC | Age: 53
End: 2023-01-01
Payer: COMMERCIAL

## 2023-01-01 ENCOUNTER — INPATIENT (INPATIENT)
Facility: HOSPITAL | Age: 53
LOS: 3 days | Discharge: ANOTHER HOSPITAL | DRG: 843 | End: 2023-02-14
Attending: INTERNAL MEDICINE | Admitting: INTERNAL MEDICINE
Payer: COMMERCIAL

## 2023-01-01 ENCOUNTER — APPOINTMENT (OUTPATIENT)
Dept: OTOLARYNGOLOGY | Facility: CLINIC | Age: 53
End: 2023-01-01
Payer: COMMERCIAL

## 2023-01-01 VITALS
OXYGEN SATURATION: 96 % | RESPIRATION RATE: 22 BRPM | SYSTOLIC BLOOD PRESSURE: 136 MMHG | TEMPERATURE: 98 F | HEART RATE: 114 BPM | DIASTOLIC BLOOD PRESSURE: 93 MMHG

## 2023-01-01 VITALS
HEIGHT: 67 IN | DIASTOLIC BLOOD PRESSURE: 93 MMHG | WEIGHT: 123.9 LBS | HEART RATE: 91 BPM | SYSTOLIC BLOOD PRESSURE: 149 MMHG | RESPIRATION RATE: 15 BRPM | TEMPERATURE: 98 F | OXYGEN SATURATION: 98 %

## 2023-01-01 VITALS
TEMPERATURE: 98.2 F | DIASTOLIC BLOOD PRESSURE: 61 MMHG | OXYGEN SATURATION: 95 % | HEART RATE: 101 BPM | SYSTOLIC BLOOD PRESSURE: 95 MMHG | RESPIRATION RATE: 18 BRPM

## 2023-01-01 VITALS
HEART RATE: 112 BPM | SYSTOLIC BLOOD PRESSURE: 149 MMHG | TEMPERATURE: 98 F | OXYGEN SATURATION: 100 % | RESPIRATION RATE: 19 BRPM | DIASTOLIC BLOOD PRESSURE: 96 MMHG

## 2023-01-01 VITALS
RESPIRATION RATE: 16 BRPM | TEMPERATURE: 97.2 F | HEIGHT: 72 IN | HEART RATE: 96 BPM | DIASTOLIC BLOOD PRESSURE: 62 MMHG | SYSTOLIC BLOOD PRESSURE: 101 MMHG | OXYGEN SATURATION: 96 % | BODY MASS INDEX: 16.93 KG/M2 | WEIGHT: 125 LBS

## 2023-01-01 VITALS
SYSTOLIC BLOOD PRESSURE: 108 MMHG | RESPIRATION RATE: 18 BRPM | TEMPERATURE: 98 F | OXYGEN SATURATION: 100 % | HEART RATE: 92 BPM | DIASTOLIC BLOOD PRESSURE: 68 MMHG

## 2023-01-01 VITALS — WEIGHT: 123.2 LBS

## 2023-01-01 DIAGNOSIS — F19.10 OTHER PSYCHOACTIVE SUBSTANCE ABUSE, UNCOMPLICATED: ICD-10-CM

## 2023-01-01 DIAGNOSIS — F41.9 ANXIETY DISORDER, UNSPECIFIED: ICD-10-CM

## 2023-01-01 DIAGNOSIS — R53.1 WEAKNESS: ICD-10-CM

## 2023-01-01 DIAGNOSIS — Z29.9 ENCOUNTER FOR PROPHYLACTIC MEASURES, UNSPECIFIED: ICD-10-CM

## 2023-01-01 DIAGNOSIS — Z87.891 PERSONAL HISTORY OF NICOTINE DEPENDENCE: ICD-10-CM

## 2023-01-01 DIAGNOSIS — E87.1 HYPO-OSMOLALITY AND HYPONATREMIA: ICD-10-CM

## 2023-01-01 DIAGNOSIS — C34.12 MALIGNANT NEOPLASM OF UPPER LOBE, LEFT BRONCHUS OR LUNG: ICD-10-CM

## 2023-01-01 DIAGNOSIS — R59.0 LOCALIZED ENLARGED LYMPH NODES: ICD-10-CM

## 2023-01-01 DIAGNOSIS — G89.29 OTHER CHRONIC PAIN: ICD-10-CM

## 2023-01-01 DIAGNOSIS — R13.10 DYSPHAGIA, UNSPECIFIED: ICD-10-CM

## 2023-01-01 DIAGNOSIS — T14.8XXA OTHER INJURY OF UNSPECIFIED BODY REGION, INITIAL ENCOUNTER: Chronic | ICD-10-CM

## 2023-01-01 DIAGNOSIS — C34.90 MALIGNANT NEOPLASM OF UNSPECIFIED PART OF UNSPECIFIED BRONCHUS OR LUNG: ICD-10-CM

## 2023-01-01 DIAGNOSIS — I10 ESSENTIAL (PRIMARY) HYPERTENSION: ICD-10-CM

## 2023-01-01 DIAGNOSIS — Z20.822 CONTACT WITH AND (SUSPECTED) EXPOSURE TO COVID-19: ICD-10-CM

## 2023-01-01 DIAGNOSIS — E43 UNSPECIFIED SEVERE PROTEIN-CALORIE MALNUTRITION: ICD-10-CM

## 2023-01-01 DIAGNOSIS — E87.6 HYPOKALEMIA: ICD-10-CM

## 2023-01-01 DIAGNOSIS — Z51.5 ENCOUNTER FOR PALLIATIVE CARE: ICD-10-CM

## 2023-01-01 DIAGNOSIS — C79.89 SECONDARY MALIGNANT NEOPLASM OF OTHER SPECIFIED SITES: ICD-10-CM

## 2023-01-01 DIAGNOSIS — E83.42 HYPOMAGNESEMIA: ICD-10-CM

## 2023-01-01 DIAGNOSIS — C77.9 SECONDARY AND UNSPECIFIED MALIGNANT NEOPLASM OF LYMPH NODE, UNSPECIFIED: ICD-10-CM

## 2023-01-01 DIAGNOSIS — Z93.0 TRACHEOSTOMY STATUS: ICD-10-CM

## 2023-01-01 DIAGNOSIS — D72.829 ELEVATED WHITE BLOOD CELL COUNT, UNSPECIFIED: ICD-10-CM

## 2023-01-01 DIAGNOSIS — J38.01 PARALYSIS OF VOCAL CORDS AND LARYNX, UNILATERAL: ICD-10-CM

## 2023-01-01 DIAGNOSIS — Z79.890 HORMONE REPLACEMENT THERAPY: ICD-10-CM

## 2023-01-01 DIAGNOSIS — R11.2 NAUSEA WITH VOMITING, UNSPECIFIED: ICD-10-CM

## 2023-01-01 DIAGNOSIS — R54 AGE-RELATED PHYSICAL DEBILITY: ICD-10-CM

## 2023-01-01 DIAGNOSIS — J69.0 PNEUMONITIS DUE TO INHALATION OF FOOD AND VOMIT: ICD-10-CM

## 2023-01-01 DIAGNOSIS — Z66 DO NOT RESUSCITATE: ICD-10-CM

## 2023-01-01 DIAGNOSIS — Z86.39 PERSONAL HISTORY OF OTHER ENDOCRINE, NUTRITIONAL AND METABOLIC DISEASE: ICD-10-CM

## 2023-01-01 DIAGNOSIS — D64.9 ANEMIA, UNSPECIFIED: ICD-10-CM

## 2023-01-01 DIAGNOSIS — J38.00 PARALYSIS OF VOCAL CORDS AND LARYNX, UNSPECIFIED: ICD-10-CM

## 2023-01-01 DIAGNOSIS — D75.839 THROMBOCYTOSIS, UNSPECIFIED: ICD-10-CM

## 2023-01-01 DIAGNOSIS — G89.3 NEOPLASM RELATED PAIN (ACUTE) (CHRONIC): ICD-10-CM

## 2023-01-01 DIAGNOSIS — M79.89 OTHER SPECIFIED SOFT TISSUE DISORDERS: ICD-10-CM

## 2023-01-01 DIAGNOSIS — C80.1 MALIGNANT (PRIMARY) NEOPLASM, UNSPECIFIED: ICD-10-CM

## 2023-01-01 DIAGNOSIS — J21.9 ACUTE BRONCHIOLITIS, UNSPECIFIED: ICD-10-CM

## 2023-01-01 DIAGNOSIS — Z71.89 OTHER SPECIFIED COUNSELING: ICD-10-CM

## 2023-01-01 DIAGNOSIS — R62.7 ADULT FAILURE TO THRIVE: ICD-10-CM

## 2023-01-01 DIAGNOSIS — K94.29 OTHER COMPLICATIONS OF GASTROSTOMY: ICD-10-CM

## 2023-01-01 LAB
24R-OH-CALCIDIOL SERPL-MCNC: 15.6 NG/ML — LOW (ref 30–80)
A1C WITH ESTIMATED AVERAGE GLUCOSE RESULT: 5.1 % — SIGNIFICANT CHANGE UP (ref 4–5.6)
ADD ON TEST-SPECIMEN IN LAB: SIGNIFICANT CHANGE UP
ALBUMIN SERPL ELPH-MCNC: 2.6 G/DL — LOW (ref 3.3–5)
ALBUMIN SERPL ELPH-MCNC: 2.8 G/DL — LOW (ref 3.3–5)
ALBUMIN SERPL ELPH-MCNC: 2.9 G/DL — LOW (ref 3.3–5)
ALBUMIN SERPL ELPH-MCNC: 3.5 G/DL — SIGNIFICANT CHANGE UP (ref 3.3–5)
ALP SERPL-CCNC: 114 U/L — SIGNIFICANT CHANGE UP (ref 40–120)
ALP SERPL-CCNC: 114 U/L — SIGNIFICANT CHANGE UP (ref 40–120)
ALP SERPL-CCNC: 141 U/L — HIGH (ref 40–120)
ALP SERPL-CCNC: 93 U/L — SIGNIFICANT CHANGE UP (ref 40–120)
ALT FLD-CCNC: 16 U/L — SIGNIFICANT CHANGE UP (ref 12–78)
ALT FLD-CCNC: 18 U/L — SIGNIFICANT CHANGE UP (ref 12–78)
ALT FLD-CCNC: 22 U/L — SIGNIFICANT CHANGE UP (ref 12–78)
ALT FLD-CCNC: 25 U/L — SIGNIFICANT CHANGE UP (ref 4–41)
ANION GAP SERPL CALC-SCNC: 10 MMOL/L — SIGNIFICANT CHANGE UP (ref 7–14)
ANION GAP SERPL CALC-SCNC: 10 MMOL/L — SIGNIFICANT CHANGE UP (ref 7–14)
ANION GAP SERPL CALC-SCNC: 11 MMOL/L — SIGNIFICANT CHANGE UP (ref 7–14)
ANION GAP SERPL CALC-SCNC: 12 MMOL/L — SIGNIFICANT CHANGE UP (ref 7–14)
ANION GAP SERPL CALC-SCNC: 13 MMOL/L — SIGNIFICANT CHANGE UP (ref 7–14)
ANION GAP SERPL CALC-SCNC: 5 MMOL/L — SIGNIFICANT CHANGE UP (ref 5–17)
ANION GAP SERPL CALC-SCNC: 6 MMOL/L — LOW (ref 7–14)
ANION GAP SERPL CALC-SCNC: 6 MMOL/L — SIGNIFICANT CHANGE UP (ref 5–17)
ANION GAP SERPL CALC-SCNC: 6 MMOL/L — SIGNIFICANT CHANGE UP (ref 5–17)
ANION GAP SERPL CALC-SCNC: 7 MMOL/L — SIGNIFICANT CHANGE UP (ref 5–17)
ANION GAP SERPL CALC-SCNC: 7 MMOL/L — SIGNIFICANT CHANGE UP (ref 7–14)
ANION GAP SERPL CALC-SCNC: 7 MMOL/L — SIGNIFICANT CHANGE UP (ref 7–14)
ANION GAP SERPL CALC-SCNC: 8 MMOL/L — SIGNIFICANT CHANGE UP (ref 5–17)
ANION GAP SERPL CALC-SCNC: 8 MMOL/L — SIGNIFICANT CHANGE UP (ref 7–14)
ANION GAP SERPL CALC-SCNC: 9 MMOL/L — SIGNIFICANT CHANGE UP (ref 7–14)
APPEARANCE UR: CLEAR — SIGNIFICANT CHANGE UP
APPEARANCE UR: CLEAR — SIGNIFICANT CHANGE UP
APTT BLD: 25.3 SEC — LOW (ref 27–36.3)
APTT BLD: 27.9 SEC — SIGNIFICANT CHANGE UP (ref 27–36.3)
APTT BLD: 30.7 SEC — SIGNIFICANT CHANGE UP (ref 27.5–35.5)
AST SERPL-CCNC: 12 U/L — SIGNIFICANT CHANGE UP (ref 4–40)
AST SERPL-CCNC: 14 U/L — LOW (ref 15–37)
AST SERPL-CCNC: 14 U/L — LOW (ref 15–37)
AST SERPL-CCNC: 16 U/L — SIGNIFICANT CHANGE UP (ref 15–37)
B PERT DNA SPEC QL NAA+PROBE: SIGNIFICANT CHANGE UP
B PERT+PARAPERT DNA PNL SPEC NAA+PROBE: SIGNIFICANT CHANGE UP
BASOPHILS # BLD AUTO: 0.01 K/UL — SIGNIFICANT CHANGE UP (ref 0–0.2)
BASOPHILS # BLD AUTO: 0.02 K/UL — SIGNIFICANT CHANGE UP (ref 0–0.2)
BASOPHILS # BLD AUTO: 0.03 K/UL — SIGNIFICANT CHANGE UP (ref 0–0.2)
BASOPHILS # BLD AUTO: 0.04 K/UL — SIGNIFICANT CHANGE UP (ref 0–0.2)
BASOPHILS NFR BLD AUTO: 0.1 % — SIGNIFICANT CHANGE UP (ref 0–2)
BASOPHILS NFR BLD AUTO: 0.2 % — SIGNIFICANT CHANGE UP (ref 0–2)
BASOPHILS NFR BLD AUTO: 0.2 % — SIGNIFICANT CHANGE UP (ref 0–2)
BASOPHILS NFR BLD AUTO: 0.3 % — SIGNIFICANT CHANGE UP (ref 0–2)
BASOPHILS NFR BLD AUTO: 0.4 % — SIGNIFICANT CHANGE UP (ref 0–2)
BASOPHILS NFR BLD AUTO: 0.4 % — SIGNIFICANT CHANGE UP (ref 0–2)
BILIRUB DIRECT SERPL-MCNC: 0.3 MG/DL — SIGNIFICANT CHANGE UP (ref 0–0.3)
BILIRUB INDIRECT FLD-MCNC: 0.3 MG/DL — SIGNIFICANT CHANGE UP (ref 0.2–1)
BILIRUB SERPL-MCNC: 0.4 MG/DL — SIGNIFICANT CHANGE UP (ref 0.2–1.2)
BILIRUB SERPL-MCNC: 0.5 MG/DL — SIGNIFICANT CHANGE UP (ref 0.2–1.2)
BILIRUB SERPL-MCNC: 0.6 MG/DL — SIGNIFICANT CHANGE UP (ref 0.2–1.2)
BILIRUB SERPL-MCNC: 0.7 MG/DL — SIGNIFICANT CHANGE UP (ref 0.2–1.2)
BILIRUB UR-MCNC: NEGATIVE — SIGNIFICANT CHANGE UP
BILIRUB UR-MCNC: NEGATIVE — SIGNIFICANT CHANGE UP
BLD GP AB SCN SERPL QL: NEGATIVE — SIGNIFICANT CHANGE UP
BLD GP AB SCN SERPL QL: SIGNIFICANT CHANGE UP
BORDETELLA PARAPERTUSSIS (RAPRVP): SIGNIFICANT CHANGE UP
BUN SERPL-MCNC: 10 MG/DL — SIGNIFICANT CHANGE UP (ref 7–23)
BUN SERPL-MCNC: 10 MG/DL — SIGNIFICANT CHANGE UP (ref 7–23)
BUN SERPL-MCNC: 11 MG/DL — SIGNIFICANT CHANGE UP (ref 7–23)
BUN SERPL-MCNC: 12 MG/DL — SIGNIFICANT CHANGE UP (ref 7–23)
BUN SERPL-MCNC: 14 MG/DL — SIGNIFICANT CHANGE UP (ref 7–23)
BUN SERPL-MCNC: 14 MG/DL — SIGNIFICANT CHANGE UP (ref 7–23)
BUN SERPL-MCNC: 15 MG/DL — SIGNIFICANT CHANGE UP (ref 7–23)
BUN SERPL-MCNC: 15 MG/DL — SIGNIFICANT CHANGE UP (ref 7–23)
BUN SERPL-MCNC: 16 MG/DL — SIGNIFICANT CHANGE UP (ref 7–23)
BUN SERPL-MCNC: 17 MG/DL — SIGNIFICANT CHANGE UP (ref 7–23)
BUN SERPL-MCNC: 18 MG/DL — SIGNIFICANT CHANGE UP (ref 7–23)
BUN SERPL-MCNC: 19 MG/DL — SIGNIFICANT CHANGE UP (ref 7–23)
BUN SERPL-MCNC: 4 MG/DL — LOW (ref 7–23)
BUN SERPL-MCNC: 5 MG/DL — LOW (ref 7–23)
BUN SERPL-MCNC: 5 MG/DL — LOW (ref 7–23)
BUN SERPL-MCNC: 6 MG/DL — LOW (ref 7–23)
BUN SERPL-MCNC: 6 MG/DL — LOW (ref 7–23)
BUN SERPL-MCNC: 7 MG/DL — SIGNIFICANT CHANGE UP (ref 7–23)
BUN SERPL-MCNC: 8 MG/DL — SIGNIFICANT CHANGE UP (ref 7–23)
BUN SERPL-MCNC: 9 MG/DL — SIGNIFICANT CHANGE UP (ref 7–23)
C PNEUM DNA SPEC QL NAA+PROBE: SIGNIFICANT CHANGE UP
CALCIUM SERPL-MCNC: 7.9 MG/DL — LOW (ref 8.4–10.5)
CALCIUM SERPL-MCNC: 8.1 MG/DL — LOW (ref 8.4–10.5)
CALCIUM SERPL-MCNC: 8.3 MG/DL — LOW (ref 8.4–10.5)
CALCIUM SERPL-MCNC: 8.4 MG/DL — SIGNIFICANT CHANGE UP (ref 8.4–10.5)
CALCIUM SERPL-MCNC: 8.4 MG/DL — SIGNIFICANT CHANGE UP (ref 8.4–10.5)
CALCIUM SERPL-MCNC: 8.5 MG/DL — SIGNIFICANT CHANGE UP (ref 8.4–10.5)
CALCIUM SERPL-MCNC: 8.5 MG/DL — SIGNIFICANT CHANGE UP (ref 8.5–10.1)
CALCIUM SERPL-MCNC: 8.6 MG/DL — SIGNIFICANT CHANGE UP (ref 8.4–10.5)
CALCIUM SERPL-MCNC: 8.7 MG/DL — SIGNIFICANT CHANGE UP (ref 8.4–10.5)
CALCIUM SERPL-MCNC: 8.7 MG/DL — SIGNIFICANT CHANGE UP (ref 8.4–10.5)
CALCIUM SERPL-MCNC: 8.7 MG/DL — SIGNIFICANT CHANGE UP (ref 8.5–10.1)
CALCIUM SERPL-MCNC: 8.8 MG/DL — SIGNIFICANT CHANGE UP (ref 8.4–10.5)
CALCIUM SERPL-MCNC: 8.8 MG/DL — SIGNIFICANT CHANGE UP (ref 8.4–10.5)
CALCIUM SERPL-MCNC: 8.8 MG/DL — SIGNIFICANT CHANGE UP (ref 8.5–10.1)
CALCIUM SERPL-MCNC: 8.9 MG/DL — SIGNIFICANT CHANGE UP (ref 8.4–10.5)
CALCIUM SERPL-MCNC: 9 MG/DL — SIGNIFICANT CHANGE UP (ref 8.4–10.5)
CALCIUM SERPL-MCNC: 9 MG/DL — SIGNIFICANT CHANGE UP (ref 8.5–10.1)
CALCIUM SERPL-MCNC: 9.6 MG/DL — SIGNIFICANT CHANGE UP (ref 8.5–10.1)
CHLORIDE SERPL-SCNC: 100 MMOL/L — SIGNIFICANT CHANGE UP (ref 98–107)
CHLORIDE SERPL-SCNC: 100 MMOL/L — SIGNIFICANT CHANGE UP (ref 98–107)
CHLORIDE SERPL-SCNC: 101 MMOL/L — SIGNIFICANT CHANGE UP (ref 96–108)
CHLORIDE SERPL-SCNC: 101 MMOL/L — SIGNIFICANT CHANGE UP (ref 98–107)
CHLORIDE SERPL-SCNC: 88 MMOL/L — LOW (ref 98–107)
CHLORIDE SERPL-SCNC: 89 MMOL/L — LOW (ref 98–107)
CHLORIDE SERPL-SCNC: 90 MMOL/L — LOW (ref 98–107)
CHLORIDE SERPL-SCNC: 91 MMOL/L — LOW (ref 98–107)
CHLORIDE SERPL-SCNC: 91 MMOL/L — LOW (ref 98–107)
CHLORIDE SERPL-SCNC: 92 MMOL/L — LOW (ref 98–107)
CHLORIDE SERPL-SCNC: 92 MMOL/L — LOW (ref 98–107)
CHLORIDE SERPL-SCNC: 93 MMOL/L — LOW (ref 98–107)
CHLORIDE SERPL-SCNC: 93 MMOL/L — LOW (ref 98–107)
CHLORIDE SERPL-SCNC: 94 MMOL/L — LOW (ref 96–108)
CHLORIDE SERPL-SCNC: 94 MMOL/L — LOW (ref 98–107)
CHLORIDE SERPL-SCNC: 95 MMOL/L — LOW (ref 98–107)
CHLORIDE SERPL-SCNC: 95 MMOL/L — LOW (ref 98–107)
CHLORIDE SERPL-SCNC: 96 MMOL/L — LOW (ref 98–107)
CHLORIDE SERPL-SCNC: 97 MMOL/L — LOW (ref 98–107)
CHLORIDE SERPL-SCNC: 98 MMOL/L — SIGNIFICANT CHANGE UP (ref 96–108)
CHLORIDE SERPL-SCNC: 98 MMOL/L — SIGNIFICANT CHANGE UP (ref 96–108)
CHLORIDE SERPL-SCNC: 98 MMOL/L — SIGNIFICANT CHANGE UP (ref 98–107)
CHLORIDE SERPL-SCNC: 99 MMOL/L — SIGNIFICANT CHANGE UP (ref 96–108)
CHLORIDE SERPL-SCNC: 99 MMOL/L — SIGNIFICANT CHANGE UP (ref 98–107)
CO2 SERPL-SCNC: 25 MMOL/L — SIGNIFICANT CHANGE UP (ref 22–31)
CO2 SERPL-SCNC: 26 MMOL/L — SIGNIFICANT CHANGE UP (ref 22–31)
CO2 SERPL-SCNC: 27 MMOL/L — SIGNIFICANT CHANGE UP (ref 22–31)
CO2 SERPL-SCNC: 28 MMOL/L — SIGNIFICANT CHANGE UP (ref 22–31)
CO2 SERPL-SCNC: 29 MMOL/L — SIGNIFICANT CHANGE UP (ref 22–31)
CO2 SERPL-SCNC: 30 MMOL/L — SIGNIFICANT CHANGE UP (ref 22–31)
CO2 SERPL-SCNC: 31 MMOL/L — SIGNIFICANT CHANGE UP (ref 22–31)
CO2 SERPL-SCNC: 32 MMOL/L — HIGH (ref 22–31)
COLOR SPEC: YELLOW — SIGNIFICANT CHANGE UP
COLOR SPEC: YELLOW — SIGNIFICANT CHANGE UP
CREAT SERPL-MCNC: 0.43 MG/DL — LOW (ref 0.5–1.3)
CREAT SERPL-MCNC: 0.44 MG/DL — LOW (ref 0.5–1.3)
CREAT SERPL-MCNC: 0.45 MG/DL — LOW (ref 0.5–1.3)
CREAT SERPL-MCNC: 0.46 MG/DL — LOW (ref 0.5–1.3)
CREAT SERPL-MCNC: 0.47 MG/DL — LOW (ref 0.5–1.3)
CREAT SERPL-MCNC: 0.47 MG/DL — LOW (ref 0.5–1.3)
CREAT SERPL-MCNC: 0.48 MG/DL — LOW (ref 0.5–1.3)
CREAT SERPL-MCNC: 0.5 MG/DL — SIGNIFICANT CHANGE UP (ref 0.5–1.3)
CREAT SERPL-MCNC: 0.51 MG/DL — SIGNIFICANT CHANGE UP (ref 0.5–1.3)
CREAT SERPL-MCNC: 0.52 MG/DL — SIGNIFICANT CHANGE UP (ref 0.5–1.3)
CREAT SERPL-MCNC: 0.53 MG/DL — SIGNIFICANT CHANGE UP (ref 0.5–1.3)
CREAT SERPL-MCNC: 0.54 MG/DL — SIGNIFICANT CHANGE UP (ref 0.5–1.3)
CREAT SERPL-MCNC: 0.55 MG/DL — SIGNIFICANT CHANGE UP (ref 0.5–1.3)
CREAT SERPL-MCNC: 0.57 MG/DL — SIGNIFICANT CHANGE UP (ref 0.5–1.3)
CREAT SERPL-MCNC: 0.57 MG/DL — SIGNIFICANT CHANGE UP (ref 0.5–1.3)
CREAT SERPL-MCNC: 0.58 MG/DL — SIGNIFICANT CHANGE UP (ref 0.5–1.3)
CREAT SERPL-MCNC: 0.59 MG/DL — SIGNIFICANT CHANGE UP (ref 0.5–1.3)
CREAT SERPL-MCNC: 0.61 MG/DL — SIGNIFICANT CHANGE UP (ref 0.5–1.3)
CREAT SERPL-MCNC: 0.64 MG/DL — SIGNIFICANT CHANGE UP (ref 0.5–1.3)
CREAT SERPL-MCNC: 0.65 MG/DL — SIGNIFICANT CHANGE UP (ref 0.5–1.3)
CREAT SERPL-MCNC: 0.65 MG/DL — SIGNIFICANT CHANGE UP (ref 0.5–1.3)
CREAT SERPL-MCNC: 0.67 MG/DL — SIGNIFICANT CHANGE UP (ref 0.5–1.3)
CREAT SERPL-MCNC: 0.79 MG/DL — SIGNIFICANT CHANGE UP (ref 0.5–1.3)
CREAT SERPL-MCNC: 0.85 MG/DL — SIGNIFICANT CHANGE UP (ref 0.5–1.3)
CREAT SERPL-MCNC: 0.92 MG/DL — SIGNIFICANT CHANGE UP (ref 0.5–1.3)
CRP SERPL-MCNC: 120 MG/L — HIGH
CULTURE RESULTS: SIGNIFICANT CHANGE UP
DIFF PNL FLD: NEGATIVE — SIGNIFICANT CHANGE UP
DIFF PNL FLD: NEGATIVE — SIGNIFICANT CHANGE UP
EGFR: 101 ML/MIN/1.73M2 — SIGNIFICANT CHANGE UP
EGFR: 106 ML/MIN/1.73M2 — SIGNIFICANT CHANGE UP
EGFR: 108 ML/MIN/1.73M2 — SIGNIFICANT CHANGE UP
EGFR: 114 ML/MIN/1.73M2 — SIGNIFICANT CHANGE UP
EGFR: 115 ML/MIN/1.73M2 — SIGNIFICANT CHANGE UP
EGFR: 117 ML/MIN/1.73M2 — SIGNIFICANT CHANGE UP
EGFR: 118 ML/MIN/1.73M2 — SIGNIFICANT CHANGE UP
EGFR: 119 ML/MIN/1.73M2 — SIGNIFICANT CHANGE UP
EGFR: 121 ML/MIN/1.73M2 — SIGNIFICANT CHANGE UP
EGFR: 121 ML/MIN/1.73M2 — SIGNIFICANT CHANGE UP
EGFR: 122 ML/MIN/1.73M2 — SIGNIFICANT CHANGE UP
EGFR: 123 ML/MIN/1.73M2 — SIGNIFICANT CHANGE UP
EGFR: 124 ML/MIN/1.73M2 — SIGNIFICANT CHANGE UP
EGFR: 126 ML/MIN/1.73M2 — SIGNIFICANT CHANGE UP
EGFR: 127 ML/MIN/1.73M2 — SIGNIFICANT CHANGE UP
EGFR: 128 ML/MIN/1.73M2 — SIGNIFICANT CHANGE UP
EGFR: 129 ML/MIN/1.73M2 — SIGNIFICANT CHANGE UP
EGFR: 130 ML/MIN/1.73M2 — SIGNIFICANT CHANGE UP
EOSINOPHIL # BLD AUTO: 0 K/UL — SIGNIFICANT CHANGE UP (ref 0–0.5)
EOSINOPHIL # BLD AUTO: 0.03 K/UL — SIGNIFICANT CHANGE UP (ref 0–0.5)
EOSINOPHIL # BLD AUTO: 0.06 K/UL — SIGNIFICANT CHANGE UP (ref 0–0.5)
EOSINOPHIL # BLD AUTO: 0.11 K/UL — SIGNIFICANT CHANGE UP (ref 0–0.5)
EOSINOPHIL # BLD AUTO: 0.15 K/UL — SIGNIFICANT CHANGE UP (ref 0–0.5)
EOSINOPHIL # BLD AUTO: 0.15 K/UL — SIGNIFICANT CHANGE UP (ref 0–0.5)
EOSINOPHIL # BLD AUTO: 0.16 K/UL — SIGNIFICANT CHANGE UP (ref 0–0.5)
EOSINOPHIL # BLD AUTO: 0.16 K/UL — SIGNIFICANT CHANGE UP (ref 0–0.5)
EOSINOPHIL # BLD AUTO: 0.18 K/UL — SIGNIFICANT CHANGE UP (ref 0–0.5)
EOSINOPHIL # BLD AUTO: 0.18 K/UL — SIGNIFICANT CHANGE UP (ref 0–0.5)
EOSINOPHIL # BLD AUTO: 0.19 K/UL — SIGNIFICANT CHANGE UP (ref 0–0.5)
EOSINOPHIL # BLD AUTO: 0.28 K/UL — SIGNIFICANT CHANGE UP (ref 0–0.5)
EOSINOPHIL # BLD AUTO: 0.3 K/UL — SIGNIFICANT CHANGE UP (ref 0–0.5)
EOSINOPHIL # BLD AUTO: 0.33 K/UL — SIGNIFICANT CHANGE UP (ref 0–0.5)
EOSINOPHIL NFR BLD AUTO: 0 % — SIGNIFICANT CHANGE UP (ref 0–6)
EOSINOPHIL NFR BLD AUTO: 0.3 % — SIGNIFICANT CHANGE UP (ref 0–6)
EOSINOPHIL NFR BLD AUTO: 0.4 % — SIGNIFICANT CHANGE UP (ref 0–6)
EOSINOPHIL NFR BLD AUTO: 0.9 % — SIGNIFICANT CHANGE UP (ref 0–6)
EOSINOPHIL NFR BLD AUTO: 1.2 % — SIGNIFICANT CHANGE UP (ref 0–6)
EOSINOPHIL NFR BLD AUTO: 1.9 % — SIGNIFICANT CHANGE UP (ref 0–6)
EOSINOPHIL NFR BLD AUTO: 2.1 % — SIGNIFICANT CHANGE UP (ref 0–6)
EOSINOPHIL NFR BLD AUTO: 2.2 % — SIGNIFICANT CHANGE UP (ref 0–6)
EOSINOPHIL NFR BLD AUTO: 2.3 % — SIGNIFICANT CHANGE UP (ref 0–6)
EOSINOPHIL NFR BLD AUTO: 2.5 % — SIGNIFICANT CHANGE UP (ref 0–6)
EOSINOPHIL NFR BLD AUTO: 2.9 % — SIGNIFICANT CHANGE UP (ref 0–6)
EOSINOPHIL NFR BLD AUTO: 3.1 % — SIGNIFICANT CHANGE UP (ref 0–6)
EOSINOPHIL NFR BLD AUTO: 3.5 % — SIGNIFICANT CHANGE UP (ref 0–6)
EOSINOPHIL NFR BLD AUTO: 3.7 % — SIGNIFICANT CHANGE UP (ref 0–6)
ESTIMATED AVERAGE GLUCOSE: 100 — SIGNIFICANT CHANGE UP
FERRITIN SERPL-MCNC: 1108 NG/ML — HIGH (ref 30–400)
FLUAV AG NPH QL: SIGNIFICANT CHANGE UP
FLUAV SUBTYP SPEC NAA+PROBE: SIGNIFICANT CHANGE UP
FLUBV AG NPH QL: SIGNIFICANT CHANGE UP
FLUBV RNA SPEC QL NAA+PROBE: SIGNIFICANT CHANGE UP
FOLATE SERPL-MCNC: >20 NG/ML — SIGNIFICANT CHANGE UP
GLUCOSE BLDC GLUCOMTR-MCNC: 100 MG/DL — HIGH (ref 70–99)
GLUCOSE BLDC GLUCOMTR-MCNC: 101 MG/DL — HIGH (ref 70–99)
GLUCOSE BLDC GLUCOMTR-MCNC: 103 MG/DL — HIGH (ref 70–99)
GLUCOSE BLDC GLUCOMTR-MCNC: 103 MG/DL — HIGH (ref 70–99)
GLUCOSE BLDC GLUCOMTR-MCNC: 104 MG/DL — HIGH (ref 70–99)
GLUCOSE BLDC GLUCOMTR-MCNC: 105 MG/DL — HIGH (ref 70–99)
GLUCOSE BLDC GLUCOMTR-MCNC: 106 MG/DL — HIGH (ref 70–99)
GLUCOSE BLDC GLUCOMTR-MCNC: 109 MG/DL — HIGH (ref 70–99)
GLUCOSE BLDC GLUCOMTR-MCNC: 109 MG/DL — HIGH (ref 70–99)
GLUCOSE BLDC GLUCOMTR-MCNC: 114 MG/DL — HIGH (ref 70–99)
GLUCOSE BLDC GLUCOMTR-MCNC: 114 MG/DL — HIGH (ref 70–99)
GLUCOSE BLDC GLUCOMTR-MCNC: 115 MG/DL — HIGH (ref 70–99)
GLUCOSE BLDC GLUCOMTR-MCNC: 116 MG/DL — HIGH (ref 70–99)
GLUCOSE BLDC GLUCOMTR-MCNC: 116 MG/DL — HIGH (ref 70–99)
GLUCOSE BLDC GLUCOMTR-MCNC: 117 MG/DL — HIGH (ref 70–99)
GLUCOSE BLDC GLUCOMTR-MCNC: 119 MG/DL — HIGH (ref 70–99)
GLUCOSE BLDC GLUCOMTR-MCNC: 120 MG/DL — HIGH (ref 70–99)
GLUCOSE BLDC GLUCOMTR-MCNC: 123 MG/DL — HIGH (ref 70–99)
GLUCOSE BLDC GLUCOMTR-MCNC: 125 MG/DL — HIGH (ref 70–99)
GLUCOSE BLDC GLUCOMTR-MCNC: 126 MG/DL — HIGH (ref 70–99)
GLUCOSE BLDC GLUCOMTR-MCNC: 128 MG/DL — HIGH (ref 70–99)
GLUCOSE BLDC GLUCOMTR-MCNC: 128 MG/DL — HIGH (ref 70–99)
GLUCOSE BLDC GLUCOMTR-MCNC: 129 MG/DL — HIGH (ref 70–99)
GLUCOSE BLDC GLUCOMTR-MCNC: 130 MG/DL — HIGH (ref 70–99)
GLUCOSE BLDC GLUCOMTR-MCNC: 132 MG/DL — HIGH (ref 70–99)
GLUCOSE BLDC GLUCOMTR-MCNC: 133 MG/DL — HIGH (ref 70–99)
GLUCOSE BLDC GLUCOMTR-MCNC: 135 MG/DL — HIGH (ref 70–99)
GLUCOSE BLDC GLUCOMTR-MCNC: 137 MG/DL — HIGH (ref 70–99)
GLUCOSE BLDC GLUCOMTR-MCNC: 137 MG/DL — HIGH (ref 70–99)
GLUCOSE BLDC GLUCOMTR-MCNC: 139 MG/DL — HIGH (ref 70–99)
GLUCOSE BLDC GLUCOMTR-MCNC: 168 MG/DL — HIGH (ref 70–99)
GLUCOSE BLDC GLUCOMTR-MCNC: 97 MG/DL — SIGNIFICANT CHANGE UP (ref 70–99)
GLUCOSE BLDC GLUCOMTR-MCNC: 97 MG/DL — SIGNIFICANT CHANGE UP (ref 70–99)
GLUCOSE BLDC GLUCOMTR-MCNC: 98 MG/DL — SIGNIFICANT CHANGE UP (ref 70–99)
GLUCOSE SERPL-MCNC: 106 MG/DL — HIGH (ref 70–99)
GLUCOSE SERPL-MCNC: 110 MG/DL — HIGH (ref 70–99)
GLUCOSE SERPL-MCNC: 113 MG/DL — HIGH (ref 70–99)
GLUCOSE SERPL-MCNC: 114 MG/DL — HIGH (ref 70–99)
GLUCOSE SERPL-MCNC: 117 MG/DL — HIGH (ref 70–99)
GLUCOSE SERPL-MCNC: 121 MG/DL — HIGH (ref 70–99)
GLUCOSE SERPL-MCNC: 122 MG/DL — HIGH (ref 70–99)
GLUCOSE SERPL-MCNC: 124 MG/DL — HIGH (ref 70–99)
GLUCOSE SERPL-MCNC: 124 MG/DL — HIGH (ref 70–99)
GLUCOSE SERPL-MCNC: 128 MG/DL — HIGH (ref 70–99)
GLUCOSE SERPL-MCNC: 130 MG/DL — HIGH (ref 70–99)
GLUCOSE SERPL-MCNC: 132 MG/DL — HIGH (ref 70–99)
GLUCOSE SERPL-MCNC: 133 MG/DL — HIGH (ref 70–99)
GLUCOSE SERPL-MCNC: 136 MG/DL — HIGH (ref 70–99)
GLUCOSE SERPL-MCNC: 138 MG/DL — HIGH (ref 70–99)
GLUCOSE SERPL-MCNC: 140 MG/DL — HIGH (ref 70–99)
GLUCOSE SERPL-MCNC: 142 MG/DL — HIGH (ref 70–99)
GLUCOSE SERPL-MCNC: 148 MG/DL — HIGH (ref 70–99)
GLUCOSE SERPL-MCNC: 153 MG/DL — HIGH (ref 70–99)
GLUCOSE SERPL-MCNC: 166 MG/DL — HIGH (ref 70–99)
GLUCOSE SERPL-MCNC: 166 MG/DL — HIGH (ref 70–99)
GLUCOSE SERPL-MCNC: 179 MG/DL — HIGH (ref 70–99)
GLUCOSE SERPL-MCNC: 187 MG/DL — HIGH (ref 70–99)
GLUCOSE SERPL-MCNC: 200 MG/DL — HIGH (ref 70–99)
GLUCOSE SERPL-MCNC: 82 MG/DL — SIGNIFICANT CHANGE UP (ref 70–99)
GLUCOSE SERPL-MCNC: 85 MG/DL — SIGNIFICANT CHANGE UP (ref 70–99)
GLUCOSE SERPL-MCNC: 86 MG/DL — SIGNIFICANT CHANGE UP (ref 70–99)
GLUCOSE SERPL-MCNC: 89 MG/DL — SIGNIFICANT CHANGE UP (ref 70–99)
GLUCOSE SERPL-MCNC: 92 MG/DL — SIGNIFICANT CHANGE UP (ref 70–99)
GLUCOSE SERPL-MCNC: 94 MG/DL — SIGNIFICANT CHANGE UP (ref 70–99)
GLUCOSE SERPL-MCNC: 94 MG/DL — SIGNIFICANT CHANGE UP (ref 70–99)
GLUCOSE SERPL-MCNC: 95 MG/DL — SIGNIFICANT CHANGE UP (ref 70–99)
GLUCOSE SERPL-MCNC: 96 MG/DL — SIGNIFICANT CHANGE UP (ref 70–99)
GLUCOSE SERPL-MCNC: 97 MG/DL — SIGNIFICANT CHANGE UP (ref 70–99)
GLUCOSE SERPL-MCNC: 98 MG/DL — SIGNIFICANT CHANGE UP (ref 70–99)
GLUCOSE UR QL: NEGATIVE — SIGNIFICANT CHANGE UP
GLUCOSE UR QL: NEGATIVE — SIGNIFICANT CHANGE UP
GRAM STN FLD: SIGNIFICANT CHANGE UP
HADV DNA SPEC QL NAA+PROBE: SIGNIFICANT CHANGE UP
HCOV 229E RNA SPEC QL NAA+PROBE: SIGNIFICANT CHANGE UP
HCOV HKU1 RNA SPEC QL NAA+PROBE: SIGNIFICANT CHANGE UP
HCOV NL63 RNA SPEC QL NAA+PROBE: SIGNIFICANT CHANGE UP
HCOV OC43 RNA SPEC QL NAA+PROBE: SIGNIFICANT CHANGE UP
HCT VFR BLD CALC: 20.8 % — CRITICAL LOW (ref 39–50)
HCT VFR BLD CALC: 20.9 % — CRITICAL LOW (ref 39–50)
HCT VFR BLD CALC: 20.9 % — CRITICAL LOW (ref 39–50)
HCT VFR BLD CALC: 21.4 % — LOW (ref 39–50)
HCT VFR BLD CALC: 21.8 % — LOW (ref 39–50)
HCT VFR BLD CALC: 22 % — LOW (ref 39–50)
HCT VFR BLD CALC: 22.6 % — LOW (ref 39–50)
HCT VFR BLD CALC: 22.7 % — LOW (ref 39–50)
HCT VFR BLD CALC: 22.7 % — LOW (ref 39–50)
HCT VFR BLD CALC: 23.1 % — LOW (ref 39–50)
HCT VFR BLD CALC: 23.4 % — LOW (ref 39–50)
HCT VFR BLD CALC: 23.4 % — LOW (ref 39–50)
HCT VFR BLD CALC: 23.5 % — LOW (ref 39–50)
HCT VFR BLD CALC: 23.8 % — LOW (ref 39–50)
HCT VFR BLD CALC: 24 % — LOW (ref 39–50)
HCT VFR BLD CALC: 24.1 % — LOW (ref 39–50)
HCT VFR BLD CALC: 24.2 % — LOW (ref 39–50)
HCT VFR BLD CALC: 24.3 % — LOW (ref 39–50)
HCT VFR BLD CALC: 24.3 % — LOW (ref 39–50)
HCT VFR BLD CALC: 24.5 % — LOW (ref 39–50)
HCT VFR BLD CALC: 24.5 % — LOW (ref 39–50)
HCT VFR BLD CALC: 24.7 % — LOW (ref 39–50)
HCT VFR BLD CALC: 24.7 % — LOW (ref 39–50)
HCT VFR BLD CALC: 25 % — LOW (ref 39–50)
HCT VFR BLD CALC: 25.5 % — LOW (ref 39–50)
HCT VFR BLD CALC: 25.6 % — LOW (ref 39–50)
HCT VFR BLD CALC: 26 % — LOW (ref 39–50)
HCT VFR BLD CALC: 26.4 % — LOW (ref 39–50)
HCT VFR BLD CALC: 26.6 % — LOW (ref 39–50)
HCT VFR BLD CALC: 26.8 % — LOW (ref 39–50)
HCT VFR BLD CALC: 26.8 % — LOW (ref 39–50)
HCT VFR BLD CALC: 27 % — LOW (ref 39–50)
HCT VFR BLD CALC: 27.2 % — LOW (ref 39–50)
HCT VFR BLD CALC: 28 % — LOW (ref 39–50)
HCT VFR BLD CALC: 28.5 % — LOW (ref 39–50)
HCT VFR BLD CALC: 28.7 % — LOW (ref 39–50)
HCT VFR BLD CALC: 29.3 % — LOW (ref 39–50)
HCT VFR BLD CALC: 30.5 % — LOW (ref 39–50)
HGB BLD-MCNC: 10.2 G/DL — LOW (ref 13–17)
HGB BLD-MCNC: 6.3 G/DL — CRITICAL LOW (ref 13–17)
HGB BLD-MCNC: 6.4 G/DL — CRITICAL LOW (ref 13–17)
HGB BLD-MCNC: 6.6 G/DL — CRITICAL LOW (ref 13–17)
HGB BLD-MCNC: 6.6 G/DL — CRITICAL LOW (ref 13–17)
HGB BLD-MCNC: 6.8 G/DL — CRITICAL LOW (ref 13–17)
HGB BLD-MCNC: 6.9 G/DL — CRITICAL LOW (ref 13–17)
HGB BLD-MCNC: 7 G/DL — CRITICAL LOW (ref 13–17)
HGB BLD-MCNC: 7 G/DL — CRITICAL LOW (ref 13–17)
HGB BLD-MCNC: 7.1 G/DL — LOW (ref 13–17)
HGB BLD-MCNC: 7.2 G/DL — LOW (ref 13–17)
HGB BLD-MCNC: 7.3 G/DL — LOW (ref 13–17)
HGB BLD-MCNC: 7.4 G/DL — LOW (ref 13–17)
HGB BLD-MCNC: 7.4 G/DL — LOW (ref 13–17)
HGB BLD-MCNC: 7.5 G/DL — LOW (ref 13–17)
HGB BLD-MCNC: 7.7 G/DL — LOW (ref 13–17)
HGB BLD-MCNC: 7.7 G/DL — LOW (ref 13–17)
HGB BLD-MCNC: 7.8 G/DL — LOW (ref 13–17)
HGB BLD-MCNC: 8 G/DL — LOW (ref 13–17)
HGB BLD-MCNC: 8 G/DL — LOW (ref 13–17)
HGB BLD-MCNC: 8.2 G/DL — LOW (ref 13–17)
HGB BLD-MCNC: 8.3 G/DL — LOW (ref 13–17)
HGB BLD-MCNC: 8.4 G/DL — LOW (ref 13–17)
HGB BLD-MCNC: 8.5 G/DL — LOW (ref 13–17)
HGB BLD-MCNC: 8.5 G/DL — LOW (ref 13–17)
HGB BLD-MCNC: 8.6 G/DL — LOW (ref 13–17)
HGB BLD-MCNC: 8.6 G/DL — LOW (ref 13–17)
HGB BLD-MCNC: 9 G/DL — LOW (ref 13–17)
HGB BLD-MCNC: 9.1 G/DL — LOW (ref 13–17)
HGB BLD-MCNC: 9.8 G/DL — LOW (ref 13–17)
HGB BLD-MCNC: 9.9 G/DL — LOW (ref 13–17)
HMPV RNA SPEC QL NAA+PROBE: SIGNIFICANT CHANGE UP
HPIV1 RNA SPEC QL NAA+PROBE: SIGNIFICANT CHANGE UP
HPIV2 RNA SPEC QL NAA+PROBE: SIGNIFICANT CHANGE UP
HPIV3 RNA SPEC QL NAA+PROBE: SIGNIFICANT CHANGE UP
HPIV4 RNA SPEC QL NAA+PROBE: SIGNIFICANT CHANGE UP
IANC: 10.57 K/UL — HIGH (ref 1.8–7.4)
IANC: 4.47 K/UL — SIGNIFICANT CHANGE UP (ref 1.8–7.4)
IANC: 4.77 K/UL — SIGNIFICANT CHANGE UP (ref 1.8–7.4)
IANC: 5.04 K/UL — SIGNIFICANT CHANGE UP (ref 1.8–7.4)
IANC: 5.26 K/UL — SIGNIFICANT CHANGE UP (ref 1.8–7.4)
IANC: 5.95 K/UL — SIGNIFICANT CHANGE UP (ref 1.8–7.4)
IANC: 6.71 K/UL — SIGNIFICANT CHANGE UP (ref 1.8–7.4)
IANC: 7.37 K/UL — SIGNIFICANT CHANGE UP (ref 1.8–7.4)
IANC: 7.53 K/UL — HIGH (ref 1.8–7.4)
IANC: 9.7 K/UL — HIGH (ref 1.8–7.4)
IANC: 9.78 K/UL — HIGH (ref 1.8–7.4)
IMM GRANULOCYTES NFR BLD AUTO: 0.2 % — SIGNIFICANT CHANGE UP (ref 0–0.9)
IMM GRANULOCYTES NFR BLD AUTO: 0.4 % — SIGNIFICANT CHANGE UP (ref 0–0.9)
IMM GRANULOCYTES NFR BLD AUTO: 0.4 % — SIGNIFICANT CHANGE UP (ref 0–0.9)
IMM GRANULOCYTES NFR BLD AUTO: 0.5 % — SIGNIFICANT CHANGE UP (ref 0–0.9)
IMM GRANULOCYTES NFR BLD AUTO: 0.6 % — SIGNIFICANT CHANGE UP (ref 0–0.9)
IMM GRANULOCYTES NFR BLD AUTO: 0.7 % — SIGNIFICANT CHANGE UP (ref 0–0.9)
IMM GRANULOCYTES NFR BLD AUTO: 0.7 % — SIGNIFICANT CHANGE UP (ref 0–0.9)
IMM GRANULOCYTES NFR BLD AUTO: 0.8 % — SIGNIFICANT CHANGE UP (ref 0–0.9)
IMM GRANULOCYTES NFR BLD AUTO: 0.9 % — SIGNIFICANT CHANGE UP (ref 0–0.9)
INR BLD: 1.14 RATIO — SIGNIFICANT CHANGE UP (ref 0.88–1.16)
INR BLD: 1.25 RATIO — HIGH (ref 0.88–1.16)
INR BLD: 1.34 RATIO — HIGH (ref 0.88–1.16)
IRON SATN MFR SERPL: 11 % — LOW (ref 16–55)
IRON SATN MFR SERPL: 11 % — LOW (ref 16–55)
IRON SATN MFR SERPL: 25 UG/DL — LOW (ref 45–165)
IRON SATN MFR SERPL: 28 UG/DL — LOW (ref 45–165)
KETONES UR-MCNC: ABNORMAL
KETONES UR-MCNC: NEGATIVE — SIGNIFICANT CHANGE UP
LACTATE SERPL-SCNC: 1.3 MMOL/L — SIGNIFICANT CHANGE UP (ref 0.5–2)
LACTATE SERPL-SCNC: 1.6 MMOL/L — SIGNIFICANT CHANGE UP (ref 0.7–2)
LACTATE SERPL-SCNC: 2.2 MMOL/L — HIGH (ref 0.5–2)
LEUKOCYTE ESTERASE UR-ACNC: NEGATIVE — SIGNIFICANT CHANGE UP
LEUKOCYTE ESTERASE UR-ACNC: NEGATIVE — SIGNIFICANT CHANGE UP
LYMPHOCYTES # BLD AUTO: 0.66 K/UL — LOW (ref 1–3.3)
LYMPHOCYTES # BLD AUTO: 0.74 K/UL — LOW (ref 1–3.3)
LYMPHOCYTES # BLD AUTO: 0.78 K/UL — LOW (ref 1–3.3)
LYMPHOCYTES # BLD AUTO: 0.79 K/UL — LOW (ref 1–3.3)
LYMPHOCYTES # BLD AUTO: 0.91 K/UL — LOW (ref 1–3.3)
LYMPHOCYTES # BLD AUTO: 0.96 K/UL — LOW (ref 1–3.3)
LYMPHOCYTES # BLD AUTO: 0.96 K/UL — LOW (ref 1–3.3)
LYMPHOCYTES # BLD AUTO: 1.1 K/UL — SIGNIFICANT CHANGE UP (ref 1–3.3)
LYMPHOCYTES # BLD AUTO: 1.15 K/UL — SIGNIFICANT CHANGE UP (ref 1–3.3)
LYMPHOCYTES # BLD AUTO: 1.3 K/UL — SIGNIFICANT CHANGE UP (ref 1–3.3)
LYMPHOCYTES # BLD AUTO: 1.47 K/UL — SIGNIFICANT CHANGE UP (ref 1–3.3)
LYMPHOCYTES # BLD AUTO: 11.5 % — LOW (ref 13–44)
LYMPHOCYTES # BLD AUTO: 11.7 % — LOW (ref 13–44)
LYMPHOCYTES # BLD AUTO: 11.8 % — LOW (ref 13–44)
LYMPHOCYTES # BLD AUTO: 12.8 % — LOW (ref 13–44)
LYMPHOCYTES # BLD AUTO: 13.4 % — SIGNIFICANT CHANGE UP (ref 13–44)
LYMPHOCYTES # BLD AUTO: 13.9 % — SIGNIFICANT CHANGE UP (ref 13–44)
LYMPHOCYTES # BLD AUTO: 15.2 % — SIGNIFICANT CHANGE UP (ref 13–44)
LYMPHOCYTES # BLD AUTO: 18.4 % — SIGNIFICANT CHANGE UP (ref 13–44)
LYMPHOCYTES # BLD AUTO: 19.8 % — SIGNIFICANT CHANGE UP (ref 13–44)
LYMPHOCYTES # BLD AUTO: 2.72 K/UL — SIGNIFICANT CHANGE UP (ref 1–3.3)
LYMPHOCYTES # BLD AUTO: 6.7 % — LOW (ref 13–44)
LYMPHOCYTES # BLD AUTO: 7.4 % — LOW (ref 13–44)
LYMPHOCYTES # BLD AUTO: 7.8 % — LOW (ref 13–44)
LYMPHOCYTES # BLD AUTO: 8.5 % — LOW (ref 13–44)
LYMPHOCYTES # BLD AUTO: 8.6 % — LOW (ref 13–44)
M PNEUMO DNA SPEC QL NAA+PROBE: SIGNIFICANT CHANGE UP
MAGNESIUM SERPL-MCNC: 1.4 MG/DL — LOW (ref 1.6–2.6)
MAGNESIUM SERPL-MCNC: 1.5 MG/DL — LOW (ref 1.6–2.6)
MAGNESIUM SERPL-MCNC: 1.6 MG/DL — SIGNIFICANT CHANGE UP (ref 1.6–2.6)
MAGNESIUM SERPL-MCNC: 1.7 MG/DL — SIGNIFICANT CHANGE UP (ref 1.6–2.6)
MAGNESIUM SERPL-MCNC: 1.8 MG/DL — SIGNIFICANT CHANGE UP (ref 1.6–2.6)
MAGNESIUM SERPL-MCNC: 1.9 MG/DL — SIGNIFICANT CHANGE UP (ref 1.6–2.6)
MAGNESIUM SERPL-MCNC: 1.9 MG/DL — SIGNIFICANT CHANGE UP (ref 1.6–2.6)
MAGNESIUM SERPL-MCNC: 2 MG/DL — SIGNIFICANT CHANGE UP (ref 1.6–2.6)
MAGNESIUM SERPL-MCNC: 2 MG/DL — SIGNIFICANT CHANGE UP (ref 1.6–2.6)
MAGNESIUM SERPL-MCNC: 2.1 MG/DL — SIGNIFICANT CHANGE UP (ref 1.6–2.6)
MAGNESIUM SERPL-MCNC: 2.8 MG/DL — HIGH (ref 1.6–2.6)
MCHC RBC-ENTMCNC: 25.9 PG — LOW (ref 27–34)
MCHC RBC-ENTMCNC: 26 PG — LOW (ref 27–34)
MCHC RBC-ENTMCNC: 26.1 PG — LOW (ref 27–34)
MCHC RBC-ENTMCNC: 26.2 PG — LOW (ref 27–34)
MCHC RBC-ENTMCNC: 26.2 PG — LOW (ref 27–34)
MCHC RBC-ENTMCNC: 26.3 PG — LOW (ref 27–34)
MCHC RBC-ENTMCNC: 26.3 PG — LOW (ref 27–34)
MCHC RBC-ENTMCNC: 26.4 PG — LOW (ref 27–34)
MCHC RBC-ENTMCNC: 26.4 PG — LOW (ref 27–34)
MCHC RBC-ENTMCNC: 26.5 PG — LOW (ref 27–34)
MCHC RBC-ENTMCNC: 26.6 PG — LOW (ref 27–34)
MCHC RBC-ENTMCNC: 26.6 PG — LOW (ref 27–34)
MCHC RBC-ENTMCNC: 26.7 PG — LOW (ref 27–34)
MCHC RBC-ENTMCNC: 26.8 PG — LOW (ref 27–34)
MCHC RBC-ENTMCNC: 26.9 PG — LOW (ref 27–34)
MCHC RBC-ENTMCNC: 27 PG — SIGNIFICANT CHANGE UP (ref 27–34)
MCHC RBC-ENTMCNC: 27.1 PG — SIGNIFICANT CHANGE UP (ref 27–34)
MCHC RBC-ENTMCNC: 27.2 PG — SIGNIFICANT CHANGE UP (ref 27–34)
MCHC RBC-ENTMCNC: 27.3 PG — SIGNIFICANT CHANGE UP (ref 27–34)
MCHC RBC-ENTMCNC: 27.4 PG — SIGNIFICANT CHANGE UP (ref 27–34)
MCHC RBC-ENTMCNC: 27.5 PG — SIGNIFICANT CHANGE UP (ref 27–34)
MCHC RBC-ENTMCNC: 27.5 PG — SIGNIFICANT CHANGE UP (ref 27–34)
MCHC RBC-ENTMCNC: 27.6 PG — SIGNIFICANT CHANGE UP (ref 27–34)
MCHC RBC-ENTMCNC: 27.7 PG — SIGNIFICANT CHANGE UP (ref 27–34)
MCHC RBC-ENTMCNC: 27.8 PG — SIGNIFICANT CHANGE UP (ref 27–34)
MCHC RBC-ENTMCNC: 29.8 GM/DL — LOW (ref 32–36)
MCHC RBC-ENTMCNC: 30.1 GM/DL — LOW (ref 32–36)
MCHC RBC-ENTMCNC: 30.2 GM/DL — LOW (ref 32–36)
MCHC RBC-ENTMCNC: 30.4 GM/DL — LOW (ref 32–36)
MCHC RBC-ENTMCNC: 30.6 GM/DL — LOW (ref 32–36)
MCHC RBC-ENTMCNC: 30.6 GM/DL — LOW (ref 32–36)
MCHC RBC-ENTMCNC: 30.7 GM/DL — LOW (ref 32–36)
MCHC RBC-ENTMCNC: 30.8 GM/DL — LOW (ref 32–36)
MCHC RBC-ENTMCNC: 30.9 GM/DL — LOW (ref 32–36)
MCHC RBC-ENTMCNC: 31 GM/DL — LOW (ref 32–36)
MCHC RBC-ENTMCNC: 31.1 GM/DL — LOW (ref 32–36)
MCHC RBC-ENTMCNC: 31.1 GM/DL — LOW (ref 32–36)
MCHC RBC-ENTMCNC: 31.2 GM/DL — LOW (ref 32–36)
MCHC RBC-ENTMCNC: 31.2 GM/DL — LOW (ref 32–36)
MCHC RBC-ENTMCNC: 31.3 GM/DL — LOW (ref 32–36)
MCHC RBC-ENTMCNC: 31.5 GM/DL — LOW (ref 32–36)
MCHC RBC-ENTMCNC: 31.5 GM/DL — LOW (ref 32–36)
MCHC RBC-ENTMCNC: 31.6 GM/DL — LOW (ref 32–36)
MCHC RBC-ENTMCNC: 31.7 GM/DL — LOW (ref 32–36)
MCHC RBC-ENTMCNC: 31.8 GM/DL — LOW (ref 32–36)
MCHC RBC-ENTMCNC: 31.8 GM/DL — LOW (ref 32–36)
MCHC RBC-ENTMCNC: 31.9 GM/DL — LOW (ref 32–36)
MCHC RBC-ENTMCNC: 32 GM/DL — SIGNIFICANT CHANGE UP (ref 32–36)
MCHC RBC-ENTMCNC: 32 GM/DL — SIGNIFICANT CHANGE UP (ref 32–36)
MCHC RBC-ENTMCNC: 32.1 GM/DL — SIGNIFICANT CHANGE UP (ref 32–36)
MCHC RBC-ENTMCNC: 32.3 GM/DL — SIGNIFICANT CHANGE UP (ref 32–36)
MCHC RBC-ENTMCNC: 32.5 GM/DL — SIGNIFICANT CHANGE UP (ref 32–36)
MCHC RBC-ENTMCNC: 33.4 GM/DL — SIGNIFICANT CHANGE UP (ref 32–36)
MCHC RBC-ENTMCNC: 33.8 GM/DL — SIGNIFICANT CHANGE UP (ref 32–36)
MCHC RBC-ENTMCNC: 34.1 GM/DL — SIGNIFICANT CHANGE UP (ref 32–36)
MCV RBC AUTO: 80.4 FL — SIGNIFICANT CHANGE UP (ref 80–100)
MCV RBC AUTO: 80.5 FL — SIGNIFICANT CHANGE UP (ref 80–100)
MCV RBC AUTO: 81.6 FL — SIGNIFICANT CHANGE UP (ref 80–100)
MCV RBC AUTO: 82.1 FL — SIGNIFICANT CHANGE UP (ref 80–100)
MCV RBC AUTO: 83 FL — SIGNIFICANT CHANGE UP (ref 80–100)
MCV RBC AUTO: 83 FL — SIGNIFICANT CHANGE UP (ref 80–100)
MCV RBC AUTO: 83.1 FL — SIGNIFICANT CHANGE UP (ref 80–100)
MCV RBC AUTO: 83.4 FL — SIGNIFICANT CHANGE UP (ref 80–100)
MCV RBC AUTO: 84.2 FL — SIGNIFICANT CHANGE UP (ref 80–100)
MCV RBC AUTO: 84.7 FL — SIGNIFICANT CHANGE UP (ref 80–100)
MCV RBC AUTO: 84.8 FL — SIGNIFICANT CHANGE UP (ref 80–100)
MCV RBC AUTO: 84.9 FL — SIGNIFICANT CHANGE UP (ref 80–100)
MCV RBC AUTO: 85.4 FL — SIGNIFICANT CHANGE UP (ref 80–100)
MCV RBC AUTO: 85.5 FL — SIGNIFICANT CHANGE UP (ref 80–100)
MCV RBC AUTO: 85.6 FL — SIGNIFICANT CHANGE UP (ref 80–100)
MCV RBC AUTO: 85.9 FL — SIGNIFICANT CHANGE UP (ref 80–100)
MCV RBC AUTO: 86 FL — SIGNIFICANT CHANGE UP (ref 80–100)
MCV RBC AUTO: 86.1 FL — SIGNIFICANT CHANGE UP (ref 80–100)
MCV RBC AUTO: 86.4 FL — SIGNIFICANT CHANGE UP (ref 80–100)
MCV RBC AUTO: 86.5 FL — SIGNIFICANT CHANGE UP (ref 80–100)
MCV RBC AUTO: 86.5 FL — SIGNIFICANT CHANGE UP (ref 80–100)
MCV RBC AUTO: 86.6 FL — SIGNIFICANT CHANGE UP (ref 80–100)
MCV RBC AUTO: 86.7 FL — SIGNIFICANT CHANGE UP (ref 80–100)
MCV RBC AUTO: 86.7 FL — SIGNIFICANT CHANGE UP (ref 80–100)
MCV RBC AUTO: 86.8 FL — SIGNIFICANT CHANGE UP (ref 80–100)
MCV RBC AUTO: 87 FL — SIGNIFICANT CHANGE UP (ref 80–100)
MCV RBC AUTO: 87.5 FL — SIGNIFICANT CHANGE UP (ref 80–100)
MCV RBC AUTO: 87.6 FL — SIGNIFICANT CHANGE UP (ref 80–100)
MCV RBC AUTO: 87.9 FL — SIGNIFICANT CHANGE UP (ref 80–100)
MCV RBC AUTO: 88.3 FL — SIGNIFICANT CHANGE UP (ref 80–100)
MCV RBC AUTO: 88.8 FL — SIGNIFICANT CHANGE UP (ref 80–100)
MCV RBC AUTO: 89.4 FL — SIGNIFICANT CHANGE UP (ref 80–100)
MONOCYTES # BLD AUTO: 0.36 K/UL — SIGNIFICANT CHANGE UP (ref 0–0.9)
MONOCYTES # BLD AUTO: 0.46 K/UL — SIGNIFICANT CHANGE UP (ref 0–0.9)
MONOCYTES # BLD AUTO: 0.6 K/UL — SIGNIFICANT CHANGE UP (ref 0–0.9)
MONOCYTES # BLD AUTO: 0.65 K/UL — SIGNIFICANT CHANGE UP (ref 0–0.9)
MONOCYTES # BLD AUTO: 0.67 K/UL — SIGNIFICANT CHANGE UP (ref 0–0.9)
MONOCYTES # BLD AUTO: 0.68 K/UL — SIGNIFICANT CHANGE UP (ref 0–0.9)
MONOCYTES # BLD AUTO: 0.69 K/UL — SIGNIFICANT CHANGE UP (ref 0–0.9)
MONOCYTES # BLD AUTO: 0.73 K/UL — SIGNIFICANT CHANGE UP (ref 0–0.9)
MONOCYTES # BLD AUTO: 0.9 K/UL — SIGNIFICANT CHANGE UP (ref 0–0.9)
MONOCYTES # BLD AUTO: 0.9 K/UL — SIGNIFICANT CHANGE UP (ref 0–0.9)
MONOCYTES # BLD AUTO: 0.94 K/UL — HIGH (ref 0–0.9)
MONOCYTES # BLD AUTO: 0.94 K/UL — HIGH (ref 0–0.9)
MONOCYTES # BLD AUTO: 1.08 K/UL — HIGH (ref 0–0.9)
MONOCYTES # BLD AUTO: 1.13 K/UL — HIGH (ref 0–0.9)
MONOCYTES NFR BLD AUTO: 10 % — SIGNIFICANT CHANGE UP (ref 2–14)
MONOCYTES NFR BLD AUTO: 10.8 % — SIGNIFICANT CHANGE UP (ref 2–14)
MONOCYTES NFR BLD AUTO: 10.8 % — SIGNIFICANT CHANGE UP (ref 2–14)
MONOCYTES NFR BLD AUTO: 12 % — SIGNIFICANT CHANGE UP (ref 2–14)
MONOCYTES NFR BLD AUTO: 13.5 % — SIGNIFICANT CHANGE UP (ref 2–14)
MONOCYTES NFR BLD AUTO: 3.6 % — SIGNIFICANT CHANGE UP (ref 2–14)
MONOCYTES NFR BLD AUTO: 4.4 % — SIGNIFICANT CHANGE UP (ref 2–14)
MONOCYTES NFR BLD AUTO: 6.3 % — SIGNIFICANT CHANGE UP (ref 2–14)
MONOCYTES NFR BLD AUTO: 7.3 % — SIGNIFICANT CHANGE UP (ref 2–14)
MONOCYTES NFR BLD AUTO: 7.3 % — SIGNIFICANT CHANGE UP (ref 2–14)
MONOCYTES NFR BLD AUTO: 7.8 % — SIGNIFICANT CHANGE UP (ref 2–14)
MONOCYTES NFR BLD AUTO: 8.2 % — SIGNIFICANT CHANGE UP (ref 2–14)
MONOCYTES NFR BLD AUTO: 9.2 % — SIGNIFICANT CHANGE UP (ref 2–14)
MONOCYTES NFR BLD AUTO: 9.6 % — SIGNIFICANT CHANGE UP (ref 2–14)
MRSA PCR RESULT.: SIGNIFICANT CHANGE UP
NEUTROPHILS # BLD AUTO: 10.57 K/UL — HIGH (ref 1.8–7.4)
NEUTROPHILS # BLD AUTO: 4.47 K/UL — SIGNIFICANT CHANGE UP (ref 1.8–7.4)
NEUTROPHILS # BLD AUTO: 4.77 K/UL — SIGNIFICANT CHANGE UP (ref 1.8–7.4)
NEUTROPHILS # BLD AUTO: 5.04 K/UL — SIGNIFICANT CHANGE UP (ref 1.8–7.4)
NEUTROPHILS # BLD AUTO: 5.22 K/UL — SIGNIFICANT CHANGE UP (ref 1.8–7.4)
NEUTROPHILS # BLD AUTO: 5.26 K/UL — SIGNIFICANT CHANGE UP (ref 1.8–7.4)
NEUTROPHILS # BLD AUTO: 5.95 K/UL — SIGNIFICANT CHANGE UP (ref 1.8–7.4)
NEUTROPHILS # BLD AUTO: 6.71 K/UL — SIGNIFICANT CHANGE UP (ref 1.8–7.4)
NEUTROPHILS # BLD AUTO: 7.37 K/UL — SIGNIFICANT CHANGE UP (ref 1.8–7.4)
NEUTROPHILS # BLD AUTO: 7.53 K/UL — HIGH (ref 1.8–7.4)
NEUTROPHILS # BLD AUTO: 8.77 K/UL — HIGH (ref 1.8–7.4)
NEUTROPHILS # BLD AUTO: 9.18 K/UL — HIGH (ref 1.8–7.4)
NEUTROPHILS # BLD AUTO: 9.7 K/UL — HIGH (ref 1.8–7.4)
NEUTROPHILS # BLD AUTO: 9.78 K/UL — HIGH (ref 1.8–7.4)
NEUTROPHILS NFR BLD AUTO: 65.4 % — SIGNIFICANT CHANGE UP (ref 43–77)
NEUTROPHILS NFR BLD AUTO: 70.4 % — SIGNIFICANT CHANGE UP (ref 43–77)
NEUTROPHILS NFR BLD AUTO: 70.7 % — SIGNIFICANT CHANGE UP (ref 43–77)
NEUTROPHILS NFR BLD AUTO: 70.7 % — SIGNIFICANT CHANGE UP (ref 43–77)
NEUTROPHILS NFR BLD AUTO: 71.8 % — SIGNIFICANT CHANGE UP (ref 43–77)
NEUTROPHILS NFR BLD AUTO: 74.3 % — SIGNIFICANT CHANGE UP (ref 43–77)
NEUTROPHILS NFR BLD AUTO: 74.4 % — SIGNIFICANT CHANGE UP (ref 43–77)
NEUTROPHILS NFR BLD AUTO: 75 % — SIGNIFICANT CHANGE UP (ref 43–77)
NEUTROPHILS NFR BLD AUTO: 77.4 % — HIGH (ref 43–77)
NEUTROPHILS NFR BLD AUTO: 81.5 % — HIGH (ref 43–77)
NEUTROPHILS NFR BLD AUTO: 82.3 % — HIGH (ref 43–77)
NEUTROPHILS NFR BLD AUTO: 84.3 % — HIGH (ref 43–77)
NEUTROPHILS NFR BLD AUTO: 86.8 % — HIGH (ref 43–77)
NEUTROPHILS NFR BLD AUTO: 88.4 % — HIGH (ref 43–77)
NITRITE UR-MCNC: NEGATIVE — SIGNIFICANT CHANGE UP
NITRITE UR-MCNC: NEGATIVE — SIGNIFICANT CHANGE UP
NRBC # BLD: 0 /100 WBCS — SIGNIFICANT CHANGE UP (ref 0–0)
NRBC # FLD: 0 K/UL — SIGNIFICANT CHANGE UP (ref 0–0)
PH UR: 5 — SIGNIFICANT CHANGE UP (ref 5–8)
PH UR: 6 — SIGNIFICANT CHANGE UP (ref 5–8)
PHOSPHATE SERPL-MCNC: 2.5 MG/DL — SIGNIFICANT CHANGE UP (ref 2.5–4.5)
PHOSPHATE SERPL-MCNC: 2.6 MG/DL — SIGNIFICANT CHANGE UP (ref 2.5–4.5)
PHOSPHATE SERPL-MCNC: 2.7 MG/DL — SIGNIFICANT CHANGE UP (ref 2.5–4.5)
PHOSPHATE SERPL-MCNC: 2.7 MG/DL — SIGNIFICANT CHANGE UP (ref 2.5–4.5)
PHOSPHATE SERPL-MCNC: 2.8 MG/DL — SIGNIFICANT CHANGE UP (ref 2.5–4.5)
PHOSPHATE SERPL-MCNC: 2.9 MG/DL — SIGNIFICANT CHANGE UP (ref 2.5–4.5)
PHOSPHATE SERPL-MCNC: 3 MG/DL — SIGNIFICANT CHANGE UP (ref 2.5–4.5)
PHOSPHATE SERPL-MCNC: 3.1 MG/DL — SIGNIFICANT CHANGE UP (ref 2.5–4.5)
PHOSPHATE SERPL-MCNC: 3.2 MG/DL — SIGNIFICANT CHANGE UP (ref 2.5–4.5)
PHOSPHATE SERPL-MCNC: 3.4 MG/DL — SIGNIFICANT CHANGE UP (ref 2.5–4.5)
PHOSPHATE SERPL-MCNC: 3.5 MG/DL — SIGNIFICANT CHANGE UP (ref 2.5–4.5)
PHOSPHATE SERPL-MCNC: 3.7 MG/DL — SIGNIFICANT CHANGE UP (ref 2.5–4.5)
PHOSPHATE SERPL-MCNC: 3.9 MG/DL — SIGNIFICANT CHANGE UP (ref 2.5–4.5)
PHOSPHATE SERPL-MCNC: 4.1 MG/DL — SIGNIFICANT CHANGE UP (ref 2.5–4.5)
PHOSPHATE SERPL-MCNC: 4.2 MG/DL — SIGNIFICANT CHANGE UP (ref 2.5–4.5)
PHOSPHATE SERPL-MCNC: 4.2 MG/DL — SIGNIFICANT CHANGE UP (ref 2.5–4.5)
PHOSPHATE SERPL-MCNC: 4.3 MG/DL — SIGNIFICANT CHANGE UP (ref 2.5–4.5)
PHOSPHATE SERPL-MCNC: 4.3 MG/DL — SIGNIFICANT CHANGE UP (ref 2.5–4.5)
PHOSPHATE SERPL-MCNC: 4.5 MG/DL — SIGNIFICANT CHANGE UP (ref 2.5–4.5)
PHOSPHATE SERPL-MCNC: 5 MG/DL — HIGH (ref 2.5–4.5)
PHOSPHATE SERPL-MCNC: 5.1 MG/DL — HIGH (ref 2.5–4.5)
PLATELET # BLD AUTO: 256 K/UL — SIGNIFICANT CHANGE UP (ref 150–400)
PLATELET # BLD AUTO: 257 K/UL — SIGNIFICANT CHANGE UP (ref 150–400)
PLATELET # BLD AUTO: 259 K/UL — SIGNIFICANT CHANGE UP (ref 150–400)
PLATELET # BLD AUTO: 267 K/UL — SIGNIFICANT CHANGE UP (ref 150–400)
PLATELET # BLD AUTO: 267 K/UL — SIGNIFICANT CHANGE UP (ref 150–400)
PLATELET # BLD AUTO: 276 K/UL — SIGNIFICANT CHANGE UP (ref 150–400)
PLATELET # BLD AUTO: 289 K/UL — SIGNIFICANT CHANGE UP (ref 150–400)
PLATELET # BLD AUTO: 293 K/UL — SIGNIFICANT CHANGE UP (ref 150–400)
PLATELET # BLD AUTO: 309 K/UL — SIGNIFICANT CHANGE UP (ref 150–400)
PLATELET # BLD AUTO: 311 K/UL — SIGNIFICANT CHANGE UP (ref 150–400)
PLATELET # BLD AUTO: 325 K/UL — SIGNIFICANT CHANGE UP (ref 150–400)
PLATELET # BLD AUTO: 327 K/UL — SIGNIFICANT CHANGE UP (ref 150–400)
PLATELET # BLD AUTO: 327 K/UL — SIGNIFICANT CHANGE UP (ref 150–400)
PLATELET # BLD AUTO: 333 K/UL — SIGNIFICANT CHANGE UP (ref 150–400)
PLATELET # BLD AUTO: 362 K/UL — SIGNIFICANT CHANGE UP (ref 150–400)
PLATELET # BLD AUTO: 364 K/UL — SIGNIFICANT CHANGE UP (ref 150–400)
PLATELET # BLD AUTO: 383 K/UL — SIGNIFICANT CHANGE UP (ref 150–400)
PLATELET # BLD AUTO: 411 K/UL — HIGH (ref 150–400)
PLATELET # BLD AUTO: 431 K/UL — HIGH (ref 150–400)
PLATELET # BLD AUTO: 478 K/UL — HIGH (ref 150–400)
PLATELET # BLD AUTO: 479 K/UL — HIGH (ref 150–400)
PLATELET # BLD AUTO: 480 K/UL — HIGH (ref 150–400)
PLATELET # BLD AUTO: 488 K/UL — HIGH (ref 150–400)
PLATELET # BLD AUTO: 490 K/UL — HIGH (ref 150–400)
PLATELET # BLD AUTO: 495 K/UL — HIGH (ref 150–400)
PLATELET # BLD AUTO: 496 K/UL — HIGH (ref 150–400)
PLATELET # BLD AUTO: 510 K/UL — HIGH (ref 150–400)
PLATELET # BLD AUTO: 518 K/UL — HIGH (ref 150–400)
PLATELET # BLD AUTO: 528 K/UL — HIGH (ref 150–400)
PLATELET # BLD AUTO: 543 K/UL — HIGH (ref 150–400)
PLATELET # BLD AUTO: 544 K/UL — HIGH (ref 150–400)
PLATELET # BLD AUTO: 548 K/UL — HIGH (ref 150–400)
PLATELET # BLD AUTO: 567 K/UL — HIGH (ref 150–400)
PLATELET # BLD AUTO: 576 K/UL — HIGH (ref 150–400)
PLATELET # BLD AUTO: 586 K/UL — HIGH (ref 150–400)
PLATELET # BLD AUTO: 594 K/UL — HIGH (ref 150–400)
PLATELET # BLD AUTO: 616 K/UL — HIGH (ref 150–400)
PLATELET # BLD AUTO: 638 K/UL — HIGH (ref 150–400)
PLATELET # BLD AUTO: 663 K/UL — HIGH (ref 150–400)
POTASSIUM SERPL-MCNC: 3.3 MMOL/L — LOW (ref 3.5–5.3)
POTASSIUM SERPL-MCNC: 3.4 MMOL/L — LOW (ref 3.5–5.3)
POTASSIUM SERPL-MCNC: 3.4 MMOL/L — LOW (ref 3.5–5.3)
POTASSIUM SERPL-MCNC: 3.6 MMOL/L — SIGNIFICANT CHANGE UP (ref 3.5–5.3)
POTASSIUM SERPL-MCNC: 3.7 MMOL/L — SIGNIFICANT CHANGE UP (ref 3.5–5.3)
POTASSIUM SERPL-MCNC: 3.8 MMOL/L — SIGNIFICANT CHANGE UP (ref 3.5–5.3)
POTASSIUM SERPL-MCNC: 3.9 MMOL/L — SIGNIFICANT CHANGE UP (ref 3.5–5.3)
POTASSIUM SERPL-MCNC: 4 MMOL/L — SIGNIFICANT CHANGE UP (ref 3.5–5.3)
POTASSIUM SERPL-MCNC: 4.1 MMOL/L — SIGNIFICANT CHANGE UP (ref 3.5–5.3)
POTASSIUM SERPL-MCNC: 4.2 MMOL/L — SIGNIFICANT CHANGE UP (ref 3.5–5.3)
POTASSIUM SERPL-MCNC: 4.3 MMOL/L — SIGNIFICANT CHANGE UP (ref 3.5–5.3)
POTASSIUM SERPL-MCNC: 4.3 MMOL/L — SIGNIFICANT CHANGE UP (ref 3.5–5.3)
POTASSIUM SERPL-SCNC: 3.3 MMOL/L — LOW (ref 3.5–5.3)
POTASSIUM SERPL-SCNC: 3.4 MMOL/L — LOW (ref 3.5–5.3)
POTASSIUM SERPL-SCNC: 3.4 MMOL/L — LOW (ref 3.5–5.3)
POTASSIUM SERPL-SCNC: 3.6 MMOL/L — SIGNIFICANT CHANGE UP (ref 3.5–5.3)
POTASSIUM SERPL-SCNC: 3.7 MMOL/L — SIGNIFICANT CHANGE UP (ref 3.5–5.3)
POTASSIUM SERPL-SCNC: 3.8 MMOL/L — SIGNIFICANT CHANGE UP (ref 3.5–5.3)
POTASSIUM SERPL-SCNC: 3.9 MMOL/L — SIGNIFICANT CHANGE UP (ref 3.5–5.3)
POTASSIUM SERPL-SCNC: 4 MMOL/L — SIGNIFICANT CHANGE UP (ref 3.5–5.3)
POTASSIUM SERPL-SCNC: 4.1 MMOL/L — SIGNIFICANT CHANGE UP (ref 3.5–5.3)
POTASSIUM SERPL-SCNC: 4.2 MMOL/L — SIGNIFICANT CHANGE UP (ref 3.5–5.3)
POTASSIUM SERPL-SCNC: 4.3 MMOL/L — SIGNIFICANT CHANGE UP (ref 3.5–5.3)
POTASSIUM SERPL-SCNC: 4.3 MMOL/L — SIGNIFICANT CHANGE UP (ref 3.5–5.3)
PREALB SERPL-MCNC: 10 MG/DL — LOW (ref 20–40)
PROCALCITONIN SERPL-MCNC: 0.09 NG/ML — SIGNIFICANT CHANGE UP (ref 0.02–0.1)
PROT SERPL-MCNC: 6 G/DL — SIGNIFICANT CHANGE UP (ref 6–8.3)
PROT SERPL-MCNC: 6.5 GM/DL — SIGNIFICANT CHANGE UP (ref 6–8.3)
PROT SERPL-MCNC: 6.9 GM/DL — SIGNIFICANT CHANGE UP (ref 6–8.3)
PROT SERPL-MCNC: 7.6 GM/DL — SIGNIFICANT CHANGE UP (ref 6–8.3)
PROT UR-MCNC: ABNORMAL
PROT UR-MCNC: NEGATIVE — SIGNIFICANT CHANGE UP
PROTHROM AB SERPL-ACNC: 13.3 SEC — SIGNIFICANT CHANGE UP (ref 10.5–13.4)
PROTHROM AB SERPL-ACNC: 14.5 SEC — HIGH (ref 10.5–13.4)
PROTHROM AB SERPL-ACNC: 15.6 SEC — HIGH (ref 10.5–13.4)
RAPID RVP RESULT: SIGNIFICANT CHANGE UP
RBC # BLD: 2.42 M/UL — LOW (ref 4.2–5.8)
RBC # BLD: 2.43 M/UL — LOW (ref 4.2–5.8)
RBC # BLD: 2.43 M/UL — LOW (ref 4.2–5.8)
RBC # BLD: 2.46 M/UL — LOW (ref 4.2–5.8)
RBC # BLD: 2.49 M/UL — LOW (ref 4.2–5.8)
RBC # BLD: 2.57 M/UL — LOW (ref 4.2–5.8)
RBC # BLD: 2.57 M/UL — LOW (ref 4.2–5.8)
RBC # BLD: 2.62 M/UL — LOW (ref 4.2–5.8)
RBC # BLD: 2.64 M/UL — LOW (ref 4.2–5.8)
RBC # BLD: 2.69 M/UL — LOW (ref 4.2–5.8)
RBC # BLD: 2.7 M/UL — LOW (ref 4.2–5.8)
RBC # BLD: 2.71 M/UL — LOW (ref 4.2–5.8)
RBC # BLD: 2.72 M/UL — LOW (ref 4.2–5.8)
RBC # BLD: 2.73 M/UL — LOW (ref 4.2–5.8)
RBC # BLD: 2.73 M/UL — LOW (ref 4.2–5.8)
RBC # BLD: 2.74 M/UL — LOW (ref 4.2–5.8)
RBC # BLD: 2.75 M/UL — LOW (ref 4.2–5.8)
RBC # BLD: 2.76 M/UL — LOW (ref 4.2–5.8)
RBC # BLD: 2.78 M/UL — LOW (ref 4.2–5.8)
RBC # BLD: 2.79 M/UL — LOW (ref 4.2–5.8)
RBC # BLD: 2.81 M/UL — LOW (ref 4.2–5.8)
RBC # BLD: 2.82 M/UL — LOW (ref 4.2–5.8)
RBC # BLD: 2.85 M/UL — LOW (ref 4.2–5.8)
RBC # BLD: 2.87 M/UL — LOW (ref 4.2–5.8)
RBC # BLD: 2.87 M/UL — LOW (ref 4.2–5.8)
RBC # BLD: 2.95 M/UL — LOW (ref 4.2–5.8)
RBC # BLD: 2.96 M/UL — LOW (ref 4.2–5.8)
RBC # BLD: 2.97 M/UL — LOW (ref 4.2–5.8)
RBC # BLD: 2.97 M/UL — LOW (ref 4.2–5.8)
RBC # BLD: 3.04 M/UL — LOW (ref 4.2–5.8)
RBC # BLD: 3.07 M/UL — LOW (ref 4.2–5.8)
RBC # BLD: 3.08 M/UL — LOW (ref 4.2–5.8)
RBC # BLD: 3.11 M/UL — LOW (ref 4.2–5.8)
RBC # BLD: 3.14 M/UL — LOW (ref 4.2–5.8)
RBC # BLD: 3.18 M/UL — LOW (ref 4.2–5.8)
RBC # BLD: 3.24 M/UL — LOW (ref 4.2–5.8)
RBC # BLD: 3.36 M/UL — LOW (ref 4.2–5.8)
RBC # BLD: 3.48 M/UL — LOW (ref 4.2–5.8)
RBC # BLD: 3.57 M/UL — LOW (ref 4.2–5.8)
RBC # BLD: 3.59 M/UL — LOW (ref 4.2–5.8)
RBC # BLD: 3.67 M/UL — LOW (ref 4.2–5.8)
RBC # FLD: 14.7 % — HIGH (ref 10.3–14.5)
RBC # FLD: 14.7 % — HIGH (ref 10.3–14.5)
RBC # FLD: 14.8 % — HIGH (ref 10.3–14.5)
RBC # FLD: 14.9 % — HIGH (ref 10.3–14.5)
RBC # FLD: 15.2 % — HIGH (ref 10.3–14.5)
RBC # FLD: 15.3 % — HIGH (ref 10.3–14.5)
RBC # FLD: 15.4 % — HIGH (ref 10.3–14.5)
RBC # FLD: 15.5 % — HIGH (ref 10.3–14.5)
RBC # FLD: 15.6 % — HIGH (ref 10.3–14.5)
RBC # FLD: 15.6 % — HIGH (ref 10.3–14.5)
RBC # FLD: 15.7 % — HIGH (ref 10.3–14.5)
RBC # FLD: 15.8 % — HIGH (ref 10.3–14.5)
RBC # FLD: 15.9 % — HIGH (ref 10.3–14.5)
RBC # FLD: 16.2 % — HIGH (ref 10.3–14.5)
RBC # FLD: 16.2 % — HIGH (ref 10.3–14.5)
RBC # FLD: 16.3 % — HIGH (ref 10.3–14.5)
RBC # FLD: 16.4 % — HIGH (ref 10.3–14.5)
RBC # FLD: 16.5 % — HIGH (ref 10.3–14.5)
RBC # FLD: 16.6 % — HIGH (ref 10.3–14.5)
RBC # FLD: 16.7 % — HIGH (ref 10.3–14.5)
RH IG SCN BLD-IMP: POSITIVE — SIGNIFICANT CHANGE UP
RSV RNA NPH QL NAA+NON-PROBE: SIGNIFICANT CHANGE UP
RSV RNA SPEC QL NAA+PROBE: SIGNIFICANT CHANGE UP
RV+EV RNA SPEC QL NAA+PROBE: SIGNIFICANT CHANGE UP
S AUREUS DNA NOSE QL NAA+PROBE: SIGNIFICANT CHANGE UP
SARS-COV-2 RNA SPEC QL NAA+PROBE: SIGNIFICANT CHANGE UP
SODIUM SERPL-SCNC: 128 MMOL/L — LOW (ref 135–145)
SODIUM SERPL-SCNC: 129 MMOL/L — LOW (ref 135–145)
SODIUM SERPL-SCNC: 130 MMOL/L — LOW (ref 135–145)
SODIUM SERPL-SCNC: 130 MMOL/L — LOW (ref 135–145)
SODIUM SERPL-SCNC: 131 MMOL/L — LOW (ref 135–145)
SODIUM SERPL-SCNC: 131 MMOL/L — LOW (ref 135–145)
SODIUM SERPL-SCNC: 132 MMOL/L — LOW (ref 135–145)
SODIUM SERPL-SCNC: 132 MMOL/L — LOW (ref 135–145)
SODIUM SERPL-SCNC: 133 MMOL/L — LOW (ref 135–145)
SODIUM SERPL-SCNC: 134 MMOL/L — LOW (ref 135–145)
SODIUM SERPL-SCNC: 135 MMOL/L — SIGNIFICANT CHANGE UP (ref 135–145)
SODIUM SERPL-SCNC: 136 MMOL/L — SIGNIFICANT CHANGE UP (ref 135–145)
SODIUM SERPL-SCNC: 138 MMOL/L — SIGNIFICANT CHANGE UP (ref 135–145)
SODIUM SERPL-SCNC: 139 MMOL/L — SIGNIFICANT CHANGE UP (ref 135–145)
SODIUM SERPL-SCNC: 139 MMOL/L — SIGNIFICANT CHANGE UP (ref 135–145)
SP GR SPEC: 1 — LOW (ref 1.01–1.02)
SP GR SPEC: 1.02 — SIGNIFICANT CHANGE UP (ref 1.01–1.05)
SPECIMEN SOURCE: SIGNIFICANT CHANGE UP
T4 FREE SERPL-MCNC: 1.34 NG/DL — SIGNIFICANT CHANGE UP (ref 0.76–1.46)
THYROGLOB AB FLD IA-ACNC: <20 IU/ML — SIGNIFICANT CHANGE UP
THYROGLOB AB SERPL-ACNC: <20 IU/ML — SIGNIFICANT CHANGE UP
THYROGLOB SERPL-MCNC: 38 NG/ML — SIGNIFICANT CHANGE UP (ref 1.6–59.9)
TIBC SERPL-MCNC: 234 UG/DL — SIGNIFICANT CHANGE UP (ref 220–430)
TIBC SERPL-MCNC: 250 UG/DL — SIGNIFICANT CHANGE UP (ref 220–430)
TROPONIN I, HIGH SENSITIVITY RESULT: 10.19 NG/L — SIGNIFICANT CHANGE UP
TSH SERPL-MCNC: 4.92 UU/ML — HIGH (ref 0.34–4.82)
UIBC SERPL-MCNC: 209 UG/DL — SIGNIFICANT CHANGE UP (ref 110–370)
UIBC SERPL-MCNC: 222 UG/DL — SIGNIFICANT CHANGE UP (ref 110–370)
UROBILINOGEN FLD QL: NEGATIVE — SIGNIFICANT CHANGE UP
UROBILINOGEN FLD QL: SIGNIFICANT CHANGE UP
VANCOMYCIN TROUGH SERPL-MCNC: 10 UG/ML — SIGNIFICANT CHANGE UP (ref 10–20)
VANCOMYCIN TROUGH SERPL-MCNC: 11.8 UG/ML — SIGNIFICANT CHANGE UP (ref 10–20)
VANCOMYCIN TROUGH SERPL-MCNC: 16 UG/ML — SIGNIFICANT CHANGE UP (ref 10–20)
VANCOMYCIN TROUGH SERPL-MCNC: 4.6 UG/ML — LOW (ref 10–20)
VANCOMYCIN TROUGH SERPL-MCNC: 8.1 UG/ML — LOW (ref 10–20)
VIT B12 SERPL-MCNC: >2000 PG/ML — HIGH (ref 232–1245)
WBC # BLD: 10.56 K/UL — HIGH (ref 3.8–10.5)
WBC # BLD: 11.61 K/UL — HIGH (ref 3.8–10.5)
WBC # BLD: 11.81 K/UL — HIGH (ref 3.8–10.5)
WBC # BLD: 12.62 K/UL — HIGH (ref 3.8–10.5)
WBC # BLD: 12.65 K/UL — HIGH (ref 3.8–10.5)
WBC # BLD: 12.67 K/UL — HIGH (ref 3.8–10.5)
WBC # BLD: 12.85 K/UL — HIGH (ref 3.8–10.5)
WBC # BLD: 13.3 K/UL — HIGH (ref 3.8–10.5)
WBC # BLD: 13.74 K/UL — HIGH (ref 3.8–10.5)
WBC # BLD: 14.66 K/UL — HIGH (ref 3.8–10.5)
WBC # BLD: 15.46 K/UL — HIGH (ref 3.8–10.5)
WBC # BLD: 15.65 K/UL — HIGH (ref 3.8–10.5)
WBC # BLD: 16.33 K/UL — HIGH (ref 3.8–10.5)
WBC # BLD: 17.33 K/UL — HIGH (ref 3.8–10.5)
WBC # BLD: 19.95 K/UL — HIGH (ref 3.8–10.5)
WBC # BLD: 20.53 K/UL — HIGH (ref 3.8–10.5)
WBC # BLD: 20.66 K/UL — HIGH (ref 3.8–10.5)
WBC # BLD: 21.62 K/UL — HIGH (ref 3.8–10.5)
WBC # BLD: 4.75 K/UL — SIGNIFICANT CHANGE UP (ref 3.8–10.5)
WBC # BLD: 4.84 K/UL — SIGNIFICANT CHANGE UP (ref 3.8–10.5)
WBC # BLD: 5.07 K/UL — SIGNIFICANT CHANGE UP (ref 3.8–10.5)
WBC # BLD: 5.42 K/UL — SIGNIFICANT CHANGE UP (ref 3.8–10.5)
WBC # BLD: 5.6 K/UL — SIGNIFICANT CHANGE UP (ref 3.8–10.5)
WBC # BLD: 5.9 K/UL — SIGNIFICANT CHANGE UP (ref 3.8–10.5)
WBC # BLD: 6.14 K/UL — SIGNIFICANT CHANGE UP (ref 3.8–10.5)
WBC # BLD: 6.31 K/UL — SIGNIFICANT CHANGE UP (ref 3.8–10.5)
WBC # BLD: 6.39 K/UL — SIGNIFICANT CHANGE UP (ref 3.8–10.5)
WBC # BLD: 6.41 K/UL — SIGNIFICANT CHANGE UP (ref 3.8–10.5)
WBC # BLD: 6.5 K/UL — SIGNIFICANT CHANGE UP (ref 3.8–10.5)
WBC # BLD: 6.79 K/UL — SIGNIFICANT CHANGE UP (ref 3.8–10.5)
WBC # BLD: 6.86 K/UL — SIGNIFICANT CHANGE UP (ref 3.8–10.5)
WBC # BLD: 6.9 K/UL — SIGNIFICANT CHANGE UP (ref 3.8–10.5)
WBC # BLD: 7.48 K/UL — SIGNIFICANT CHANGE UP (ref 3.8–10.5)
WBC # BLD: 7.56 K/UL — SIGNIFICANT CHANGE UP (ref 3.8–10.5)
WBC # BLD: 7.67 K/UL — SIGNIFICANT CHANGE UP (ref 3.8–10.5)
WBC # BLD: 7.7 K/UL — SIGNIFICANT CHANGE UP (ref 3.8–10.5)
WBC # BLD: 7.98 K/UL — SIGNIFICANT CHANGE UP (ref 3.8–10.5)
WBC # BLD: 9.24 K/UL — SIGNIFICANT CHANGE UP (ref 3.8–10.5)
WBC # BLD: 9.36 K/UL — SIGNIFICANT CHANGE UP (ref 3.8–10.5)
WBC # BLD: 9.82 K/UL — SIGNIFICANT CHANGE UP (ref 3.8–10.5)
WBC # BLD: 9.92 K/UL — SIGNIFICANT CHANGE UP (ref 3.8–10.5)
WBC # FLD AUTO: 10.56 K/UL — HIGH (ref 3.8–10.5)
WBC # FLD AUTO: 11.61 K/UL — HIGH (ref 3.8–10.5)
WBC # FLD AUTO: 11.81 K/UL — HIGH (ref 3.8–10.5)
WBC # FLD AUTO: 12.62 K/UL — HIGH (ref 3.8–10.5)
WBC # FLD AUTO: 12.65 K/UL — HIGH (ref 3.8–10.5)
WBC # FLD AUTO: 12.67 K/UL — HIGH (ref 3.8–10.5)
WBC # FLD AUTO: 12.85 K/UL — HIGH (ref 3.8–10.5)
WBC # FLD AUTO: 13.3 K/UL — HIGH (ref 3.8–10.5)
WBC # FLD AUTO: 13.74 K/UL — HIGH (ref 3.8–10.5)
WBC # FLD AUTO: 14.66 K/UL — HIGH (ref 3.8–10.5)
WBC # FLD AUTO: 15.46 K/UL — HIGH (ref 3.8–10.5)
WBC # FLD AUTO: 15.65 K/UL — HIGH (ref 3.8–10.5)
WBC # FLD AUTO: 16.33 K/UL — HIGH (ref 3.8–10.5)
WBC # FLD AUTO: 17.33 K/UL — HIGH (ref 3.8–10.5)
WBC # FLD AUTO: 19.95 K/UL — HIGH (ref 3.8–10.5)
WBC # FLD AUTO: 20.53 K/UL — HIGH (ref 3.8–10.5)
WBC # FLD AUTO: 20.66 K/UL — HIGH (ref 3.8–10.5)
WBC # FLD AUTO: 21.62 K/UL — HIGH (ref 3.8–10.5)
WBC # FLD AUTO: 4.75 K/UL — SIGNIFICANT CHANGE UP (ref 3.8–10.5)
WBC # FLD AUTO: 4.84 K/UL — SIGNIFICANT CHANGE UP (ref 3.8–10.5)
WBC # FLD AUTO: 5.07 K/UL — SIGNIFICANT CHANGE UP (ref 3.8–10.5)
WBC # FLD AUTO: 5.42 K/UL — SIGNIFICANT CHANGE UP (ref 3.8–10.5)
WBC # FLD AUTO: 5.6 K/UL — SIGNIFICANT CHANGE UP (ref 3.8–10.5)
WBC # FLD AUTO: 5.9 K/UL — SIGNIFICANT CHANGE UP (ref 3.8–10.5)
WBC # FLD AUTO: 6.14 K/UL — SIGNIFICANT CHANGE UP (ref 3.8–10.5)
WBC # FLD AUTO: 6.31 K/UL — SIGNIFICANT CHANGE UP (ref 3.8–10.5)
WBC # FLD AUTO: 6.39 K/UL — SIGNIFICANT CHANGE UP (ref 3.8–10.5)
WBC # FLD AUTO: 6.41 K/UL — SIGNIFICANT CHANGE UP (ref 3.8–10.5)
WBC # FLD AUTO: 6.5 K/UL — SIGNIFICANT CHANGE UP (ref 3.8–10.5)
WBC # FLD AUTO: 6.79 K/UL — SIGNIFICANT CHANGE UP (ref 3.8–10.5)
WBC # FLD AUTO: 6.86 K/UL — SIGNIFICANT CHANGE UP (ref 3.8–10.5)
WBC # FLD AUTO: 6.9 K/UL — SIGNIFICANT CHANGE UP (ref 3.8–10.5)
WBC # FLD AUTO: 7.48 K/UL — SIGNIFICANT CHANGE UP (ref 3.8–10.5)
WBC # FLD AUTO: 7.56 K/UL — SIGNIFICANT CHANGE UP (ref 3.8–10.5)
WBC # FLD AUTO: 7.67 K/UL — SIGNIFICANT CHANGE UP (ref 3.8–10.5)
WBC # FLD AUTO: 7.7 K/UL — SIGNIFICANT CHANGE UP (ref 3.8–10.5)
WBC # FLD AUTO: 7.98 K/UL — SIGNIFICANT CHANGE UP (ref 3.8–10.5)
WBC # FLD AUTO: 9.24 K/UL — SIGNIFICANT CHANGE UP (ref 3.8–10.5)
WBC # FLD AUTO: 9.36 K/UL — SIGNIFICANT CHANGE UP (ref 3.8–10.5)
WBC # FLD AUTO: 9.82 K/UL — SIGNIFICANT CHANGE UP (ref 3.8–10.5)
WBC # FLD AUTO: 9.92 K/UL — SIGNIFICANT CHANGE UP (ref 3.8–10.5)

## 2023-01-01 PROCEDURE — 73701 CT LOWER EXTREMITY W/DYE: CPT | Mod: 26,50

## 2023-01-01 PROCEDURE — 99232 SBSQ HOSP IP/OBS MODERATE 35: CPT

## 2023-01-01 PROCEDURE — 99497 ADVNCD CARE PLAN 30 MIN: CPT | Mod: 25

## 2023-01-01 PROCEDURE — 99233 SBSQ HOSP IP/OBS HIGH 50: CPT

## 2023-01-01 PROCEDURE — 71045 X-RAY EXAM CHEST 1 VIEW: CPT | Mod: 26

## 2023-01-01 PROCEDURE — 71260 CT THORAX DX C+: CPT | Mod: 26,MA

## 2023-01-01 PROCEDURE — 99223 1ST HOSP IP/OBS HIGH 75: CPT

## 2023-01-01 PROCEDURE — 99239 HOSP IP/OBS DSCHRG MGMT >30: CPT

## 2023-01-01 PROCEDURE — 99232 SBSQ HOSP IP/OBS MODERATE 35: CPT | Mod: GC

## 2023-01-01 PROCEDURE — 77334 RADIATION TREATMENT AID(S): CPT | Mod: 26

## 2023-01-01 PROCEDURE — 99222 1ST HOSP IP/OBS MODERATE 55: CPT | Mod: 25

## 2023-01-01 PROCEDURE — 99231 SBSQ HOSP IP/OBS SF/LOW 25: CPT

## 2023-01-01 PROCEDURE — 92523 SPEECH SOUND LANG COMPREHEN: CPT | Mod: GN

## 2023-01-01 PROCEDURE — 77295 3-D RADIOTHERAPY PLAN: CPT | Mod: 26

## 2023-01-01 PROCEDURE — 77290 THER RAD SIMULAJ FIELD CPLX: CPT

## 2023-01-01 PROCEDURE — 84439 ASSAY OF FREE THYROXINE: CPT

## 2023-01-01 PROCEDURE — 84100 ASSAY OF PHOSPHORUS: CPT

## 2023-01-01 PROCEDURE — 84145 PROCALCITONIN (PCT): CPT

## 2023-01-01 PROCEDURE — 85025 COMPLETE CBC W/AUTO DIFF WBC: CPT

## 2023-01-01 PROCEDURE — 99498 ADVNCD CARE PLAN ADDL 30 MIN: CPT

## 2023-01-01 PROCEDURE — 84432 ASSAY OF THYROGLOBULIN: CPT

## 2023-01-01 PROCEDURE — 43830 GSTRST OPEN WO CONSTJ TUBE: CPT

## 2023-01-01 PROCEDURE — 80048 BASIC METABOLIC PNL TOTAL CA: CPT

## 2023-01-01 PROCEDURE — 84443 ASSAY THYROID STIM HORMONE: CPT

## 2023-01-01 PROCEDURE — 83735 ASSAY OF MAGNESIUM: CPT

## 2023-01-01 PROCEDURE — 80053 COMPREHEN METABOLIC PANEL: CPT

## 2023-01-01 PROCEDURE — 77300 RADIATION THERAPY DOSE PLAN: CPT

## 2023-01-01 PROCEDURE — 93971 EXTREMITY STUDY: CPT | Mod: 26

## 2023-01-01 PROCEDURE — 80076 HEPATIC FUNCTION PANEL: CPT

## 2023-01-01 PROCEDURE — 83550 IRON BINDING TEST: CPT

## 2023-01-01 PROCEDURE — 31575 DIAGNOSTIC LARYNGOSCOPY: CPT

## 2023-01-01 PROCEDURE — 93010 ELECTROCARDIOGRAM REPORT: CPT

## 2023-01-01 PROCEDURE — 99221 1ST HOSP IP/OBS SF/LOW 40: CPT

## 2023-01-01 PROCEDURE — 84484 ASSAY OF TROPONIN QUANT: CPT

## 2023-01-01 PROCEDURE — 77263 THER RADIOLOGY TX PLNG CPLX: CPT

## 2023-01-01 PROCEDURE — 99291 CRITICAL CARE FIRST HOUR: CPT

## 2023-01-01 PROCEDURE — 43830 GSTRST OPEN WO CONSTJ TUBE: CPT | Mod: AS

## 2023-01-01 PROCEDURE — 77427 RADIATION TX MANAGEMENT X5: CPT

## 2023-01-01 PROCEDURE — 77290 THER RAD SIMULAJ FIELD CPLX: CPT | Mod: 26

## 2023-01-01 PROCEDURE — 74177 CT ABD & PELVIS W/CONTRAST: CPT | Mod: 26

## 2023-01-01 PROCEDURE — 99214 OFFICE O/P EST MOD 30 MIN: CPT | Mod: 25

## 2023-01-01 PROCEDURE — 82306 VITAMIN D 25 HYDROXY: CPT

## 2023-01-01 PROCEDURE — 99291 CRITICAL CARE FIRST HOUR: CPT | Mod: 25

## 2023-01-01 PROCEDURE — 77334 RADIATION TREATMENT AID(S): CPT

## 2023-01-01 PROCEDURE — 83540 ASSAY OF IRON: CPT

## 2023-01-01 PROCEDURE — 94640 AIRWAY INHALATION TREATMENT: CPT

## 2023-01-01 PROCEDURE — 99222 1ST HOSP IP/OBS MODERATE 55: CPT

## 2023-01-01 PROCEDURE — 77300 RADIATION THERAPY DOSE PLAN: CPT | Mod: 26

## 2023-01-01 PROCEDURE — 77262 THER RADIOLOGY TX PLNG INTRM: CPT

## 2023-01-01 PROCEDURE — 92610 EVALUATE SWALLOWING FUNCTION: CPT | Mod: GN

## 2023-01-01 PROCEDURE — 86800 THYROGLOBULIN ANTIBODY: CPT

## 2023-01-01 PROCEDURE — 31610 TRACHEOSTOMY FENEST SKIN FLP: CPT | Mod: GC

## 2023-01-01 PROCEDURE — 74018 RADEX ABDOMEN 1 VIEW: CPT | Mod: 26

## 2023-01-01 PROCEDURE — 99204 OFFICE O/P NEW MOD 45 MIN: CPT | Mod: 25

## 2023-01-01 PROCEDURE — 82728 ASSAY OF FERRITIN: CPT

## 2023-01-01 PROCEDURE — 74177 CT ABD & PELVIS W/CONTRAST: CPT

## 2023-01-01 PROCEDURE — 73701 CT LOWER EXTREMITY W/DYE: CPT | Mod: 50

## 2023-01-01 PROCEDURE — 70491 CT SOFT TISSUE NECK W/DYE: CPT | Mod: 26,MA

## 2023-01-01 PROCEDURE — 83605 ASSAY OF LACTIC ACID: CPT

## 2023-01-01 PROCEDURE — 36415 COLL VENOUS BLD VENIPUNCTURE: CPT

## 2023-01-01 PROCEDURE — 77280 THER RAD SIMULAJ FIELD SMPL: CPT | Mod: 26

## 2023-01-01 PROCEDURE — 82746 ASSAY OF FOLIC ACID SERUM: CPT

## 2023-01-01 PROCEDURE — 90792 PSYCH DIAG EVAL W/MED SRVCS: CPT

## 2023-01-01 PROCEDURE — 84134 ASSAY OF PREALBUMIN: CPT

## 2023-01-01 PROCEDURE — 86140 C-REACTIVE PROTEIN: CPT

## 2023-01-01 PROCEDURE — 82607 VITAMIN B-12: CPT

## 2023-01-01 PROCEDURE — 99222 1ST HOSP IP/OBS MODERATE 55: CPT | Mod: 57

## 2023-01-01 PROCEDURE — 77295 3-D RADIOTHERAPY PLAN: CPT

## 2023-01-01 DEVICE — FEEDING TUBE GASTROSTOMY MIC-KEY 18FR: Type: IMPLANTABLE DEVICE | Status: FUNCTIONAL

## 2023-01-01 DEVICE — SURGICEL 2 X 14": Type: IMPLANTABLE DEVICE | Status: FUNCTIONAL

## 2023-01-01 DEVICE — TUBE TRACH SZ 6 CUFF FLEX REUSE: Type: IMPLANTABLE DEVICE | Status: FUNCTIONAL

## 2023-01-01 RX ORDER — HYDROMORPHONE HYDROCHLORIDE 2 MG/ML
6 INJECTION INTRAMUSCULAR; INTRAVENOUS; SUBCUTANEOUS EVERY 4 HOURS
Refills: 0 | Status: DISCONTINUED | OUTPATIENT
Start: 2023-01-01 | End: 2023-01-01

## 2023-01-01 RX ORDER — METHADONE HYDROCHLORIDE 40 MG/1
5 TABLET ORAL ONCE
Refills: 0 | Status: DISCONTINUED | OUTPATIENT
Start: 2023-01-01 | End: 2023-01-01

## 2023-01-01 RX ORDER — IPRATROPIUM/ALBUTEROL SULFATE 18-103MCG
3 AEROSOL WITH ADAPTER (GRAM) INHALATION EVERY 6 HOURS
Refills: 0 | Status: DISCONTINUED | OUTPATIENT
Start: 2023-01-01 | End: 2023-01-01

## 2023-01-01 RX ORDER — IPRATROPIUM/ALBUTEROL SULFATE 18-103MCG
3 AEROSOL WITH ADAPTER (GRAM) INHALATION
Qty: 0 | Refills: 0 | DISCHARGE
Start: 2023-01-01

## 2023-01-01 RX ORDER — NICOTINE POLACRILEX 2 MG
1 GUM BUCCAL
Qty: 0 | Refills: 0 | DISCHARGE

## 2023-01-01 RX ORDER — HYDROMORPHONE HYDROCHLORIDE 2 MG/ML
0.5 INJECTION INTRAMUSCULAR; INTRAVENOUS; SUBCUTANEOUS
Refills: 0 | Status: DISCONTINUED | OUTPATIENT
Start: 2023-01-01 | End: 2023-01-01

## 2023-01-01 RX ORDER — FENTANYL CITRATE 50 UG/ML
1 INJECTION INTRAVENOUS
Refills: 0 | Status: DISCONTINUED | OUTPATIENT
Start: 2023-01-01 | End: 2023-01-01

## 2023-01-01 RX ORDER — ACETAMINOPHEN 500 MG
1000 TABLET ORAL ONCE
Refills: 0 | Status: DISCONTINUED | OUTPATIENT
Start: 2023-01-01 | End: 2023-01-01

## 2023-01-01 RX ORDER — METHADONE HYDROCHLORIDE 40 MG/1
10 TABLET ORAL THREE TIMES A DAY
Refills: 0 | Status: DISCONTINUED | OUTPATIENT
Start: 2023-01-01 | End: 2023-01-01

## 2023-01-01 RX ORDER — ACETAMINOPHEN 500 MG
1000 TABLET ORAL ONCE
Refills: 0 | Status: COMPLETED | OUTPATIENT
Start: 2023-01-01 | End: 2023-01-01

## 2023-01-01 RX ORDER — ENOXAPARIN SODIUM 100 MG/ML
30 INJECTION SUBCUTANEOUS EVERY 24 HOURS
Refills: 0 | Status: DISCONTINUED | OUTPATIENT
Start: 2023-01-01 | End: 2023-01-01

## 2023-01-01 RX ORDER — HYDROMORPHONE HYDROCHLORIDE 2 MG/ML
1 INJECTION INTRAMUSCULAR; INTRAVENOUS; SUBCUTANEOUS ONCE
Refills: 0 | Status: DISCONTINUED | OUTPATIENT
Start: 2023-01-01 | End: 2023-01-01

## 2023-01-01 RX ORDER — PANTOPRAZOLE SODIUM 20 MG/1
40 TABLET, DELAYED RELEASE ORAL
Qty: 0 | Refills: 0 | DISCHARGE
Start: 2023-01-01

## 2023-01-01 RX ORDER — METHADONE HYDROCHLORIDE 40 MG/1
15 TABLET ORAL THREE TIMES A DAY
Refills: 0 | Status: DISCONTINUED | OUTPATIENT
Start: 2023-01-01 | End: 2023-01-01

## 2023-01-01 RX ORDER — VANCOMYCIN HCL 1 G
1500 VIAL (EA) INTRAVENOUS EVERY 12 HOURS
Refills: 0 | Status: COMPLETED | OUTPATIENT
Start: 2023-01-01 | End: 2023-01-01

## 2023-01-01 RX ORDER — HYDROMORPHONE HYDROCHLORIDE 2 MG/ML
2 INJECTION INTRAMUSCULAR; INTRAVENOUS; SUBCUTANEOUS
Refills: 0 | Status: DISCONTINUED | OUTPATIENT
Start: 2023-01-01 | End: 2023-01-01

## 2023-01-01 RX ORDER — LANOLIN ALCOHOL/MO/W.PET/CERES
3 CREAM (GRAM) TOPICAL AT BEDTIME
Refills: 0 | Status: DISCONTINUED | OUTPATIENT
Start: 2023-01-01 | End: 2023-01-01

## 2023-01-01 RX ORDER — QUETIAPINE FUMARATE 200 MG/1
1 TABLET, FILM COATED ORAL
Qty: 0 | Refills: 0 | DISCHARGE

## 2023-01-01 RX ORDER — MAGNESIUM SULFATE 500 MG/ML
2 VIAL (ML) INJECTION ONCE
Refills: 0 | Status: COMPLETED | OUTPATIENT
Start: 2023-01-01 | End: 2023-01-01

## 2023-01-01 RX ORDER — HYDROMORPHONE HYDROCHLORIDE 2 MG/ML
30 INJECTION INTRAMUSCULAR; INTRAVENOUS; SUBCUTANEOUS
Refills: 0 | Status: DISCONTINUED | OUTPATIENT
Start: 2023-01-01 | End: 2023-01-01

## 2023-01-01 RX ORDER — PIPERACILLIN AND TAZOBACTAM 4; .5 G/20ML; G/20ML
3.38 INJECTION, POWDER, LYOPHILIZED, FOR SOLUTION INTRAVENOUS ONCE
Refills: 0 | Status: DISCONTINUED | OUTPATIENT
Start: 2023-01-01 | End: 2023-01-01

## 2023-01-01 RX ORDER — PIPERACILLIN AND TAZOBACTAM 4; .5 G/20ML; G/20ML
3.38 INJECTION, POWDER, LYOPHILIZED, FOR SOLUTION INTRAVENOUS EVERY 8 HOURS
Refills: 0 | Status: DISCONTINUED | OUTPATIENT
Start: 2023-01-01 | End: 2023-01-01

## 2023-01-01 RX ORDER — CHOLECALCIFEROL (VITAMIN D3) 125 MCG
1 CAPSULE ORAL
Qty: 0 | Refills: 0 | DISCHARGE

## 2023-01-01 RX ORDER — HYDROMORPHONE HYDROCHLORIDE 2 MG/ML
1 INJECTION INTRAMUSCULAR; INTRAVENOUS; SUBCUTANEOUS
Refills: 0 | Status: DISCONTINUED | OUTPATIENT
Start: 2023-01-01 | End: 2023-01-01

## 2023-01-01 RX ORDER — LEVOTHYROXINE SODIUM 125 MCG
40 TABLET ORAL AT BEDTIME
Refills: 0 | Status: DISCONTINUED | OUTPATIENT
Start: 2023-01-01 | End: 2023-01-01

## 2023-01-01 RX ORDER — SODIUM CHLORIDE 9 MG/ML
1000 INJECTION, SOLUTION INTRAVENOUS
Refills: 0 | Status: DISCONTINUED | OUTPATIENT
Start: 2023-01-01 | End: 2023-01-01

## 2023-01-01 RX ORDER — ZOLPIDEM TARTRATE 10 MG/1
5 TABLET ORAL AT BEDTIME
Refills: 0 | Status: DISCONTINUED | OUTPATIENT
Start: 2023-01-01 | End: 2023-01-01

## 2023-01-01 RX ORDER — QUETIAPINE FUMARATE 200 MG/1
25 TABLET, FILM COATED ORAL AT BEDTIME
Refills: 0 | Status: DISCONTINUED | OUTPATIENT
Start: 2023-01-01 | End: 2023-01-01

## 2023-01-01 RX ORDER — ONDANSETRON 8 MG/1
4 TABLET, FILM COATED ORAL EVERY 8 HOURS
Refills: 0 | Status: DISCONTINUED | OUTPATIENT
Start: 2023-01-01 | End: 2023-01-01

## 2023-01-01 RX ORDER — ONDANSETRON 8 MG/1
1 TABLET, FILM COATED ORAL
Qty: 0 | Refills: 0 | DISCHARGE

## 2023-01-01 RX ORDER — ENOXAPARIN SODIUM 100 MG/ML
40 INJECTION SUBCUTANEOUS EVERY 24 HOURS
Refills: 0 | Status: DISCONTINUED | OUTPATIENT
Start: 2023-01-01 | End: 2023-01-01

## 2023-01-01 RX ORDER — LEVOTHYROXINE SODIUM 125 MCG
50 TABLET ORAL DAILY
Refills: 0 | Status: DISCONTINUED | OUTPATIENT
Start: 2023-01-01 | End: 2023-01-01

## 2023-01-01 RX ORDER — PIPERACILLIN AND TAZOBACTAM 4; .5 G/20ML; G/20ML
3.38 INJECTION, POWDER, LYOPHILIZED, FOR SOLUTION INTRAVENOUS ONCE
Refills: 0 | Status: COMPLETED | OUTPATIENT
Start: 2023-01-01 | End: 2023-01-01

## 2023-01-01 RX ORDER — HEPARIN SODIUM 5000 [USP'U]/ML
5000 INJECTION INTRAVENOUS; SUBCUTANEOUS EVERY 8 HOURS
Refills: 0 | Status: DISCONTINUED | OUTPATIENT
Start: 2023-01-01 | End: 2023-01-01

## 2023-01-01 RX ORDER — CEFTRIAXONE 500 MG/1
1000 INJECTION, POWDER, FOR SOLUTION INTRAMUSCULAR; INTRAVENOUS EVERY 24 HOURS
Refills: 0 | Status: COMPLETED | OUTPATIENT
Start: 2023-01-01 | End: 2023-01-01

## 2023-01-01 RX ORDER — ENOXAPARIN SODIUM 100 MG/ML
40 INJECTION SUBCUTANEOUS
Qty: 0 | Refills: 0 | DISCHARGE
Start: 2023-01-01

## 2023-01-01 RX ORDER — ACETAMINOPHEN 500 MG
750 TABLET ORAL ONCE
Refills: 0 | Status: COMPLETED | OUTPATIENT
Start: 2023-01-01 | End: 2023-01-01

## 2023-01-01 RX ORDER — EPINEPHRINE 11.25MG/ML
0.5 SOLUTION, NON-ORAL INHALATION ONCE
Refills: 0 | Status: COMPLETED | OUTPATIENT
Start: 2023-01-01 | End: 2023-01-01

## 2023-01-01 RX ORDER — DEXAMETHASONE 0.5 MG/5ML
2 ELIXIR ORAL
Qty: 0 | Refills: 0 | DISCHARGE

## 2023-01-01 RX ORDER — PANTOPRAZOLE SODIUM 20 MG/1
40 TABLET, DELAYED RELEASE ORAL
Refills: 0 | Status: DISCONTINUED | OUTPATIENT
Start: 2023-01-01 | End: 2023-01-01

## 2023-01-01 RX ORDER — GABAPENTIN 400 MG/1
300 CAPSULE ORAL THREE TIMES A DAY
Refills: 0 | Status: DISCONTINUED | OUTPATIENT
Start: 2023-01-01 | End: 2023-01-01

## 2023-01-01 RX ORDER — PANTOPRAZOLE SODIUM 20 MG/1
40 TABLET, DELAYED RELEASE ORAL DAILY
Refills: 0 | Status: DISCONTINUED | OUTPATIENT
Start: 2023-01-01 | End: 2023-01-01

## 2023-01-01 RX ORDER — METOCLOPRAMIDE HCL 10 MG
10 TABLET ORAL ONCE
Refills: 0 | Status: COMPLETED | OUTPATIENT
Start: 2023-01-01 | End: 2023-01-01

## 2023-01-01 RX ORDER — NICOTINE POLACRILEX 2 MG
1 GUM BUCCAL DAILY
Refills: 0 | Status: DISCONTINUED | OUTPATIENT
Start: 2023-01-01 | End: 2023-01-01

## 2023-01-01 RX ORDER — VANCOMYCIN HCL 1 G
1500 VIAL (EA) INTRAVENOUS ONCE
Refills: 0 | Status: COMPLETED | OUTPATIENT
Start: 2023-01-01 | End: 2023-01-01

## 2023-01-01 RX ORDER — METHADONE HYDROCHLORIDE 40 MG/1
7.5 TABLET ORAL EVERY 8 HOURS
Refills: 0 | Status: DISCONTINUED | OUTPATIENT
Start: 2023-01-01 | End: 2023-01-01

## 2023-01-01 RX ORDER — LIDOCAINE 4 G/100G
1 CREAM TOPICAL DAILY
Refills: 0 | Status: DISCONTINUED | OUTPATIENT
Start: 2023-01-01 | End: 2023-01-01

## 2023-01-01 RX ORDER — MAGNESIUM OXIDE 400 MG ORAL TABLET 241.3 MG
400 TABLET ORAL
Refills: 0 | Status: COMPLETED | OUTPATIENT
Start: 2023-01-01 | End: 2023-01-01

## 2023-01-01 RX ORDER — HYDRALAZINE HCL 50 MG
5 TABLET ORAL ONCE
Refills: 0 | Status: COMPLETED | OUTPATIENT
Start: 2023-01-01 | End: 2023-01-01

## 2023-01-01 RX ORDER — SODIUM CHLORIDE 9 MG/ML
250 INJECTION INTRAMUSCULAR; INTRAVENOUS; SUBCUTANEOUS ONCE
Refills: 0 | Status: COMPLETED | OUTPATIENT
Start: 2023-01-01 | End: 2023-01-01

## 2023-01-01 RX ORDER — IPRATROPIUM/ALBUTEROL SULFATE 18-103MCG
3 AEROSOL WITH ADAPTER (GRAM) INHALATION ONCE
Refills: 0 | Status: DISCONTINUED | OUTPATIENT
Start: 2023-01-01 | End: 2023-01-01

## 2023-01-01 RX ORDER — HYDROMORPHONE HYDROCHLORIDE 2 MG/ML
2 INJECTION INTRAMUSCULAR; INTRAVENOUS; SUBCUTANEOUS
Qty: 168 | Refills: 0
Start: 2023-01-01 | End: 2023-03-30

## 2023-01-01 RX ORDER — FENTANYL CITRATE 50 UG/ML
50 INJECTION INTRAVENOUS ONCE
Refills: 0 | Status: DISCONTINUED | OUTPATIENT
Start: 2023-01-01 | End: 2023-01-01

## 2023-01-01 RX ORDER — ALPRAZOLAM 0.25 MG
1 TABLET ORAL
Qty: 0 | Refills: 0 | DISCHARGE

## 2023-01-01 RX ORDER — ALBUTEROL 90 UG/1
2 AEROSOL, METERED ORAL EVERY 6 HOURS
Refills: 0 | Status: DISCONTINUED | OUTPATIENT
Start: 2023-01-01 | End: 2023-01-01

## 2023-01-01 RX ORDER — POTASSIUM CHLORIDE 20 MEQ
40 PACKET (EA) ORAL ONCE
Refills: 0 | Status: COMPLETED | OUTPATIENT
Start: 2023-01-01 | End: 2023-01-01

## 2023-01-01 RX ORDER — ACETAMINOPHEN 500 MG
650 TABLET ORAL EVERY 6 HOURS
Refills: 0 | Status: DISCONTINUED | OUTPATIENT
Start: 2023-01-01 | End: 2023-01-01

## 2023-01-01 RX ORDER — OXYCODONE AND ACETAMINOPHEN 5; 325 MG/1; MG/1
1 TABLET ORAL
Qty: 0 | Refills: 0 | DISCHARGE

## 2023-01-01 RX ORDER — HYDROXYZINE HCL 10 MG
25 TABLET ORAL EVERY 6 HOURS
Refills: 0 | Status: DISCONTINUED | OUTPATIENT
Start: 2023-01-01 | End: 2023-01-01

## 2023-01-01 RX ORDER — HYDROXYZINE HCL 10 MG
12.5 TABLET ORAL
Qty: 0 | Refills: 0 | DISCHARGE
Start: 2023-01-01

## 2023-01-01 RX ORDER — SODIUM CHLORIDE 9 MG/ML
1550 INJECTION INTRAMUSCULAR; INTRAVENOUS; SUBCUTANEOUS ONCE
Refills: 0 | Status: DISCONTINUED | OUTPATIENT
Start: 2023-01-01 | End: 2023-01-01

## 2023-01-01 RX ORDER — INFLUENZA VIRUS VACCINE 15; 15; 15; 15 UG/.5ML; UG/.5ML; UG/.5ML; UG/.5ML
0.5 SUSPENSION INTRAMUSCULAR ONCE
Refills: 0 | Status: DISCONTINUED | OUTPATIENT
Start: 2023-01-01 | End: 2023-01-01

## 2023-01-01 RX ORDER — PIPERACILLIN AND TAZOBACTAM 4; .5 G/20ML; G/20ML
3.38 INJECTION, POWDER, LYOPHILIZED, FOR SOLUTION INTRAVENOUS EVERY 8 HOURS
Refills: 0 | Status: COMPLETED | OUTPATIENT
Start: 2023-01-01 | End: 2023-01-01

## 2023-01-01 RX ORDER — KETOROLAC TROMETHAMINE 30 MG/ML
15 SYRINGE (ML) INJECTION ONCE
Refills: 0 | Status: DISCONTINUED | OUTPATIENT
Start: 2023-01-01 | End: 2023-01-01

## 2023-01-01 RX ORDER — ZOLPIDEM TARTRATE 10 MG/1
1 TABLET ORAL
Qty: 0 | Refills: 0 | DISCHARGE
Start: 2023-01-01

## 2023-01-01 RX ORDER — ASCORBIC ACID 60 MG
1 TABLET,CHEWABLE ORAL
Qty: 0 | Refills: 0 | DISCHARGE

## 2023-01-01 RX ORDER — FERROUS SULFATE 325(65) MG
5 TABLET ORAL
Qty: 0 | Refills: 0 | DISCHARGE
Start: 2023-01-01

## 2023-01-01 RX ORDER — VANCOMYCIN HCL 1 G
VIAL (EA) INTRAVENOUS
Refills: 0 | Status: DISCONTINUED | OUTPATIENT
Start: 2023-01-01 | End: 2023-01-01

## 2023-01-01 RX ORDER — SODIUM CHLORIDE 9 MG/ML
500 INJECTION INTRAMUSCULAR; INTRAVENOUS; SUBCUTANEOUS ONCE
Refills: 0 | Status: COMPLETED | OUTPATIENT
Start: 2023-01-01 | End: 2023-01-01

## 2023-01-01 RX ORDER — SODIUM CHLORIDE 9 MG/ML
1000 INJECTION INTRAMUSCULAR; INTRAVENOUS; SUBCUTANEOUS ONCE
Refills: 0 | Status: COMPLETED | OUTPATIENT
Start: 2023-01-01 | End: 2023-01-01

## 2023-01-01 RX ORDER — PIPERACILLIN AND TAZOBACTAM 4; .5 G/20ML; G/20ML
3.38 INJECTION, POWDER, LYOPHILIZED, FOR SOLUTION INTRAVENOUS
Qty: 0 | Refills: 0 | DISCHARGE
Start: 2023-01-01

## 2023-01-01 RX ORDER — CALCIUM CARBONATE 500(1250)
1 TABLET ORAL ONCE
Refills: 0 | Status: COMPLETED | OUTPATIENT
Start: 2023-01-01 | End: 2023-01-01

## 2023-01-01 RX ORDER — FERROUS SULFATE 325(65) MG
1 TABLET ORAL
Qty: 0 | Refills: 0 | DISCHARGE

## 2023-01-01 RX ORDER — SODIUM CHLORIDE 9 MG/ML
1000 INJECTION INTRAMUSCULAR; INTRAVENOUS; SUBCUTANEOUS
Refills: 0 | Status: DISCONTINUED | OUTPATIENT
Start: 2023-01-01 | End: 2023-01-01

## 2023-01-01 RX ORDER — SODIUM CHLORIDE 9 MG/ML
1550 INJECTION INTRAMUSCULAR; INTRAVENOUS; SUBCUTANEOUS ONCE
Refills: 0 | Status: COMPLETED | OUTPATIENT
Start: 2023-01-01 | End: 2023-01-01

## 2023-01-01 RX ORDER — FERROUS SULFATE 325(65) MG
300 TABLET ORAL DAILY
Refills: 0 | Status: DISCONTINUED | OUTPATIENT
Start: 2023-01-01 | End: 2023-01-01

## 2023-01-01 RX ORDER — MIDODRINE HYDROCHLORIDE 2.5 MG/1
10 TABLET ORAL ONCE
Refills: 0 | Status: COMPLETED | OUTPATIENT
Start: 2023-01-01 | End: 2023-01-01

## 2023-01-01 RX ORDER — SODIUM CHLORIDE 9 MG/ML
1000 INJECTION, SOLUTION INTRAVENOUS
Refills: 0 | Status: COMPLETED | OUTPATIENT
Start: 2023-01-01 | End: 2023-01-01

## 2023-01-01 RX ORDER — HYDROMORPHONE HYDROCHLORIDE 2 MG/ML
1 INJECTION INTRAMUSCULAR; INTRAVENOUS; SUBCUTANEOUS
Qty: 100 | Refills: 0 | Status: DISCONTINUED | OUTPATIENT
Start: 2023-01-01 | End: 2023-01-01

## 2023-01-01 RX ORDER — FOLIC ACID 0.8 MG
1 TABLET ORAL DAILY
Refills: 0 | Status: DISCONTINUED | OUTPATIENT
Start: 2023-01-01 | End: 2023-01-01

## 2023-01-01 RX ORDER — OXYCODONE AND ACETAMINOPHEN 5; 325 MG/1; MG/1
0 TABLET ORAL
Qty: 0 | Refills: 0 | DISCHARGE

## 2023-01-01 RX ORDER — GABAPENTIN 400 MG/1
300 CAPSULE ORAL EVERY 8 HOURS
Refills: 0 | Status: DISCONTINUED | OUTPATIENT
Start: 2023-01-01 | End: 2023-01-01

## 2023-01-01 RX ORDER — HYDROMORPHONE HYDROCHLORIDE 2 MG/ML
1 INJECTION INTRAMUSCULAR; INTRAVENOUS; SUBCUTANEOUS
Qty: 0 | Refills: 0 | DISCHARGE
Start: 2023-01-01

## 2023-01-01 RX ORDER — NICOTINE POLACRILEX 2 MG
1 GUM BUCCAL
Qty: 0 | Refills: 0 | DISCHARGE
Start: 2023-01-01

## 2023-01-01 RX ORDER — ONDANSETRON 8 MG/1
4 TABLET, FILM COATED ORAL
Qty: 0 | Refills: 0 | DISCHARGE
Start: 2023-01-01

## 2023-01-01 RX ORDER — OXYCODONE HYDROCHLORIDE 5 MG/1
5 TABLET ORAL EVERY 4 HOURS
Refills: 0 | Status: DISCONTINUED | OUTPATIENT
Start: 2023-01-01 | End: 2023-01-01

## 2023-01-01 RX ORDER — POTASSIUM CHLORIDE 20 MEQ
20 PACKET (EA) ORAL
Refills: 0 | Status: DISCONTINUED | OUTPATIENT
Start: 2023-01-01 | End: 2023-01-01

## 2023-01-01 RX ORDER — HYDROMORPHONE HYDROCHLORIDE 2 MG/ML
1 INJECTION INTRAMUSCULAR; INTRAVENOUS; SUBCUTANEOUS
Qty: 112 | Refills: 0
Start: 2023-01-01 | End: 2023-03-30

## 2023-01-01 RX ORDER — HYDRALAZINE HCL 50 MG
5 TABLET ORAL EVERY 6 HOURS
Refills: 0 | Status: DISCONTINUED | OUTPATIENT
Start: 2023-01-01 | End: 2023-01-01

## 2023-01-01 RX ORDER — FAMOTIDINE 10 MG/ML
20 INJECTION INTRAVENOUS ONCE
Refills: 0 | Status: COMPLETED | OUTPATIENT
Start: 2023-01-01 | End: 2023-01-01

## 2023-01-01 RX ORDER — POTASSIUM CHLORIDE 20 MEQ
20 PACKET (EA) ORAL ONCE
Refills: 0 | Status: COMPLETED | OUTPATIENT
Start: 2023-01-01 | End: 2023-01-01

## 2023-01-01 RX ORDER — FENTANYL CITRATE 50 UG/ML
1 INJECTION INTRAVENOUS
Qty: 0 | Refills: 0 | DISCHARGE
Start: 2023-01-01

## 2023-01-01 RX ORDER — LISINOPRIL 2.5 MG/1
1 TABLET ORAL
Qty: 0 | Refills: 0 | DISCHARGE

## 2023-01-01 RX ORDER — VANCOMYCIN HCL 1 G
750 VIAL (EA) INTRAVENOUS EVERY 12 HOURS
Refills: 0 | Status: DISCONTINUED | OUTPATIENT
Start: 2023-01-01 | End: 2023-01-01

## 2023-01-01 RX ORDER — VANCOMYCIN HCL 1 G
1000 VIAL (EA) INTRAVENOUS EVERY 12 HOURS
Refills: 0 | Status: DISCONTINUED | OUTPATIENT
Start: 2023-01-01 | End: 2023-01-01

## 2023-01-01 RX ORDER — IPRATROPIUM/ALBUTEROL SULFATE 18-103MCG
3 AEROSOL WITH ADAPTER (GRAM) INHALATION EVERY 12 HOURS
Refills: 0 | Status: DISCONTINUED | OUTPATIENT
Start: 2023-01-01 | End: 2023-01-01

## 2023-01-01 RX ORDER — HYDRALAZINE HCL 50 MG
5 TABLET ORAL
Qty: 0 | Refills: 0 | DISCHARGE
Start: 2023-01-01

## 2023-01-01 RX ORDER — MAGNESIUM SULFATE 500 MG/ML
1 VIAL (ML) INJECTION ONCE
Refills: 0 | Status: COMPLETED | OUTPATIENT
Start: 2023-01-01 | End: 2023-01-01

## 2023-01-01 RX ORDER — HYDROMORPHONE HYDROCHLORIDE 2 MG/ML
4 INJECTION INTRAMUSCULAR; INTRAVENOUS; SUBCUTANEOUS EVERY 4 HOURS
Refills: 0 | Status: DISCONTINUED | OUTPATIENT
Start: 2023-01-01 | End: 2023-01-01

## 2023-01-01 RX ORDER — ACETAMINOPHEN 500 MG
650 TABLET ORAL ONCE
Refills: 0 | Status: COMPLETED | OUTPATIENT
Start: 2023-01-01 | End: 2023-01-01

## 2023-01-01 RX ORDER — SODIUM BICARBONATE 1 MEQ/ML
650 SYRINGE (ML) INTRAVENOUS ONCE
Refills: 0 | Status: COMPLETED | OUTPATIENT
Start: 2023-01-01 | End: 2023-01-01

## 2023-01-01 RX ORDER — FENTANYL CITRATE 50 UG/ML
25 INJECTION INTRAVENOUS
Refills: 0 | Status: DISCONTINUED | OUTPATIENT
Start: 2023-01-01 | End: 2023-01-01

## 2023-01-01 RX ORDER — METHADONE HYDROCHLORIDE 40 MG/1
5 TABLET ORAL THREE TIMES A DAY
Refills: 0 | Status: DISCONTINUED | OUTPATIENT
Start: 2023-01-01 | End: 2023-01-01

## 2023-01-01 RX ORDER — HYDROMORPHONE HYDROCHLORIDE 2 MG/ML
0.5 INJECTION INTRAMUSCULAR; INTRAVENOUS; SUBCUTANEOUS
Qty: 0 | Refills: 0 | DISCHARGE
Start: 2023-01-01

## 2023-01-01 RX ORDER — QUETIAPINE FUMARATE 200 MG/1
1 TABLET, FILM COATED ORAL
Qty: 0 | Refills: 0 | DISCHARGE
Start: 2023-01-01

## 2023-01-01 RX ORDER — ACETAMINOPHEN 500 MG
650 TABLET ORAL EVERY 6 HOURS
Refills: 0 | Status: COMPLETED | OUTPATIENT
Start: 2023-01-01 | End: 2023-01-01

## 2023-01-01 RX ORDER — LIDOCAINE 4 G/100G
1 CREAM TOPICAL
Qty: 0 | Refills: 0 | DISCHARGE
Start: 2023-01-01

## 2023-01-01 RX ORDER — DEXTROSE MONOHYDRATE, SODIUM CHLORIDE, AND POTASSIUM CHLORIDE 50; .745; 4.5 G/1000ML; G/1000ML; G/1000ML
100 INJECTION, SOLUTION INTRAVENOUS
Qty: 0 | Refills: 0 | DISCHARGE
Start: 2023-01-01

## 2023-01-01 RX ORDER — KETOROLAC TROMETHAMINE 30 MG/ML
15 SYRINGE (ML) INJECTION EVERY 8 HOURS
Refills: 0 | Status: DISCONTINUED | OUTPATIENT
Start: 2023-01-01 | End: 2023-01-01

## 2023-01-01 RX ORDER — BUPRENORPHINE AND NALOXONE 2; .5 MG/1; MG/1
1 TABLET SUBLINGUAL
Qty: 0 | Refills: 0 | DISCHARGE

## 2023-01-01 RX ORDER — LEVOTHYROXINE SODIUM 125 MCG
1 TABLET ORAL
Qty: 0 | Refills: 0 | DISCHARGE

## 2023-01-01 RX ORDER — POLYETHYLENE GLYCOL 3350 17 G/17G
17 POWDER, FOR SOLUTION ORAL DAILY
Refills: 0 | Status: DISCONTINUED | OUTPATIENT
Start: 2023-01-01 | End: 2023-01-01

## 2023-01-01 RX ORDER — GABAPENTIN 400 MG/1
600 CAPSULE ORAL THREE TIMES A DAY
Refills: 0 | Status: DISCONTINUED | OUTPATIENT
Start: 2023-01-01 | End: 2023-01-01

## 2023-01-01 RX ORDER — LISINOPRIL 2.5 MG/1
40 TABLET ORAL DAILY
Refills: 0 | Status: COMPLETED | OUTPATIENT
Start: 2023-01-01 | End: 2023-01-01

## 2023-01-01 RX ORDER — HYDROMORPHONE HYDROCHLORIDE 2 MG/ML
1 INJECTION INTRAMUSCULAR; INTRAVENOUS; SUBCUTANEOUS EVERY 4 HOURS
Refills: 0 | Status: DISCONTINUED | OUTPATIENT
Start: 2023-01-01 | End: 2023-01-01

## 2023-01-01 RX ORDER — MORPHINE SULFATE 50 MG/1
4 CAPSULE, EXTENDED RELEASE ORAL
Refills: 0 | Status: DISCONTINUED | OUTPATIENT
Start: 2023-01-01 | End: 2023-01-01

## 2023-01-01 RX ORDER — NALOXONE HYDROCHLORIDE 4 MG/.1ML
0.1 SPRAY NASAL
Refills: 0 | Status: DISCONTINUED | OUTPATIENT
Start: 2023-01-01 | End: 2023-01-01

## 2023-01-01 RX ORDER — ONDANSETRON 8 MG/1
4 TABLET, FILM COATED ORAL ONCE
Refills: 0 | Status: DISCONTINUED | OUTPATIENT
Start: 2023-01-01 | End: 2023-01-01

## 2023-01-01 RX ORDER — ALBUTEROL 90 UG/1
2.5 AEROSOL, METERED ORAL THREE TIMES A DAY
Refills: 0 | Status: DISCONTINUED | OUTPATIENT
Start: 2023-01-01 | End: 2023-01-01

## 2023-01-01 RX ORDER — VANCOMYCIN HCL 1 G
VIAL (EA) INTRAVENOUS
Refills: 0 | Status: COMPLETED | OUTPATIENT
Start: 2023-01-01 | End: 2023-01-01

## 2023-01-01 RX ORDER — SENNA PLUS 8.6 MG/1
2 TABLET ORAL AT BEDTIME
Refills: 0 | Status: DISCONTINUED | OUTPATIENT
Start: 2023-01-01 | End: 2023-01-01

## 2023-01-01 RX ORDER — ALBUTEROL 90 UG/1
2 AEROSOL, METERED ORAL
Qty: 0 | Refills: 0 | DISCHARGE
Start: 2023-01-01

## 2023-01-01 RX ORDER — FLUTICASONE FUROATE, UMECLIDINIUM BROMIDE AND VILANTEROL TRIFENATATE 200; 62.5; 25 UG/1; UG/1; UG/1
1 POWDER RESPIRATORY (INHALATION)
Qty: 0 | Refills: 0 | DISCHARGE

## 2023-01-01 RX ORDER — VANCOMYCIN HCL 1 G
1250 VIAL (EA) INTRAVENOUS EVERY 12 HOURS
Refills: 0 | Status: DISCONTINUED | OUTPATIENT
Start: 2023-01-01 | End: 2023-01-01

## 2023-01-01 RX ORDER — VANCOMYCIN HCL 1 G
500 VIAL (EA) INTRAVENOUS EVERY 12 HOURS
Refills: 0 | Status: DISCONTINUED | OUTPATIENT
Start: 2023-01-01 | End: 2023-01-01

## 2023-01-01 RX ORDER — LEVOTHYROXINE SODIUM 125 MCG
1 TABLET ORAL
Qty: 0 | Refills: 0 | DISCHARGE
Start: 2023-01-01

## 2023-01-01 RX ORDER — MULTIVIT-MIN/FERROUS GLUCONATE 9 MG/15 ML
15 LIQUID (ML) ORAL DAILY
Refills: 0 | Status: DISCONTINUED | OUTPATIENT
Start: 2023-01-01 | End: 2023-01-01

## 2023-01-01 RX ORDER — DEXTROSE MONOHYDRATE, SODIUM CHLORIDE, AND POTASSIUM CHLORIDE 50; .745; 4.5 G/1000ML; G/1000ML; G/1000ML
1000 INJECTION, SOLUTION INTRAVENOUS
Refills: 0 | Status: DISCONTINUED | OUTPATIENT
Start: 2023-01-01 | End: 2023-01-01

## 2023-01-01 RX ORDER — DIPHENHYDRAMINE HCL 50 MG
25 CAPSULE ORAL ONCE
Refills: 0 | Status: COMPLETED | OUTPATIENT
Start: 2023-01-01 | End: 2023-01-01

## 2023-01-01 RX ORDER — LEVOTHYROXINE SODIUM 125 MCG
37.5 TABLET ORAL ONCE
Refills: 0 | Status: COMPLETED | OUTPATIENT
Start: 2023-01-01 | End: 2023-01-01

## 2023-01-01 RX ORDER — FOLIC ACID 0.8 MG
1 TABLET ORAL
Qty: 0 | Refills: 0 | DISCHARGE

## 2023-01-01 RX ADMIN — SODIUM CHLORIDE 75 MILLILITER(S): 9 INJECTION, SOLUTION INTRAVENOUS at 12:47

## 2023-01-01 RX ADMIN — HYDROMORPHONE HYDROCHLORIDE 4 MILLIGRAM(S): 2 INJECTION INTRAMUSCULAR; INTRAVENOUS; SUBCUTANEOUS at 14:08

## 2023-01-01 RX ADMIN — Medication 650 MILLIGRAM(S): at 11:45

## 2023-01-01 RX ADMIN — Medication 50 MICROGRAM(S): at 06:46

## 2023-01-01 RX ADMIN — SODIUM CHLORIDE 1550 MILLILITER(S): 9 INJECTION INTRAMUSCULAR; INTRAVENOUS; SUBCUTANEOUS at 00:46

## 2023-01-01 RX ADMIN — HYDROMORPHONE HYDROCHLORIDE 6 MILLIGRAM(S): 2 INJECTION INTRAMUSCULAR; INTRAVENOUS; SUBCUTANEOUS at 22:46

## 2023-01-01 RX ADMIN — PIPERACILLIN AND TAZOBACTAM 25 GRAM(S): 4; .5 INJECTION, POWDER, LYOPHILIZED, FOR SOLUTION INTRAVENOUS at 21:28

## 2023-01-01 RX ADMIN — Medication 3 MILLILITER(S): at 21:07

## 2023-01-01 RX ADMIN — ZOLPIDEM TARTRATE 5 MILLIGRAM(S): 10 TABLET ORAL at 23:09

## 2023-01-01 RX ADMIN — Medication 1 PATCH: at 06:38

## 2023-01-01 RX ADMIN — SENNA PLUS 2 TABLET(S): 8.6 TABLET ORAL at 00:12

## 2023-01-01 RX ADMIN — Medication 50 MICROGRAM(S): at 05:27

## 2023-01-01 RX ADMIN — PANTOPRAZOLE SODIUM 40 MILLIGRAM(S): 20 TABLET, DELAYED RELEASE ORAL at 17:21

## 2023-01-01 RX ADMIN — HYDROMORPHONE HYDROCHLORIDE 1 MILLIGRAM(S): 2 INJECTION INTRAMUSCULAR; INTRAVENOUS; SUBCUTANEOUS at 13:00

## 2023-01-01 RX ADMIN — METHADONE HYDROCHLORIDE 15 MILLIGRAM(S): 40 TABLET ORAL at 22:17

## 2023-01-01 RX ADMIN — Medication 1 PATCH: at 07:00

## 2023-01-01 RX ADMIN — GABAPENTIN 300 MILLIGRAM(S): 400 CAPSULE ORAL at 20:40

## 2023-01-01 RX ADMIN — Medication 25 GRAM(S): at 17:22

## 2023-01-01 RX ADMIN — HYDROMORPHONE HYDROCHLORIDE 30 MILLILITER(S): 2 INJECTION INTRAMUSCULAR; INTRAVENOUS; SUBCUTANEOUS at 08:04

## 2023-01-01 RX ADMIN — Medication 0.5 MILLIGRAM(S): at 17:23

## 2023-01-01 RX ADMIN — QUETIAPINE FUMARATE 25 MILLIGRAM(S): 200 TABLET, FILM COATED ORAL at 21:34

## 2023-01-01 RX ADMIN — Medication 750 MILLIGRAM(S): at 12:30

## 2023-01-01 RX ADMIN — HYDROMORPHONE HYDROCHLORIDE 6 MILLIGRAM(S): 2 INJECTION INTRAMUSCULAR; INTRAVENOUS; SUBCUTANEOUS at 16:31

## 2023-01-01 RX ADMIN — HYDROMORPHONE HYDROCHLORIDE 1 MILLIGRAM(S): 2 INJECTION INTRAMUSCULAR; INTRAVENOUS; SUBCUTANEOUS at 02:01

## 2023-01-01 RX ADMIN — HYDROMORPHONE HYDROCHLORIDE 0.5 MILLIGRAM(S): 2 INJECTION INTRAMUSCULAR; INTRAVENOUS; SUBCUTANEOUS at 00:42

## 2023-01-01 RX ADMIN — PANTOPRAZOLE SODIUM 40 MILLIGRAM(S): 20 TABLET, DELAYED RELEASE ORAL at 17:31

## 2023-01-01 RX ADMIN — Medication 40 MILLIGRAM(S): at 05:52

## 2023-01-01 RX ADMIN — GABAPENTIN 600 MILLIGRAM(S): 400 CAPSULE ORAL at 07:28

## 2023-01-01 RX ADMIN — Medication 166.67 MILLIGRAM(S): at 07:16

## 2023-01-01 RX ADMIN — Medication 40 MICROGRAM(S): at 22:39

## 2023-01-01 RX ADMIN — Medication 300 MILLIGRAM(S): at 22:39

## 2023-01-01 RX ADMIN — SODIUM CHLORIDE 75 MILLILITER(S): 9 INJECTION, SOLUTION INTRAVENOUS at 17:24

## 2023-01-01 RX ADMIN — Medication 1000 MILLIGRAM(S): at 01:16

## 2023-01-01 RX ADMIN — Medication 1 MILLIGRAM(S): at 13:32

## 2023-01-01 RX ADMIN — HYDROMORPHONE HYDROCHLORIDE 1 MILLIGRAM(S): 2 INJECTION INTRAMUSCULAR; INTRAVENOUS; SUBCUTANEOUS at 09:02

## 2023-01-01 RX ADMIN — Medication 1 MILLIGRAM(S): at 19:48

## 2023-01-01 RX ADMIN — Medication 1 MILLIGRAM(S): at 11:02

## 2023-01-01 RX ADMIN — Medication 1 MILLIGRAM(S): at 14:01

## 2023-01-01 RX ADMIN — Medication 400 MILLIGRAM(S): at 02:42

## 2023-01-01 RX ADMIN — Medication 1 PATCH: at 18:09

## 2023-01-01 RX ADMIN — GABAPENTIN 600 MILLIGRAM(S): 400 CAPSULE ORAL at 14:12

## 2023-01-01 RX ADMIN — Medication 1 MILLIGRAM(S): at 18:57

## 2023-01-01 RX ADMIN — SENNA PLUS 2 TABLET(S): 8.6 TABLET ORAL at 22:38

## 2023-01-01 RX ADMIN — GABAPENTIN 300 MILLIGRAM(S): 400 CAPSULE ORAL at 13:15

## 2023-01-01 RX ADMIN — HEPARIN SODIUM 5000 UNIT(S): 5000 INJECTION INTRAVENOUS; SUBCUTANEOUS at 06:21

## 2023-01-01 RX ADMIN — PANTOPRAZOLE SODIUM 40 MILLIGRAM(S): 20 TABLET, DELAYED RELEASE ORAL at 06:22

## 2023-01-01 RX ADMIN — HYDROMORPHONE HYDROCHLORIDE 4 MILLIGRAM(S): 2 INJECTION INTRAMUSCULAR; INTRAVENOUS; SUBCUTANEOUS at 22:57

## 2023-01-01 RX ADMIN — HYDROMORPHONE HYDROCHLORIDE 0.5 MILLIGRAM(S): 2 INJECTION INTRAMUSCULAR; INTRAVENOUS; SUBCUTANEOUS at 00:51

## 2023-01-01 RX ADMIN — Medication 1 MILLIGRAM(S): at 17:42

## 2023-01-01 RX ADMIN — METHADONE HYDROCHLORIDE 15 MILLIGRAM(S): 40 TABLET ORAL at 13:41

## 2023-01-01 RX ADMIN — HYDROMORPHONE HYDROCHLORIDE 0.5 MILLIGRAM(S): 2 INJECTION INTRAMUSCULAR; INTRAVENOUS; SUBCUTANEOUS at 05:19

## 2023-01-01 RX ADMIN — GABAPENTIN 600 MILLIGRAM(S): 400 CAPSULE ORAL at 14:55

## 2023-01-01 RX ADMIN — Medication 1 MILLIGRAM(S): at 14:51

## 2023-01-01 RX ADMIN — GABAPENTIN 600 MILLIGRAM(S): 400 CAPSULE ORAL at 06:07

## 2023-01-01 RX ADMIN — HYDROMORPHONE HYDROCHLORIDE 0.5 MILLIGRAM(S): 2 INJECTION INTRAMUSCULAR; INTRAVENOUS; SUBCUTANEOUS at 20:20

## 2023-01-01 RX ADMIN — METHADONE HYDROCHLORIDE 15 MILLIGRAM(S): 40 TABLET ORAL at 22:13

## 2023-01-01 RX ADMIN — METHADONE HYDROCHLORIDE 15 MILLIGRAM(S): 40 TABLET ORAL at 21:31

## 2023-01-01 RX ADMIN — PIPERACILLIN AND TAZOBACTAM 25 GRAM(S): 4; .5 INJECTION, POWDER, LYOPHILIZED, FOR SOLUTION INTRAVENOUS at 13:11

## 2023-01-01 RX ADMIN — Medication 5 MILLIGRAM(S): at 05:50

## 2023-01-01 RX ADMIN — Medication 0.5 MILLIGRAM(S): at 13:21

## 2023-01-01 RX ADMIN — Medication 1 PATCH: at 11:20

## 2023-01-01 RX ADMIN — Medication 3 MILLILITER(S): at 09:39

## 2023-01-01 RX ADMIN — Medication 1 MILLIGRAM(S): at 18:14

## 2023-01-01 RX ADMIN — Medication 3 MILLILITER(S): at 10:07

## 2023-01-01 RX ADMIN — OXYCODONE HYDROCHLORIDE 5 MILLIGRAM(S): 5 TABLET ORAL at 20:39

## 2023-01-01 RX ADMIN — QUETIAPINE FUMARATE 25 MILLIGRAM(S): 200 TABLET, FILM COATED ORAL at 22:17

## 2023-01-01 RX ADMIN — HYDROMORPHONE HYDROCHLORIDE 0.5 MILLIGRAM(S): 2 INJECTION INTRAMUSCULAR; INTRAVENOUS; SUBCUTANEOUS at 23:48

## 2023-01-01 RX ADMIN — METHADONE HYDROCHLORIDE 15 MILLIGRAM(S): 40 TABLET ORAL at 21:58

## 2023-01-01 RX ADMIN — Medication 3 MILLILITER(S): at 15:49

## 2023-01-01 RX ADMIN — GABAPENTIN 600 MILLIGRAM(S): 400 CAPSULE ORAL at 14:00

## 2023-01-01 RX ADMIN — METHADONE HYDROCHLORIDE 15 MILLIGRAM(S): 40 TABLET ORAL at 13:51

## 2023-01-01 RX ADMIN — SODIUM CHLORIDE 75 MILLILITER(S): 9 INJECTION, SOLUTION INTRAVENOUS at 19:38

## 2023-01-01 RX ADMIN — HEPARIN SODIUM 5000 UNIT(S): 5000 INJECTION INTRAVENOUS; SUBCUTANEOUS at 05:30

## 2023-01-01 RX ADMIN — HYDROMORPHONE HYDROCHLORIDE 6 MILLIGRAM(S): 2 INJECTION INTRAMUSCULAR; INTRAVENOUS; SUBCUTANEOUS at 23:56

## 2023-01-01 RX ADMIN — Medication 40 MILLIGRAM(S): at 06:50

## 2023-01-01 RX ADMIN — HYDROMORPHONE HYDROCHLORIDE 0.5 MILLIGRAM(S): 2 INJECTION INTRAMUSCULAR; INTRAVENOUS; SUBCUTANEOUS at 11:12

## 2023-01-01 RX ADMIN — Medication 3 MILLILITER(S): at 21:41

## 2023-01-01 RX ADMIN — HYDROMORPHONE HYDROCHLORIDE 1 MILLIGRAM(S): 2 INJECTION INTRAMUSCULAR; INTRAVENOUS; SUBCUTANEOUS at 02:39

## 2023-01-01 RX ADMIN — Medication 15 MILLIGRAM(S): at 14:00

## 2023-01-01 RX ADMIN — HYDROMORPHONE HYDROCHLORIDE 6 MILLIGRAM(S): 2 INJECTION INTRAMUSCULAR; INTRAVENOUS; SUBCUTANEOUS at 05:40

## 2023-01-01 RX ADMIN — HEPARIN SODIUM 5000 UNIT(S): 5000 INJECTION INTRAVENOUS; SUBCUTANEOUS at 22:55

## 2023-01-01 RX ADMIN — Medication 3 MILLILITER(S): at 11:21

## 2023-01-01 RX ADMIN — METHADONE HYDROCHLORIDE 10 MILLIGRAM(S): 40 TABLET ORAL at 13:10

## 2023-01-01 RX ADMIN — HYDROMORPHONE HYDROCHLORIDE 30 MILLILITER(S): 2 INJECTION INTRAMUSCULAR; INTRAVENOUS; SUBCUTANEOUS at 11:20

## 2023-01-01 RX ADMIN — Medication 3 MILLILITER(S): at 08:54

## 2023-01-01 RX ADMIN — Medication 1 PATCH: at 10:03

## 2023-01-01 RX ADMIN — Medication 650 MILLIGRAM(S): at 07:47

## 2023-01-01 RX ADMIN — PIPERACILLIN AND TAZOBACTAM 25 GRAM(S): 4; .5 INJECTION, POWDER, LYOPHILIZED, FOR SOLUTION INTRAVENOUS at 06:48

## 2023-01-01 RX ADMIN — Medication 40 MILLIGRAM(S): at 14:02

## 2023-01-01 RX ADMIN — CEFTRIAXONE 100 MILLIGRAM(S): 500 INJECTION, POWDER, FOR SOLUTION INTRAMUSCULAR; INTRAVENOUS at 13:58

## 2023-01-01 RX ADMIN — GABAPENTIN 600 MILLIGRAM(S): 400 CAPSULE ORAL at 14:34

## 2023-01-01 RX ADMIN — Medication 3 MILLILITER(S): at 08:03

## 2023-01-01 RX ADMIN — HYDROMORPHONE HYDROCHLORIDE 1 MG/HR: 2 INJECTION INTRAMUSCULAR; INTRAVENOUS; SUBCUTANEOUS at 12:10

## 2023-01-01 RX ADMIN — HYDROMORPHONE HYDROCHLORIDE 0.5 MILLIGRAM(S): 2 INJECTION INTRAMUSCULAR; INTRAVENOUS; SUBCUTANEOUS at 09:40

## 2023-01-01 RX ADMIN — HYDROMORPHONE HYDROCHLORIDE 1 MILLIGRAM(S): 2 INJECTION INTRAMUSCULAR; INTRAVENOUS; SUBCUTANEOUS at 15:46

## 2023-01-01 RX ADMIN — HEPARIN SODIUM 5000 UNIT(S): 5000 INJECTION INTRAVENOUS; SUBCUTANEOUS at 06:33

## 2023-01-01 RX ADMIN — Medication 3 MILLILITER(S): at 10:03

## 2023-01-01 RX ADMIN — Medication 1 MILLIGRAM(S): at 18:22

## 2023-01-01 RX ADMIN — Medication 3 MILLILITER(S): at 21:19

## 2023-01-01 RX ADMIN — Medication 1 PATCH: at 20:43

## 2023-01-01 RX ADMIN — Medication 3 MILLILITER(S): at 21:14

## 2023-01-01 RX ADMIN — Medication 15 MILLIGRAM(S): at 07:47

## 2023-01-01 RX ADMIN — HYDROMORPHONE HYDROCHLORIDE 0.5 MILLIGRAM(S): 2 INJECTION INTRAMUSCULAR; INTRAVENOUS; SUBCUTANEOUS at 18:19

## 2023-01-01 RX ADMIN — Medication 1 MILLIGRAM(S): at 11:06

## 2023-01-01 RX ADMIN — PANTOPRAZOLE SODIUM 40 MILLIGRAM(S): 20 TABLET, DELAYED RELEASE ORAL at 18:19

## 2023-01-01 RX ADMIN — Medication 1 PATCH: at 16:58

## 2023-01-01 RX ADMIN — Medication 1 MILLIGRAM(S): at 07:02

## 2023-01-01 RX ADMIN — Medication 1 MILLIGRAM(S): at 11:01

## 2023-01-01 RX ADMIN — Medication 1 MILLIGRAM(S): at 10:03

## 2023-01-01 RX ADMIN — HEPARIN SODIUM 5000 UNIT(S): 5000 INJECTION INTRAVENOUS; SUBCUTANEOUS at 23:36

## 2023-01-01 RX ADMIN — Medication 3 MILLILITER(S): at 21:09

## 2023-01-01 RX ADMIN — HYDROMORPHONE HYDROCHLORIDE 1 MILLIGRAM(S): 2 INJECTION INTRAMUSCULAR; INTRAVENOUS; SUBCUTANEOUS at 00:08

## 2023-01-01 RX ADMIN — Medication 1 MILLIGRAM(S): at 11:27

## 2023-01-01 RX ADMIN — HYDROMORPHONE HYDROCHLORIDE 30 MILLILITER(S): 2 INJECTION INTRAMUSCULAR; INTRAVENOUS; SUBCUTANEOUS at 20:27

## 2023-01-01 RX ADMIN — HEPARIN SODIUM 5000 UNIT(S): 5000 INJECTION INTRAVENOUS; SUBCUTANEOUS at 22:13

## 2023-01-01 RX ADMIN — Medication 3 MILLILITER(S): at 22:24

## 2023-01-01 RX ADMIN — Medication 3 MILLILITER(S): at 14:56

## 2023-01-01 RX ADMIN — Medication 15 MILLIGRAM(S): at 09:58

## 2023-01-01 RX ADMIN — HEPARIN SODIUM 5000 UNIT(S): 5000 INJECTION INTRAVENOUS; SUBCUTANEOUS at 13:08

## 2023-01-01 RX ADMIN — HYDROMORPHONE HYDROCHLORIDE 6 MILLIGRAM(S): 2 INJECTION INTRAMUSCULAR; INTRAVENOUS; SUBCUTANEOUS at 09:52

## 2023-01-01 RX ADMIN — Medication 3 MILLILITER(S): at 17:02

## 2023-01-01 RX ADMIN — Medication 1 PATCH: at 19:00

## 2023-01-01 RX ADMIN — HEPARIN SODIUM 5000 UNIT(S): 5000 INJECTION INTRAVENOUS; SUBCUTANEOUS at 14:23

## 2023-01-01 RX ADMIN — Medication 3 MILLILITER(S): at 16:14

## 2023-01-01 RX ADMIN — HYDROMORPHONE HYDROCHLORIDE 1 MILLIGRAM(S): 2 INJECTION INTRAMUSCULAR; INTRAVENOUS; SUBCUTANEOUS at 22:58

## 2023-01-01 RX ADMIN — GABAPENTIN 600 MILLIGRAM(S): 400 CAPSULE ORAL at 22:24

## 2023-01-01 RX ADMIN — Medication 0.5 MILLIGRAM(S): at 23:02

## 2023-01-01 RX ADMIN — Medication 1 PATCH: at 19:32

## 2023-01-01 RX ADMIN — Medication 1 MILLIGRAM(S): at 14:21

## 2023-01-01 RX ADMIN — Medication 1 MILLIGRAM(S): at 19:24

## 2023-01-01 RX ADMIN — HYDROMORPHONE HYDROCHLORIDE 1 MILLIGRAM(S): 2 INJECTION INTRAMUSCULAR; INTRAVENOUS; SUBCUTANEOUS at 20:00

## 2023-01-01 RX ADMIN — QUETIAPINE FUMARATE 25 MILLIGRAM(S): 200 TABLET, FILM COATED ORAL at 21:39

## 2023-01-01 RX ADMIN — HYDROMORPHONE HYDROCHLORIDE 1 MILLIGRAM(S): 2 INJECTION INTRAMUSCULAR; INTRAVENOUS; SUBCUTANEOUS at 05:45

## 2023-01-01 RX ADMIN — PANTOPRAZOLE SODIUM 40 MILLIGRAM(S): 20 TABLET, DELAYED RELEASE ORAL at 05:27

## 2023-01-01 RX ADMIN — SODIUM CHLORIDE 75 MILLILITER(S): 9 INJECTION, SOLUTION INTRAVENOUS at 06:37

## 2023-01-01 RX ADMIN — GABAPENTIN 300 MILLIGRAM(S): 400 CAPSULE ORAL at 01:49

## 2023-01-01 RX ADMIN — Medication 1 PATCH: at 07:04

## 2023-01-01 RX ADMIN — HEPARIN SODIUM 5000 UNIT(S): 5000 INJECTION INTRAVENOUS; SUBCUTANEOUS at 21:33

## 2023-01-01 RX ADMIN — Medication 3 MILLILITER(S): at 10:58

## 2023-01-01 RX ADMIN — Medication 3 MILLILITER(S): at 09:18

## 2023-01-01 RX ADMIN — Medication 1 PATCH: at 11:28

## 2023-01-01 RX ADMIN — Medication 3 MILLILITER(S): at 14:42

## 2023-01-01 RX ADMIN — HYDROMORPHONE HYDROCHLORIDE 0.5 MILLIGRAM(S): 2 INJECTION INTRAMUSCULAR; INTRAVENOUS; SUBCUTANEOUS at 04:52

## 2023-01-01 RX ADMIN — Medication 1 PATCH: at 11:27

## 2023-01-01 RX ADMIN — Medication 1 MILLIGRAM(S): at 18:12

## 2023-01-01 RX ADMIN — PIPERACILLIN AND TAZOBACTAM 25 GRAM(S): 4; .5 INJECTION, POWDER, LYOPHILIZED, FOR SOLUTION INTRAVENOUS at 13:35

## 2023-01-01 RX ADMIN — Medication 1 MILLIGRAM(S): at 21:39

## 2023-01-01 RX ADMIN — GABAPENTIN 600 MILLIGRAM(S): 400 CAPSULE ORAL at 07:03

## 2023-01-01 RX ADMIN — GABAPENTIN 600 MILLIGRAM(S): 400 CAPSULE ORAL at 05:28

## 2023-01-01 RX ADMIN — PIPERACILLIN AND TAZOBACTAM 200 GRAM(S): 4; .5 INJECTION, POWDER, LYOPHILIZED, FOR SOLUTION INTRAVENOUS at 01:02

## 2023-01-01 RX ADMIN — GABAPENTIN 600 MILLIGRAM(S): 400 CAPSULE ORAL at 21:58

## 2023-01-01 RX ADMIN — Medication 1 PATCH: at 19:22

## 2023-01-01 RX ADMIN — Medication 5 MILLIGRAM(S): at 22:49

## 2023-01-01 RX ADMIN — HYDROMORPHONE HYDROCHLORIDE 6 MILLIGRAM(S): 2 INJECTION INTRAMUSCULAR; INTRAVENOUS; SUBCUTANEOUS at 23:06

## 2023-01-01 RX ADMIN — HYDROMORPHONE HYDROCHLORIDE 30 MILLILITER(S): 2 INJECTION INTRAMUSCULAR; INTRAVENOUS; SUBCUTANEOUS at 08:42

## 2023-01-01 RX ADMIN — HEPARIN SODIUM 5000 UNIT(S): 5000 INJECTION INTRAVENOUS; SUBCUTANEOUS at 05:21

## 2023-01-01 RX ADMIN — PIPERACILLIN AND TAZOBACTAM 25 GRAM(S): 4; .5 INJECTION, POWDER, LYOPHILIZED, FOR SOLUTION INTRAVENOUS at 10:08

## 2023-01-01 RX ADMIN — Medication 1 PATCH: at 07:43

## 2023-01-01 RX ADMIN — Medication 1 PATCH: at 11:40

## 2023-01-01 RX ADMIN — HYDROMORPHONE HYDROCHLORIDE 6 MILLIGRAM(S): 2 INJECTION INTRAMUSCULAR; INTRAVENOUS; SUBCUTANEOUS at 19:48

## 2023-01-01 RX ADMIN — PIPERACILLIN AND TAZOBACTAM 25 GRAM(S): 4; .5 INJECTION, POWDER, LYOPHILIZED, FOR SOLUTION INTRAVENOUS at 23:27

## 2023-01-01 RX ADMIN — HYDROMORPHONE HYDROCHLORIDE 30 MILLILITER(S): 2 INJECTION INTRAMUSCULAR; INTRAVENOUS; SUBCUTANEOUS at 20:15

## 2023-01-01 RX ADMIN — Medication 400 MILLIGRAM(S): at 14:47

## 2023-01-01 RX ADMIN — Medication 3 MILLILITER(S): at 15:42

## 2023-01-01 RX ADMIN — METHADONE HYDROCHLORIDE 15 MILLIGRAM(S): 40 TABLET ORAL at 23:02

## 2023-01-01 RX ADMIN — PANTOPRAZOLE SODIUM 40 MILLIGRAM(S): 20 TABLET, DELAYED RELEASE ORAL at 11:57

## 2023-01-01 RX ADMIN — Medication 3 MILLILITER(S): at 10:31

## 2023-01-01 RX ADMIN — HYDROMORPHONE HYDROCHLORIDE 2 MILLIGRAM(S): 2 INJECTION INTRAMUSCULAR; INTRAVENOUS; SUBCUTANEOUS at 20:35

## 2023-01-01 RX ADMIN — PIPERACILLIN AND TAZOBACTAM 25 GRAM(S): 4; .5 INJECTION, POWDER, LYOPHILIZED, FOR SOLUTION INTRAVENOUS at 17:40

## 2023-01-01 RX ADMIN — Medication 40 MILLIGRAM(S): at 22:12

## 2023-01-01 RX ADMIN — Medication 0.5 MILLIGRAM(S): at 18:00

## 2023-01-01 RX ADMIN — Medication 50 MICROGRAM(S): at 06:21

## 2023-01-01 RX ADMIN — HYDROMORPHONE HYDROCHLORIDE 0.5 MILLIGRAM(S): 2 INJECTION INTRAMUSCULAR; INTRAVENOUS; SUBCUTANEOUS at 15:57

## 2023-01-01 RX ADMIN — Medication 25 MILLIGRAM(S): at 00:15

## 2023-01-01 RX ADMIN — HYDROMORPHONE HYDROCHLORIDE 6 MILLIGRAM(S): 2 INJECTION INTRAMUSCULAR; INTRAVENOUS; SUBCUTANEOUS at 15:50

## 2023-01-01 RX ADMIN — SENNA PLUS 2 TABLET(S): 8.6 TABLET ORAL at 22:16

## 2023-01-01 RX ADMIN — Medication 1 MILLIGRAM(S): at 10:40

## 2023-01-01 RX ADMIN — PIPERACILLIN AND TAZOBACTAM 25 GRAM(S): 4; .5 INJECTION, POWDER, LYOPHILIZED, FOR SOLUTION INTRAVENOUS at 22:10

## 2023-01-01 RX ADMIN — HYDROMORPHONE HYDROCHLORIDE 6 MILLIGRAM(S): 2 INJECTION INTRAMUSCULAR; INTRAVENOUS; SUBCUTANEOUS at 12:33

## 2023-01-01 RX ADMIN — HYDROMORPHONE HYDROCHLORIDE 6 MILLIGRAM(S): 2 INJECTION INTRAMUSCULAR; INTRAVENOUS; SUBCUTANEOUS at 13:21

## 2023-01-01 RX ADMIN — Medication 3 MILLILITER(S): at 10:05

## 2023-01-01 RX ADMIN — QUETIAPINE FUMARATE 25 MILLIGRAM(S): 200 TABLET, FILM COATED ORAL at 22:21

## 2023-01-01 RX ADMIN — Medication 3 MILLILITER(S): at 15:22

## 2023-01-01 RX ADMIN — Medication 650 MILLIGRAM(S): at 20:19

## 2023-01-01 RX ADMIN — Medication 1 MILLIGRAM(S): at 09:58

## 2023-01-01 RX ADMIN — HEPARIN SODIUM 5000 UNIT(S): 5000 INJECTION INTRAVENOUS; SUBCUTANEOUS at 14:54

## 2023-01-01 RX ADMIN — Medication 1 PATCH: at 09:46

## 2023-01-01 RX ADMIN — Medication 1 PATCH: at 11:44

## 2023-01-01 RX ADMIN — PANTOPRAZOLE SODIUM 40 MILLIGRAM(S): 20 TABLET, DELAYED RELEASE ORAL at 07:17

## 2023-01-01 RX ADMIN — HYDROMORPHONE HYDROCHLORIDE 4 MILLIGRAM(S): 2 INJECTION INTRAMUSCULAR; INTRAVENOUS; SUBCUTANEOUS at 04:47

## 2023-01-01 RX ADMIN — Medication 1 MILLIGRAM(S): at 14:38

## 2023-01-01 RX ADMIN — Medication 1 MILLIGRAM(S): at 21:58

## 2023-01-01 RX ADMIN — Medication 1 MILLIGRAM(S): at 23:38

## 2023-01-01 RX ADMIN — HEPARIN SODIUM 5000 UNIT(S): 5000 INJECTION INTRAVENOUS; SUBCUTANEOUS at 16:57

## 2023-01-01 RX ADMIN — HEPARIN SODIUM 5000 UNIT(S): 5000 INJECTION INTRAVENOUS; SUBCUTANEOUS at 13:34

## 2023-01-01 RX ADMIN — PIPERACILLIN AND TAZOBACTAM 25 GRAM(S): 4; .5 INJECTION, POWDER, LYOPHILIZED, FOR SOLUTION INTRAVENOUS at 00:26

## 2023-01-01 RX ADMIN — HYDROMORPHONE HYDROCHLORIDE 2 MILLIGRAM(S): 2 INJECTION INTRAMUSCULAR; INTRAVENOUS; SUBCUTANEOUS at 14:28

## 2023-01-01 RX ADMIN — PANTOPRAZOLE SODIUM 40 MILLIGRAM(S): 20 TABLET, DELAYED RELEASE ORAL at 17:40

## 2023-01-01 RX ADMIN — Medication 3 MILLILITER(S): at 08:52

## 2023-01-01 RX ADMIN — METHADONE HYDROCHLORIDE 7.5 MILLIGRAM(S): 40 TABLET ORAL at 04:35

## 2023-01-01 RX ADMIN — PIPERACILLIN AND TAZOBACTAM 25 GRAM(S): 4; .5 INJECTION, POWDER, LYOPHILIZED, FOR SOLUTION INTRAVENOUS at 12:44

## 2023-01-01 RX ADMIN — PANTOPRAZOLE SODIUM 40 MILLIGRAM(S): 20 TABLET, DELAYED RELEASE ORAL at 18:55

## 2023-01-01 RX ADMIN — SODIUM CHLORIDE 75 MILLILITER(S): 9 INJECTION, SOLUTION INTRAVENOUS at 05:23

## 2023-01-01 RX ADMIN — Medication 1 MILLIGRAM(S): at 21:29

## 2023-01-01 RX ADMIN — Medication 50 MICROGRAM(S): at 06:32

## 2023-01-01 RX ADMIN — Medication 1 PATCH: at 21:00

## 2023-01-01 RX ADMIN — HYDROMORPHONE HYDROCHLORIDE 2 MILLIGRAM(S): 2 INJECTION INTRAMUSCULAR; INTRAVENOUS; SUBCUTANEOUS at 03:03

## 2023-01-01 RX ADMIN — Medication 3 MILLILITER(S): at 07:21

## 2023-01-01 RX ADMIN — QUETIAPINE FUMARATE 25 MILLIGRAM(S): 200 TABLET, FILM COATED ORAL at 22:16

## 2023-01-01 RX ADMIN — Medication 300 MILLIGRAM(S): at 12:44

## 2023-01-01 RX ADMIN — Medication 1 MILLIGRAM(S): at 22:29

## 2023-01-01 RX ADMIN — HYDROMORPHONE HYDROCHLORIDE 1 MILLIGRAM(S): 2 INJECTION INTRAMUSCULAR; INTRAVENOUS; SUBCUTANEOUS at 11:15

## 2023-01-01 RX ADMIN — Medication 1 PATCH: at 09:20

## 2023-01-01 RX ADMIN — HEPARIN SODIUM 5000 UNIT(S): 5000 INJECTION INTRAVENOUS; SUBCUTANEOUS at 22:24

## 2023-01-01 RX ADMIN — Medication 50 MICROGRAM(S): at 06:40

## 2023-01-01 RX ADMIN — Medication 1 PATCH: at 09:16

## 2023-01-01 RX ADMIN — METHADONE HYDROCHLORIDE 15 MILLIGRAM(S): 40 TABLET ORAL at 21:33

## 2023-01-01 RX ADMIN — Medication 300 MILLIGRAM(S): at 11:02

## 2023-01-01 RX ADMIN — Medication 1 MILLIGRAM(S): at 22:32

## 2023-01-01 RX ADMIN — GABAPENTIN 600 MILLIGRAM(S): 400 CAPSULE ORAL at 05:35

## 2023-01-01 RX ADMIN — Medication 1 PATCH: at 20:17

## 2023-01-01 RX ADMIN — PANTOPRAZOLE SODIUM 40 MILLIGRAM(S): 20 TABLET, DELAYED RELEASE ORAL at 07:28

## 2023-01-01 RX ADMIN — Medication 1 MILLIGRAM(S): at 10:50

## 2023-01-01 RX ADMIN — HEPARIN SODIUM 5000 UNIT(S): 5000 INJECTION INTRAVENOUS; SUBCUTANEOUS at 14:12

## 2023-01-01 RX ADMIN — Medication 1 PATCH: at 12:35

## 2023-01-01 RX ADMIN — HYDROMORPHONE HYDROCHLORIDE 6 MILLIGRAM(S): 2 INJECTION INTRAMUSCULAR; INTRAVENOUS; SUBCUTANEOUS at 09:53

## 2023-01-01 RX ADMIN — Medication 1 MILLIGRAM(S): at 10:49

## 2023-01-01 RX ADMIN — Medication 650 MILLIGRAM(S): at 00:27

## 2023-01-01 RX ADMIN — PANTOPRAZOLE SODIUM 40 MILLIGRAM(S): 20 TABLET, DELAYED RELEASE ORAL at 06:03

## 2023-01-01 RX ADMIN — GABAPENTIN 600 MILLIGRAM(S): 400 CAPSULE ORAL at 13:47

## 2023-01-01 RX ADMIN — Medication 1 PATCH: at 08:00

## 2023-01-01 RX ADMIN — PANTOPRAZOLE SODIUM 40 MILLIGRAM(S): 20 TABLET, DELAYED RELEASE ORAL at 18:14

## 2023-01-01 RX ADMIN — HYDROMORPHONE HYDROCHLORIDE 1 MILLIGRAM(S): 2 INJECTION INTRAMUSCULAR; INTRAVENOUS; SUBCUTANEOUS at 09:00

## 2023-01-01 RX ADMIN — Medication 40 MILLIGRAM(S): at 23:39

## 2023-01-01 RX ADMIN — HYDROMORPHONE HYDROCHLORIDE 30 MILLILITER(S): 2 INJECTION INTRAMUSCULAR; INTRAVENOUS; SUBCUTANEOUS at 08:18

## 2023-01-01 RX ADMIN — HYDROMORPHONE HYDROCHLORIDE 0.5 MILLIGRAM(S): 2 INJECTION INTRAMUSCULAR; INTRAVENOUS; SUBCUTANEOUS at 11:59

## 2023-01-01 RX ADMIN — Medication 1 MILLIGRAM(S): at 12:44

## 2023-01-01 RX ADMIN — Medication 1 PATCH: at 13:45

## 2023-01-01 RX ADMIN — HYDROMORPHONE HYDROCHLORIDE 6 MILLIGRAM(S): 2 INJECTION INTRAMUSCULAR; INTRAVENOUS; SUBCUTANEOUS at 22:32

## 2023-01-01 RX ADMIN — METHADONE HYDROCHLORIDE 15 MILLIGRAM(S): 40 TABLET ORAL at 21:56

## 2023-01-01 RX ADMIN — HYDROMORPHONE HYDROCHLORIDE 6 MILLIGRAM(S): 2 INJECTION INTRAMUSCULAR; INTRAVENOUS; SUBCUTANEOUS at 07:17

## 2023-01-01 RX ADMIN — Medication 40 MILLIGRAM(S): at 13:39

## 2023-01-01 RX ADMIN — HYDROMORPHONE HYDROCHLORIDE 1 MILLIGRAM(S): 2 INJECTION INTRAMUSCULAR; INTRAVENOUS; SUBCUTANEOUS at 05:17

## 2023-01-01 RX ADMIN — Medication 250 MILLIGRAM(S): at 18:21

## 2023-01-01 RX ADMIN — GABAPENTIN 600 MILLIGRAM(S): 400 CAPSULE ORAL at 06:26

## 2023-01-01 RX ADMIN — Medication 3 MILLILITER(S): at 04:34

## 2023-01-01 RX ADMIN — HEPARIN SODIUM 5000 UNIT(S): 5000 INJECTION INTRAVENOUS; SUBCUTANEOUS at 05:35

## 2023-01-01 RX ADMIN — Medication 1 PATCH: at 09:11

## 2023-01-01 RX ADMIN — Medication 15 MILLIGRAM(S): at 10:15

## 2023-01-01 RX ADMIN — Medication 650 MILLIGRAM(S): at 17:27

## 2023-01-01 RX ADMIN — Medication 1 PATCH: at 07:37

## 2023-01-01 RX ADMIN — HYDROMORPHONE HYDROCHLORIDE 1 MILLIGRAM(S): 2 INJECTION INTRAMUSCULAR; INTRAVENOUS; SUBCUTANEOUS at 16:45

## 2023-01-01 RX ADMIN — METHADONE HYDROCHLORIDE 10 MILLIGRAM(S): 40 TABLET ORAL at 07:27

## 2023-01-01 RX ADMIN — HYDROMORPHONE HYDROCHLORIDE 6 MILLIGRAM(S): 2 INJECTION INTRAMUSCULAR; INTRAVENOUS; SUBCUTANEOUS at 06:47

## 2023-01-01 RX ADMIN — SODIUM CHLORIDE 75 MILLILITER(S): 9 INJECTION, SOLUTION INTRAVENOUS at 09:54

## 2023-01-01 RX ADMIN — HYDROMORPHONE HYDROCHLORIDE 1 MILLIGRAM(S): 2 INJECTION INTRAMUSCULAR; INTRAVENOUS; SUBCUTANEOUS at 22:20

## 2023-01-01 RX ADMIN — Medication 5 MILLIGRAM(S): at 05:13

## 2023-01-01 RX ADMIN — Medication 40 MICROGRAM(S): at 21:58

## 2023-01-01 RX ADMIN — Medication 1 PATCH: at 11:06

## 2023-01-01 RX ADMIN — Medication 3 MILLILITER(S): at 03:52

## 2023-01-01 RX ADMIN — HYDROMORPHONE HYDROCHLORIDE 0.5 MILLIGRAM(S): 2 INJECTION INTRAMUSCULAR; INTRAVENOUS; SUBCUTANEOUS at 20:54

## 2023-01-01 RX ADMIN — CEFTRIAXONE 100 MILLIGRAM(S): 500 INJECTION, POWDER, FOR SOLUTION INTRAMUSCULAR; INTRAVENOUS at 14:07

## 2023-01-01 RX ADMIN — HYDROMORPHONE HYDROCHLORIDE 1 MILLIGRAM(S): 2 INJECTION INTRAMUSCULAR; INTRAVENOUS; SUBCUTANEOUS at 07:32

## 2023-01-01 RX ADMIN — Medication 1 MILLIGRAM(S): at 13:39

## 2023-01-01 RX ADMIN — HYDROMORPHONE HYDROCHLORIDE 30 MILLILITER(S): 2 INJECTION INTRAMUSCULAR; INTRAVENOUS; SUBCUTANEOUS at 08:10

## 2023-01-01 RX ADMIN — Medication 1 MILLIGRAM(S): at 06:50

## 2023-01-01 RX ADMIN — Medication 650 MILLIGRAM(S): at 07:58

## 2023-01-01 RX ADMIN — HYDROMORPHONE HYDROCHLORIDE 6 MILLIGRAM(S): 2 INJECTION INTRAMUSCULAR; INTRAVENOUS; SUBCUTANEOUS at 22:50

## 2023-01-01 RX ADMIN — HYDROMORPHONE HYDROCHLORIDE 30 MILLILITER(S): 2 INJECTION INTRAMUSCULAR; INTRAVENOUS; SUBCUTANEOUS at 08:20

## 2023-01-01 RX ADMIN — Medication 1 PATCH: at 09:34

## 2023-01-01 RX ADMIN — METHADONE HYDROCHLORIDE 10 MILLIGRAM(S): 40 TABLET ORAL at 23:34

## 2023-01-01 RX ADMIN — HYDROMORPHONE HYDROCHLORIDE 6 MILLIGRAM(S): 2 INJECTION INTRAMUSCULAR; INTRAVENOUS; SUBCUTANEOUS at 10:18

## 2023-01-01 RX ADMIN — Medication 1 PATCH: at 19:30

## 2023-01-01 RX ADMIN — Medication 300 MILLIGRAM(S): at 11:33

## 2023-01-01 RX ADMIN — Medication 3 MILLILITER(S): at 16:06

## 2023-01-01 RX ADMIN — Medication 250 MILLIGRAM(S): at 12:00

## 2023-01-01 RX ADMIN — Medication 3 MILLILITER(S): at 04:24

## 2023-01-01 RX ADMIN — HYDROMORPHONE HYDROCHLORIDE 6 MILLIGRAM(S): 2 INJECTION INTRAMUSCULAR; INTRAVENOUS; SUBCUTANEOUS at 21:23

## 2023-01-01 RX ADMIN — METHADONE HYDROCHLORIDE 15 MILLIGRAM(S): 40 TABLET ORAL at 13:08

## 2023-01-01 RX ADMIN — GABAPENTIN 600 MILLIGRAM(S): 400 CAPSULE ORAL at 21:29

## 2023-01-01 RX ADMIN — Medication 1 MILLIGRAM(S): at 01:49

## 2023-01-01 RX ADMIN — PANTOPRAZOLE SODIUM 40 MILLIGRAM(S): 20 TABLET, DELAYED RELEASE ORAL at 17:19

## 2023-01-01 RX ADMIN — HYDROMORPHONE HYDROCHLORIDE 0.5 MILLIGRAM(S): 2 INJECTION INTRAMUSCULAR; INTRAVENOUS; SUBCUTANEOUS at 08:39

## 2023-01-01 RX ADMIN — Medication 3 MILLILITER(S): at 03:16

## 2023-01-01 RX ADMIN — Medication 3 MILLILITER(S): at 10:02

## 2023-01-01 RX ADMIN — HYDROMORPHONE HYDROCHLORIDE 0.5 MILLIGRAM(S): 2 INJECTION INTRAMUSCULAR; INTRAVENOUS; SUBCUTANEOUS at 04:25

## 2023-01-01 RX ADMIN — Medication 1 MILLIGRAM(S): at 17:02

## 2023-01-01 RX ADMIN — Medication 3 MILLILITER(S): at 15:48

## 2023-01-01 RX ADMIN — SODIUM CHLORIDE 75 MILLILITER(S): 9 INJECTION INTRAMUSCULAR; INTRAVENOUS; SUBCUTANEOUS at 21:57

## 2023-01-01 RX ADMIN — Medication 1 MILLIGRAM(S): at 12:07

## 2023-01-01 RX ADMIN — Medication 1 MILLIGRAM(S): at 23:00

## 2023-01-01 RX ADMIN — HEPARIN SODIUM 5000 UNIT(S): 5000 INJECTION INTRAVENOUS; SUBCUTANEOUS at 06:25

## 2023-01-01 RX ADMIN — HYDROMORPHONE HYDROCHLORIDE 30 MILLILITER(S): 2 INJECTION INTRAMUSCULAR; INTRAVENOUS; SUBCUTANEOUS at 08:39

## 2023-01-01 RX ADMIN — SENNA PLUS 2 TABLET(S): 8.6 TABLET ORAL at 21:34

## 2023-01-01 RX ADMIN — Medication 40 MILLIGRAM(S): at 14:50

## 2023-01-01 RX ADMIN — Medication 650 MILLIGRAM(S): at 07:17

## 2023-01-01 RX ADMIN — HYDROMORPHONE HYDROCHLORIDE 30 MILLILITER(S): 2 INJECTION INTRAMUSCULAR; INTRAVENOUS; SUBCUTANEOUS at 20:26

## 2023-01-01 RX ADMIN — PIPERACILLIN AND TAZOBACTAM 25 GRAM(S): 4; .5 INJECTION, POWDER, LYOPHILIZED, FOR SOLUTION INTRAVENOUS at 05:10

## 2023-01-01 RX ADMIN — GABAPENTIN 600 MILLIGRAM(S): 400 CAPSULE ORAL at 13:23

## 2023-01-01 RX ADMIN — Medication 1 MILLIGRAM(S): at 21:32

## 2023-01-01 RX ADMIN — PIPERACILLIN AND TAZOBACTAM 25 GRAM(S): 4; .5 INJECTION, POWDER, LYOPHILIZED, FOR SOLUTION INTRAVENOUS at 20:49

## 2023-01-01 RX ADMIN — PANTOPRAZOLE SODIUM 40 MILLIGRAM(S): 20 TABLET, DELAYED RELEASE ORAL at 17:25

## 2023-01-01 RX ADMIN — Medication 1 MILLIGRAM(S): at 02:48

## 2023-01-01 RX ADMIN — METHADONE HYDROCHLORIDE 15 MILLIGRAM(S): 40 TABLET ORAL at 22:27

## 2023-01-01 RX ADMIN — PANTOPRAZOLE SODIUM 40 MILLIGRAM(S): 20 TABLET, DELAYED RELEASE ORAL at 06:07

## 2023-01-01 RX ADMIN — MAGNESIUM OXIDE 400 MG ORAL TABLET 400 MILLIGRAM(S): 241.3 TABLET ORAL at 18:17

## 2023-01-01 RX ADMIN — PIPERACILLIN AND TAZOBACTAM 25 GRAM(S): 4; .5 INJECTION, POWDER, LYOPHILIZED, FOR SOLUTION INTRAVENOUS at 15:11

## 2023-01-01 RX ADMIN — PIPERACILLIN AND TAZOBACTAM 25 GRAM(S): 4; .5 INJECTION, POWDER, LYOPHILIZED, FOR SOLUTION INTRAVENOUS at 05:14

## 2023-01-01 RX ADMIN — SENNA PLUS 2 TABLET(S): 8.6 TABLET ORAL at 23:35

## 2023-01-01 RX ADMIN — PANTOPRAZOLE SODIUM 40 MILLIGRAM(S): 20 TABLET, DELAYED RELEASE ORAL at 12:01

## 2023-01-01 RX ADMIN — Medication 3 MILLILITER(S): at 15:41

## 2023-01-01 RX ADMIN — PIPERACILLIN AND TAZOBACTAM 25 GRAM(S): 4; .5 INJECTION, POWDER, LYOPHILIZED, FOR SOLUTION INTRAVENOUS at 17:55

## 2023-01-01 RX ADMIN — Medication 300 MILLIGRAM(S): at 22:04

## 2023-01-01 RX ADMIN — Medication 1 MILLIGRAM(S): at 23:52

## 2023-01-01 RX ADMIN — SODIUM CHLORIDE 500 MILLILITER(S): 9 INJECTION INTRAMUSCULAR; INTRAVENOUS; SUBCUTANEOUS at 06:33

## 2023-01-01 RX ADMIN — SODIUM CHLORIDE 500 MILLILITER(S): 9 INJECTION INTRAMUSCULAR; INTRAVENOUS; SUBCUTANEOUS at 09:03

## 2023-01-01 RX ADMIN — HEPARIN SODIUM 5000 UNIT(S): 5000 INJECTION INTRAVENOUS; SUBCUTANEOUS at 13:45

## 2023-01-01 RX ADMIN — Medication 3 MILLILITER(S): at 03:15

## 2023-01-01 RX ADMIN — HYDROMORPHONE HYDROCHLORIDE 1 MILLIGRAM(S): 2 INJECTION INTRAMUSCULAR; INTRAVENOUS; SUBCUTANEOUS at 04:49

## 2023-01-01 RX ADMIN — Medication 1 MILLIGRAM(S): at 23:27

## 2023-01-01 RX ADMIN — Medication 3 MILLIGRAM(S): at 21:03

## 2023-01-01 RX ADMIN — Medication 3 MILLILITER(S): at 22:16

## 2023-01-01 RX ADMIN — PANTOPRAZOLE SODIUM 40 MILLIGRAM(S): 20 TABLET, DELAYED RELEASE ORAL at 17:41

## 2023-01-01 RX ADMIN — FENTANYL CITRATE 50 MICROGRAM(S): 50 INJECTION INTRAVENOUS at 09:46

## 2023-01-01 RX ADMIN — PANTOPRAZOLE SODIUM 40 MILLIGRAM(S): 20 TABLET, DELAYED RELEASE ORAL at 12:42

## 2023-01-01 RX ADMIN — MAGNESIUM OXIDE 400 MG ORAL TABLET 400 MILLIGRAM(S): 241.3 TABLET ORAL at 07:10

## 2023-01-01 RX ADMIN — PANTOPRAZOLE SODIUM 40 MILLIGRAM(S): 20 TABLET, DELAYED RELEASE ORAL at 06:06

## 2023-01-01 RX ADMIN — Medication 50 MICROGRAM(S): at 05:35

## 2023-01-01 RX ADMIN — HYDROMORPHONE HYDROCHLORIDE 1 MILLIGRAM(S): 2 INJECTION INTRAMUSCULAR; INTRAVENOUS; SUBCUTANEOUS at 18:03

## 2023-01-01 RX ADMIN — Medication 1 MILLIGRAM(S): at 09:16

## 2023-01-01 RX ADMIN — HEPARIN SODIUM 5000 UNIT(S): 5000 INJECTION INTRAVENOUS; SUBCUTANEOUS at 13:02

## 2023-01-01 RX ADMIN — HYDROMORPHONE HYDROCHLORIDE 6 MILLIGRAM(S): 2 INJECTION INTRAMUSCULAR; INTRAVENOUS; SUBCUTANEOUS at 09:48

## 2023-01-01 RX ADMIN — GABAPENTIN 600 MILLIGRAM(S): 400 CAPSULE ORAL at 13:10

## 2023-01-01 RX ADMIN — METHADONE HYDROCHLORIDE 15 MILLIGRAM(S): 40 TABLET ORAL at 06:39

## 2023-01-01 RX ADMIN — HYDROMORPHONE HYDROCHLORIDE 6 MILLIGRAM(S): 2 INJECTION INTRAMUSCULAR; INTRAVENOUS; SUBCUTANEOUS at 09:23

## 2023-01-01 RX ADMIN — QUETIAPINE FUMARATE 25 MILLIGRAM(S): 200 TABLET, FILM COATED ORAL at 23:16

## 2023-01-01 RX ADMIN — Medication 3 MILLILITER(S): at 14:49

## 2023-01-01 RX ADMIN — METHADONE HYDROCHLORIDE 10 MILLIGRAM(S): 40 TABLET ORAL at 13:01

## 2023-01-01 RX ADMIN — HYDROMORPHONE HYDROCHLORIDE 1 MILLIGRAM(S): 2 INJECTION INTRAMUSCULAR; INTRAVENOUS; SUBCUTANEOUS at 08:58

## 2023-01-01 RX ADMIN — Medication 1 PATCH: at 11:00

## 2023-01-01 RX ADMIN — Medication 3 MILLILITER(S): at 22:22

## 2023-01-01 RX ADMIN — Medication 50 MICROGRAM(S): at 06:26

## 2023-01-01 RX ADMIN — HYDROMORPHONE HYDROCHLORIDE 6 MILLIGRAM(S): 2 INJECTION INTRAMUSCULAR; INTRAVENOUS; SUBCUTANEOUS at 18:17

## 2023-01-01 RX ADMIN — Medication 250 MILLIGRAM(S): at 06:24

## 2023-01-01 RX ADMIN — PIPERACILLIN AND TAZOBACTAM 25 GRAM(S): 4; .5 INJECTION, POWDER, LYOPHILIZED, FOR SOLUTION INTRAVENOUS at 23:14

## 2023-01-01 RX ADMIN — GABAPENTIN 600 MILLIGRAM(S): 400 CAPSULE ORAL at 14:35

## 2023-01-01 RX ADMIN — Medication 1 MILLIGRAM(S): at 06:40

## 2023-01-01 RX ADMIN — SENNA PLUS 2 TABLET(S): 8.6 TABLET ORAL at 21:58

## 2023-01-01 RX ADMIN — Medication 1 MILLIGRAM(S): at 21:56

## 2023-01-01 RX ADMIN — CEFTRIAXONE 100 MILLIGRAM(S): 500 INJECTION, POWDER, FOR SOLUTION INTRAMUSCULAR; INTRAVENOUS at 14:00

## 2023-01-01 RX ADMIN — Medication 1 MILLIGRAM(S): at 05:47

## 2023-01-01 RX ADMIN — Medication 1 PATCH: at 08:38

## 2023-01-01 RX ADMIN — HYDROMORPHONE HYDROCHLORIDE 1 MILLIGRAM(S): 2 INJECTION INTRAMUSCULAR; INTRAVENOUS; SUBCUTANEOUS at 10:45

## 2023-01-01 RX ADMIN — Medication 1 MILLIGRAM(S): at 09:29

## 2023-01-01 RX ADMIN — HYDROMORPHONE HYDROCHLORIDE 6 MILLIGRAM(S): 2 INJECTION INTRAMUSCULAR; INTRAVENOUS; SUBCUTANEOUS at 22:20

## 2023-01-01 RX ADMIN — Medication 1 PATCH: at 07:35

## 2023-01-01 RX ADMIN — HYDROMORPHONE HYDROCHLORIDE 2 MILLIGRAM(S): 2 INJECTION INTRAMUSCULAR; INTRAVENOUS; SUBCUTANEOUS at 14:15

## 2023-01-01 RX ADMIN — HYDROMORPHONE HYDROCHLORIDE 0.5 MILLIGRAM(S): 2 INJECTION INTRAMUSCULAR; INTRAVENOUS; SUBCUTANEOUS at 00:30

## 2023-01-01 RX ADMIN — PANTOPRAZOLE SODIUM 40 MILLIGRAM(S): 20 TABLET, DELAYED RELEASE ORAL at 17:16

## 2023-01-01 RX ADMIN — HEPARIN SODIUM 5000 UNIT(S): 5000 INJECTION INTRAVENOUS; SUBCUTANEOUS at 21:38

## 2023-01-01 RX ADMIN — Medication 1 PATCH: at 18:11

## 2023-01-01 RX ADMIN — GABAPENTIN 300 MILLIGRAM(S): 400 CAPSULE ORAL at 06:50

## 2023-01-01 RX ADMIN — CEFTRIAXONE 100 MILLIGRAM(S): 500 INJECTION, POWDER, FOR SOLUTION INTRAMUSCULAR; INTRAVENOUS at 14:21

## 2023-01-01 RX ADMIN — GABAPENTIN 600 MILLIGRAM(S): 400 CAPSULE ORAL at 14:36

## 2023-01-01 RX ADMIN — GABAPENTIN 600 MILLIGRAM(S): 400 CAPSULE ORAL at 22:13

## 2023-01-01 RX ADMIN — Medication 1 MILLIGRAM(S): at 11:19

## 2023-01-01 RX ADMIN — Medication 1 PATCH: at 11:58

## 2023-01-01 RX ADMIN — METHADONE HYDROCHLORIDE 15 MILLIGRAM(S): 40 TABLET ORAL at 22:30

## 2023-01-01 RX ADMIN — Medication 3 MILLILITER(S): at 04:09

## 2023-01-01 RX ADMIN — Medication 3 MILLILITER(S): at 21:55

## 2023-01-01 RX ADMIN — Medication 1 PATCH: at 19:53

## 2023-01-01 RX ADMIN — Medication 3 MILLILITER(S): at 11:14

## 2023-01-01 RX ADMIN — Medication 50 MICROGRAM(S): at 07:27

## 2023-01-01 RX ADMIN — HEPARIN SODIUM 5000 UNIT(S): 5000 INJECTION INTRAVENOUS; SUBCUTANEOUS at 22:17

## 2023-01-01 RX ADMIN — GABAPENTIN 600 MILLIGRAM(S): 400 CAPSULE ORAL at 13:12

## 2023-01-01 RX ADMIN — GABAPENTIN 600 MILLIGRAM(S): 400 CAPSULE ORAL at 22:17

## 2023-01-01 RX ADMIN — Medication 1 PATCH: at 12:41

## 2023-01-01 RX ADMIN — PANTOPRAZOLE SODIUM 40 MILLIGRAM(S): 20 TABLET, DELAYED RELEASE ORAL at 05:34

## 2023-01-01 RX ADMIN — Medication 1 PATCH: at 18:55

## 2023-01-01 RX ADMIN — Medication 1 MILLIGRAM(S): at 11:10

## 2023-01-01 RX ADMIN — HYDROMORPHONE HYDROCHLORIDE 1 MILLIGRAM(S): 2 INJECTION INTRAMUSCULAR; INTRAVENOUS; SUBCUTANEOUS at 19:52

## 2023-01-01 RX ADMIN — HEPARIN SODIUM 5000 UNIT(S): 5000 INJECTION INTRAVENOUS; SUBCUTANEOUS at 15:02

## 2023-01-01 RX ADMIN — PANTOPRAZOLE SODIUM 40 MILLIGRAM(S): 20 TABLET, DELAYED RELEASE ORAL at 06:24

## 2023-01-01 RX ADMIN — HEPARIN SODIUM 5000 UNIT(S): 5000 INJECTION INTRAVENOUS; SUBCUTANEOUS at 23:00

## 2023-01-01 RX ADMIN — Medication 400 MILLIGRAM(S): at 05:58

## 2023-01-01 RX ADMIN — PIPERACILLIN AND TAZOBACTAM 25 GRAM(S): 4; .5 INJECTION, POWDER, LYOPHILIZED, FOR SOLUTION INTRAVENOUS at 20:29

## 2023-01-01 RX ADMIN — Medication 1 MILLIGRAM(S): at 05:27

## 2023-01-01 RX ADMIN — HEPARIN SODIUM 5000 UNIT(S): 5000 INJECTION INTRAVENOUS; SUBCUTANEOUS at 13:40

## 2023-01-01 RX ADMIN — METHADONE HYDROCHLORIDE 10 MILLIGRAM(S): 40 TABLET ORAL at 13:45

## 2023-01-01 RX ADMIN — HEPARIN SODIUM 5000 UNIT(S): 5000 INJECTION INTRAVENOUS; SUBCUTANEOUS at 22:29

## 2023-01-01 RX ADMIN — SENNA PLUS 2 TABLET(S): 8.6 TABLET ORAL at 22:12

## 2023-01-01 RX ADMIN — GABAPENTIN 300 MILLIGRAM(S): 400 CAPSULE ORAL at 23:34

## 2023-01-01 RX ADMIN — Medication 40 MILLIGRAM(S): at 07:03

## 2023-01-01 RX ADMIN — HYDROMORPHONE HYDROCHLORIDE 1 MILLIGRAM(S): 2 INJECTION INTRAMUSCULAR; INTRAVENOUS; SUBCUTANEOUS at 04:37

## 2023-01-01 RX ADMIN — Medication 3 MILLIGRAM(S): at 20:19

## 2023-01-01 RX ADMIN — SENNA PLUS 2 TABLET(S): 8.6 TABLET ORAL at 22:28

## 2023-01-01 RX ADMIN — Medication 1 PATCH: at 12:34

## 2023-01-01 RX ADMIN — HEPARIN SODIUM 5000 UNIT(S): 5000 INJECTION INTRAVENOUS; SUBCUTANEOUS at 06:07

## 2023-01-01 RX ADMIN — HEPARIN SODIUM 5000 UNIT(S): 5000 INJECTION INTRAVENOUS; SUBCUTANEOUS at 23:02

## 2023-01-01 RX ADMIN — OXYCODONE HYDROCHLORIDE 5 MILLIGRAM(S): 5 TABLET ORAL at 12:45

## 2023-01-01 RX ADMIN — Medication 3 MILLILITER(S): at 20:44

## 2023-01-01 RX ADMIN — GABAPENTIN 600 MILLIGRAM(S): 400 CAPSULE ORAL at 13:33

## 2023-01-01 RX ADMIN — GABAPENTIN 300 MILLIGRAM(S): 400 CAPSULE ORAL at 21:58

## 2023-01-01 RX ADMIN — Medication 1 PATCH: at 11:09

## 2023-01-01 RX ADMIN — GABAPENTIN 600 MILLIGRAM(S): 400 CAPSULE ORAL at 14:01

## 2023-01-01 RX ADMIN — SENNA PLUS 2 TABLET(S): 8.6 TABLET ORAL at 22:00

## 2023-01-01 RX ADMIN — Medication 1 MILLIGRAM(S): at 22:13

## 2023-01-01 RX ADMIN — Medication 300 MILLIGRAM(S): at 11:59

## 2023-01-01 RX ADMIN — Medication 1 PATCH: at 09:21

## 2023-01-01 RX ADMIN — Medication 50 MICROGRAM(S): at 06:03

## 2023-01-01 RX ADMIN — Medication 3 MILLILITER(S): at 15:35

## 2023-01-01 RX ADMIN — Medication 3 MILLILITER(S): at 03:40

## 2023-01-01 RX ADMIN — QUETIAPINE FUMARATE 25 MILLIGRAM(S): 200 TABLET, FILM COATED ORAL at 21:56

## 2023-01-01 RX ADMIN — HEPARIN SODIUM 5000 UNIT(S): 5000 INJECTION INTRAVENOUS; SUBCUTANEOUS at 06:46

## 2023-01-01 RX ADMIN — HYDROMORPHONE HYDROCHLORIDE 0.5 MILLIGRAM(S): 2 INJECTION INTRAMUSCULAR; INTRAVENOUS; SUBCUTANEOUS at 13:15

## 2023-01-01 RX ADMIN — Medication 1 PATCH: at 20:45

## 2023-01-01 RX ADMIN — HYDROMORPHONE HYDROCHLORIDE 1 MILLIGRAM(S): 2 INJECTION INTRAMUSCULAR; INTRAVENOUS; SUBCUTANEOUS at 05:15

## 2023-01-01 RX ADMIN — Medication 40 MILLIGRAM(S): at 04:52

## 2023-01-01 RX ADMIN — HEPARIN SODIUM 5000 UNIT(S): 5000 INJECTION INTRAVENOUS; SUBCUTANEOUS at 05:28

## 2023-01-01 RX ADMIN — DEXTROSE MONOHYDRATE, SODIUM CHLORIDE, AND POTASSIUM CHLORIDE 100 MILLILITER(S): 50; .745; 4.5 INJECTION, SOLUTION INTRAVENOUS at 12:30

## 2023-01-01 RX ADMIN — METHADONE HYDROCHLORIDE 15 MILLIGRAM(S): 40 TABLET ORAL at 05:38

## 2023-01-01 RX ADMIN — Medication 10 MILLIGRAM(S): at 03:59

## 2023-01-01 RX ADMIN — METHADONE HYDROCHLORIDE 7.5 MILLIGRAM(S): 40 TABLET ORAL at 13:18

## 2023-01-01 RX ADMIN — HYDROMORPHONE HYDROCHLORIDE 30 MILLILITER(S): 2 INJECTION INTRAMUSCULAR; INTRAVENOUS; SUBCUTANEOUS at 20:29

## 2023-01-01 RX ADMIN — METHADONE HYDROCHLORIDE 15 MILLIGRAM(S): 40 TABLET ORAL at 13:58

## 2023-01-01 RX ADMIN — SODIUM CHLORIDE 1000 MILLILITER(S): 9 INJECTION INTRAMUSCULAR; INTRAVENOUS; SUBCUTANEOUS at 09:46

## 2023-01-01 RX ADMIN — GABAPENTIN 600 MILLIGRAM(S): 400 CAPSULE ORAL at 06:52

## 2023-01-01 RX ADMIN — HYDROMORPHONE HYDROCHLORIDE 0.5 MILLIGRAM(S): 2 INJECTION INTRAMUSCULAR; INTRAVENOUS; SUBCUTANEOUS at 04:53

## 2023-01-01 RX ADMIN — Medication 3 MILLILITER(S): at 15:30

## 2023-01-01 RX ADMIN — Medication 3 MILLILITER(S): at 10:26

## 2023-01-01 RX ADMIN — PIPERACILLIN AND TAZOBACTAM 25 GRAM(S): 4; .5 INJECTION, POWDER, LYOPHILIZED, FOR SOLUTION INTRAVENOUS at 15:02

## 2023-01-01 RX ADMIN — QUETIAPINE FUMARATE 25 MILLIGRAM(S): 200 TABLET, FILM COATED ORAL at 21:58

## 2023-01-01 RX ADMIN — PIPERACILLIN AND TAZOBACTAM 25 GRAM(S): 4; .5 INJECTION, POWDER, LYOPHILIZED, FOR SOLUTION INTRAVENOUS at 20:42

## 2023-01-01 RX ADMIN — HEPARIN SODIUM 5000 UNIT(S): 5000 INJECTION INTRAVENOUS; SUBCUTANEOUS at 14:15

## 2023-01-01 RX ADMIN — HYDROMORPHONE HYDROCHLORIDE 6 MILLIGRAM(S): 2 INJECTION INTRAMUSCULAR; INTRAVENOUS; SUBCUTANEOUS at 13:30

## 2023-01-01 RX ADMIN — Medication 25 GRAM(S): at 13:34

## 2023-01-01 RX ADMIN — HYDROMORPHONE HYDROCHLORIDE 6 MILLIGRAM(S): 2 INJECTION INTRAMUSCULAR; INTRAVENOUS; SUBCUTANEOUS at 10:22

## 2023-01-01 RX ADMIN — HYDROMORPHONE HYDROCHLORIDE 0.5 MILLIGRAM(S): 2 INJECTION INTRAMUSCULAR; INTRAVENOUS; SUBCUTANEOUS at 21:18

## 2023-01-01 RX ADMIN — GABAPENTIN 300 MILLIGRAM(S): 400 CAPSULE ORAL at 17:54

## 2023-01-01 RX ADMIN — Medication 50 MICROGRAM(S): at 06:05

## 2023-01-01 RX ADMIN — HEPARIN SODIUM 5000 UNIT(S): 5000 INJECTION INTRAVENOUS; SUBCUTANEOUS at 23:14

## 2023-01-01 RX ADMIN — HYDROMORPHONE HYDROCHLORIDE 30 MILLILITER(S): 2 INJECTION INTRAMUSCULAR; INTRAVENOUS; SUBCUTANEOUS at 08:52

## 2023-01-01 RX ADMIN — HYDROMORPHONE HYDROCHLORIDE 0.5 MILLIGRAM(S): 2 INJECTION INTRAMUSCULAR; INTRAVENOUS; SUBCUTANEOUS at 00:15

## 2023-01-01 RX ADMIN — HYDROMORPHONE HYDROCHLORIDE 1 MILLIGRAM(S): 2 INJECTION INTRAMUSCULAR; INTRAVENOUS; SUBCUTANEOUS at 02:15

## 2023-01-01 RX ADMIN — PANTOPRAZOLE SODIUM 40 MILLIGRAM(S): 20 TABLET, DELAYED RELEASE ORAL at 19:48

## 2023-01-01 RX ADMIN — Medication 40 MILLIGRAM(S): at 05:27

## 2023-01-01 RX ADMIN — Medication 1 MILLIGRAM(S): at 14:12

## 2023-01-01 RX ADMIN — Medication 40 MICROGRAM(S): at 00:27

## 2023-01-01 RX ADMIN — PANTOPRAZOLE SODIUM 40 MILLIGRAM(S): 20 TABLET, DELAYED RELEASE ORAL at 13:57

## 2023-01-01 RX ADMIN — GABAPENTIN 600 MILLIGRAM(S): 400 CAPSULE ORAL at 06:24

## 2023-01-01 RX ADMIN — Medication 25 GRAM(S): at 06:06

## 2023-01-01 RX ADMIN — Medication 40 MILLIGRAM(S): at 11:41

## 2023-01-01 RX ADMIN — GABAPENTIN 600 MILLIGRAM(S): 400 CAPSULE ORAL at 14:37

## 2023-01-01 RX ADMIN — HYDROMORPHONE HYDROCHLORIDE 30 MILLILITER(S): 2 INJECTION INTRAMUSCULAR; INTRAVENOUS; SUBCUTANEOUS at 20:11

## 2023-01-01 RX ADMIN — Medication 3 MILLILITER(S): at 20:22

## 2023-01-01 RX ADMIN — Medication 40 MILLIGRAM(S): at 16:55

## 2023-01-01 RX ADMIN — Medication 1 MILLIGRAM(S): at 17:16

## 2023-01-01 RX ADMIN — Medication 250 MILLIGRAM(S): at 06:08

## 2023-01-01 RX ADMIN — HEPARIN SODIUM 5000 UNIT(S): 5000 INJECTION INTRAVENOUS; SUBCUTANEOUS at 22:12

## 2023-01-01 RX ADMIN — HYDROMORPHONE HYDROCHLORIDE 30 MILLILITER(S): 2 INJECTION INTRAMUSCULAR; INTRAVENOUS; SUBCUTANEOUS at 20:16

## 2023-01-01 RX ADMIN — METHADONE HYDROCHLORIDE 15 MILLIGRAM(S): 40 TABLET ORAL at 16:07

## 2023-01-01 RX ADMIN — HEPARIN SODIUM 5000 UNIT(S): 5000 INJECTION INTRAVENOUS; SUBCUTANEOUS at 23:27

## 2023-01-01 RX ADMIN — Medication 1 PATCH: at 18:47

## 2023-01-01 RX ADMIN — QUETIAPINE FUMARATE 25 MILLIGRAM(S): 200 TABLET, FILM COATED ORAL at 23:00

## 2023-01-01 RX ADMIN — Medication 1 MILLIGRAM(S): at 13:23

## 2023-01-01 RX ADMIN — PIPERACILLIN AND TAZOBACTAM 25 GRAM(S): 4; .5 INJECTION, POWDER, LYOPHILIZED, FOR SOLUTION INTRAVENOUS at 23:34

## 2023-01-01 RX ADMIN — PANTOPRAZOLE SODIUM 40 MILLIGRAM(S): 20 TABLET, DELAYED RELEASE ORAL at 06:32

## 2023-01-01 RX ADMIN — PANTOPRAZOLE SODIUM 40 MILLIGRAM(S): 20 TABLET, DELAYED RELEASE ORAL at 06:46

## 2023-01-01 RX ADMIN — PIPERACILLIN AND TAZOBACTAM 25 GRAM(S): 4; .5 INJECTION, POWDER, LYOPHILIZED, FOR SOLUTION INTRAVENOUS at 14:08

## 2023-01-01 RX ADMIN — Medication 1 PATCH: at 07:39

## 2023-01-01 RX ADMIN — HYDROMORPHONE HYDROCHLORIDE 1 MILLIGRAM(S): 2 INJECTION INTRAMUSCULAR; INTRAVENOUS; SUBCUTANEOUS at 12:52

## 2023-01-01 RX ADMIN — OXYCODONE HYDROCHLORIDE 5 MILLIGRAM(S): 5 TABLET ORAL at 21:02

## 2023-01-01 RX ADMIN — QUETIAPINE FUMARATE 25 MILLIGRAM(S): 200 TABLET, FILM COATED ORAL at 21:38

## 2023-01-01 RX ADMIN — METHADONE HYDROCHLORIDE 15 MILLIGRAM(S): 40 TABLET ORAL at 13:59

## 2023-01-01 RX ADMIN — HYDROMORPHONE HYDROCHLORIDE 2 MILLIGRAM(S): 2 INJECTION INTRAMUSCULAR; INTRAVENOUS; SUBCUTANEOUS at 18:46

## 2023-01-01 RX ADMIN — Medication 250 MILLIGRAM(S): at 06:03

## 2023-01-01 RX ADMIN — Medication 3 MILLILITER(S): at 23:04

## 2023-01-01 RX ADMIN — QUETIAPINE FUMARATE 25 MILLIGRAM(S): 200 TABLET, FILM COATED ORAL at 21:32

## 2023-01-01 RX ADMIN — Medication 3 MILLILITER(S): at 10:42

## 2023-01-01 RX ADMIN — METHADONE HYDROCHLORIDE 15 MILLIGRAM(S): 40 TABLET ORAL at 22:24

## 2023-01-01 RX ADMIN — SENNA PLUS 2 TABLET(S): 8.6 TABLET ORAL at 21:39

## 2023-01-01 RX ADMIN — Medication 1 MILLIGRAM(S): at 18:23

## 2023-01-01 RX ADMIN — Medication 1 MILLIGRAM(S): at 22:38

## 2023-01-01 RX ADMIN — HEPARIN SODIUM 5000 UNIT(S): 5000 INJECTION INTRAVENOUS; SUBCUTANEOUS at 06:41

## 2023-01-01 RX ADMIN — GABAPENTIN 300 MILLIGRAM(S): 400 CAPSULE ORAL at 05:17

## 2023-01-01 RX ADMIN — Medication 3 MILLILITER(S): at 16:28

## 2023-01-01 RX ADMIN — Medication 1 PATCH: at 18:56

## 2023-01-01 RX ADMIN — HYDROMORPHONE HYDROCHLORIDE 6 MILLIGRAM(S): 2 INJECTION INTRAMUSCULAR; INTRAVENOUS; SUBCUTANEOUS at 18:09

## 2023-01-01 RX ADMIN — SODIUM CHLORIDE 75 MILLILITER(S): 9 INJECTION, SOLUTION INTRAVENOUS at 13:02

## 2023-01-01 RX ADMIN — Medication 1 MILLIGRAM(S): at 09:50

## 2023-01-01 RX ADMIN — HYDROMORPHONE HYDROCHLORIDE 2 MILLIGRAM(S): 2 INJECTION INTRAMUSCULAR; INTRAVENOUS; SUBCUTANEOUS at 15:45

## 2023-01-01 RX ADMIN — HYDROMORPHONE HYDROCHLORIDE 1 MILLIGRAM(S): 2 INJECTION INTRAMUSCULAR; INTRAVENOUS; SUBCUTANEOUS at 20:51

## 2023-01-01 RX ADMIN — HYDROMORPHONE HYDROCHLORIDE 4 MILLIGRAM(S): 2 INJECTION INTRAMUSCULAR; INTRAVENOUS; SUBCUTANEOUS at 21:57

## 2023-01-01 RX ADMIN — GABAPENTIN 300 MILLIGRAM(S): 400 CAPSULE ORAL at 13:57

## 2023-01-01 RX ADMIN — Medication 3 MILLILITER(S): at 16:43

## 2023-01-01 RX ADMIN — Medication 40 MILLIEQUIVALENT(S): at 13:45

## 2023-01-01 RX ADMIN — Medication 1 PATCH: at 11:52

## 2023-01-01 RX ADMIN — HYDROMORPHONE HYDROCHLORIDE 30 MILLILITER(S): 2 INJECTION INTRAMUSCULAR; INTRAVENOUS; SUBCUTANEOUS at 20:23

## 2023-01-01 RX ADMIN — Medication 40 MICROGRAM(S): at 22:04

## 2023-01-01 RX ADMIN — GABAPENTIN 300 MILLIGRAM(S): 400 CAPSULE ORAL at 04:19

## 2023-01-01 RX ADMIN — Medication 1 MILLIGRAM(S): at 13:52

## 2023-01-01 RX ADMIN — HEPARIN SODIUM 5000 UNIT(S): 5000 INJECTION INTRAVENOUS; SUBCUTANEOUS at 07:05

## 2023-01-01 RX ADMIN — PANTOPRAZOLE SODIUM 40 MILLIGRAM(S): 20 TABLET, DELAYED RELEASE ORAL at 18:13

## 2023-01-01 RX ADMIN — Medication 1 MILLIGRAM(S): at 17:25

## 2023-01-01 RX ADMIN — Medication 1 PATCH: at 10:45

## 2023-01-01 RX ADMIN — HYDROMORPHONE HYDROCHLORIDE 30 MILLILITER(S): 2 INJECTION INTRAMUSCULAR; INTRAVENOUS; SUBCUTANEOUS at 20:17

## 2023-01-01 RX ADMIN — HYDROMORPHONE HYDROCHLORIDE 0.5 MILLIGRAM(S): 2 INJECTION INTRAMUSCULAR; INTRAVENOUS; SUBCUTANEOUS at 18:53

## 2023-01-01 RX ADMIN — Medication 40 MILLIGRAM(S): at 20:49

## 2023-01-01 RX ADMIN — METHADONE HYDROCHLORIDE 15 MILLIGRAM(S): 40 TABLET ORAL at 14:11

## 2023-01-01 RX ADMIN — GABAPENTIN 600 MILLIGRAM(S): 400 CAPSULE ORAL at 23:14

## 2023-01-01 RX ADMIN — Medication 1 MILLIGRAM(S): at 04:19

## 2023-01-01 RX ADMIN — Medication 1 PATCH: at 09:22

## 2023-01-01 RX ADMIN — HYDROMORPHONE HYDROCHLORIDE 6 MILLIGRAM(S): 2 INJECTION INTRAMUSCULAR; INTRAVENOUS; SUBCUTANEOUS at 20:01

## 2023-01-01 RX ADMIN — PIPERACILLIN AND TAZOBACTAM 25 GRAM(S): 4; .5 INJECTION, POWDER, LYOPHILIZED, FOR SOLUTION INTRAVENOUS at 01:49

## 2023-01-01 RX ADMIN — HEPARIN SODIUM 5000 UNIT(S): 5000 INJECTION INTRAVENOUS; SUBCUTANEOUS at 05:15

## 2023-01-01 RX ADMIN — Medication 1 PATCH: at 12:44

## 2023-01-01 RX ADMIN — Medication 650 MILLIGRAM(S): at 12:15

## 2023-01-01 RX ADMIN — METHADONE HYDROCHLORIDE 15 MILLIGRAM(S): 40 TABLET ORAL at 13:32

## 2023-01-01 RX ADMIN — Medication 1 MILLIGRAM(S): at 22:17

## 2023-01-01 RX ADMIN — METHADONE HYDROCHLORIDE 15 MILLIGRAM(S): 40 TABLET ORAL at 06:21

## 2023-01-01 RX ADMIN — Medication 1 MILLIGRAM(S): at 22:16

## 2023-01-01 RX ADMIN — Medication 1 PATCH: at 09:25

## 2023-01-01 RX ADMIN — Medication 5 MILLIGRAM(S): at 15:59

## 2023-01-01 RX ADMIN — HYDROMORPHONE HYDROCHLORIDE 30 MILLILITER(S): 2 INJECTION INTRAMUSCULAR; INTRAVENOUS; SUBCUTANEOUS at 10:31

## 2023-01-01 RX ADMIN — Medication 0.5 MILLIGRAM(S): at 00:31

## 2023-01-01 RX ADMIN — METHADONE HYDROCHLORIDE 15 MILLIGRAM(S): 40 TABLET ORAL at 06:24

## 2023-01-01 RX ADMIN — FAMOTIDINE 104 MILLIGRAM(S): 10 INJECTION INTRAVENOUS at 17:04

## 2023-01-01 RX ADMIN — HYDROMORPHONE HYDROCHLORIDE 30 MILLILITER(S): 2 INJECTION INTRAMUSCULAR; INTRAVENOUS; SUBCUTANEOUS at 05:11

## 2023-01-01 RX ADMIN — Medication 5 MILLIGRAM(S): at 02:27

## 2023-01-01 RX ADMIN — HYDROMORPHONE HYDROCHLORIDE 6 MILLIGRAM(S): 2 INJECTION INTRAMUSCULAR; INTRAVENOUS; SUBCUTANEOUS at 12:51

## 2023-01-01 RX ADMIN — Medication 3 MILLILITER(S): at 02:33

## 2023-01-01 RX ADMIN — HYDROMORPHONE HYDROCHLORIDE 0.5 MILLIGRAM(S): 2 INJECTION INTRAMUSCULAR; INTRAVENOUS; SUBCUTANEOUS at 08:07

## 2023-01-01 RX ADMIN — SENNA PLUS 2 TABLET(S): 8.6 TABLET ORAL at 21:33

## 2023-01-01 RX ADMIN — Medication 1 PATCH: at 08:59

## 2023-01-01 RX ADMIN — PANTOPRAZOLE SODIUM 40 MILLIGRAM(S): 20 TABLET, DELAYED RELEASE ORAL at 17:42

## 2023-01-01 RX ADMIN — Medication 1 MILLIGRAM(S): at 12:17

## 2023-01-01 RX ADMIN — HYDROMORPHONE HYDROCHLORIDE 6 MILLIGRAM(S): 2 INJECTION INTRAMUSCULAR; INTRAVENOUS; SUBCUTANEOUS at 22:36

## 2023-01-01 RX ADMIN — SODIUM CHLORIDE 75 MILLILITER(S): 9 INJECTION, SOLUTION INTRAVENOUS at 18:57

## 2023-01-01 RX ADMIN — HYDROMORPHONE HYDROCHLORIDE 1 MILLIGRAM(S): 2 INJECTION INTRAMUSCULAR; INTRAVENOUS; SUBCUTANEOUS at 09:56

## 2023-01-01 RX ADMIN — Medication 3 MILLILITER(S): at 02:07

## 2023-01-01 RX ADMIN — METHADONE HYDROCHLORIDE 15 MILLIGRAM(S): 40 TABLET ORAL at 06:46

## 2023-01-01 RX ADMIN — Medication 3 MILLILITER(S): at 20:35

## 2023-01-01 RX ADMIN — HYDROMORPHONE HYDROCHLORIDE 0.5 MILLIGRAM(S): 2 INJECTION INTRAMUSCULAR; INTRAVENOUS; SUBCUTANEOUS at 10:29

## 2023-01-01 RX ADMIN — HEPARIN SODIUM 5000 UNIT(S): 5000 INJECTION INTRAVENOUS; SUBCUTANEOUS at 13:36

## 2023-01-01 RX ADMIN — PANTOPRAZOLE SODIUM 40 MILLIGRAM(S): 20 TABLET, DELAYED RELEASE ORAL at 17:28

## 2023-01-01 RX ADMIN — PIPERACILLIN AND TAZOBACTAM 25 GRAM(S): 4; .5 INJECTION, POWDER, LYOPHILIZED, FOR SOLUTION INTRAVENOUS at 17:01

## 2023-01-01 RX ADMIN — GABAPENTIN 600 MILLIGRAM(S): 400 CAPSULE ORAL at 21:38

## 2023-01-01 RX ADMIN — HYDROMORPHONE HYDROCHLORIDE 6 MILLIGRAM(S): 2 INJECTION INTRAMUSCULAR; INTRAVENOUS; SUBCUTANEOUS at 18:00

## 2023-01-01 RX ADMIN — HYDROMORPHONE HYDROCHLORIDE 1 MILLIGRAM(S): 2 INJECTION INTRAMUSCULAR; INTRAVENOUS; SUBCUTANEOUS at 22:57

## 2023-01-01 RX ADMIN — HEPARIN SODIUM 5000 UNIT(S): 5000 INJECTION INTRAVENOUS; SUBCUTANEOUS at 13:15

## 2023-01-01 RX ADMIN — GABAPENTIN 300 MILLIGRAM(S): 400 CAPSULE ORAL at 21:03

## 2023-01-01 RX ADMIN — HYDROMORPHONE HYDROCHLORIDE 1 MILLIGRAM(S): 2 INJECTION INTRAMUSCULAR; INTRAVENOUS; SUBCUTANEOUS at 12:40

## 2023-01-01 RX ADMIN — Medication 650 MILLIGRAM(S): at 23:06

## 2023-01-01 RX ADMIN — GABAPENTIN 600 MILLIGRAM(S): 400 CAPSULE ORAL at 14:23

## 2023-01-01 RX ADMIN — PANTOPRAZOLE SODIUM 40 MILLIGRAM(S): 20 TABLET, DELAYED RELEASE ORAL at 07:38

## 2023-01-01 RX ADMIN — Medication 3 MILLILITER(S): at 22:06

## 2023-01-01 RX ADMIN — HYDROMORPHONE HYDROCHLORIDE 1 MG/HR: 2 INJECTION INTRAMUSCULAR; INTRAVENOUS; SUBCUTANEOUS at 12:25

## 2023-01-01 RX ADMIN — HEPARIN SODIUM 5000 UNIT(S): 5000 INJECTION INTRAVENOUS; SUBCUTANEOUS at 06:03

## 2023-01-01 RX ADMIN — Medication 100 GRAM(S): at 11:52

## 2023-01-01 RX ADMIN — Medication 1 MILLIGRAM(S): at 14:35

## 2023-01-01 RX ADMIN — Medication 1 PATCH: at 06:23

## 2023-01-01 RX ADMIN — HYDROMORPHONE HYDROCHLORIDE 6 MILLIGRAM(S): 2 INJECTION INTRAMUSCULAR; INTRAVENOUS; SUBCUTANEOUS at 16:26

## 2023-01-01 RX ADMIN — PIPERACILLIN AND TAZOBACTAM 200 GRAM(S): 4; .5 INJECTION, POWDER, LYOPHILIZED, FOR SOLUTION INTRAVENOUS at 09:13

## 2023-01-01 RX ADMIN — FENTANYL CITRATE 25 MICROGRAM(S): 50 INJECTION INTRAVENOUS at 12:15

## 2023-01-01 RX ADMIN — GABAPENTIN 300 MILLIGRAM(S): 400 CAPSULE ORAL at 13:01

## 2023-01-01 RX ADMIN — PIPERACILLIN AND TAZOBACTAM 25 GRAM(S): 4; .5 INJECTION, POWDER, LYOPHILIZED, FOR SOLUTION INTRAVENOUS at 06:23

## 2023-01-01 RX ADMIN — HYDROMORPHONE HYDROCHLORIDE 2 MILLIGRAM(S): 2 INJECTION INTRAMUSCULAR; INTRAVENOUS; SUBCUTANEOUS at 09:30

## 2023-01-01 RX ADMIN — OXYCODONE HYDROCHLORIDE 5 MILLIGRAM(S): 5 TABLET ORAL at 12:30

## 2023-01-01 RX ADMIN — Medication 1 PATCH: at 07:16

## 2023-01-01 RX ADMIN — GABAPENTIN 600 MILLIGRAM(S): 400 CAPSULE ORAL at 21:39

## 2023-01-01 RX ADMIN — HEPARIN SODIUM 5000 UNIT(S): 5000 INJECTION INTRAVENOUS; SUBCUTANEOUS at 06:06

## 2023-01-01 RX ADMIN — Medication 3 MILLILITER(S): at 22:58

## 2023-01-01 RX ADMIN — Medication 3 MILLILITER(S): at 05:09

## 2023-01-01 RX ADMIN — Medication 25 GRAM(S): at 12:16

## 2023-01-01 RX ADMIN — Medication 37.5 MICROGRAM(S): at 06:51

## 2023-01-01 RX ADMIN — HYDROMORPHONE HYDROCHLORIDE 0.5 MILLIGRAM(S): 2 INJECTION INTRAMUSCULAR; INTRAVENOUS; SUBCUTANEOUS at 12:50

## 2023-01-01 RX ADMIN — HYDROMORPHONE HYDROCHLORIDE 2 MILLIGRAM(S): 2 INJECTION INTRAMUSCULAR; INTRAVENOUS; SUBCUTANEOUS at 17:31

## 2023-01-01 RX ADMIN — HYDROMORPHONE HYDROCHLORIDE 6 MILLIGRAM(S): 2 INJECTION INTRAMUSCULAR; INTRAVENOUS; SUBCUTANEOUS at 15:03

## 2023-01-01 RX ADMIN — Medication 1 PATCH: at 19:29

## 2023-01-01 RX ADMIN — Medication 1 PATCH: at 09:12

## 2023-01-01 RX ADMIN — Medication 40 MILLIGRAM(S): at 05:15

## 2023-01-01 RX ADMIN — Medication 15 MILLIGRAM(S): at 13:31

## 2023-01-01 RX ADMIN — HYDROMORPHONE HYDROCHLORIDE 6 MILLIGRAM(S): 2 INJECTION INTRAMUSCULAR; INTRAVENOUS; SUBCUTANEOUS at 21:30

## 2023-01-01 RX ADMIN — METHADONE HYDROCHLORIDE 15 MILLIGRAM(S): 40 TABLET ORAL at 14:08

## 2023-01-01 RX ADMIN — HYDROMORPHONE HYDROCHLORIDE 0.5 MILLIGRAM(S): 2 INJECTION INTRAMUSCULAR; INTRAVENOUS; SUBCUTANEOUS at 19:25

## 2023-01-01 RX ADMIN — SODIUM CHLORIDE 75 MILLILITER(S): 9 INJECTION, SOLUTION INTRAVENOUS at 09:37

## 2023-01-01 RX ADMIN — Medication 1 MILLIGRAM(S): at 17:23

## 2023-01-01 RX ADMIN — Medication 1 MILLIGRAM(S): at 14:08

## 2023-01-01 RX ADMIN — Medication 3 MILLILITER(S): at 14:44

## 2023-01-01 RX ADMIN — OXYCODONE HYDROCHLORIDE 5 MILLIGRAM(S): 5 TABLET ORAL at 11:48

## 2023-01-01 RX ADMIN — Medication 3 MILLILITER(S): at 16:05

## 2023-01-01 RX ADMIN — Medication 250 MILLIGRAM(S): at 02:55

## 2023-01-01 RX ADMIN — GABAPENTIN 600 MILLIGRAM(S): 400 CAPSULE ORAL at 21:43

## 2023-01-01 RX ADMIN — Medication 3 MILLILITER(S): at 04:48

## 2023-01-01 RX ADMIN — ONDANSETRON 4 MILLIGRAM(S): 8 TABLET, FILM COATED ORAL at 22:58

## 2023-01-01 RX ADMIN — Medication 1 MILLIGRAM(S): at 04:05

## 2023-01-01 RX ADMIN — Medication 166.67 MILLIGRAM(S): at 18:33

## 2023-01-01 RX ADMIN — Medication 1 PATCH: at 08:17

## 2023-01-01 RX ADMIN — HYDROMORPHONE HYDROCHLORIDE 30 MILLILITER(S): 2 INJECTION INTRAMUSCULAR; INTRAVENOUS; SUBCUTANEOUS at 20:19

## 2023-01-01 RX ADMIN — Medication 50 MICROGRAM(S): at 05:16

## 2023-01-01 RX ADMIN — HYDROMORPHONE HYDROCHLORIDE 30 MILLILITER(S): 2 INJECTION INTRAMUSCULAR; INTRAVENOUS; SUBCUTANEOUS at 13:59

## 2023-01-01 RX ADMIN — HEPARIN SODIUM 5000 UNIT(S): 5000 INJECTION INTRAVENOUS; SUBCUTANEOUS at 21:39

## 2023-01-01 RX ADMIN — Medication 15 MILLIGRAM(S): at 14:01

## 2023-01-01 RX ADMIN — SODIUM CHLORIDE 75 MILLILITER(S): 9 INJECTION, SOLUTION INTRAVENOUS at 20:50

## 2023-01-01 RX ADMIN — Medication 1 PATCH: at 11:16

## 2023-01-01 RX ADMIN — GABAPENTIN 600 MILLIGRAM(S): 400 CAPSULE ORAL at 21:56

## 2023-01-01 RX ADMIN — Medication 1 MILLIGRAM(S): at 17:38

## 2023-01-01 RX ADMIN — HYDROMORPHONE HYDROCHLORIDE 0.5 MILLIGRAM(S): 2 INJECTION INTRAMUSCULAR; INTRAVENOUS; SUBCUTANEOUS at 20:29

## 2023-01-01 RX ADMIN — GABAPENTIN 600 MILLIGRAM(S): 400 CAPSULE ORAL at 15:00

## 2023-01-01 RX ADMIN — HYDROMORPHONE HYDROCHLORIDE 6 MILLIGRAM(S): 2 INJECTION INTRAMUSCULAR; INTRAVENOUS; SUBCUTANEOUS at 20:18

## 2023-01-01 RX ADMIN — GABAPENTIN 600 MILLIGRAM(S): 400 CAPSULE ORAL at 23:00

## 2023-01-01 RX ADMIN — Medication 40 MILLIGRAM(S): at 05:21

## 2023-01-01 RX ADMIN — Medication 1 MILLIGRAM(S): at 18:56

## 2023-01-01 RX ADMIN — Medication 400 MILLIGRAM(S): at 09:31

## 2023-01-01 RX ADMIN — Medication 1 PATCH: at 09:10

## 2023-01-01 RX ADMIN — HYDROMORPHONE HYDROCHLORIDE 2 MILLIGRAM(S): 2 INJECTION INTRAMUSCULAR; INTRAVENOUS; SUBCUTANEOUS at 02:40

## 2023-01-01 RX ADMIN — Medication 1 MILLIGRAM(S): at 21:54

## 2023-01-01 RX ADMIN — Medication 1 MILLIGRAM(S): at 18:43

## 2023-01-01 RX ADMIN — Medication 3 MILLILITER(S): at 03:11

## 2023-01-01 RX ADMIN — HYDROMORPHONE HYDROCHLORIDE 1 MILLIGRAM(S): 2 INJECTION INTRAMUSCULAR; INTRAVENOUS; SUBCUTANEOUS at 08:18

## 2023-01-01 RX ADMIN — HYDROMORPHONE HYDROCHLORIDE 6 MILLIGRAM(S): 2 INJECTION INTRAMUSCULAR; INTRAVENOUS; SUBCUTANEOUS at 11:26

## 2023-01-01 RX ADMIN — HYDROMORPHONE HYDROCHLORIDE 2 MILLIGRAM(S): 2 INJECTION INTRAMUSCULAR; INTRAVENOUS; SUBCUTANEOUS at 09:45

## 2023-01-01 RX ADMIN — HEPARIN SODIUM 5000 UNIT(S): 5000 INJECTION INTRAVENOUS; SUBCUTANEOUS at 06:58

## 2023-01-01 RX ADMIN — Medication 5 MILLIGRAM(S): at 20:53

## 2023-01-01 RX ADMIN — Medication 3 MILLILITER(S): at 10:39

## 2023-01-01 RX ADMIN — Medication 1 MILLIGRAM(S): at 01:44

## 2023-01-01 RX ADMIN — PANTOPRAZOLE SODIUM 40 MILLIGRAM(S): 20 TABLET, DELAYED RELEASE ORAL at 18:57

## 2023-01-01 RX ADMIN — HYDROMORPHONE HYDROCHLORIDE 6 MILLIGRAM(S): 2 INJECTION INTRAMUSCULAR; INTRAVENOUS; SUBCUTANEOUS at 10:00

## 2023-01-01 RX ADMIN — Medication 3 MILLILITER(S): at 09:29

## 2023-01-01 RX ADMIN — Medication 650 MILLIGRAM(S): at 00:57

## 2023-01-01 RX ADMIN — Medication 3 MILLILITER(S): at 10:34

## 2023-01-01 RX ADMIN — Medication 1 PATCH: at 07:36

## 2023-01-01 RX ADMIN — HYDROMORPHONE HYDROCHLORIDE 30 MILLILITER(S): 2 INJECTION INTRAMUSCULAR; INTRAVENOUS; SUBCUTANEOUS at 12:07

## 2023-01-01 RX ADMIN — HYDROMORPHONE HYDROCHLORIDE 6 MILLIGRAM(S): 2 INJECTION INTRAMUSCULAR; INTRAVENOUS; SUBCUTANEOUS at 11:32

## 2023-01-01 RX ADMIN — GABAPENTIN 300 MILLIGRAM(S): 400 CAPSULE ORAL at 23:09

## 2023-01-01 RX ADMIN — Medication 1 MILLIGRAM(S): at 05:20

## 2023-01-01 RX ADMIN — PANTOPRAZOLE SODIUM 40 MILLIGRAM(S): 20 TABLET, DELAYED RELEASE ORAL at 18:54

## 2023-01-01 RX ADMIN — PANTOPRAZOLE SODIUM 40 MILLIGRAM(S): 20 TABLET, DELAYED RELEASE ORAL at 18:23

## 2023-01-01 RX ADMIN — Medication 400 MILLIGRAM(S): at 00:46

## 2023-01-01 RX ADMIN — HYDROMORPHONE HYDROCHLORIDE 1 MILLIGRAM(S): 2 INJECTION INTRAMUSCULAR; INTRAVENOUS; SUBCUTANEOUS at 01:55

## 2023-01-01 RX ADMIN — METHADONE HYDROCHLORIDE 15 MILLIGRAM(S): 40 TABLET ORAL at 21:39

## 2023-01-01 RX ADMIN — Medication 1 PATCH: at 10:51

## 2023-01-01 RX ADMIN — HYDROMORPHONE HYDROCHLORIDE 1 MILLIGRAM(S): 2 INJECTION INTRAMUSCULAR; INTRAVENOUS; SUBCUTANEOUS at 12:08

## 2023-01-01 RX ADMIN — Medication 1 MILLIGRAM(S): at 21:37

## 2023-01-01 RX ADMIN — Medication 1 PATCH: at 20:34

## 2023-01-01 RX ADMIN — Medication 1 MILLIGRAM(S): at 17:28

## 2023-01-01 RX ADMIN — Medication 1 MILLIGRAM(S): at 19:17

## 2023-01-01 RX ADMIN — GABAPENTIN 600 MILLIGRAM(S): 400 CAPSULE ORAL at 06:57

## 2023-01-01 RX ADMIN — Medication 3 MILLILITER(S): at 09:24

## 2023-01-01 RX ADMIN — METHADONE HYDROCHLORIDE 15 MILLIGRAM(S): 40 TABLET ORAL at 06:06

## 2023-01-01 RX ADMIN — Medication 1 MILLIGRAM(S): at 18:47

## 2023-01-01 RX ADMIN — HYDROMORPHONE HYDROCHLORIDE 1 MILLIGRAM(S): 2 INJECTION INTRAMUSCULAR; INTRAVENOUS; SUBCUTANEOUS at 07:28

## 2023-01-01 RX ADMIN — PANTOPRAZOLE SODIUM 40 MILLIGRAM(S): 20 TABLET, DELAYED RELEASE ORAL at 17:01

## 2023-01-01 RX ADMIN — METHADONE HYDROCHLORIDE 5 MILLIGRAM(S): 40 TABLET ORAL at 01:26

## 2023-01-01 RX ADMIN — Medication 1 MILLIGRAM(S): at 09:04

## 2023-01-01 RX ADMIN — PIPERACILLIN AND TAZOBACTAM 25 GRAM(S): 4; .5 INJECTION, POWDER, LYOPHILIZED, FOR SOLUTION INTRAVENOUS at 07:00

## 2023-01-01 RX ADMIN — PIPERACILLIN AND TAZOBACTAM 25 GRAM(S): 4; .5 INJECTION, POWDER, LYOPHILIZED, FOR SOLUTION INTRAVENOUS at 14:31

## 2023-01-01 RX ADMIN — GABAPENTIN 600 MILLIGRAM(S): 400 CAPSULE ORAL at 05:12

## 2023-01-01 RX ADMIN — Medication 100 GRAM(S): at 14:11

## 2023-01-01 RX ADMIN — Medication 1 MILLIGRAM(S): at 22:23

## 2023-01-01 RX ADMIN — Medication 1 PATCH: at 11:05

## 2023-01-01 RX ADMIN — HYDROMORPHONE HYDROCHLORIDE 1 MILLIGRAM(S): 2 INJECTION INTRAMUSCULAR; INTRAVENOUS; SUBCUTANEOUS at 23:27

## 2023-01-01 RX ADMIN — SENNA PLUS 2 TABLET(S): 8.6 TABLET ORAL at 23:03

## 2023-01-01 RX ADMIN — HEPARIN SODIUM 5000 UNIT(S): 5000 INJECTION INTRAVENOUS; SUBCUTANEOUS at 07:27

## 2023-01-01 RX ADMIN — Medication 0.5 MILLIGRAM(S): at 17:47

## 2023-01-01 RX ADMIN — Medication 250 MILLIGRAM(S): at 18:15

## 2023-01-01 RX ADMIN — DEXTROSE MONOHYDRATE, SODIUM CHLORIDE, AND POTASSIUM CHLORIDE 100 MILLILITER(S): 50; .745; 4.5 INJECTION, SOLUTION INTRAVENOUS at 04:27

## 2023-01-01 RX ADMIN — HYDROMORPHONE HYDROCHLORIDE 6 MILLIGRAM(S): 2 INJECTION INTRAMUSCULAR; INTRAVENOUS; SUBCUTANEOUS at 21:00

## 2023-01-01 RX ADMIN — QUETIAPINE FUMARATE 25 MILLIGRAM(S): 200 TABLET, FILM COATED ORAL at 21:37

## 2023-01-01 RX ADMIN — HYDROMORPHONE HYDROCHLORIDE 30 MILLILITER(S): 2 INJECTION INTRAMUSCULAR; INTRAVENOUS; SUBCUTANEOUS at 20:55

## 2023-01-01 RX ADMIN — HEPARIN SODIUM 5000 UNIT(S): 5000 INJECTION INTRAVENOUS; SUBCUTANEOUS at 15:13

## 2023-01-01 RX ADMIN — Medication 3 MILLILITER(S): at 21:16

## 2023-01-01 RX ADMIN — HEPARIN SODIUM 5000 UNIT(S): 5000 INJECTION INTRAVENOUS; SUBCUTANEOUS at 14:38

## 2023-01-01 RX ADMIN — METHADONE HYDROCHLORIDE 10 MILLIGRAM(S): 40 TABLET ORAL at 22:12

## 2023-01-01 RX ADMIN — HEPARIN SODIUM 5000 UNIT(S): 5000 INJECTION INTRAVENOUS; SUBCUTANEOUS at 06:00

## 2023-01-01 RX ADMIN — Medication 15 MILLIGRAM(S): at 07:32

## 2023-01-01 RX ADMIN — Medication 3 MILLILITER(S): at 15:24

## 2023-01-01 RX ADMIN — Medication 1 MILLIGRAM(S): at 09:45

## 2023-01-01 RX ADMIN — Medication 250 MILLIGRAM(S): at 02:41

## 2023-01-01 RX ADMIN — Medication 3 MILLILITER(S): at 04:31

## 2023-01-01 RX ADMIN — GABAPENTIN 600 MILLIGRAM(S): 400 CAPSULE ORAL at 23:02

## 2023-01-01 RX ADMIN — PANTOPRAZOLE SODIUM 40 MILLIGRAM(S): 20 TABLET, DELAYED RELEASE ORAL at 06:38

## 2023-01-01 RX ADMIN — Medication 40 MILLIGRAM(S): at 22:04

## 2023-01-01 RX ADMIN — Medication 1 PATCH: at 09:04

## 2023-01-01 RX ADMIN — HEPARIN SODIUM 5000 UNIT(S): 5000 INJECTION INTRAVENOUS; SUBCUTANEOUS at 14:09

## 2023-01-01 RX ADMIN — GABAPENTIN 600 MILLIGRAM(S): 400 CAPSULE ORAL at 22:38

## 2023-01-01 RX ADMIN — HYDROMORPHONE HYDROCHLORIDE 0.5 MILLIGRAM(S): 2 INJECTION INTRAMUSCULAR; INTRAVENOUS; SUBCUTANEOUS at 19:05

## 2023-01-01 RX ADMIN — HEPARIN SODIUM 5000 UNIT(S): 5000 INJECTION INTRAVENOUS; SUBCUTANEOUS at 13:48

## 2023-01-01 RX ADMIN — METHADONE HYDROCHLORIDE 15 MILLIGRAM(S): 40 TABLET ORAL at 21:40

## 2023-01-01 RX ADMIN — METHADONE HYDROCHLORIDE 15 MILLIGRAM(S): 40 TABLET ORAL at 05:35

## 2023-01-01 RX ADMIN — Medication 3 MILLILITER(S): at 10:14

## 2023-01-01 RX ADMIN — ENOXAPARIN SODIUM 40 MILLIGRAM(S): 100 INJECTION SUBCUTANEOUS at 20:39

## 2023-01-01 RX ADMIN — Medication 3 MILLILITER(S): at 20:49

## 2023-01-01 RX ADMIN — Medication 3 MILLILITER(S): at 03:38

## 2023-01-01 RX ADMIN — METHADONE HYDROCHLORIDE 15 MILLIGRAM(S): 40 TABLET ORAL at 06:51

## 2023-01-01 RX ADMIN — Medication 1 PATCH: at 08:33

## 2023-01-01 RX ADMIN — HYDROMORPHONE HYDROCHLORIDE 6 MILLIGRAM(S): 2 INJECTION INTRAMUSCULAR; INTRAVENOUS; SUBCUTANEOUS at 20:53

## 2023-01-01 RX ADMIN — Medication 1 PATCH: at 09:03

## 2023-01-01 RX ADMIN — Medication 3 MILLILITER(S): at 20:38

## 2023-01-01 RX ADMIN — Medication 20 MILLIEQUIVALENT(S): at 12:06

## 2023-01-01 RX ADMIN — PANTOPRAZOLE SODIUM 40 MILLIGRAM(S): 20 TABLET, DELAYED RELEASE ORAL at 05:21

## 2023-01-01 RX ADMIN — HYDROMORPHONE HYDROCHLORIDE 1 MILLIGRAM(S): 2 INJECTION INTRAMUSCULAR; INTRAVENOUS; SUBCUTANEOUS at 10:32

## 2023-01-01 RX ADMIN — HYDROMORPHONE HYDROCHLORIDE 1 MILLIGRAM(S): 2 INJECTION INTRAMUSCULAR; INTRAVENOUS; SUBCUTANEOUS at 20:30

## 2023-01-01 RX ADMIN — GABAPENTIN 600 MILLIGRAM(S): 400 CAPSULE ORAL at 13:40

## 2023-01-01 RX ADMIN — Medication 40 MILLIGRAM(S): at 13:15

## 2023-01-01 RX ADMIN — HYDROMORPHONE HYDROCHLORIDE 1 MILLIGRAM(S): 2 INJECTION INTRAMUSCULAR; INTRAVENOUS; SUBCUTANEOUS at 05:09

## 2023-01-01 RX ADMIN — SODIUM CHLORIDE 500 MILLILITER(S): 9 INJECTION INTRAMUSCULAR; INTRAVENOUS; SUBCUTANEOUS at 06:00

## 2023-01-01 RX ADMIN — METHADONE HYDROCHLORIDE 15 MILLIGRAM(S): 40 TABLET ORAL at 21:28

## 2023-01-01 RX ADMIN — Medication 1 MILLIGRAM(S): at 22:30

## 2023-01-01 RX ADMIN — HEPARIN SODIUM 5000 UNIT(S): 5000 INJECTION INTRAVENOUS; SUBCUTANEOUS at 21:58

## 2023-01-01 RX ADMIN — HEPARIN SODIUM 5000 UNIT(S): 5000 INJECTION INTRAVENOUS; SUBCUTANEOUS at 17:54

## 2023-01-01 RX ADMIN — Medication 1 PATCH: at 18:37

## 2023-01-01 RX ADMIN — PIPERACILLIN AND TAZOBACTAM 200 GRAM(S): 4; .5 INJECTION, POWDER, LYOPHILIZED, FOR SOLUTION INTRAVENOUS at 14:47

## 2023-01-01 RX ADMIN — METHADONE HYDROCHLORIDE 10 MILLIGRAM(S): 40 TABLET ORAL at 05:27

## 2023-01-01 RX ADMIN — Medication 1 MILLIGRAM(S): at 10:21

## 2023-01-01 RX ADMIN — Medication 3 MILLILITER(S): at 21:00

## 2023-01-01 RX ADMIN — Medication 40 MICROGRAM(S): at 22:55

## 2023-01-01 RX ADMIN — HEPARIN SODIUM 5000 UNIT(S): 5000 INJECTION INTRAVENOUS; SUBCUTANEOUS at 22:31

## 2023-01-01 RX ADMIN — HEPARIN SODIUM 5000 UNIT(S): 5000 INJECTION INTRAVENOUS; SUBCUTANEOUS at 14:51

## 2023-01-01 RX ADMIN — HYDROMORPHONE HYDROCHLORIDE 1 MILLIGRAM(S): 2 INJECTION INTRAMUSCULAR; INTRAVENOUS; SUBCUTANEOUS at 02:24

## 2023-01-01 RX ADMIN — Medication 1 PATCH: at 11:53

## 2023-01-01 RX ADMIN — HYDROMORPHONE HYDROCHLORIDE 0.5 MILLIGRAM(S): 2 INJECTION INTRAMUSCULAR; INTRAVENOUS; SUBCUTANEOUS at 16:18

## 2023-01-01 RX ADMIN — HYDROMORPHONE HYDROCHLORIDE 30 MILLILITER(S): 2 INJECTION INTRAMUSCULAR; INTRAVENOUS; SUBCUTANEOUS at 08:13

## 2023-01-01 RX ADMIN — METHADONE HYDROCHLORIDE 15 MILLIGRAM(S): 40 TABLET ORAL at 07:18

## 2023-01-01 RX ADMIN — HYDROMORPHONE HYDROCHLORIDE 1 MILLIGRAM(S): 2 INJECTION INTRAMUSCULAR; INTRAVENOUS; SUBCUTANEOUS at 01:37

## 2023-01-01 RX ADMIN — Medication 1 MILLIGRAM(S): at 21:38

## 2023-01-01 RX ADMIN — GABAPENTIN 600 MILLIGRAM(S): 400 CAPSULE ORAL at 06:40

## 2023-01-01 RX ADMIN — HYDROMORPHONE HYDROCHLORIDE 0.5 MILLIGRAM(S): 2 INJECTION INTRAMUSCULAR; INTRAVENOUS; SUBCUTANEOUS at 04:18

## 2023-01-01 RX ADMIN — HYDROMORPHONE HYDROCHLORIDE 6 MILLIGRAM(S): 2 INJECTION INTRAMUSCULAR; INTRAVENOUS; SUBCUTANEOUS at 17:41

## 2023-01-01 RX ADMIN — Medication 3 MILLILITER(S): at 09:47

## 2023-01-01 RX ADMIN — GABAPENTIN 600 MILLIGRAM(S): 400 CAPSULE ORAL at 06:05

## 2023-01-01 RX ADMIN — Medication 1 MILLIGRAM(S): at 06:32

## 2023-01-01 RX ADMIN — Medication 3 MILLILITER(S): at 00:04

## 2023-01-01 RX ADMIN — Medication 1 MILLIGRAM(S): at 13:02

## 2023-01-01 RX ADMIN — Medication 1 PATCH: at 09:48

## 2023-01-01 RX ADMIN — Medication 1 MILLIGRAM(S): at 12:15

## 2023-01-01 RX ADMIN — PIPERACILLIN AND TAZOBACTAM 25 GRAM(S): 4; .5 INJECTION, POWDER, LYOPHILIZED, FOR SOLUTION INTRAVENOUS at 04:18

## 2023-01-01 RX ADMIN — Medication 1 PATCH: at 14:24

## 2023-01-01 RX ADMIN — Medication 3 MILLILITER(S): at 08:24

## 2023-01-01 RX ADMIN — HYDROMORPHONE HYDROCHLORIDE 4 MILLIGRAM(S): 2 INJECTION INTRAMUSCULAR; INTRAVENOUS; SUBCUTANEOUS at 05:00

## 2023-01-01 RX ADMIN — Medication 250 MILLIGRAM(S): at 18:17

## 2023-01-01 RX ADMIN — HEPARIN SODIUM 5000 UNIT(S): 5000 INJECTION INTRAVENOUS; SUBCUTANEOUS at 14:08

## 2023-01-01 RX ADMIN — Medication 1 PATCH: at 14:35

## 2023-01-01 RX ADMIN — Medication 40 MILLIGRAM(S): at 23:35

## 2023-01-01 RX ADMIN — HYDROMORPHONE HYDROCHLORIDE 1 MILLIGRAM(S): 2 INJECTION INTRAMUSCULAR; INTRAVENOUS; SUBCUTANEOUS at 07:47

## 2023-01-01 RX ADMIN — Medication 1 MILLIGRAM(S): at 22:39

## 2023-01-01 RX ADMIN — Medication 1 MILLIGRAM(S): at 16:38

## 2023-01-01 RX ADMIN — PANTOPRAZOLE SODIUM 40 MILLIGRAM(S): 20 TABLET, DELAYED RELEASE ORAL at 06:11

## 2023-01-01 RX ADMIN — GABAPENTIN 600 MILLIGRAM(S): 400 CAPSULE ORAL at 22:30

## 2023-01-01 RX ADMIN — HYDROMORPHONE HYDROCHLORIDE 30 MILLILITER(S): 2 INJECTION INTRAMUSCULAR; INTRAVENOUS; SUBCUTANEOUS at 08:33

## 2023-01-01 RX ADMIN — Medication 0.5 MILLILITER(S): at 08:03

## 2023-01-01 RX ADMIN — METHADONE HYDROCHLORIDE 15 MILLIGRAM(S): 40 TABLET ORAL at 06:03

## 2023-01-01 RX ADMIN — HYDROMORPHONE HYDROCHLORIDE 1 MILLIGRAM(S): 2 INJECTION INTRAMUSCULAR; INTRAVENOUS; SUBCUTANEOUS at 13:52

## 2023-01-01 RX ADMIN — Medication 1000 MILLIGRAM(S): at 10:31

## 2023-01-01 RX ADMIN — HEPARIN SODIUM 5000 UNIT(S): 5000 INJECTION INTRAVENOUS; SUBCUTANEOUS at 13:52

## 2023-01-01 RX ADMIN — Medication 40 MILLIGRAM(S): at 20:19

## 2023-01-01 RX ADMIN — METHADONE HYDROCHLORIDE 15 MILLIGRAM(S): 40 TABLET ORAL at 14:21

## 2023-01-01 RX ADMIN — SENNA PLUS 2 TABLET(S): 8.6 TABLET ORAL at 21:32

## 2023-01-01 RX ADMIN — Medication 300 MILLIGRAM(S): at 07:39

## 2023-01-01 RX ADMIN — Medication 50 MICROGRAM(S): at 05:30

## 2023-01-01 RX ADMIN — PANTOPRAZOLE SODIUM 40 MILLIGRAM(S): 20 TABLET, DELAYED RELEASE ORAL at 06:57

## 2023-01-01 RX ADMIN — CEFTRIAXONE 100 MILLIGRAM(S): 500 INJECTION, POWDER, FOR SOLUTION INTRAMUSCULAR; INTRAVENOUS at 14:46

## 2023-01-01 RX ADMIN — HYDROMORPHONE HYDROCHLORIDE 2 MILLIGRAM(S): 2 INJECTION INTRAMUSCULAR; INTRAVENOUS; SUBCUTANEOUS at 21:48

## 2023-01-01 RX ADMIN — Medication 3 MILLILITER(S): at 03:42

## 2023-01-01 RX ADMIN — PANTOPRAZOLE SODIUM 40 MILLIGRAM(S): 20 TABLET, DELAYED RELEASE ORAL at 06:27

## 2023-01-01 RX ADMIN — HYDROMORPHONE HYDROCHLORIDE 6 MILLIGRAM(S): 2 INJECTION INTRAMUSCULAR; INTRAVENOUS; SUBCUTANEOUS at 12:02

## 2023-01-01 RX ADMIN — HYDROMORPHONE HYDROCHLORIDE 1 MILLIGRAM(S): 2 INJECTION INTRAMUSCULAR; INTRAVENOUS; SUBCUTANEOUS at 13:33

## 2023-01-01 RX ADMIN — SENNA PLUS 2 TABLET(S): 8.6 TABLET ORAL at 23:00

## 2023-01-01 RX ADMIN — Medication 3 MILLILITER(S): at 09:37

## 2023-01-01 RX ADMIN — Medication 300 MILLIGRAM(S): at 11:26

## 2023-01-01 RX ADMIN — Medication 50 MICROGRAM(S): at 06:57

## 2023-01-01 RX ADMIN — Medication 1 MILLIGRAM(S): at 13:10

## 2023-01-01 RX ADMIN — Medication 1 PATCH: at 09:58

## 2023-01-01 RX ADMIN — Medication 1 PATCH: at 12:56

## 2023-01-01 RX ADMIN — Medication 1 MILLIGRAM(S): at 18:18

## 2023-01-01 RX ADMIN — Medication 1 MILLIGRAM(S): at 20:47

## 2023-01-01 RX ADMIN — Medication 1 PATCH: at 11:43

## 2023-01-01 RX ADMIN — Medication 3 MILLILITER(S): at 20:33

## 2023-01-01 RX ADMIN — Medication 40 MILLIGRAM(S): at 13:11

## 2023-01-01 RX ADMIN — GABAPENTIN 600 MILLIGRAM(S): 400 CAPSULE ORAL at 14:04

## 2023-01-01 RX ADMIN — METHADONE HYDROCHLORIDE 15 MILLIGRAM(S): 40 TABLET ORAL at 06:32

## 2023-01-01 RX ADMIN — HYDROMORPHONE HYDROCHLORIDE 0.5 MILLIGRAM(S): 2 INJECTION INTRAMUSCULAR; INTRAVENOUS; SUBCUTANEOUS at 05:18

## 2023-01-01 RX ADMIN — METHADONE HYDROCHLORIDE 15 MILLIGRAM(S): 40 TABLET ORAL at 05:11

## 2023-01-01 RX ADMIN — HYDROMORPHONE HYDROCHLORIDE 1 MILLIGRAM(S): 2 INJECTION INTRAMUSCULAR; INTRAVENOUS; SUBCUTANEOUS at 15:03

## 2023-01-01 RX ADMIN — PANTOPRAZOLE SODIUM 40 MILLIGRAM(S): 20 TABLET, DELAYED RELEASE ORAL at 18:47

## 2023-01-01 RX ADMIN — HEPARIN SODIUM 5000 UNIT(S): 5000 INJECTION INTRAVENOUS; SUBCUTANEOUS at 13:22

## 2023-01-01 RX ADMIN — SENNA PLUS 2 TABLET(S): 8.6 TABLET ORAL at 22:29

## 2023-01-01 RX ADMIN — Medication 1 PATCH: at 19:06

## 2023-01-01 RX ADMIN — Medication 40 MICROGRAM(S): at 22:28

## 2023-01-01 RX ADMIN — Medication 1 PATCH: at 12:15

## 2023-01-01 RX ADMIN — HEPARIN SODIUM 5000 UNIT(S): 5000 INJECTION INTRAVENOUS; SUBCUTANEOUS at 14:01

## 2023-01-01 RX ADMIN — PIPERACILLIN AND TAZOBACTAM 25 GRAM(S): 4; .5 INJECTION, POWDER, LYOPHILIZED, FOR SOLUTION INTRAVENOUS at 21:58

## 2023-01-01 RX ADMIN — METHADONE HYDROCHLORIDE 15 MILLIGRAM(S): 40 TABLET ORAL at 21:38

## 2023-01-01 RX ADMIN — SODIUM CHLORIDE 75 MILLILITER(S): 9 INJECTION, SOLUTION INTRAVENOUS at 11:52

## 2023-01-01 RX ADMIN — SENNA PLUS 2 TABLET(S): 8.6 TABLET ORAL at 22:14

## 2023-01-01 RX ADMIN — Medication 3 MILLILITER(S): at 21:13

## 2023-01-01 RX ADMIN — Medication 50 MICROGRAM(S): at 06:25

## 2023-01-01 RX ADMIN — METHADONE HYDROCHLORIDE 15 MILLIGRAM(S): 40 TABLET ORAL at 21:29

## 2023-01-01 RX ADMIN — SODIUM CHLORIDE 100 MILLILITER(S): 9 INJECTION, SOLUTION INTRAVENOUS at 22:01

## 2023-01-01 RX ADMIN — Medication 1 PATCH: at 18:24

## 2023-01-01 RX ADMIN — Medication 250 MILLIGRAM(S): at 10:41

## 2023-01-01 RX ADMIN — METHADONE HYDROCHLORIDE 15 MILLIGRAM(S): 40 TABLET ORAL at 22:16

## 2023-01-01 RX ADMIN — HEPARIN SODIUM 5000 UNIT(S): 5000 INJECTION INTRAVENOUS; SUBCUTANEOUS at 01:50

## 2023-01-01 RX ADMIN — FENTANYL CITRATE 25 MICROGRAM(S): 50 INJECTION INTRAVENOUS at 11:51

## 2023-01-01 RX ADMIN — HYDROMORPHONE HYDROCHLORIDE 6 MILLIGRAM(S): 2 INJECTION INTRAMUSCULAR; INTRAVENOUS; SUBCUTANEOUS at 23:26

## 2023-01-01 RX ADMIN — METHADONE HYDROCHLORIDE 15 MILLIGRAM(S): 40 TABLET ORAL at 13:22

## 2023-01-01 RX ADMIN — Medication 1 MILLIGRAM(S): at 17:15

## 2023-01-01 RX ADMIN — HYDROMORPHONE HYDROCHLORIDE 1 MILLIGRAM(S): 2 INJECTION INTRAMUSCULAR; INTRAVENOUS; SUBCUTANEOUS at 22:05

## 2023-01-01 RX ADMIN — HYDROMORPHONE HYDROCHLORIDE 6 MILLIGRAM(S): 2 INJECTION INTRAMUSCULAR; INTRAVENOUS; SUBCUTANEOUS at 17:02

## 2023-01-01 RX ADMIN — HEPARIN SODIUM 5000 UNIT(S): 5000 INJECTION INTRAVENOUS; SUBCUTANEOUS at 06:11

## 2023-01-01 RX ADMIN — Medication 1 MILLIGRAM(S): at 17:40

## 2023-01-01 RX ADMIN — Medication 1 PATCH: at 10:40

## 2023-01-01 RX ADMIN — PANTOPRAZOLE SODIUM 40 MILLIGRAM(S): 20 TABLET, DELAYED RELEASE ORAL at 17:39

## 2023-01-01 RX ADMIN — Medication 25 GRAM(S): at 12:30

## 2023-01-01 RX ADMIN — PANTOPRAZOLE SODIUM 40 MILLIGRAM(S): 20 TABLET, DELAYED RELEASE ORAL at 13:14

## 2023-01-01 RX ADMIN — Medication 3 MILLILITER(S): at 09:43

## 2023-01-01 RX ADMIN — Medication 1 MILLIGRAM(S): at 17:55

## 2023-01-01 RX ADMIN — ONDANSETRON 4 MILLIGRAM(S): 8 TABLET, FILM COATED ORAL at 15:57

## 2023-01-01 RX ADMIN — Medication 25 GRAM(S): at 11:27

## 2023-01-01 RX ADMIN — Medication 250 MILLIGRAM(S): at 23:15

## 2023-01-01 RX ADMIN — HEPARIN SODIUM 5000 UNIT(S): 5000 INJECTION INTRAVENOUS; SUBCUTANEOUS at 00:26

## 2023-01-01 RX ADMIN — DEXTROSE MONOHYDRATE, SODIUM CHLORIDE, AND POTASSIUM CHLORIDE 100 MILLILITER(S): 50; .745; 4.5 INJECTION, SOLUTION INTRAVENOUS at 19:26

## 2023-01-01 RX ADMIN — Medication 1 PATCH: at 09:35

## 2023-01-01 RX ADMIN — Medication 1 MILLIGRAM(S): at 23:57

## 2023-01-01 RX ADMIN — HYDROMORPHONE HYDROCHLORIDE 2 MILLIGRAM(S): 2 INJECTION INTRAMUSCULAR; INTRAVENOUS; SUBCUTANEOUS at 07:17

## 2023-01-01 RX ADMIN — QUETIAPINE FUMARATE 25 MILLIGRAM(S): 200 TABLET, FILM COATED ORAL at 22:39

## 2023-01-01 RX ADMIN — HEPARIN SODIUM 5000 UNIT(S): 5000 INJECTION INTRAVENOUS; SUBCUTANEOUS at 21:29

## 2023-01-01 RX ADMIN — METHADONE HYDROCHLORIDE 15 MILLIGRAM(S): 40 TABLET ORAL at 06:57

## 2023-01-01 RX ADMIN — Medication 0.5 MILLIGRAM(S): at 02:06

## 2023-01-01 RX ADMIN — Medication 1 PATCH: at 12:07

## 2023-01-01 RX ADMIN — MIDODRINE HYDROCHLORIDE 10 MILLIGRAM(S): 2.5 TABLET ORAL at 08:09

## 2023-01-01 RX ADMIN — Medication 1 PATCH: at 06:59

## 2023-01-01 RX ADMIN — Medication 3 MILLILITER(S): at 21:49

## 2023-01-01 RX ADMIN — Medication 3 MILLILITER(S): at 09:51

## 2023-01-01 RX ADMIN — Medication 1 PATCH: at 18:02

## 2023-01-01 RX ADMIN — PANTOPRAZOLE SODIUM 40 MILLIGRAM(S): 20 TABLET, DELAYED RELEASE ORAL at 05:47

## 2023-01-01 RX ADMIN — Medication 1 PATCH: at 12:12

## 2023-01-01 RX ADMIN — METHADONE HYDROCHLORIDE 15 MILLIGRAM(S): 40 TABLET ORAL at 06:33

## 2023-01-01 RX ADMIN — Medication 3 MILLILITER(S): at 21:02

## 2023-01-01 RX ADMIN — PIPERACILLIN AND TAZOBACTAM 25 GRAM(S): 4; .5 INJECTION, POWDER, LYOPHILIZED, FOR SOLUTION INTRAVENOUS at 06:08

## 2023-01-01 RX ADMIN — CEFTRIAXONE 100 MILLIGRAM(S): 500 INJECTION, POWDER, FOR SOLUTION INTRAMUSCULAR; INTRAVENOUS at 17:42

## 2023-01-01 RX ADMIN — Medication 40 MILLIGRAM(S): at 13:48

## 2023-01-01 RX ADMIN — Medication 1 MILLIGRAM(S): at 09:25

## 2023-01-01 RX ADMIN — HEPARIN SODIUM 5000 UNIT(S): 5000 INJECTION INTRAVENOUS; SUBCUTANEOUS at 06:38

## 2023-01-01 RX ADMIN — HYDROMORPHONE HYDROCHLORIDE 0.5 MILLIGRAM(S): 2 INJECTION INTRAMUSCULAR; INTRAVENOUS; SUBCUTANEOUS at 19:10

## 2023-01-01 RX ADMIN — GABAPENTIN 600 MILLIGRAM(S): 400 CAPSULE ORAL at 06:38

## 2023-01-01 RX ADMIN — Medication 3 MILLILITER(S): at 20:54

## 2023-01-01 RX ADMIN — GABAPENTIN 300 MILLIGRAM(S): 400 CAPSULE ORAL at 09:51

## 2023-01-01 RX ADMIN — Medication 3 MILLILITER(S): at 21:10

## 2023-01-01 RX ADMIN — HEPARIN SODIUM 5000 UNIT(S): 5000 INJECTION INTRAVENOUS; SUBCUTANEOUS at 21:34

## 2023-01-01 RX ADMIN — Medication 1 TABLET(S): at 21:57

## 2023-01-01 RX ADMIN — HYDROMORPHONE HYDROCHLORIDE 6 MILLIGRAM(S): 2 INJECTION INTRAMUSCULAR; INTRAVENOUS; SUBCUTANEOUS at 16:01

## 2023-01-01 RX ADMIN — Medication 650 MILLIGRAM(S): at 18:11

## 2023-01-01 RX ADMIN — Medication 3 MILLILITER(S): at 20:43

## 2023-01-01 RX ADMIN — Medication 40 MICROGRAM(S): at 07:02

## 2023-01-01 RX ADMIN — GABAPENTIN 600 MILLIGRAM(S): 400 CAPSULE ORAL at 21:32

## 2023-01-01 RX ADMIN — HYDROMORPHONE HYDROCHLORIDE 6 MILLIGRAM(S): 2 INJECTION INTRAMUSCULAR; INTRAVENOUS; SUBCUTANEOUS at 12:57

## 2023-01-01 RX ADMIN — Medication 1 MILLIGRAM(S): at 17:41

## 2023-01-01 RX ADMIN — Medication 1 MILLIGRAM(S): at 10:46

## 2023-01-01 RX ADMIN — Medication 5 MILLIGRAM(S): at 15:38

## 2023-01-01 RX ADMIN — Medication 1 PATCH: at 18:17

## 2023-01-01 RX ADMIN — QUETIAPINE FUMARATE 25 MILLIGRAM(S): 200 TABLET, FILM COATED ORAL at 21:29

## 2023-01-01 RX ADMIN — METHADONE HYDROCHLORIDE 15 MILLIGRAM(S): 40 TABLET ORAL at 07:10

## 2023-01-01 RX ADMIN — HYDROMORPHONE HYDROCHLORIDE 30 MILLILITER(S): 2 INJECTION INTRAMUSCULAR; INTRAVENOUS; SUBCUTANEOUS at 20:28

## 2023-01-01 RX ADMIN — PANTOPRAZOLE SODIUM 40 MILLIGRAM(S): 20 TABLET, DELAYED RELEASE ORAL at 06:40

## 2023-01-01 RX ADMIN — METHADONE HYDROCHLORIDE 15 MILLIGRAM(S): 40 TABLET ORAL at 14:54

## 2023-01-01 RX ADMIN — Medication 3 MILLILITER(S): at 03:14

## 2023-01-01 RX ADMIN — GABAPENTIN 300 MILLIGRAM(S): 400 CAPSULE ORAL at 11:44

## 2023-01-01 RX ADMIN — ENOXAPARIN SODIUM 30 MILLIGRAM(S): 100 INJECTION SUBCUTANEOUS at 06:48

## 2023-01-01 RX ADMIN — HYDROMORPHONE HYDROCHLORIDE 1 MILLIGRAM(S): 2 INJECTION INTRAMUSCULAR; INTRAVENOUS; SUBCUTANEOUS at 22:55

## 2023-01-01 RX ADMIN — Medication 50 MICROGRAM(S): at 05:12

## 2023-01-01 RX ADMIN — Medication 650 MILLIGRAM(S): at 17:57

## 2023-01-01 RX ADMIN — Medication 50 MICROGRAM(S): at 06:15

## 2023-01-01 RX ADMIN — Medication 1000 MILLIGRAM(S): at 06:28

## 2023-01-01 RX ADMIN — PANTOPRAZOLE SODIUM 40 MILLIGRAM(S): 20 TABLET, DELAYED RELEASE ORAL at 06:21

## 2023-01-01 RX ADMIN — Medication 3 MILLILITER(S): at 21:25

## 2023-01-01 RX ADMIN — HYDROMORPHONE HYDROCHLORIDE 6 MILLIGRAM(S): 2 INJECTION INTRAMUSCULAR; INTRAVENOUS; SUBCUTANEOUS at 15:56

## 2023-01-01 RX ADMIN — Medication 1 PATCH: at 19:28

## 2023-01-01 RX ADMIN — PIPERACILLIN AND TAZOBACTAM 25 GRAM(S): 4; .5 INJECTION, POWDER, LYOPHILIZED, FOR SOLUTION INTRAVENOUS at 07:02

## 2023-01-01 RX ADMIN — Medication 3 MILLILITER(S): at 04:56

## 2023-01-01 RX ADMIN — HYDROMORPHONE HYDROCHLORIDE 6 MILLIGRAM(S): 2 INJECTION INTRAMUSCULAR; INTRAVENOUS; SUBCUTANEOUS at 20:32

## 2023-01-01 RX ADMIN — HYDROMORPHONE HYDROCHLORIDE 1 MILLIGRAM(S): 2 INJECTION INTRAMUSCULAR; INTRAVENOUS; SUBCUTANEOUS at 02:04

## 2023-01-01 RX ADMIN — Medication 40 MILLIGRAM(S): at 06:00

## 2023-01-01 RX ADMIN — Medication 3 MILLILITER(S): at 10:10

## 2023-01-01 RX ADMIN — HYDROMORPHONE HYDROCHLORIDE 1 MILLIGRAM(S): 2 INJECTION INTRAMUSCULAR; INTRAVENOUS; SUBCUTANEOUS at 20:36

## 2023-01-01 RX ADMIN — GABAPENTIN 600 MILLIGRAM(S): 400 CAPSULE ORAL at 18:37

## 2023-01-01 RX ADMIN — HYDROMORPHONE HYDROCHLORIDE 2 MILLIGRAM(S): 2 INJECTION INTRAMUSCULAR; INTRAVENOUS; SUBCUTANEOUS at 11:16

## 2023-01-01 RX ADMIN — Medication 650 MILLIGRAM(S): at 14:28

## 2023-01-01 RX ADMIN — HYDROMORPHONE HYDROCHLORIDE 1 MILLIGRAM(S): 2 INJECTION INTRAMUSCULAR; INTRAVENOUS; SUBCUTANEOUS at 02:05

## 2023-01-01 RX ADMIN — ENOXAPARIN SODIUM 40 MILLIGRAM(S): 100 INJECTION SUBCUTANEOUS at 20:50

## 2023-01-01 RX ADMIN — PANTOPRAZOLE SODIUM 40 MILLIGRAM(S): 20 TABLET, DELAYED RELEASE ORAL at 22:30

## 2023-01-01 RX ADMIN — Medication 1 MILLIGRAM(S): at 11:41

## 2023-01-01 RX ADMIN — HEPARIN SODIUM 5000 UNIT(S): 5000 INJECTION INTRAVENOUS; SUBCUTANEOUS at 06:50

## 2023-01-01 RX ADMIN — Medication 1 MILLIGRAM(S): at 09:03

## 2023-01-01 RX ADMIN — HYDROMORPHONE HYDROCHLORIDE 30 MILLILITER(S): 2 INJECTION INTRAMUSCULAR; INTRAVENOUS; SUBCUTANEOUS at 23:18

## 2023-01-01 RX ADMIN — ZOLPIDEM TARTRATE 5 MILLIGRAM(S): 10 TABLET ORAL at 20:19

## 2023-01-01 RX ADMIN — GABAPENTIN 300 MILLIGRAM(S): 400 CAPSULE ORAL at 15:47

## 2023-01-01 RX ADMIN — GABAPENTIN 600 MILLIGRAM(S): 400 CAPSULE ORAL at 06:45

## 2023-01-01 RX ADMIN — GABAPENTIN 300 MILLIGRAM(S): 400 CAPSULE ORAL at 06:33

## 2023-01-01 RX ADMIN — SODIUM CHLORIDE 500 MILLILITER(S): 9 INJECTION INTRAMUSCULAR; INTRAVENOUS; SUBCUTANEOUS at 05:26

## 2023-01-01 RX ADMIN — HYDROMORPHONE HYDROCHLORIDE 1 MILLIGRAM(S): 2 INJECTION INTRAMUSCULAR; INTRAVENOUS; SUBCUTANEOUS at 15:31

## 2023-01-01 RX ADMIN — Medication 3 MILLILITER(S): at 20:36

## 2023-01-01 RX ADMIN — Medication 50 MICROGRAM(S): at 04:19

## 2023-01-01 RX ADMIN — SENNA PLUS 2 TABLET(S): 8.6 TABLET ORAL at 22:21

## 2023-01-01 RX ADMIN — SENNA PLUS 2 TABLET(S): 8.6 TABLET ORAL at 23:16

## 2023-01-01 RX ADMIN — Medication 3 MILLILITER(S): at 03:20

## 2023-01-01 RX ADMIN — Medication 1 PATCH: at 11:25

## 2023-01-01 RX ADMIN — HYDROMORPHONE HYDROCHLORIDE 0.5 MILLIGRAM(S): 2 INJECTION INTRAMUSCULAR; INTRAVENOUS; SUBCUTANEOUS at 18:30

## 2023-01-01 RX ADMIN — GABAPENTIN 300 MILLIGRAM(S): 400 CAPSULE ORAL at 13:10

## 2023-01-01 RX ADMIN — Medication 3 MILLILITER(S): at 15:44

## 2023-01-01 RX ADMIN — Medication 40 MICROGRAM(S): at 00:40

## 2023-01-01 RX ADMIN — Medication 1 PATCH: at 11:02

## 2023-01-01 RX ADMIN — PANTOPRAZOLE SODIUM 40 MILLIGRAM(S): 20 TABLET, DELAYED RELEASE ORAL at 05:35

## 2023-01-01 RX ADMIN — GABAPENTIN 600 MILLIGRAM(S): 400 CAPSULE ORAL at 13:35

## 2023-01-01 RX ADMIN — SODIUM CHLORIDE 100 MILLILITER(S): 9 INJECTION INTRAMUSCULAR; INTRAVENOUS; SUBCUTANEOUS at 21:05

## 2023-01-01 RX ADMIN — Medication 1 MILLIGRAM(S): at 17:21

## 2023-01-01 RX ADMIN — HEPARIN SODIUM 5000 UNIT(S): 5000 INJECTION INTRAVENOUS; SUBCUTANEOUS at 21:59

## 2023-01-01 RX ADMIN — SODIUM CHLORIDE 100 MILLILITER(S): 9 INJECTION INTRAMUSCULAR; INTRAVENOUS; SUBCUTANEOUS at 16:47

## 2023-01-01 RX ADMIN — Medication 3 MILLILITER(S): at 15:52

## 2023-01-01 RX ADMIN — METHADONE HYDROCHLORIDE 15 MILLIGRAM(S): 40 TABLET ORAL at 23:15

## 2023-01-01 RX ADMIN — Medication 1 MILLIGRAM(S): at 11:42

## 2023-01-01 RX ADMIN — HEPARIN SODIUM 5000 UNIT(S): 5000 INJECTION INTRAVENOUS; SUBCUTANEOUS at 23:34

## 2023-01-01 RX ADMIN — Medication 3 MILLILITER(S): at 22:07

## 2023-01-01 RX ADMIN — PANTOPRAZOLE SODIUM 40 MILLIGRAM(S): 20 TABLET, DELAYED RELEASE ORAL at 09:15

## 2023-01-01 RX ADMIN — CEFTRIAXONE 100 MILLIGRAM(S): 500 INJECTION, POWDER, FOR SOLUTION INTRAMUSCULAR; INTRAVENOUS at 14:38

## 2023-01-01 RX ADMIN — Medication 1 MILLIGRAM(S): at 18:52

## 2023-01-01 RX ADMIN — GABAPENTIN 600 MILLIGRAM(S): 400 CAPSULE ORAL at 14:51

## 2023-01-01 RX ADMIN — Medication 0.5 MILLIGRAM(S): at 17:28

## 2023-01-01 RX ADMIN — Medication 1 MILLIGRAM(S): at 10:20

## 2023-01-01 RX ADMIN — HYDROMORPHONE HYDROCHLORIDE 6 MILLIGRAM(S): 2 INJECTION INTRAMUSCULAR; INTRAVENOUS; SUBCUTANEOUS at 09:04

## 2023-01-01 RX ADMIN — Medication 0.5 MILLIGRAM(S): at 09:35

## 2023-01-01 RX ADMIN — HYDROMORPHONE HYDROCHLORIDE 0.5 MILLIGRAM(S): 2 INJECTION INTRAMUSCULAR; INTRAVENOUS; SUBCUTANEOUS at 21:03

## 2023-01-01 RX ADMIN — METHADONE HYDROCHLORIDE 15 MILLIGRAM(S): 40 TABLET ORAL at 14:38

## 2023-01-01 RX ADMIN — HYDROMORPHONE HYDROCHLORIDE 1 MILLIGRAM(S): 2 INJECTION INTRAMUSCULAR; INTRAVENOUS; SUBCUTANEOUS at 12:21

## 2023-01-01 RX ADMIN — GABAPENTIN 600 MILLIGRAM(S): 400 CAPSULE ORAL at 05:29

## 2023-01-01 RX ADMIN — HEPARIN SODIUM 5000 UNIT(S): 5000 INJECTION INTRAVENOUS; SUBCUTANEOUS at 22:38

## 2023-01-01 RX ADMIN — METHADONE HYDROCHLORIDE 15 MILLIGRAM(S): 40 TABLET ORAL at 17:19

## 2023-01-01 RX ADMIN — GABAPENTIN 300 MILLIGRAM(S): 400 CAPSULE ORAL at 02:20

## 2023-01-01 RX ADMIN — PANTOPRAZOLE SODIUM 40 MILLIGRAM(S): 20 TABLET, DELAYED RELEASE ORAL at 17:24

## 2023-01-01 RX ADMIN — HEPARIN SODIUM 5000 UNIT(S): 5000 INJECTION INTRAVENOUS; SUBCUTANEOUS at 14:36

## 2023-01-01 RX ADMIN — QUETIAPINE FUMARATE 25 MILLIGRAM(S): 200 TABLET, FILM COATED ORAL at 22:30

## 2023-01-01 RX ADMIN — HEPARIN SODIUM 5000 UNIT(S): 5000 INJECTION INTRAVENOUS; SUBCUTANEOUS at 05:11

## 2023-01-01 RX ADMIN — Medication 5 MILLIGRAM(S): at 21:09

## 2023-01-01 RX ADMIN — Medication 1 MILLIGRAM(S): at 23:33

## 2023-01-01 RX ADMIN — HYDROMORPHONE HYDROCHLORIDE 30 MILLILITER(S): 2 INJECTION INTRAMUSCULAR; INTRAVENOUS; SUBCUTANEOUS at 18:34

## 2023-01-01 RX ADMIN — HYDROMORPHONE HYDROCHLORIDE 2 MILLIGRAM(S): 2 INJECTION INTRAMUSCULAR; INTRAVENOUS; SUBCUTANEOUS at 06:12

## 2023-01-01 RX ADMIN — Medication 3 MILLILITER(S): at 20:39

## 2023-01-01 RX ADMIN — Medication 3 MILLILITER(S): at 22:08

## 2023-01-01 RX ADMIN — Medication 1 MILLIGRAM(S): at 00:40

## 2023-01-01 RX ADMIN — Medication 1 PATCH: at 09:00

## 2023-01-01 RX ADMIN — Medication 1 MILLIGRAM(S): at 14:15

## 2023-01-01 RX ADMIN — HYDROMORPHONE HYDROCHLORIDE 1 MILLIGRAM(S): 2 INJECTION INTRAMUSCULAR; INTRAVENOUS; SUBCUTANEOUS at 16:30

## 2023-01-01 RX ADMIN — HEPARIN SODIUM 5000 UNIT(S): 5000 INJECTION INTRAVENOUS; SUBCUTANEOUS at 13:57

## 2023-01-01 RX ADMIN — METHADONE HYDROCHLORIDE 15 MILLIGRAM(S): 40 TABLET ORAL at 10:49

## 2023-01-01 RX ADMIN — PIPERACILLIN AND TAZOBACTAM 25 GRAM(S): 4; .5 INJECTION, POWDER, LYOPHILIZED, FOR SOLUTION INTRAVENOUS at 07:07

## 2023-01-01 RX ADMIN — Medication 300 MILLIGRAM(S): at 11:06

## 2023-01-01 RX ADMIN — PIPERACILLIN AND TAZOBACTAM 25 GRAM(S): 4; .5 INJECTION, POWDER, LYOPHILIZED, FOR SOLUTION INTRAVENOUS at 13:52

## 2023-01-01 RX ADMIN — HEPARIN SODIUM 5000 UNIT(S): 5000 INJECTION INTRAVENOUS; SUBCUTANEOUS at 13:59

## 2023-01-01 RX ADMIN — Medication 0.5 MILLIGRAM(S): at 08:22

## 2023-01-01 RX ADMIN — HEPARIN SODIUM 5000 UNIT(S): 5000 INJECTION INTRAVENOUS; SUBCUTANEOUS at 14:35

## 2023-01-01 RX ADMIN — HYDROMORPHONE HYDROCHLORIDE 6 MILLIGRAM(S): 2 INJECTION INTRAMUSCULAR; INTRAVENOUS; SUBCUTANEOUS at 11:33

## 2023-01-01 RX ADMIN — Medication 0.5 MILLIGRAM(S): at 16:00

## 2023-01-01 RX ADMIN — HYDROMORPHONE HYDROCHLORIDE 6 MILLIGRAM(S): 2 INJECTION INTRAMUSCULAR; INTRAVENOUS; SUBCUTANEOUS at 17:11

## 2023-01-01 RX ADMIN — ALBUTEROL 2.5 MILLIGRAM(S): 90 AEROSOL, METERED ORAL at 16:37

## 2023-01-01 RX ADMIN — Medication 3 MILLILITER(S): at 16:09

## 2023-01-01 RX ADMIN — Medication 650 MILLIGRAM(S): at 17:41

## 2023-01-01 RX ADMIN — Medication 50 MICROGRAM(S): at 07:18

## 2023-01-01 RX ADMIN — Medication 1 MILLIGRAM(S): at 17:11

## 2023-01-01 RX ADMIN — HEPARIN SODIUM 5000 UNIT(S): 5000 INJECTION INTRAVENOUS; SUBCUTANEOUS at 06:55

## 2023-01-01 RX ADMIN — GABAPENTIN 600 MILLIGRAM(S): 400 CAPSULE ORAL at 21:59

## 2023-01-01 RX ADMIN — Medication 3 MILLILITER(S): at 03:29

## 2023-01-01 RX ADMIN — Medication 40 MILLIGRAM(S): at 04:19

## 2023-01-01 RX ADMIN — ENOXAPARIN SODIUM 40 MILLIGRAM(S): 100 INJECTION SUBCUTANEOUS at 20:29

## 2023-01-01 RX ADMIN — HEPARIN SODIUM 5000 UNIT(S): 5000 INJECTION INTRAVENOUS; SUBCUTANEOUS at 13:10

## 2023-01-01 RX ADMIN — HYDROMORPHONE HYDROCHLORIDE 6 MILLIGRAM(S): 2 INJECTION INTRAMUSCULAR; INTRAVENOUS; SUBCUTANEOUS at 06:10

## 2023-01-01 RX ADMIN — PIPERACILLIN AND TAZOBACTAM 25 GRAM(S): 4; .5 INJECTION, POWDER, LYOPHILIZED, FOR SOLUTION INTRAVENOUS at 06:33

## 2023-01-01 RX ADMIN — HYDROMORPHONE HYDROCHLORIDE 30 MILLILITER(S): 2 INJECTION INTRAMUSCULAR; INTRAVENOUS; SUBCUTANEOUS at 08:34

## 2023-01-01 RX ADMIN — Medication 2 MILLIGRAM(S): at 01:47

## 2023-01-01 RX ADMIN — METHADONE HYDROCHLORIDE 5 MILLIGRAM(S): 40 TABLET ORAL at 05:14

## 2023-01-01 RX ADMIN — GABAPENTIN 600 MILLIGRAM(S): 400 CAPSULE ORAL at 22:27

## 2023-01-01 RX ADMIN — GABAPENTIN 600 MILLIGRAM(S): 400 CAPSULE ORAL at 06:03

## 2023-01-01 RX ADMIN — Medication 3 MILLILITER(S): at 09:34

## 2023-01-01 RX ADMIN — HEPARIN SODIUM 5000 UNIT(S): 5000 INJECTION INTRAVENOUS; SUBCUTANEOUS at 18:13

## 2023-01-01 RX ADMIN — HEPARIN SODIUM 5000 UNIT(S): 5000 INJECTION INTRAVENOUS; SUBCUTANEOUS at 07:17

## 2023-01-01 RX ADMIN — GABAPENTIN 600 MILLIGRAM(S): 400 CAPSULE ORAL at 06:21

## 2023-01-01 RX ADMIN — HYDROMORPHONE HYDROCHLORIDE 6 MILLIGRAM(S): 2 INJECTION INTRAMUSCULAR; INTRAVENOUS; SUBCUTANEOUS at 17:39

## 2023-01-01 RX ADMIN — PIPERACILLIN AND TAZOBACTAM 25 GRAM(S): 4; .5 INJECTION, POWDER, LYOPHILIZED, FOR SOLUTION INTRAVENOUS at 15:18

## 2023-01-01 RX ADMIN — METHADONE HYDROCHLORIDE 15 MILLIGRAM(S): 40 TABLET ORAL at 14:34

## 2023-01-01 RX ADMIN — METHADONE HYDROCHLORIDE 15 MILLIGRAM(S): 40 TABLET ORAL at 14:51

## 2023-01-01 RX ADMIN — Medication 1 PATCH: at 06:26

## 2023-01-01 RX ADMIN — HYDROMORPHONE HYDROCHLORIDE 6 MILLIGRAM(S): 2 INJECTION INTRAMUSCULAR; INTRAVENOUS; SUBCUTANEOUS at 22:16

## 2023-01-01 RX ADMIN — SODIUM CHLORIDE 75 MILLILITER(S): 9 INJECTION, SOLUTION INTRAVENOUS at 22:32

## 2023-01-01 RX ADMIN — Medication 650 MILLIGRAM(S): at 00:13

## 2023-01-01 RX ADMIN — Medication 40 MILLIGRAM(S): at 20:40

## 2023-01-01 RX ADMIN — LISINOPRIL 40 MILLIGRAM(S): 2.5 TABLET ORAL at 01:47

## 2023-01-01 RX ADMIN — Medication 3 MILLILITER(S): at 07:43

## 2023-01-01 RX ADMIN — Medication 1 PATCH: at 07:08

## 2023-01-01 RX ADMIN — PIPERACILLIN AND TAZOBACTAM 25 GRAM(S): 4; .5 INJECTION, POWDER, LYOPHILIZED, FOR SOLUTION INTRAVENOUS at 21:57

## 2023-01-01 RX ADMIN — ONDANSETRON 4 MILLIGRAM(S): 8 TABLET, FILM COATED ORAL at 02:06

## 2023-01-01 RX ADMIN — Medication 1 MILLIGRAM(S): at 11:00

## 2023-01-01 RX ADMIN — METHADONE HYDROCHLORIDE 15 MILLIGRAM(S): 40 TABLET ORAL at 22:59

## 2023-01-01 RX ADMIN — Medication 650 MILLIGRAM(S): at 06:06

## 2023-01-01 RX ADMIN — PANTOPRAZOLE SODIUM 40 MILLIGRAM(S): 20 TABLET, DELAYED RELEASE ORAL at 06:52

## 2023-01-01 RX ADMIN — HYDROMORPHONE HYDROCHLORIDE 1 MILLIGRAM(S): 2 INJECTION INTRAMUSCULAR; INTRAVENOUS; SUBCUTANEOUS at 13:48

## 2023-01-01 RX ADMIN — Medication 3 MILLILITER(S): at 03:25

## 2023-01-01 RX ADMIN — HYDROMORPHONE HYDROCHLORIDE 6 MILLIGRAM(S): 2 INJECTION INTRAMUSCULAR; INTRAVENOUS; SUBCUTANEOUS at 11:56

## 2023-01-01 RX ADMIN — Medication 1 MILLIGRAM(S): at 13:35

## 2023-01-01 RX ADMIN — HEPARIN SODIUM 5000 UNIT(S): 5000 INJECTION INTRAVENOUS; SUBCUTANEOUS at 21:32

## 2023-01-01 RX ADMIN — Medication 50 MICROGRAM(S): at 06:41

## 2023-01-01 RX ADMIN — GABAPENTIN 600 MILLIGRAM(S): 400 CAPSULE ORAL at 14:07

## 2023-01-01 RX ADMIN — METHADONE HYDROCHLORIDE 15 MILLIGRAM(S): 40 TABLET ORAL at 05:29

## 2023-01-01 RX ADMIN — GABAPENTIN 600 MILLIGRAM(S): 400 CAPSULE ORAL at 06:33

## 2023-01-01 RX ADMIN — Medication 1 MILLIGRAM(S): at 23:15

## 2023-01-01 RX ADMIN — PIPERACILLIN AND TAZOBACTAM 25 GRAM(S): 4; .5 INJECTION, POWDER, LYOPHILIZED, FOR SOLUTION INTRAVENOUS at 04:52

## 2023-01-01 RX ADMIN — Medication 0.5 MILLIGRAM(S): at 05:09

## 2023-01-01 RX ADMIN — HEPARIN SODIUM 5000 UNIT(S): 5000 INJECTION INTRAVENOUS; SUBCUTANEOUS at 09:50

## 2023-01-01 RX ADMIN — Medication 40 MILLIGRAM(S): at 00:26

## 2023-01-01 RX ADMIN — Medication 1000 MILLIGRAM(S): at 15:00

## 2023-01-01 RX ADMIN — METHADONE HYDROCHLORIDE 15 MILLIGRAM(S): 40 TABLET ORAL at 06:40

## 2023-01-01 RX ADMIN — Medication 650 MILLIGRAM(S): at 13:58

## 2023-01-01 RX ADMIN — Medication 1 MILLIGRAM(S): at 15:10

## 2023-01-01 RX ADMIN — HEPARIN SODIUM 5000 UNIT(S): 5000 INJECTION INTRAVENOUS; SUBCUTANEOUS at 21:57

## 2023-01-01 RX ADMIN — Medication 650 MILLIGRAM(S): at 04:07

## 2023-01-01 RX ADMIN — Medication 1 PATCH: at 11:21

## 2023-01-01 RX ADMIN — Medication 1 MILLIGRAM(S): at 00:11

## 2023-01-01 RX ADMIN — PIPERACILLIN AND TAZOBACTAM 25 GRAM(S): 4; .5 INJECTION, POWDER, LYOPHILIZED, FOR SOLUTION INTRAVENOUS at 21:03

## 2023-01-01 RX ADMIN — HYDROMORPHONE HYDROCHLORIDE 1 MILLIGRAM(S): 2 INJECTION INTRAMUSCULAR; INTRAVENOUS; SUBCUTANEOUS at 07:53

## 2023-01-01 RX ADMIN — PANTOPRAZOLE SODIUM 40 MILLIGRAM(S): 20 TABLET, DELAYED RELEASE ORAL at 17:05

## 2023-01-01 RX ADMIN — DEXTROSE MONOHYDRATE, SODIUM CHLORIDE, AND POTASSIUM CHLORIDE 100 MILLILITER(S): 50; .745; 4.5 INJECTION, SOLUTION INTRAVENOUS at 21:10

## 2023-01-01 RX ADMIN — Medication 3 MILLILITER(S): at 09:57

## 2023-01-01 RX ADMIN — Medication 1 PATCH: at 18:13

## 2023-01-01 RX ADMIN — Medication 50 MICROGRAM(S): at 06:53

## 2023-01-01 RX ADMIN — Medication 3 MILLILITER(S): at 14:37

## 2023-01-01 RX ADMIN — HYDROMORPHONE HYDROCHLORIDE 0.5 MILLIGRAM(S): 2 INJECTION INTRAMUSCULAR; INTRAVENOUS; SUBCUTANEOUS at 00:39

## 2023-01-01 RX ADMIN — METHADONE HYDROCHLORIDE 15 MILLIGRAM(S): 40 TABLET ORAL at 14:23

## 2023-01-01 RX ADMIN — Medication 1 PATCH: at 20:38

## 2023-01-01 RX ADMIN — HYDROMORPHONE HYDROCHLORIDE 1 MILLIGRAM(S): 2 INJECTION INTRAMUSCULAR; INTRAVENOUS; SUBCUTANEOUS at 11:30

## 2023-01-01 RX ADMIN — METHADONE HYDROCHLORIDE 7.5 MILLIGRAM(S): 40 TABLET ORAL at 21:02

## 2023-01-01 RX ADMIN — SODIUM CHLORIDE 75 MILLILITER(S): 9 INJECTION, SOLUTION INTRAVENOUS at 22:42

## 2023-01-01 RX ADMIN — Medication 300 MILLIGRAM(S): at 18:22

## 2023-01-01 RX ADMIN — PANTOPRAZOLE SODIUM 40 MILLIGRAM(S): 20 TABLET, DELAYED RELEASE ORAL at 06:33

## 2023-01-01 RX ADMIN — GABAPENTIN 600 MILLIGRAM(S): 400 CAPSULE ORAL at 13:58

## 2023-01-01 RX ADMIN — Medication 650 MILLIGRAM(S): at 19:49

## 2023-01-01 RX ADMIN — METHADONE HYDROCHLORIDE 15 MILLIGRAM(S): 40 TABLET ORAL at 14:01

## 2023-01-01 RX ADMIN — Medication 1 PATCH: at 11:56

## 2023-01-01 RX ADMIN — QUETIAPINE FUMARATE 25 MILLIGRAM(S): 200 TABLET, FILM COATED ORAL at 22:14

## 2023-01-01 RX ADMIN — HYDROMORPHONE HYDROCHLORIDE 4 MILLIGRAM(S): 2 INJECTION INTRAMUSCULAR; INTRAVENOUS; SUBCUTANEOUS at 13:08

## 2023-01-01 RX ADMIN — HEPARIN SODIUM 5000 UNIT(S): 5000 INJECTION INTRAVENOUS; SUBCUTANEOUS at 06:32

## 2023-01-01 RX ADMIN — HYDROMORPHONE HYDROCHLORIDE 2 MILLIGRAM(S): 2 INJECTION INTRAMUSCULAR; INTRAVENOUS; SUBCUTANEOUS at 14:30

## 2023-01-01 RX ADMIN — HYDROMORPHONE HYDROCHLORIDE 1 MILLIGRAM(S): 2 INJECTION INTRAMUSCULAR; INTRAVENOUS; SUBCUTANEOUS at 12:37

## 2023-01-01 RX ADMIN — Medication 1 MILLIGRAM(S): at 23:02

## 2023-01-01 RX ADMIN — Medication 40 MILLIGRAM(S): at 05:09

## 2023-01-01 RX ADMIN — HYDROMORPHONE HYDROCHLORIDE 1 MILLIGRAM(S): 2 INJECTION INTRAMUSCULAR; INTRAVENOUS; SUBCUTANEOUS at 04:22

## 2023-01-01 RX ADMIN — HEPARIN SODIUM 5000 UNIT(S): 5000 INJECTION INTRAVENOUS; SUBCUTANEOUS at 14:04

## 2023-01-01 RX ADMIN — SODIUM CHLORIDE 75 MILLILITER(S): 9 INJECTION, SOLUTION INTRAVENOUS at 17:47

## 2023-01-01 RX ADMIN — Medication 1 PATCH: at 19:38

## 2023-01-01 RX ADMIN — ONDANSETRON 4 MILLIGRAM(S): 8 TABLET, FILM COATED ORAL at 20:01

## 2023-01-01 RX ADMIN — GABAPENTIN 300 MILLIGRAM(S): 400 CAPSULE ORAL at 20:19

## 2023-01-01 RX ADMIN — Medication 40 MILLIGRAM(S): at 11:56

## 2023-01-01 RX ADMIN — PANTOPRAZOLE SODIUM 40 MILLIGRAM(S): 20 TABLET, DELAYED RELEASE ORAL at 17:54

## 2023-01-01 RX ADMIN — GABAPENTIN 600 MILLIGRAM(S): 400 CAPSULE ORAL at 22:44

## 2023-01-01 RX ADMIN — Medication 40 MILLIGRAM(S): at 13:00

## 2023-01-01 RX ADMIN — OXYCODONE HYDROCHLORIDE 5 MILLIGRAM(S): 5 TABLET ORAL at 13:15

## 2023-01-01 RX ADMIN — Medication 25 MILLIGRAM(S): at 13:11

## 2023-01-01 RX ADMIN — Medication 40 MILLIGRAM(S): at 22:55

## 2023-01-01 RX ADMIN — Medication 3 MILLILITER(S): at 04:28

## 2023-01-01 RX ADMIN — PIPERACILLIN AND TAZOBACTAM 25 GRAM(S): 4; .5 INJECTION, POWDER, LYOPHILIZED, FOR SOLUTION INTRAVENOUS at 22:54

## 2023-01-01 RX ADMIN — Medication 3 MILLILITER(S): at 16:16

## 2023-01-01 RX ADMIN — METHADONE HYDROCHLORIDE 15 MILLIGRAM(S): 40 TABLET ORAL at 14:35

## 2023-01-01 RX ADMIN — HYDROMORPHONE HYDROCHLORIDE 0.5 MILLIGRAM(S): 2 INJECTION INTRAMUSCULAR; INTRAVENOUS; SUBCUTANEOUS at 20:47

## 2023-01-01 RX ADMIN — Medication 3 MILLIGRAM(S): at 20:40

## 2023-01-01 RX ADMIN — HEPARIN SODIUM 5000 UNIT(S): 5000 INJECTION INTRAVENOUS; SUBCUTANEOUS at 06:27

## 2023-01-01 RX ADMIN — Medication 1 MILLIGRAM(S): at 12:41

## 2023-01-01 RX ADMIN — HYDROMORPHONE HYDROCHLORIDE 6 MILLIGRAM(S): 2 INJECTION INTRAMUSCULAR; INTRAVENOUS; SUBCUTANEOUS at 22:02

## 2023-01-20 NOTE — PHYSICAL EXAM
[Normal] : normal skin color and pigmentation and no rash [de-identified] : Enlarged right neck mass

## 2023-01-20 NOTE — REASON FOR VISIT
[Lung Cancer] : lung cancer [Consideration for Non-Curative Therapy] : consideration for non-curative therapy for

## 2023-01-20 NOTE — HISTORY OF PRESENT ILLNESS
[FreeTextEntry1] : This is a 52 year old make who presented to  with hemoptysis x 3 weeks as well as a 10 pound weight loss in July 2022. CT Chest demonstrated a large 7 cm left upper lobe mass with enlarged AP window lymph nodes and subcarinal lymph nodes. His past medical history includes HTN, substance use on Suboxone, alcohol use s/p detox with DTs, smoker. \par \par He is status post flexible bronchoscopy, EBUS with FNA biopsy of subcarinal lymph node on 7/18/22. pathology reveals scanty fragments of poorly differentiated non-small cell carcinoma consistent with adenocarcinoma. PDL-1 high at 80%.  Patient was d/c'd and followed up with Dr. Trammell for Medical Oncology eval. \par \par PET scan 8-13-22 shows left upper lobe mass, right pulmonary nodule, multiple mediastinal and supraclavicular nodes that are positive. Also showed FDG avidity in thyroid, left vocal cord, stomach, and small bowel.\par \par Patient was scheduled to start immunotx, but was admitted once again to  on 9-14-22 because of cough, SOB, and hemoptysis. He was found to have postobstructive pneumonia in SHAILA and started on Zosyn. Elio also found to have small bowel obstruction. He notes a 10 pound weight loss over the past month.\par \par He received 2 units of PRBC at  on 10/24/22 due to anemia. \par \par He is currently undergoing Keytruda with Dr. Trammell s/p cycle 4.  Plan is to start chemotherapy on Monday 1/23/23.\par \par He is feeling well. Denies SOB or coughing. Denies any further hemoptysis. Pain noted in left upper posterior chest near shoulder blade. Taking Advil with improvement. \par \par Elio complains of difficulty with swallowing but can have soup; also c/o hoarseness, and coughing.  Recently noted by Dr. Trammell that his right neck mass is enlarging.\par \par Elio was last seen by me in November 2022, at which time I recommended ENT evaluation for the bilateral cervical adenopathy, and FDG avidity in the thyroid and in the larynx.  Elio states he did not follow through with that recommendation.  Instaed, he has just been receiving immunotx for the known lung adenoca.

## 2023-01-20 NOTE — REVIEW OF SYSTEMS
[Fatigue: Grade 0] : Fatigue: Grade 0 [Cough: Grade 1 - Mild symptoms; nonprescription intervention indicated] : Cough: Grade 1 - Mild symptoms; nonprescription intervention indicated [Dyspnea: Grade 2 - Shortness of breath with minimal exertion; limiting instrumental ADL] : Dyspnea: Grade 2 - Shortness of breath with minimal exertion; limiting instrumental ADL [Hoarseness: Grade 1 - Mild or intermittent voice change; fully understandable; self-resolves] : Hoarseness: Grade 1 - Mild or intermittent voice change; fully understandable; self-resolves

## 2023-01-20 NOTE — ED PROVIDER NOTE - CROS ED NEURO ALL NEG
- - - Keystone Flap Text: The defect edges were debeveled with a #15 scalpel blade.  Given the location of the defect, shape of the defect a keystone flap was deemed most appropriate.  Using a sterile surgical marker, an appropriate keystone flap was drawn incorporating the defect, outlining the appropriate donor tissue and placing the expected incisions within the relaxed skin tension lines where possible. The area thus outlined was incised deep to adipose tissue with a #15 scalpel blade.  The skin margins were undermined to an appropriate distance in all directions around the primary defect and laterally outward around the flap utilizing iris scissors.

## 2023-01-20 NOTE — VITALS
[Least Pain Intensity: 0/10] : 0/10 [80: Normal activity with effort; some signs or symptoms of disease.] : 80: Normal activity with effort; some signs or symptoms of disease.  [Maximal Pain Intensity: 2/10] : 2/10

## 2023-01-23 PROBLEM — C34.90 ADENOCARCINOMA OF LUNG: Status: ACTIVE | Noted: 2022-01-01

## 2023-01-23 PROBLEM — R59.0 CERVICAL LYMPHADENOPATHY: Status: ACTIVE | Noted: 2023-01-01

## 2023-01-23 PROBLEM — J38.01 UNILATERAL PARTIAL VOCAL CORD PARALYSIS: Status: ACTIVE | Noted: 2023-01-01

## 2023-01-23 NOTE — PROCEDURE
[Hoarseness] : hoarseness not clearly evaluated by indirect laryngoscopy [None] : none [Flexible Endoscope] : examined with the flexible endoscope [Serial Number: ___] : Serial Number: [unfilled] [de-identified] : No lesions in the NPx, OPx, HPx or larynx.  L. VC is paralyzed, R. is mobile.  Airway patent.\par

## 2023-01-23 NOTE — PHYSICAL EXAM
[de-identified] : Bulky but somewhat soft LAD in bilateral levels III and IV.   [Laryngoscopy Performed] : laryngoscopy was performed, see procedure section for findings [FreeTextEntry1] : Poor denitition.  No concerning lesions in the OC/OPx on inspection or palpation.  The area of concern along the FOM on last PET without any obvious lesions. [Normal] : no rashes

## 2023-01-23 NOTE — HISTORY OF PRESENT ILLNESS
[de-identified] : Referred by Dr. Rose for lymphadenopathy and vocal cord activity noted on the PET from 8/13/22.  PT is currently being treated for lung cancer with chemo at [facility], Dr. Hu. PT had treatment today.  Dr. Rose recommended the pt see Dr. Rodriguez in Nov, 2022 but the patient never followed through. Dr. Rose wanted pt to see Dr. Rodriguez prior to starting RT. As of last week, pt was having difficulty swallowing. PT was on immunotherapy from August, 2022.  Denies SOB and pain.

## 2023-02-10 NOTE — PATIENT PROFILE ADULT - CONTRAINDICATIONS & PRECAUTIONS (SELECT ALL THAT APPLY)
Color consistent with ethnicity/race, warm, dry intact, resilient. Patient/surrogate refused vaccine...

## 2023-02-10 NOTE — ED PROVIDER NOTE - OBJECTIVE STATEMENT
53 y/o male w/ a PMHX of stage IV lung CA, HTN, anxiety on Xanax, chronic pain on Percocet, and substance abuse presents to the ED via EMS for difficulty swallowing. Pt reports the difficulty swallowing started x3 days ago and has been worsening since, states he is now unable to swallow pills or food, thinks he is withdrawing from the Xanax and Percocet. Denies SOB. Pt first treatment x3 weeks ago, next treatment in 2 days. Pt notes he has had similar Sx before however it usually self resolves. Pt able to swallow secretions w/ difficulty. Nonsmoker. Denies alcohol or illicit drug use. NKDA. PCP: Dr. Belcher in [place]. [place]: Dr. Black. 51 y/o male w/ a PMHX of stage IV lung CA, HTN, anxiety on Xanax, chronic pain on Percocet, and substance abuse presents to the ED via EMS for difficulty swallowing. Pt reports difficulty swallowing started x3 days ago and has been worsening since, states he is now unable to swallow pills or food, thinks he is withdrawing from the Xanax and Percocet. Denies SOB. Pt first treatment x3 weeks ago, next treatment in 2 days. Pt notes he has had similar Sx before however it usually self resolves. Pt able to swallow secretions w/ difficulty. Nonsmoker. Denies alcohol or illicit drug use. NKDA. PCP: Dr. Belcher in [place]. [place]: Dr. Black.

## 2023-02-10 NOTE — PHARMACOTHERAPY INTERVENTION NOTE - COMMENTS
Medication reconciliation completed.  Reviewed Medication list and confirmed med allergies with patient; confirmed with Dr. First MedHt[facility].  Pt confirms that he has not been able to take "almost all" of his meds for the past 3 days.  Pt receives chemo every 3 weeks and states his last round was 3 weeks ago, next scheduled for monday 2-13.

## 2023-02-10 NOTE — ED ADULT NURSE NOTE - NSIMPLEMENTINTERV_GEN_ALL_ED
Implemented All Universal Safety Interventions:  [place] to call system. Call bell, personal items and telephone within reach. Instruct patient to call for assistance. Room bathroom lighting operational. Non-slip footwear when patient is off stretcher. Physically safe environment: no spills, clutter or unnecessary equipment. Stretcher in lowest position, wheels locked, appropriate side rails in place.

## 2023-02-10 NOTE — H&P ADULT - HISTORY OF PRESENT ILLNESS
51 y/o male w/ a PMHX of stage IV lung CA, HTN, anxiety on Xanax, chronic pain on Percocet, and substance abuse presents to the ED via EMS for difficulty swallowing. Pt reports difficulty swallowing started x3 days ago and has been worsening since, states he is now unable to swallow pills or food, thinks he is withdrawing from the Xanax and Percocet. Denies SOB. Pt first treatment x3 weeks ago, next treatment in 2 days. Pt notes he has had similar Sx before however it usually self resolves. Pt able to swallow secretions w/ difficulty  Pt is adm for further Pall eval and maybe hospice.

## 2023-02-10 NOTE — H&P ADULT - NSHPPHYSICALEXAM_GEN_ALL_CORE
Vital Signs Last 24 Hrs  T(C): 36.9 (10 Feb 2023 11:05), Max: 36.9 (10 Feb 2023 11:05)  T(F): 98.5 (10 Feb 2023 11:05), Max: 98.5 (10 Feb 2023 11:05)  HR: 79 (10 Feb 2023 11:05) (79 - 91)  BP: 135/91 (10 Feb 2023 11:05) (135/91 - 149/93)  BP(mean): 102 (10 Feb 2023 11:05) (102 - 102)  RR: 16 (10 Feb 2023 11:05) (15 - 16)  SpO2: 97% (10 Feb 2023 11:05) (97% - 98%)    Parameters below as of 10 Feb 2023 11:05  Patient On (Oxygen Delivery Method): room air    PHYSICAL EXAM:      Constitutional: NAD  Eyes: perrl, no conjunctival changes  ENMT: no exudates, moist oral muc, uvula midline  Neck: no JVD, no LAD  Back: no cva tenderness  Respiratory: CTA, no exp wheezes  Cardiovascular: S1S2 reg, no murmur gallop or rub  Gastrointestinal: abd soft, NT/ND + BS  Genitourinary: voiding  Extremities: FROM, no joint effusions, no edema, no clubbing , no cyanosis  Vascular: pedal pulses + bilateral, warm extremities  Neurological: non focal, mot str 5/5/ all extr  Skin: no rashes  Lymph Nodes: no LAD

## 2023-02-10 NOTE — H&P ADULT - ASSESSMENT
* Dysphagia sec to large necrotic mass measuring at least 8.1 x 4.5 cm in the   retrotracheal region with compression and displacement of the trachea   anteriorly and compression of the pharynx and proximal esophagus   - pt was seen by thoracic, he should be palliative  adm for Pall eval and home hospice  cancer is advanced   DNR DNI  PRN anlagesia  IVF  Seroquel can't be combined with narcs/ computer alert, I did not resume it    * Abnormal CT chest  empiric abx: Zosyn  Albuterol

## 2023-02-10 NOTE — CHART NOTE - NSCHARTNOTEFT_GEN_A_CORE
Reviewed plan of care with Dr. Yun, patient admitted for pain control, palliative will follow up on Monday

## 2023-02-10 NOTE — ED PROVIDER NOTE - CLINICAL SUMMARY MEDICAL DECISION MAKING FREE TEXT BOX
51 y/o w/ stage IV lung CA w/ difficulty swallowing PO meds and food. Will get basic labs, CT soft tissue/neck/chest for further eval, and likely admission w/ inability to tolerate PO. 53 y/o w/ stage IV lung CA w/ difficulty swallowing PO meds and food. Will get basic labs, CT soft tissue/neck/chest for further eval, and likely admission w/ inability to tolerate PO.    3:23PM- GI- Dr. Weems reviewed case- no indication for acute intervention at this time; rec thoracics- at bedside to evaluate patient; Surg onc also contacted as requested by GI; palliative consult placed. Endorsed to hospitalist at this time.

## 2023-02-10 NOTE — PATIENT PROFILE ADULT - FALL HARM RISK - HARM RISK INTERVENTIONS

## 2023-02-10 NOTE — ED ADULT TRIAGE NOTE - CHIEF COMPLAINT QUOTE
patient brought in by EMS from home c/o difficulty swallowing.  hx stage 4 lung ca, tumor on throat.  notes worsening difficulty swallowing over the last 3 days, unable to swallow pills or eat/drink much.  states he is withdrawing from xanax and percocet, hasn't been able to take these in 2 days.  denies difficulty breathing.

## 2023-02-10 NOTE — H&P ADULT - NSHPPOAPULMEMBOLUS_GEN_A_CORE
DATE OF ADMISSION:  05/03/2018

 

DATE OF DISCHARGE:  05/03/2018

 

ALLERGIES:  The patient is allergic to BENADRYL.

 

The patient was seen and examined on the day of discharge.  Denies any new complaints.  Mentation has
 significantly improved.

 

DISCHARGE MEDICATIONS:  The patient will continue 1 gram ceftriaxone every day until the urine cultur
e sensitivities arrive.  Urine culture at this time shows gram negative willow.  All other home medicati
ons will be resumed.

 

BRIEF HOSPITAL COURSE:  The patient is a 69-year-old female who was admitted earlier today by Dr. Ramona mckeon with altered mentation.  Please refer to the history and physical for further details.

 

The patient was admitted to the hospital with diagnosis of encephalopathy.  CT scan of the brain was 
negative for acute findings.  Chest x-ray was negative for pneumonia.  Right upper quadrant ultrasoun
d showed multiple stones with significant sludge without any findings consistent with acute cholecyst
itis.  CT scan of the abdomen and pelvis was done that showed normal gallbladder without any evidence
 of biliary ductal dilatation.  Her encephalopathy is probably secondary to urinary tract infection c
ombined with dehydration.  The patient has not been eating and drinking.  Urinalysis showed hyaline c
ast with ketones with large leukocyte esterase and greater than 50 WBCs with 3+ bacteria.  Her mentat
ion improved with IV fluids and antibiotics.  She will continue IV antibiotics until the final cultur
e report and sensitivities.  The patient will be discharged back to the rehabilitation today.

 

FINAL DIAGNOSES:

1.  Toxic metabolic encephalopathy, multifactorial.  Probably secondary to urinary tract infection co
mbined with dehydration.

2.  Hypertension.

3.  Recent L1 compression fracture.

4.  Mild hyponatremia.

5.  Indeterminate troponins, probably secondary to dehydration.  Repeat troponins were normal.

6.  Chronic kidney disease 2.

 

SIGNIFICANT LABORATORY DATA:

1.  TSH was normal.

2.  Troponin 0.035 on admission, repeat troponin 0.023.

3.  Urine drug screen positive for opiates.

4.  Sodium 133.

5.  Creatinine 0.82 with BUN 13.
no

## 2023-02-10 NOTE — H&P ADULT - NSHPLABSRESULTS_GEN_ALL_CORE
10.2   7.98  )-----------( 548      ( 10 Feb 2023 09:36 )             30.5   02-10    130<L>  |  94<L>  |  8   ----------------------------<  133<H>  4.2   |  30  |  0.61    Ca    9.6      10 Feb 2023 09:36    TPro  7.6  /  Alb  2.9<L>  /  TBili  0.5  /  DBili  x   /  AST  14<L>  /  ALT  18  /  AlkPhos  141<H>  02-10      CT Neck Soft Tissue w/ IV Cont (02.10.23 @ 10:54) >    IMPRESSION: Large necrotic mass measuring at least 8.1 x 4.5 cm in the   retrotracheal region with compression and displacement of the trachea   anteriorly and compression of the pharynx and proximal esophagus as well   as the LEFT internal jugular vein and internal carotid artery posterior   laterally. This appears to obliterate the majority of the LEFT lobe of   the thyroid gland and likely represents a thyroid carcinoma. A necrotic   5.2 x 5.2 cm mass extends from the upper pole of the RIGHT lobe of the   thyroid gland with erosion through the RIGHT thyroid cartilage and   extension into the RIGHT paralaryngeal space, also suspicious for thyroid   carcinoma. A necrotic 2.6 x 1.8 cm LEFT level 2 lymph node is noted. And   a necrotic 1 cm LEFT level 3 lymph node is noted.      < from: CT Chest w/ IV Cont (02.10.23 @ 10:53) >    IMPRESSION:    Since 11/21/2022:    Stable left upper lobe neoplasm.    Increased mass in the lower neck, contiguous with the thyroid and   inseparable from the proximal esophagus.    New bronchial and bronchiolar impaction in both lungs. Consider   aspiration or infectious bronchiolitis.    Multiple other lung nodules, some unchanged, some smaller, and some new.    New right adrenal metastasis.

## 2023-02-10 NOTE — CONSULT NOTE ADULT - ASSESSMENT
53 y/o male w/ a PMHX of stage IV lung CA, HTN, anxiety on Xanax, chronic pain on Percocet, and substance abuse presents to the ED via EMS for difficulty swallowing. Pt reports difficulty swallowing started x3 days ago and has been worsening since, states he is now unable to swallow pills or food, thinks he is withdrawing from the Xanax and Percocet. Denies SOB. Pt first treatment x3 weeks ago, next treatment in 2 days. Pt notes he has had similar Sx before however it usually self resolves. Pt able to swallow secretions w/ difficulty  Pt is adm for further Pall eval and maybe hospice.

## 2023-02-10 NOTE — CHART NOTE - NSCHARTNOTEFT_GEN_A_CORE
Patient reporting acute anxiety, says he normally takes PO xanax at home, however unable to swallow currently due to thyroid issue  Ativan x1  CIWA scoring

## 2023-02-11 NOTE — PROVIDER CONTACT NOTE (OTHER) - SITUATION
pt with advanced lung CA  eval for inpatient radiation
pt admitted w/ difficulty swallowing/+pna, w/ tyroid CA, pt w/ extremely anxiousness. pt has not slept all night. pt receiving IV ativan prn

## 2023-02-11 NOTE — SWALLOW BEDSIDE ASSESSMENT ADULT - SWALLOW EVAL: CRITERIA FOR SKILLED INTERVENTION MET
DO NOT FEEL THAT ACUTE SPEECH PATHOLOGY FOLLOW UP OR ACUTE VOICE/SWALLOW THERAPY WOULD BE OF CLINICAL BENEFIT IN HOSPITAL AT THIS TIME AS I SUSPECT THAT DEFICITS ARE DUE COMPRESSIVE MALIGNANT MASSES THAT ARE NOT AMENABLE TO TRADITIONAL SPEECH PATHOLOGY INTERVENTION TECHNIQUES. FURTHER, OROPHARYNGEAL STIMULATION SEEMED TO RESULT IN CREATING INCREASED SECRETIONS THAT HE WAS NOT ABLE TO SAFELY MOBILIZE, AS WELL AS INCREASED STRIDOR/O2 DESATURATION PLACING HIM AT HIGH RISK FOR PULMONARY DECOMPENSATION/OVERT DISCOMFORT. AS SUCH, THIS SERVICE WILL NOT ACTIVELY FOLLOW IN HOUSE. RECONSULT ONLY IF STATUS CHANGES AND PT RECEIVES SOME TYPE OF TREATMENT THAT HAS THE POTENTIAL TO REDUCE THE SIZE OF HIS COMPRESSIVE MALIGNANT MASSES.

## 2023-02-11 NOTE — SWALLOW BEDSIDE ASSESSMENT ADULT - ASR SWALLOW LABIAL MOBILITY
No lip focality evident./within functional limits risks and benefits of treatment options risks and benefits of treatment options

## 2023-02-11 NOTE — CONSULT NOTE ADULT - ASSESSMENT
CT neck with IV contrast  CLINICAL INFORMATION: Difficulty swallowing    TECHNIQUE:  Contiguous axial 3 mm thicksections were obtained through   the neck using single helical acquisition.  Images were acquired during   the intravenous administration of 60 cc of Omnipaque 350/ 0 cc discarded.    Image data was reconstructed in the 3 mm sagittal and coronal planes.    This scan was performed using automatic exposure control (radiation dose   reduction software) to obtain a diagnostic image quality scan with   patient dose as low as reasonably achievable.    FINDINGS:   No prior similar studies are available for review.    Large necrotic mass measuring at least 8.1 x 4.5 cm in the retrotracheal   region with compression and displacement of the trachea anteriorly and   compression of the pharynx and proximal esophagus as well as the LEFT   internal jugularvein and internal carotid artery posterior laterally.   This appears to obliterate the majority of the LEFT lobe of the thyroid   gland and likely represents a thyroid carcinoma. A necrotic 5.2 x 5.2 cm   mass extends from the upper pole of the RIGHTlobe of the thyroid gland   with erosion through the RIGHT thyroid cartilage and extension into the   RIGHT paralaryngeal space, also suspicious for thyroid carcinoma. A   necrotic 2.6 x 1.8 cm LEFT level 2 lymph node is noted. And a necrotic 1   cm LEFT level 3 lymph node is noted.    The larynx is grossly intact.  The preepiglottic and LEFT paralaryngeal   spaces are intact.  Laryngeal cartilages remain intact.    The nasopharynx is symmetric.  No lateral retropharyngeal mass is found.    The underlying central skull base is intact.  The petrous temporal bones   and mastoids remain clear.  The visualized base of brain appears   unremarkable.    The parotid and submandibular glands are intact.    The visualized paranasal sinuses are significant for minimal mucosal   thickening in the BILATERAL maxillary alveolar recesses.  The nasal   cavity is unremarkable.    The cervical spine show mild degenerative changes.    The carotid and vertebral arteries demonstrate enhancement indicating   their patency.    The lung apices demonstrate a 5.6 x 5.8 cm LEFT suprahilar mass extending   into the LEFT upper lobe from the aorticopulmonary window possibly   representing metastatic lymphadenopathy. See chest CT for details.      IMPRESSION: Large necrotic mass measuring at least 8.1 x 4.5 cm in the   retrotracheal region with compression and displacement of the trachea   anteriorly and compression of the pharynx and proximal esophagus as well   as the LEFT internal jugular vein and internal carotid artery posterior   laterally. This appears to obliterate the majority of the LEFT lobe of   the thyroid gland and likely represents a thyroid carcinoma. A necrotic   5.2 x 5.2 cm mass extends from the upper pole of the RIGHT lobe of the   thyroid gland with erosion through the RIGHT thyroid cartilage and   extension into the RIGHT paralaryngeal space, also suspicious for thyroid   carcinoma. A necrotic 2.6 x 1.8 cm LEFT level 2 lymph node is noted. And   a necrotic 1 cm LEFT level 3 lymph node is noted.    >  : CT chest 11/21/2022.    FINDINGS:    LUNGS/AIRWAYS/PLEURA: Mild narrowing of the proximal trachea due to mass   effect from adjacent lymphadenopathy. Unchanged 6 cm left upper lobe mass   occluding the apicoposterior segmental bronchus and with broadbase that   abuts and bulges the left oblique fissure. Increased linear density and   associated architectural distortion adjacent to the mass, likely   scarring. New mucous impacted bronchi in the left lower lobe and   increased tree-in-bud in both lower lobes. Few new nodules in the right   lung up to 7 mm, for example, in the right lower lobe (2-109 and 2-96).   Unchanged groundglass nodules in the right upper lobe (2-21, 2-48).   Decreased nodules in the middle lobe, for example, 1 cm on 2-80. No   pleural effusion.    LYMPH NODES/MEDIASTINUM: Increased mass in the lower neck which is   inseparable from the thyroid, upwards of 7 cm in largest diameter,   slightly narrowing and deviating the trachea anteriorly, and inseparable   from the proximal esophagus. Stable mediastinal and bilateral hilar   lymphadenopathy.    HEART/VASCULATURE: Normal heart size. No pericardial effusion. Normal   caliber aorta and pulmonary artery.    UPPER ABDOMEN: New 5.4 cm right adrenal mass.    BONES/SOFT TISSUES: Unremarkable.      IMPRESSION:    Since 11/21/2022:    Stable left upper lobe neoplasm.    Increased mass in the lower neck, contiguous with the thyroid and   inseparable from the proximal esophagus.    New bronchial and bronchiolar impaction in both lungs. Consider   aspiration or infectious bronchiolitis.    Multiple other lung nodules, some unchanged, some smaller, and some new.        CBC Full  -  ( 10 Feb 2023 09:36 )  WBC Count : 7.98 K/uL  RBC Count : 3.67 M/uL  Hemoglobin : 10.2 g/dL  Hematocrit : 30.5 %  Platelet Count - Automated : 548 K/uL  Mean Cell Volume : 83.1 fl  Mean Cell Hemoglobin : 27.8 pg  Mean Cell Hemoglobin Concentration : 33.4 gm/dL  Auto Neutrophil # : 5.22 K/uL  Auto Lymphocyte # : 1.47 K/uL  Auto Monocyte # : 1.08 K/uL  Auto Eosinophil # : 0.15 K/uL  Auto Basophil # : 0.03 K/uL  Auto Neutrophil % : 65.4 %  Auto Lymphocyte % : 18.4 %  Auto Monocyte % : 13.5 %  Auto Eosinophil % : 1.9 %  Auto Basophil % : 0.4 %      02-11    131<L>  |  98  |  5<L>  ----------------------------<  138<H>  3.4<L>   |  28  |  0.48<L>    Ca    8.5      11 Feb 2023 06:49  Phos  3.4     02-11  Mg     1.4     02-11    TPro  6.5  /  Alb  2.6<L>  /  TBili  0.6  /  DBili  0.3  /  AST  14<L>  /  ALT  16  /  AlkPhos  114  02-11          PT/INR - ( 10 Feb 2023 09:36 )   PT: 14.5 sec;   INR: 1.25 ratio         PTT - ( 10 Feb 2023 09:36 )  PTT:30.7 sec    Vital Signs Last 24 Hrs  T(C): 36.9 (11 Feb 2023 08:43), Max: 37.2 (10 Feb 2023 16:30)  T(F): 98.4 (11 Feb 2023 08:43), Max: 98.9 (10 Feb 2023 16:30)  HR: 95 (11 Feb 2023 08:43) (85 - 101)  BP: 160/98 (11 Feb 2023 08:43) (143/73 - 162/94)  BP(mean): 96 (10 Feb 2023 16:30) (96 - 96)  RR: 18 (11 Feb 2023 08:43) (18 - 18)  SpO2: 95% (11 Feb 2023 08:43) (95% - 99%)    Parameters below as of 11 Feb 2023 08:43  Patient On (Oxygen Delivery Method): room air

## 2023-02-11 NOTE — SWALLOW BEDSIDE ASSESSMENT ADULT - COMMENTS
The pt was admitted to  with swallow difficulties, and congestion. Hospital course notable for anxiousness(On CIWA protocol), stridor, Dysphagia and congestion. Imaging in hospital notable for left upper lobe lung mass with multiple other smaller pulmonary nodules consistent with advanced lung cancer. Additionally, imaging also notable for evidence of adrenal metastasis as well as the presence of a large necrotic retrotracheal mass arising from thyroid with compression of Pharynx/Proximal Esophagus suspect for concomitant Thyroid cancer. This profile is superimposed upon a history of known stage 4 lung cancer, chronic pain, anxiety, polysubstance/ETOH abuse, anxiety, and hypertension. Pt is a smoker. See below for additional past medical information. The pt was admitted to  with swallow difficulties, and congestion. He c/o repeated choking when attempting to eat. Hospital course notable for anxiousness(On CIWA protocol), stridor, Dysphagia and congestion. Imaging in hospital notable for left upper lobe lung mass with multiple other smaller pulmonary nodules consistent with advanced lung cancer. Additionally, imaging also notable for evidence of adrenal metastasis as well as the presence of a large necrotic retrotracheal mass arising from thyroid with compression of Pharynx/Proximal Esophagus suspect for concomitant Thyroid cancer. This profile is superimposed upon a history of known stage 4 lung cancer, chronic pain, anxiety, polysubstance/ETOH abuse, anxiety, and hypertension. Pt is a smoker. See below for additional past medical information.

## 2023-02-11 NOTE — PROGRESS NOTE ADULT - ASSESSMENT
53 y/o male w/ a PMHX of stage IV NSCLC adenocarcinoma  lung CA, HTN, anxiety on Xanax, chronic pain on Percocet, ho substance abuse  admitted on 2/10/23 via EMS for difficulty swallowing.    Dysphagia sec to large mass  8.1 x 4.5 cm in the retrotracheal region with displacement of the trachea  anteriorly , compression of the pharynx and proximal esophagus   Upper respiratory wheezing known Adenocarcinoma of SHAILA with Multifocal PNA likely aspiration vs bronchiolitis  New right adrenal metastasis  RLE lump ? mets   - albuterol neb tid   - c/w Zosyn   - pt was seen by thoracic, he should be palliative    - d/w Dr. Weems - not an candidate for palliative stent placement , not an candidate for endoscopic PEG placement , can be done only by IR if needed   - speech and swallow - c/w full liquid diet with supplements  - CT RLE and abd and pelvis - pending   - pulmonary consult - pending   - oncology consult Dr. Trammell primary   - Pall  consult     Neck pain   - start fentanyl patch 25 - can not tolerate PO tabs meds  - oxycodone 5 mg liquid q4h   - dilaudid 0.5 IV prn for severe     Right lobe thyroid mass  - TSH 4, check free T4   - as per oncology work-up  - c/w levothyroxine po     Anxiety   - lorazepam liquid q6h prn , IV if severe anxiety    Hypomagnesemia, Hypokalemia, Hypovolumic hyponatremia  - replace, monitor  - c/w IV fluids monitor    Normocytic anemia, Thrombocytosis  - check iron studies    Severe protein calorie malnutrition  Abnormal weight loss  - nutritional consult   - IV fluids with vitamins     Advance directives  MOLST - DNR/DNI  - HCP Susan Arenas [phone] updated at bedside        53 y/o male w/ a PMHX of stage IV NSCLC adenocarcinoma  lung CA, HTN, anxiety on Xanax, chronic pain on Percocet, ho substance abuse  admitted on 2/10/23 via EMS for difficulty swallowing.    Dysphagia sec to large mass  8.1 x 4.5 cm in the retrotracheal region with displacement of the trachea  anteriorly , compression of the pharynx and proximal esophagus   Upper respiratory wheezing known Adenocarcinoma of SHAILA with Multifocal PNA likely aspiration vs bronchiolitis  New right adrenal metastasis  RLE lump ? mets   - albuterol neb tid   - c/w Zosyn   - pt was seen by thoracic, he should be palliative    - d/w Dr. Weems - not an candidate for palliative stent placement , not an candidate for endoscopic PEG placement , can be done only by IR if needed   - speech and swallow - grossly aspirating  should be NPO , but if understands the risk and goals is quality of life  with c/w  full liquid /puree diet with supplements moderately thick   - CT RLE and abd and pelvis - pending   - pulmonary consult - pending   - oncology consult Dr. Trammell primary   - rad onc consult ? needs inpatient radiation  - Pall  consult     Neck pain   - start fentanyl patch 25 - can not tolerate PO tabs meds  - oxycodone 5 mg liquid q4h   - dilaudid 0.5 IV prn for severe     Right lobe thyroid mass  - TSH 4, check free T4   - as per oncology work-up  - c/w levothyroxine po     Anxiety   - lorazepam liquid q6h prn , IV if severe anxiety    Hypomagnesemia, Hypokalemia, Hypovolumic hyponatremia  - replace, monitor  - c/w IV fluids monitor    Normocytic anemia, Thrombocytosis  - check iron studies    Severe protein calorie malnutrition  Abnormal weight loss  - nutritional consult   - IV fluids with vitamins       Advance directives  MOLST - DNR/DNI  - pt do not want feeding tube   - HCP Susan Arenas [phone] updated at bedside    - palliative team consulted      51 y/o male w/ a PMHX of stage IV NSCLC adenocarcinoma  lung CA, HTN, anxiety on Xanax, chronic pain on Percocet, ho substance abuse  admitted on 2/10/23 via EMS for difficulty swallowing.    Dysphagia sec to large mass  8.1 x 4.5 cm in the retrotracheal region with displacement of the trachea  anteriorly , compression of the pharynx and proximal esophagus   Upper respiratory wheezing known Adenocarcinoma of SHAILA with Multifocal PNA likely aspiration vs bronchiolitis  New right adrenal metastasis  RLE lump ? mets   - albuterol neb tid   - c/w Zosyn   - pt was seen by thoracic, he should be palliative    - d/w Dr. Weems - not an candidate for palliative stent placement , not an candidate for endoscopic PEG placement , can be done only by IR if needed   - speech and swallow - grossly aspirating  should be NPO , but if understands the risk and goals is quality of life  with c/w  full liquid /puree diet with supplements moderately thick   - CT RLE and abd and pelvis - pending   - pulmonary consult - pending   - oncology consult Dr. Trammell primary   - rad onc consult ? needs inpatient radiation  - Pall  consult   - IR consult for possible PEG placement    Neck pain   - start fentanyl patch 25 - can not tolerate PO tabs meds  - oxycodone 5 mg liquid q4h   - dilaudid 0.5 IV prn for severe     Right lobe thyroid mass  - TSH 4, check free T4   - as per oncology work-up  - c/w levothyroxine po     Anxiety   - lorazepam liquid q6h prn , IV if severe anxiety    Hypomagnesemia, Hypokalemia, Hypovolumic hyponatremia  - replace, monitor  - c/w IV fluids monitor    Normocytic anemia, Thrombocytosis  - check iron studies    Severe protein calorie malnutrition  Abnormal weight loss  - nutritional consult   - IV fluids with vitamins       Advance directives  MOLST - DNR/DNI  - pt do not want feeding tube   - HCP Susan Arenas [phone] updated at bedside    - palliative team consulted      51 y/o male w/ a PMHX of stage IV NSCLC adenocarcinoma  lung CA, HTN, anxiety on Xanax, chronic pain on Percocet, ho substance abuse  admitted on 2/10/23 via EMS for difficulty swallowing.    Dysphagia sec to large mass  8.1 x 4.5 cm in the retrotracheal region with displacement of the trachea  anteriorly , compression of the pharynx and proximal esophagus   Upper respiratory wheezing known Adenocarcinoma of SHAILA with Multifocal PNA likely aspiration vs bronchiolitis  New right adrenal metastasis  RLE lump ? mets   - albuterol neb tid   - c/w Zosyn   - pt was seen by thoracic, he should be palliative    - d/w Dr. Weems - not an candidate for palliative stent placement , not an candidate for endoscopic PEG placement , can be done only by IR if needed   - speech and swallow - grossly aspirating  should be NPO , but if understands the risk and goals is quality of life  with c/w  full liquid /puree diet with supplements moderately thick   - CT RLE and abd and pelvis - pending   - pulmonary consult - pending   - oncology consult Dr. Trammell primary   - rad onc consult ? needs inpatient radiation  - Pall  consult   - IR consult for possible PEG placement    Neck pain   - start fentanyl patch 25 - can not tolerate PO tabs meds  - oxycodone 5 mg liquid q4h   - dilaudid 0.5 IV prn for severe     Right lobe thyroid mass  - TSH 4, check free T4   - as per oncology work-up  - c/w levothyroxine po     Anxiety   - lorazepam liquid q6h prn , IV if severe anxiety    Hypomagnesemia, Hypokalemia, Hypovolumic hyponatremia  - replace, monitor  - c/w IV fluids monitor    Normocytic anemia, Thrombocytosis  - check iron studies    Severe protein calorie malnutrition  Abnormal weight loss  - nutritional consult   - IV fluids with vitamins       Advance directives  MOLST - DNR/DNI  - pt not sure if he wants  feeding tube   - HCP Susan Arenas [phone] updated at bedside    - palliative team consulted

## 2023-02-11 NOTE — CHART NOTE - NSCHARTNOTEFT_GEN_A_CORE
Was informed by RN that pt is restless and wants more ativan. Pt already s/p 2X 0.5 mg of dilaudid and 3 mg IV ativan. Will refrain from giving more IV ativan at this time for fear of respiratory depression. Pt is restless and jumping in and out of bed. Is becoming a fall risk. Not scoring on CIWA. So will place pt on enhanced  supervision.

## 2023-02-11 NOTE — SWALLOW BEDSIDE ASSESSMENT ADULT - ORAL PHASE
Bolus formation/transfer were achieved via functional AP lingual actions and pt cleared oral debris on his own.

## 2023-02-11 NOTE — SWALLOW BEDSIDE ASSESSMENT ADULT - SWALLOW EVAL: STRUCTURAL ABNORMALITIES
A substantial loss of mass was apparent in pt's strap muscle regions. Additionally, a firm mass was noted on palpation which approximated the right side of his clavicle just lateral to the distal portion of his right Pharynx. Pt's Larynx deviated to the left at rest.

## 2023-02-11 NOTE — SWALLOW BEDSIDE ASSESSMENT ADULT - ASPIRATION PRECAUTIONS
MAINTAIN ASPIRATION PRECAUTIONS FOR PT'S SECRETIONS. PT'S HEAD OF BED SHOULD BE POSTURED AT SOMEWHAT OF AN UPRIGHT ANGLE WHEN HE IS LYING DOWN TO GUARD AGAINST SALIVA ASPIRATION./yes

## 2023-02-11 NOTE — SWALLOW BEDSIDE ASSESSMENT ADULT - NS SPL SWALLOW CLINIC TRIAL FT
Pt with functional Oral Swallowing integrity atop profound Non Functional Pharyngeal Dysphagia. Pt with minimal laryngeal lift on palpation during swallow attempts. Overt moist coughing, increased audible pharyngeal congestion and wet dysphonia demonstrated after intakes with puree foods suspect for Aspiration. Aspiration signs demonstrated with most conservative food consistency, despite cues to employ compensatory swallow maneuvers. Prominent Pharyngeal Dysphagia is in setting of advanced Lung Cancer/Thyroid cancer with Thyroid lesion compressing upon laryngopharyngeal structures. Also, suspect that pt with probable vocal fold/cord paralysis or paresis that may contributory to his Pharyngeal Dysphagia as well. Solids and liquids not offered given severity of Pharyngeal Dysphagia.

## 2023-02-11 NOTE — SWALLOW BEDSIDE ASSESSMENT ADULT - ADDITIONAL RECOMMENDATIONS
HOSPITALIST, NUTRITION, ONCOLOGY AND PALLIATIVE CARE FOLLOW UP. PT'S NON FUNCTIONAL PHARYNGEAL DYSPHAGIA IS FELT TO BE LONG TERM AND HE OVERLY ASPIRATES THE MOST CONSERVATIVE FOOD CONSISTENCY(PUREE FOOD). HE IS SAFER FROM A PULMONARY STANCE AND ALSO MORE COMFORTABLE IF NPO STATUS IS MAINTAINED. PLACEMENT OF A PEG MAY POSSIBLY PROLONG HIS LIFE FOR A BRIEF PERIOD OF TIME BUT IT CERTAINLY WOULD NOT ENHANCE HIS LIFE QUALITY GIVEN THE EXTENSIVENESS OF HIS METASTATIC LUNG/THYROID CANCERS. FURTHER, PEG PLACEMENT WOULD NOT REDUCE OR ELIMINATE THE FACT THAT HE RECURRENTLY ASPIRATES HIS SECRETIONS. IF PT REFUSES TO ACCEPT BEING NPO WHILE IN HIS CURRENT END OF LIFE STATE, HE WAS ADVISED THAT WE CAN PROVIDE FOODS ORALLY FOR QUALITY OF LIFE REASONS IF HE IS WILLING TO ACCEPT THE RISKS OF RECURRENT ASPIRATION. RISK OF ACCEPTING PO INCLUDES REPEATED PNA'S AND EVEN DEATH. SHOULD PT OPT TO ACCEPT FOOD AGAINST MEDICAL ADVICE WHILE ACCEPTING ASPIRATION RISKS, THAN THE MOST CONSERVATIVE DIET TYPE IS PUREE WITH MODERATELY THICK LIQUIDS.

## 2023-02-11 NOTE — SWALLOW BEDSIDE ASSESSMENT ADULT - SWALLOW EVAL: DIAGNOSIS
1) On encounter, a substantial loss of mass was apparent in pt's strap muscle regions. A firm mass was noted on palpation which approximated the right side of his clavicle just lateral to the distal portion of his right Pharynx. Pt's Larynx deviated to the left at rest. The pt was audibly congested w/overt difficulties mobilizing his pharyngeal secretions. Additionally, stridor was noted on inhalation/exhalation. The pt was alert/interactive. He was cooperative but anxious.  Pt was able to verbalize during communicative probes without evidence of a shahrzad primary motor speech or primary linguistic pathology. However, his voice was hoarse/raspy/breathy/low in volume and wet consistent with Dysphonia. Dysphonia in setting of advanced Lung Cancer/Thyroid cancer with Thyroid lesion compressing upon laryngopharyngeal structures. Suspect pt also with probable vocal fold/cord paralysis or paresis that may be contributory to his Dysphonia as well. 91

## 2023-02-11 NOTE — SWALLOW BEDSIDE ASSESSMENT ADULT - SWALLOW EVAL: PROGNOSIS
2) Pt with functional Oral Swallowing integrity atop profound Non Functional Pharyngeal Dysphagia. Pt with minimal laryngeal lift on palpation during swallow attempts. Overt moist coughing, increased audible pharyngeal congestion and wet dysphonia demonstrated after intakes with puree foods suspect for Aspiration. Aspiration signs demonstrated with most conservative food consistency, despite cues to employ compensatory swallow maneuvers. Prominent Pharyngeal Dysphagia is in setting of advanced Lung Cancer/Thyroid cancer with Thyroid lesion compressing upon laryngopharyngeal structures. Also, suspect that pt with probable vocal fold/cord paralysis or paresis that may contributory to his Pharyngeal Dysphagia as well.

## 2023-02-11 NOTE — CHART NOTE - NSCHARTNOTEFT_GEN_A_CORE
I reviewed the imaging with the ER when patient was admitted yesterday. Patient with lung mass and posterior pharyngeal mass/thyroid mass causing compression of trachea and esophagus. The mass is so large and proximal, there is no endsocopic intervention that is safe for him. A stent, even if able to be placed and landed just below the esophageal sphincter, has a high chance of further compressing the trachea and completely closing it off, essentially killing him. The mass is so large that I don't think a combined approach with thoracic surgery is feasable as well (ie. esohageal stent and tracheal stent) since it is compressing both lumens. Eval by heme/onc and surg onc for palliative treatments such as radiation or ?if palliative surgery is even an option to relieve the pressure in his hypo pharynx and upper aerodigestive tract. Palliative care consultation. If team wishes to pursue feeding tube, then IR would be the service to call but this would not help with his inability to tolerate secretions. I think radiation is likely the best option if it is an option. Initial (On Arrival)

## 2023-02-11 NOTE — SWALLOW BEDSIDE ASSESSMENT ADULT - PHARYNGEAL PHASE
SWALLOW TRIGGER WAS LATENT. LARYNGEAL LIFT ON PALPATION DURING SWALLOWING TRIALS WAS SIGNIFICANTLY REDUCED. APPROXIMATELY 5 REPEAT DRY SWALLOWS NOTED AFTER PRIMARY SWALLOWING TRIALS SUSPECT FOR REDUCED PHARYNGEAL MOTILITY. THIS WAS FOLLOWED BY WET COUGHING, INCREASED AUDIBLE PHARYNGEAL CONGESTION AND O2 DESATURATION SUSPECT FOR ASPIRATION, DESPITE CUES TO EMPLOY COMPENSATORY SWALLOWING MANEUVERS.

## 2023-02-11 NOTE — SWALLOW BEDSIDE ASSESSMENT ADULT - SWALLOW EVAL: RECOMMENDED DIET
SUGGEST NPO RESTRICTION AS PT GROSSLY ASPIRATES PUREE FOODS WHICH IS THE MOST CONSERVATIVE FOOD CONSISTENCY. FURTHER, HE APPEARS OVERTLY UNCOMFORTABLE WHEN ASPIRATING.

## 2023-02-11 NOTE — CONSULT NOTE ADULT - NSCONSULTADDITIONALINFOA_GEN_ALL_CORE
impression   52-year-old male with extensive metastatic poorly differentiated adenocarcinoma of apparent lung origin   initial response to single agent   immunotherapy reasonable,    patient did not follow-up   recently returned with recurrent/progressive disease   approximately 3 weeks ago cycle #1 Alimta carboplatin   Keytruda No. 5     plan was to get cycle #2 on Monday, February 13   palliative radiation to neck     now with progressive decline difficulty swallowing solids    PLAN   patient was seen by radiation therapy yesterday evening, Dr. Rose suggested DC as soon as possible so that he could initiate palliative radiation to the neck     patient's overall condition may make discharge difficult     case discussed with Dr. Lion hospitalist   steroids, possibly reduce swelling in neck   speech and swallowing consultation   pulmonary consultation   optimize pain control/   Fentanyl patch and liquid narcotics   dietary consult to optimize tolerable nutrition   CT abdomen and pelvis assess extent of disease   CT thigh assess possible cancer     given extensive disease and thyroid we will check TSH, thyroglobulin     patient's overall prognosis is very grave, he is  critically ill     patient also has requested DNR DNI status,  which I agree is appropriate

## 2023-02-11 NOTE — CONSULT NOTE ADULT - ASSESSMENT
1) Stage IV Lung Cancer  2) Necrotic Thyroid Mass  3) Dyspnea   4) Aspiration Risk  5) Abnormal CT Chest    53 y/o male w/ a PMHX of stage IV lung CA, HTN, anxiety on Xanax, chronic pain on Percocet, and substance abuse presents to the ED via EMS for difficulty swallowing. Pt reports difficulty swallowing started x3 days ago and has been worsening since, states he is now unable to swallow pills or food, thinks he is withdrawing from the Xanax and Percocet. Denies SOB. Pt first treatment x3 weeks ago, next treatment in 2 days. Pt notes he has had similar Sx before however it usually self resolves. Pt able to swallow secretions w/ difficulty  CT Neck  Large necrotic mass measuring at least 8.1 x 4.5 cm in the retrotracheal region with compression and displacement of the trachea   anteriorly and compression of the pharynx and proximal esophagus as well as the LEFT internal jugular vein and internal carotid artery posterior   laterally. This appears to obliterate the majority of the LEFT lobe of the thyroid gland and likely represents a thyroid carcinoma. A necrotic   5.2 x 5.2 cm mass extends from the upper pole of the RIGHT lobe of the thyroid gland with erosion through the RIGHT thyroid cartilage and   extension into the RIGHT paralaryngeal space, also suspicious for thyroid carcinoma. A necrotic 2.6 x 1.8 cm LEFT level 2 lymph node is noted. And a necrotic 1 cm LEFT level 3 lymph node is noted.  CT Chest- No pulmonary embolism.  Decreased left upper lobe mass with endobronchial component that occludes   the apicoposterior segmental bronchus . Decreased left upper lobe groundglass and increased linear scarring adjacent to the mass, likely related to treatment. Two indeterminate middle lobe nodules.  A third middle lobe nodule and  opacities in the left lower lobe are decreased.  Reviewed images independently   There is a possibility that he may receive Radiation but in the interim, will start IV Solumedrol  Start nebulization if secretions worsen but the stridor is secondary to a mass effect from the necrotic lesion  Will discuss further with hospitalist  Bronchodilators started, will need to start nebulization since inspiratory effort is compromised by the lesion- (Duoneb q 6 hrs )  Discussed with Oncology

## 2023-02-11 NOTE — SWALLOW BEDSIDE ASSESSMENT ADULT - SWALLOW EVAL: SECRETION MANAGEMENT
Pt with overt difficulties mobilizing pharyngeal secretions and his volitional cough is moist/weak in strength.

## 2023-02-11 NOTE — SWALLOW BEDSIDE ASSESSMENT ADULT - SLP GENERAL OBSERVATIONS
On encounter, a substantial loss of mass was apparent in pt's strap muscle regions. A firm mass was noted on palpation which approximated the right side of his clavicle just lateral to the distal portion of his right Pharynx. Pt's Larynx deviated to the left at rest. The pt was audibly congested w/overt difficulties mobilizing his pharyngeal secretions. Additionally, stridor was noted on inhalation/exhalation. The pt was alert/interactive. He was cooperative but anxious.  Pt was able to verbalize during communicative probes without evidence of a shahrzad primary motor speech or primary linguistic pathology. However, his voice was hoarse/raspy/breathy/low in volume and wet consistent with Dysphonia. Dysphonia in setting of advanced Lung Cancer/Thyroid cancer with Thyroid lesion compressing upon laryngopharyngeal structures. Suspect pt also with probable vocal fold/cord paralysis or paresis that may be contributory to his Dysphonia as well.

## 2023-02-12 NOTE — DIETITIAN INITIAL EVALUATION ADULT - PERTINENT MEDS FT
MEDICATIONS  (STANDING):  albuterol    0.083% 2.5 milliGRAM(s) Nebulizer three times a day  albuterol/ipratropium for Nebulization 3 milliLiter(s) Nebulizer every 6 hours  dextrose 5% + sodium chloride 0.9% with potassium chloride 20 mEq/L 1000 milliLiter(s) (100 mL/Hr) IV Continuous <Continuous>  enoxaparin Injectable 40 milliGRAM(s) SubCutaneous every 24 hours  fentaNYL   Patch  25 MICROgram(s)/Hr 1 Patch Transdermal every 72 hours  folic acid 1 milliGRAM(s) Oral daily  gabapentin 300 milliGRAM(s) Oral three times a day  levothyroxine 50 MICROGram(s) Oral daily  methylPREDNISolone sodium succinate Injectable 40 milliGRAM(s) IV Push every 8 hours  nicotine - 21 mG/24Hr(s) Patch 1 Patch Transdermal daily  piperacillin/tazobactam IVPB.. 3.375 Gram(s) IV Intermittent every 8 hours    MEDICATIONS  (PRN):  acetaminophen     Tablet .. 650 milliGRAM(s) Oral every 6 hours PRN Temp greater or equal to 38C (100.4F), Mild Pain (1 - 3)  albuterol    90 MICROgram(s) HFA Inhaler 2 Puff(s) Inhalation every 6 hours PRN Bronchospasm  aluminum hydroxide/magnesium hydroxide/simethicone Suspension 30 milliLiter(s) Oral every 4 hours PRN Dyspepsia  bisacodyl 5 milliGRAM(s) Oral every 12 hours PRN Constipation  hydrALAZINE Injectable 5 milliGRAM(s) IV Push every 6 hours PRN for SBP >140  HYDROmorphone  Injectable 0.5 milliGRAM(s) IV Push every 3 hours PRN Severe Pain (7 - 10)  LORazepam    Concentrate 1 milliGRAM(s) Oral every 6 hours PRN Anxiety  LORazepam   Injectable 0.5 milliGRAM(s) IV Push every 8 hours PRN severe anxiety  melatonin 3 milliGRAM(s) Oral at bedtime PRN Insomnia  ondansetron Injectable 4 milliGRAM(s) IV Push every 8 hours PRN Nausea and/or Vomiting  oxyCODONE    Solution 5 milliGRAM(s) Oral every 4 hours PRN Moderate Pain (4 - 6)

## 2023-02-12 NOTE — PROGRESS NOTE ADULT - ASSESSMENT
plan:   supportive care as per pulmonary medicine     optimize pain control     hold  oral nutrition given high risk of aspiration     radiation therapy consult appreciated     if radiation can be obtained and patient could swallow  would then offer option of improved nutrition  and  decreased risk of aspiration   would then add additional chemoimmunotherapy     patient's current performance status is 4   he is profoundly debilitated   he is critically ill and his overall prognosis is terrible    Tomorrow we will review the  patient's current status with radiation oncology Dr. Rose   , medical oncology Dr. Trammell ; both of  who are familiar with patient from previous evaluations ;   and are better able to assess if  the above strategy is realistic

## 2023-02-12 NOTE — DIETITIAN INITIAL EVALUATION ADULT - OTHER INFO
53 y/o male w/ a PMHX of stage IV lung CA, HTN, anxiety on Xanax, chronic pain on Percocet, and substance abuse presents to the ED via EMS for difficulty swallowing. Pt reports difficulty swallowing started x3 days ago and has been worsening since, states he is now unable to swallow pills or food, thinks he is withdrawing from the Xanax and Percocet.    Pt c/o very diff. swallowing, SLP consult rec. NPO status. RD obtained bedscale wt today 2/12/23: 115#. Previous RD note 9/15/22: dx severe PCM, wt: 132#. NFPE reveals severe muscle/fat wasting. Pt on Fulls w/diff. swallowing per SLP: pt needs to be NPO. Pt refusing any suppls except for Ensure Plus Vanilla. Confirm goals of care regarding nutrition support. If unable to advance po, consider enteral nutrition support within [facility] and re-consult RD. See below recommendations.

## 2023-02-12 NOTE — DIETITIAN INITIAL EVALUATION ADULT - DIET TYPE
see SLP consult: NPO/NPO Per SLP 2/11/23: SUGGEST NPO RESTRICTION AS PT GROSSLY ASPIRATES PUREE FOODS WHICH IS THE MOST CONSERVATIVE FOOD CONSISTENCY. FURTHER, HE APPEARS OVERTLY UNCOMFORTABLE WHEN ASPIRATING/NPO

## 2023-02-12 NOTE — DIETITIAN NUTRITION RISK NOTIFICATION - ADDITIONAL COMMENTS/DIETITIAN RECOMMENDATIONS
1. See SLP consult: recs NPO status d/t severe diff. swallowing and extremely high risk for aspiration  2. If able to advance po, Add Ensure Plus Hi Pro TID to help pt optimize ENN po and pro intake.   3. If unable to advance po within 2 more days, consider nutrition support within [facility] and re-consult RD.  3. Maintain aspiration precautions, back of bed >35 degrees.   4. Monitor bowel movements, if no BM for >3 days, consider implementing bowel regimen.   5. Consider adding thiamine 100 mg daily 2/2 poor PO intake/ malnutrition and MVI w/ minerals daily to ensure 100% RDA met   6. RDN will continue to monitor PO intake, labs, hydration, and wt prn.

## 2023-02-12 NOTE — CONSULT NOTE ADULT - ASSESSMENT
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   initial response to single agent   immunotherapy reasonable,    patient did not follow-up   recently returned with recurrent/progressive disease   approximately 3 weeks ago cycle #1 Alimta carboplatin   Keytruda No. 5     plan was to get cycle #2 on Monday, February 13   palliative radiation to neck     now with progressive decline difficulty swallowing solids, although overall condition somewhat better since admission  with current medical management .    Agree with follow up plan:   patient was seen by radiation therapy yesterday evening, Dr. Rose suggested DC as soon as possible so that he could initiate palliative radiation to the neck(5 day course pending).    Patient's overall condition may make discharge difficult. Could consider transfer to [facility] for inpatient RT. Alternative may be transport via EMS to outpatient radiation facility for RT while remaining an inpatient . Rationale for palliative RT , mechanics of , potential acute and late effects all discussed with patient at length. He appears to understand and all questions have been answered to his satisfaction.    agree with the following:   steroids, possibly reduce swelling in neck   speech and swallowing consultation   pulmonary consultation   optimize pain control/   Fentanyl patch and liquid narcotics   dietary consult to optimize tolerable nutrition   CT abdomen and pelvis assess extent of disease   CT thigh assess possible cancer     given extensive disease and thyroid we will check TSH, thyroglobulin     patient's overall prognosis is very grave, he is  critically ill     patient also has requested DNR DNI status,  which I agree is appropriate.

## 2023-02-12 NOTE — DIETITIAN INITIAL EVALUATION ADULT - ORAL INTAKE PTA/DIET HISTORY
Pt reports drinking Boost Strawberry at home 3-4/day. + diff. swallowing, see SLP consult for NPO recs.

## 2023-02-12 NOTE — DIETITIAN INITIAL EVALUATION ADULT - ADD RECOMMEND
1. See SLP consult: recs NPO status d/t severe diff. swallowing and extremely high risk for aspiration  2. If able to advance po, Add Ensure Plus Hi Pro TID to help pt optimize ENN po and pro intake.   3. Maintain aspiration precautions, back of bed >35 degrees.   4. Monitor bowel movements, if no BM for >3 days, consider implementing bowel regimen.   5. Consider adding thiamine 100 mg daily 2/2 poor PO intake/ malnutrition and MVI w/ minerals daily to ensure 100% RDA met   6. RDN will continue to monitor PO intake, labs, hydration, and wt prn.  1. See SLP consult: recs NPO status d/t severe diff. swallowing and extremely high risk for aspiration  2. If able to advance po, Add Ensure Plus Hi Pro TID to help pt optimize ENN po and pro intake.   3. If unable to advance po within 2 more days, consider nutrition support within GOC and re-consult RD.  3. Maintain aspiration precautions, back of bed >35 degrees.   4. Monitor bowel movements, if no BM for >3 days, consider implementing bowel regimen.   5. Consider adding thiamine 100 mg daily 2/2 poor PO intake/ malnutrition and MVI w/ minerals daily to ensure 100% RDA met   6. RDN will continue to monitor PO intake, labs, hydration, and wt prn.

## 2023-02-12 NOTE — DIETITIAN INITIAL EVALUATION ADULT - CONTINUE CURRENT NUTRITION CARE PLAN
TriniLP: pt needs NPO PerSLP: SUGGEST NPO RESTRICTION AS PT GROSSLY ASPIRATES PUREE FOODS WHICH IS THE MOST CONSERVATIVE FOOD CONSISTENCY. FURTHER, HE APPEARS OVERTLY UNCOMFORTABLE WHEN ASPIRATING

## 2023-02-12 NOTE — PROGRESS NOTE ADULT - ASSESSMENT
53 y/o male w/ a PMHX of stage IV NSCLC adenocarcinoma  lung CA, HTN, anxiety on Xanax, chronic pain on Percocet, ho substance abuse  admitted on 2/10/23 via EMS for difficulty swallowing.    Dysphagia sec to large mass  8.1 x 4.5 cm in the retrotracheal region with displacement of the trachea  anteriorly , compression of the pharynx and proximal esophagus   Upper respiratory wheezing known Adenocarcinoma of SHAILA with Multifocal PNA likely aspiration vs bronchiolitis  New right adrenal metastasis  RLE lump ? mets   - albuterol neb tid   - c/w Zosyn   - pt was seen by thoracic, he should be palliative    - d/w Dr. Weems - not an candidate for palliative stent placement , not an candidate for endoscopic PEG placement , can be done only by IR if needed   - speech and swallow - grossly aspirating  should be NPO , but if understands the risk and goals is quality of life  with c/w  full liquid /puree diet with supplements moderately thick   - CT RLE and abd and pelvis - pending   - pulmonary consult - pending   - oncology consult Dr. Trammell primary   - rad onc consult ? needs inpatient radiation  - Pall  consult   - IR consult for possible PEG placement  2/12 - NPO PER SLP recs  plan for RT - appreciate Dr Dallas's recs - transport to RT daily or transfer to [facility] - will discuss with Dr Rose , POC discussed with Dr Helms  Neck pain   - start fentanyl patch 25 - can not tolerate PO tabs meds  - oxycodone 5 mg liquid q4h   - dilaudid 0.5 IV prn for severe     Right lobe thyroid mass  - TSH 4, check free T4   - as per oncology work-up  - c/w levothyroxine po     Anxiety   - lorazepam liquid q6h prn , IV if severe anxiety    Hypomagnesemia, Hypokalemia, Hypovolumic hyponatremia  - replace, monitor  - c/w IV fluids monitor    Normocytic anemia, Thrombocytosis  - check iron studies    Severe protein calorie malnutrition  Abnormal weight loss  - nutritional consult   - IV fluids with vitamins       Advance directives  MOLST - DNR/DNI  - pt not sure if he wants  feeding tube   - HCP Susan Arenas [phone] updated at bedside    - palliative team consulted      DISPO - plan for palliative RT via transport or at [facility]

## 2023-02-12 NOTE — DIETITIAN INITIAL EVALUATION ADULT - PERTINENT LABORATORY DATA
02-12    132<L>  |  98  |  5<L>  ----------------------------<  187<H>  3.9   |  28  |  0.57    Ca    8.7      12 Feb 2023 07:04  Phos  4.2     02-12  Mg     1.8     02-12    TPro  6.9  /  Alb  2.8<L>  /  TBili  0.7  /  DBili  x   /  AST  16  /  ALT  22  /  AlkPhos  114  02-12  A1C with Estimated Average Glucose Result: 5.1 % (09-15-22 @ 06:32)

## 2023-02-12 NOTE — CHART NOTE - NSCHARTNOTEFT_GEN_A_CORE
Patient with tachycardia overnight with increased RR. BMP, Lactate, troponin, and CBC ordered. On IV zosyn for Multifocal PNA. Nurse to recheck vitals. Patient with tachycardia overnight with increased RR. BMP, Lactate, troponin, and CBC ordered. On IV zosyn for Multifocal PNA. Nurse to recheck vitals.    Case discussed with senior resident.

## 2023-02-12 NOTE — DIETITIAN INITIAL EVALUATION ADULT - NS FNS DIET ORDER
Diet, Full Liquid:   Moderately Thick Liquids (MODTHICKLIQS)  Liquid Protein Supplement     Qty per Day:  3  Supplement Feeding Modality:  Oral  Ensure Plant-Based Cans or Servings Per Day:  4       Frequency:  Daily (02-11-23 @ 15:17)

## 2023-02-13 NOTE — PROGRESS NOTE ADULT - ASSESSMENT
Dysphagia sec to large mass  8.1 x 4.5 cm in the retrotracheal region with displacement of the trachea  anteriorly , compression of the pharynx and proximal esophagus   Upper respiratory wheezing known Adenocarcinoma of SHAILA with Multifocal PNA likely aspiration vs bronchiolitis  New right adrenal metastasis  RLE lump ? mets to right thigh   - albuterol neb tid /ZOSYN  - d/w Dr. Weems - not an candidate for palliative stent placement , not an candidate for endoscopic PEG placement , can be done only by IR if needed   - speech and swallow - grossly aspirating  should be NPO   - oncology consult Dr. Trammell primary   2/13 -continue NPO PER SLP recs  plan for RT - appreciate Dr Rose's help: transfer to [facility] - POC discussed with Dr Cardenas    Neck pain   - start fentanyl patch 25 - can not tolerate PO tabs meds  - oxycodone 5 mg liquid q4h;  dilaudid 0.5 IV prn for severe     Right lobe thyroid mass  - TSH 4, check free T4 - as per oncology work-up: thyroglobulin etc neg; c/w levothyroxine po     Anxiety   - lorazepam liquid q6h prn , IV if severe anxiety    Hypomagnesemia, Hypokalemia, Hypovolumic hyponatremia  - replace, monitor  - c/w IV fluids monitor    Normocytic anemia, Thrombocytosis  - check iron studies    Severe protein calorie malnutrition  Abnormal weight loss  - nutritional consult   - IV fluids with vitamins     Advance directives  MOLST - DNR/DNI  - HCP Susan Arenas [phone]     await tf to [facility]

## 2023-02-13 NOTE — PROGRESS NOTE ADULT - REASON FOR ADMISSION
dysphagia , pain, anxiety
weakness
Metastatic lung cancer   significant metastases in neck making swallowing difficult   failure to thrive
dysphagia , pain, anxiety

## 2023-02-13 NOTE — DISCHARGE NOTE PROVIDER - HOSPITAL COURSE
51 y/o male w/ a PMHX of stage IV NSCLC adenocarcinoma  lung CA, HTN, anxiety on Xanax, chronic pain on Percocet, ho substance abuse  admitted on 2/10/23 via EMS for difficulty swallowing. Pt reports difficulty swallowing started x3 days ago and has been worsening since, states he is now unable to swallow pills or food, thinks he is withdrawing from the Xanax and Percocet. Denies SOB. Pt first treatment x3 weeks ago, next treatment in 2 days. Pt notes he has had similar Sx before however it usually self resolves. Pt able to swallow secretions w/ difficulty.      Dysphagia sec to large mass  8.1 x 4.5 cm in the retrotracheal region with displacement of the trachea  anteriorly , compression of the pharynx and proximal esophagus   Upper respiratory wheezing known Adenocarcinoma of SHAILA with Multifocal PNA likely aspiration vs bronchiolitis  New right adrenal metastasis  RLE lump ? mets to right thigh   - albuterol neb tid /ZOSYN  - d/w Dr. Weems - not an candidate for palliative stent placement , not an candidate for endoscopic PEG placement , can be done only by IR if needed   - speech and swallow - grossly aspirating  should be NPO   - oncology consult Dr. Trammell primary   2/13 -continue NPO PER SLP recs  plan for RT - appreciate Dr Rose's help: transfer to [facility] - POC discussed with Dr Cardenas  Neck pain   - start fentanyl patch 25 - can not tolerate PO tabs meds  - oxycodone 5 mg liquid q4h;  dilaudid 0.5 IV prn for severe   Right lobe thyroid mass  - TSH 4, check free T4 - as per oncology work-up: thyroglobulin etc neg; c/w levothyroxine po   Anxiety   - lorazepam liquid q6h prn , IV if severe anxiety  Hypomagnesemia, Hypokalemia, Hypovolumic hyponatremia  - replace, monitor  - c/w IV fluids monitor  Normocytic anemia, Thrombocytosis  - check iron studies  Severe protein calorie malnutrition  Abnormal weight loss  - nutritional consult   - IV fluids with vitamins   Advance directives  MOLST - DNR/DNI  - HCP Susan Arenas [phone]     OTHER DETAILS:   2/13 - no cp  palps or sob at rest - he is in discomfort and has audible wheezing at bedside, is on RA     PHYSICAL EXAM:  T(F): 98.2 (13 Feb 2023 08:21), Max: 98.7 (13 Feb 2023 01:04)  HR: 113 (13 Feb 2023 08:21) (90 - 113)  BP: 138/100 (13 Feb 2023 08:21) (138/100 - 176/88)  RR: 20 (13 Feb 2023 08:21) (18 - 22)  SpO2: 97% (13 Feb 2023 08:21) (95% - 99%)/RA  GENERAL: NAD, able to lie flat in bed  HEAD:  Atraumatic, Normocephalic  EYES: EOMI, PERRLA, normal sclera  ENT: Moist mucous membranes  NECK: rt neck mass, neck supple   CHEST/LUNG: Clear to auscultation bilaterally; No rales, rhonchi, wheezing, or rubs. Unlabored respirations  HEART: Regular rate and rhythm; No murmurs, rubs, or gallops  ABDOMEN: Bowel sounds present; Soft, Nontender, Nondistended. No hepatomegaly  EXTREMITIES:  no pitting bilaterally  NERVOUS SYSTEM:  Alert & Oriented X3, speech clear. No focal motor or sensory deficits  MSK: FROM all 4 extremities, full and equal strength  SKIN: No rashes or lesions    LABS: All Labs Reviewed:                    9.9    9.92  )-----------( 638      ( 12 Feb 2023 07:04 )             29.3   134<L>  |  101  |  11  ----------------------------<  166<H>  3.8   |  25  |  0.51  TPro  6.9  /  Alb  2.8<L>  /  TBili  0.7  /  DBili  x   /  AST  16  /  ALT  22  /  AlkPhos  114  02-12    RADIOLOGY/EKG:  < from: CT Lower Extremity w/ IV Cont, Bilateral (02.11.23 @ 17:40) >  Large soft tissue mass with aggressive features centered in the right   ischiofemoral interval as described above. Given the history of   metastatic lung cancer, the finding is likely to represent a large soft   tissue metastasis.  < from: CT Neck Soft Tissue w/ IV Cont (02.10.23 @ 10:54) >  IMPRESSION: Large necrotic mass measuring at least 8.1 x 4.5 cm in the   retrotracheal region with compression and displacement of the trachea   anteriorly and compression of the pharynx and proximal esophagus as well   as the LEFT internal jugular vein and internal carotid artery posterior   laterally. This appears to obliterate the majority of the LEFT lobe of   the thyroid gland and likely represents a thyroid carcinoma. A necrotic   5.2 x 5.2 cm mass extends from the upper pole of the RIGHT lobe of the   thyroid gland with erosion through the RIGHT thyroid cartilage and ..    time spent on discharge - 50 mins  Tf to [facility] - signed out to DR Liyah Waite; pls consult DR Nya Spann - RAD ONC - she is aware                  51 y/o male w/ a PMHX of stage IV NSCLC adenocarcinoma  lung CA, HTN, anxiety on Xanax, chronic pain on Percocet, ho substance abuse  admitted on 2/10/23 via EMS for difficulty swallowing. Pt reports difficulty swallowing started x3 days ago and has been worsening since, states he is now unable to swallow pills or food, thinks he is withdrawing from the Xanax and Percocet. Denies SOB. Pt first treatment x3 weeks ago, next treatment in 2 days. Pt notes he has had similar Sx before however it usually self resolves. Pt able to swallow secretions w/ difficulty.    HOSPITAL COURSE:   Dysphagia sec to large mass  8.1 x 4.5 cm in the retrotracheal region with displacement of the trachea  anteriorly , compression of the pharynx and proximal esophagus   Upper respiratory wheezing known Adenocarcinoma of SHAILA with Multifocal PNA likely aspiration vs bronchiolitis  New right adrenal metastasis  RLE lump ? mets to right thigh   - albuterol neb tid /ZOSYN  - d/w Dr. Weems - not an candidate for palliative stent placement , not an candidate for endoscopic PEG placement , can be done only by IR if needed   - speech and swallow - grossly aspirating  should be NPO   - oncology consult Dr. Trammell primary   2/13 -continue NPO PER SLP recs  plan for RT - appreciate Dr Rose's help: transfer to [facility] - POC discussed with Dr Cardenas  Neck pain   - start fentanyl patch 25 - can not tolerate PO tabs meds  - oxycodone 5 mg liquid q4h;  dilaudid 0.5 IV prn for severe   Right lobe thyroid mass  - TSH 4, check free T4 - as per oncology work-up: thyroglobulin etc neg; c/w levothyroxine po   Anxiety   - lorazepam liquid q6h prn , IV if severe anxiety  Hypomagnesemia, Hypokalemia, Hypovolumic hyponatremia  - replace, monitor  - c/w IV fluids monitor  Normocytic anemia, Thrombocytosis  - check iron studies  Severe protein calorie malnutrition  Abnormal weight loss  - nutritional consult   - IV fluids with vitamins   Advance directives  MOLST - DNR/DNI  - HCP Susan Arenas [phone]     OTHER DETAILS:   2/13 - no cp  palps or sob at rest - he is in discomfort and has audible wheezing at bedside, is on RA     PHYSICAL EXAM:  T(F): 98.2 (13 Feb 2023 08:21), Max: 98.7 (13 Feb 2023 01:04)  HR: 113 (13 Feb 2023 08:21) (90 - 113)  BP: 138/100 (13 Feb 2023 08:21) (138/100 - 176/88)  RR: 20 (13 Feb 2023 08:21) (18 - 22)  SpO2: 97% (13 Feb 2023 08:21) (95% - 99%)/RA  GENERAL: NAD, able to lie flat in bed  HEAD:  Atraumatic, Normocephalic  EYES: EOMI, PERRLA, normal sclera  ENT: Moist mucous membranes  NECK: rt neck mass, neck supple   CHEST/LUNG: Clear to auscultation bilaterally; No rales, rhonchi, wheezing, or rubs. Unlabored respirations  HEART: Regular rate and rhythm; No murmurs, rubs, or gallops  ABDOMEN: Bowel sounds present; Soft, Nontender, Nondistended. No hepatomegaly  EXTREMITIES:  no pitting bilaterally  NERVOUS SYSTEM:  Alert & Oriented X3, speech clear. No focal motor or sensory deficits  MSK: FROM all 4 extremities, full and equal strength  SKIN: No rashes or lesions    LABS: All Labs Reviewed:                    9.9    9.92  )-----------( 638      ( 12 Feb 2023 07:04 )             29.3   134<L>  |  101  |  11  ----------------------------<  166<H>  3.8   |  25  |  0.51  TPro  6.9  /  Alb  2.8<L>  /  TBili  0.7  /  DBili  x   /  AST  16  /  ALT  22  /  AlkPhos  114  02-12    RADIOLOGY/EKG:  < from: CT Lower Extremity w/ IV Cont, Bilateral (02.11.23 @ 17:40) >  Large soft tissue mass with aggressive features centered in the right   ischiofemoral interval as described above. Given the history of   metastatic lung cancer, the finding is likely to represent a large soft   tissue metastasis.  < from: CT Neck Soft Tissue w/ IV Cont (02.10.23 @ 10:54) >  IMPRESSION: Large necrotic mass measuring at least 8.1 x 4.5 cm in the   retrotracheal region with compression and displacement of the trachea   anteriorly and compression of the pharynx and proximal esophagus as well   as the LEFT internal jugular vein and internal carotid artery posterior   laterally. This appears to obliterate the majority of the LEFT lobe of   the thyroid gland and likely represents a thyroid carcinoma. A necrotic   5.2 x 5.2 cm mass extends from the upper pole of the RIGHT lobe of the   thyroid gland with erosion through the RIGHT thyroid cartilage and ..    time spent on discharge - 50 mins  Tf to [facility] - signed out to DR Liyah Waite; pls consult DR Nya Spann - RAD ONC - she is aware   discussed with family at bedside 51 y/o male w/ a PMHX of stage IV NSCLC adenocarcinoma  lung CA, HTN, anxiety on Xanax, chronic pain on Percocet, ho substance abuse  admitted on 2/10/23 via EMS for difficulty swallowing. Pt reports difficulty swallowing started x3 days ago and has been worsening since, states he is now unable to swallow pills or food, thinks he is withdrawing from the Xanax and Percocet. Denies SOB. Pt first treatment x3 weeks ago, next treatment in 2 days. Pt notes he has had similar Sx before however it usually self resolves. Pt able to swallow secretions w/ difficulty.    HOSPITAL COURSE:   Dysphagia sec to large mass  8.1 x 4.5 cm in the retrotracheal region with displacement of the trachea  anteriorly , compression of the pharynx and proximal esophagus   Upper respiratory wheezing known Adenocarcinoma of SHAILA with Multifocal PNA likely aspiration vs bronchiolitis  New right adrenal metastasis  RLE lump ? mets to right thigh   - albuterol neb tid /ZOSYN  - d/w Dr. Weems - not an candidate for palliative stent placement , not an candidate for endoscopic PEG placement , can be done only by IR if needed   - speech and swallow - grossly aspirating  should be NPO   - oncology consult Dr. Trammell primary   2/13 -continue NPO PER SLP recs  plan for RT - appreciate Dr Rose's help: transfer to [facility] - POC discussed with Dr Cardenas  Neck pain   - start fentanyl patch 25 - can not tolerate PO tabs meds  - oxycodone 5 mg liquid q4h;  dilaudid 0.5 IV prn for severe   Right lobe thyroid mass  - TSH 4, check free T4 - as per oncology work-up: thyroglobulin etc neg; c/w levothyroxine po   Anxiety   - lorazepam liquid q6h prn , IV if severe anxiety  Hypomagnesemia, Hypokalemia, Hypovolumic hyponatremia  - replace, monitor  - c/w IV fluids monitor  Normocytic anemia, Thrombocytosis  - check iron studies  Severe protein calorie malnutrition  Abnormal weight loss  - nutritional consult   - IV fluids with vitamins   Advance directives  MOLST - DNR/DNI  - HCP Susan Arenas [phone]     OTHER DETAILS:   2/13 - no cp  palps or sob at rest - he is in discomfort and has audible wheezing at bedside, is on RA   2/14 - no cp, is anxious; tachy 120s, worse today - has stridor compared to yesterday despite 100 percent sats on RA     PHYSICAL EXAM:  Vital Signs Last 24 Hrs  T(C): 36.7 (14 Feb 2023 07:20), Max: 36.9 (13 Feb 2023 21:19)  T(F): 98.1 (14 Feb 2023 07:20), Max: 98.4 (13 Feb 2023 21:19)  HR: 129 (14 Feb 2023 07:20) (100 - 129)  BP: 147/98 (14 Feb 2023 07:20) (144/89 - 169/99)  RR: 22 (14 Feb 2023 07:20) (20 - 22)  SpO2: 96% (14 Feb 2023 07:20) (95% - 99%)room air  GENERAL: NAD, able to lie flat in bed  HEAD:  Atraumatic, Normocephalic  EYES: EOMI, PERRLA, normal sclera  ENT: Moist mucous membranes  NECK: rt neck mass, neck supple   CHEST/LUNG: wheezing/stridor   HEART: Regular rate and rhythm; tachy   ABDOMEN: Bowel sounds present; Soft, Nontender, Nondistended. No hepatomegaly  EXTREMITIES:  no pitting bilaterally  NERVOUS SYSTEM:  Alert & Oriented X3, speech clear. No focal motor or sensory deficits  MSK: FROM all 4 extremities, full and equal strength  SKIN: No rashes or lesions    LABS: All Labs Reviewed:                    9.9    9.92  )-----------( 638      ( 12 Feb 2023 07:04 )             29.3   134<L>  |  101  |  11  ----------------------------<  166<H>  3.8   |  25  |  0.51  TPro  6.9  /  Alb  2.8<L>  /  TBili  0.7  /  DBili  x   /  AST  16  /  ALT  22  /  AlkPhos  114  02-12    RADIOLOGY/EKG:  < from: CT Lower Extremity w/ IV Cont, Bilateral (02.11.23 @ 17:40) >  Large soft tissue mass with aggressive features centered in the right   ischiofemoral interval as described above. Given the history of   metastatic lung cancer, the finding is likely to represent a large soft   tissue metastasis.  < from: CT Neck Soft Tissue w/ IV Cont (02.10.23 @ 10:54) >  IMPRESSION: Large necrotic mass measuring at least 8.1 x 4.5 cm in the   retrotracheal region with compression and displacement of the trachea   anteriorly and compression of the pharynx and proximal esophagus as well   as the LEFT internal jugular vein and internal carotid artery posterior   laterally. This appears to obliterate the majority of the LEFT lobe of   the thyroid gland and likely represents a thyroid carcinoma. A necrotic   5.2 x 5.2 cm mass extends from the upper pole of the RIGHT lobe of the   thyroid gland with erosion through the RIGHT thyroid cartilage and ..    time spent on discharge - 50 mins  Tf to [facility] - signed out 2/13 to DR Liyah Waite; pls consult DR Nya Spann - RAD ONC - she is aware   discussed with family at bedside

## 2023-02-13 NOTE — CONSULT NOTE ADULT - ASSESSMENT
PAIN ADJUSTMENTS    dILAUDID 6MG ~ 60MG po mORPHINE       Increased Fentanyl 25mcg to 50mcg patch [id number]  START Dilaudid 1mg IV [id number] prn for severe pain  START Dilaudid 0.5mg IV [id number] prn for moderate pain      full note to follow.  Process of Care  --Reviewed dx/treatment problems and alignment with Goals of Care    Physical Aspects of Care  --Pain  PAIN ADJUSTMENTS  Patient was on 25mcg Fentanyl [id number] prn , with Dilaudid 0.5mg IV [id number] prn   Total dILAUDID IV was 6MG in 24 hours  ~ 60MG po Morphine  THEREFORE:     Increased Fentanyl 25mcg to 50mcg patch [id number]  START Dilaudid 1mg IV [id number] prn for severe pain  START Dilaudid 0.5mg IV [id number] prn for moderate pain       --Bowel Regimen  denies constipation  risk for constipation d/t immobility and opiates  daily dulcolax  pt should have daily BM if no BM in 72hours add another med    --Dyspnea  No SOB at this time  comfortable and in NAD    --Nausea Vomiting  denies    --Weakness  PT as tolerated     Psychological and Psychiatric Aspects of Care:   --Greif/Bereavment: emotional support provided  --Hx of psychiatric dx: none  -Pastoral Care Available PRN     Social Aspects of Care  -SW involved     Cultural Aspects  -Primary Language: English    Goals of Care:     We discussed Palliative Care team being a supportive team when a patient has ongoing illnesses.  We also discussed that it is not an end of life care service, but can help navigate symptoms and emotional support througout their hospital stay here.    Hospice was explained as well  as an end of life care philosophy.  When a disease cannot be cured, or family/patient decide the treatment burdens out weigh the risk and one choses to change focus of treatment from cure to quality/comfort.       Prognosis: Death can occur at anytime, but if disease continues to progress naturally patient likely has days to weeks.    Ethical and Legal Aspects:   NA        Capacity: pt w/ capacity  HCP/Surrogate: His Cousin Maria Eugenia  Code Status: DNR DNI  MOLST:  dnr dni  Dispo Plan: pall radiation vs. in patient hospice referral to hcn    Discussed With: Case coordinated with attending and SW and RN     Time Spent: 90 minutes including the care, coordination and counseling of this patient, 50% of which was spent coordinating and counseling.

## 2023-02-13 NOTE — CONSULT NOTE ADULT - CONVERSATION DETAILS
We discussed Palliative Care team being a supportive team when a patient has ongoing illnesses.  We also discussed that it is not an end of life care service, but can help navigate symptoms and emotional support throughout their hospital stay here.     Hospice was explained as well as an end of life care philosophy. When a disease cannot be cured, or family/patient decide the treatment burdens out weight the risk and chose to change focus of treatment from cure to quality/comfort.       Patient expressed he was hoping ELIESER or Dr. Rose can complete palliative radiation.       Long discussion with family at this stage, we are recommending comfort measures as patient continues to decline regardless of maximal support.        I expressed that from a palliative perspective if patient could receive Pall Rads it could be helpful in symptom management but if it is deemed unsafe or not possible the only next option would be in patient hospice unit for aggressive symptom management.     We discussed what that would mean,  no longer doing blood tests, dx imaging, abx, and w/ comfort medications to address any symptoms that may arise. This would mean shortened life span but would focus on comfort and quality at the end of life.       His cousin and him understood

## 2023-02-13 NOTE — CONSULT NOTE ADULT - PROBLEM SELECTOR RECOMMENDATION 9
Case reviewed with Dr. Stein. No safe window for percutaneous gastrostomy as stomach is covered by bowel on CT.

## 2023-02-13 NOTE — PROGRESS NOTE ADULT - NUTRITIONAL ASSESSMENT
This patient has been assessed with a concern for Malnutrition and has been determined to have a diagnosis/diagnoses of Severe protein-calorie malnutrition.    This patient is being managed with:   Diet NPO-  Entered: Feb 12 2023 12:06PM    
This patient has been assessed with a concern for Malnutrition and has been determined to have a diagnosis/diagnoses of Severe protein-calorie malnutrition.    This patient is being managed with:   Diet Full Liquid-  Moderately Thick Liquids (MODTHICKLIQS)  Liquid Protein Supplement     Qty per Day:  3  Supplement Feeding Modality:  Oral  Ensure Plant-Based Cans or Servings Per Day:  4       Frequency:  Daily  Entered: Feb 11 2023  3:17PM    The following pending diet order is being considered for treatment of Severe protein-calorie malnutrition:  Diet NPO-  Entered: Feb 12 2023 12:06PM

## 2023-02-13 NOTE — DISCHARGE NOTE PROVIDER - NSDCCPCAREPLAN_GEN_ALL_CORE_FT
PRINCIPAL DISCHARGE DIAGNOSIS  Diagnosis: Lung cancer  Assessment and Plan of Treatment: with neck mass compressing esophagus and trachea - transfer to [facility] for palliative chemo

## 2023-02-13 NOTE — DISCHARGE NOTE PROVIDER - DETAILS OF MALNUTRITION DIAGNOSIS/DIAGNOSES
This patient has been assessed with a concern for Malnutrition and was treated during this hospitalization for the following Nutrition diagnosis/diagnoses:     -  02/12/2023: Severe protein-calorie malnutrition

## 2023-02-13 NOTE — CHART NOTE - NSCHARTNOTEFT_GEN_A_CORE
Pt BP was elevated at 160/113 with HR of 111. Wanted to prescribe PO labetalol, but pt does not take anything PO. Wanted to do an IV push of labetalol but was informed that they do not do that on the floors. Pt was administered 5mg IV push of hydralazine for BP control

## 2023-02-13 NOTE — DISCHARGE NOTE PROVIDER - CARE PROVIDER_API CALL
Nya Spann)  Radiation Oncology  [address] [facility], [facility] [place] [address]  Phone: [phone]  Fax: [phone]  Follow Up Time:     Anirudh Trammell  HEMATOLOGY  [address] [place] [address], [address] [place] [address]  Phone: [phone]  Fax: [phone]  Follow Up Time: 1 week

## 2023-02-13 NOTE — CONSULT NOTE ADULT - SUBJECTIVE AND OBJECTIVE BOX
Chief Complaint:  Patient is a 52y old  Male who presents with a chief complaint of Metastatic lung cancer      HPI:  53 y/o male w/ a PMHX of stage IV lung CA, HTN, anxiety on Xanax, chronic pain on Percocet, and substance abuse presents to the ED via EMS for difficulty swallowing. Pt reports difficulty swallowing started x3 days ago and has been worsening since, states he is now unable to swallow pills or food, thinks he is withdrawing from the Xanax and Percocet. Denies SOB. Pt first treatment x3 weeks ago, next treatment in 2 days. IR consulted for gastrostomy placement.    Allergies  No Known Allergies    MEDICATIONS  (STANDING):  albuterol    0.083% 2.5 milliGRAM(s) Nebulizer three times a day  albuterol/ipratropium for Nebulization 3 milliLiter(s) Nebulizer every 6 hours  dextrose 5% + sodium chloride 0.9% with potassium chloride 20 mEq/L 1000 milliLiter(s) (100 mL/Hr) IV Continuous <Continuous>  enoxaparin Injectable 40 milliGRAM(s) SubCutaneous every 24 hours  fentaNYL   Patch  50 MICROgram(s)/Hr 1 Patch Transdermal every 72 hours  folic acid 1 milliGRAM(s) Oral daily  gabapentin 300 milliGRAM(s) Oral three times a day  levothyroxine 50 MICROGram(s) Oral daily  methylPREDNISolone sodium succinate Injectable 40 milliGRAM(s) IV Push every 8 hours  nicotine - 21 mG/24Hr(s) Patch 1 Patch Transdermal daily  piperacillin/tazobactam IVPB.. 3.375 Gram(s) IV Intermittent every 8 hours  zolpidem 5 milliGRAM(s) Oral at bedtime    MEDICATIONS  (PRN):  acetaminophen     Tablet .. 650 milliGRAM(s) Oral every 6 hours PRN Temp greater or equal to 38C (100.4F), Mild Pain (1 - 3)  albuterol    90 MICROgram(s) HFA Inhaler 2 Puff(s) Inhalation every 6 hours PRN Bronchospasm  aluminum hydroxide/magnesium hydroxide/simethicone Suspension 30 milliLiter(s) Oral every 4 hours PRN Dyspepsia  bisacodyl 5 milliGRAM(s) Oral every 12 hours PRN Constipation  hydrALAZINE Injectable 5 milliGRAM(s) IV Push every 6 hours PRN for SBP >140  HYDROmorphone  Injectable 1 milliGRAM(s) IV Push every 3 hours PRN Severe Pain (7 - 10)  HYDROmorphone  Injectable 0.5 milliGRAM(s) IV Push every 3 hours PRN Moderate Pain (4 - 6)  LORazepam   Injectable 0.5 milliGRAM(s) IV Push every 8 hours PRN severe anxiety  melatonin 3 milliGRAM(s) Oral at bedtime PRN Insomnia  ondansetron Injectable 4 milliGRAM(s) IV Push every 8 hours PRN Nausea and/or Vomiting      PAST MEDICAL & SURGICAL HISTORY:  HTN (hypertension)      Smoker      Substance abuse      Admits to alcohol use      Lung cancer  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)      Lung cancer      Injury due to foreign body  right wrist      FAMILY HISTORY:  FH: rheumatic fever (Father)      Vital Signs Last 24 Hrs  T(C): 36.8 (13 Feb 2023 08:21), Max: 37.1 (13 Feb 2023 01:04)  T(F): 98.2 (13 Feb 2023 08:21), Max: 98.7 (13 Feb 2023 01:04)  HR: 113 (13 Feb 2023 08:21) (90 - 113)  BP: 138/100 (13 Feb 2023 08:21) (138/100 - 176/88)  BP(mean): --  RR: 20 (13 Feb 2023 08:21) (18 - 22)  SpO2: 97% (13 Feb 2023 08:21) (95% - 99%)    Parameters below as of 13 Feb 2023 08:21  Patient On (Oxygen Delivery Method): room air    CBC                        9.9    9.92  )-----------( 638      ( 12 Feb 2023 07:04 )             29.3       Chemistry  02-13    134<L>  |  101  |  11  ----------------------------<  166<H>  3.8   |  25  |  0.51    Ca    9.0      13 Feb 2023 05:58  Phos  4.2     02-12  Mg     1.9     02-13    TPro  6.9  /  Alb  2.8<L>  /  TBili  0.7  /  DBili  x   /  AST  16  /  ALT  22  /  AlkPhos  114  02-12          
52y old Male with hx of metastatic lung Cancer now w/ tracheal / esophageal compression d/t neck mass presented to ED for uncontrolled pain.  Pt reports difficulty swallowing started x3 days ago and has been worsening since, states he is now unable to swallow pills or food, thinks he is withdrawing from the Xanax and Percocet.    Oncology history:  July 2022 presented with metastatic advanced poorly differentiated adenocarcinoma of the lung, PD-L1 high, received 4 cycles of single agent palliative Keytruda immunotherapy, had positive response. patient did not return for follow-up.  Several weeks ago returned to Dr. Trammell’s office weeks ago with progressive disease  restarted on treatment with Keytruda , AND  concurrent chemotherapy carboplatin and Alimta  had progressive disease in the neck and had seen radiation therapy in consultation  Was scheduled to begin palliative radiation tx  to enlarging neck mass on Monday February 13, ,  as well as concurrent carboplatin Alimta cycle #2 with Keytruda.  Admitted to  on 2-10-23.    Patient now having stridor.  Unable to swallow.  Referred for urgent palliative RT.    IR consulted for gastrostomy tube placement.    Past medical history includes tobacco, alcohol, subtance abuse        MEDICATIONS  (STANDING):  albuterol    0.083% 2.5 milliGRAM(s) Nebulizer three times a day  albuterol/ipratropium for Nebulization 3 milliLiter(s) Nebulizer every 6 hours  dextrose 5% + sodium chloride 0.9% with potassium chloride 20 mEq/L 1000 milliLiter(s) (100 mL/Hr) IV Continuous <Continuous>  enoxaparin Injectable 40 milliGRAM(s) SubCutaneous every 24 hours  fentaNYL   Patch  50 MICROgram(s)/Hr 1 Patch Transdermal every 72 hours  folic acid 1 milliGRAM(s) Oral daily  gabapentin 300 milliGRAM(s) Oral three times a day  levothyroxine 50 MICROGram(s) Oral daily  methylPREDNISolone sodium succinate Injectable 40 milliGRAM(s) IV Push every 8 hours  nicotine - 21 mG/24Hr(s) Patch 1 Patch Transdermal daily  piperacillin/tazobactam IVPB.. 3.375 Gram(s) IV Intermittent every 8 hours  zolpidem 5 milliGRAM(s) Oral at bedtime    MEDICATIONS  (PRN):  acetaminophen     Tablet .. 650 milliGRAM(s) Oral every 6 hours PRN Temp greater or equal to 38C (100.4F), Mild Pain (1 - 3)  albuterol    90 MICROgram(s) HFA Inhaler 2 Puff(s) Inhalation every 6 hours PRN Bronchospasm  aluminum hydroxide/magnesium hydroxide/simethicone Suspension 30 milliLiter(s) Oral every 4 hours PRN Dyspepsia  bisacodyl 5 milliGRAM(s) Oral every 12 hours PRN Constipation  hydrALAZINE Injectable 5 milliGRAM(s) IV Push every 6 hours PRN for SBP >140  HYDROmorphone  Injectable 1 milliGRAM(s) IV Push every 3 hours PRN Severe Pain (7 - 10)  HYDROmorphone  Injectable 0.5 milliGRAM(s) IV Push every 3 hours PRN Moderate Pain (4 - 6)  LORazepam   Injectable 0.5 milliGRAM(s) IV Push every 8 hours PRN severe anxiety  melatonin 3 milliGRAM(s) Oral at bedtime PRN Insomnia  ondansetron Injectable 4 milliGRAM(s) IV Push every 8 hours PRN Nausea and/or Vomiting    EXAM:   frail cachectic 52-year-old male   neck firm hard masses bilateral neck   Stridor audible   lungs wheeze bilaterally   heart S1-S2   abdomen soft nontender bowel sound present   palpable mass/  nodule right lower back   extremities: mass on right thigh    Vital Signs Last 24 Hrs  T(C): 36.8 (13 Feb 2023 08:21), Max: 37.1 (13 Feb 2023 01:04)  T(F): 98.2 (13 Feb 2023 08:21), Max: 98.7 (13 Feb 2023 01:04)  HR: 113 (13 Feb 2023 08:21) (90 - 113)  BP: 138/100 (13 Feb 2023 08:21) (138/100 - 176/88)  BP(mean): --  RR: 20 (13 Feb 2023 08:21) (18 - 22)  SpO2: 97% (13 Feb 2023 08:21) (95% - 99%)    General: calm in NAD  Mental Status: awake alert oriented x3  HEENT: eomi, perrl  Lungs: +audible stridor, some wheezing, no rales or rhnochi b/s limited  Cardiac: s1s2   GI: soft nontender +BS  : voids  Ext: moves all 4 extremities spontaneously  Neuro: no gross findings     LABS:                    9.9    9.92  )-----------( 638      ( 12 Feb 2023 07:04 )             29.3     134<L>  |  101  |  11  ----------------------------<  166<H>  3.8   |  25  |  0.51    Ca    9.0      13 Feb 2023 05:58  Phos  4.2     02-12  Mg     1.9     02-13  TPro  6.9  /  Alb  2.8<L>  /  TBili  0.7  /  DBili  x   /  AST  16  /  ALT  22  /  AlkPhos  114  02-12  Albumin: Albumin, Serum: 2.8 g/dL (02-12 @ 07:04)    RADIOLOGY/ADDITIONAL STUDIES:  CT neck with IV contrast  CLINICAL INFORMATION: Difficulty swallowing  TECHNIQUE:  Contiguous axial 3 mm thicksections were obtained through   the neck using single helical acquisition.  Images were acquired during   the intravenous administration of 60 cc of Omnipaque 350/ 0 cc discarded.    Image data was reconstructed in the 3 mm sagittal and coronal planes.    This scan was performed using automatic exposure control (radiation dose   reduction software) to obtain a diagnostic image quality scan with   patient dose as low as reasonably achievable.  FINDINGS:   No prior similar studies are available for review.  Large necrotic mass measuring at least 8.1 x 4.5 cm in the retrotracheal   region with compression and displacement of the trachea anteriorly and   compression of the pharynx and proximal esophagus as well as the LEFT   internal jugularvein and internal carotid artery posterior laterally.   This appears to obliterate the majority of the LEFT lobe of the thyroid   gland and likely represents a thyroid carcinoma. A necrotic 5.2 x 5.2 cm   mass extends from the upper pole of the RIGHTlobe of the thyroid gland   with erosion through the RIGHT thyroid cartilage and extension into the   RIGHT paralaryngeal space, also suspicious for thyroid carcinoma. A   necrotic 2.6 x 1.8 cm LEFT level 2 lymph node is noted. And a necrotic 1   cm LEFT level 3 lymph node is noted.  The larynx is grossly intact.  The preepiglottic and LEFT paralaryngeal   spaces are intact.  Laryngeal cartilages remain intact.  The nasopharynx is symmetric.  No lateral retropharyngeal mass is found.    The underlying central skull base is intact.  The petrous temporal bones   and mastoids remain clear.  The visualized base of brain appears   unremarkable.  The parotid and submandibular glands are intact.  The visualized paranasal sinuses are significant for minimal mucosal   thickening in the BILATERAL maxillary alveolar recesses.  The nasal   cavity is unremarkable.  The cervical spine show mild degenerative changes.  The carotid and vertebral arteries demonstrate enhancement indicating   their patency.  The lung apices demonstrate a 5.6 x 5.8 cm LEFT suprahilar mass extending   into the LEFT upper lobe from the aorticopulmonary window possibly   representing metastatic lymphadenopathy. See chest CT for details.    IMPRESSION: Large necrotic mass measuring at least 8.1 x 4.5 cm in the   retrotracheal region with compression and displacement of the trachea   anteriorly and compression of the pharynx and proximal esophagus as well   as the LEFT internal jugular vein and internal carotid artery posterior   laterally. This appears to obliterate the majority of the LEFT lobe of   the thyroid gland and likely represents a thyroid carcinoma. A necrotic   5.2 x 5.2 cm mass extends from the upper pole of the RIGHT lobe of the   thyroid gland with erosion through the RIGHT thyroid cartilage and   extension into the RIGHT paralaryngeal space, also suspicious for thyroid   carcinoma. A necrotic 2.6 x 1.8 cm LEFT level 2 lymph node is noted. And   a necrotic 1 cm LEFT level 3 lymph node is noted.  
Surgeon: Joseluis    Consult requesting by: ER     HISTORY OF PRESENT ILLNESS:  51 y/o male w/ a PMHX of stage IV lung CA, HTN, anxiety on Xanax, chronic pain on Percocet, and substance abuse presents to the ED via EMS for difficulty swallowing. Pt reports difficulty swallowing started x3 days ago and has been worsening since, states he is now unable to swallow pills or food, thinks he is withdrawing from the Xanax and Percocet. Denies SOB. Pt first treatment x3 weeks ago, next treatment in 2 days. Pt notes he has had similar Sx before however it usually self resolves. Pt able to swallow secretions w/ difficulty  Pt is adm for further Pall eval and maybe hospice.    PAST MEDICAL & SURGICAL HISTORY:  HTN (hypertension)      Smoker      Substance abuse      Admits to alcohol use      Lung cancer  non small cell adenocarcinoma of the lung (dx 22)      Lung cancer      Injury due to foreign body  right wrist          MEDICATIONS  (STANDING):  gabapentin 300 milliGRAM(s) Oral three times a day  levothyroxine 50 MICROGram(s) Oral daily  nicotine - 21 mG/24Hr(s) Patch 1 Patch Transdermal daily  piperacillin/tazobactam IVPB.. 3.375 Gram(s) IV Intermittent every 8 hours  sodium chloride 0.9%. 1000 milliLiter(s) (100 mL/Hr) IV Continuous <Continuous>    MEDICATIONS  (PRN):  acetaminophen     Tablet .. 650 milliGRAM(s) Oral every 6 hours PRN Temp greater or equal to 38C (100.4F), Mild Pain (1 - 3)  albuterol    90 MICROgram(s) HFA Inhaler 2 Puff(s) Inhalation every 6 hours PRN Bronchospasm  aluminum hydroxide/magnesium hydroxide/simethicone Suspension 30 milliLiter(s) Oral every 4 hours PRN Dyspepsia  HYDROmorphone  Injectable 0.5 milliGRAM(s) IV Push every 3 hours PRN Severe Pain (7 - 10)  LORazepam   Injectable 1 milliGRAM(s) IV Push every 8 hours PRN Anxiety  melatonin 3 milliGRAM(s) Oral at bedtime PRN Insomnia  morphine  - Injectable 4 milliGRAM(s) IV Push every 3 hours PRN Moderate Pain (4 - 6)  ondansetron Injectable 4 milliGRAM(s) IV Push every 8 hours PRN Nausea and/or Vomiting    Antiplatelet therapy:    none                       Last dose/amt:    Allergies    No Known Allergies    Intolerances        SOCIAL HISTORY:  Smoker: [ x] Yes  [ ] No        PACK YEARS:                         WHEN QUIT?  ETOH use: [ ] Yes  [x ] No              FREQUENCY / QUANTITY:  Ilicit Drug use:  [ ] Yes  [x ] No  Occupation:  Live with: self   Assisted device use:    FAMILY HISTORY:  FH: rheumatic fever (Father)        Review of Systems  CONSTITUTIONAL:  Fevers[ ] chills[ ] sweats[ ] fatigue[ ] weight loss[ x] weight gain [ ]                                     NEGATIVE [ ]   NEURO:  parathesias[ ] seizures [ ]  syncope [ ]  confusion [ ]                                                                                NEGATIVE[ ]   EYES: glasses[ ]  blurry vision[ ]  discharge[ ] pain[ ] glaucoma [ ]                                                                          NEGATIVE[ ]   ENMT:  difficulty hearing [ ]  vertigo[ ]  dysphagia[ ] epistaxis[ ] recent dental work [ ]                                    NEGATIVE[ ]   + difficulty swallowing   CV:  chest pain[ ] palpitations[ ] DRUMMOND [ x] diaphoresis [ ]                                                                                           NEGATIVE[ ]   RESPIRATORY:  wheezing[ ] SOB[x ] cough [ x] sputum[ x] hemoptysis[ ]                                                                  NEGATIVE[ ]   GI:  nausea[ ]  vomiting [ ]  diarrhea[ ] constipation [ ] melena [ ]                                                                      NEGATIVE[ ]   : hematuria[ ]  dysuria[ ] urgency[ ] incontinence[ ]                                                                                            NEGATIVE[x ]   MUSCULOSKELETAL  arthritis[ ]  joint swelling [ ] muscle weakness [ x]                                                                NEGATIVE[ ]   SKIN/BREAST:  rash[ ] itching [ ]  hair loss[ ] masses[x ]  Rtside of neck                                                                   NEGATIVE[ ]   PSYCH:  dementia [ ] depression [x ] anxiety[ x]                                                                                                               NEGATIVE[ ]   HEME/LYMPH:  bruises easily[ ] enlarged lymph nodes[ ] tender lymph nodes[ ]                                               NEGATIVE[x ]   ENDOCRINE:  cold intolerance[ ] heat intolerance[ ] polydipsia[ ]                                                                          NEGATIVE[x ]     PHYSICAL EXAM  Vital Signs Last 24 Hrs  T(C): 36.9 (10 Feb 2023 11:05), Max: 36.9 (10 Feb 2023 11:05)  T(F): 98.5 (10 Feb 2023 11:05), Max: 98.5 (10 Feb 2023 11:05)  HR: 85 (10 Feb 2023 16:02) (79 - 91)  BP: 143/73 (10 Feb 2023 16:02) (135/91 - 149/93)  BP(mean): 102 (10 Feb 2023 11:05) (102 - 102)  RR: 18 (10 Feb 2023 16:02) (15 - 18)  SpO2: 99% (10 Feb 2023 16:02) (97% - 99%)    Parameters below as of 10 Feb 2023 16:02  Patient On (Oxygen Delivery Method): room air        CONSTITUTIONAL:                                                                          WNL[ ] cachetic, unkept    Neuro: WNL[x ] Normal exam oriented to person/place & time with no focal motor or sensory  deficits. Other                     Eyes: WNL[ x]   Normal exam of conjunctiva & lids, pupils equally reactive. Other     ENT: WNL[ ]    Normal exam of nasal/oral mucosa with absence of cyanosis. Other dry   Neck: WNL[ ]  Normal exam of jugular veins, trachea & thyroid. Other + Rt Neck mass   Chest: WNL[ ] Normal lung exam with good air movement absence of wheezes, rales, or rhonchi: Other   decreased b/l  with wheeze                                                              CV:  Auscultation: normal [x ] S3[ ] S4[ ] Irregular [ ] Rub[ ] Clicks[ ]    Murmurs none:[ x]systolic [ ]  diastolic [ ] holosystolic [ ]  Carotids: No Bruits[ ] Other                 Abdominal Aorta: normal [ ] nonpalpable[ x]Other                                                                                      GI:           WNL[ x] Normal exam of abdomen, liver & spleen with no noted masses or tenderness. Other                                                                                                        Extremities: WNL[x ] Normal no evidence of cyanosis or deformity Edema: none[ ]trace[ ]1+[ ]2+[ ]3+[ ]4+[ ]                                                            LABS:                        10.2   7.98  )-----------( 548      ( 10 Feb 2023 09:36 )             30.5     02-10    130<L>  |  94<L>  |  8   ----------------------------<  133<H>  4.2   |  30  |  0.61    Ca    9.6      10 Feb 2023 09:36    TPro  7.6  /  Alb  2.9<L>  /  TBili  0.5  /  DBili  x   /  AST  14<L>  /  ALT  18  /  AlkPhos  141<H>  02-10    PT/INR - ( 10 Feb 2023 09:36 )   PT: 14.5 sec;   INR: 1.25 ratio         PTT - ( 10 Feb 2023 09:36 )  PTT:30.7 sec  Urinalysis Basic - ( 10 Feb 2023 11:29 )    Color: Yellow / Appearance: Clear / S.005 / pH: x  Gluc: x / Ketone: Small  / Bili: Negative / Urobili: Negative   Blood: x / Protein: Negative / Nitrite: Negative   Leuk Esterase: Negative / RBC: x / WBC x   Sq Epi: x / Non Sq Epi: x / Bacteria: x      < from: CT Chest w/ IV Cont (02.10.23 @ 10:53) >    LUNGS/AIRWAYS/PLEURA: Mild narrowing of the proximal trachea due to mass   effect from adjacent lymphadenopathy. Unchanged 6 cm left upper lobe mass   occluding the apicoposterior segmental bronchus and with broadbase that   abuts and bulges the left oblique fissure. Increased linear density and   associated architectural distortion adjacent to the mass, likely   scarring. New mucous impacted bronchi in the left lower lobe and   increased tree-in-bud in both lower lobes. Few new nodules in the right   lung up to 7 mm, for example, in the right lower lobe (2-109 and 2-96).   Unchanged groundglass nodules in the right upper lobe (2-21, 2-48).   Decreased nodules in the middle lobe, for example, 1 cm on 2-80. No   pleural effusion.    LYMPH NODES/MEDIASTINUM: Increased mass in the lower neck which is   inseparable from the thyroid, upwards of 7 cm in largest diameter,   slightly narrowing and deviating the trachea anteriorly, and inseparable   from the proximal esophagus. Stable mediastinal and bilateral hilar   lymphadenopathy.    HEART/VASCULATURE: Normal heart size. No pericardial effusion. Normal   caliber aorta and pulmonary artery.    UPPER ABDOMEN: New 5.4 cm right adrenal mass.    BONES/SOFT TISSUES: Unremarkable.      IMPRESSION:    Since 2022:    Stable left upper lobe neoplasm.    Increased mass in the lower neck, contiguous with the thyroid and   inseparable from the proximal esophagus.    New bronchial and bronchiolar impaction in both lungs. Consider   aspiration or infectious bronchiolitis.    Multiple other lung nodules, some unchanged, some smaller, and some new.    New right adrenal metastasis.    < end of copied text >                                        
52-year-old male   long history tobacco use  July 2022 presented with metastatic advanced poorly differentiated adenocarcinoma of the lung   PD-L1 very elevated   received 4 cycles of single agent palliative Keytruda/ immunotherapy   had positive response     patient did not return for follow-up     Several weeks ago returned to our office weeks ago with progressive disease     restarted on treatment with Keytruda , AND  concurrent chemotherapy carboplatin and Alimta   had progressive disease in the neck and had seen radiation therapy in consultation   Was scheduled to begin palliative radiation Monday February, February 13, ,  as well as concurrent carboplatin Alimta cycle #2 with Keytruda     now presents to emergency room with days of increased difficulty swallowing, increased weakness   as per admission note concerned about withdrawing from Xanax and Percocet     past medical history includes   tobacco, alcohol, subtance abuse       metastatic cancer, hypothyroidism    ROS   difficulty swallowing  solids   profoundly weak   no fevers no chills     physical examination   frail cachectic 52-year-old male   neck firm hard masses bilateral neck   lungs wheeze bilaterally   heart S1-S2   abdomen soft nontender bowel sound present   palpable mass/  nodule right lower back   extremities: mass on thigh  
Patient is a 52y old  Male who presents with a chief complaint of failure to thrive (2023 14:31)      HPI:  51 y/o male w/ a PMHX of stage IV lung CA, HTN, anxiety on Xanax, chronic pain on Percocet, and substance abuse presents to the ED via EMS for difficulty swallowing. Pt reports difficulty swallowing started x3 days ago and has been worsening since, states he is now unable to swallow pills or food, thinks he is withdrawing from the Xanax and Percocet. Denies SOB. Pt first treatment x3 weeks ago, next treatment in 2 days. Pt notes he has had similar Sx before however it usually self resolves. Pt able to swallow secretions w/ difficulty  CT Neck  Large necrotic mass measuring at least 8.1 x 4.5 cm in the retrotracheal region with compression and displacement of the trachea   anteriorly and compression of the pharynx and proximal esophagus as well as the LEFT internal jugular vein and internal carotid artery posterior   laterally. This appears to obliterate the majority of the LEFT lobe of the thyroid gland and likely represents a thyroid carcinoma. A necrotic   5.2 x 5.2 cm mass extends from the upper pole of the RIGHT lobe of the thyroid gland with erosion through the RIGHT thyroid cartilage and   extension into the RIGHT paralaryngeal space, also suspicious for thyroid carcinoma. A necrotic 2.6 x 1.8 cm LEFT level 2 lymph node is noted. And a necrotic 1 cm LEFT level 3 lymph node is noted.          PAST MEDICAL & SURGICAL HISTORY:  HTN (hypertension)      Smoker      Substance abuse      Admits to alcohol use      Lung cancer  non small cell adenocarcinoma of the lung (dx 22)      Lung cancer      Injury due to foreign body  right wrist          PREVIOUS DIAGNOSTIC TESTING:      MEDICATIONS  (STANDING):  albuterol    0.083% 2.5 milliGRAM(s) Nebulizer three times a day  dextrose 5% + sodium chloride 0.9% with potassium chloride 20 mEq/L 1000 milliLiter(s) (100 mL/Hr) IV Continuous <Continuous>  enoxaparin Injectable 40 milliGRAM(s) SubCutaneous every 24 hours  fentaNYL   Patch  25 MICROgram(s)/Hr 1 Patch Transdermal every 72 hours  folic acid 1 milliGRAM(s) Oral daily  gabapentin 300 milliGRAM(s) Oral three times a day  levothyroxine 50 MICROGram(s) Oral daily  nicotine - 21 mG/24Hr(s) Patch 1 Patch Transdermal daily  piperacillin/tazobactam IVPB.. 3.375 Gram(s) IV Intermittent every 8 hours  sodium chloride 0.9% 1000 milliLiter(s) (100 mL/Hr) IV Continuous <Continuous>    MEDICATIONS  (PRN):  acetaminophen     Tablet .. 650 milliGRAM(s) Oral every 6 hours PRN Temp greater or equal to 38C (100.4F), Mild Pain (1 - 3)  albuterol    90 MICROgram(s) HFA Inhaler 2 Puff(s) Inhalation every 6 hours PRN Bronchospasm  aluminum hydroxide/magnesium hydroxide/simethicone Suspension 30 milliLiter(s) Oral every 4 hours PRN Dyspepsia  bisacodyl 5 milliGRAM(s) Oral every 12 hours PRN Constipation  hydrALAZINE Injectable 5 milliGRAM(s) IV Push every 6 hours PRN for SBP >140  HYDROmorphone  Injectable 0.5 milliGRAM(s) IV Push every 3 hours PRN Severe Pain (7 - 10)  LORazepam    Concentrate 1 milliGRAM(s) Oral every 6 hours PRN Anxiety  LORazepam   Injectable 0.5 milliGRAM(s) IV Push every 8 hours PRN severe anxiety  melatonin 3 milliGRAM(s) Oral at bedtime PRN Insomnia  ondansetron Injectable 4 milliGRAM(s) IV Push every 8 hours PRN Nausea and/or Vomiting  oxyCODONE    Solution 5 milliGRAM(s) Oral every 4 hours PRN Moderate Pain (4 - 6)      FAMILY HISTORY:  FH: rheumatic fever (Father)        SOCIAL HISTORY:  ***    REVIEW OF SYSTEM:  dyspnea     Vital Signs Last 24 Hrs  T(C): 37.4 (2023 15:08), Max: 37.4 (2023 15:08)  T(F): 99.3 (2023 15:08), Max: 99.3 (2023 15:08)  HR: 116 (2023 15:08) (90 - 116)  BP: 151/98 (2023 15:08) (146/83 - 162/94)  BP(mean): 96 (10 Feb 2023 16:30) (96 - 96)  RR: 18 (2023 15:08) (18 - 18)  SpO2: 96% (2023 15:08) (95% - 98%)    Parameters below as of 2023 15:08  Patient On (Oxygen Delivery Method): room air        I&O's Summary    PHYSICAL EXAM  General Appearance: cooperative, no acute distress,   HEENT: PERRL, conjunctiva clear, EOM's intact, non injected pharynx, no exudate, TM   normal  Neck: Supple, , no adenopathy, thyroid: not enlarged, no carotid bruit or JVD  Back: Symmetric, no  tenderness,no soft tissue tenderness  Lungs: stridor is present   Heart: Regular rate and rhythm, S1, S2 normal, no murmur, rub or gallop  Abdomen: Soft, non-tender, bowel sounds active , no hepatosplenomegaly  Extremities: no cyanosis or edema, no joint swelling  Skin: Skin color, texture normal, no rashes   Neurologic: Alert and oriented X3    ECG:    LABS:                          10.2   7.98  )-----------( 548      ( 10 Feb 2023 09:36 )             30.5     02-11    131<L>  |  98  |  5<L>  ----------------------------<  138<H>  3.4<L>   |  28  |  0.48<L>    Ca    8.5      2023 06:49  Phos  3.4     -  Mg     1.4     -11    TPro  6.5  /  Alb  2.6<L>  /  TBili  0.6  /  DBili  0.3  /  AST  14<L>  /  ALT  16  /  AlkPhos  114  02-11            PT/INR - ( 10 Feb 2023 09:36 )   PT: 14.5 sec;   INR: 1.25 ratio         PTT - ( 10 Feb 2023 09:36 )  PTT:30.7 sec  Urinalysis Basic - ( 10 Feb 2023 11:29 )    Color: Yellow / Appearance: Clear / S.005 / pH: x  Gluc: x / Ketone: Small  / Bili: Negative / Urobili: Negative   Blood: x / Protein: Negative / Nitrite: Negative   Leuk Esterase: Negative / RBC: x / WBC x   Sq Epi: x / Non Sq Epi: x / Bacteria: x            RADIOLOGY & ADDITIONAL STUDIES:    
51 y/o male w/ a heavy smoking history and  PMHX of stage IV lung CA, HTN, anxiety on Xanax, chronic pain on Percocet, and substance abuse presents to the ED via EMS for difficulty swallowing. Pt reports difficulty swallowing started x3 days ago and has been worsening since, states he is now unable to swallow pills or food, thinks he is withdrawing from the Xanax and Percocet. Denies SOB. Pt first treatment x3 weeks ago, next treatment in 2 days. Pt notes he has had similar Sx before however it usually self resolves. Pt able to swallow secretions w/ difficulty  Pt is adm for further Pall eval and maybe hospice.    Oncologic history:   July 2022 presented with metastatic advanced poorly differentiated adenocarcinoma of the lung   PD-L1 very elevated  received 4 cycles of single agent palliative Keytruda/ immunotherapy.   Had positive response, but  patient did not return for follow-up.     Several weeks ago returned to Medical Oncologist (Dr. Chris Helms)  weeks ago with progressive disease. Also seen by Rad med (Dr. Nadira Rose) and under went treatment  planning simulation for palliation of neck mass. Patient scheduled to start a 5 day course of RT tomorrow 2/13/23 at the [facility] ([address] [facility]) as an outpatient as well as concurrent carboplatin Alimta cycle #2 with Keytruda.    CT Neck  Large necrotic mass measuring at least 8.1 x 4.5 cm in the retrotracheal region with compression and displacement of the trachea   anteriorly and compression of the pharynx and proximal esophagus as well as the LEFT internal jugular vein and internal carotid artery posterior   laterally. This appears to obliterate the majority of the LEFT lobe of the thyroid gland and likely represents a thyroid carcinoma. A necrotic   5.2 x 5.2 cm mass extends from the upper pole of the RIGHT lobe of the thyroid gland with erosion through the RIGHT thyroid cartilage and   extension into the RIGHT paralaryngeal space, also suspicious for thyroid carcinoma. A necrotic 2.6 x 1.8 cm LEFT level 2 lymph node is noted. And a necrotic 1 cm LEFT level 3 lymph node is noted.       Allergies and Intolerances:        Allergies:  	No Known Allergies:     Home Medications:   * Patient Currently Takes Medications as of 10-Feb-2023 11:16 documented in Structured Notes  · 	albuterol 90 mcg/inh inhalation aerosol: Last Dose Taken:  , 2 puff(s) inhaled every 6 hours, As needed, Bronchospasm  · 	Reglan 10 mg oral tablet: Last Dose Taken:  , 1 tab(s) orally every 8 hours, As Needed  · 	gabapentin 300 mg oral capsule: Last Dose Taken:  , 1 cap(s) orally 3 times a day  · 	ALPRAZolam 1 mg oral tablet: Last Dose Taken:  , 1 tab(s) orally 3 times a day  · 	QUEtiapine 50 mg oral tablet: Last Dose Taken:  , 1 tab(s) orally once a day (at bedtime)  · 	lisinopril 40 mg oral tablet: Last Dose Taken:  , 1 tab(s) orally once a day  · 	levothyroxine 50 mcg (0.05 mg) oral tablet: Last Dose Taken:  , 1 tab(s) orally once a day  	  · 	Percocet 10/325 oral tablet: Last Dose Taken:  , 1 tab(s) orally every 8 hours  	***pt usually on Suboxone 8-2 but has been changed to Percocet***  · 	Suboxone 8 mg-2 mg sublingual film: Last Dose Taken:  , ***Pt switched from Suboxone to Percocet ***  1 film(s) sublingual 2 times a day  · 	dexamethasone 4 mg oral tablet: Last Dose Taken:  , 2 tab(s) orally 2 times a day the day before and day after chemo  · 	folic acid 1 mg oral tablet: Last Dose Taken:  , 1 tab(s) orally once a day  · 	ondansetron 8 mg oral tablet: 1 tab(s) orally 3 times a day, As Needed  · 	nicotine 21 mg/24 hr transdermal film, extended release: 1 patch transdermal once a day  · 	Super B Complex oral tablet: 1 tab(s) orally once a day    .MEDICATIONS  (PRN):  acetaminophen     Tablet .. 650 milliGRAM(s) Oral every 6 hours PRN Temp greater or equal to 38C (100.4F), Mild Pain (1 - 3)  albuterol    90 MICROgram(s) HFA Inhaler 2 Puff(s) Inhalation every 6 hours PRN Bronchospasm  aluminum hydroxide/magnesium hydroxide/simethicone Suspension 30 milliLiter(s) Oral every 4 hours PRN Dyspepsia  bisacodyl 5 milliGRAM(s) Oral every 12 hours PRN Constipation  hydrALAZINE Injectable 5 milliGRAM(s) IV Push every 6 hours PRN for SBP >140  HYDROmorphone  Injectable 0.5 milliGRAM(s) IV Push every 3 hours PRN Severe Pain (7 - 10)  LORazepam    Concentrate 1 milliGRAM(s) Oral every 6 hours PRN Anxiety  LORazepam   Injectable 0.5 milliGRAM(s) IV Push every 8 hours PRN severe anxiety  melatonin 3 milliGRAM(s) Oral at bedtime PRN Insomnia  ondansetron Injectable 4 milliGRAM(s) IV Push every 8 hours PRN Nausea and/or Vomiting  oxyCODONE    Solution 5 milliGRAM(s) Oral every 4 hours PRN Moderate Pain (4 - 6)        Patient History:    Past Medical, Past Surgical, and Family History:  PAST MEDICAL HISTORY:    Admits to alcohol use   HTN (hypertension)   Lung cancer 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)   Smoker   Substance abuse.     PAST SURGICAL HISTORY:  Injury due to foreign body right wrist.     FAMILY HISTORY:  Father  Still living? Unknown  FH: rheumatic fever, Age at diagnosis: Age Unknown.    Vital Signs Last 24 Hrs  T(C): 37.4 (11 Feb 2023 15:08), Max: 37.4 (11 Feb 2023 15:08)  T(F): 99.3 (11 Feb 2023 15:08), Max: 99.3 (11 Feb 2023 15:08)  HR: 116 (11 Feb 2023 15:08) (90 - 116)  BP: 151/98 (11 Feb 2023 15:08) (146/83 - 162/94)  BP(mean): 96 (10 Feb 2023 16:30) (96 - 96)  RR: 18 (11 Feb 2023 15:08) (18 - 18)  SpO2: 96% (11 Feb 2023 15:08) (95% - 98%)    Parameters below as of 11 Feb 2023 15:08  Patient On (Oxygen Delivery Method): room air    PHYSICAL EXAM  General Appearance: cooperative, no acute distress,   HEENT: PERRL, conjunctiva clear, EOM's intact, non injected pharynx, no exudate, TM   normal  Neck: firm hard masses bilateral neck  Back: Symmetric, no  tenderness,no soft tissue tenderness  Lungs: stridor is present   Heart: Regular rate and rhythm, S1, S2 normal, no murmur, rub or gallop  Abdomen: Soft, non-tender, bowel sounds active , no hepatosplenomegaly  Extremities: no cyanosis or edema, no joint swelling  Skin: Skin color, texture normal, no rashes   Neurologic: Alert and oriented X3, otherwise nonfocal.    IMAGING STUDIES:    CT neck with IV contrast  CLINICAL INFORMATION: Difficulty swallowing    TECHNIQUE:  Contiguous axial 3 mm thicksections were obtained through   the neck using single helical acquisition.  Images were acquired during   the intravenous administration of 60 cc of Omnipaque 350/ 0 cc discarded.    Image data was reconstructed in the 3 mm sagittal and coronal planes.    This scan was performed using automatic exposure control (radiation dose   reduction software) to obtain a diagnostic image quality scan with   patient dose as low as reasonably achievable.    FINDINGS:   No prior similar studies are available for review.    Large necrotic mass measuring at least 8.1 x 4.5 cm in the retrotracheal   region with compression and displacement of the trachea anteriorly and   compression of the pharynx and proximal esophagus as well as the LEFT   internal jugularvein and internal carotid artery posterior laterally.   This appears to obliterate the majority of the LEFT lobe of the thyroid   gland and likely represents a thyroid carcinoma. A necrotic 5.2 x 5.2 cm   mass extends from the upper pole of the RIGHTlobe of the thyroid gland   with erosion through the RIGHT thyroid cartilage and extension into the   RIGHT paralaryngeal space, also suspicious for thyroid carcinoma. A   necrotic 2.6 x 1.8 cm LEFT level 2 lymph node is noted. And a necrotic 1   cm LEFT level 3 lymph node is noted.      The larynx is grossly intact.  The preepiglottic and LEFT paralaryngeal   spaces are intact.  Laryngeal cartilages remain intact.  The nasopharynx is symmetric.  No lateral retropharyngeal mass is found.    The underlying central skull base is intact.  The petrous temporal bones   and mastoids remain clear.  The visualized base of brain appears   unremarkable.    The parotid and submandibular glands are intact.    The visualized paranasal sinuses are significant for minimal mucosal   thickening in the BILATERAL maxillary alveolar recesses.  The nasal   cavity is unremarkable.    The cervical spine show mild degenerative changes.  The carotid and vertebral arteries demonstrate enhancement indicating   their patency.    The lung apices demonstrate a 5.6 x 5.8 cm LEFT suprahilar mass extending   into the LEFT upper lobe from the aorticopulmonary window possibly   representing metastatic lymphadenopathy. See chest CT for details.      IMPRESSION: Large necrotic mass measuring at least 8.1 x 4.5 cm in the   retrotracheal region with compression and displacement of the trachea   anteriorly and compression of the pharynx and proximal esophagus as well   as the LEFT internal jugular vein and internal carotid artery posterior   laterally. This appears to obliterate the majority of the LEFT lobe of   the thyroid gland and likely represents a thyroid carcinoma. A necrotic   5.2 x 5.2 cm mass extends from the upper pole of the RIGHT lobe of the   thyroid gland with erosion through the RIGHT thyroid cartilage and   extension into the RIGHT paralaryngeal space, also suspicious for thyroid   carcinoma. A necrotic 2.6 x 1.8 cm LEFT level 2 lymph node is noted. And   a necrotic 1 cm LEFT level 3 lymph node is noted.    ACC: [id number] EXAM: CT LWR EXT IC BI ORDERED BY: ANDRY RUCKER    PROCEDURE DATE: 02/11/2023        INTERPRETATION: EXAMINATION: CT LOWER EXTREMITY WITH IV CONTRAST BILATERAL    CLINICAL INDICATION:Evaluate thigh mass. History of stage IV lung cancer.    COMPARISON: Correlation with CT abdomen and pelvis dated 9/20/2022    TECHNIQUE: CT of the bilateral lower extremities was performed with intravenous contras. Primary images are located under a separate separate abdominopelvic CT accession, reviewed and reported separately.    INTERPRETATION:    Soft tissues: Centered in the right ischiofemoral interval, and centered at the right quadratus femoris muscle, there is a large soft tissue mass with heterogeneous attenuation/enhancement which anteriorly displaces the adductor muscles and posteriorly displaces the right gluteal musculature, likely partly involving those muscles, measuring 13.3 cm anteroposteriorly, 5.6 cm transversely, and 10.3 cm craniocaudally and closely abutting the right sciatic nerve.    Bones: No acute fracture. No substantial knee joint effusion bilaterally.    IMPRESSION:  Large soft tissue mass with aggressive features centered in the right ischiofemoral interval as described above. Given the history of metastatic lung cancer, the finding is likely to represent a large soft tissue metastasis.    The finding is new from the prior CT abdomen and pelvis of 9/20/2022.    The lesion closely abuts the right sciatic nerve. MRI would be helpful to further characterize the local anatomy, if clinically important.    --- End of Report ---    SHLOMIT GOLDBERG-STEIN MD; Attending Radiologist  This document has been electronically signed. Feb 11 2023 5:59PM  ACC: [id number] EXAM: CT ABDOMEN AND PELVIS IC ORDERED BY: ANDRY DIVINSKIY    PROCEDURE DATE: 02/11/2023        INTERPRETATION: CLINICAL INFORMATION: Back mass, abdominal weight loss, right leg mass, hx of stage IV lung CA    COMPARISON: CT abdomen pelvis 9/20/2022. CT chest 11/21/2022    CONTRAST/COMPLICATIONS:  IV Contrast: Omnipaque 350 90 cc administered 10 cc discarded  Oral Contrast: NONE  Complications: None reported at time of study completion    PROCEDURE:  CT of the Abdomen and Pelvis and proximal lower extremity to knee level was performed.  Sagittal and coronal reformats were performed.    FINDINGS:  LOWER CHEST: Numerous groundglass nodules in the left lower lobe more than right lower lobe. 5 mm solid pulmonary nodule in the right lower lobe, image 2-10 is new as well as a 6 mm right lower lobe spiculated nodule, image 2-11; stable 3mm left lower lobe nodule 2-9.    LIVER: Fatty changes near the falciform ligament. No focal lesions.  BILE DUCTS: Normal caliber.  GALLBLADDER: Within normal limits.  SPLEEN: Normal-sized spleen with splenule.  PANCREAS: Homogeneous enhancement. No ductal dilatation.  ADRENALS: Normal left adrenal gland. Right adrenal gland heterogeneous mass measuring 3.9 x 5.8 x 6.0 cm is new compared to prior study.  KIDNEYS/URETERS: No hydroureteronephrosis. Normal left kidney. A 7 mm right kidney upper pole lesion with higher than fluid attenuation is indeterminate and not seen on prior study.    BLADDER: Within normal limits.  REPRODUCTIVE ORGANS: Prostate within normal limits.    BOWEL: No bowel obstruction. Appendix is normal. Previously seen small bowel obstruction is resolved. Short segment wall thickening of a small bowel loop in the left lower quadrant is less well evaluated on current study but appears to be unchanged, best seen on 602-38 and 2-79.  PERITONEUM: Small amount of free pelvic fluid.  VESSELS: Atherosclerotic changes. Hepatic veins, portal vein, SMV, renal veins and IVC are patent.  RETROPERITONEUM/LYMPH NODES:  New right-sided poorly enhancing retroperitoneal mass posterior to the right psoas muscle measuring 3.2 x 4.0 x 3.1 cm, image 2-62 and an adjacent lesion measuring 2.9 x 2.5 x 3.4 cm more inferiorly, image 2-68. There is a mesenteric lobulated mass measuring 4.1 x 4.3 x 6.2 cm, similar in size and appearance when compared to prior study, previously 3.8 x 4.2 x 6.2 cm.  ABDOMINAL WALL: Inferior to the right gluteal fold and gluteus sally muscle there is a large mass involving the proximal posterior hamstring muscles measuring 5.9 x 12 cm x 11.1cm. The mass is poorly peripherally enhancing and infiltrating the muscles. There is no adjacent bony.    BONES: There is no erosion of the adjacent proximal femoral bone from the above-mentioned mass.. No aggressive lytic or blastic bony lesions are noted.    IMPRESSION:  In the setting of metastatic lung cancer there is interval development of new metastatic disease when compared to prior imaging 9/20/2022 ans 11/21/2022 including the right adrenal gland, the right retroperitoneum posterior to the psoas muscle and the musculature in the posterior proximal right thigh.    Previously seen mesenteric adenopathy is grossly unchanged.    New indeterminate lesion in the right kidney may represent a renal metastasis versus hemorrhagic cyst or primary renal mass.    Left lower lobe groundglass nodules are suggestive of an infectious or inflammatory etiology. Additional solid pulmonary nodules are concerning for metastatic disease. Consider more complete evaluation with CT of the chest or PET/CT as clinically indicated.        --- End of Report ---    PAULY GIL MD; Attending Radiologist  This document has been electronically signed. Feb 11 2023 10:18PM          LABS:  CBC Full  -  ( 10 Feb 2023 09:36 )  WBC Count : 7.98 K/uL  RBC Count : 3.67 M/uL  Hemoglobin : 10.2 g/dL  Hematocrit : 30.5 %  Platelet Count - Automated : 548 K/uL  Mean Cell Volume : 83.1 fl  Mean Cell Hemoglobin : 27.8 pg  Mean Cell Hemoglobin Concentration : 33.4 gm/dL  Auto Neutrophil # : 5.22 K/uL  Auto Lymphocyte # : 1.47 K/uL  Auto Monocyte # : 1.08 K/uL  Auto Eosinophil # : 0.15 K/uL  Auto Basophil # : 0.03 K/uL  Auto Neutrophil % : 65.4 %  Auto Lymphocyte % : 18.4 %  Auto Monocyte % : 13.5 %  Auto Eosinophil % : 1.9 %  Auto Basophil % : 0.4 %      02-11    131<L>  |  98  |  5<L>  ----------------------------<  138<H>  3.4<L>   |  28  |  0.48<L>    Ca    8.5      11 Feb 2023 06:49  Phos  3.4     02-11  Mg     1.4     02-11    TPro  6.5  /  Alb  2.6<L>  /  TBili  0.6  /  DBili  0.3  /  AST  14<L>  /  ALT  16  /  AlkPhos  114  02-11    
  HPI: Pt is a 52y old Male with hx of metastatic lung Cancer now w/ tracheal / esophageal compression d/t neck mass presented to ED for uncontrolled pain.  Pt reports difficulty swallowing started x3 days ago and has been worsening since, states he is now unable to swallow pills or food, thinks he is withdrawing from the Xanax and Percocet.     patient reports feeling frustrated and hopes that he can get Radiation tx as the discussion was being held over weekend for possible [facility] Transfer vs. possibility of transport from  to [facility] center??      CAse d/w Patient and his cousin Maria Eugenia (who is a surgical nurse) who lives out of town. We had significant prognosis discussion.  Patient        PAIN: ( x)Yes   ( )No  Level: back w. dyspnea too  Location: lower back  Intensity:    7/10  Quality:  Aggravating Factors: movment   Alleviating Factors: dilaudid   Radiation:   Duration/Timing: constant  Impact on ADLs:    DYSPNEA: (x ) Yes  ( ) No  Level: pt w/ baseline stridor     PAST MEDICAL & SURGICAL HISTORY:  HTN (hypertension)      Smoker      Substance abuse      Admits to alcohol use      Lung cancer  non small cell adenocarcinoma of the lung (dx 22)      Lung cancer      Injury due to foreign body  right wrist          SOCIAL HX:    Hx opiate tolerance ( x)YES  ( )NO    Baseline ADLs  (Prior to Admission)  (x ) Independent   ( )Dependent    FAMILY HISTORY:  FH: rheumatic fever (Father)      Review of Systems:    Anxiety- signficant  Depression-denies  Physical Discomfort- pt uncomfortable , was more comfortable sp Dilaudid 1mg IV  Dyspnea- ++ constant, some relief w IV Dilaudid  Constipation-denies  Diarrhea-denies  Nausea-denies  Vomiting-denies  Anorexia-denies  Weight Loss- +  Cough-+  Secretions-+  Fatigue-denies  Weakness-+  Delirium-denies    All other systems reviewed and negative       PHYSICAL EXAM:    Vital Signs Last 24 Hrs  T(C): 36.8 (2023 08:21), Max: 37.1 (2023 01:04)  T(F): 98.2 (2023 08:21), Max: 98.7 (2023 01:04)  HR: 113 (2023 08:21) (90 - 113)  BP: 138/100 (2023 08:21) (138/100 - 176/88)  BP(mean): --  RR: 20 (2023 08:21) (18 - 22)  SpO2: 97% (2023 08:21) (95% - 99%)    Parameters below as of 2023 08:21  Patient On (Oxygen Delivery Method): room air      Daily     Daily Weight in k.5 (2023 05:23)    PPSV2: 30  %  FAST:    General: calm in NAD  Mental Status: awake alert oriented x3  HEENT: eomi, perrl  Lungs: +audible stridor, some wheezing, no rales or rhnochi b/s limited  Cardiac: s1s2   GI: soft nontender +BS  : voids  Ext: moves all 4 extremities spontaneously  Neuro: no gross findings       LABS:                        9.9    9.92  )-----------( 638      ( 2023 07:04 )             29.3     02-13    134<L>  |  101  |  11  ----------------------------<  166<H>  3.8   |  25  |  0.51    Ca    9.0      2023 05:58  Phos  4.2     02-12  Mg     1.9     02-13    TPro  6.9  /  Alb  2.8<L>  /  TBili  0.7  /  DBili  x   /  AST  16  /  ALT  22  /  AlkPhos  114  02-12      Albumin: Albumin, Serum: 2.8 g/dL (-12 @ 07:04)      Allergies    No Known Allergies    Intolerances      MEDICATIONS  (STANDING):  albuterol    0.083% 2.5 milliGRAM(s) Nebulizer three times a day  albuterol/ipratropium for Nebulization 3 milliLiter(s) Nebulizer every 6 hours  dextrose 5% + sodium chloride 0.9% with potassium chloride 20 mEq/L 1000 milliLiter(s) (100 mL/Hr) IV Continuous <Continuous>  enoxaparin Injectable 40 milliGRAM(s) SubCutaneous every 24 hours  fentaNYL   Patch  50 MICROgram(s)/Hr 1 Patch Transdermal every 72 hours  folic acid 1 milliGRAM(s) Oral daily  gabapentin 300 milliGRAM(s) Oral three times a day  levothyroxine 50 MICROGram(s) Oral daily  methylPREDNISolone sodium succinate Injectable 40 milliGRAM(s) IV Push every 8 hours  nicotine - 21 mG/24Hr(s) Patch 1 Patch Transdermal daily  piperacillin/tazobactam IVPB.. 3.375 Gram(s) IV Intermittent every 8 hours  zolpidem 5 milliGRAM(s) Oral at bedtime    MEDICATIONS  (PRN):  acetaminophen     Tablet .. 650 milliGRAM(s) Oral every 6 hours PRN Temp greater or equal to 38C (100.4F), Mild Pain (1 - 3)  albuterol    90 MICROgram(s) HFA Inhaler 2 Puff(s) Inhalation every 6 hours PRN Bronchospasm  aluminum hydroxide/magnesium hydroxide/simethicone Suspension 30 milliLiter(s) Oral every 4 hours PRN Dyspepsia  bisacodyl 5 milliGRAM(s) Oral every 12 hours PRN Constipation  hydrALAZINE Injectable 5 milliGRAM(s) IV Push every 6 hours PRN for SBP >140  HYDROmorphone  Injectable 1 milliGRAM(s) IV Push every 3 hours PRN Severe Pain (7 - 10)  HYDROmorphone  Injectable 0.5 milliGRAM(s) IV Push every 3 hours PRN Moderate Pain (4 - 6)  LORazepam   Injectable 0.5 milliGRAM(s) IV Push every 8 hours PRN severe anxiety  melatonin 3 milliGRAM(s) Oral at bedtime PRN Insomnia  ondansetron Injectable 4 milliGRAM(s) IV Push every 8 hours PRN Nausea and/or Vomiting      RADIOLOGY/ADDITIONAL STUDIES:

## 2023-02-13 NOTE — DISCHARGE NOTE PROVIDER - NSDCMRMEDTOKEN_GEN_ALL_CORE_FT
albuterol 90 mcg/inh inhalation aerosol: 2 puff(s) inhaled every 6 hours, As needed, Bronchospasm  Dextrose 5% with 0.9% NaCl and KCl 20 mEq/L intravenous solution: 100 milliliter(s) intravenous every hour  enoxaparin: 40 milligram(s) subcutaneous once a day  fentaNYL 50 mcg/hr transdermal film, extended release: 1 patch transdermal every 72 hours  folic acid 1 mg oral tablet: 1 tab(s) orally once a day  hydrALAZINE 20 mg/mL injectable solution: 5  injectable every 6 hours  levothyroxine 50 mcg (0.05 mg) oral tablet: 1 tab(s) orally once a day    LORazepam: 0.5 milligram(s) intravenous every 8 hours, As Needed  methylPREDNISolone: 40 milligram(s) intravenous every 8 hours  nicotine 21 mg/24 hr transdermal film, extended release: 1 patch transdermal once a day  ondansetron 2 mg/mL injectable solution: 4 milligram(s) injectable every 8 hours, As Needed  piperacillin-tazobactam: 3.375 gram(s) intravenous every 8 hours  zolpidem 5 mg oral tablet: 1 tab(s) orally once a day (at bedtime)   albuterol 90 mcg/inh inhalation aerosol: 2 puff(s) inhaled every 6 hours, As needed, Bronchospasm  Dextrose 5% with 0.9% NaCl and KCl 20 mEq/L intravenous solution: 100 milliliter(s) intravenous every hour  enoxaparin: 40 milligram(s) subcutaneous once a day  fentaNYL 50 mcg/hr transdermal film, extended release: 1 patch transdermal every 72 hours  folic acid 1 mg oral tablet: 1 tab(s) orally once a day  hydrALAZINE 20 mg/mL injectable solution: 5  injectable every 6 hours  HYDROmorphone: 0.5 milligram(s) intravenous every 3 hours, As Needed for moderate pain   HYDROmorphone: 1 milligram(s) intravenous every 3 hours, As Needed, for severe pain   levothyroxine 50 mcg (0.05 mg) oral tablet: 1 tab(s) orally once a day    LORazepam: 0.5 milligram(s) intravenous every 8 hours, As Needed  methylPREDNISolone: 40 milligram(s) intravenous every 8 hours  nicotine 21 mg/24 hr transdermal film, extended release: 1 patch transdermal once a day  ondansetron 2 mg/mL injectable solution: 4 milligram(s) injectable every 8 hours, As Needed  piperacillin-tazobactam: 3.375 gram(s) intravenous every 8 hours  zolpidem 5 mg oral tablet: 1 tab(s) orally once a day (at bedtime)   albuterol 90 mcg/inh inhalation aerosol: 2 puff(s) inhaled every 6 hours, As needed, Bronchospasm  Dextrose 5% with 0.9% NaCl and KCl 20 mEq/L intravenous solution: 100 milliliter(s) intravenous every hour  enoxaparin: 40 milligram(s) subcutaneous once a day  fentaNYL 50 mcg/hr transdermal film, extended release: 1 patch transdermal every 72 hours  folic acid 1 mg oral tablet: 1 tab(s) orally once a day  hydrALAZINE 20 mg/mL injectable solution: 5  injectable every 6 hours  HYDROmorphone: 0.5 milligram(s) intravenous every 3 hours, As Needed for moderate pain   HYDROmorphone: 1 milligram(s) intravenous every 3 hours, As Needed, for severe pain   ipratropium-albuterol 0.5 mg-2.5 mg/3 mL inhalation solution: 3 milliliter(s) inhaled every 6 hours  ipratropium-albuterol 0.5 mg-2.5 mg/3 mL inhalation solution: 3 milliliter(s) inhaled once  levothyroxine 50 mcg (0.05 mg) oral tablet: 1 tab(s) orally once a day    LORazepam: 0.5 milligram(s) intravenous every 8 hours, As Needed  methylPREDNISolone: 40 milligram(s) intravenous every 8 hours  nicotine 21 mg/24 hr transdermal film, extended release: 1 patch transdermal once a day  ondansetron 2 mg/mL injectable solution: 4 milligram(s) injectable every 8 hours, As Needed  piperacillin-tazobactam: 3.375 gram(s) intravenous every 8 hours  zolpidem 5 mg oral tablet: 1 tab(s) orally once a day (at bedtime)

## 2023-02-13 NOTE — CONSULT NOTE ADULT - ASSESSMENT
51 y/o male with hx of tobacoo/EToh/substance abuse diagnosed with stage IV lung cancer, PDL1 high, in July 2022.  He was treated with Keytruda with good response initially, but patient did not followup.  He now has enlarging neck mass for which he was to undergo palliative RT.  However, he was admitted to  with increasing SOB, difficulty swallowing, and weakness.  Neck mass has further enlarged and is compressing esophagus and airway, causing stridor.  He has been started on Medrol.  Recommend urgent palliative RT.  discussed with Dr. Nya Spann, Rad Onc at [facility], who agrees.  Patient should be transferred to [facility] for inpatient RT.  Please arrange with transfer team ASAP.

## 2023-02-13 NOTE — PROGRESS NOTE ADULT - ASSESSMENT
1. Progressive NSCLC  disease progression in the neck causing stridor and dysphagia  patient is too weak and short of breath to be safely discharged  rec  Palliative RT as suggested by Dr Dallas as inpatient  contacted Dr Rose form RT    2. PS too poor for chemotherapy   first dose was ineffective  prognosis is very poor   palliative care FU

## 2023-02-13 NOTE — CONSULT NOTE ADULT - CONSULT REASON
metastatic adenocarcinoma   failure to thrive
complex goals of care and symptom management
Dyspnea
Metastatic adenocarcinoma of lung with swallowing compromise due to large neck mass for evaluation and consideration of palliative radiotherapy.
Lung mass
RT
51yo M with dysphagia/aspiration 2/2 neck mass, referred to IR for gastrostomy placement

## 2023-02-13 NOTE — CONSULT NOTE ADULT - ASSESSMENT
53yo M with dysphagia/aspiration 2/2 neck mass, referred to IR for gastrostomy placement  - CT obtained

## 2023-02-13 NOTE — CONSULT NOTE ADULT - CONSULT REQUESTED DATE/TIME
11-Feb-2023 16:22
13-Feb-2023 10:55
10-Feb-2023 16:13
11-Feb-2023
12-Feb-2023 10:33
13-Feb-2023
13-Feb-2023 11:02

## 2023-02-14 NOTE — H&P ADULT - PROBLEM SELECTOR PLAN 3
Progressive NSCLC  disease progression in the neck causing stridor and dysphagia  pending palliative radiation

## 2023-02-14 NOTE — H&P ADULT - PROBLEM SELECTOR PLAN 2
Upper respiratory wheezing known Adenocarcinoma of SHAILA with Multifocal PNA likely aspiration vs bronchiolitis  Continue with Zosyn - was started at [facility]  Monitor fever and wbc curve  Deescalate abx as warranted

## 2023-02-14 NOTE — H&P ADULT - NSHPPHYSICALEXAM_GEN_ALL_CORE
.  VITAL SIGNS:  T(F): 98.2 (02-14-23 @ 11:15), Max: 98.4 (02-13-23 @ 21:19)  HR: 114 (02-14-23 @ 11:15) (100 - 129)  BP: 136/93 (02-14-23 @ 11:15) (136/93 - 169/99)  BP(mean): --  RR: 22 (02-14-23 @ 11:15) (19 - 22)  SpO2: 96% (02-14-23 @ 11:15) (95% - 100%)    PHYSICAL EXAM:    Constitutional: very cachetic, resting comfortably in bed; NAD  HEENT: NC/AT, PERRL, EOMI, anicteric sclera, no nasal discharge; uvula midline, no oropharyngeal erythema or exudates; MMM  Neck: large right neck mass   Respiratory: + stridor heard at bedside  Cardiac: +S1/S2; RRR; no M/R/G; PMI non-displaced  Gastrointestinal: soft, NT/ND; no rebound or guarding; +BSx4  Back: spine midline, no bony tenderness or step-offs; no CVAT B/L  Extremities: WWP, no clubbing or cyanosis; no peripheral edema  Musculoskeletal: NROM x4; no joint swelling, tenderness or erythema  Vascular: 2+ radial, femoral, DP/PT pulses B/L  Dermatologic: skin warm, dry and intact; no rashes, wounds, or scars  Lymphatic: no submandibular or cervical LAD  Neurologic: AAOx3; CNII-XII grossly intact; no focal deficits  Psychiatric: affect and characteristics of appearance, verbalizations, behaviors are appropriate, denies SI/HI/AH/VH

## 2023-02-14 NOTE — CONSULT NOTE ADULT - ASSESSMENT
52 year old male with a history of stage 4 lung cancer diagnosed 8/2022 that is admitted to [facility] for Palliative radiation. Patient also with right sided neck mass with compression if trachea. Patient c/o SOB, dyspnea,. Not able to tolerate oral diet. Choking on liquids and solids.  Admits to 30lb weight loss over several months.  now s/p open trach. SICU consulted for observation overnight.    NEUROLOGIC   - Pain control: PRN, tylenol ATC    RESPIRATORY   - Monitor SpO2 goal >92%  - Incentive spirometry   - s/p trach    CARDIOVASCULAR   - Monitor hemodynamics     GASTROINTESTINAL   - Diet: NPO    /RENAL   - IV fluids: d5NS @ 75  - Strict I/Os  - Monitor BMP qd  - strict Is/Os  - Monitor electrolytes, replete PRN    HEMATOLOGIC  - Monitor H/H   - DVT ppx: Lovenox & SCD's    INFECTIOUS DISEASE  - Monitor fever / WBC  - zosyn    ENDOCRINE  - Monitor gluc  - Hgb a1c in AM.    DISPO: PACU overnight.  --------------------------------------------------------------------------------------    Critical Care Diagnoses: new trach

## 2023-02-14 NOTE — H&P ADULT - ASSESSMENT
52 year old male w/ a PMHX of stage IV lung CA, HTN, anxiety on Xanax, chronic pain on Percocet, and substance abuse was admitted to [facility] on 2/10/23 due to difficulty swallowing. 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 Pt is transferred to [facility] for Palliative Radiation.

## 2023-02-14 NOTE — CONSULT NOTE ADULT - ATTENDING COMMENTS
Patient with stage 4 lung cancer, right sided neck compression causing critical airway s/p tracheostomy by ent  N mentating well, pain control  resp on trach collar, monitor for critical airway 24 hrs s/p trach  cv hemodynamically stable  gi npo, swallow eval  gu/renal monitor uop  heme vte ppx  id zosyn  endo no changes    The patient is a critical care patient with life threatening hemodynamic and metabolic instability in SICU.  I have personally interviewed when possible and examined the patient, reviewed data and laboratory tests/x-rays and all pertinent electronic images.  I was physically present for the key portions of the evaluation and management (E/M) service provided.   The SICU team has a constant risk benefit analyzes discussion with the primary team, all consultants, House Staff and PA's on all decisions.  These diagnoses are unrelated to the surgical procedure noted above.  I meet with family if needed to get further history, discuss the case and make care decisions for this patient who might not be able to participate.  Time involved in performance of separately billable procedures was not counted toward my critical care time. There is no overlap.  I spent 55-75 minutes ( 0800Hrs-0915Hrs in AM/ 1600Hrs-1715Hrs in PM, or other time indicated) of critical care time for the diagnoses, assessment, plan and interventions.  This time excludes time spent on separate procedures and teaching.

## 2023-02-14 NOTE — H&P ADULT - PROBLEM SELECTOR PLAN 5
Dvt ppx: Lovenox QD  Code status: DNR/ DNI ([place] in chart from [facility])

## 2023-02-14 NOTE — PROGRESS NOTE ADULT - SUBJECTIVE AND OBJECTIVE BOX
53 y/o male w/ a PMHX of stage IV NSCLC adenocarcinoma  lung CA, HTN, anxiety on Xanax, chronic pain on Percocet, ho substance abuse  admitted on 2/10/23 via EMS for difficulty swallowing. Pt reports difficulty swallowing started x3 days ago and has been worsening since, states he is now unable to swallow pills or food, thinks he is withdrawing from the Xanax and Percocet. Denies SOB. Pt first treatment x3 weeks ago, next treatment in 2 days. Pt notes he has had similar Sx before however it usually self resolves. Pt able to swallow secretions w/ difficulty    2/11 -  Patient seen and examined at bedside earlier today, pt reports uncontrolled right sided neck pain, anxiety, can only tolerate liquid food, lost 20 lb in 1 month, reports  right medial  thigh mass causing pain too, + bm, denies urinary symptoms   2/12 - SLP recs NPO; no cp palps sob, is on O2 which is new for him, coughing    Physical exam:   T(F): 97.9 (12 Feb 2023 05:28), Max: 99.4 (11 Feb 2023 20:54)  HR: 100 (12 Feb 2023 07:43) (98 - 130)  BP: 144/97 (12 Feb 2023 05:28) (144/94 - 151/98)  RR: 20 (12 Feb 2023 05:28) (18 - 26)  SpO2: 94% (12 Feb 2023 07:43) (94% - 100%) nasal cannula  General : NAD, appear to be of stated age , well groomed   NERVOUS SYSTEM:  Alert & Oriented X3, non- focal exam, Motor Strength 5/5 B/L upper and lower extremities; DTRs 2+ intact and symmetric  HEAD:  Atraumatic, Normocephalic  EYES: EOMI, PERRLA, conjunctiva and sclera clear  HEENT:dry mucous membranes, right sided lump in the  neck , No JVD  CHEST: BS decreased bilaterally; No rales, +  rhonchi, +  wheezing  HEART: Regular rate and rhythm; No murmurs, no rubs or gallops  ABDOMEN: Soft, Non-tender, Non-distended; Bowel sounds present, no guarding , no peritoneal irritation   GENITOURINARY- Voiding, no suprapubic tenderness  EXTREMITIES:  2+ Peripheral Pulses, No clubbing, cyanosis,   edema  MUSCULOSKELETAL:- No muscle tenderness, Muscle tone normal, No joint tenderness, no Joint swelling,  Joint ROM –normal   SKIN-no rash, no lesion, RLE in upper thigh lump on medical side, + back lump      LABS/RAD   CT Neck Soft Tissue w/ IV Cont (02.10.23 @ 10:54) >  IMPRESSION: Large necrotic mass measuring at least 8.1 x 4.5 cm in the   retrotracheal region with compression and displacement of the trachea   anteriorly and compression of the pharynx and proximal esophagus as well   as the LEFT internal jugular vein and internal carotid artery posterior   laterally. This appears to obliterate the majority of the LEFT lobe of   the thyroid gland and likely represents a thyroid carcinoma. A necrotic   5.2 x 5.2 cm mass extends from the upper pole of the RIGHT lobe of the   thyroid gland with erosion through the RIGHT thyroid cartilage and   extension into the RIGHT paralaryngeal space, also suspicious for thyroid   carcinoma. A necrotic 2.6 x 1.8 cm LEFT level 2 lymph node is noted. And   a necrotic 1 cm LEFT level 3 lymph node is noted.      CT Chest w/ IV Cont (02.10.23 @ 10:53) >  Stable left upper lobe neoplasm.  Increased mass in the lower neck, contiguous with the thyroid and   inseparable from the proximal esophagus.  New bronchial and bronchiolar impaction in both lungs. Consider   aspiration or infectious bronchiolitis.  Multiple other lung nodules, some unchanged, some smaller, and some new.  New right adrenal metastasis.      LABS: All Labs Reviewed                  9.9    9.92  )-----------( [id number]      ( 12 Feb 2023 07:04 )             29.3   132<L>  |  98  |  5<L>  ----------------------------<  187<H>  3.9   |  28  |  0.57  Ca    8.7      12 Feb 2023 07:04  Phos  4.2     02-12  Mg     1.8     02-12      acetaminophen     Tablet .. 650 milliGRAM(s) Oral every 6 hours PRN  albuterol    0.083% 2.5 milliGRAM(s) Nebulizer three times a day  albuterol    90 MICROgram(s) HFA Inhaler 2 Puff(s) Inhalation every 6 hours PRN  albuterol/ipratropium for Nebulization 3 milliLiter(s) Nebulizer every 6 hours  aluminum hydroxide/magnesium hydroxide/simethicone Suspension 30 milliLiter(s) Oral every 4 hours PRN  bisacodyl 5 milliGRAM(s) Oral every 12 hours PRN  dextrose 5% + sodium chloride 0.9% with potassium chloride 20 mEq/L 1000 milliLiter(s) IV Continuous <Continuous>  enoxaparin Injectable 40 milliGRAM(s) SubCutaneous every 24 hours  fentaNYL   Patch  25 MICROgram(s)/Hr 1 Patch Transdermal every 72 hours  folic acid 1 milliGRAM(s) Oral daily  gabapentin 300 milliGRAM(s) Oral three times a day  hydrALAZINE Injectable 5 milliGRAM(s) IV Push every 6 hours PRN  HYDROmorphone  Injectable 0.5 milliGRAM(s) IV Push every 3 hours PRN  levothyroxine 50 MICROGram(s) Oral daily  LORazepam    Concentrate 1 milliGRAM(s) Oral every 6 hours PRN  LORazepam   Injectable 0.5 milliGRAM(s) IV Push every 8 hours PRN  melatonin 3 milliGRAM(s) Oral at bedtime PRN  methylPREDNISolone sodium succinate Injectable 40 milliGRAM(s) IV Push every 8 hours  nicotine - 21 mG/24Hr(s) Patch 1 Patch Transdermal daily  ondansetron Injectable 4 milliGRAM(s) IV Push every 8 hours PRN  oxyCODONE    Solution 5 milliGRAM(s) Oral every 4 hours PRN  piperacillin/tazobactam IVPB.. 3.375 Gram(s) IV Intermittent every 8 hours              
53 y/o male w/ a PMHX of stage IV NSCLC adenocarcinoma  lung CA, HTN, anxiety on Xanax, chronic pain on Percocet, ho substance abuse  admitted on 2/10/23 via EMS for difficulty swallowing. Pt reports difficulty swallowing started x3 days ago and has been worsening since, states he is now unable to swallow pills or food, thinks he is withdrawing from the Xanax and Percocet. Denies SOB. Pt first treatment x3 weeks ago, next treatment in 2 days. Pt notes he has had similar Sx before however it usually self resolves. Pt able to swallow secretions w/ difficulty.    2/13 - anxious, pain under control, audible wheezing at bedside     VITALS: stable, 100% SaO2 on RA   GENERAL: NAD, able to lie flat in bed  HEAD:  Atraumatic, Normocephalic  EYES: EOMI, PERRLA, normal sclera  ENT: Moist mucous membranes  NECK: rt neck mass, neck supple   CHEST/LUNG: audible wheezing at bedside  HEART: Regular rate and rhythm; tachy   ABDOMEN: Bowel sounds present; Soft, Nontender, Nondistended. No hepatomegaly  EXTREMITIES:  no pitting bilaterally  NERVOUS SYSTEM:  Alert & Oriented X3, speech clear. No focal motor or sensory deficits  MSK: FROM all 4 extremities, full and equal strength  SKIN: No rashes or lesions    LABS: All Labs Reviewed:                    9.9    9.92  )-----------( 638      ( 12 Feb 2023 07:04 )             29.3   134<L>  |  101  |  11  ----------------------------<  166<H>  3.8   |  25  |  0.51  TPro  6.9  /  Alb  2.8<L>  /  TBili  0.7  /  DBili  x   /  AST  16  /  ALT  22  /  AlkPhos  114  02-12    RADIOLOGY/EKG:  < from: CT Lower Extremity w/ IV Cont, Bilateral (02.11.23 @ 17:40) >  Large soft tissue mass with aggressive features centered in the right   ischiofemoral interval as described above. Given the history of   metastatic lung cancer, the finding is likely to represent a large soft   tissue metastasis.  < from: CT Neck Soft Tissue w/ IV Cont (02.10.23 @ 10:54) >  IMPRESSION: Large necrotic mass measuring at least 8.1 x 4.5 cm in the   retrotracheal region with compression and displacement of the trachea   anteriorly and compression of the pharynx and proximal esophagus as well   as the LEFT internal jugular vein and internal carotid artery posterior   laterally. This appears to obliterate the majority of the LEFT lobe of   the thyroid gland and likely represents a thyroid carcinoma. A necrotic   5.2 x 5.2 cm mass extends from the upper pole of the RIGHT lobe of the   thyroid gland with erosion through the RIGHT thyroid cartilage and ..    MEDS:   acetaminophen     Tablet .. 650 milliGRAM(s) Oral every 6 hours PRN  albuterol    0.083% 2.5 milliGRAM(s) Nebulizer three times a day  albuterol    90 MICROgram(s) HFA Inhaler 2 Puff(s) Inhalation every 6 hours PRN  albuterol/ipratropium for Nebulization 3 milliLiter(s) Nebulizer every 6 hours  albuterol/ipratropium for Nebulization. 3 milliLiter(s) Nebulizer once  aluminum hydroxide/magnesium hydroxide/simethicone Suspension 30 milliLiter(s) Oral every 4 hours PRN  bisacodyl 5 milliGRAM(s) Oral every 12 hours PRN  dextrose 5% + sodium chloride 0.9% with potassium chloride 20 mEq/L 1000 milliLiter(s) IV Continuous <Continuous>  enoxaparin Injectable 40 milliGRAM(s) SubCutaneous every 24 hours  fentaNYL   Patch  50 MICROgram(s)/Hr 1 Patch Transdermal every 72 hours  folic acid 1 milliGRAM(s) Oral daily  gabapentin 300 milliGRAM(s) Oral three times a day  hydrALAZINE Injectable 5 milliGRAM(s) IV Push every 6 hours PRN  HYDROmorphone  Injectable 1 milliGRAM(s) IV Push every 3 hours PRN  HYDROmorphone  Injectable 0.5 milliGRAM(s) IV Push every 3 hours PRN  levothyroxine 50 MICROGram(s) Oral daily  LORazepam   Injectable 0.5 milliGRAM(s) IV Push every 8 hours PRN  melatonin 3 milliGRAM(s) Oral at bedtime PRN  methylPREDNISolone sodium succinate Injectable 40 milliGRAM(s) IV Push every 8 hours  nicotine - 21 mG/24Hr(s) Patch 1 Patch Transdermal daily  ondansetron Injectable 4 milliGRAM(s) IV Push every 8 hours PRN  piperacillin/tazobactam IVPB.. 3.375 Gram(s) IV Intermittent every 8 hours  zolpidem 5 milliGRAM(s) Oral at bedtime        
Patient is a 52y old  Male who presents with a chief complaint of failure to thrive (2023 14:31)      HPI:  53 y/o male w/ a PMHX of stage IV lung CA, HTN, anxiety on Xanax, chronic pain on Percocet, and substance abuse presents to the ED via EMS for difficulty swallowing. Pt reports difficulty swallowing started x3 days ago and has been worsening since, states he is now unable to swallow pills or food, thinks he is withdrawing from the Xanax and Percocet. Denies SOB. Pt first treatment x3 weeks ago, next treatment in 2 days. Pt notes he has had similar Sx before however it usually self resolves. Pt able to swallow secretions w/ difficulty  CT Neck  Large necrotic mass measuring at least 8.1 x 4.5 cm in the retrotracheal region with compression and displacement of the trachea   anteriorly and compression of the pharynx and proximal esophagus as well as the LEFT internal jugular vein and internal carotid artery posterior   laterally. This appears to obliterate the majority of the LEFT lobe of the thyroid gland and likely represents a thyroid carcinoma. A necrotic   5.2 x 5.2 cm mass extends from the upper pole of the RIGHT lobe of the thyroid gland with erosion through the RIGHT thyroid cartilage and   extension into the RIGHT paralaryngeal space, also suspicious for thyroid carcinoma. A necrotic 2.6 x 1.8 cm LEFT level 2 lymph node is noted. And a necrotic 1 cm LEFT level 3 lymph node is noted.      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)      Lung cancer      Injury due to foreign body  right wrist          PREVIOUS DIAGNOSTIC TESTING:      MEDICATIONS  (STANDING):  albuterol    0.083% 2.5 milliGRAM(s) Nebulizer three times a day  dextrose 5% + sodium chloride 0.9% with potassium chloride 20 mEq/L 1000 milliLiter(s) (100 mL/Hr) IV Continuous <Continuous>  enoxaparin Injectable 40 milliGRAM(s) SubCutaneous every 24 hours  fentaNYL   Patch  25 MICROgram(s)/Hr 1 Patch Transdermal every 72 hours  folic acid 1 milliGRAM(s) Oral daily  gabapentin 300 milliGRAM(s) Oral three times a day  levothyroxine 50 MICROGram(s) Oral daily  nicotine - 21 mG/24Hr(s) Patch 1 Patch Transdermal daily  piperacillin/tazobactam IVPB.. 3.375 Gram(s) IV Intermittent every 8 hours  sodium chloride 0.9% 1000 milliLiter(s) (100 mL/Hr) IV Continuous <Continuous>    MEDICATIONS  (PRN):  acetaminophen     Tablet .. 650 milliGRAM(s) Oral every 6 hours PRN Temp greater or equal to 38C (100.4F), Mild Pain (1 - 3)  albuterol    90 MICROgram(s) HFA Inhaler 2 Puff(s) Inhalation every 6 hours PRN Bronchospasm  aluminum hydroxide/magnesium hydroxide/simethicone Suspension 30 milliLiter(s) Oral every 4 hours PRN Dyspepsia  bisacodyl 5 milliGRAM(s) Oral every 12 hours PRN Constipation  hydrALAZINE Injectable 5 milliGRAM(s) IV Push every 6 hours PRN for SBP >140  HYDROmorphone  Injectable 0.5 milliGRAM(s) IV Push every 3 hours PRN Severe Pain (7 - 10)  LORazepam    Concentrate 1 milliGRAM(s) Oral every 6 hours PRN Anxiety  LORazepam   Injectable 0.5 milliGRAM(s) IV Push every 8 hours PRN severe anxiety  melatonin 3 milliGRAM(s) Oral at bedtime PRN Insomnia  ondansetron Injectable 4 milliGRAM(s) IV Push every 8 hours PRN Nausea and/or Vomiting  oxyCODONE    Solution 5 milliGRAM(s) Oral every 4 hours PRN Moderate Pain (4 - 6)      FAMILY HISTORY:  FH: rheumatic fever (Father)        SOCIAL HISTORY:  ***    REVIEW OF SYSTEM:  dyspnea     Vital Signs Last 24 Hrs  T(C): 36.7 (2023 07:20), Max: 36.9 (2023 21:19)  T(F): 98.1 (2023 07:20), Max: 98.4 (2023 21:19)  HR: 129 (2023 07:20) (100 - 129)  BP: 147/98 (2023 07:20) (144/89 - 169/99)  BP(mean): --  RR: 22 (2023 07:20) (20 - 22)  SpO2: 96% (2023 07:20) (95% - 99%)    Parameters below as of 2023 07:20  Patient On (Oxygen Delivery Method): room air          I&O's Summary    PHYSICAL EXAM  General Appearance: cooperative, no acute distress,   HEENT: PERRL, conjunctiva clear, EOM's intact, non injected pharynx, no exudate, TM   normal  Neck: Supple, , no adenopathy, thyroid: not enlarged, no carotid bruit or JVD  Back: Symmetric, no  tenderness,no soft tissue tenderness  Lungs: stridor is present   Heart: Regular rate and rhythm, S1, S2 normal, no murmur, rub or gallop  Abdomen: Soft, non-tender, bowel sounds active , no hepatosplenomegaly  Extremities: no cyanosis or edema, no joint swelling  Skin: Skin color, texture normal, no rashes   Neurologic: Alert and oriented X3    ECG:    LABS:                          10.2   7.98  )-----------( 548      ( 10 Feb 2023 09:36 )             30.5     02-11    131<L>  |  98  |  5<L>  ----------------------------<  138<H>  3.4<L>   |  28  |  0.48<L>    Ca    8.5      2023 06:49  Phos  3.4     02-11  Mg     1.4     02-11    TPro  6.5  /  Alb  2.6<L>  /  TBili  0.6  /  DBili  0.3  /  AST  14<L>  /  ALT  16  /  AlkPhos  114  02-11            PT/INR - ( 10 Feb 2023 09:36 )   PT: 14.5 sec;   INR: 1.25 ratio         PTT - ( 10 Feb 2023 09:36 )  PTT:30.7 sec  Urinalysis Basic - ( 10 Feb 2023 11:29 )    Color: Yellow / Appearance: Clear / S.005 / pH: x  Gluc: x / Ketone: Small  / Bili: Negative / Urobili: Negative   Blood: x / Protein: Negative / Nitrite: Negative   Leuk Esterase: Negative / RBC: x / WBC x   Sq Epi: x / Non Sq Epi: x / Bacteria: x            RADIOLOGY & ADDITIONAL STUDIES:    
52-year-old male   long history tobacco use  July 2022 presented with metastatic advanced poorly differentiated adenocarcinoma of the lung   PD-L1 high   received 4 cycles of single agent palliative Keytruda/ immunotherapy   had positive response     patient did not return for follow-up     Several weeks ago returned to our office weeks ago with progressive disease     restarted on treatment with Keytruda , AND  concurrent chemotherapy carboplatin and Alimta   had progressive disease in the neck and had seen radiation therapy in consultation   Was scheduled to begin palliative radiation Monday February, February 13, ,  as well as concurrent carboplatin Alimta cycle #2 with Keytruda    now having stridor]    s/b rad onc and recommendation was for inpaitent palliative RT if not dischargable  patient has not responded to first dose of carboplatin and pemetrexed 3 weeks back     past medical history includes   tobacco, alcohol, subtance abuse       metastatic cancer, hypothyroidism    ROS   difficulty swallowing  solids   profoundly weak  short of breath   no fevers no chills     physical examination   frail cachectic 52-year-old male   neck firm hard masses bilateral neck  Stridor audible   lungs wheeze bilaterally   heart S1-S2   abdomen soft nontender bowel sound present   palpable mass/  nodule right lower back   extremities: mass on right thigh      CT neck with IV contrast  CLINICAL INFORMATION: Difficulty swallowing    TECHNIQUE:  Contiguous axial 3 mm thicksections were obtained through   the neck using single helical acquisition.  Images were acquired during   the intravenous administration of 60 cc of Omnipaque 350/ 0 cc discarded.    Image data was reconstructed in the 3 mm sagittal and coronal planes.    This scan was performed using automatic exposure control (radiation dose   reduction software) to obtain a diagnostic image quality scan with   patient dose as low as reasonably achievable.    FINDINGS:   No prior similar studies are available for review.    Large necrotic mass measuring at least 8.1 x 4.5 cm in the retrotracheal   region with compression and displacement of the trachea anteriorly and   compression of the pharynx and proximal esophagus as well as the LEFT   internal jugularvein and internal carotid artery posterior laterally.   This appears to obliterate the majority of the LEFT lobe of the thyroid   gland and likely represents a thyroid carcinoma. A necrotic 5.2 x 5.2 cm   mass extends from the upper pole of the RIGHTlobe of the thyroid gland   with erosion through the RIGHT thyroid cartilage and extension into the   RIGHT paralaryngeal space, also suspicious for thyroid carcinoma. A   necrotic 2.6 x 1.8 cm LEFT level 2 lymph node is noted. And a necrotic 1   cm LEFT level 3 lymph node is noted.    The larynx is grossly intact.  The preepiglottic and LEFT paralaryngeal   spaces are intact.  Laryngeal cartilages remain intact.    The nasopharynx is symmetric.  No lateral retropharyngeal mass is found.    The underlying central skull base is intact.  The petrous temporal bones   and mastoids remain clear.  The visualized base of brain appears   unremarkable.    The parotid and submandibular glands are intact.    The visualized paranasal sinuses are significant for minimal mucosal   thickening in the BILATERAL maxillary alveolar recesses.  The nasal   cavity is unremarkable.        The cervical spine show mild degenerative changes.    The carotid and vertebral arteries demonstrate enhancement indicating   their patency.    The lung apices demonstrate a 5.6 x 5.8 cm LEFT suprahilar mass extending   into the LEFT upper lobe from the aorticopulmonary window possibly   representing metastatic lymphadenopathy. See chest CT for details.      IMPRESSION: Large necrotic mass measuring at least 8.1 x 4.5 cm in the   retrotracheal region with compression and displacement of the trachea   anteriorly and compression of the pharynx and proximal esophagus as well   as the LEFT internal jugular vein and internal carotid artery posterior   laterally. This appears to obliterate the majority of the LEFT lobe of   the thyroid gland and likely represents a thyroid carcinoma. A necrotic   5.2 x 5.2 cm mass extends from the upper pole of the RIGHT lobe of the   thyroid gland with erosion through the RIGHT thyroid cartilage and   extension into the RIGHT paralaryngeal space, also suspicious for thyroid   carcinoma. A necrotic 2.6 x 1.8 cm LEFT level 2 lymph node is noted. And   a necrotic 1 cm LEFT level 3 lymph node is noted.        TECHNIQUE: CT of the bilateral lower extremities was performed with   intravenous contras. Primary images are located under a separate separate   abdominopelvic CT accession, reviewed and reported separately.    INTERPRETATION:    Soft tissues: Centered in the right ischiofemoral interval, and centered   at the right quadratus femoris muscle, there is a large soft tissue mass   with heterogeneous attenuation/enhancement which anteriorly displaces the   adductor muscles and posteriorly displaces the right gluteal musculature,   likely partly involving those muscles, measuring 13.3 cm   anteroposteriorly, 5.6 cm transversely, and 10.3 cm craniocaudally and   closely abutting the right sciatic nerve.    Bones: No acute fracture. No substantial knee joint effusion bilaterally.    IMPRESSION:  Large soft tissue mass with aggressive features centered in the right   ischiofemoral interval as described above. Given the history of   metastatic lung cancer, the finding is likely to represent a large soft   tissue metastasis.    The finding is new from the prior CT abdomen and pelvis of 9/20/2022.    The lesion closely abuts the right sciatic nerve. MRI would be helpful to   further characterize the local anatomy, if clinically important.    --- End of Report ---            SHLOMIT GOLDBERG-STEIN MD; Attending Radiologist  This document has been electronically signed. Feb 11 2023  5:59PM
Subjective:  Chief complain :  dysphagia    HPI:     53 y/o male w/ a PMHX of stage IV NSCLC adenocarcinoma  lung CA, HTN, anxiety on Xanax, chronic pain on Percocet, ho substance abuse  admitted on 2/10/23 via EMS for difficulty swallowing. Pt reports difficulty swallowing started x3 days ago and has been worsening since, states he is now unable to swallow pills or food, thinks he is withdrawing from the Xanax and Percocet. Denies SOB. Pt first treatment x3 weeks ago, next treatment in 2 days. Pt notes he has had similar Sx before however it usually self resolves. Pt able to swallow secretions w/ difficulty     -  Patient seen and examined at bedside earlier today, pt reports uncontrolled right sided neck pain, anxiety, can only tolerate liquid food, lost 20 lb in 1 month, reports  right medial  thigh mass causing pain too, + bm, denies urinary symptoms     Review of system- Rest of the review of system are negative except mentioned in HPI     Vital sings reviewed for last 24 h  T(C): 36.9 (23 @ 08:43), Max: 37.2 (02-10-23 @ 16:30)  T(F): 98.4 (23 @ 08:43), Max: 98.9 (02-10-23 @ 16:30)  HR: 95 (23 @ 08:43) (85 - 101)  BP: 160/98 (23 @ 08:43) (143/73 - 162/94)  RR: 18 (23 @ 08:43) (18 - 18)  SpO2: 95% (23 @ 08:43) (95% - 99%)  Wt(kg): --  Daily     Daily   CAPILLARY BLOOD GLUCOSE      Physical exam:   General : NAD, appear to be of stated age , well groomed   NERVOUS SYSTEM:  Alert & Oriented X3, non- focal exam, Motor Strength 5/5 B/L upper and lower extremities; DTRs 2+ intact and symmetric  HEAD:  Atraumatic, Normocephalic  EYES: EOMI, PERRLA, conjunctiva and sclera clear  HEENT:dry mucous membranes, right sided lump in the  neck , No JVD  CHEST: BS decreased bilaterally; No rales, +  rhonchi, +  wheezing  HEART: Regular rate and rhythm; No murmurs, no rubs or gallops  ABDOMEN: Soft, Non-tender, Non-distended; Bowel sounds present, no guarding , no peritoneal irritation   GENITOURINARY- Voiding, no suprapubic tenderness  EXTREMITIES:  2+ Peripheral Pulses, No clubbing, cyanosis,   edema  MUSCULOSKELETAL:- No muscle tenderness, Muscle tone normal, No joint tenderness, no Joint swelling,  Joint ROM –normal   SKIN-no rash, no lesion, RLE in upper thigh lump on medical side, + back lump    Labs radiologic and other test : all reviewed and interpret                         10.  )-----------( 548      ( 10 Feb 2023 09:36 )             30.5     02-    131<L>  |  98  |  5<L>  ----------------------------<  138<H>  3.4<L>   |  28  |  0.48<L>    Ca    8.5      2023 06:49  Phos  3.4       Mg     1.4         TPro  6.5  /  Alb  2.6<L>  /  TBili  0.6  /  DBili  0.3  /  AST  14<L>  /  ALT  16  /  AlkPhos  114  -11    PT/INR - ( 10 Feb 2023 09:36 )   PT: 14.5 sec;   INR: 1.25 ratio         PTT - ( 10 Feb 2023 09:36 )  PTT:30.7 sec        Urinalysis Basic - ( 10 Feb 2023 11:29 )    Color: Yellow / Appearance: Clear / S.005 / pH: x  Gluc: x / Ketone: Small  / Bili: Negative / Urobili: Negative   Blood: x / Protein: Negative / Nitrite: Negative   Leuk Esterase: Negative / RBC: x / WBC x   Sq Epi: x / Non Sq Epi: x / Bacteria: x    CT Neck Soft Tissue w/ IV Cont (02.10.23 @ 10:54) >  IMPRESSION: Large necrotic mass measuring at least 8.1 x 4.5 cm in the   retrotracheal region with compression and displacement of the trachea   anteriorly and compression of the pharynx and proximal esophagus as well   as the LEFT internal jugular vein and internal carotid artery posterior   laterally. This appears to obliterate the majority of the LEFT lobe of   the thyroid gland and likely represents a thyroid carcinoma. A necrotic   5.2 x 5.2 cm mass extends from the upper pole of the RIGHT lobe of the   thyroid gland with erosion through the RIGHT thyroid cartilage and   extension into the RIGHT paralaryngeal space, also suspicious for thyroid   carcinoma. A necrotic 2.6 x 1.8 cm LEFT level 2 lymph node is noted. And   a necrotic 1 cm LEFT level 3 lymph node is noted.    Critical value:  I discussed the finding of this report with Dr. Gallegos   at 11:30 AM on 02/10/2023.  Critical value policy of the hospital was   followed.  Read back and confirmation of receipt of this communication   was performed.  This verbal communication supplements the text report of   this document.    CT Chest w/ IV Cont (02.10.23 @ 10:53) >  IMPRESSION:    Since 2022:    Stable left upper lobe neoplasm.    Increased mass in the lower neck, contiguous with the thyroid and   inseparable from the proximal esophagus.    New bronchial and bronchiolar impaction in both lungs. Consider   aspiration or infectious bronchiolitis.    Multiple other lung nodules, some unchanged, some smaller, and some new.    New right adrenal metastasis.      RECENT CULTURES:  Culture - Urine (02.10.23 @ 11:29)    Specimen Source: Clean Catch None    Culture Results:   <10,000 CFU/mL Normal Urogenital Kyara    Cardiac testing : reviewed   EKG - sinus , no ischemic changes     Thyroid Stimulating Hormone, Serum (23 @ 06:49)    Thyroid Stimulating Hormone, Serum: 4.92 uU/mL    Procedures :     Devices:     Current medications:  acetaminophen     Tablet .. 650 milliGRAM(s) Oral every 6 hours PRN  albuterol    90 MICROgram(s) HFA Inhaler 2 Puff(s) Inhalation every 6 hours PRN  aluminum hydroxide/magnesium hydroxide/simethicone Suspension 30 milliLiter(s) Oral every 4 hours PRN  bisacodyl 5 milliGRAM(s) Oral every 12 hours PRN  dextrose 5% + sodium chloride 0.9% with potassium chloride 20 mEq/L 1000 milliLiter(s) IV Continuous <Continuous>  fentaNYL   Patch  25 MICROgram(s)/Hr 1 Patch Transdermal every 72 hours  folic acid 1 milliGRAM(s) Oral daily  gabapentin 300 milliGRAM(s) Oral three times a day  HYDROmorphone  Injectable 0.5 milliGRAM(s) IV Push every 3 hours PRN  levothyroxine 50 MICROGram(s) Oral daily  LORazepam    Concentrate 1 milliGRAM(s) Oral every 6 hours PRN  LORazepam   Injectable 0.5 milliGRAM(s) IV Push every 8 hours PRN  melatonin 3 milliGRAM(s) Oral at bedtime PRN  nicotine - 21 mG/24Hr(s) Patch 1 Patch Transdermal daily  ondansetron Injectable 4 milliGRAM(s) IV Push every 8 hours PRN  oxyCODONE    Solution 5 milliGRAM(s) Oral every 4 hours PRN  piperacillin/tazobactam IVPB.. 3.375 Gram(s) IV Intermittent every 8 hours            
· Subjective and Objective:   52-year-old male   long history tobacco use  July 2022 presented with metastatic advanced poorly differentiated adenocarcinoma of the lung   PD-L1 very elevated   received 4 cycles of single agent palliative Keytruda/ immunotherapy   had positive response     patient did not return for follow-up     Several weeks ago returned to our office weeks ago with progressive disease     restarted on treatment with Keytruda , AND  concurrent chemotherapy carboplatin and Alimta   had progressive disease in the neck and had seen radiation therapy in consultation   Was scheduled to begin palliative radiation Monday February, February 13, ,  as well as concurrent carboplatin Alimta cycle #2 with Keytruda     now presents to emergency room with days of increased difficulty swallowing, increased weakness   as per admission note concerned about withdrawing from Xanax and Percocet    February 12, 2023   pain relatively well controlled on fentanyl and liquid narcotics   anorexic, profoundly weak   earlier discomfort     past medical history includes   tobacco, alcohol, subtance abuse       metastatic cancer, hypothyroidism    ROS   difficulty swallowing  solids   profoundly weak   no fevers no chills     physical examination   frail cachectic 52-year-old male   neck firm hard masses bilateral neck   lungs wheeze bilaterally   heart S1-S2   abdomen soft nontender bowel sound present   palpable mass/  nodule right lower back   extremities: mass on  upper  inner right thigh

## 2023-02-14 NOTE — DISCHARGE NOTE NURSING/CASE MANAGEMENT/SOCIAL WORK - PATIENT PORTAL LINK FT
You can access the FollowMyHealth Patient Portal offered by [facility] by registering at the following website: [email or web address]. By joining [employer]’s FollowMyHealth portal, you will also be able to view your health information using other applications (apps) compatible with our system.

## 2023-02-14 NOTE — DISCHARGE NOTE NURSING/CASE MANAGEMENT/SOCIAL WORK - NSDCPEFALRISK_GEN_ALL_CORE
For information on Fall & Injury Prevention, visit: [email or web address] OR  [email or web address] OR  https://www.cdc.gov/steadi/patient.html

## 2023-02-14 NOTE — CONSULT NOTE ADULT - SUBJECTIVE AND OBJECTIVE BOX
=====================================================                                                             SICU Consultation Note                                                             =====================================================  Patient is a 52y old  Male who presents with a chief complaint of Palliative Radiation (14 Feb 2023 18:00)    52 year old male with a history of stage 4 lung cancer diagnosed 8/2022 that is admitted to [facility] for Palliative radiation. Patient also with right sided neck mass with compression if trachea. Patient c/o SOB, dyspnea,. Not able to tolerate oral diet. Choking on liquids and solids.  Admits to 30lb weight loss over several months.  Patient s/p open trach, recovering well in PACU. SICU consulted for observation overnight in the setting of fresh trach.    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 650 milliGRAM(s) Oral every 6 hours PRN  albuterol    90 MICROgram(s) HFA Inhaler 2 Puff(s) Inhalation every 6 hours PRN  albuterol/ipratropium for Nebulization 3 milliLiter(s) Nebulizer every 6 hours  aluminum hydroxide/magnesium hydroxide/simethicone Suspension 30 milliLiter(s) Oral every 4 hours PRN  bisacodyl 5 milliGRAM(s) Oral every 12 hours PRN  enoxaparin Injectable 30 milliGRAM(s) SubCutaneous every 24 hours  folic acid 1 milliGRAM(s) Oral daily  gabapentin 300 milliGRAM(s) Oral every 8 hours  HYDROmorphone  Injectable 1 milliGRAM(s) IV Push every 3 hours PRN  HYDROmorphone  Injectable 0.5 milliGRAM(s) IV Push every 3 hours PRN  HYDROmorphone  Injectable 0.5 milliGRAM(s) IV Push every 10 minutes PRN  HYDROmorphone  Injectable 1 milliGRAM(s) IV Push every 10 minutes PRN  influenza   Vaccine 0.5 milliLiter(s) IntraMuscular once  levothyroxine Injectable 40 MICROGram(s) IV Push at bedtime  melatonin 3 milliGRAM(s) Oral at bedtime  methylPREDNISolone sodium succinate Injectable 40 milliGRAM(s) IV Push every 8 hours  multivitamin/minerals/iron Oral Solution (CENTRUM) 15 milliLiter(s) Oral daily  nicotine - 21 mG/24Hr(s) Patch 1 Patch Transdermal daily  ondansetron Injectable 4 milliGRAM(s) IV Push every 8 hours PRN  ondansetron Injectable 4 milliGRAM(s) IV Push once PRN  piperacillin/tazobactam IVPB.- 3.375 Gram(s) IV Intermittent once  sodium chloride 0.9%. 1000 milliLiter(s) IV Continuous <Continuous>  zolpidem 5 milliGRAM(s) Oral at bedtime PRN      CURRENT MEDICATIONS:   --------------------------------------------------------------------------------------  Neurologic Medications  acetaminophen     Tablet .. 650 milliGRAM(s) Oral every 6 hours PRN Temp greater or equal to 38C (100.4F), Mild Pain (1 - 3)  gabapentin 300 milliGRAM(s) Oral every 8 hours  HYDROmorphone  Injectable 1 milliGRAM(s) IV Push every 3 hours PRN Severe Pain (7 - 10)  HYDROmorphone  Injectable 0.5 milliGRAM(s) IV Push every 3 hours PRN Moderate Pain (4 - 6)  HYDROmorphone  Injectable 0.5 milliGRAM(s) IV Push every 10 minutes PRN Moderate Pain (4 - 6)  HYDROmorphone  Injectable 1 milliGRAM(s) IV Push every 10 minutes PRN Severe Pain (7 - 10)  melatonin 3 milliGRAM(s) Oral at bedtime  ondansetron Injectable 4 milliGRAM(s) IV Push every 8 hours PRN Nausea and/or Vomiting  ondansetron Injectable 4 milliGRAM(s) IV Push once PRN Nausea and/or Vomiting  zolpidem 5 milliGRAM(s) Oral at bedtime PRN Insomnia    Respiratory Medications  albuterol    90 MICROgram(s) HFA Inhaler 2 Puff(s) Inhalation every 6 hours PRN Bronchospasm  albuterol/ipratropium for Nebulization 3 milliLiter(s) Nebulizer every 6 hours    Cardiovascular Medications    Gastrointestinal Medications  aluminum hydroxide/magnesium hydroxide/simethicone Suspension 30 milliLiter(s) Oral every 4 hours PRN Dyspepsia  bisacodyl 5 milliGRAM(s) Oral every 12 hours PRN Constipation  folic acid 1 milliGRAM(s) Oral daily  multivitamin/minerals/iron Oral Solution (CENTRUM) 15 milliLiter(s) Oral daily  sodium chloride 0.9%. 1000 milliLiter(s) IV Continuous <Continuous>    Hematologic/Oncologic Medications  enoxaparin Injectable 30 milliGRAM(s) SubCutaneous every 24 hours  influenza   Vaccine 0.5 milliLiter(s) IntraMuscular once    Antimicrobial/Immunologic Medications  piperacillin/tazobactam IVPB.- 3.375 Gram(s) IV Intermittent once    Endocrine/Metabolic Medications  levothyroxine Injectable 40 MICROGram(s) IV Push at bedtime  methylPREDNISolone sodium succinate Injectable 40 milliGRAM(s) IV Push every 8 hours    Topical/Other Medications  nicotine - 21 mG/24Hr(s) Patch 1 Patch Transdermal daily    --------------------------------------------------------------------------------------    VITAL SIGNS, INS/OUTS (last 24 hours):    T(C): 36.8 (02-14-23 @ 18:45), Max: 37 (02-14-23 @ 16:05)  HR: 94 (02-14-23 @ 19:00) (92 - 129)  BP: 113/81 (02-14-23 @ 19:00) (111/82 - 169/99)  ABP: --  ABP(mean): --  RR: 15 (02-14-23 @ 19:00) (15 - 23)  SpO2: 98% (02-14-23 @ 19:00) (95% - 100%)  --------------------------------------------------------------------------------------    EXAM  NEUROLOGY  Exam: Normal, NAD, alert, oriented x 3, no focal deficits.     HEENT  Exam: Normocephalic. EOMI. Large right sided neck mass, s/p trach    RESPIRATORY  Exam: Lungs clear to auscultation, Normal expansion/effort.  RA    CARDIOVASCULAR  Exam: S1, S2.  Regular rate and rhythm.     GI/NUTRITION  Exam: Abdomen soft, Non-tender, Non-distended.  Current Diet:  NPO    VASCULAR  Exam: Extremities warm, pink, well-perfused.     MUSCULOSKELETAL  Exam: All extremities moving spontaneously without limitations.      SKIN:  Exam: Good skin turgor, no skin breakdown.

## 2023-02-14 NOTE — PROGRESS NOTE ADULT - SUBJECTIVE AND OBJECTIVE BOX
52 year old male with lung cancer s/p chemo, transferred to [facility] for palliative radiation  preop scope showed b/l vocal cord paralysis  now s/p tracheostomy, DNR rescinded for tracheostomy  was easily intubated from above by anesthesia team with glidescope and 6.0 ETT   6 shiley cuffed trach now in place and sutured    plan:  - SICU overnight for fresh trach status  - transfer back to medicine team in AM  - trach collar  - will deflate cuff in AM  - will cut sutures and change trach to cuffless on POD5   52 year old male with lung cancer s/p chemo, transferred to [facility] for palliative radiation  preop scope showed b/l vocal cord paralysis  now s/p tracheostomy, DNR rescinded for tracheostomy  was easily intubated from above by anesthesia team with glidescope and 6.0 ETT   6 shiley cuffed trach now in place and sutured    plan:  - SICU overnight for fresh trach status  - transfer back to medicine team in AM  - trach collar  - will deflate cuff in AM  - will cut sutures and change trach to cuffless on POD5  - DNR resumed

## 2023-02-14 NOTE — H&P ADULT - NSHPLABSRESULTS_GEN_ALL_CORE
LABS:      02-13    134<L>  |  101  |  11  ----------------------------<  166<H>  3.8   |  25  |  0.51    Ca    9.0      13 Feb 2023 05:58  Mg     1.9     02-13      CT Neck  Large necrotic mass measuring at least 8.1 x 4.5 cm in the retrotracheal region with compression and displacement of the trachea   anteriorly and compression of the pharynx and proximal esophagus as well as the LEFT internal jugular vein and internal carotid artery posterior   laterally. This appears to obliterate the majority of the LEFT lobe of the thyroid gland and likely represents a thyroid carcinoma. A necrotic   5.2 x 5.2 cm mass extends from the upper pole of the RIGHT lobe of the thyroid gland with erosion through the RIGHT thyroid cartilage and   extension into the RIGHT paralaryngeal space, also suspicious for thyroid carcinoma. A necrotic 2.6 x 1.8 cm LEFT level 2 lymph node is noted. And a necrotic 1 cm LEFT level 3 lymph node is noted.

## 2023-02-14 NOTE — PATIENT PROFILE ADULT - ARRIVAL FROM
----- Message from VERENICE Hoff sent at 3/15/2019  8:34 AM CDT -----  Please notify the patient of stable results.  Follow-up as planned.  
Lab letter sent home  
Hospitals[facility] Facilities

## 2023-02-14 NOTE — CONSULT NOTE ADULT - PROBLEM SELECTOR RECOMMENDATION 9
1. Recommend SLP eval for diet  2. Would place NG feeding tube to optimize nutrition and consider Peg tube for long term nutritional needs based of SLP eval  3. Humidified air, prn consider heliox prn if patient decompensates  4. Patient is not able to be intubated without fiberoptic scope and would be difficult with fiberoptic scope. Tracheostomy might be difficult based off CT report and exam. Will discuss with attending.   5. palliative/ GOC 1. Recommend SLP eval for diet  2. Would place NG feeding tube to optimize nutrition and consider Peg tube for long term nutritional needs based of SLP eval  3. Humidified air, prn consider heliox prn if patient decompensates  4. Patient is not able to be intubated without fiberoptic scope and would be difficult with fiberoptic scope. Tracheostomy might be difficult based off CT report and exam. Will discuss with attending.   5. palliative/ GOC  6. Decadron 10 q 8 with pepcid 1. Recommend SLP eval for diet  2. Would place NG feeding tube to optimize nutrition and consider Peg tube for long term nutritional needs based of SLP eval  3. Humidified air, prn consider heliox prn if patient decompensates  4. Patient is not able to be intubated without fiberoptic scope and would be difficult with fiberoptic scope. Tracheostomy might be difficult based off CT report and exam. Will discuss with attending.   5. palliative/ GOC  6. Decadron 10 q 8 with pepcid  7. Patient is consented for tracheostomy tube placement with Dr. Guardado today  8. NPO/IVF  9. Patient is on the addon list for OR today  10. Primary team updated  11. Case discussed with Dr. Guardado

## 2023-02-14 NOTE — H&P ADULT - PROBLEM SELECTOR PLAN 1
Dysphagia sec to large mass  8.1 x 4.5 cm in the retrotracheal region with displacement of the trachea  anteriorly , compression of the pharynx and proximal esophagus  Keep patient NPO - started on IVF maintenance   RadOnc consulted for palliative radiation  ENT consulted on recommendation of RadOnc for evaluation of airway  Dilaudid PRN for pain- patient unable to tolerate any PO Dysphagia sec to large mass  8.1 x 4.5 cm in the retrotracheal region with displacement of the trachea  anteriorly , compression of the pharynx and proximal esophagus  Keep patient NPO - started on IVF maintenance   RadOnc consulted for palliative radiation  ENT consulted on recommendation of RadOnc for evaluation of airway  Dilaudid PRN for pain- patient unable to tolerate any PO  Continue with Solu-Medrol 40mg Q8H- started in HH

## 2023-02-14 NOTE — H&P ADULT - HISTORY OF PRESENT ILLNESS
52 year old male w/ a PMHX of stage IV lung CA, HTN, anxiety on Xanax, chronic pain on Percocet, and substance abuse was admitted to [facility] on 2/10/23 due to difficulty swallowing. Pt has had difficulty swallowing for th past week. Pt states it has been worsening since, states he is now unable to swallow pills or food. Pt was found to have large necrotic mass measuring at least 8.1 x 4.5 cm in the retrotracheal region with compression and displacement of the trachea   anteriorly and compression of the pharynx and proximal esophagus as well as the LEFT internal jugular vein and internal carotid artery posterior   laterally. Pt also developed stridor for the past week. Pt is transferred to [facility] for Palliative Radiation.   Denies chest pain, abdominal pain.

## 2023-02-14 NOTE — PROGRESS NOTE ADULT - ASSESSMENT
1) Stage IV Lung Cancer  2) Necrotic Thyroid Mass  3) Dyspnea   4) Aspiration Risk  5) Abnormal CT Chest    53 y/o male w/ a PMHX of stage IV lung CA, HTN, anxiety on Xanax, chronic pain on Percocet, and substance abuse presents to the ED via EMS for difficulty swallowing. Pt reports difficulty swallowing started x3 days ago and has been worsening since, states he is now unable to swallow pills or food, thinks he is withdrawing from the Xanax and Percocet. Denies SOB. Pt first treatment x3 weeks ago, next treatment in 2 days. Pt notes he has had similar Sx before however it usually self resolves. Pt able to swallow secretions w/ difficulty  CT Neck  Large necrotic mass measuring at least 8.1 x 4.5 cm in the retrotracheal region with compression and displacement of the trachea   anteriorly and compression of the pharynx and proximal esophagus as well as the LEFT internal jugular vein and internal carotid artery posterior   laterally. This appears to obliterate the majority of the LEFT lobe of the thyroid gland and likely represents a thyroid carcinoma. A necrotic   5.2 x 5.2 cm mass extends from the upper pole of the RIGHT lobe of the thyroid gland with erosion through the RIGHT thyroid cartilage and   extension into the RIGHT paralaryngeal space, also suspicious for thyroid carcinoma. A necrotic 2.6 x 1.8 cm LEFT level 2 lymph node is noted. And a necrotic 1 cm LEFT level 3 lymph node is noted.  CT Chest- No pulmonary embolism.  Decreased left upper lobe mass with endobronchial component that occludes   the apicoposterior segmental bronchus . Decreased left upper lobe groundglass and increased linear scarring adjacent to the mass, likely related to treatment. Two indeterminate middle lobe nodules.  A third middle lobe nodule and  opacities in the left lower lobe are decreased.  Reviewed images independently   There is a possibility that he may receive Radiation but in the interim, continue IV Solumedrol 40mg q 8 hrs  Continue nebulization - stridor is secondary to a mass effect from the necrotic lesion. He has slightly less inspiratory and expiratory stridor after speaking but it stars again after a few breaths   Awaiting transfer to [facility] for Radiation

## 2023-02-14 NOTE — H&P ADULT - NSHPSOCIALHISTORY_GEN_ALL_CORE
Pt states he lives alone. Smoked for 25 years 1ppd , quit when he was diagnosed with cancer in June 2022.

## 2023-02-14 NOTE — H&P ADULT - NSHPREVIEWOFSYSTEMS_GEN_ALL_CORE
REVIEW OF SYSTEMS:    CONSTITUTIONAL:  No weakness, fevers or chills  EYES/ENT:  No visual changes;  No vertigo or throat pain   NECK:  painful neck mass, unable to swallow   RESPIRATORY:  + stridor, no shortness of breath   CARDIOVASCULAR:  No chest pain or palpitations  GASTROINTESTINAL:  No abdominal or epigastric pain. No nausea, vomiting, or hematemesis; No diarrhea or constipation. No melena or hematochezia.  GENITOURINARY:  No dysuria, frequency or hematuria  MUSCULOSKELETAL:  FROM all extremities, normal strength, No calf tenderness  NEUROLOGICAL:  No numbness or weakness  SKIN:  No itching, rashes Patient

## 2023-02-15 NOTE — PROGRESS NOTE ADULT - PROBLEM SELECTOR PLAN 2
Upper respiratory wheezing known Adenocarcinoma of SHAILA with Multifocal PNA likely aspiration vs bronchiolitis  Continue with Zosyn - was started at [facility]  Monitor fever and wbc curve  Deescalate abx as warranted

## 2023-02-15 NOTE — PROGRESS NOTE ADULT - PROBLEM SELECTOR PLAN 5
Dvt ppx: Lovenox QD  hypercoagulable state d/t malignancy  Code status: DNR/ DNI ([place] in chart from [facility])

## 2023-02-15 NOTE — PROGRESS NOTE ADULT - SUBJECTIVE AND OBJECTIVE BOX
52 year old male with lung cancer s/p chemo, transferred to [facility] for palliative radiation. Tracheostomy performed 2/14. No acute events overnight.     Vital Signs Last 24 Hrs  T(C): 36.6 (15 Feb 2023 07:00), Max: 37 (14 Feb 2023 16:05)  T(F): 97.9 (15 Feb 2023 07:00), Max: 98.6 (14 Feb 2023 16:05)  HR: 93 (15 Feb 2023 07:00) (84 - 129)  BP: 126/97 (15 Feb 2023 07:00) (109/79 - 149/96)  BP(mean): 102 (15 Feb 2023 07:00) (84 - 106)  RR: 14 (15 Feb 2023 07:00) (10 - 23)  SpO2: 100% (15 Feb 2023 07:00) (95% - 100%)    Parameters below as of 15 Feb 2023 03:30  Patient On (Oxygen Delivery Method): room air      General: NAD, A+Ox3  Respiratory: No respiratory distress, stridor, or stertor  Voice quality: unable to assess   Face:  Symmetric without masses or lesions  Neck: 6 shilfey cuffed trach sutured in place, cuff deflated on rounds this morning        52 year old male Stage IV lung cancer s/p chemo w/ b/l thyroid mass, p/w stridor 2/14, s/p trach 2/14.  plan:  - SICU overnight for fresh trach status, plan to return to medicine team today  - trach collar, cuff deflated this morning  - will cut sutures and change trach to cuffless on POD5  - DNR resumed

## 2023-02-15 NOTE — CHART NOTE - NSCHARTNOTEFT_GEN_A_CORE
Pt added to OR tomorrow with Dr Orta  open G tube placement  NPO p MN  valid COVID swab, type and screen, coagulation profile    Joanne LOZANO [id number]

## 2023-02-15 NOTE — CHART NOTE - NSCHARTNOTEFT_GEN_A_CORE
52y.o. M, Stage 4 metastatic lung cancer to neck, 8cm mass causing stridor, ENT consulted and deemed at risk of difficult intubation  and proceeding with tracheostomy done on 2/14/23 to secure the airway prior to palliative radiation treatment to the neck.  Prior to trach, CT scan showed the trachea was deviated anteriorly by the neck mass.   Also recommended is SLP diet and possible peg tube if unable to eat and maintain weight.  A 10% weight loss from baseline  indicates the need for PEG placement.    He came to [facility] by way of transfer from [facility] seen by Rad Onc Nadira Rose.    We will plan for CT simulation and palliative RT 5 fractions to the neck mass.        < from: CT Neck Soft Tissue w/ IV Cont (02.10.23 @ 10:54) >    IMPRESSION: Large necrotic mass measuring at least 8.1 x 4.5 cm in the   retrotracheal region with compression and displacement of the trachea   anteriorly and compression of the pharynx and proximal esophagus as well   as the LEFT internal jugular vein and internal carotid artery posterior   laterally. This appears to obliterate the majority of the LEFT lobe of   the thyroid gland and likely represents a thyroid carcinoma. A necrotic   5.2 x 5.2 cm mass extends from the upper pole of the RIGHT lobe of the   thyroid gland with erosion through the RIGHT thyroid cartilage and   extension into the RIGHT paralaryngeal space, also suspicious for thyroid   carcinoma. A necrotic 2.6 x 1.8 cm LEFT level 2 lymph node is noted. And   a necrotic 1 cm LEFT level 3 lymph node is noted.    Critical value:  I discussed the finding of this report with Dr. Gallegos   at 11:30 AM on 02/10/2023.  Critical value policy of the hospital was   followed.  Read back and confirmation of receipt of this communication   was performed.  This verbal communication supplements the text report of   this document.    < end of copied text > 52y.o. M, Stage 4 metastatic lung cancer to neck, 8cm mass causing stridor, ENT consulted and deemed at risk of difficult intubation  and proceeding with tracheostomy done on 2/14/23 to secure the airway prior to palliative radiation treatment to the neck.  Prior to trach, CT scan showed the trachea was deviated anteriorly by the neck mass.   Also recommended is SLP diet and possible peg tube if unable to eat and maintain weight.  A 10% weight loss from baseline  indicates the need for PEG placement.    He came to [facility] by way of transfer from [facility] seen by Rad Onc Nadira Rose.    We will plan for CT simulation and palliative RT 5 fractions to the neck mass.  Plan for CT simulation today.         < from: CT Neck Soft Tissue w/ IV Cont (02.10.23 @ 10:54) >    IMPRESSION: Large necrotic mass measuring at least 8.1 x 4.5 cm in the   retrotracheal region with compression and displacement of the trachea   anteriorly and compression of the pharynx and proximal esophagus as well   as the LEFT internal jugular vein and internal carotid artery posterior   laterally. This appears to obliterate the majority of the LEFT lobe of   the thyroid gland and likely represents a thyroid carcinoma. A necrotic   5.2 x 5.2 cm mass extends from the upper pole of the RIGHT lobe of the   thyroid gland with erosion through the RIGHT thyroid cartilage and   extension into the RIGHT paralaryngeal space, also suspicious for thyroid   carcinoma. A necrotic 2.6 x 1.8 cm LEFT level 2 lymph node is noted. And   a necrotic 1 cm LEFT level 3 lymph node is noted.    Critical value:  I discussed the finding of this report with Dr. Gallegos   at 11:30 AM on 02/10/2023.  Critical value policy of the hospital was   followed.  Read back and confirmation of receipt of this communication   was performed.  This verbal communication supplements the text report of   this document.    < end of copied text > 52y.o. M, Stage 4 metastatic lung cancer to neck, 8cm mass causing stridor, ENT consulted and deemed at risk of difficult intubation  and proceeding with tracheostomy done on 2/14/23 to secure the airway prior to palliative radiation treatment to the neck.  Prior to trach, CT scan showed the trachea was deviated anteriorly by the neck mass.   Also recommended is SLP diet and possible peg tube if unable to eat and maintain weight.  A 10% weight loss from baseline  indicates the need for PEG placement.    He came to [facility] by way of transfer from [facility] seen by Rad Onc Nadira Rose.    We will plan for CT simulation and palliative RT 5 fractions to the neck mass.  Plan for CT simulation today.         < from: CT Neck Soft Tissue w/ IV Cont (02.10.23 @ 10:54) >    IMPRESSION: Large necrotic mass measuring at least 8.1 x 4.5 cm in the   retrotracheal region with compression and displacement of the trachea   anteriorly and compression of the pharynx and proximal esophagus as well   as the LEFT internal jugular vein and internal carotid artery posterior   laterally. This appears to obliterate the majority of the LEFT lobe of   the thyroid gland and likely represents a thyroid carcinoma. A necrotic   5.2 x 5.2 cm mass extends from the upper pole of the RIGHT lobe of the   thyroid gland with erosion through the RIGHT thyroid cartilage and   extension into the RIGHT paralaryngeal space, also suspicious for thyroid   carcinoma. A necrotic 2.6 x 1.8 cm LEFT level 2 lymph node is noted. And   a necrotic 1 cm LEFT level 3 lymph node is noted.    Critical value:  I discussed the finding of this report with Dr. Gallegos   at 11:30 AM on 02/10/2023.  Critical value policy of the hospital was   followed.  Read back and confirmation of receipt of this communication   was performed.  This verbal communication supplements the text report of   this document.      ATTESTATION:  Locally advanced metastatic lung cancer in the neck with stridor.      ATTENDING NOTE:  He is a 52 year old long time smoker who was diagnosed with a lung cancer in the summer of 2022.  He was reported to have not pursued his workup or therapeutic recommendation.  Recently was presented for difficulty swallowing.  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  He was evaluated for primary radiation (with chemo?) with his neck mass progressed to stridor and he was reviewed to [facility] for palliative RT.  Evaluation by ENT resulted in a recommendation for a tracheostomy which was done yesterday.  He is now proceeding with our 20Gy/5x tx regimen.  He can make some progress the hope would be to offer a more protracted treatment course.  Today he had a CT simulation for treatment planning.       KARENA MENSAH DO  Attending, Radiation Medicine.

## 2023-02-15 NOTE — PROGRESS NOTE ADULT - SUBJECTIVE AND OBJECTIVE BOX
Dr. Ange Johnson  Pager [phone]    PROGRESS NOTE:     Patient is a 52y old  Male who presents with a chief complaint of Palliative Radiation (15 Feb 2023 07:06)      SUBJECTIVE / OVERNIGHT EVENTS: pt denies chest pain/sob, feels anxious  ADDITIONAL REVIEW OF SYSTEMS: afebrile     MEDICATIONS  (STANDING):  albuterol/ipratropium for Nebulization 3 milliLiter(s) Nebulizer every 6 hours  dextrose 5% + sodium chloride 0.9%. 1000 milliLiter(s) (75 mL/Hr) IV Continuous <Continuous>  enoxaparin Injectable 30 milliGRAM(s) SubCutaneous every 24 hours  folic acid Injectable 1 milliGRAM(s) IV Push daily  gabapentin 300 milliGRAM(s) Oral every 8 hours  influenza   Vaccine 0.5 milliLiter(s) IntraMuscular once  levothyroxine Injectable 40 MICROGram(s) IV Push at bedtime  LORazepam   Injectable 0.5 milliGRAM(s) IV Push once  methylPREDNISolone sodium succinate Injectable 40 milliGRAM(s) IV Push every 8 hours  nicotine - 21 mG/24Hr(s) Patch 1 Patch Transdermal daily  piperacillin/tazobactam IVPB.. 3.375 Gram(s) IV Intermittent every 8 hours  sodium chloride 0.9% 1000 milliLiter(s) (75 mL/Hr) IV Continuous <Continuous>    MEDICATIONS  (PRN):  albuterol    90 MICROgram(s) HFA Inhaler 2 Puff(s) Inhalation every 6 hours PRN Bronchospasm  HYDROmorphone  Injectable 0.5 milliGRAM(s) IV Push every 10 minutes PRN Moderate Pain (4 - 6)  HYDROmorphone  Injectable 1 milliGRAM(s) IV Push every 3 hours PRN Severe Pain (7 - 10)  HYDROmorphone  Injectable 0.5 milliGRAM(s) IV Push every 3 hours PRN Moderate Pain (4 - 6)  ketorolac   Injectable 15 milliGRAM(s) IV Push every 8 hours PRN Breakthrough Pain  ondansetron Injectable 4 milliGRAM(s) IV Push every 8 hours PRN Nausea and/or Vomiting  ondansetron Injectable 4 milliGRAM(s) IV Push once PRN Nausea and/or Vomiting      CAPILLARY BLOOD GLUCOSE        I&O's Summary    14 Feb 2023 07:01  -  15 Feb 2023 07:00  --------------------------------------------------------  IN: 925 mL / OUT: 250 mL / NET: 675 mL    15 Feb 2023 07:01  -  15 Feb 2023 13:10  --------------------------------------------------------  IN: 375 mL / OUT: 350 mL / NET: 25 mL        PHYSICAL EXAM:  Vital Signs Last 24 Hrs  T(C): 36.8 (15 Feb 2023 12:10), Max: 37 (14 Feb 2023 16:05)  T(F): 98.2 (15 Feb 2023 12:10), Max: 98.6 (14 Feb 2023 16:05)  HR: 82 (15 Feb 2023 13:00) (79 - 125)  BP: 123/87 (15 Feb 2023 13:00) (104/78 - 144/94)  BP(mean): 96 (15 Feb 2023 13:00) (84 - 106)  RR: 14 (15 Feb 2023 13:00) (10 - 23)  SpO2: 100% (15 Feb 2023 13:00) (95% - 100%)    Parameters below as of 15 Feb 2023 13:00  Patient On (Oxygen Delivery Method): tracheostomy collar      CONSTITUTIONAL: NAD, well-developed  RESPIRATORY: Normal respiratory effort; lungs are clear to auscultation bilaterally  Neck: large neck mass, with cuffed trach in place  CARDIOVASCULAR: Regular rate and rhythm, normal S1 and S2, no murmur/rub/gallop; No lower extremity edema; Peripheral pulses are 2+ bilaterally  ABDOMEN: Nontender to palpation, normoactive bowel sounds, no rebound/guarding; No hepatosplenomegaly  MUSCULOSKELETAL: no clubbing or cyanosis of digits; no joint swelling or tenderness to palpation  PSYCH: A+O to person, place, and time; affect appropriate    LABS:                        9.1    20.66 )-----------( 616      ( 15 Feb 2023 05:20 )             28.0     02-15    130<L>  |  93<L>  |  14  ----------------------------<  140<H>  4.1   |  26  |  0.55    Ca    8.6      15 Feb 2023 05:20  Phos  4.3     02-15  Mg     1.70     02-15    TPro  6.0  /  Alb  3.5  /  TBili  0.4  /  DBili  x   /  AST  12  /  ALT  25  /  AlkPhos  93  02-15    RADIOLOGY & ADDITIONAL TESTS:  Results Reviewed:   Imaging Personally Reviewed:  < from: CT Lower Extremity w/ IV Cont, Bilateral (02.11.23 @ 17:40) >  Large soft tissue mass with aggressive features centered in the right   ischiofemoral interval as described above. Given the history of   metastatic lung cancer, the finding is likely to represent a large soft   tissue metastasis.    The finding is new from the prior CT abdomen and pelvis of 9/20/2022.    The lesion closely abuts the right sciatic nerve. MRI would be helpful to   further characterize the local anatomy, if clinically important.      < from: CT Abdomen and Pelvis w/ IV Cont (02.11.23 @ 17:40) >  In the setting of metastatic lung cancer there is interval development of   new metastatic disease when compared to prior imaging9/20/2022 ans   11/21/2022 including the right adrenal gland, the right retroperitoneum   posterior to the psoas muscle and the musculature in the posterior   proximal right thigh.    Previously seen mesenteric adenopathy is grossly unchanged.    Newindeterminate lesion in the right kidney may represent a renal   metastasis versus hemorrhagic cyst or primary renal mass.    Left lower lobe groundglass nodules are suggestive of an infectious or   inflammatory etiology. Additional solid pulmonary nodules are concerning   for metastatic disease. Consider more complete evaluation with CT of the   chest or PET/CT as clinically indicated.    Electrocardiogram Personally Reviewed:    COORDINATION OF CARE:  Care Discussed with Consultants/Other Providers [Y/N]: litzy Multani, simulation today, SLP humbertoal, plan for xrt x5 fractions, pain control  Prior or Outpatient Records Reviewed [Y/N]:   Dr. Ange Johnson  Pager [phone]    PROGRESS NOTE:     Patient is a 52y old  Male who presents with a chief complaint of Palliative Radiation (15 Feb 2023 07:06)      SUBJECTIVE / OVERNIGHT EVENTS: pt denies chest pain/sob, feels anxious  ADDITIONAL REVIEW OF SYSTEMS: afebrile     MEDICATIONS  (STANDING):  albuterol/ipratropium for Nebulization 3 milliLiter(s) Nebulizer every 6 hours  dextrose 5% + sodium chloride 0.9%. 1000 milliLiter(s) (75 mL/Hr) IV Continuous <Continuous>  enoxaparin Injectable 30 milliGRAM(s) SubCutaneous every 24 hours  folic acid Injectable 1 milliGRAM(s) IV Push daily  gabapentin 300 milliGRAM(s) Oral every 8 hours  influenza   Vaccine 0.5 milliLiter(s) IntraMuscular once  levothyroxine Injectable 40 MICROGram(s) IV Push at bedtime  LORazepam   Injectable 0.5 milliGRAM(s) IV Push once  methylPREDNISolone sodium succinate Injectable 40 milliGRAM(s) IV Push every 8 hours  nicotine - 21 mG/24Hr(s) Patch 1 Patch Transdermal daily  piperacillin/tazobactam IVPB.. 3.375 Gram(s) IV Intermittent every 8 hours  sodium chloride 0.9% 1000 milliLiter(s) (75 mL/Hr) IV Continuous <Continuous>    MEDICATIONS  (PRN):  albuterol    90 MICROgram(s) HFA Inhaler 2 Puff(s) Inhalation every 6 hours PRN Bronchospasm  HYDROmorphone  Injectable 0.5 milliGRAM(s) IV Push every 10 minutes PRN Moderate Pain (4 - 6)  HYDROmorphone  Injectable 1 milliGRAM(s) IV Push every 3 hours PRN Severe Pain (7 - 10)  HYDROmorphone  Injectable 0.5 milliGRAM(s) IV Push every 3 hours PRN Moderate Pain (4 - 6)  ketorolac   Injectable 15 milliGRAM(s) IV Push every 8 hours PRN Breakthrough Pain  ondansetron Injectable 4 milliGRAM(s) IV Push every 8 hours PRN Nausea and/or Vomiting  ondansetron Injectable 4 milliGRAM(s) IV Push once PRN Nausea and/or Vomiting      CAPILLARY BLOOD GLUCOSE        I&O's Summary    14 Feb 2023 07:01  -  15 Feb 2023 07:00  --------------------------------------------------------  IN: 925 mL / OUT: 250 mL / NET: 675 mL    15 Feb 2023 07:01  -  15 Feb 2023 13:10  --------------------------------------------------------  IN: 375 mL / OUT: 350 mL / NET: 25 mL        PHYSICAL EXAM:  Vital Signs Last 24 Hrs  T(C): 36.8 (15 Feb 2023 12:10), Max: 37 (14 Feb 2023 16:05)  T(F): 98.2 (15 Feb 2023 12:10), Max: 98.6 (14 Feb 2023 16:05)  HR: 82 (15 Feb 2023 13:00) (79 - 125)  BP: 123/87 (15 Feb 2023 13:00) (104/78 - 144/94)  BP(mean): 96 (15 Feb 2023 13:00) (84 - 106)  RR: 14 (15 Feb 2023 13:00) (10 - 23)  SpO2: 100% (15 Feb 2023 13:00) (95% - 100%)    Parameters below as of 15 Feb 2023 13:00  Patient On (Oxygen Delivery Method): tracheostomy collar      CONSTITUTIONAL: NAD, cachectic   RESPIRATORY: Normal respiratory effort; lungs are clear to auscultation bilaterally  Neck: large right neck mass, with cuffed trach in place  CARDIOVASCULAR: Regular rate and rhythm, normal S1 and S2, no murmur/rub/gallop; No lower extremity edema; Peripheral pulses are 2+ bilaterally  ABDOMEN: Nontender to palpation, normoactive bowel sounds, no rebound/guarding; No hepatosplenomegaly  MUSCULOSKELETAL: no clubbing or cyanosis of digits; no joint swelling or tenderness to palpation, palpable right groin mass  PSYCH: A+O to person, place, and time; affect appropriate    LABS:                        9.1    20.66 )-----------( 616      ( 15 Feb 2023 05:20 )             28.0     02-15    130<L>  |  93<L>  |  14  ----------------------------<  140<H>  4.1   |  26  |  0.55    Ca    8.6      15 Feb 2023 05:20  Phos  4.3     02-15  Mg     1.70     02-15    TPro  6.0  /  Alb  3.5  /  TBili  0.4  /  DBili  x   /  AST  12  /  ALT  25  /  AlkPhos  93  02-15    RADIOLOGY & ADDITIONAL TESTS:  Results Reviewed:   Imaging Personally Reviewed:  < from: CT Lower Extremity w/ IV Cont, Bilateral (02.11.23 @ 17:40) >  Large soft tissue mass with aggressive features centered in the right   ischiofemoral interval as described above. Given the history of   metastatic lung cancer, the finding is likely to represent a large soft   tissue metastasis.    The finding is new from the prior CT abdomen and pelvis of 9/20/2022.    The lesion closely abuts the right sciatic nerve. MRI would be helpful to   further characterize the local anatomy, if clinically important.      < from: CT Abdomen and Pelvis w/ IV Cont (02.11.23 @ 17:40) >  In the setting of metastatic lung cancer there is interval development of   new metastatic disease when compared to prior imaging9/20/2022 ans   11/21/2022 including the right adrenal gland, the right retroperitoneum   posterior to the psoas muscle and the musculature in the posterior   proximal right thigh.    Previously seen mesenteric adenopathy is grossly unchanged.    Newindeterminate lesion in the right kidney may represent a renal   metastasis versus hemorrhagic cyst or primary renal mass.    Left lower lobe groundglass nodules are suggestive of an infectious or   inflammatory etiology. Additional solid pulmonary nodules are concerning   for metastatic disease. Consider more complete evaluation with CT of the   chest or PET/CT as clinically indicated.    Electrocardiogram Personally Reviewed:    COORDINATION OF CARE:  Care Discussed with Consultants/Other Providers [Y/N]: litzy Multani, simulation today, SLP edenilson, plan for xrt x5 fractions, pain control  Prior or Outpatient Records Reviewed [Y/N]:

## 2023-02-15 NOTE — PROGRESS NOTE ADULT - ASSESSMENT
52 year old male with a history of stage 4 lung cancer diagnosed 8/2022 that is admitted to [facility] for Palliative radiation. Patient also with right sided neck mass with compression if trachea. Patient c/o SOB, dyspnea,. Not able to tolerate oral diet. Choking on liquids and solids.  Admits to 30lb weight loss over several months.  now s/p open trach. SICU consulted for observation overnight.    NEUROLOGIC   - Pain control: PRN, tylenol ATC    RESPIRATORY   - Monitor SpO2 goal >92%  - Incentive spirometry   - s/p trach    CARDIOVASCULAR   - Monitor hemodynamics     GASTROINTESTINAL   - Diet: NPO    /RENAL   - IV fluids: d5NS @ 75  - Strict I/Os  - Monitor BMP qd  - strict Is/Os  - Monitor electrolytes, replete PRN    HEMATOLOGIC  - Monitor H/H   - DVT ppx: Lovenox & SCD's    INFECTIOUS DISEASE  - Monitor fever / WBC  - zosyn    ENDOCRINE  - Monitor gluc  - Hgb a1c in AM.    DISPO: PACU overnight. transfer to medicine in AM  --------------------------------------------------------------------------------------    Critical Care Diagnoses: new trach

## 2023-02-15 NOTE — CONSULT NOTE ADULT - SUBJECTIVE AND OBJECTIVE BOX
HPI:  52 year old male w/ a PMHX of stage IV lung CA, HTN, anxiety on Xanax, chronic pain on Percocet, and substance abuse was admitted to [facility] on 2/10/23 due to difficulty swallowing. Pt has had difficulty swallowing for th past week. Pt states it has been worsening since, states he is now unable to swallow pills or food. Pt was found to have large necrotic mass measuring at least 8.1 x 4.5 cm in the retrotracheal region with compression and displacement of the trachea   anteriorly and compression of the pharynx and proximal esophagus as well as the LEFT internal jugular vein and internal carotid artery posterior   laterally. Pt also developed stridor for the past week. Pt is transferred to [facility] for Palliative Radiation.   Denies chest pain, abdominal pain.      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)  Injury due to foreign body  right wrist      REVIEW OF SYSTEMS      General: Weight change/ Fatigue/ HA/Dizzy	    Skin/Breast: No Rashes/ Lesions/ Masses  	  Ophthalmologic: No Blurry vision/ Glaucoma/ Blindness  	  ENMT: No Hearing loss/ Drainage/ Lesions	    Respiratory and Thorax: Stridor  No Cough/ Wheezing/ SOB/ Hemoptysis/ Sputum production  	  Cardiovascular: No Chest pain/ Palpitations/ Diaphoresis	    Gastrointestinal: No Nausea/ Vomiting/ Constipation/ Appetite Change	    Genitourinary: No Hematuria/ Dysuria/ Frequency change/ Impotence	    Musculoskeletal: No Pain/ Weakness/ Claudication	    Neurological: No Seizures/ TIA/CVA/ Parastasis    Psychiatric: No Dementia/ Depression/ SI/HI	    Hematology/Lymphatics: No hx of bleeding/ Edema	    Endocrine:	No Hyperglycemia/ Hypoglycemia    Allergic/Immunologic:	 No Anaphylaxis/ Intolerance/ Recent illnesses    MEDICATIONS  (STANDING):  albuterol/ipratropium for Nebulization 3 milliLiter(s) Nebulizer every 6 hours  dextrose 5% + sodium chloride 0.9%. 1000 milliLiter(s) (75 mL/Hr) IV Continuous <Continuous>  enoxaparin Injectable 30 milliGRAM(s) SubCutaneous every 24 hours  folic acid Injectable 1 milliGRAM(s) IV Push daily  gabapentin 300 milliGRAM(s) Oral every 8 hours  influenza   Vaccine 0.5 milliLiter(s) IntraMuscular once  levothyroxine Injectable 40 MICROGram(s) IV Push at bedtime  LORazepam   Injectable 0.5 milliGRAM(s) IV Push once  methylPREDNISolone sodium succinate Injectable 40 milliGRAM(s) IV Push every 8 hours  nicotine - 21 mG/24Hr(s) Patch 1 Patch Transdermal daily  pantoprazole  Injectable 40 milliGRAM(s) IV Push daily  piperacillin/tazobactam IVPB.. 3.375 Gram(s) IV Intermittent every 8 hours  sodium chloride 0.9% 1000 milliLiter(s) (75 mL/Hr) IV Continuous <Continuous>    MEDICATIONS  (PRN):  albuterol    90 MICROgram(s) HFA Inhaler 2 Puff(s) Inhalation every 6 hours PRN Bronchospasm  HYDROmorphone  Injectable 0.5 milliGRAM(s) IV Push every 10 minutes PRN Moderate Pain (4 - 6)  HYDROmorphone  Injectable 1 milliGRAM(s) IV Push every 3 hours PRN Severe Pain (7 - 10)  HYDROmorphone  Injectable 0.5 milliGRAM(s) IV Push every 3 hours PRN Moderate Pain (4 - 6)  ketorolac   Injectable 15 milliGRAM(s) IV Push every 8 hours PRN Breakthrough Pain  ondansetron Injectable 4 milliGRAM(s) IV Push every 8 hours PRN Nausea and/or Vomiting  ondansetron Injectable 4 milliGRAM(s) IV Push once PRN Nausea and/or Vomiting      Allergies    No Known Allergies    Intolerances      SOCIAL HISTORY:  Occupation:  Smoking Hx: denies  Etoh Hx: denies  IVDA Hx: denies    FAMILY HISTORY:  FH: rheumatic fever (Father)      unless noted, no significant family hx with Mother, Father, Siblings    Vital Signs Last 24 Hrs  T(C): 36.8 (15 Feb 2023 12:10), Max: 37 (14 Feb 2023 16:05)  T(F): 98.2 (15 Feb 2023 12:10), Max: 98.6 (14 Feb 2023 16:05)  HR: 84 (15 Feb 2023 14:00) (76 - 125)  BP: 119/80 (15 Feb 2023 14:00) (104/78 - 144/94)  BP(mean): 88 (15 Feb 2023 14:00) (84 - 106)  RR: 16 (15 Feb 2023 14:00) (10 - 23)  SpO2: 100% (15 Feb 2023 14:00) (95% - 100%)    Parameters below as of 15 Feb 2023 13:45  Patient On (Oxygen Delivery Method): tracheostomy collar        General: Cachectic NAD  Neurology: Awake, nonfocal, REEVES x 4  Eyes: Scleras clear, PERRLA/ EOMI, Gross vision intact  ENT: Size 6 cuffed trach, no stridor  Neck: R Neck mass, trachea midline.   Respiratory: CTA B/L, No wheezing, rales, rhonchi  CV: RRR, S1S2, no murmurs, rubs or gallops  Abdominal: Soft, NT, ND +BS,   Extremities: No edema, + peripheral pulses  Skin: No Rashes, Hematoma, Ecchymosis  Lymphatic: No Neck, axilla, groin LAD  Psych: Oriented x 3, normal affect  Incisions:   Tubes:    LABS:                        9.1    20.66 )-----------( 616      ( 15 Feb 2023 05:20 )             28.0     02-15    130<L>  |  93<L>  |  14  ----------------------------<  140<H>  4.1   |  26  |  0.55    Ca    8.6      15 Feb 2023 05:20  Phos  4.3     02-15  Mg     1.70     02-15    TPro  6.0  /  Alb  3.5  /  TBili  0.4  /  DBili  x   /  AST  12  /  ALT  25  /  AlkPhos  93  02-15          RADIOLOGY & ADDITIONAL STUDIES:    ASSESSMENT:   52yMalePAST MEDICAL & SURGICAL HISTORY:  HTN (hypertension)  Smoker  Substance abuse  Admits to alcohol use  Lung cancer  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    Lung cancer    Need for prophylactic measure    Pneumonia, aspiration    H/O: hypothyroidism    Vocal cord paralysis      HEALTH ISSUES - R/O PROBLEM Dx:      PLAN:  Patient seen at the bedside to eval for PEG placement.  Due to a large mass may not be a candidate for a PEG. Will evaluate for an open G tube.  Please, switch from Lovenox to Heparin SC.  PPI IV  Cont IVF  Nutrition consult  Will coordinate with ENT.

## 2023-02-15 NOTE — PROGRESS NOTE ADULT - SUBJECTIVE AND OBJECTIVE BOX
SICU Daily Progress Note  =====================================================  Interval/Overnight Events:     *  - NAEON      ALLERGIES:  No Known Allergies    --------------------------------------------------------------------------------------    MEDICATIONS:    Neurologic Medications  acetaminophen     Tablet .. 650 milliGRAM(s) Oral every 6 hours PRN Temp greater or equal to 38C (100.4F), Mild Pain (1 - 3)  gabapentin 300 milliGRAM(s) Oral every 8 hours  HYDROmorphone  Injectable 1 milliGRAM(s) IV Push every 3 hours PRN Severe Pain (7 - 10)  HYDROmorphone  Injectable 0.5 milliGRAM(s) IV Push every 3 hours PRN Moderate Pain (4 - 6)  HYDROmorphone  Injectable 0.5 milliGRAM(s) IV Push every 10 minutes PRN Moderate Pain (4 - 6)  HYDROmorphone  Injectable 1 milliGRAM(s) IV Push every 10 minutes PRN Severe Pain (7 - 10)  melatonin 3 milliGRAM(s) Oral at bedtime  ondansetron Injectable 4 milliGRAM(s) IV Push every 8 hours PRN Nausea and/or Vomiting  ondansetron Injectable 4 milliGRAM(s) IV Push once PRN Nausea and/or Vomiting  zolpidem 5 milliGRAM(s) Oral at bedtime PRN Insomnia    Respiratory Medications  albuterol    90 MICROgram(s) HFA Inhaler 2 Puff(s) Inhalation every 6 hours PRN Bronchospasm  albuterol/ipratropium for Nebulization 3 milliLiter(s) Nebulizer every 6 hours  diphenhydrAMINE Injectable 25 milliGRAM(s) IV Push once    Gastrointestinal Medications  aluminum hydroxide/magnesium hydroxide/simethicone Suspension 30 milliLiter(s) Oral every 4 hours PRN Dyspepsia  bisacodyl 5 milliGRAM(s) Oral every 12 hours PRN Constipation  dextrose 5% + sodium chloride 0.9%. 1000 milliLiter(s) IV Continuous <Continuous>  folic acid 1 milliGRAM(s) Oral daily  multivitamin/minerals/iron Oral Solution (CENTRUM) 15 milliLiter(s) Oral sanaz    Hematologic/Oncologic Medications  enoxaparin Injectable 30 milliGRAM(s) SubCutaneous every 24 hours  influenza   Vaccine 0.5 milliLiter(s) IntraMuscular once    Antimicrobial/Immunologic Medications  piperacillin/tazobactam IVPB.. 3.375 Gram(s) IV Intermittent every 8 hours    Endocrine/Metabolic Medications  levothyroxine Injectable 40 MICROGram(s) IV Push at bedtime  methylPREDNISolone sodium succinate Injectable 40 milliGRAM(s) IV Push every 8 hours    Topical/Other Medications  nicotine - 21 mG/24Hr(s) Patch 1 Patch Transdermal daily    --------------------------------------------------------------------------------------    VITAL SIGNS:  T(C): 36.7 (02-14-23 @ 23:00), Max: 37 (02-14-23 @ 16:05)  HR: 85 (02-14-23 @ 23:00) (84 - 129)  BP: 118/95 (02-14-23 @ 23:00) (109/79 - 166/100)  RR: 16 (02-14-23 @ 23:00) (10 - 23)  SpO2: 99% (02-14-23 @ 23:00) (95% - 100%)  --------------------------------------------------------------------------------------    INS AND OUTS:    02-14-23 @ 07:01  -  02-15-23 @ 00:02  --------------------------------------------------------  IN: 225 mL / OUT: 250 mL / NET: -25 mL      --------------------------------------------------------------------------------------    EXAM    NEURO: NAD, resting comfortably  HEENT: NC/AT, Large right sided neck mass, s/p trach  RESPIRATORY: nonlabored respirations, normal CW expansion  CARDIO: RRR, S1S2  ABDOMEN: soft, nontender, nondistended  EXTREMITIES: normal strength, no deformities    --------------------------------------------------------------------------------------

## 2023-02-15 NOTE — PROGRESS NOTE ADULT - ASSESSMENT
52 year old male w/ a PMHX of stage IV lung CA, HTN, anxiety on Xanax, chronic pain on Percocet, and substance abuse was admitted to [facility] on 2/10/23 due to difficulty swallowing. 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 Pt is transferred to [facility] for Palliative Radiation.

## 2023-02-15 NOTE — PROGRESS NOTE ADULT - PROBLEM SELECTOR PLAN 1
Dysphagia sec to large mass  8.1 x 4.5 cm in the retrotracheal region with displacement of the trachea  anteriorly , compression of the pharynx and proximal esophagus  Keep patient NPO - c/w mIVF d5ns   RadOnc consulted for palliative radiation, CT sim today, plan for 5 fractions   ENT consulted on recommendation of RadOnc for evaluation of airway  Dilaudid PRN for pain- add toradol 15mg q8 prn, patient unable to tolerate any PO  SLP eval, if deemed unable to take po, then consult CT surgery for PEG, d/w ENT team  Continue with Solu-Medrol 40mg Q8H- started in HH Dysphagia sec to large mass  8.1 x 4.5 cm in the retrotracheal region with displacement of the trachea  anteriorly , compression of the pharynx and proximal esophagus  Keep patient NPO - c/w mIVF d5ns   RadOnc consulted for palliative radiation, CT sim today, plan for 5 fractions to neck mass   ENT consulted, s/p tracheostomy on 2/14, plan to change to cuffless trach in 5 days per ENT team, trach care per ENT  Dilaudid PRN for pain- add toradol 15mg q8 prn, patient unable to tolerate any PO, patient with severe protein calorie malnutrition  SLP eval. I called CTS surgery consult for PEG placement. Also d/w ENT team  Continue with Solu-Medrol 40mg Q8H- started in

## 2023-02-16 NOTE — CONSULT NOTE ADULT - PROBLEM SELECTOR RECOMMENDATION 9
Pt started on 50mcg Fentanyl @ [facility] - pt does not find it helpful. Discontinued.   Pt required PRN Dilaudid 1mg IVP x 8 within 24 hrs  - Initiated PCA Dilaudid 5mg/1ml: DD 1mg, 30 min lockout, continuous 0.5mg, 4hr 10mg  - Okay to continue with Dilaudid 1mg IVP Q2 hrs while PCA pump is being set up - please discontinue once PCA on  - Bowel regimen while on opioids  - PRN Narcan   - Pt will eventually need long acting pain medication - most likely methadone due to PEG tube Pt started on 50mcg Fentanyl @ [facility] - pt does not find it helpful. Discontinued.   Pt required PRN Dilaudid 1mg IVP x 8 within 24 hrs  - Initiated PCA Dilaudid 5mg/1ml: DD 1mg, 30 min lockout, continuous 0.5mg, 4hr 10mg  - Okay to continue with Dilaudid 1mg IVP Q2 hrs while PCA pump is being set up - please discontinue once PCA on  - Gabapentin 300mg Solution TID once able to use PEG tube  - Bowel regimen while on opioids  - PRN Narcan   - Pt will eventually need long acting pain medication - most likely methadone due to PEG tube Pt started on 50mcg Fentanyl @ [facility] - pt does not find it helpful. Discontinued.   Pt required PRN Dilaudid 1mg IVP x 8 within 24 hrs  - Initiated PCA Dilaudid 5mg/1ml: DD 1mg, 30 min lockout, continuous 0.5mg, 4hr 10mg  - Okay to continue with Dilaudid 1mg IVP Q2 hrs while PCA pump is being set up - please discontinue once PCA on  - Gabapentin 300mg Solution TID once able to use PEG tube  - Bowel regimen while on opioids  - PRN Narcan   - Pt will eventually need long acting pain medication - most likely methadone once able to use PEG tube

## 2023-02-16 NOTE — CONSULT NOTE ADULT - PROBLEM SELECTOR RECOMMENDATION 5
Prior MOLST in chart - DNR/DNI  Please page for any questions, concerns or uncontrolled symptoms #[id number]

## 2023-02-16 NOTE — PROGRESS NOTE ADULT - ASSESSMENT
52 year old male w/ a PMHX of stage IV lung CA, HTN, anxiety on Xanax, chronic pain on Percocet, and substance abuse was admitted to [facility] on 2/10/23 due to difficulty swallowing. 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 Pt is transferred to [facility] for Palliative Radiation.

## 2023-02-16 NOTE — DIETITIAN INITIAL EVALUATION ADULT - PERTINENT MEDS FT
MEDICATIONS  (STANDING):  albuterol/ipratropium for Nebulization 3 milliLiter(s) Nebulizer every 6 hours  dextrose 5% + sodium chloride 0.9%. 1000 milliLiter(s) (75 mL/Hr) IV Continuous <Continuous>  heparin   Injectable 5000 Unit(s) SubCutaneous every 8 hours  HYDROmorphone PCA (5 mG/mL) 30 milliLiter(s) PCA Continuous PCA Continuous  influenza   Vaccine 0.5 milliLiter(s) IntraMuscular once  levothyroxine Injectable 40 MICROGram(s) IV Push at bedtime  methylPREDNISolone sodium succinate Injectable 40 milliGRAM(s) IV Push every 8 hours  nicotine - 21 mG/24Hr(s) Patch 1 Patch Transdermal daily  pantoprazole  Injectable 40 milliGRAM(s) IV Push daily  piperacillin/tazobactam IVPB.. 3.375 Gram(s) IV Intermittent every 8 hours    MEDICATIONS  (PRN):  albuterol    90 MICROgram(s) HFA Inhaler 2 Puff(s) Inhalation every 6 hours PRN Bronchospasm  HYDROmorphone  Injectable 1 milliGRAM(s) IV Push every 2 hours PRN Severe Pain (7 - 10)  HYDROmorphone PCA (5 mG/mL) Rescue Clinician Bolus 2 milliGRAM(s) IV Push every 2 hours PRN Severe Pain (7 - 10)  LORazepam   Injectable 0.5 milliGRAM(s) IV Push every 8 hours PRN Anxiety  naloxone Injectable 0.1 milliGRAM(s) IV Push every 3 minutes PRN For ANY of the following changes in patient status:  A. RR LESS THAN 10 breaths per minute, B. Oxygen saturation LESS THAN 90%, C. Sedation score of 6  ondansetron Injectable 4 milliGRAM(s) IV Push every 8 hours PRN Nausea and/or Vomiting

## 2023-02-16 NOTE — BRIEF OPERATIVE NOTE - NSICDXBRIEFPREOP_GEN_ALL_CORE_FT
PRE-OP DIAGNOSIS:  Vocal cord paralysis 14-Feb-2023 17:59:51  Richa Candelaria  
PRE-OP DIAGNOSIS:  Dysphagia 16-Feb-2023 11:34:43  Betzy Knott

## 2023-02-16 NOTE — DIETITIAN INITIAL EVALUATION ADULT - OTHER INFO
Medical course: Per chart 52 year old male w/ a PMHX of stage IV lung CA, HTN, anxiety on Xanax, chronic pain on Percocet, and substance abuse was admitted to [facility] on 2/10/23 due to difficulty swallowing. 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 Pt is transferred to [facility] for Palliative Radiation.     Nutrition interview: Pt A&Ox4, trached, unable to speak. Able to nod yes/no and writes on clipboard at bedside. PEG placed today. No recent episodes of nausea, vomiting, diarrhea or constipation. No BM noted on RN flowsheets, pt has been NPO. Pt has not been able to tolerate any PO 2/2 dysphagia. SLP unable to assess 2/2 NPO status. Per chart SLP to follow on 2/17. No food allergies. Stated UBW: 145# but has lost weight, consistent with [place] HIE. Pt asking when he would start TF, discussed that once he is medically cleared TF will begin at slow rate to monitor tolerance.

## 2023-02-16 NOTE — DIETITIAN INITIAL EVALUATION ADULT - NS FNS DIET ORDER
Diet, NPO after Midnight:      NPO Start Date: 15-Feb-2023,   NPO Start Time: 23:59 (02-15-23 @ 17:56)

## 2023-02-16 NOTE — DIETITIAN INITIAL EVALUATION ADULT - ADD RECOMMEND
1) When medically feasible recommend Jevity 1.5@ 50ml/hr x24hrs. Initiate feeding at 10ml/hr and increase 10ml Q6hr to goal rate.  2) Monitor weights, labs, BM's, skin integrity, tolerance to PO intake. Monitor for refeeding  3) Obtain weekly weights   4) Water flush deferred to medical team  5) Please reconsult RD prn.

## 2023-02-16 NOTE — CHART NOTE - NSCHARTNOTEFT_GEN_A_CORE
This report was requested by: Laura Claixto | Reference #: [id number]    Others' Prescriptions  Patient Name: Jared TannerBirth Date: 1970  Address: [address] [place], [place] [address]Sex: Male  Rx Written	Rx Dispensed	Drug	Quantity	Days Supply	Prescriber Name	Prescriber Karen #	Payment Method	Dispenser  02/01/2023	02/03/2023	curaleaf whole flower 3.1mg ? 4.0mg thc:<0.5mg cbd/inh	1	3	Javad Bland DO	[id number]	[facility]  02/01/2023	02/03/2023	curaleaf whole flower 2.1mg ? 3.0mg thc:<0.5mg cbd/inh	1	3	Javad Bland DO	[id number]	[facility]  02/01/2023	02/03/2023	vireo hicolor dream blackberry chew 10mg thc:10mg cbn/chew	1	3	Javad Bland DO	[id number]	[facility]  02/01/2023	02/03/2023	renew hybrid 10mg thc; <0.05mg cbd/capsule	1	3	Javad Bland DO	[id number]	[facility]  02/02/2023	02/02/2023	oxycodone-acetaminophen  mg tab	90	30	Javad Bland DO	[id number]	[facility]  01/18/2023	01/18/2023	buprenorphine-naloxone 8-2 mg sl film	60	30	Justin Ospina	[id number]	[facility]  01/18/2023	01/18/2023	alprazolam 1 mg tablet	90	30	Justin Ospina	[id number]	[facility]  12/13/2022	12/21/2022	alprazolam 1 mg tablet	90	30	Justin Ospina	[id number]	[facility]  12/15/2022	12/20/2022	morphine sulf er 15 mg tablet	90	30	Javad Bland DO	[id number]	[facility]  12/14/2022	12/15/2022	diazepam 5 mg tablet	21	7	Justin Ospina	[id number]	[facility]  12/13/2022	12/14/2022	buprenorphine-naloxone 8-2 mg sl film	60	30	AnaiJustin	[id number]	[facility]  11/22/2022	11/23/2022	buprenorphine-naloxone 8-2 mg sl film	45	23	AnaiJustin	[id number]	[facility]  11/22/2022	11/23/2022	alprazolam 1 mg tablet	90	30	AnaiJustin	[id number]	[facility]  10/24/2022	10/26/2022	alprazolam 1 mg tablet	90	30	Anai, Justin	[id number]	[facility]  10/24/2022	10/26/2022	buprenorphine-naloxone 8-2 mg sl film	30	30	AnaiJustin	[id number]	[facility]  09/28/2022	09/30/2022	buprenorphine-naloxone 8-2 mg sl film	30	30	KimberlyJustin davis	[id number]	[facility]  09/28/2022	09/28/2022	alprazolam 1 mg tablet	90	30	AnaiJustin	[id number]	[facility]  09/26/2022	09/26/2022	alprazolam 1 mg tablet	4	2	Justin Ospina	[id number]	[facility]  08/11/2022	08/19/2022	buprenorphine-naloxone 8-2 mg sl film	60	30	KimberlyJustin davis	[id number]	[facility]  08/11/2022	08/12/2022	alprazolam 1 mg tablet	60	30	KimberlyJustin davis	[id number]	[facility]  07/14/2022	07/15/2022	buprenorphine-naloxone 8-2 mg sl film	60	30	Anai, Justin	[id number]	[facility]  07/14/2022	07/15/2022	alprazolam 1 mg tablet	60	30	KimberlyJustin davis	[id number]	[facility]  06/16/2022	06/18/2022	alprazolam 1 mg tablet	60	30	KimberlySegundo davisJustin	[id number]	[facility]  06/16/2022	06/18/2022	buprenorphine-naloxone 8-2 mg sl film	60	30	Justin Ospina	[id number]	[facility]  05/26/2022	05/27/2022	oxycodone hcl (ir) 5 mg tablet	5	1	Deena Mazariegos	[id number]	[facility]  05/12/2022	05/13/2022	buprenorphine-naloxone 8-2 mg sl film	60	30	Kimberlycu, Justin	[id number]	[facility]  05/12/2022	05/13/2022	alprazolam 1 mg tablet	60	30	Austriasusan, Justin	[id number]	[facility]  05/03/2022	05/04/2022	oxycodone-acetaminophen 5-325 mg tablet	10	3	Geoffrey Wang J (PA-C)	[id number]	[facility]  04/13/2022	04/15/2022	alprazolam 1 mg tablet	60	30	KimberlyJustin davis	[id number]	[facility]  04/13/2022	04/15/2022	buprenorphine-naloxone 8-2 mg sl film	60	30	KimberlyJustin davis	[id number]	[facility]  03/14/2022	03/17/2022	alprazolam 1 mg tablet	60	30	Austriasusan, Justin	[id number]	[facility]  03/14/2022	03/17/2022	buprenorphine-naloxone 8-2 mg sl film	60	30	Austriasusan, Justin	[id number]	[facility]  02/16/2022	02/17/2022	buprenorphine-naloxone 8-2 mg sl film	60	30	KimberlyJustin davis	[id number]	[facility]  02/16/2022	02/17/2022	alprazolam 1 mg tablet	60	30	Austriacu, Justin	[id number]	[facility]

## 2023-02-16 NOTE — BRIEF OPERATIVE NOTE - NSICDXBRIEFPROCEDURE_GEN_ALL_CORE_FT
PROCEDURES:  Open placement of gastrostomy tube 16-Feb-2023 11:32:36  Betzy Knott  
PROCEDURES:  Open tracheostomy 14-Feb-2023 17:59:42  Richa Candelaria

## 2023-02-16 NOTE — CONSULT NOTE ADULT - PROBLEM SELECTOR RECOMMENDATION 2
S/P 4 cycles of single agent palliative Keytruda/ immunotherapy - had positive response, patient did not return for follow-up. Several weeks ago returned to office weeks ago with progressive disease restarted on treatment with Keytruda AND concurrent chemotherapy carboplatin and Alimta. Had progressive disease in the neck and had seen radiation therapy in consultation  - Transferred to [facility] for palliative RT to neck  - Trach placed 2/15 for airway protection & PEG placed 2/16 for nutrition/medication administration   - No longer candidate for DMT

## 2023-02-16 NOTE — PROGRESS NOTE ADULT - PROBLEM SELECTOR PLAN 5
Dvt ppx: Lovenox QD  hypercoagulable state d/t malignancy  Code status: DNR/ DNI (Molst in chart from [facility]), pt was evaluated by Palliative in the [facility] , may request palliative eval for symptom management as pt is saying he has high tolerance for narcotics as he is an ex drug addict  case d/w pt, RN in PACU and ACP

## 2023-02-16 NOTE — DIETITIAN INITIAL EVALUATION ADULT - ORAL INTAKE PTA/DIET HISTORY
Pt lives at home alone. Reported decreased intake for about 1 month due to dysphagia. Pt not unable to tolerate PO 2/2 large mass.

## 2023-02-16 NOTE — DIETITIAN INITIAL EVALUATION ADULT - ENTERAL
Recommend Jevity 1.5 @50ml/hr x24hrs to provide 1200ml total formula, 1800kcal (34kcal/kg), 76g pro (1.4 g/kg), 912ml free water. Initiate feeding @10ml hr. Increase 10ml q6h.

## 2023-02-16 NOTE — SWALLOW BEDSIDE ASSESSMENT ADULT - COMMENTS
Consult received for clinical swallow evaluation. Per discussion with ISH Chapman, patient NPO pending OR for G-Tube placement today. Therefore, swallow evaluation deferred at this time and this department to follow up as schedule permits.

## 2023-02-16 NOTE — DIETITIAN INITIAL EVALUATION ADULT - PERTINENT LABORATORY DATA
02-16 Na 133 mmol/L<L> Glu 124 mg/dL<H> K+ 4.0 mmol/L Cr 0.54 mg/dL BUN 19 mg/dL Phos 3.0 mg/dL  02-15 @ 18:22 POCT 182 mg/dL  A1C with Estimated Average Glucose Result: 5.1 % (02-15-23 @ 05:20)  A1C with Estimated Average Glucose Result: 5.1 % (09-15-22 @ 06:32)

## 2023-02-16 NOTE — PROGRESS NOTE ADULT - PROBLEM SELECTOR PLAN 1
Dysphagia sec to large mass  8.1 x 4.5 cm in the retrotracheal region with displacement of the trachea  anteriorly , compression of the pharynx and proximal esophagus  Keep patient NPO - c/w mIVF d5ns   RadOnc consulted for palliative radiation, CT sim on 2/15 ,  plan for 5 fractions to neck mass   ENT consulted, s/p tracheostomy on 2/14, plan to change to cuffless trach in 5 days per ENT team, trach care per ENT  Dilaudid PRN for pain- add toradol 15mg q8 prn, patient unable to tolerate any PO, patient with severe protein calorie malnutrition  SLP eval.   -pt s/p Surgically placed G tube , will start feeding as per recommendation from SX   Continue with Solu-Medrol 40mg Q8H- started in HH

## 2023-02-16 NOTE — CONSULT NOTE ADULT - NS ATTEND AMEND GEN_ALL_CORE FT
Patient seen and examined agree with above note as modified, where appropriate, by me. Large neck mass s/p trach. PT for radiotherapy, will need feeding tube. Will plan for PEG possible open gastrostomy.
52 year old male w/ a PMHX of stage IV lung CA, HTN, anxiety on Xanax, chronic pain on Percocet, and substance abuse was admitted to [facility] on 2/10/23 due to difficulty swallowing. 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 Pt is transferred to [facility] for Palliative Radiation.     Patient seen with Palliative NP, Amy Palacio. Patient with severe back pain and states that current regimen is not providing adequate relief. Discussed starting dilaudid PCA for dose finding and explained expectations of IV dilaudid transition to PO regimen, likely methadone, once peg tube is usable. Discontinue fentanyl patch. Patient reports BM yesterday. He is very hungry and is eager to start TF as soon as possible.   > Appreciate rad/onc recs   > Appreciate CT surgery recs   > Appreciate heme/onc recs- will reach out to determine if patient will be a candidate for further DMT after completing RT.     Thank you for allowing us to participate in your patient's care. We will continue to follow with you. Please page [phone] for any q's or c's.     Laura Calixto D.O.   Palliative Medicine

## 2023-02-16 NOTE — PROGRESS NOTE ADULT - PROBLEM SELECTOR PLAN 2
Upper respiratory wheezing known Adenocarcinoma of SHAILA with Multifocal PNA likely aspiration vs bronchiolitis  Continue with Zosyn - was started at [facility]  Monitor fever and wbc curve  Deescalate abx as warranted

## 2023-02-16 NOTE — CONSULT NOTE ADULT - PROBLEM SELECTOR RECOMMENDATION 4
Prior MOLST in chart - DNR/DNI  Please page for any questions, concerns or uncontrolled symptoms #[id number] Was previously on Suboxone - pt self discontinued 3-4 weeks ago

## 2023-02-16 NOTE — CONSULT NOTE ADULT - ASSESSMENT
52 year old male w/ a PMHX of stage IV lung CA, HTN, anxiety on Xanax, chronic pain on Percocet, and substance abuse was admitted to [facility] on 2/10/23 due to difficulty swallowing. Pt has had difficulty swallowing for th past week. Pt states it has been worsening since, states he is now unable to swallow pills or food. Pt was found to have large necrotic mass measuring at least 8.1 x 4.5 cm in the retrotracheal region with compression and displacement of the trachea   anteriorly and compression of the pharynx and proximal esophagus as well as the LEFT internal jugular vein and internal carotid artery posterior   laterally. Pt also developed stridor for the past week. Pt is transferred to [facility] for Palliative Radiation. Palliative care consulted for pain management.  52 year old male w/ a PMHX of stage IV lung CA, HTN, anxiety on Xanax, chronic pain on Percocet, and substance abuse was admitted to [facility] on 2/10/23 due to difficulty swallowing. Pt has had difficulty swallowing for th past week. Pt states it has been worsening since, states he is now unable to swallow pills or food. 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 Pt also developed stridor for the past week. Pt is transferred to [facility] for Palliative Radiation. Palliative care consulted for pain management.

## 2023-02-16 NOTE — BRIEF OPERATIVE NOTE - COMMENTS
I, MARTA Manzanares served as first assistant on this operation. My involvement was including but not limited to positioning, prepping and draping, instrument insertion and removal, exposure for the surgeon by using instruments, retrieving specimens from the operative field, closing surgical incisions and dressing wounds. As well as other tasks as directed by the surgeon.

## 2023-02-16 NOTE — PROGRESS NOTE ADULT - PROBLEM SELECTOR PROBLEM 4
1st attempt:     Left message for patient to return my call to inform her of the new date and time of her six month follow up with Dr. Kahn.    H/O: hypothyroidism

## 2023-02-16 NOTE — PROGRESS NOTE ADULT - SUBJECTIVE AND OBJECTIVE BOX
Pt c/o soreness at his open G-tube site  VSS  Abd: G-tube site is clean and dry; dressing over wound is clean and dry; tube to gravity, draining non-bloody bilious fluid;   Gen: awake and alert in NAD  A: stable s/p open G-tube  P: Keep tube to gravity tonight and continue flushes     The G-tube may be used for tube feeds starting tomorrow; Feeds as per primary team

## 2023-02-16 NOTE — CONSULT NOTE ADULT - SUBJECTIVE AND OBJECTIVE BOX
[facility] Geriatrics and Palliative Care  Amy Palacio, Palliative Care Nurse Practitioner  Contact Info: Page [phone] (Including Nights/Weekends), Message on Microsoft Teams (Amy Palacio), or leave VM at Palliative Office [phone] (non-urgent)    HPI:  52 year old male w/ a PMHX of stage IV lung CA, HTN, anxiety on Xanax, chronic pain on Percocet, and substance abuse was admitted to [facility] on 2/10/23 due to difficulty swallowing. Pt has had difficulty swallowing for th past week. Pt states it has been worsening since, states he is now unable to swallow pills or food. Pt was found to have large necrotic mass measuring at least 8.1 x 4.5 cm in the retrotracheal region with compression and displacement of the trachea   anteriorly and compression of the pharynx and proximal esophagus as well as the LEFT internal jugular vein and internal carotid artery posterior   laterally. Pt also developed stridor for the past week. Pt is transferred to [facility] for Palliative Radiation.   Denies chest pain, abdominal pain.        (14 Feb 2023 13:39)    PERTINENT PM/SXH:   No pertinent past medical history    HTN (hypertension)    Smoker    Substance abuse    Admits to alcohol use    Lung cancer    Lung cancer    No significant past surgical history    Injury due to foreign body      -------------------------------------------------------------------------------------------------------    FAMILY HISTORY:  FH: rheumatic fever (Father)    Family Hx substance abuse [ ]yes [ ]no  ITEMS NOT CHECKED ARE NOT PRESENT    SOCIAL HISTORY:   Significant other/partner[ ]  Children[ ]  Anabaptism/Spirituality:  Substance hx:  [ ]   Tobacco hx:  [ ]   Alcohol hx: [ ]   Home Opioid hx:  [X ] I-Stop Reference No:  See separate chart note for full istop info  Living Situation: [X]Home  [ ]Long term care  [ ]Rehab [ ]Other    BASELINE (I)ADL(s) (prior to admission):  [place]: [ X]Total  [ ] Moderate [ ]Dependent    -------------------------------------------------------------------------------------------------------  ADVANCE DIRECTIVES:    DNR/MOLST  [ ]  Living Will  [ ]   DECISION MAKER(s):  [ ] Health Care Proxy(s)  [ ] Surrogate(s)  [ ] Guardian           Name(s): Phone Number(s):    -------------------------------------------------------------------------------------------------------  Allergies    No Known Allergies    Intolerances    MEDICATIONS  (STANDING):  albuterol/ipratropium for Nebulization 3 milliLiter(s) Nebulizer every 6 hours  dextrose 5% + sodium chloride 0.9%. 1000 milliLiter(s) (75 mL/Hr) IV Continuous <Continuous>  heparin   Injectable 5000 Unit(s) SubCutaneous every 8 hours  HYDROmorphone PCA (5 mG/mL) 30 milliLiter(s) PCA Continuous PCA Continuous  influenza   Vaccine 0.5 milliLiter(s) IntraMuscular once  levothyroxine Injectable 40 MICROGram(s) IV Push at bedtime  methylPREDNISolone sodium succinate Injectable 40 milliGRAM(s) IV Push every 8 hours  nicotine - 21 mG/24Hr(s) Patch 1 Patch Transdermal daily  pantoprazole  Injectable 40 milliGRAM(s) IV Push daily  piperacillin/tazobactam IVPB.. 3.375 Gram(s) IV Intermittent every 8 hours    MEDICATIONS  (PRN):  albuterol    90 MICROgram(s) HFA Inhaler 2 Puff(s) Inhalation every 6 hours PRN Bronchospasm  HYDROmorphone  Injectable 1 milliGRAM(s) IV Push every 2 hours PRN Severe Pain (7 - 10)  HYDROmorphone PCA (5 mG/mL) Rescue Clinician Bolus 2 milliGRAM(s) IV Push every 2 hours PRN Severe Pain (7 - 10)  LORazepam   Injectable 0.5 milliGRAM(s) IV Push every 8 hours PRN Anxiety  naloxone Injectable 0.1 milliGRAM(s) IV Push every 3 minutes PRN For ANY of the following changes in patient status:  A. RR LESS THAN 10 breaths per minute, B. Oxygen saturation LESS THAN 90%, C. Sedation score of 6  ondansetron Injectable 4 milliGRAM(s) IV Push every 8 hours PRN Nausea and/or Vomiting    PRESENT SYMPTOMS: [ ]Unable to self-report  [ ] CPOT [ ] PAINADs [ ] RDOS  Source if other than patient:  [ ]Family   [ ]Team     Pain: [X ]yes [ ]no  QOL impact - multiple hospitalization   Location - abdomen/back/neck                    Aggravating factors - movement  Quality - sharp  Radiation - none  Timing- constant   Severity (0-10 scale): 10/10  Minimal acceptable level (0-10 scale)/Pain goal: 4    CPOT:    https://www.[email or web address].org/getattachment/[email or web address]/Critical-Care-Pain-Observation-Tool-(CPOT)    PAINAD Score: See PAINAD tool and score below       RDOS: See RDOS tool and score below   0 to 2  minimal or no respiratory distress   3  mild distress  4 to 6 moderate distress  >7 severe distress    Dyspnea:                           [ ]Mild [ ]Moderate [ ]Severe  Anxiety:                             [ ]Mild [ ]Moderate [ ]Severe  Fatigue:                             [ ]Mild [ ]Moderate [ ]Severe  Nausea:                             [ ]Mild [ ]Moderate [ ]Severe  Loss of appetite:              [ ]Mild [ ]Moderate [ ]Severe  Constipation:                    [ ]Mild [ ]Moderate [ ]Severe  Other Symptoms:  [X ]All other review of systems negative     -------------------------------------------------------------------------------------------------------  PCSSQ[Palliative Care Spiritual Screening Question]   Severity (0-10):  Score of 4 or > indicate consideration of Chaplaincy referral.  Chaplaincy Referral: [ ] yes [ ] refused [ ] following [X ] Deferred     Caregiver [place]? : [ ] yes [ ] no [ ] Deferred [X ] Declined             Social work referral [ ] Patient & Family Centered Care Referral [ ]     Anticipatory Grief present?:  [ ] yes [ ] no  [X ] Deferred                  Social work referral [ ] Chaplaincy Referral [ ]      -------------------------------------------------------------------------------------------------------  PHYSICAL EXAM:  Vital Signs Last 24 Hrs  T(C): 36.8 (16 Feb 2023 14:00), Max: 37.3 (16 Feb 2023 02:15)  T(F): 98.2 (16 Feb 2023 14:00), Max: 99.1 (16 Feb 2023 02:15)  HR: 84 (16 Feb 2023 14:00) (80 - 102)  BP: 121/84 (16 Feb 2023 14:00) (101/62 - 142/90)  BP(mean): 91 (16 Feb 2023 13:15) (88 - 102)  RR: 18 (16 Feb 2023 14:00) (10 - 21)  SpO2: 98% (16 Feb 2023 14:00) (97% - 100%)    Parameters below as of 16 Feb 2023 14:00  Patient On (Oxygen Delivery Method): tracheostomy collar     I&O's Summary    15 Feb 2023 07:01  -  16 Feb 2023 07:00  --------------------------------------------------------  IN: 1075 mL / OUT: 350 mL / NET: 725 mL    16 Feb 2023 07:01  -  16 Feb 2023 15:22  --------------------------------------------------------  IN: 115 mL / OUT: 0 mL / NET: 115 mL        GENERAL: Sol cruz - pt communicates by writing   [ ]Cachexia  [X ]Alert  [ X]Oriented x4   [ ]Lethargic  [ ]Unarousable  [ ]Verbal  [X ]Non-Verbal    Behavioral:   [ ] Anxiety  [ ] Delirium [ ] Agitation [ X] Other    HEENT:  [ ]Normal   [ ]Dry mouth   [ X]ET Tube/Trach  [ ]Oral lesions    PULMONARY:   [ ]Clear [ ]Tachypnea  [ ]Audible excessive secretions   [ ]Rhonchi        [ ]Right [ ]Left [ ]Bilateral  [ ]Crackles        [ ]Right [ ]Left [ ]Bilateral  [ ]Wheezing     [ ]Right [ ]Left [ ]Bilateral  [ X]Diminished breath sounds [ ]right [ ]left [ ]bilateral    CARDIOVASCULAR:    [ X]Regular [ ]Irregular [ ]Tachy  [ ]Faheem [ ]Murmur [ ]Other    GASTROINTESTINAL:  [X ]Soft  [ ]Distended   [ X]+BS  [ ]Non tender [ X]Tender  [ ]Other [ X]PEG [ ]OGT/ NGT  Last BM: 2/15    GENITOURINARY:  [X ]Normal [ ] Incontinent   [ ]Oliguria/Anuria   [ ]Wall    MUSCULOSKELETAL:   [ ]Normal   [X ]Weakness  [ ]Bed/Wheelchair bound [ ]Edema    NEUROLOGIC:   [X ]No focal deficits  [ ]Cognitive impairment  [ ]Dysphagia [ ]Dysarthria [ ]Paresis [ ]Other     SKIN:   [X ]Normal  [ ]Rash  [ ]Other  [ ]Pressure ulcer(s)       Present on admission [ ]y [ ]n    -------------------------------------------------------------------------------------------------------  CRITICAL CARE:  [ ] Shock Present  [ ]Septic [ ]Cardiogenic [ ]Neurologic [ ]Hypovolemic  [ ]  Vasopressors [ ]  Inotropes   [ ]Respiratory failure present [ ]Mechanical ventilation [ ]Non-invasive ventilatory support [ ]High flow    [ ]Acute  [ ]Chronic [ ]Hypoxic  [ ]Hypercarbic [ ]Other  [ ]Other organ failure     -------------------------------------------------------------------------------------------------------  LABS:                        8.5    21.62 )-----------( 567      ( 16 Feb 2023 03:00 )             26.6   02-16    133<L>  |  95<L>  |  19  ----------------------------<  124<H>  4.0   |  25  |  0.54    Ca    8.4      16 Feb 2023 03:00  Phos  3.0     02-16  Mg     2.10     02-16    TPro  6.0  /  Alb  3.5  /  TBili  0.4  /  DBili  x   /  AST  12  /  ALT  25  /  AlkPhos  93  02-15  PT/INR - ( 16 Feb 2023 03:00 )   PT: 13.3 sec;   INR: 1.14 ratio         PTT - ( 16 Feb 2023 03:00 )  PTT:25.3 sec      -------------------------------------------------------------------------------------------------------  RADIOLOGY & ADDITIONAL STUDIES: < from: CT Neck Soft Tissue w/ IV Cont (02.10.23 @ 10:54) >  IMPRESSION: Large necrotic mass measuring at least 8.1 x 4.5 cm in the   retrotracheal region with compression and displacement of the trachea   anteriorly and compression of the pharynx and proximal esophagus as well   as the LEFT internal jugular vein and internal carotid artery posterior   laterally. This appears to obliterate the majority of the LEFT lobe of   the thyroid gland and likely represents a thyroid carcinoma. A necrotic   5.2 x 5.2 cm mass extends from the upper pole of the RIGHT lobe of the   thyroid gland with erosion through the RIGHT thyroid cartilage and   extension into the RIGHT paralaryngeal space, also suspicious for thyroid   carcinoma. A necrotic 2.6 x 1.8 cm LEFT level 2 lymph node is noted. And   a necrotic 1 cm LEFT level 3 lymph node is noted.    < end of copied text >    -------------------------------------------------------------------------------------------------------  PROTEIN CALORIE MALNUTRITION PRESENT: [ ]mild [ ]moderate [ ]severe [ ]underweight [ ]morbid obesity  https://www.andeal.org/vault/2440/web/files/ONC/Table_Clinical%20Characteristics%20to%20Document%20Malnutrition-White%20JV%20et%20al%202012.pdf    Height (cm): 182.9 (02-16-23 @ 06:00), 170.2 (02-10-23 @ 08:52), 182.9 (11-21-22 @ 14:45)  Weight (kg): 52.2 (02-16-23 @ 06:00), 56.2 (02-10-23 @ 08:52), 61.235 (11-21-22 @ 17:08)  BMI (kg/m2): 15.6 (02-16-23 @ 06:00), 19.4 (02-10-23 @ 08:52), 18.3 (11-21-22 @ 17:08)    Palliative Performance Status Version 2:   See PPSv2 tool and score below          [ ]PPSV2 < or = to 30% [ ]significant weight loss  [ ]poor nutritional intake  [ ]anasarca[ ]Artificial Nutrition      -------------------------------------------------------------------------------------------------------  Other REFERRALS:  [ ]Hospice  [ ]Child Life  [ ]Social Work  [ ]Case management [ ]Holistic Therapy     -------------------------------------------------------------------------------------------------------  Goals of Care Document:  [facility] Geriatrics and Palliative Care  Amy Palacio, Palliative Care Nurse Practitioner  Contact Info: Page [phone] (Including Nights/Weekends), Message on Microsoft Teams (Amy Palacio), or leave  at Palliative Office [phone] (non-urgent)    HPI:  52 year old male w/ a PMHX of stage IV lung CA, HTN, anxiety on Xanax, chronic pain on Percocet, and substance abuse was admitted to [facility] on 2/10/23 due to difficulty swallowing. Pt has had difficulty swallowing for th past week. Pt states it has been worsening since, states he is now unable to swallow pills or food. Pt was found to have large necrotic mass measuring at least 8.1 x 4.5 cm in the retrotracheal region with compression and displacement of the trachea   anteriorly and compression of the pharynx and proximal esophagus as well as the LEFT internal jugular vein and internal carotid artery posterior   laterally. Pt also developed stridor for the past week. Pt is transferred to [facility] for Palliative Radiation.   Denies chest pain, abdominal pain.        (14 Feb 2023 13:39)    PERTINENT PM/SXH:   HTN (hypertension)  Smoker  Substance abuse  Admits to alcohol use  Lung cancer  Injury due to foreign body    -------------------------------------------------------------------------------------------------------    FAMILY HISTORY:  FH: rheumatic fever (Father)    Family Hx substance abuse [ ]yes [ ]no  ITEMS NOT CHECKED ARE NOT PRESENT    SOCIAL HISTORY:   Significant other/partner[ ]  Children[ ]  Latter-day/Spirituality:  Substance hx:  [ ]   Tobacco hx:  [ ]   Alcohol hx: [ ]   Home Opioid hx:  [X ] I-Stop Reference No:  See separate chart note for full istop info  Living Situation: [X]Home  [ ]Long term care  [ ]Rehab [ ]Other    BASELINE (I)ADL(s) (prior to admission):  [place]: [ X]Total  [ ] Moderate [ ]Dependent    -------------------------------------------------------------------------------------------------------  ADVANCE DIRECTIVES:    DNR/MOLST  [ ]  Living Will  [ ]   DECISION MAKER(s):  [ ] Health Care Proxy(s)  [ ] Surrogate(s)  [ ] Guardian           Name(s): Phone Number(s):    -------------------------------------------------------------------------------------------------------  Allergies    No Known Allergies    Intolerances    MEDICATIONS  (STANDING):  albuterol/ipratropium for Nebulization 3 milliLiter(s) Nebulizer every 6 hours  dextrose 5% + sodium chloride 0.9%. 1000 milliLiter(s) (75 mL/Hr) IV Continuous <Continuous>  heparin   Injectable 5000 Unit(s) SubCutaneous every 8 hours  HYDROmorphone PCA (5 mG/mL) 30 milliLiter(s) PCA Continuous PCA Continuous  influenza   Vaccine 0.5 milliLiter(s) IntraMuscular once  levothyroxine Injectable 40 MICROGram(s) IV Push at bedtime  methylPREDNISolone sodium succinate Injectable 40 milliGRAM(s) IV Push every 8 hours  nicotine - 21 mG/24Hr(s) Patch 1 Patch Transdermal daily  pantoprazole  Injectable 40 milliGRAM(s) IV Push daily  piperacillin/tazobactam IVPB.. 3.375 Gram(s) IV Intermittent every 8 hours    MEDICATIONS  (PRN):  albuterol    90 MICROgram(s) HFA Inhaler 2 Puff(s) Inhalation every 6 hours PRN Bronchospasm  HYDROmorphone  Injectable 1 milliGRAM(s) IV Push every 2 hours PRN Severe Pain (7 - 10)  HYDROmorphone PCA (5 mG/mL) Rescue Clinician Bolus 2 milliGRAM(s) IV Push every 2 hours PRN Severe Pain (7 - 10)  LORazepam   Injectable 0.5 milliGRAM(s) IV Push every 8 hours PRN Anxiety  naloxone Injectable 0.1 milliGRAM(s) IV Push every 3 minutes PRN For ANY of the following changes in patient status:  A. RR LESS THAN 10 breaths per minute, B. Oxygen saturation LESS THAN 90%, C. Sedation score of 6  ondansetron Injectable 4 milliGRAM(s) IV Push every 8 hours PRN Nausea and/or Vomiting    PRESENT SYMPTOMS: [ ]Unable to self-report  [ ] CPOT [ ] PAINADs [ ] RDOS  Source if other than patient:  [ ]Family   [ ]Team     Pain: [X ]yes [ ]no  QOL impact - multiple hospitalization   Location - abdomen/back/neck                    Aggravating factors - movement  Quality - sharp  Radiation - none  Timing- constant   Severity (0-10 scale): 10/10  Minimal acceptable level (0-10 scale)/Pain goal: 4    CPOT:    https://www.[facility].org/getattachment/[email or web address]/Critical-Care-Pain-Observation-Tool-(CPOT)    PAINAD Score: See PAINAD tool and score below       RDOS: See RDOS tool and score below   0 to 2  minimal or no respiratory distress   3  mild distress  4 to 6 moderate distress  >7 severe distress    Dyspnea:                           [ ]Mild [ ]Moderate [ ]Severe  Anxiety:                             [ ]Mild [ ]Moderate [ ]Severe  Fatigue:                             [ ]Mild [ ]Moderate [ ]Severe  Nausea:                             [ ]Mild [ ]Moderate [ ]Severe  Loss of appetite:              [ ]Mild [ ]Moderate [ ]Severe  Constipation:                    [ ]Mild [ ]Moderate [ ]Severe  Other Symptoms:  [X ]All other review of systems negative     -------------------------------------------------------------------------------------------------------  PCSSQ[Palliative Care Spiritual Screening Question]   Severity (0-10):  Score of 4 or > indicate consideration of Chaplaincy referral.  Chaplaincy Referral: [ ] yes [ ] refused [ ] following [X ] Deferred     Caregiver [place]? : [ ] yes [ ] no [ ] Deferred [X ] Declined             Social work referral [ ] Patient & Family Centered Care Referral [ ]     Anticipatory Grief present?:  [ ] yes [ ] no  [X ] Deferred                  Social work referral [ ] Chaplaincy Referral [ ]      -------------------------------------------------------------------------------------------------------  PHYSICAL EXAM:  Vital Signs Last 24 Hrs  T(C): 36.8 (16 Feb 2023 14:00), Max: 37.3 (16 Feb 2023 02:15)  T(F): 98.2 (16 Feb 2023 14:00), Max: 99.1 (16 Feb 2023 02:15)  HR: 84 (16 Feb 2023 14:00) (80 - 102)  BP: 121/84 (16 Feb 2023 14:00) (101/62 - 142/90)  BP(mean): 91 (16 Feb 2023 13:15) (88 - 102)  RR: 18 (16 Feb 2023 14:00) (10 - 21)  SpO2: 98% (16 Feb 2023 14:00) (97% - 100%)    Parameters below as of 16 Feb 2023 14:00  Patient On (Oxygen Delivery Method): tracheostomy collar     I&O's Summary    15 Feb 2023 07:01  -  16 Feb 2023 07:00  --------------------------------------------------------  IN: 1075 mL / OUT: 350 mL / NET: 725 mL    16 Feb 2023 07:01  -  16 Feb 2023 15:22  --------------------------------------------------------  IN: 115 mL / OUT: 0 mL / NET: 115 mL        GENERAL: Sol cruz - pt communicates by writing   [ x]Cachexia  [X ]Alert  [ X]Oriented x4   [ ]Lethargic  [ ]Unarousable  [ ]Verbal  [X ]Non-Verbal    Behavioral:   [ ] Anxiety  [ ] Delirium [ ] Agitation [ X] Other    HEENT:  [ ]Normal   [ ]Dry mouth   [ X]ET Tube/Trach  [ ]Oral lesions    PULMONARY:   [ ]Clear [ ]Tachypnea  [ ]Audible excessive secretions   [ ]Rhonchi        [ ]Right [ ]Left [ ]Bilateral  [ ]Crackles        [ ]Right [ ]Left [ ]Bilateral  [ ]Wheezing     [ ]Right [ ]Left [ ]Bilateral  [ X]Diminished breath sounds [ ]right [ ]left [ ]bilateral    CARDIOVASCULAR:    [ X]Regular [ ]Irregular [ ]Tachy  [ ]Faheem [ ]Murmur [ ]Other    GASTROINTESTINAL:  [X ]Soft  [ ]Distended   [ X]+BS  [ ]Non tender [ X]Tender  [ ]Other [ X]PEG [ ]OGT/ NGT  Last BM: 2/15    GENITOURINARY:  [X ]Normal [ ] Incontinent   [ ]Oliguria/Anuria   [ ]Wall    MUSCULOSKELETAL:   [ ]Normal   [X ]Weakness  [ ]Bed/Wheelchair bound [ ]Edema    NEUROLOGIC:   [X ]No focal deficits  [ ]Cognitive impairment  [ ]Dysphagia [ ]Dysarthria [ ]Paresis [ ]Other     SKIN:   [X ]Normal  [ ]Rash  [ ]Other  [ ]Pressure ulcer(s)       Present on admission [ ]y [ ]n    -------------------------------------------------------------------------------------------------------  CRITICAL CARE:  [ ] Shock Present  [ ]Septic [ ]Cardiogenic [ ]Neurologic [ ]Hypovolemic  [ ]  Vasopressors [ ]  Inotropes   [ ]Respiratory failure present [ ]Mechanical ventilation [ ]Non-invasive ventilatory support [ ]High flow    [ ]Acute  [ ]Chronic [ ]Hypoxic  [ ]Hypercarbic [ ]Other  [ ]Other organ failure     -------------------------------------------------------------------------------------------------------  LABS:                        8.5    21.62 )-----------( 567      ( 16 Feb 2023 03:00 )             26.6   02-16    133<L>  |  95<L>  |  19  ----------------------------<  124<H>  4.0   |  25  |  0.54    Ca    8.4      16 Feb 2023 03:00  Phos  3.0     02-16  Mg     2.10     02-16    TPro  6.0  /  Alb  3.5  /  TBili  0.4  /  DBili  x   /  AST  12  /  ALT  25  /  AlkPhos  93  02-15  PT/INR - ( 16 Feb 2023 03:00 )   PT: 13.3 sec;   INR: 1.14 ratio         PTT - ( 16 Feb 2023 03:00 )  PTT:25.3 sec      -------------------------------------------------------------------------------------------------------  RADIOLOGY & ADDITIONAL STUDIES: < from: CT Neck Soft Tissue w/ IV Cont (02.10.23 @ 10:54) >  IMPRESSION: Large necrotic mass measuring at least 8.1 x 4.5 cm in the   retrotracheal region with compression and displacement of the trachea   anteriorly and compression of the pharynx and proximal esophagus as well   as the LEFT internal jugular vein and internal carotid artery posterior   laterally. This appears to obliterate the majority of the LEFT lobe of   the thyroid gland and likely represents a thyroid carcinoma. A necrotic   5.2 x 5.2 cm mass extends from the upper pole of the RIGHT lobe of the   thyroid gland with erosion through the RIGHT thyroid cartilage and   extension into the RIGHT paralaryngeal space, also suspicious for thyroid   carcinoma. A necrotic 2.6 x 1.8 cm LEFT level 2 lymph node is noted. And   a necrotic 1 cm LEFT level 3 lymph node is noted.    < end of copied text >    -------------------------------------------------------------------------------------------------------  PROTEIN CALORIE MALNUTRITION PRESENT: [ ]mild [ ]moderate [ ]severe [ ]underweight [ ]morbid obesity  https://www.andeal.org/vault/2440/web/files/ONC/Table_Clinical%20Characteristics%20to%20Document%20Malnutrition-White%20JV%20et%20al%839008.pdf    Height (cm): 182.9 (02-16-23 @ 06:00), 170.2 (02-10-23 @ 08:52), 182.9 (11-21-22 @ 14:45)  Weight (kg): 52.2 (02-16-23 @ 06:00), 56.2 (02-10-23 @ 08:52), 61.235 (11-21-22 @ 17:08)  BMI (kg/m2): 15.6 (02-16-23 @ 06:00), 19.4 (02-10-23 @ 08:52), 18.3 (11-21-22 @ 17:08)    Palliative Performance Status Version 2:   See PPSv2 tool and score below          [ ]PPSV2 < or = to 30% [ ]significant weight loss  [ ]poor nutritional intake  [ ]anasarca[ ]Artificial Nutrition      -------------------------------------------------------------------------------------------------------  Other REFERRALS:  [ ]Hospice  [ ]Child Life  [ ]Social Work  [ ]Case management [ ]Holistic Therapy     -------------------------------------------------------------------------------------------------------

## 2023-02-16 NOTE — BRIEF OPERATIVE NOTE - NSICDXBRIEFPOSTOP_GEN_ALL_CORE_FT
POST-OP DIAGNOSIS:  Vocal cord paralysis 14-Feb-2023 18:00:02  Richa Candelaria  
POST-OP DIAGNOSIS:  Dysphagia 16-Feb-2023 11:33:32  Betzy Knott

## 2023-02-16 NOTE — PROGRESS NOTE ADULT - SUBJECTIVE AND OBJECTIVE BOX
Patient is a 52y old  Male who presents with a chief complaint of Palliative Radiation (15 Feb 2023 14:07)      SUBJECTIVE / OVERNIGHT EVENTS: patient seen and examined by bedside in PACU, pt s/p Surgically placed G tube , pt alert and awake, c/o pain in the surgical site , pt says he is an ex drug addict and has a high tolerance for narcotics   denies headache, dizziness, , CP, Palpitations , N/V/D,         MEDICATIONS  (STANDING):  albuterol/ipratropium for Nebulization 3 milliLiter(s) Nebulizer every 6 hours  dextrose 5% + sodium chloride 0.9%. 1000 milliLiter(s) (75 mL/Hr) IV Continuous <Continuous>  fentaNYL   Patch  50 MICROgram(s)/Hr 1 Patch Transdermal every 72 hours  heparin   Injectable 5000 Unit(s) SubCutaneous every 8 hours  influenza   Vaccine 0.5 milliLiter(s) IntraMuscular once  levothyroxine Injectable 40 MICROGram(s) IV Push at bedtime  methylPREDNISolone sodium succinate Injectable 40 milliGRAM(s) IV Push every 8 hours  nicotine - 21 mG/24Hr(s) Patch 1 Patch Transdermal daily  pantoprazole  Injectable 40 milliGRAM(s) IV Push daily  piperacillin/tazobactam IVPB.. 3.375 Gram(s) IV Intermittent every 8 hours    MEDICATIONS  (PRN):  albuterol    90 MICROgram(s) HFA Inhaler 2 Puff(s) Inhalation every 6 hours PRN Bronchospasm  fentaNYL    Injectable 25 MICROGram(s) IV Push every 5 minutes PRN Moderate Pain (4 - 6)  HYDROmorphone  Injectable 1 milliGRAM(s) IV Push every 3 hours PRN Severe Pain (7 - 10)  HYDROmorphone  Injectable 0.5 milliGRAM(s) IV Push every 3 hours PRN Moderate Pain (4 - 6)  ketorolac   Injectable 15 milliGRAM(s) IV Push every 8 hours PRN Breakthrough Pain  LORazepam   Injectable 0.5 milliGRAM(s) IV Push every 8 hours PRN Anxiety  ondansetron Injectable 4 milliGRAM(s) IV Push every 8 hours PRN Nausea and/or Vomiting  ondansetron Injectable 4 milliGRAM(s) IV Push once PRN Nausea and/or Vomiting      Vital Signs Last 24 Hrs  T(C): 36.9 (16 Feb 2023 11:30), Max: 37.3 (16 Feb 2023 02:15)  T(F): 98.4 (16 Feb 2023 11:30), Max: 99.1 (16 Feb 2023 02:15)  HR: 91 (16 Feb 2023 12:30) (76 - 102)  BP: 117/85 (16 Feb 2023 12:30) (101/62 - 142/90)  BP(mean): 93 (16 Feb 2023 12:30) (88 - 102)  RR: 10 (16 Feb 2023 12:30) (10 - 21)  SpO2: 100% (16 Feb 2023 12:30) (97% - 100%)    Parameters below as of 16 Feb 2023 11:30  Patient On (Oxygen Delivery Method): tracheostomy collar  O2 Flow (L/min): 8  O2 Concentration (%): 50  CAPILLARY BLOOD GLUCOSE      POCT Blood Glucose.: 182 mg/dL (15 Feb 2023 18:22)    I&O's Summary    15 Feb 2023 07:01  -  16 Feb 2023 07:00  --------------------------------------------------------  IN: 1075 mL / OUT: 350 mL / NET: 725 mL        PHYSICAL EXAM:  CONSTITUTIONAL: mild distress due to pain , cachectic   RESPIRATORY: Normal respiratory effort; lungs are clear to auscultation bilaterally  Neck: large right neck mass, with cuffed trach in place  CARDIOVASCULAR: Regular rate and rhythm, normal S1 and S2, no murmur/rub/gallop; No lower extremity edema; Peripheral pulses are 2+ bilaterally  ABDOMEN:  PEG with dressing, tender to palpation   MUSCULOSKELETAL: no clubbing or cyanosis of digits; no joint swelling or tenderness to palpation, palpable right groin mass  PSYCH: A+O to person, place, and time;  appears anxious         LABS:                        8.5    21.62 )-----------( 567      ( 16 Feb 2023 03:00 )             26.6     02-16    133<L>  |  95<L>  |  19  ----------------------------<  124<H>  4.0   |  25  |  0.54    Ca    8.4      16 Feb 2023 03:00  Phos  3.0     02-16  Mg     2.10     02-16    TPro  6.0  /  Alb  3.5  /  TBili  0.4  /  DBili  x   /  AST  12  /  ALT  25  /  AlkPhos  93  02-15    PT/INR - ( 16 Feb 2023 03:00 )   PT: 13.3 sec;   INR: 1.14 ratio         PTT - ( 16 Feb 2023 03:00 )  PTT:25.3 sec          RADIOLOGY & ADDITIONAL TESTS:    Imaging Personally Reviewed:    Consultant(s) Notes Reviewed:      Care Discussed with Consultants/Other Providers:

## 2023-02-17 NOTE — SWALLOW BEDSIDE ASSESSMENT ADULT - COMMENTS
Per Hospitalist Note 2/17/23: "52 year old male w/ a PMHX of stage IV lung CA, HTN, anxiety on Xanax, chronic pain on Percocet, and substance abuse was admitted to [facility] on 2/10/23 due to difficulty swallowing. 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 Pt is transferred to [facility] for Palliative Radiation." Per Hospitalist Note 2/17/23: "52 year old male w/ a PMHX of stage IV lung CA, HTN, anxiety on Xanax, chronic pain on Percocet, and substance abuse was admitted to [facility] on 2/10/23 due to difficulty swallowing. 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 Pt is transferred to [facility] for Palliative Radiation."    Per ENT Note 2/17/23: "52 year old male Stage IV lung cancer s/p chemo w/ b/l thyroid mass, p/w stridor 2/14, s/p trach 2/14."     Patient received upright in bed, awake and alert. #6 Shiley cuffed tracheostomy noted, cuff deflated, on trach collar status. Patient aphonic despite digital occlusion of tracheostomy hub, therefore communicating with SLP via gestures and written communication. Spo2 at 95% at start of evaluation, with fluctuation between 89 and 91 during the evaluation. RN made aware and arrived to bedside to provide tracheal suctioning after evaluation with improvement of Spo2 to 94.

## 2023-02-17 NOTE — PROGRESS NOTE ADULT - PROBLEM SELECTOR PLAN 1
Pt started on 50mcg Fentanyl @ [facility] - pt does not find it helpful. Discontinued.   - Increased PCA Dilaudid 5mg/1ml: DD 1.5mg, 15 min lockout, continuous 1mg, 4hr 28mg  - Gabapentin 300mg Solution TID once able to use PEG tube  - Bowel regimen while on opioids  - PRN Narcan   - Pt will eventually need long acting pain medication - most likely methadone once able to use PEG tube.

## 2023-02-17 NOTE — PROGRESS NOTE ADULT - SUBJECTIVE AND OBJECTIVE BOX
[facility] Geriatrics and Palliative Care  Amy Palacio Palliative Care Nurse Practitioner  Contact Info: Page [phone] (Including Nights/Weekends), Message on Microsoft Teams (Amy Palacio), or leave VM at Palliative Office [phone] (non-urgent)    SUBJECTIVE AND OBJECTIVE:  Indication for Geriatrics and Palliative Care Services/INTERVAL HPI: Symptom management     OVERNIGHT EVENTS:    DNR on chart:Yes  Yes      Allergies    No Known Allergies    Intolerances    MEDICATIONS  (STANDING):  acetaminophen   IVPB .. 1000 milliGRAM(s) IV Intermittent once  albuterol/ipratropium for Nebulization 3 milliLiter(s) Nebulizer every 6 hours  dextrose 5% + sodium chloride 0.9%. 1000 milliLiter(s) (75 mL/Hr) IV Continuous <Continuous>  heparin   Injectable 5000 Unit(s) SubCutaneous every 8 hours  HYDROmorphone PCA (5 mG/mL) 30 milliLiter(s) PCA Continuous PCA Continuous  influenza   Vaccine 0.5 milliLiter(s) IntraMuscular once  levothyroxine Injectable 40 MICROGram(s) IV Push at bedtime  LORazepam   Injectable 1 milliGRAM(s) IV Push every 6 hours  methylPREDNISolone sodium succinate Injectable 40 milliGRAM(s) IV Push every 8 hours  nicotine - 21 mG/24Hr(s) Patch 1 Patch Transdermal daily  pantoprazole  Injectable 40 milliGRAM(s) IV Push daily  piperacillin/tazobactam IVPB.. 3.375 Gram(s) IV Intermittent every 8 hours    MEDICATIONS  (PRN):  albuterol    90 MICROgram(s) HFA Inhaler 2 Puff(s) Inhalation every 6 hours PRN Bronchospasm  HYDROmorphone PCA (5 mG/mL) Rescue Clinician Bolus 2 milliGRAM(s) IV Push every 1 hour PRN Severe Pain (7 - 10)  naloxone Injectable 0.1 milliGRAM(s) IV Push every 3 minutes PRN For ANY of the following changes in patient status:  A. RR LESS THAN 10 breaths per minute, B. Oxygen saturation LESS THAN 90%, C. Sedation score of 6  ondansetron Injectable 4 milliGRAM(s) IV Push every 8 hours PRN Nausea and/or Vomiting        -------------------------------------------------------------------------------------------------------  ITEMS UNCHECKED ARE NOT PRESENT    PRESENT SYMPTOMS: [ ]Unable to self-report - see [ ] CPOT [ ] PAINADS [ ] RDOS  Source if other than patient:  [ ]Family   [ ]Team     Pain:  [ ]yes [ ]no  QOL impact -   Location -                    Aggravating factors -  Quality -  Radiation -  Timing-  Severity (0-10 scale):  Minimal acceptable level (0-10 scale)/pain goal:    CPOT:    https://www.[facility].org/getattachment/[email or web address]/Critical-Care-Pain-Observation-Tool-(CPOT)    PAINAD Score: See PAINAD tool and score below       RDOS: See RDOS tool and score below   0 to 2  minimal or no respiratory distress   3  mild distress  4 to 6 moderate distress  >7 severe distress    Dyspnea:                           [ ]Mild [ ]Moderate [ ]Severe  Anxiety:                             [ ]Mild [ ]Moderate [ ]Severe  Fatigue:                             [ ]Mild [ ]Moderate [ ]Severe  Nausea:                             [ ]Mild [ ]Moderate [ ]Severe  Loss of appetite:              [ ]Mild [ ]Moderate [ ]Severe  Constipation:                    [ ]Mild [ ]Moderate [ ]Severe  Other Symptoms:  [ ]All other review of systems negative     Home Medications for Symptoms if present:    I Stop Reference no:     -------------------------------------------------------------------------------------------------------  PCSSQ[Palliative Care Spiritual Screening Question]   Severity (0-10):  Score of 4 or > indicate consideration of Chaplaincy referral.  Chaplaincy Referral: [ ] yes [ ] refused [ ] following [ ] Deferred     Caregiver [place]? : [ ] yes [ ] no [ ] Deferred [ ] Declined             Social work referral [ ] Patient & Family Centered Care Referral [ ]     Anticipatory Grief present?:  [ ] yes [ ] no  [ ] Deferred                  Social work referral [ ] Chaplaincy Referral [ ]    -------------------------------------------------------------------------------------------------------  PHYSICAL EXAM:  Vital Signs Last 24 Hrs  T(C): 36.8 (17 Feb 2023 09:18), Max: 36.9 (16 Feb 2023 22:05)  T(F): 98.3 (17 Feb 2023 09:18), Max: 98.4 (16 Feb 2023 22:05)  HR: 79 (17 Feb 2023 09:18) (71 - 90)  BP: 121/81 (17 Feb 2023 09:18) (107/74 - 135/82)  BP(mean): --  RR: 18 (17 Feb 2023 09:18) (17 - 18)  SpO2: 96% (17 Feb 2023 09:18) (96% - 100%)    Parameters below as of 17 Feb 2023 09:18  Patient On (Oxygen Delivery Method): tracheostomy collar     I&O's Summary    16 Feb 2023 07:01  -  17 Feb 2023 07:00  --------------------------------------------------------  IN: 1215 mL / OUT: 110 mL / NET: 1105 mL       GENERAL:   [ ]Cachexia  [ ]Alert  [ ]Oriented x   [ ]Lethargic  [ ]Unarousable  [ ]Verbal  [ ]Non-Verbal    Behavioral:   [ ]Anxiety  [ ]Delirium [ ]Agitation [ ]Other    HEENT:  [ ]Normal   [ ]Dry mouth   [ ]ET Tube/Trach  [ ]Oral lesions    PULMONARY:   [ ]Clear [ ]Tachypnea  [ ]Audible excessive secretions   [ ]Rhonchi        [ ]Right [ ]Left [ ]Bilateral  [ ]Crackles        [ ]Right [ ]Left [ ]Bilateral  [ ]Wheezing     [ ]Right [ ]Left [ ]Bilateral  [ ]Diminished BS [ ] Right [ ]Left [ ]Bilateral    CARDIOVASCULAR:    [ ]Regular [ ]Irregular [ ]Tachy  [ ]Faheem [ ]Murmur [ ]Other    GASTROINTESTINAL:  [ ]Soft  [ ]Distended   [ ]+BS  [ ]Non tender [ ]Tender  [ ]Other [ ]PEG [ ]OGT/ NGT   Last BM:     GENITOURINARY:  [ ]Normal [ ]Incontinent   [ ]Oliguria/Anuria   [ ]Wall    MUSCULOSKELETAL:   [ ]Normal   [ ]Weakness  [ ]Bed/Wheelchair bound [ ]Edema    NEUROLOGIC:   [ ]No focal deficits  [ ] Cognitive impairment  [ ] Dysphagia [ ]Dysarthria [ ] Paresis [ ]Other     SKIN:   [ ]Normal  [ ]Rash  [ ]Other  [ ]Pressure ulcer(s) [ ]y [ ]n present on admission    -------------------------------------------------------------------------------------------------------  CRITICAL CARE:  [ ]Shock Present  [ ]Septic [ ]Cardiogenic [ ]Neurologic [ ]Hypovolemic  [ ]Vasopressors [ ]Inotropes  [ ]Respiratory failure present [ ]Mechanical Ventilation [ ]Non-invasive ventilatory support [ ]High-Flow   [ ]Acute  [ ]Chronic [ ]Hypoxic  [ ]Hypercarbic [ ]Other  [ ]Other organ failure     -------------------------------------------------------------------------------------------------------  LABS:                        7.7    19.95 )-----------( 490      ( 17 Feb 2023 06:30 )             24.7   02-17    133<L>  |  98  |  17  ----------------------------<  148<H>  4.2   |  26  |  0.53    Ca    8.6      17 Feb 2023 06:30  Phos  2.5     02-17  Mg     2.00     02-17    PT/INR - ( 16 Feb 2023 03:00 )   PT: 13.3 sec;   INR: 1.14 ratio         PTT - ( 16 Feb 2023 03:00 )  PTT:25.3 sec      -------------------------------------------------------------------------------------------------------  RADIOLOGY & ADDITIONAL STUDIES: < from: CT Neck Soft Tissue w/ IV Cont (02.10.23 @ 10:54) >  IMPRESSION: Large necrotic mass measuring at least 8.1 x 4.5 cm in the   retrotracheal region with compression and displacement of the trachea   anteriorly and compression of the pharynx and proximal esophagus as well   as the LEFT internal jugular vein and internal carotid artery posterior   laterally. This appears to obliterate the majority of the LEFT lobe of   the thyroid gland and likely represents a thyroid carcinoma. A necrotic   5.2 x 5.2 cm mass extends from the upper pole of the RIGHT lobe of the   thyroid gland with erosion through the RIGHT thyroid cartilage and   extension into the RIGHT paralaryngeal space, also suspicious for thyroid   carcinoma. A necrotic 2.6 x 1.8 cm LEFT level 2 lymph node is noted. And   a necrotic 1 cm LEFT level 3 lymph node is noted.    < end of copied text >  -------------------------------------------------------------------------------------------------------  Protein Calorie Malnutrition Present: [ ]mild [ ]moderate [ ]severe [ ]underweight [ ]morbid obesity  https://www.[email or web address]/PAK/7400/web/files/ONC/Table_Clinical%20Characteristics%20to%20Document%20Malnutrition-White%20JV%20et%20al%202012.pdf    Height (cm): 182.9 (02-16-23 @ 06:00), 170.2 (02-10-23 @ 08:52), 182.9 (11-21-22 @ 14:45)  Weight (kg): 52.2 (02-16-23 @ 06:00), 56.2 (02-10-23 @ 08:52), 61.235 (11-21-22 @ 17:08)  BMI (kg/m2): 15.6 (02-16-23 @ 06:00), 19.4 (02-10-23 @ 08:52), 18.3 (11-21-22 @ 17:08)    Palliative Performance Status Version 2:   See PPSv2 tool and score below       [ ]PPSV2 < or = 30%  [ ]significant weight loss [ ]poor nutritional intake [ ]anasarcaPrealbumin, Serum: 10 mg/dL (02-12-23 @ 07:04)  [ ]Artificial Nutrition    Other REFERRALS:  [ ]Hospice  [ ]Child Life  [ ]Social Work  [ ]Case management [ ]Holistic Therapy     -------------------------------------------------------------------------------------------------------  Goals of Care Document: [facility] Geriatrics and Palliative Care  Amy Palacio, Palliative Care Nurse Practitioner  Contact Info: Page [phone] (Including Nights/Weekends), Message on Microsoft Teams (Amy Palacio), or leave VM at Palliative Office [phone] (non-urgent)    SUBJECTIVE AND OBJECTIVE:  Indication for Geriatrics and Palliative Care Services/INTERVAL HPI: Symptom management Complex symptom management.  Pt seen and examined this morning, pt endorses pain despite PCA pump. Pt shared he was unable to get clinician bolus 2/2 nursing issues. Escalated to manager to enforce PCA pump education. Pt also shared that Ativan dose was not sufficient and asked for an increase. Copious secretions noted in trach.     OVERNIGHT EVENTS: PCA started ~1800 yesterday usage until 0800 today: Demands 15, attempts 169, CB X 1 total IV Dilaudid 17mg. IV Ativan x 2.     DNR on chart:Yes  Yes      Allergies    No Known Allergies    Intolerances    MEDICATIONS  (STANDING):  acetaminophen   IVPB .. 1000 milliGRAM(s) IV Intermittent once  albuterol/ipratropium for Nebulization 3 milliLiter(s) Nebulizer every 6 hours  dextrose 5% + sodium chloride 0.9%. 1000 milliLiter(s) (75 mL/Hr) IV Continuous <Continuous>  heparin   Injectable 5000 Unit(s) SubCutaneous every 8 hours  HYDROmorphone PCA (5 mG/mL) 30 milliLiter(s) PCA Continuous PCA Continuous  influenza   Vaccine 0.5 milliLiter(s) IntraMuscular once  levothyroxine Injectable 40 MICROGram(s) IV Push at bedtime  LORazepam   Injectable 1 milliGRAM(s) IV Push every 6 hours  methylPREDNISolone sodium succinate Injectable 40 milliGRAM(s) IV Push every 8 hours  nicotine - 21 mG/24Hr(s) Patch 1 Patch Transdermal daily  pantoprazole  Injectable 40 milliGRAM(s) IV Push daily  piperacillin/tazobactam IVPB.. 3.375 Gram(s) IV Intermittent every 8 hours    MEDICATIONS  (PRN):  albuterol    90 MICROgram(s) HFA Inhaler 2 Puff(s) Inhalation every 6 hours PRN Bronchospasm  HYDROmorphone PCA (5 mG/mL) Rescue Clinician Bolus 2 milliGRAM(s) IV Push every 1 hour PRN Severe Pain (7 - 10)  naloxone Injectable 0.1 milliGRAM(s) IV Push every 3 minutes PRN For ANY of the following changes in patient status:  A. RR LESS THAN 10 breaths per minute, B. Oxygen saturation LESS THAN 90%, C. Sedation score of 6  ondansetron Injectable 4 milliGRAM(s) IV Push every 8 hours PRN Nausea and/or Vomiting    -------------------------------------------------------------------------------------------------------  ITEMS UNCHECKED ARE NOT PRESENT    PRESENT SYMPTOMS: [ ]Unable to self-report - see [ ] CPOT [ ] PAINADS [ ] RDOS  Source if other than patient:  [ ]Family   [ ]Team     Pain: [X ]yes [ ]no  QOL impact - multiple hospitalization   Location - abdomen/back/neck                    Aggravating factors - movement  Quality - sharp  Radiation - none  Timing- constant   Severity (0-10 scale): 10/10  Minimal acceptable level (0-10 scale)/Pain goal: 4    CPOT:    https://www.[facility].org/getattachment/[email or web address]/Critical-Care-Pain-Observation-Tool-(CPOT)    PAINAD Score: See PAINAD tool and score below       RDOS: See RDOS tool and score below   0 to 2  minimal or no respiratory distress   3  mild distress  4 to 6 moderate distress  >7 severe distress    Dyspnea:                           [ ]Mild [ ]Moderate [ ]Severe  Anxiety:                             [ ]Mild [X]Moderate [ ]Severe  Fatigue:                             [ ]Mild [ ]Moderate [ ]Severe  Nausea:                             [ ]Mild [ ]Moderate [ ]Severe  Loss of appetite:              [ ]Mild [ ]Moderate [ ]Severe  Constipation:                    [ ]Mild [ ]Moderate [ ]Severe  Other Symptoms:  [X]All other review of systems negative     Home Medications for Symptoms if present:    I Stop Reference no:     -------------------------------------------------------------------------------------------------------  PCSSQ[Palliative Care Spiritual Screening Question]   Severity (0-10): 0  Score of 4 or > indicate consideration of Chaplaincy referral.  Chaplaincy Referral: [ ] yes [ ] refused [ ] following [X ] Deferred     Caregiver [place]? : [ ] yes [ ] no [ ] Deferred [X ] Declined             Social work referral [ ] Patient & Family Centered Care Referral [ ]     Anticipatory Grief present?:  [ ] yes [ ] no  [X ] Deferred                  Social work referral [ ] Chaplaincy Referral [ ]    -------------------------------------------------------------------------------------------------------  PHYSICAL EXAM:  Vital Signs Last 24 Hrs  T(C): 36.8 (17 Feb 2023 09:18), Max: 36.9 (16 Feb 2023 22:05)  T(F): 98.3 (17 Feb 2023 09:18), Max: 98.4 (16 Feb 2023 22:05)  HR: 79 (17 Feb 2023 09:18) (71 - 90)  BP: 121/81 (17 Feb 2023 09:18) (107/74 - 135/82)  BP(mean): --  RR: 18 (17 Feb 2023 09:18) (17 - 18)  SpO2: 96% (17 Feb 2023 09:18) (96% - 100%)    Parameters below as of 17 Feb 2023 09:18  Patient On (Oxygen Delivery Method): tracheostomy collar     I&O's Summary    16 Feb 2023 07:01  -  17 Feb 2023 07:00  --------------------------------------------------------  IN: 1215 mL / OUT: 110 mL / NET: 1105 mL       GENERAL: Sol cruz - pt communicates by writing   [ x]Cachexia  [X ]Alert  [ X]Oriented x4   [ ]Lethargic  [ ]Unarousable  [ ]Verbal  [X ]Non-Verbal    Behavioral:   [ ] Anxiety  [ ] Delirium [ ] Agitation [ X] Other    HEENT:  [ ]Normal   [ ]Dry mouth   [ X]ET Tube/Trach  [ ]Oral lesions    PULMONARY:   [ ]Clear [ ]Tachypnea  [ ]Audible excessive secretions   [ ]Rhonchi        [ ]Right [ ]Left [ ]Bilateral  [ ]Crackles        [ ]Right [ ]Left [ ]Bilateral  [ ]Wheezing     [ ]Right [ ]Left [ ]Bilateral  [ X]Diminished breath sounds [ ]right [ ]left [ ]bilateral    CARDIOVASCULAR:    [ X]Regular [ ]Irregular [ ]Tachy  [ ]Faheem [ ]Murmur [ ]Other    GASTROINTESTINAL:  [X ]Soft  [ ]Distended   [ X]+BS  [ ]Non tender [ X]Tender  [ ]Other [ X]PEG [ ]OGT/ NGT  Last BM: 2/15    GENITOURINARY:  [X ]Normal [ ] Incontinent   [ ]Oliguria/Anuria   [ ]Wall    MUSCULOSKELETAL:   [ ]Normal   [X ]Weakness  [ ]Bed/Wheelchair bound [ ]Edema    NEUROLOGIC:   [X ]No focal deficits  [ ]Cognitive impairment  [ ]Dysphagia [ ]Dysarthria [ ]Paresis [ ]Other     SKIN:   [X ]Normal  [ ]Rash  [ ]Other  [ ]Pressure ulcer(s)       Present on admission [ ]y [ ]n    -------------------------------------------------------------------------------------------------------  CRITICAL CARE:  [ ]Shock Present  [ ]Septic [ ]Cardiogenic [ ]Neurologic [ ]Hypovolemic  [ ]Vasopressors [ ]Inotropes  [ ]Respiratory failure present [ ]Mechanical Ventilation [ ]Non-invasive ventilatory support [ ]High-Flow   [ ]Acute  [ ]Chronic [ ]Hypoxic  [ ]Hypercarbic [ ]Other  [ ]Other organ failure     -------------------------------------------------------------------------------------------------------  LABS:                        7.7    19.95 )-----------( 490      ( 17 Feb 2023 06:30 )             24.7   02-17    133<L>  |  98  |  17  ----------------------------<  148<H>  4.2   |  26  |  0.53    Ca    8.6      17 Feb 2023 06:30  Phos  2.5     02-17  Mg     2.00     02-17    PT/INR - ( 16 Feb 2023 03:00 )   PT: 13.3 sec;   INR: 1.14 ratio         PTT - ( 16 Feb 2023 03:00 )  PTT:25.3 sec    -------------------------------------------------------------------------------------------------------  RADIOLOGY & ADDITIONAL STUDIES: < from: CT Neck Soft Tissue w/ IV Cont (02.10.23 @ 10:54) >  IMPRESSION: Large necrotic mass measuring at least 8.1 x 4.5 cm in the   retrotracheal region with compression and displacement of the trachea   anteriorly and compression of the pharynx and proximal esophagus as well   as the LEFT internal jugular vein and internal carotid artery posterior   laterally. This appears to obliterate the majority of the LEFT lobe of   the thyroid gland and likely represents a thyroid carcinoma. A necrotic   5.2 x 5.2 cm mass extends from the upper pole of the RIGHT lobe of the   thyroid gland with erosion through the RIGHT thyroid cartilage and   extension into the RIGHT paralaryngeal space, also suspicious for thyroid   carcinoma. A necrotic 2.6 x 1.8 cm LEFT level 2 lymph node is noted. And   a necrotic 1 cm LEFT level 3 lymph node is noted.    < end of copied text >  -------------------------------------------------------------------------------------------------------  Protein Calorie Malnutrition Present: [ ]mild [ ]moderate [ ]severe [ ]underweight [ ]morbid obesity  https://www.[employer].org/vault/2440/web/files/ONC/Table_Clinical%20Characteristics%20to%20Document%20Malnutrition-White%20JV%20et%20al%202012.pdf    Height (cm): 182.9 (02-16-23 @ 06:00), 170.2 (02-10-23 @ 08:52), 182.9 (11-21-22 @ 14:45)  Weight (kg): 52.2 (02-16-23 @ 06:00), 56.2 (02-10-23 @ 08:52), 61.235 (11-21-22 @ 17:08)  BMI (kg/m2): 15.6 (02-16-23 @ 06:00), 19.4 (02-10-23 @ 08:52), 18.3 (11-21-22 @ 17:08)    Palliative Performance Status Version 2:   See PPSv2 tool and score below       [ ]PPSV2 < or = 30%  [ ]significant weight loss [ ]poor nutritional intake [ ]anasarcaPrealbumin, Serum: 10 mg/dL (02-12-23 @ 07:04)  [ ]Artificial Nutrition    Other REFERRALS:  [ ]Hospice  [ ]Child Life  [ ]Social Work  [ ]Case management [ ]Holistic Therapy     -------------------------------------------------------------------------------------------------------

## 2023-02-17 NOTE — PROGRESS NOTE ADULT - PROBLEM SELECTOR PLAN 6
Prior MOLST in chart - DNR/DNI  Please page for any questions, concerns or uncontrolled symptoms #[id number].

## 2023-02-17 NOTE — PROGRESS NOTE ADULT - PROBLEM SELECTOR PLAN 1
Dysphagia sec to large mass  8.1 x 4.5 cm in the retrotracheal region with displacement of the trachea  anteriorly , compression of the pharynx and proximal esophagus  Keep patient NPO - c/w mIVF d5ns   RadOnc consulted for palliative radiation, CT sim on 2/15 ,  plan for 5 fractions to neck mass   ENT consulted, s/p tracheostomy on 2/14, plan to change to cuffless trach in 5 days per ENT team, trach care per ENT  Dilaudid PRN for pain- add toradol 15mg q8 prn, patient unable to tolerate any PO, patient with severe protein calorie malnutrition  SLP eval.   -pt s/p Surgically placed G tube , will start feeding as per recommendation from SX   Continue with Solu-Medrol 40mg Q8H- started in HH Dysphagia sec to large mass  8.1 x 4.5 cm in the retrotracheal region with displacement of the trachea  anteriorly , compression of the pharynx and proximal esophagus  Keep patient NPO - c/w mIVF d5ns   RadOnc consulted for palliative radiation, CT sim on 2/15 ,  plan for 5 fractions to neck mass   ENT consulted, s/p tracheostomy on 2/14, plan to change to cuffless trach in 5 days per ENT team, trach care per ENT  pt seen by palliative for pain management , started on PCA ,   patient with severe protein calorie malnutrition- will start PEG feeding   SLP eval.   Continue with Solu-Medrol 40mg Q8H- started in

## 2023-02-17 NOTE — PROGRESS NOTE ADULT - PROBLEM SELECTOR PLAN 2
S/P 4 cycles of single agent palliative Keytruda/ immunotherapy - had positive response, patient did not return for follow-up. Several weeks ago returned to office weeks ago with progressive disease restarted on treatment with Keytruda AND concurrent chemotherapy carboplatin and Alimta. Had progressive disease in the neck and had seen radiation therapy in consultation  - Transferred to [facility] for palliative RT to neck  - Trach placed 2/15 for airway protection & PEG placed 2/16 for nutrition/medication administration   - No longer candidate for DMT.

## 2023-02-17 NOTE — SWALLOW BEDSIDE ASSESSMENT ADULT - NS SPL SWALLOW CLINIC TRIAL FT
unable to assess given limited PO trials accepted due to patient report of "feeling bloated" and declining further PO trials.

## 2023-02-17 NOTE — PROGRESS NOTE ADULT - NS ATTEND AMEND GEN_ALL_CORE FT
52 year old male w/ a PMHX of stage IV lung CA, HTN, anxiety on Xanax, chronic pain on Percocet, and substance abuse was admitted to  on 2/10/23 due to difficulty swallowing. 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 Pt is transferred to [facility] for Palliative Radiation.     Patient seen with Palliative NP, Amy Palacio. Palliative team received multiple calls from his family regarding inadequate pain control overnight. Patient with issues with PCA pump overnight. Educated RN staff and escalating family's concerns regarding patient's pain. Patient seen and attempted to call his family while we were in the room. Patient c/o of pain at site of trach/peg and back pain with anxiety when he has copious secretions leaking out of trach collar. Explained that his PCA would be adjusted as well as anxiolytic medications. See above for recs.   > Appreciate rad/onc recs   > Appreciate CT surgery recs     Thank you for allowing us to participate in your patient's care. We will continue to follow with you. Please page [phone] for any q's or c's.     Laura Calixto D.O.   Palliative Medicine.

## 2023-02-17 NOTE — SWALLOW BEDSIDE ASSESSMENT ADULT - SWALLOW EVAL: DIAGNOSIS
Green Dye Swallow Test: Unable to adequately assess oral/pharyngeal stage of swallow as patient accepted limited PO trials (~5cc of puree, 5cc of moderately thick liquids impregnated with Green Food Color Dye) indicating "I feel bloated". Due to limited PO trials/limited assessment, gross aspiration can not be ruled out.

## 2023-02-17 NOTE — PROGRESS NOTE ADULT - SUBJECTIVE AND OBJECTIVE BOX
Pt seen and examined  Awake, alert, resting in bed.   On TC  Pt states some pain at abd incisions but only in   the morning.     Vital Signs Last 24 Hrs  T(C): 36.7 (17 Feb 2023 14:22), Max: 36.9 (16 Feb 2023 22:05)  T(F): 98.1 (17 Feb 2023 14:22), Max: 98.4 (16 Feb 2023 22:05)  HR: 73 (17 Feb 2023 14:22) (71 - 90)  BP: 115/74 (17 Feb 2023 14:22) (107/74 - 135/82)  BP(mean): --  RR: 18 (17 Feb 2023 14:22) (17 - 18)  SpO2: 97% (17 Feb 2023 14:22) (96% - 100%)    Parameters below as of 17 Feb 2023 14:22  Patient On (Oxygen Delivery Method): tracheostomy collar    O2 Concentration (%): 35    A+Ox 3  Communicating w paper and pen  Abd soft, NT, ND  Abd incision c/d/i with primary dressing  G tube site c/d/i with sutures in place  To gravity at present    A/P: 53yo M s/p Open G tube placement on 2/16/23    -Can begin tube feeds today  -Continue to flush G tube with water  -Cont local wound care  -Will remove primary dressing tomorrow  -Sutures to remain in place for now  Will cont to follow

## 2023-02-17 NOTE — PROGRESS NOTE ADULT - SUBJECTIVE AND OBJECTIVE BOX
Patient is a 52y old  Male who presents with a chief complaint of Palliative Radiation (17 Feb 2023 08:29)      SUBJECTIVE / OVERNIGHT EVENTS:    MEDICATIONS  (STANDING):  acetaminophen   IVPB .. 1000 milliGRAM(s) IV Intermittent once  albuterol/ipratropium for Nebulization 3 milliLiter(s) Nebulizer every 6 hours  dextrose 5% + sodium chloride 0.9%. 1000 milliLiter(s) (75 mL/Hr) IV Continuous <Continuous>  heparin   Injectable 5000 Unit(s) SubCutaneous every 8 hours  HYDROmorphone PCA (5 mG/mL) 30 milliLiter(s) PCA Continuous PCA Continuous  influenza   Vaccine 0.5 milliLiter(s) IntraMuscular once  levothyroxine Injectable 40 MICROGram(s) IV Push at bedtime  methylPREDNISolone sodium succinate Injectable 40 milliGRAM(s) IV Push every 8 hours  nicotine - 21 mG/24Hr(s) Patch 1 Patch Transdermal daily  pantoprazole  Injectable 40 milliGRAM(s) IV Push daily  piperacillin/tazobactam IVPB.. 3.375 Gram(s) IV Intermittent every 8 hours    MEDICATIONS  (PRN):  albuterol    90 MICROgram(s) HFA Inhaler 2 Puff(s) Inhalation every 6 hours PRN Bronchospasm  HYDROmorphone PCA (5 mG/mL) Rescue Clinician Bolus 2 milliGRAM(s) IV Push every 2 hours PRN Severe Pain (7 - 10)  LORazepam   Injectable 0.5 milliGRAM(s) IV Push every 8 hours PRN Anxiety  naloxone Injectable 0.1 milliGRAM(s) IV Push every 3 minutes PRN For ANY of the following changes in patient status:  A. RR LESS THAN 10 breaths per minute, B. Oxygen saturation LESS THAN 90%, C. Sedation score of 6  ondansetron Injectable 4 milliGRAM(s) IV Push every 8 hours PRN Nausea and/or Vomiting      Vital Signs Last 24 Hrs  T(C): 36.8 (17 Feb 2023 09:18), Max: 37.2 (16 Feb 2023 13:00)  T(F): 98.3 (17 Feb 2023 09:18), Max: 99 (16 Feb 2023 13:00)  HR: 79 (17 Feb 2023 09:18) (71 - 102)  BP: 121/81 (17 Feb 2023 09:18) (107/74 - 142/90)  BP(mean): 91 (16 Feb 2023 13:15) (88 - 102)  RR: 18 (17 Feb 2023 09:18) (10 - 19)  SpO2: 96% (17 Feb 2023 09:18) (96% - 100%)    Parameters below as of 17 Feb 2023 09:18  Patient On (Oxygen Delivery Method): tracheostomy collar      CAPILLARY BLOOD GLUCOSE        I&O's Summary    16 Feb 2023 07:01  -  17 Feb 2023 07:00  --------------------------------------------------------  IN: 1215 mL / OUT: 110 mL / NET: 1105 mL      PHYSICAL EXAM:  CONSTITUTIONAL: , cachectic   RESPIRATORY: Normal respiratory effort; lungs are clear to auscultation bilaterally  Neck: large right neck mass, with cuffed trach in place  CARDIOVASCULAR: Regular rate and rhythm, normal S1 and S2, no murmur/rub/gallop; No lower extremity edema; Peripheral pulses are 2+ bilaterally  ABDOMEN:  PEG with dressing, tender to palpation   MUSCULOSKELETAL: no clubbing or cyanosis of digits; no joint swelling or tenderness to palpation, palpable right groin mass  PSYCH: A+O to person, place, and time;  appears anxious     LABS:                        7.7    19.95 )-----------( 490      ( 17 Feb 2023 06:30 )             24.7     02-17    133<L>  |  98  |  17  ----------------------------<  148<H>  4.2   |  26  |  0.53    Ca    8.6      17 Feb 2023 06:30  Phos  2.5     02-17  Mg     2.00     02-17      PT/INR - ( 16 Feb 2023 03:00 )   PT: 13.3 sec;   INR: 1.14 ratio         PTT - ( 16 Feb 2023 03:00 )  PTT:25.3 sec          RADIOLOGY & ADDITIONAL TESTS:    Imaging Personally Reviewed:    Consultant(s) Notes Reviewed:      Care Discussed with Consultants/Other Providers:     Patient is a 52y old  Male who presents with a chief complaint of Palliative Radiation (17 Feb 2023 08:29)      SUBJECTIVE / OVERNIGHT EVENTS: patient seen and examined by bedside, appears comfortable, pt controlled with PCA , denies headache, dizziness, SOB, CP, Palpitations , N/V/D, abdominal pain  pt writing to let his needs known     MEDICATIONS  (STANDING):  acetaminophen   IVPB .. 1000 milliGRAM(s) IV Intermittent once  albuterol/ipratropium for Nebulization 3 milliLiter(s) Nebulizer every 6 hours  dextrose 5% + sodium chloride 0.9%. 1000 milliLiter(s) (75 mL/Hr) IV Continuous <Continuous>  heparin   Injectable 5000 Unit(s) SubCutaneous every 8 hours  HYDROmorphone PCA (5 mG/mL) 30 milliLiter(s) PCA Continuous PCA Continuous  influenza   Vaccine 0.5 milliLiter(s) IntraMuscular once  levothyroxine Injectable 40 MICROGram(s) IV Push at bedtime  methylPREDNISolone sodium succinate Injectable 40 milliGRAM(s) IV Push every 8 hours  nicotine - 21 mG/24Hr(s) Patch 1 Patch Transdermal daily  pantoprazole  Injectable 40 milliGRAM(s) IV Push daily  piperacillin/tazobactam IVPB.. 3.375 Gram(s) IV Intermittent every 8 hours    MEDICATIONS  (PRN):  albuterol    90 MICROgram(s) HFA Inhaler 2 Puff(s) Inhalation every 6 hours PRN Bronchospasm  HYDROmorphone PCA (5 mG/mL) Rescue Clinician Bolus 2 milliGRAM(s) IV Push every 2 hours PRN Severe Pain (7 - 10)  LORazepam   Injectable 0.5 milliGRAM(s) IV Push every 8 hours PRN Anxiety  naloxone Injectable 0.1 milliGRAM(s) IV Push every 3 minutes PRN For ANY of the following changes in patient status:  A. RR LESS THAN 10 breaths per minute, B. Oxygen saturation LESS THAN 90%, C. Sedation score of 6  ondansetron Injectable 4 milliGRAM(s) IV Push every 8 hours PRN Nausea and/or Vomiting      Vital Signs Last 24 Hrs  T(C): 36.8 (17 Feb 2023 09:18), Max: 37.2 (16 Feb 2023 13:00)  T(F): 98.3 (17 Feb 2023 09:18), Max: 99 (16 Feb 2023 13:00)  HR: 79 (17 Feb 2023 09:18) (71 - 102)  BP: 121/81 (17 Feb 2023 09:18) (107/74 - 142/90)  BP(mean): 91 (16 Feb 2023 13:15) (88 - 102)  RR: 18 (17 Feb 2023 09:18) (10 - 19)  SpO2: 96% (17 Feb 2023 09:18) (96% - 100%)    Parameters below as of 17 Feb 2023 09:18  Patient On (Oxygen Delivery Method): tracheostomy collar      CAPILLARY BLOOD GLUCOSE        I&O's Summary    16 Feb 2023 07:01  -  17 Feb 2023 07:00  --------------------------------------------------------  IN: 1215 mL / OUT: 110 mL / NET: 1105 mL      PHYSICAL EXAM:  CONSTITUTIONAL: , NAD cachectic   RESPIRATORY: Normal respiratory effort; lungs are clear to auscultation bilaterally  Neck: large right neck mass, with cuffed trach in place  CARDIOVASCULAR: Regular rate and rhythm, normal S1 and S2, no murmur/rub/gallop; No lower extremity edema; Peripheral pulses are 2+ bilaterally  ABDOMEN:  PEG with dressing, minimally tender to palpation   MUSCULOSKELETAL: no clubbing or cyanosis of digits; no joint swelling or tenderness to palpation, palpable right groin mass  PSYCH: A+O to person, place, and time;  appears anxious     LABS:                        7.7    19.95 )-----------( 490      ( 17 Feb 2023 06:30 )             24.7     02-17    133<L>  |  98  |  17  ----------------------------<  148<H>  4.2   |  26  |  0.53    Ca    8.6      17 Feb 2023 06:30  Phos  2.5     02-17  Mg     2.00     02-17      PT/INR - ( 16 Feb 2023 03:00 )   PT: 13.3 sec;   INR: 1.14 ratio         PTT - ( 16 Feb 2023 03:00 )  PTT:25.3 sec          RADIOLOGY & ADDITIONAL TESTS:    Imaging Personally Reviewed:    Consultant(s) Notes Reviewed:  ENT     Care Discussed with Consultants/Other Providers:

## 2023-02-17 NOTE — PROGRESS NOTE ADULT - PROBLEM SELECTOR PLAN 3
- Liberated Ativan IVP 1mg ATC Q6 (hold for sedation) - Liberated Ativan IVP 1mg ATC Q6 (hold for sedation)  - Family provides a lot of support to patient

## 2023-02-17 NOTE — PROGRESS NOTE ADULT - ASSESSMENT
Incomplete note - full note to be written     PLAN: increased PCA continuous 1mg/hr, increased DD 1.5 Q 15, CB 2mg Q1hrs PRN severe pain   Increased Ativan 1mg Q6hrs ATC (hold for sedation)  52 year old male w/ a PMHX of stage IV lung CA, HTN, anxiety on Xanax, chronic pain on Percocet, and substance abuse was admitted to [facility] on 2/10/23 due to difficulty swallowing. Pt has had difficulty swallowing for th past week. Pt states it has been worsening since, states he is now unable to swallow pills or food. 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 Pt also developed stridor for the past week. Pt is transferred to [facility] for Palliative Radiation. Palliative care consulted for pain management.         Incomplete note - full note to be written     PLAN: increased PCA continuous 1mg/hr, increased DD 1.5 Q 15, CB 2mg Q1hrs PRN severe pain   Increased Ativan 1mg Q6hrs ATC (hold for sedation)  52 year old male w/ a PMHX of stage IV lung CA, HTN, anxiety on Xanax, chronic pain on Percocet, and substance abuse was admitted to [facility] on 2/10/23 due to difficulty swallowing. Pt has had difficulty swallowing for th past week. Pt states it has been worsening since, states he is now unable to swallow pills or food. 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 Pt also developed stridor for the past week. Pt is transferred to [facility] for Palliative Radiation. Palliative care consulted for pain management.

## 2023-02-17 NOTE — PROGRESS NOTE ADULT - PROBLEM SELECTOR PLAN 5
Dvt ppx: Lovenox QD  hypercoagulable state d/t malignancy  Code status: DNR/ DNI ([place] in chart from [facility]), pt was evaluated by Palliative in the [facility] , may request palliative eval for symptom management as pt is saying he has high tolerance for narcotics as he is an ex drug addict  case d/w pt, RN in PACU and ACP Dvt ppx: Lovenox QD  hypercoagulable state d/t malignancy  Code status: DNR/ DNI ([place] in chart from [facility]), pt was evaluated by Palliative in the [facility]  palliative eval requested for symptom management as pt is saying he has high tolerance for narcotics as he is an ex drug addict  case d/w pt, RN and  ACP  plan of care d/w Mery Davis phone# [phone]

## 2023-02-17 NOTE — PROGRESS NOTE ADULT - ASSESSMENT
52 year old male w/ a PMHX of stage IV lung CA, HTN, anxiety on Xanax, chronic pain on Percocet, and substance abuse was admitted to [facility] on 2/10/23 due to difficulty swallowing. 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 Pt is transferred to [facility] for Palliative Radiation.

## 2023-02-17 NOTE — PROGRESS NOTE ADULT - SUBJECTIVE AND OBJECTIVE BOX
ENT ISSUE: s/p tracheostomy 2/14     HPI: Pt seen and examined at bedside. 6CN shiley in place, small amount of dried blood noted and removed no active bleeding noted. Pt suctioned with out any resistance.      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 1000 milliGRAM(s) IV Intermittent once  albuterol/ipratropium for Nebulization 3 milliLiter(s) Nebulizer every 6 hours  dextrose 5% + sodium chloride 0.9%. 1000 milliLiter(s) (75 mL/Hr) IV Continuous <Continuous>  heparin   Injectable 5000 Unit(s) SubCutaneous every 8 hours  HYDROmorphone PCA (5 mG/mL) 30 milliLiter(s) PCA Continuous PCA Continuous  influenza   Vaccine 0.5 milliLiter(s) IntraMuscular once  levothyroxine Injectable 40 MICROGram(s) IV Push at bedtime  methylPREDNISolone sodium succinate Injectable 40 milliGRAM(s) IV Push every 8 hours  nicotine - 21 mG/24Hr(s) Patch 1 Patch Transdermal daily  pantoprazole  Injectable 40 milliGRAM(s) IV Push daily  piperacillin/tazobactam IVPB.. 3.375 Gram(s) IV Intermittent every 8 hours    MEDICATIONS  (PRN):  albuterol    90 MICROgram(s) HFA Inhaler 2 Puff(s) Inhalation every 6 hours PRN Bronchospasm  HYDROmorphone PCA (5 mG/mL) Rescue Clinician Bolus 2 milliGRAM(s) IV Push every 2 hours PRN Severe Pain (7 - 10)  LORazepam   Injectable 0.5 milliGRAM(s) IV Push every 8 hours PRN Anxiety  naloxone Injectable 0.1 milliGRAM(s) IV Push every 3 minutes PRN For ANY of the following changes in patient status:  A. RR LESS THAN 10 breaths per minute, B. Oxygen saturation LESS THAN 90%, C. Sedation score of 6  ondansetron Injectable 4 milliGRAM(s) IV Push every 8 hours PRN Nausea and/or Vomiting           ROS:   ENT: all negative except as noted in HPI   Neuro: denies numbness/tingling, loss of sensation  Endo: denies heat/cold intolerance, excessive sweating      Vital Signs Last 24 Hrs  T(C): 36.5 (17 Feb 2023 06:00), Max: 37.2 (16 Feb 2023 09:18)  T(F): 97.7 (17 Feb 2023 06:00), Max: 99 (16 Feb 2023 09:18)  HR: 76 (17 Feb 2023 06:00) (71 - 102)  BP: 117/74 (17 Feb 2023 06:00) (107/74 - 142/90)  BP(mean): 91 (16 Feb 2023 13:15) (88 - 102)  RR: 18 (17 Feb 2023 06:00) (10 - 19)  SpO2: 96% (17 Feb 2023 06:00) (96% - 100%)    Parameters below as of 17 Feb 2023 06:00  Patient On (Oxygen Delivery Method): tracheostomy collar                              7.7    19.95 )-----------( 490      ( 17 Feb 2023 06:30 )             24.7    02-16    133<L>  |  95<L>  |  19  ----------------------------<  124<H>  4.0   |  25  |  0.54    Ca    8.4      16 Feb 2023 03:00  Phos  3.0     02-16  Mg     2.10     02-16     PT/INR - ( 16 Feb 2023 03:00 )   PT: 13.3 sec;   INR: 1.14 ratio         PTT - ( 16 Feb 2023 03:00 )  PTT:25.3 sec    Physical exam   General: NAD, A+Ox3  Respiratory: No respiratory distress, stridor, or stertor  Voice quality: unable to assess   Face:  Symmetric without masses or lesions  Neck: 6 shilfey cuffed trach  in place,  cuff is deflated, small amount of dried blood noted and removed, no active bleeding

## 2023-02-17 NOTE — PROGRESS NOTE ADULT - PROBLEM SELECTOR PLAN 2
Upper respiratory wheezing known Adenocarcinoma of SHAILA with Multifocal PNA likely aspiration vs bronchiolitis  Continue with Zosyn - was started at [facility]  Monitor fever and wbc curve  Deescalate abx as warranted

## 2023-02-17 NOTE — PROGRESS NOTE ADULT - ASSESSMENT
52 year old male Stage IV lung cancer s/p chemo w/ b/l thyroid mass, p/w stridor 2/14, s/p trach 2/14.

## 2023-02-17 NOTE — PROGRESS NOTE ADULT - PROBLEM SELECTOR PLAN 4
OPERATIVE REPORT- Dr Raiza Lanier  PATIENT NAME: Caitie Chaves    :  1940  MRN: [id number]  Pt Location: [facility] OR ROOM 04    SURGERY DATE: 2018    Surgeon: Julieth Vásquez MD    Pre-op Diagnosis:  1  Right hydronephrosis  2  Right ureteral stricture  3  Right ureteral stones     Post-op Diagnosis:  1  same    Procedure:  1  Cystoscopy  2  Fluoroscopy  3  Right retrograde pyelography  4  Right ureteral stent replacement    Specimen(s): * No specimens in log *    Estimated Blood Loss: Minimal    Complications: None    Drains:  1  6 X 28 centimeter right ureteral stent    Anesthesia type: IV Sedation with Anesthesia    Indications for surgery:  Stricture and stone from prior procedures elsewhere  Findings:  1  Again the wire could not pass alongside the stent into the kidney and required the obstructing stent to be removed before the wire could be gently passed up to the kidney  There is a large opaque stone at the level of obstruction in the mid ureter on the right    Procedure and Technique:   After obtaining consent and identifying the patient, antibiotics were given as ordered and the patient was brought to the room  All appropriate leads and monitors were placed and the patient was appropriately positioned on the table  Anesthesia was administered and the patient was sterilely prepped and draped  A timeout was performed where the patient name, , procedure, antibiotics, allergies, etc  were discussed  All in the room were satisfied before the start of the operative procedure  What follows are the operative findings and events  Cystoscopy was undertaken  The bladder was systematically surveyed and found to be free of stones, tumors or infection  The right ureteral orifice was identified and cannulated with a flexi tipped wire    The stent was grasped and pulled down past where the wire was stuck in the ureter and then the wire gently made up through that hole where the stent was up into the kidney  The stent was grasped and removed completely  A five Kazakh open-ended catheter was placed over the wire and the wire was removed  A retrograde was performed outlining the kidney and proximal right ureter  The wire was reintroduced and the catheter was removed  The wire was used to guide a new stent into position with a good coil proximally and distally  Patient was then awakened and transferred to the recovery room in satisfactory condition  Efflux was noted from the stent  The procedure was terminated and the patient was awakened without incident and transferred to the PACU in satisfactory condition  Plan:  1  He will need follow-up in the office if he would like to discuss options other than repeating the stent exchange in 12 weeks  SIGNATURE: Lesa Bucio MD  DATE: November 13, 2018  TIME: 5:56 PM    Portions of the record may have been created with voice recognition software   Occasional wrong word or "sound alike" substitutions may have occurred due to the inherent limitations of voice recognition software   Read the chart carefully and recognize, using context, where substitutions have occurred  PPSV 60%  Needs assistance with some ADLS  PT consult appreciated.

## 2023-02-17 NOTE — PROGRESS NOTE ADULT - PROBLEM SELECTOR PLAN 1
- will cut sutures and change trach to cuffless on POD5  - DNR resumed  - Please call ENT [phone] or page [phone] with questions/concerns

## 2023-02-18 NOTE — PROGRESS NOTE ADULT - PROBLEM SELECTOR PLAN 1
Dysphagia sec to large mass  8.1 x 4.5 cm in the retrotracheal region with displacement of the trachea  anteriorly , compression of the pharynx and proximal esophagus  Keep patient NPO - c/w mIVF d5ns   RadOnc consulted for palliative radiation, CT sim on 2/15 ,  plan for 5 fractions to neck mass   ENT consulted, s/p tracheostomy on 2/14, plan to change to cuffless trach in 5 days per ENT team, trach care per ENT  pt seen by palliative for pain management , started on PCA ,   patient with severe protein calorie malnutrition- will start PEG feeding   SLP eval.   Continue with Solu-Medrol 40mg Q8H- started in  Dysphagia sec to large mass  8.1 x 4.5 cm in the retrotracheal region with displacement of the trachea  anteriorly , compression of the pharynx and proximal esophagus  Keep patient NPO - c/w mIVF d5ns   RadOnc consulted for palliative radiation, CT sim on 2/15 ,  plan for 5 fractions to neck mass   ENT consulted, s/p tracheostomy on 2/14, plan to change to cuffless trach in 5 days per ENT team, trach care per ENT  pt seen by palliative for pain management , started on PCA , Pain controlled with current regimen   patient with severe protein calorie malnutrition-  started  PEG feeding , tolerating well   SLP eval.   Continue with Solu-Medrol 40mg Q8H- started in HH

## 2023-02-18 NOTE — CHART NOTE - NSCHARTNOTEFT_GEN_A_CORE
Called [facility] and spoke to the pt's nurse who reports the pt expressed his pain was controlled on the current PCA regimen. Pt has not received IV Tylenol dose that is ordered PRN. Pt has not received a clinician administered bolus in the last 24 hours. If pt's symptoms not controlled, he may receive a CAB of Dilaudid 2mg IV q1h PRN. Call the geriatrics and palliative medicine ([facility]) team if using more than 3 PRN in less than 24 hours or if PRN meds are not controlling symptoms. [phone]

## 2023-02-18 NOTE — PROGRESS NOTE ADULT - ASSESSMENT
52 year old male w/ a PMHX of stage IV lung CA, HTN, anxiety on Xanax, chronic pain on Percocet, and substance abuse was admitted to [facility] on 2/10/23 due to difficulty swallowing. 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 Pt is transferred to [facility] for Palliative Radiation.

## 2023-02-18 NOTE — CHART NOTE - NSCHARTNOTEFT_GEN_A_CORE
POD 2 s/p gastromstomy tube placement    tube loosened today  sutures removed    please reconsult thoracic surgery PRN    Joanne LOZANO [phone]

## 2023-02-18 NOTE — PROGRESS NOTE ADULT - SUBJECTIVE AND OBJECTIVE BOX
Patient is a 52y old  Male who presents with a chief complaint of Palliative Radiation (17 Feb 2023 16:31)      SUBJECTIVE / OVERNIGHT EVENTS:    MEDICATIONS  (STANDING):  acetaminophen   IVPB .. 1000 milliGRAM(s) IV Intermittent once  albuterol/ipratropium for Nebulization 3 milliLiter(s) Nebulizer every 6 hours  dextrose 5% + sodium chloride 0.9%. 1000 milliLiter(s) (75 mL/Hr) IV Continuous <Continuous>  gabapentin Solution 300 milliGRAM(s) Oral three times a day  heparin   Injectable 5000 Unit(s) SubCutaneous every 8 hours  HYDROmorphone PCA (5 mG/mL) 30 milliLiter(s) PCA Continuous PCA Continuous  influenza   Vaccine 0.5 milliLiter(s) IntraMuscular once  levothyroxine Injectable 40 MICROGram(s) IV Push at bedtime  LORazepam   Injectable 1 milliGRAM(s) IV Push every 6 hours  methylPREDNISolone sodium succinate Injectable 40 milliGRAM(s) IV Push every 8 hours  nicotine - 21 mG/24Hr(s) Patch 1 Patch Transdermal daily  pantoprazole  Injectable 40 milliGRAM(s) IV Push daily  piperacillin/tazobactam IVPB.. 3.375 Gram(s) IV Intermittent every 8 hours  senna 2 Tablet(s) Oral at bedtime    MEDICATIONS  (PRN):  albuterol    90 MICROgram(s) HFA Inhaler 2 Puff(s) Inhalation every 6 hours PRN Bronchospasm  HYDROmorphone PCA (5 mG/mL) Rescue Clinician Bolus 2 milliGRAM(s) IV Push every 1 hour PRN Severe Pain (7 - 10)  naloxone Injectable 0.1 milliGRAM(s) IV Push every 3 minutes PRN For ANY of the following changes in patient status:  A. RR LESS THAN 10 breaths per minute, B. Oxygen saturation LESS THAN 90%, C. Sedation score of 6  ondansetron Injectable 4 milliGRAM(s) IV Push every 8 hours PRN Nausea and/or Vomiting  polyethylene glycol 3350 17 Gram(s) Oral daily PRN Constipation      Vital Signs Last 24 Hrs  T(C): 36.7 (18 Feb 2023 10:27), Max: 37.2 (18 Feb 2023 02:41)  T(F): 98.1 (18 Feb 2023 10:27), Max: 99 (18 Feb 2023 02:41)  HR: 112 (18 Feb 2023 10:27) (72 - 112)  BP: 110/70 (18 Feb 2023 10:27) (98/63 - 122/77)  BP(mean): --  RR: 19 (18 Feb 2023 10:27) (17 - 19)  SpO2: 97% (18 Feb 2023 10:27) (91% - 100%)    Parameters below as of 18 Feb 2023 10:27  Patient On (Oxygen Delivery Method): tracheostomy collar      CAPILLARY BLOOD GLUCOSE      POCT Blood Glucose.: 137 mg/dL (18 Feb 2023 08:33)  POCT Blood Glucose.: 139 mg/dL (17 Feb 2023 23:44)  POCT Blood Glucose.: 132 mg/dL (17 Feb 2023 18:04)    I&O's Summary    17 Feb 2023 07:01  -  18 Feb 2023 07:00  --------------------------------------------------------  IN: 1230 mL / OUT: 0 mL / NET: 1230 mL        PHYSICAL EXAM:  CONSTITUTIONAL: , NAD cachectic   RESPIRATORY: Normal respiratory effort; lungs are clear to auscultation bilaterally  Neck: large right neck mass, with cuffed trach in place  CARDIOVASCULAR: Regular rate and rhythm, normal S1 and S2, no murmur/rub/gallop; No lower extremity edema; Peripheral pulses are 2+ bilaterally  ABDOMEN:  PEG with dressing, minimally tender to palpation   MUSCULOSKELETAL: no clubbing or cyanosis of digits; no joint swelling or tenderness to palpation, palpable right groin mass  PSYCH: A+O to person, place, and time;  appears anxious         LABS:                        9.0    20.53 )-----------( 586      ( 18 Feb 2023 06:24 )             28.5     02-18    136  |  96<L>  |  15  ----------------------------<  142<H>  3.8   |  29  |  0.52    Ca    9.0      18 Feb 2023 06:24  Phos  2.9     02-18  Mg     1.80     02-18                RADIOLOGY & ADDITIONAL TESTS:    Imaging Personally Reviewed:    Consultant(s) Notes Reviewed:      Care Discussed with Consultants/Other Providers:     Patient is a 52y old  Male who presents with a chief complaint of Palliative Radiation (17 Feb 2023 16:31)      SUBJECTIVE / OVERNIGHT EVENTS: patient seen and examined by bedside, pt sitting up in bed, reporting pain controlled on current PCA regimen , denies headache, dizziness, SOB, CP, Palpitations , N/V/D, Pt had a BM, Tube feeding started, tolerating well     MEDICATIONS  (STANDING):  acetaminophen   IVPB .. 1000 milliGRAM(s) IV Intermittent once  albuterol/ipratropium for Nebulization 3 milliLiter(s) Nebulizer every 6 hours  dextrose 5% + sodium chloride 0.9%. 1000 milliLiter(s) (75 mL/Hr) IV Continuous <Continuous>  gabapentin Solution 300 milliGRAM(s) Oral three times a day  heparin   Injectable 5000 Unit(s) SubCutaneous every 8 hours  HYDROmorphone PCA (5 mG/mL) 30 milliLiter(s) PCA Continuous PCA Continuous  influenza   Vaccine 0.5 milliLiter(s) IntraMuscular once  levothyroxine Injectable 40 MICROGram(s) IV Push at bedtime  LORazepam   Injectable 1 milliGRAM(s) IV Push every 6 hours  methylPREDNISolone sodium succinate Injectable 40 milliGRAM(s) IV Push every 8 hours  nicotine - 21 mG/24Hr(s) Patch 1 Patch Transdermal daily  pantoprazole  Injectable 40 milliGRAM(s) IV Push daily  piperacillin/tazobactam IVPB.. 3.375 Gram(s) IV Intermittent every 8 hours  senna 2 Tablet(s) Oral at bedtime    MEDICATIONS  (PRN):  albuterol    90 MICROgram(s) HFA Inhaler 2 Puff(s) Inhalation every 6 hours PRN Bronchospasm  HYDROmorphone PCA (5 mG/mL) Rescue Clinician Bolus 2 milliGRAM(s) IV Push every 1 hour PRN Severe Pain (7 - 10)  naloxone Injectable 0.1 milliGRAM(s) IV Push every 3 minutes PRN For ANY of the following changes in patient status:  A. RR LESS THAN 10 breaths per minute, B. Oxygen saturation LESS THAN 90%, C. Sedation score of 6  ondansetron Injectable 4 milliGRAM(s) IV Push every 8 hours PRN Nausea and/or Vomiting  polyethylene glycol 3350 17 Gram(s) Oral daily PRN Constipation      Vital Signs Last 24 Hrs  T(C): 36.7 (18 Feb 2023 10:27), Max: 37.2 (18 Feb 2023 02:41)  T(F): 98.1 (18 Feb 2023 10:27), Max: 99 (18 Feb 2023 02:41)  HR: 112 (18 Feb 2023 10:27) (72 - 112)  BP: 110/70 (18 Feb 2023 10:27) (98/63 - 122/77)  BP(mean): --  RR: 19 (18 Feb 2023 10:27) (17 - 19)  SpO2: 97% (18 Feb 2023 10:27) (91% - 100%)    Parameters below as of 18 Feb 2023 10:27  Patient On (Oxygen Delivery Method): tracheostomy collar      CAPILLARY BLOOD GLUCOSE      POCT Blood Glucose.: 137 mg/dL (18 Feb 2023 08:33)  POCT Blood Glucose.: 139 mg/dL (17 Feb 2023 23:44)  POCT Blood Glucose.: 132 mg/dL (17 Feb 2023 18:04)    I&O's Summary    17 Feb 2023 07:01  -  18 Feb 2023 07:00  --------------------------------------------------------  IN: 1230 mL / OUT: 0 mL / NET: 1230 mL        PHYSICAL EXAM:  CONSTITUTIONAL: , NAD cachectic   RESPIRATORY: Normal respiratory effort; lungs are clear to auscultation bilaterally  Neck: large right neck mass, with cuffed trach in place  CARDIOVASCULAR: Regular rate and rhythm, normal S1 and S2, no murmur/rub/gallop; No lower extremity edema; Peripheral pulses are 2+ bilaterally  ABDOMEN:  PEG with dressing, minimally tender to palpation   MUSCULOSKELETAL: no clubbing or cyanosis of digits; no joint swelling or tenderness to palpation, palpable right groin mass  PSYCH: A+O to person, place, and time;  appears anxious         LABS:                        9.0    20.53 )-----------( 586      ( 18 Feb 2023 06:24 )             28.5     02-18    136  |  96<L>  |  15  ----------------------------<  142<H>  3.8   |  29  |  0.52    Ca    9.0      18 Feb 2023 06:24  Phos  2.9     02-18  Mg     1.80     02-18                RADIOLOGY & ADDITIONAL TESTS:    Imaging Personally Reviewed:    Consultant(s) Notes Reviewed:  Ct sx, Palliative     Care Discussed with Consultants/Other Providers:

## 2023-02-18 NOTE — PROGRESS NOTE ADULT - PROBLEM SELECTOR PLAN 2
Upper respiratory wheezing known Adenocarcinoma of SHAILA with Multifocal PNA likely aspiration vs bronchiolitis  Continue with Zosyn - was started at [facility]  Monitor fever and wbc curve  Deescalate abx as warranted Upper respiratory wheezing known Adenocarcinoma of SHAILA with Multifocal PNA likely aspiration vs bronchiolitis  Continue with Zosyn - was started at [facility], will complete 7 day course   Monitor fever and wbc curve  Deescalate abx as warranted

## 2023-02-18 NOTE — PROGRESS NOTE ADULT - PROBLEM SELECTOR PLAN 4
takes levothyroxine 50mcg at home- converted to IV takes levothyroxine 50mcg at home- converted to IV   will transition to via peg , if tolerating G tube feeding

## 2023-02-18 NOTE — PROGRESS NOTE ADULT - PROBLEM SELECTOR PLAN 5
Dvt ppx: Lovenox QD  hypercoagulable state d/t malignancy  Code status: DNR/ DNI (Molst in chart from [facility]), pt was evaluated by Palliative in the [facility]  palliative eval requested for symptom management as pt is saying he has high tolerance for narcotics as he is an ex drug addict  case d/w pt, RN and  ACP  plan of care d/w Mery Davis phone# [phone] Dvt ppx: Lovenox QD  hypercoagulable state d/t malignancy  Code status: DNR/ DNI (Molst in chart from [facility]), pt was evaluated by Palliative in the [facility]  palliative eval requested for symptom management as pt is saying he has high tolerance for narcotics as he is an ex drug addict  case d/w pt, RN and  ACP  plan of care d/w Cousin Susan phone# [phone] on 2/17, updates provided and all questions answered   d/w pt, OK to discuss his care plan with his cousin

## 2023-02-19 NOTE — PROGRESS NOTE ADULT - SUBJECTIVE AND OBJECTIVE BOX
Patient is a 52y old  Male who presents with a chief complaint of Palliative Radiation (19 Feb 2023 08:44)      SUBJECTIVE / OVERNIGHT EVENTS:    MEDICATIONS  (STANDING):  acetaminophen   IVPB .. 1000 milliGRAM(s) IV Intermittent once  albuterol/ipratropium for Nebulization 3 milliLiter(s) Nebulizer every 6 hours  dextrose 5% + sodium chloride 0.9%. 1000 milliLiter(s) (75 mL/Hr) IV Continuous <Continuous>  gabapentin Solution 300 milliGRAM(s) Oral three times a day  heparin   Injectable 5000 Unit(s) SubCutaneous every 8 hours  HYDROmorphone PCA (5 mG/mL) 30 milliLiter(s) PCA Continuous PCA Continuous  influenza   Vaccine 0.5 milliLiter(s) IntraMuscular once  levothyroxine Injectable 40 MICROGram(s) IV Push at bedtime  LORazepam   Injectable 1 milliGRAM(s) IV Push every 6 hours  methylPREDNISolone sodium succinate Injectable 40 milliGRAM(s) IV Push every 8 hours  nicotine - 21 mG/24Hr(s) Patch 1 Patch Transdermal daily  pantoprazole  Injectable 40 milliGRAM(s) IV Push daily  piperacillin/tazobactam IVPB.. 3.375 Gram(s) IV Intermittent every 8 hours  senna 2 Tablet(s) Oral at bedtime    MEDICATIONS  (PRN):  albuterol    90 MICROgram(s) HFA Inhaler 2 Puff(s) Inhalation every 6 hours PRN Bronchospasm  HYDROmorphone PCA (5 mG/mL) Rescue Clinician Bolus 2 milliGRAM(s) IV Push every 1 hour PRN Severe Pain (7 - 10)  naloxone Injectable 0.1 milliGRAM(s) IV Push every 3 minutes PRN For ANY of the following changes in patient status:  A. RR LESS THAN 10 breaths per minute, B. Oxygen saturation LESS THAN 90%, C. Sedation score of 6  ondansetron Injectable 4 milliGRAM(s) IV Push every 8 hours PRN Nausea and/or Vomiting  polyethylene glycol 3350 17 Gram(s) Oral daily PRN Constipation      Vital Signs Last 24 Hrs  T(C): 37.2 (19 Feb 2023 10:24), Max: 37.2 (19 Feb 2023 10:24)  T(F): 98.9 (19 Feb 2023 10:24), Max: 98.9 (19 Feb 2023 10:24)  HR: 89 (19 Feb 2023 10:24) (89 - 98)  BP: 110/72 (19 Feb 2023 10:24) (100/61 - 122/72)  BP(mean): --  RR: 18 (19 Feb 2023 10:24) (18 - 19)  SpO2: 100% (19 Feb 2023 10:24) (96% - 100%)    Parameters below as of 19 Feb 2023 10:24  Patient On (Oxygen Delivery Method): room air      CAPILLARY BLOOD GLUCOSE        I&O's Summary    18 Feb 2023 07:01  -  19 Feb 2023 07:00  --------------------------------------------------------  IN: 1160 mL / OUT: 251 mL / NET: 909 mL        PHYSICAL EXAM:  GENERAL: NAD, well-developed  HEAD:  Atraumatic, Normocephalic  EYES: EOMI, PERRLA, conjunctiva and sclera clear  NECK: Supple, No JVD  CHEST/LUNG: Clear to auscultation bilaterally; No wheeze  HEART: Regular rate and rhythm; No murmurs, rubs, or gallops  ABDOMEN: Soft, Nontender, Nondistended; Bowel sounds present  EXTREMITIES:  2+ Peripheral Pulses, No clubbing, cyanosis, or edema  PSYCH: AAOx3  NEUROLOGY: non-focal  SKIN: No rashes or lesions    LABS:                        7.5    15.46 )-----------( 478      ( 19 Feb 2023 06:26 )             24.1     02-19    136  |  96<L>  |  15  ----------------------------<  179<H>  4.2   |  30  |  0.50    Ca    8.6      19 Feb 2023 06:26  Phos  2.8     02-19  Mg     1.70     02-19                RADIOLOGY & ADDITIONAL TESTS:    Imaging Personally Reviewed:    Consultant(s) Notes Reviewed:      Care Discussed with Consultants/Other Providers:   Patient is a 52y old  Male who presents with a chief complaint of Palliative Radiation (19 Feb 2023 08:44)      SUBJECTIVE / OVERNIGHT EVENTS: patient seen and examined by bedside, pt c/o feeling anxious , asking for ativan ATC, , also c/o heart burn, denies headache, dizziness, SOB, CP, Palpitations , N/V/D, abdominal pain  pt tolerating PEG feeding         MEDICATIONS  (STANDING):  acetaminophen   IVPB .. 1000 milliGRAM(s) IV Intermittent once  albuterol/ipratropium for Nebulization 3 milliLiter(s) Nebulizer every 6 hours  dextrose 5% + sodium chloride 0.9%. 1000 milliLiter(s) (75 mL/Hr) IV Continuous <Continuous>  gabapentin Solution 300 milliGRAM(s) Oral three times a day  heparin   Injectable 5000 Unit(s) SubCutaneous every 8 hours  HYDROmorphone PCA (5 mG/mL) 30 milliLiter(s) PCA Continuous PCA Continuous  influenza   Vaccine 0.5 milliLiter(s) IntraMuscular once  levothyroxine Injectable 40 MICROGram(s) IV Push at bedtime  LORazepam   Injectable 1 milliGRAM(s) IV Push every 6 hours  methylPREDNISolone sodium succinate Injectable 40 milliGRAM(s) IV Push every 8 hours  nicotine - 21 mG/24Hr(s) Patch 1 Patch Transdermal daily  pantoprazole  Injectable 40 milliGRAM(s) IV Push daily  piperacillin/tazobactam IVPB.. 3.375 Gram(s) IV Intermittent every 8 hours  senna 2 Tablet(s) Oral at bedtime    MEDICATIONS  (PRN):  albuterol    90 MICROgram(s) HFA Inhaler 2 Puff(s) Inhalation every 6 hours PRN Bronchospasm  HYDROmorphone PCA (5 mG/mL) Rescue Clinician Bolus 2 milliGRAM(s) IV Push every 1 hour PRN Severe Pain (7 - 10)  naloxone Injectable 0.1 milliGRAM(s) IV Push every 3 minutes PRN For ANY of the following changes in patient status:  A. RR LESS THAN 10 breaths per minute, B. Oxygen saturation LESS THAN 90%, C. Sedation score of 6  ondansetron Injectable 4 milliGRAM(s) IV Push every 8 hours PRN Nausea and/or Vomiting  polyethylene glycol 3350 17 Gram(s) Oral daily PRN Constipation      Vital Signs Last 24 Hrs  T(C): 37.2 (19 Feb 2023 10:24), Max: 37.2 (19 Feb 2023 10:24)  T(F): 98.9 (19 Feb 2023 10:24), Max: 98.9 (19 Feb 2023 10:24)  HR: 89 (19 Feb 2023 10:24) (89 - 98)  BP: 110/72 (19 Feb 2023 10:24) (100/61 - 122/72)  BP(mean): --  RR: 18 (19 Feb 2023 10:24) (18 - 19)  SpO2: 100% (19 Feb 2023 10:24) (96% - 100%)    Parameters below as of 19 Feb 2023 10:24  Patient On (Oxygen Delivery Method): room air      CAPILLARY BLOOD GLUCOSE        I&O's Summary    18 Feb 2023 07:01  -  19 Feb 2023 07:00  --------------------------------------------------------  IN: 1160 mL / OUT: 251 mL / NET: 909 mL      PHYSICAL EXAM:  CONSTITUTIONAL: , NAD cachectic   RESPIRATORY: Normal respiratory effort; lungs are clear to auscultation bilaterally  Neck: large right neck mass, with cuffed trach in place  CARDIOVASCULAR: Regular rate and rhythm, normal S1 and S2, no murmur/rub/gallop; No lower extremity edema; Peripheral pulses are 2+ bilaterally  ABDOMEN:  PEG with dressing, minimally tender to palpation   MUSCULOSKELETAL: no clubbing or cyanosis of digits; no joint swelling or tenderness to palpation, palpable right groin mass  PSYCH: A+O to person, place, and time;  appears anxious           LABS:                        7.5    15.46 )-----------( 478      ( 19 Feb 2023 06:26 )             24.1     02-19    136  |  96<L>  |  15  ----------------------------<  179<H>  4.2   |  30  |  0.50    Ca    8.6      19 Feb 2023 06:26  Phos  2.8     02-19  Mg     1.70     02-19                RADIOLOGY & ADDITIONAL TESTS:    Imaging Personally Reviewed:    Consultant(s) Notes Reviewed:  ENT     Care Discussed with Consultants/Other Providers:

## 2023-02-19 NOTE — PROGRESS NOTE ADULT - PROBLEM SELECTOR PLAN 2
Upper respiratory wheezing known Adenocarcinoma of SHAILA with Multifocal PNA likely aspiration vs bronchiolitis  Continue with Zosyn - was started at [facility], will complete 7 day course   Monitor fever and wbc curve  Deescalate abx as warranted

## 2023-02-19 NOTE — PROGRESS NOTE ADULT - PROBLEM SELECTOR PLAN 4
1. What type of treatment are you seeking today? [facility]   A. What times can patient attend program? afternoon   B. Can patient do virtual or in-person? In-person     C. What is earliest date patient would be able to start programming? 05/13        2. What symptoms are you having? Pt was in IOP and is looking to get back into treatment         4.  Insurance verified?  Yes                    takes levothyroxine 50mcg at home- converted to IV   will transition to via peg , if tolerating G tube feeding pt c/o anxiety, asking for ativan,   on Ativan 1 mg q 6 hrs ATC  Monitor respiratory status

## 2023-02-19 NOTE — PROGRESS NOTE ADULT - SUBJECTIVE AND OBJECTIVE BOX
INTERVAL HX:  Patient seen and examined at bedside, no issues with tracheostomy     Vital Signs Last 24 Hrs  T(C): 36.8 (19 Feb 2023 06:00), Max: 36.8 (19 Feb 2023 06:00)  T(F): 98.3 (19 Feb 2023 06:00), Max: 98.3 (19 Feb 2023 06:00)  HR: 90 (19 Feb 2023 06:00) (81 - 112)  BP: 121/68 (19 Feb 2023 06:00) (100/61 - 122/72)  BP(mean): --  RR: 18 (19 Feb 2023 06:00) (18 - 19)  SpO2: 99% (19 Feb 2023 06:00) (96% - 99%)    Parameters below as of 19 Feb 2023 06:00  Patient On (Oxygen Delivery Method): tracheostomy collar    NAD, lying in bed  Breathing comfortably on room air, no stridor, stertor  OC/OP: no erythema, bleeding, lacerations; dentition same as prior to surgery  6CN secured with sutures and trach tie  Minimal peristomal drainage   No active bleeding  Soft suction passes easily     Procedure: Trach Change  Patient was placed supine with neck extended. 6CN trach was fully deflated and removed. 6UN trach was placed and secured with soft collar. No complications.     A/P:   52 year old male Stage IV lung cancer s/p chemo w/ b/l thyroid mass, p/w stridor 2/14, s/p trach 2/14.    - routine trach care  - secure trach with soft collar, phalanges flush against skin  - keep trach of same size and one size down at bedside at all times  - consult SLP for speaking valve use (should only be used as tolerated during the day)  - ENT will sign off, page with questions

## 2023-02-19 NOTE — PROGRESS NOTE ADULT - ASSESSMENT
52 year old male w/ a PMHX of stage IV lung CA, HTN, anxiety on Xanax, chronic pain on Percocet, and substance abuse was admitted to [facility] on 2/10/23 due to difficulty swallowing. 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 Pt is transferred to [facility] for Palliative Radiation.

## 2023-02-19 NOTE — PROGRESS NOTE ADULT - PROBLEM SELECTOR PLAN 1
Dysphagia sec to large mass  8.1 x 4.5 cm in the retrotracheal region with displacement of the trachea  anteriorly , compression of the pharynx and proximal esophagus  Keep patient NPO - c/w mIVF d5ns   RadOnc consulted for palliative radiation, CT sim on 2/15 ,  plan for 5 fractions to neck mass   ENT consulted, s/p tracheostomy on 2/14, plan to change to cuffless trach in 5 days per ENT team, trach care per ENT  pt seen by palliative for pain management , started on PCA , Pain controlled with current regimen   patient with severe protein calorie malnutrition-  started  PEG feeding , tolerating well   SLP eval.   Continue with Solu-Medrol 40mg Q8H- started in HH Dysphagia sec to large mass  8.1 x 4.5 cm in the retrotracheal region with displacement of the trachea  anteriorly , compression of the pharynx and proximal esophagus  Keep patient NPO -  was on  mIVF d5ns , will DC as pt has been tolerating Tube feeding   [facility] consulted for palliative radiation, CT sim on 2/15 ,  plan for 5 fractions to neck mass , first treatment on 2/17   ENT consulted, s/p tracheostomy on 2/14, plan to change to cuffless trach in 5 days per ENT team, trach care per ENT, ENT rec keep trach of same size and one size down at bedside at all times and to consult SLP for speaking valve use (should only be used as tolerated during the day)  pt seen by palliative for pain management , started on PCA , Pain controlled with current regimen   patient with severe protein calorie malnutrition-  started  PEG feeding , tolerating well   SLP eval.   Continue with Solu-Medrol 40mg Q8H- started in HH- will d/w ENT regarding weaning steroids

## 2023-02-19 NOTE — PROGRESS NOTE ADULT - PROBLEM SELECTOR PLAN 3
Progressive NSCLC  disease progression in the neck causing stridor and dysphagia  pending palliative radiation Progressive NSCLC  disease progression in the neck causing stridor and dysphagia   palliative radiation in progress

## 2023-02-19 NOTE — PROGRESS NOTE ADULT - PROBLEM SELECTOR PLAN 6
Dvt ppx: Lovenox QD  hypercoagulable state d/t malignancy  Code status: DNR/ DNI (Molst in chart from [facility]), pt was evaluated by Palliative in the [facility]  palliative eval requested for symptom management as pt is saying he has high tolerance for narcotics as he is an ex drug addict  pt c/o heart burn , on IV ppi qd, will change to BID   case d/w pt, RN and  ACP  plan of care d/w Cousin Susan phone# [phone] on 2/17, updates provided and all questions answered   d/w pt, OK to discuss his care plan with his cousin

## 2023-02-20 NOTE — PROGRESS NOTE ADULT - PROBLEM SELECTOR PLAN 2
Upper respiratory wheezing known Adenocarcinoma of SHAILA with Multifocal PNA likely aspiration vs bronchiolitis  Continue with Zosyn - was started at [facility], will complete 7 day course   Monitor fever and wbc curve  Deescalate abx as warranted

## 2023-02-20 NOTE — PROGRESS NOTE ADULT - SUBJECTIVE AND OBJECTIVE BOX
Patient is a 52y old  Male who presents with a chief complaint of Palliative Radiation (19 Feb 2023 10:37)      SUBJECTIVE / OVERNIGHT EVENTS:    MEDICATIONS  (STANDING):  acetaminophen   IVPB .. 1000 milliGRAM(s) IV Intermittent once  albuterol/ipratropium for Nebulization 3 milliLiter(s) Nebulizer every 6 hours  dextrose 5% + sodium chloride 0.9%. 1000 milliLiter(s) (75 mL/Hr) IV Continuous <Continuous>  gabapentin Solution 300 milliGRAM(s) Oral three times a day  heparin   Injectable 5000 Unit(s) SubCutaneous every 8 hours  HYDROmorphone PCA (5 mG/mL) 30 milliLiter(s) PCA Continuous PCA Continuous  influenza   Vaccine 0.5 milliLiter(s) IntraMuscular once  levothyroxine Injectable 40 MICROGram(s) IV Push at bedtime  LORazepam   Injectable 1 milliGRAM(s) IV Push every 6 hours  methylPREDNISolone sodium succinate Injectable 40 milliGRAM(s) IV Push every 12 hours  nicotine - 21 mG/24Hr(s) Patch 1 Patch Transdermal daily  pantoprazole  Injectable 40 milliGRAM(s) IV Push two times a day  piperacillin/tazobactam IVPB.. 3.375 Gram(s) IV Intermittent every 8 hours  senna 2 Tablet(s) Oral at bedtime    MEDICATIONS  (PRN):  albuterol    90 MICROgram(s) HFA Inhaler 2 Puff(s) Inhalation every 6 hours PRN Bronchospasm  HYDROmorphone PCA (5 mG/mL) Rescue Clinician Bolus 2 milliGRAM(s) IV Push every 1 hour PRN Severe Pain (7 - 10)  naloxone Injectable 0.1 milliGRAM(s) IV Push every 3 minutes PRN For ANY of the following changes in patient status:  A. RR LESS THAN 10 breaths per minute, B. Oxygen saturation LESS THAN 90%, C. Sedation score of 6  ondansetron Injectable 4 milliGRAM(s) IV Push every 8 hours PRN Nausea and/or Vomiting  polyethylene glycol 3350 17 Gram(s) Oral daily PRN Constipation      Vital Signs Last 24 Hrs  T(C): 36.6 (20 Feb 2023 06:00), Max: 37.2 (19 Feb 2023 10:24)  T(F): 97.9 (20 Feb 2023 06:00), Max: 98.9 (19 Feb 2023 10:24)  HR: 88 (20 Feb 2023 06:00) (74 - 98)  BP: 121/77 (20 Feb 2023 06:00) (100/61 - 126/80)  BP(mean): --  RR: 16 (20 Feb 2023 06:00) (16 - 18)  SpO2: 99% (20 Feb 2023 06:00) (96% - 100%)    Parameters below as of 20 Feb 2023 06:00  Patient On (Oxygen Delivery Method): tracheostomy collar      CAPILLARY BLOOD GLUCOSE        I&O's Summary    19 Feb 2023 07:01  -  20 Feb 2023 07:00  --------------------------------------------------------  IN: 2160 mL / OUT: 0 mL / NET: 2160 mL      PHYSICAL EXAM:  CONSTITUTIONAL: , NAD cachectic   RESPIRATORY: Normal respiratory effort; lungs are clear to auscultation bilaterally  Neck: large right neck mass, with cuffed trach in place  CARDIOVASCULAR: Regular rate and rhythm, normal S1 and S2, no murmur/rub/gallop; No lower extremity edema; Peripheral pulses are 2+ bilaterally  ABDOMEN:  PEG with dressing, minimally tender to palpation   MUSCULOSKELETAL: no clubbing or cyanosis of digits; no joint swelling or tenderness to palpation, palpable right groin mass  PSYCH: A+O to person, place, and time;  appears anxious         LABS:                        7.8    17.33 )-----------( 518      ( 20 Feb 2023 05:40 )             25.5     02-19    136  |  96<L>  |  15  ----------------------------<  179<H>  4.2   |  30  |  0.50    Ca    8.6      19 Feb 2023 06:26  Phos  2.8     02-19  Mg     1.70     02-19                RADIOLOGY & ADDITIONAL TESTS:    Imaging Personally Reviewed:    Consultant(s) Notes Reviewed:      Care Discussed with Consultants/Other Providers:     Patient is a 52y old  Male who presents with a chief complaint of Palliative Radiation (19 Feb 2023 10:37)      SUBJECTIVE / OVERNIGHT EVENTS: patient seen and examined by bedside , pt appears comfortable, no respiratory distress noted , pt asking to increase Ativan to q 4-5 hrs as it is not enough as he has a high tolerance. Pt also was c/o heart bur and tube feeding was decreased, denies headache, dizziness, SOB, CP, Palpitations , N/V/D,     MEDICATIONS  (STANDING):  acetaminophen   IVPB .. 1000 milliGRAM(s) IV Intermittent once  albuterol/ipratropium for Nebulization 3 milliLiter(s) Nebulizer every 6 hours  dextrose 5% + sodium chloride 0.9%. 1000 milliLiter(s) (75 mL/Hr) IV Continuous <Continuous>  gabapentin Solution 300 milliGRAM(s) Oral three times a day  heparin   Injectable 5000 Unit(s) SubCutaneous every 8 hours  HYDROmorphone PCA (5 mG/mL) 30 milliLiter(s) PCA Continuous PCA Continuous  influenza   Vaccine 0.5 milliLiter(s) IntraMuscular once  levothyroxine Injectable 40 MICROGram(s) IV Push at bedtime  LORazepam   Injectable 1 milliGRAM(s) IV Push every 6 hours  methylPREDNISolone sodium succinate Injectable 40 milliGRAM(s) IV Push every 12 hours  nicotine - 21 mG/24Hr(s) Patch 1 Patch Transdermal daily  pantoprazole  Injectable 40 milliGRAM(s) IV Push two times a day  piperacillin/tazobactam IVPB.. 3.375 Gram(s) IV Intermittent every 8 hours  senna 2 Tablet(s) Oral at bedtime    MEDICATIONS  (PRN):  albuterol    90 MICROgram(s) HFA Inhaler 2 Puff(s) Inhalation every 6 hours PRN Bronchospasm  HYDROmorphone PCA (5 mG/mL) Rescue Clinician Bolus 2 milliGRAM(s) IV Push every 1 hour PRN Severe Pain (7 - 10)  naloxone Injectable 0.1 milliGRAM(s) IV Push every 3 minutes PRN For ANY of the following changes in patient status:  A. RR LESS THAN 10 breaths per minute, B. Oxygen saturation LESS THAN 90%, C. Sedation score of 6  ondansetron Injectable 4 milliGRAM(s) IV Push every 8 hours PRN Nausea and/or Vomiting  polyethylene glycol 3350 17 Gram(s) Oral daily PRN Constipation      Vital Signs Last 24 Hrs  T(C): 36.6 (20 Feb 2023 06:00), Max: 37.2 (19 Feb 2023 10:24)  T(F): 97.9 (20 Feb 2023 06:00), Max: 98.9 (19 Feb 2023 10:24)  HR: 88 (20 Feb 2023 06:00) (74 - 98)  BP: 121/77 (20 Feb 2023 06:00) (100/61 - 126/80)  BP(mean): --  RR: 16 (20 Feb 2023 06:00) (16 - 18)  SpO2: 99% (20 Feb 2023 06:00) (96% - 100%)    Parameters below as of 20 Feb 2023 06:00  Patient On (Oxygen Delivery Method): tracheostomy collar      CAPILLARY BLOOD GLUCOSE        I&O's Summary    19 Feb 2023 07:01  -  20 Feb 2023 07:00  --------------------------------------------------------  IN: 2160 mL / OUT: 0 mL / NET: 2160 mL      PHYSICAL EXAM:  CONSTITUTIONAL: , NAD cachectic   RESPIRATORY: Normal respiratory effort; lungs are clear to auscultation bilaterally  Neck: large right neck mass, with cuffed trach in place  CARDIOVASCULAR: Regular rate and rhythm, normal S1 and S2, no murmur/rub/gallop; No lower extremity edema; Peripheral pulses are 2+ bilaterally  ABDOMEN:  PEG with dressing, minimally tender to palpation   MUSCULOSKELETAL: no clubbing or cyanosis of digits; no joint swelling or tenderness to palpation, palpable right groin mass  PSYCH: A+O to person, place, and time;          LABS:                        7.8    17.33 )-----------( 518      ( 20 Feb 2023 05:40 )             25.5     02-19    136  |  96<L>  |  15  ----------------------------<  179<H>  4.2   |  30  |  0.50    Ca    8.6      19 Feb 2023 06:26  Phos  2.8     02-19  Mg     1.70     02-19                RADIOLOGY & ADDITIONAL TESTS:    Imaging Personally Reviewed:    Consultant(s) Notes Reviewed:      Care Discussed with Consultants/Other Providers:

## 2023-02-20 NOTE — PROGRESS NOTE ADULT - ASSESSMENT
52 year old male w/ a PMHX of stage IV lung CA, HTN, anxiety on Xanax, chronic pain on Percocet, and substance abuse was admitted to [facility] on 2/10/23 due to difficulty swallowing. 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 Pt is transferred to [facility] for Palliative Radiation.

## 2023-02-20 NOTE — PROGRESS NOTE ADULT - PROBLEM SELECTOR PLAN 3
Progressive NSCLC  disease progression in the neck causing stridor and dysphagia   palliative radiation in progress

## 2023-02-20 NOTE — PROGRESS NOTE ADULT - PROBLEM SELECTOR PLAN 5
takes levothyroxine 50mcg at home- converted to IV   will transition to via peg , if tolerating G tube feeding

## 2023-02-20 NOTE — PROVIDER CONTACT NOTE (OTHER) - ACTION/TREATMENT ORDERED:
MD made aware. MD states okay for patient to be off tube feeds overnight. MD states will order IV fluids to be infused while on PCA pump.

## 2023-02-20 NOTE — CHART NOTE - NSCHARTNOTEFT_GEN_A_CORE
Paged by Dr. Ceja covering pt Flores that he was c/o pain. Dr. Ceja expressed she thought the pt appeared anxious and the pt asked for standing Ativan, although he was already on Ativan 1mg q6h ATC. Dr. Ceja then increased Ativan to 1mg q4h ATC. I called [facility] and spoke to the pt's nurse who stated the pt was sleeping comfortably at the time. Nurse reports that the pt was not c/o uncontrolled pain to her but reported that to the physician. Nurse also felt the pt appeared anxious earlier and thought the Ativan increased frequency seemed to help. Pt's PCA usage was noted to be 29 injections with 32 attempts of IV Dilaudid. Nurse reports that she reminded the pt he could press the PCA button q15 min PRN. I instructed the nurse to give the CAB 2mg IV PRN uncontrolled pain and to try the IV Tylenol in addition as he did not get either of these. I also told the nurse to Call the geriatrics and palliative medicine ([facility]) team if meds are not controlling symptoms. [id number]

## 2023-02-20 NOTE — PROGRESS NOTE ADULT - PROBLEM SELECTOR PLAN 4
pt c/o anxiety, asking for ativan,   on Ativan 1 mg q 6 hrs ATC  Monitor respiratory status pt c/o anxiety, asking for ativan,   on Ativan 1 mg q 6 hrs ATC, pt says not enough, wants to increase to q 4  hrs ,d/w pharmacy, will increase to 1 mg q 4 hrs   Monitor respiratory status, hold if sedated   Pt on PCA for pain control, wants to  speak to palliative, paged, awaiting call back pt c/o anxiety, asking for ativan,   on Ativan 1 mg q 6 hrs ATC, pt says not enough, wants to increase to q 4  hrs ,d/w pharmacy, will increase to 1 mg q 4 hrs   Monitor respiratory status, hold if sedated   Pt on PCA for pain control, wants to  case d/w  palliative,

## 2023-02-20 NOTE — PROGRESS NOTE ADULT - PROBLEM SELECTOR PLAN 6
Dvt ppx: Lovenox QD  hypercoagulable state d/t malignancy  Code status: DNR/ DNI ([facility] in chart from [facility]), pt was evaluated by Palliative in the [facility]  palliative eval requested for symptom management as pt is saying he has high tolerance for narcotics as he is an ex drug addict  pt c/o heart burn , on IV ppi qd, will change to BID   case d/w pt, RN and  ACP  plan of care d/w Cousin Susan phone# [phone] on 2/17, updates provided and all questions answered   d/w pt, OK to discuss his care plan with his cousin Dvt ppx: Lovenox QD  hypercoagulable state d/t malignancy  Code status: DNR/ DNI ([facility]st in chart from [facility]), pt was evaluated by Palliative in the [facility]  palliative eval requested for symptom management as pt is saying he has high tolerance for narcotics as he is an ex drug addict  pt c/o heart burn , on IV ppi qd, changed  to BID   case d/w pt, RN and  ACP  plan of care d/w Cousin Susan phone# [phone] on 2/17, updates provided and all questions answered   d/w pt, OK to discuss his care plan with his cousin

## 2023-02-21 NOTE — PROGRESS NOTE ADULT - NS ATTEND AMEND GEN_ALL_CORE FT
52 year old male w/ a PMHX of stage IV lung CA, HTN, anxiety on Xanax, chronic pain on Percocet, and substance abuse was admitted to [facility] on 2/10/23 due to difficulty swallowing. 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 Pt is transferred to [facility] for Palliative Radiation.     Case discussed with Palliative NP, Amy Palacio. Start methadone as above for long acting pain control and to help wean patient off dilaudid PCA. Transition IV ativan to PO as peg tube is being utilized.   > PLEASE HAVE ONCOLOGY see patient inpatient. If patient is appropriate for hospice, would plan to have goc discussion before discharge. At this time, patient believes he is getting more Keytruda after he completes RT.   > Appreciate rad/onc recs - RT to end on 2/24   > Appreciate CT surgery recs     Thank you for allowing us to participate in your patient's care. We will continue to follow with you. Please page [phone] for any q's or c's.     Laura Calixto D.O.   Palliative Medicine.

## 2023-02-21 NOTE — PROGRESS NOTE ADULT - PROBLEM SELECTOR PLAN 3
Progressive NSCLC with disease progression in the neck causing stridor and dysphagia  - palliative radiation in progress  - per heme onc at [place] pt no longer candidate for DMT  - follow up hemeon recs  - pain management per palliative Improving, likely 2/2 PNA and reactive post op  - trend CBC, fever curve

## 2023-02-21 NOTE — PROGRESS NOTE ADULT - PROBLEM SELECTOR PLAN 8
Dvt ppx: Lovenox QD, hypercoagulable state d/t malignancy  Code status: DNR/ DNI ([facility] in chart from [facility]), pt was evaluated by Palliative in the [facility]  palliative eval requested for symptom management as pt is saying he has high tolerance for narcotics as he is an ex drug addict  Diet: tube feeds  Dispo: pending     -case d/w pt, RN and ACP  -plan of care d/w Cousin Susan phone# [phone] on 2/17, updates provided and all questions answered, d/w pt, OK to discuss his care plan with his cousin

## 2023-02-21 NOTE — PROGRESS NOTE ADULT - PROBLEM SELECTOR PLAN 6
Prior MOLST in chart - DNR/DNI  Please page for any questions, concerns or uncontrolled symptoms #[id number].

## 2023-02-21 NOTE — PROGRESS NOTE ADULT - PROBLEM SELECTOR PLAN 2
S/P 4 cycles of single agent palliative Keytruda/ immunotherapy - had positive response, patient did not return for follow-up. Several weeks ago returned to office weeks ago with progressive disease restarted on treatment with Keytruda AND concurrent chemotherapy carboplatin and Alimta. Had progressive disease in the neck and had seen radiation therapy in consultation  - Transferred to [facility] for palliative RT to neck  - Trach placed 2/15 for airway protection & PEG placed 2/16 for nutrition/medication administration   - No longer candidate for DMT. S/P 4 cycles of single agent palliative Keytruda/ immunotherapy - had positive response, patient did not return for follow-up. Several weeks ago returned to office weeks ago with progressive disease restarted on treatment with Keytruda AND concurrent chemotherapy carboplatin and Alimta. Had progressive disease in the neck and had seen radiation therapy in consultation  - Transferred to [facility] for palliative RT to neck  - Trach placed 2/15 for airway protection & PEG placed 2/16 for nutrition/medication administration   - Patient states that he was under assumption that he would be getting more Keytruda after palliative RT. Please have Oncology ([facility]- Dr. Trammell) see patient to clarify if patient is a candidate for further DMT.

## 2023-02-21 NOTE — PROGRESS NOTE ADULT - PROBLEM SELECTOR PLAN 1
Dysphagia 2/2 large mass in the retrotracheal region with displacement of the trachea  anteriorly, compression of the pharynx and proximal esophagus  - keep patient NPO with PEG feeds pending speech and swallow follow up  - continue palliative radiation, to be completed on 2/24  - keep trach of same size and one size down at bedside at all times  - can start solumedrol 40mg daily tomorrow for weaning  - continue pain control per palliative

## 2023-02-21 NOTE — PROGRESS NOTE ADULT - PROBLEM SELECTOR PLAN 6
Dvt ppx: Lovenox QD, hypercoagulable state d/t malignancy  Code status: DNR/ DNI ([facility] in chart from [facility]), pt was evaluated by Palliative in the [facility]  palliative eval requested for symptom management as pt is saying he has high tolerance for narcotics as he is an ex drug addict  Diet: tube feeds  Dispo: pending     -case d/w pt, RN and ACP  -plan of care d/w Cousin Susan phone# [phone] on 2/17, updates provided and all questions answered, d/w pt, OK to discuss his care plan with his cousin Continue ativan 1mg q4hr  - Monitor respiratory status, hold if sedated

## 2023-02-21 NOTE — PROGRESS NOTE ADULT - SUBJECTIVE AND OBJECTIVE BOX
[facility] Geriatrics and Palliative Care  Amy Palacio, Palliative Care Nurse Practitioner  Contact Info: Page [phone] (Including Nights/Weekends), Message on Microsoft Teams (Amy Palacio), or leave VM at Palliative Office [phone] (non-urgent)    SUBJECTIVE AND OBJECTIVE:  Indication for Geriatrics and Palliative Care Services/INTERVAL HPI: Complex symptom management.  Pt seen and examined this afternoon, pt shared pain is 8/10 after returning from radiation. Explained the plan of initiating Methadone for long acting pain control, pt in agreement.     OVERNIGHT EVENTS: Pt required 35 demands, 36 attempts, 0 CAB. Totaling 81.5mg IV Dilaudid within 24hrs. IV Ativan liberated to Q4 hrs ATC by primary team over the weekend.    DNR on chart:Yes  Yes      Allergies    No Known Allergies    Intolerances    MEDICATIONS  (STANDING):  acetaminophen   IVPB .. 1000 milliGRAM(s) IV Intermittent once  albuterol/ipratropium for Nebulization 3 milliLiter(s) Nebulizer every 6 hours  dextrose 5% + sodium chloride 0.9%. 1000 milliLiter(s) (75 mL/Hr) IV Continuous <Continuous>  gabapentin Solution 300 milliGRAM(s) Oral three times a day  heparin   Injectable 5000 Unit(s) SubCutaneous every 8 hours  HYDROmorphone PCA (5 mG/mL) 30 milliLiter(s) PCA Continuous PCA Continuous  influenza   Vaccine 0.5 milliLiter(s) IntraMuscular once  levothyroxine Injectable 40 MICROGram(s) IV Push at bedtime  LORazepam   Injectable 1 milliGRAM(s) IV Push every 4 hours  methadone   Solution 10 milliGRAM(s) Oral three times a day  methylPREDNISolone sodium succinate Injectable 40 milliGRAM(s) IV Push every 12 hours  nicotine - 21 mG/24Hr(s) Patch 1 Patch Transdermal daily  pantoprazole  Injectable 40 milliGRAM(s) IV Push two times a day  piperacillin/tazobactam IVPB.. 3.375 Gram(s) IV Intermittent every 8 hours  senna 2 Tablet(s) Oral at bedtime    MEDICATIONS  (PRN):  albuterol    90 MICROgram(s) HFA Inhaler 2 Puff(s) Inhalation every 6 hours PRN Bronchospasm  HYDROmorphone PCA (5 mG/mL) Rescue Clinician Bolus 2 milliGRAM(s) IV Push every 1 hour PRN Severe Pain (7 - 10)  naloxone Injectable 0.1 milliGRAM(s) IV Push every 3 minutes PRN For ANY of the following changes in patient status:  A. RR LESS THAN 10 breaths per minute, B. Oxygen saturation LESS THAN 90%, C. Sedation score of 6  ondansetron Injectable 4 milliGRAM(s) IV Push every 8 hours PRN Nausea and/or Vomiting  polyethylene glycol 3350 17 Gram(s) Oral daily PRN Constipation        -------------------------------------------------------------------------------------------------------  ITEMS UNCHECKED ARE NOT PRESENT    PRESENT SYMPTOMS: [ ]Unable to self-report - see [ ] CPOT [ ] PAINADS [ ] RDOS  Source if other than patient:  [ ]Family   [ ]Team     Pain:  [ ]yes [ ]no  QOL impact -   Location -                    Aggravating factors -  Quality -  Radiation -  Timing-  Severity (0-10 scale):  Minimal acceptable level (0-10 scale)/pain goal:    CPOT:    https://www.[facility].org/getattachment/[email or web address]/Critical-Care-Pain-Observation-Tool-(CPOT)    PAINAD Score: See PAINAD tool and score below       RDOS: See RDOS tool and score below   0 to 2  minimal or no respiratory distress   3  mild distress  4 to 6 moderate distress  >7 severe distress    Dyspnea:                           [ ]Mild [ ]Moderate [ ]Severe  Anxiety:                             [ ]Mild [ ]Moderate [ ]Severe  Fatigue:                             [ ]Mild [ ]Moderate [ ]Severe  Nausea:                             [ ]Mild [ ]Moderate [ ]Severe  Loss of appetite:              [ ]Mild [ ]Moderate [ ]Severe  Constipation:                    [ ]Mild [ ]Moderate [ ]Severe  Other Symptoms:  [ ]All other review of systems negative     Home Medications for Symptoms if present:    I Stop Reference no:     -------------------------------------------------------------------------------------------------------  PCSSQ[Palliative Care Spiritual Screening Question]   Severity (0-10):  Score of 4 or > indicate consideration of Chaplaincy referral.  Chaplaincy Referral: [ ] yes [ ] refused [ ] following [ ] Deferred     Caregiver [place]? : [ ] yes [ ] no [ ] Deferred [ ] Declined             Social work referral [ ] Patient & Family Centered Care Referral [ ]     Anticipatory Grief present?:  [ ] yes [ ] no  [ ] Deferred                  Social work referral [ ] Chaplaincy Referral [ ]    -------------------------------------------------------------------------------------------------------  PHYSICAL EXAM:  Vital Signs Last 24 Hrs  T(C): 37 (21 Feb 2023 10:02), Max: 37 (21 Feb 2023 10:02)  T(F): 98.6 (21 Feb 2023 10:02), Max: 98.6 (21 Feb 2023 10:02)  HR: 81 (21 Feb 2023 10:02) (75 - 88)  BP: 132/84 (21 Feb 2023 10:02) (109/75 - 137/81)  BP(mean): --  RR: 17 (21 Feb 2023 10:02) (16 - 18)  SpO2: 97% (21 Feb 2023 10:02) (97% - 100%)    Parameters below as of 21 Feb 2023 10:02  Patient On (Oxygen Delivery Method): tracheostomy collar     I&O's Summary    20 Feb 2023 07:01  -  21 Feb 2023 07:00  --------------------------------------------------------  IN: 3215 mL / OUT: 0 mL / NET: 3215 mL    21 Feb 2023 07:01  -  21 Feb 2023 13:39  --------------------------------------------------------  IN: 380 mL / OUT: 0 mL / NET: 380 mL       GENERAL:   [ ]Cachexia  [ ]Alert  [ ]Oriented x   [ ]Lethargic  [ ]Unarousable  [ ]Verbal  [ ]Non-Verbal    Behavioral:   [ ]Anxiety  [ ]Delirium [ ]Agitation [ ]Other    HEENT:  [ ]Normal   [ ]Dry mouth   [ ]ET Tube/Trach  [ ]Oral lesions    PULMONARY:   [ ]Clear [ ]Tachypnea  [ ]Audible excessive secretions   [ ]Rhonchi        [ ]Right [ ]Left [ ]Bilateral  [ ]Crackles        [ ]Right [ ]Left [ ]Bilateral  [ ]Wheezing     [ ]Right [ ]Left [ ]Bilateral  [ ]Diminished BS [ ] Right [ ]Left [ ]Bilateral    CARDIOVASCULAR:    [ ]Regular [ ]Irregular [ ]Tachy  [ ]Faheem [ ]Murmur [ ]Other    GASTROINTESTINAL:  [ ]Soft  [ ]Distended   [ ]+BS  [ ]Non tender [ ]Tender  [ ]Other [ ]PEG [ ]OGT/ NGT   Last BM:     GENITOURINARY:  [ ]Normal [ ]Incontinent   [ ]Oliguria/Anuria   [ ]Wall    MUSCULOSKELETAL:   [ ]Normal   [ ]Weakness  [ ]Bed/Wheelchair bound [ ]Edema    NEUROLOGIC:   [ ]No focal deficits  [ ] Cognitive impairment  [ ] Dysphagia [ ]Dysarthria [ ] Paresis [ ]Other     SKIN:   [ ]Normal  [ ]Rash  [ ]Other  [ ]Pressure ulcer(s) [ ]y [ ]n present on admission    -------------------------------------------------------------------------------------------------------  CRITICAL CARE:  [ ]Shock Present  [ ]Septic [ ]Cardiogenic [ ]Neurologic [ ]Hypovolemic  [ ]Vasopressors [ ]Inotropes  [ ]Respiratory failure present [ ]Mechanical Ventilation [ ]Non-invasive ventilatory support [ ]High-Flow   [ ]Acute  [ ]Chronic [ ]Hypoxic  [ ]Hypercarbic [ ]Other  [ ]Other organ failure     -------------------------------------------------------------------------------------------------------  LABS:                        7.1    16.33 )-----------( 479      ( 21 Feb 2023 06:15 )             23.1   02-21    134<L>  |  95<L>  |  12  ----------------------------<  153<H>  3.9   |  27  |  0.43<L>    Ca    8.6      21 Feb 2023 06:15  Phos  2.8     02-21  Mg     1.60     02-21    -------------------------------------------------------------------------------------------------------  RADIOLOGY & ADDITIONAL STUDIES: Reviewed.      -------------------------------------------------------------------------------------------------------  Protein Calorie Malnutrition Present: [ ]mild [ ]moderate [ ]severe [ ]underweight [ ]morbid obesity  https://www.[employer].org/vault/2440/web/files/ONC/Table_Clinical%20Characteristics%20to%20Document%20Malnutrition-White%20JV%20et%20al%202012.pdf    Height (cm): 182.9 (02-16-23 @ 06:00), 170.2 (02-10-23 @ 08:52), 182.9 (11-21-22 @ 14:45)  Weight (kg): 52.2 (02-16-23 @ 06:00), 56.2 (02-10-23 @ 08:52), 61.235 (11-21-22 @ 17:08)  BMI (kg/m2): 15.6 (02-16-23 @ 06:00), 19.4 (02-10-23 @ 08:52), 18.3 (11-21-22 @ 17:08)    Palliative Performance Status Version 2:   See PPSv2 tool and score below       [ ]PPSV2 < or = 30%  [ ]significant weight loss [ ]poor nutritional intake [ ]anasarcaPrealbumin, Serum: 10 mg/dL (02-12-23 @ 07:04)  [ ]Artificial Nutrition    Other REFERRALS:  [ ]Hospice  [ ]Child Life  [ ]Social Work  [ ]Case management [ ]Holistic Therapy     ------------------------------------------------------------------------------------------------------- [facility] Geriatrics and Palliative Care  Amy Palacio, Palliative Care Nurse Practitioner  Contact Info: Page [phone] (Including Nights/Weekends), Message on Microsoft Teams (Amy Palacio), or leave VM at Palliative Office [phone] (non-urgent)    SUBJECTIVE AND OBJECTIVE:  Indication for Geriatrics and Palliative Care Services/INTERVAL HPI: Complex symptom management.  Pt seen and examined this afternoon, pt shared pain is 8/10 after returning from radiation. Explained the plan of initiating Methadone for long acting pain control, pt in agreement.     OVERNIGHT EVENTS: Pt required 35 demands, 36 attempts, 0 CAB. Totaling 81.5mg IV Dilaudid within 24hrs. IV Ativan liberated to Q4 hrs ATC by primary team over the weekend.    DNR on chart:Yes  Yes      Allergies    No Known Allergies    Intolerances    MEDICATIONS  (STANDING):  acetaminophen   IVPB .. 1000 milliGRAM(s) IV Intermittent once  albuterol/ipratropium for Nebulization 3 milliLiter(s) Nebulizer every 6 hours  dextrose 5% + sodium chloride 0.9%. 1000 milliLiter(s) (75 mL/Hr) IV Continuous <Continuous>  gabapentin Solution 300 milliGRAM(s) Oral three times a day  heparin   Injectable 5000 Unit(s) SubCutaneous every 8 hours  HYDROmorphone PCA (5 mG/mL) 30 milliLiter(s) PCA Continuous PCA Continuous  influenza   Vaccine 0.5 milliLiter(s) IntraMuscular once  levothyroxine Injectable 40 MICROGram(s) IV Push at bedtime  LORazepam   Injectable 1 milliGRAM(s) IV Push every 4 hours  methadone   Solution 10 milliGRAM(s) Oral three times a day  methylPREDNISolone sodium succinate Injectable 40 milliGRAM(s) IV Push every 12 hours  nicotine - 21 mG/24Hr(s) Patch 1 Patch Transdermal daily  pantoprazole  Injectable 40 milliGRAM(s) IV Push two times a day  piperacillin/tazobactam IVPB.. 3.375 Gram(s) IV Intermittent every 8 hours  senna 2 Tablet(s) Oral at bedtime    MEDICATIONS  (PRN):  albuterol    90 MICROgram(s) HFA Inhaler 2 Puff(s) Inhalation every 6 hours PRN Bronchospasm  HYDROmorphone PCA (5 mG/mL) Rescue Clinician Bolus 2 milliGRAM(s) IV Push every 1 hour PRN Severe Pain (7 - 10)  naloxone Injectable 0.1 milliGRAM(s) IV Push every 3 minutes PRN For ANY of the following changes in patient status:  A. RR LESS THAN 10 breaths per minute, B. Oxygen saturation LESS THAN 90%, C. Sedation score of 6  ondansetron Injectable 4 milliGRAM(s) IV Push every 8 hours PRN Nausea and/or Vomiting  polyethylene glycol 3350 17 Gram(s) Oral daily PRN Constipation    -------------------------------------------------------------------------------------------------------  ITEMS UNCHECKED ARE NOT PRESENT    PRESENT SYMPTOMS: [ ]Unable to self-report - see [ ] CPOT [ ] PAINADS [ ] RDOS  Source if other than patient:  [ ]Family   [ ]Team     Pain:  [x ]yes [ ]no  QOL impact - long hospital stay  Location - abdomen/back              Aggravating factors - movement  Quality - sharp  Radiation - none  Timing- constant   Severity (0-10 scale):  Minimal acceptable level (0-10 scale)/pain goal:    CPOT:    https://www.[facility].org/getattachment/[email or web address]/Critical-Care-Pain-Observation-Tool-(CPOT)    PAINAD Score: See PAINAD tool and score below       RDOS: See RDOS tool and score below   0 to 2  minimal or no respiratory distress   3  mild distress  4 to 6 moderate distress  >7 severe distress    Dyspnea:                           [ ]Mild [ ]Moderate [ ]Severe  Anxiety:                             [ ]Mild [x ]Moderate [ ]Severe  Fatigue:                             [ ]Mild [ ]Moderate [ ]Severe  Nausea:                             [ ]Mild [ ]Moderate [ ]Severe  Loss of appetite:              [ ]Mild [ ]Moderate [ ]Severe  Constipation:                    [ ]Mild [ ]Moderate [ ]Severe  Other Symptoms:  [x ]All other review of systems negative     Home Medications for Symptoms if present:    I Stop Reference no:     -------------------------------------------------------------------------------------------------------  PCSSQ[Palliative Care Spiritual Screening Question]   Severity (0-10):  Score of 4 or > indicate consideration of Chaplaincy referral.  Chaplaincy Referral: [ ] yes [ ] refused [ ] following [ x] Deferred     Caregiver [place]? : [ ] yes [ ] no [ ] Deferred [ x] Declined             Social work referral [ ] Patient & Family Centered Care Referral [ ]     Anticipatory Grief present?:  [ ] yes [ ] no  [x ] Deferred                  Social work referral [ ] Chaplaincy Referral [ ]    -------------------------------------------------------------------------------------------------------  PHYSICAL EXAM:  Vital Signs Last 24 Hrs  T(C): 37 (21 Feb 2023 10:02), Max: 37 (21 Feb 2023 10:02)  T(F): 98.6 (21 Feb 2023 10:02), Max: 98.6 (21 Feb 2023 10:02)  HR: 81 (21 Feb 2023 10:02) (75 - 88)  BP: 132/84 (21 Feb 2023 10:02) (109/75 - 137/81)  BP(mean): --  RR: 17 (21 Feb 2023 10:02) (16 - 18)  SpO2: 97% (21 Feb 2023 10:02) (97% - 100%)    Parameters below as of 21 Feb 2023 10:02  Patient On (Oxygen Delivery Method): tracheostomy collar     I&O's Summary    20 Feb 2023 07:01  -  21 Feb 2023 07:00  --------------------------------------------------------  IN: 3215 mL / OUT: 0 mL / NET: 3215 mL    21 Feb 2023 07:01  -  21 Feb 2023 13:39  --------------------------------------------------------  IN: 380 mL / OUT: 0 mL / NET: 380 mL       GENERAL: Sol cruz - pt communicates by writing   [ x]Cachexia  [X ]Alert  [ X]Oriented x4   [ ]Lethargic  [ ]Unarousable  [ ]Verbal  [X ]Non-Verbal    Behavioral:   [ ] Anxiety  [ ] Delirium [ ] Agitation [ X] Other    HEENT:  [ ]Normal   [ ]Dry mouth   [ X]ET Tube/Trach  [ ]Oral lesions    PULMONARY:   [ ]Clear [ ]Tachypnea  [ ]Audible excessive secretions   [ ]Rhonchi        [ ]Right [ ]Left [ ]Bilateral  [ ]Crackles        [ ]Right [ ]Left [ ]Bilateral  [ ]Wheezing     [ ]Right [ ]Left [ ]Bilateral  [ X]Diminished breath sounds [ ]right [ ]left [ ]bilateral    CARDIOVASCULAR:    [ X]Regular [ ]Irregular [ ]Tachy  [ ]Faheem [ ]Murmur [ ]Other    GASTROINTESTINAL:  [X ]Soft  [ ]Distended   [ X]+BS  [ ]Non tender [ X]Tender  [ ]Other [ X]PEG [ ]OGT/ NGT  Last BM: 2/21    GENITOURINARY:  [X ]Normal [ ] Incontinent   [ ]Oliguria/Anuria   [ ]Wall    MUSCULOSKELETAL:   [ ]Normal   [X ]Weakness  [ ]Bed/Wheelchair bound [ ]Edema    NEUROLOGIC:   [X ]No focal deficits  [ ]Cognitive impairment  [ ]Dysphagia [ ]Dysarthria [ ]Paresis [ ]Other     SKIN:   [X ]Normal  [ ]Rash  [ ]Other  [ ]Pressure ulcer(s)       Present on admission [ ]y [ ]n      -------------------------------------------------------------------------------------------------------  CRITICAL CARE:  [ ]Shock Present  [ ]Septic [ ]Cardiogenic [ ]Neurologic [ ]Hypovolemic  [ ]Vasopressors [ ]Inotropes  [ ]Respiratory failure present [ ]Mechanical Ventilation [ ]Non-invasive ventilatory support [ ]High-Flow   [ ]Acute  [ ]Chronic [ ]Hypoxic  [ ]Hypercarbic [ ]Other  [ ]Other organ failure     -------------------------------------------------------------------------------------------------------  LABS:                        7.1    16.33 )-----------( 479      ( 21 Feb 2023 06:15 )             23.1   02-21    134<L>  |  95<L>  |  12  ----------------------------<  153<H>  3.9   |  27  |  0.43<L>    Ca    8.6      21 Feb 2023 06:15  Phos  2.8     02-21  Mg     1.60     02-21    -------------------------------------------------------------------------------------------------------  RADIOLOGY & ADDITIONAL STUDIES: Reviewed.      -------------------------------------------------------------------------------------------------------  Protein Calorie Malnutrition Present: [ ]mild [ ]moderate [ ]severe [ ]underweight [ ]morbid obesity  https://www.[employer].org/vault/2440/web/files/ONC/Table_Clinical%20Characteristics%20to%20Document%20Malnutrition-White%20JV%20et%20al%202012.pdf    Height (cm): 182.9 (02-16-23 @ 06:00), 170.2 (02-10-23 @ 08:52), 182.9 (11-21-22 @ 14:45)  Weight (kg): 52.2 (02-16-23 @ 06:00), 56.2 (02-10-23 @ 08:52), 61.235 (11-21-22 @ 17:08)  BMI (kg/m2): 15.6 (02-16-23 @ 06:00), 19.4 (02-10-23 @ 08:52), 18.3 (11-21-22 @ 17:08)    Palliative Performance Status Version 2:   See PPSv2 tool and score below       [ ]PPSV2 < or = 30%  [ ]significant weight loss [ ]poor nutritional intake [ ]anasarcaPrealbumin, Serum: 10 mg/dL (02-12-23 @ 07:04)  [ ]Artificial Nutrition    Other REFERRALS:  [ ]Hospice  [ ]Child Life  [ ]Social Work  [ ]Case management [ ]Holistic Therapy     -------------------------------------------------------------------------------------------------------

## 2023-02-21 NOTE — PROGRESS NOTE ADULT - PROBLEM SELECTOR PLAN 1
- Initiated Methadone 10mg TID 2/21- QTc 450 2/10 - Repeat EKG ordered  - c/w PCA Dilaudid 5mg/1ml: DD 1.5mg, 15 min lockout, continuous 1mg, 4hr 28mg  - c/w Gabapentin 300mg Solution TID once able to use PEG tube  - Bowel regimen while on opioids  - PRN Narcan

## 2023-02-21 NOTE — PROGRESS NOTE ADULT - PROBLEM SELECTOR PLAN 5
- start levothyroxine in AM Progressive NSCLC with disease progression in the neck causing stridor and dysphagia  - palliative radiation in progress  - per heme onc at [place] pt no longer candidate for DMT  - follow up hemeon recs  - pain management per palliative

## 2023-02-21 NOTE — PROGRESS NOTE ADULT - ASSESSMENT
52M with metastatic lung cancer, HTN, anxiety, chronic pain on percocet, prior substance abuse who was admitted to [facility] on 2/10/23 due to difficulty swallowing. 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 Pt is s/p trach and PEG and is undergoing palliative radiation

## 2023-02-21 NOTE — CHART NOTE - NSCHARTNOTEFT_GEN_A_CORE
Pt seen for Severe malnutrition follow up    Medical Course: 52 year old male with metastatic lung cancer, HTN, anxiety, chronic pain on percocet, prior substance abuse who was admitted to [facility] on 2/10/23 due to difficulty swallowing. 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 Pt is s/p trach and PEG and is undergoing palliative radiation    Nutrition Course: Pt A&Ox4, trached, communicates through writing in notepad. Pt now undergoing radiation therapy. Pt reports bad heart burn and feeling of fullness when tube feed running at goal rate, reports that he often asks to shut it off. Pt concerned about receiving enough calories when the rate lowered or stopped. Discussed option of TwoCal formula to meet needs at lower volume to prevent heart burn, writes "I am willing to try anything". PPI increased to BID for heart burn. No complaints of diarrhea or constipation. Receiving bowel regimen.   Recommend Twocal HN @ 35ml/hr x24hrs to provide 840ml total formula, 1680kcal (32kcal/kg), 70g pro (1.3 g/kg), 588ml free water. Monitor tolerance and heart burn symptoms.    Diet Prescription: Diet, NPO with Tube Feed:   Tube Feeding Modality: Gastrostomy  Jevity 1.5 Jonathan (JEVITY1.5RTH)  Continuous  Starting Tube Feed Rate {mL per Hour}: 10  Increase Tube Feed Rate by (mL): 10     Every 6 hours  Until Goal Tube Feed Rate (mL per Hour): 50  Tube Feed Duration (in Hours): 24  Tube Feed Start Time: 12:00 (02-17-23 @ 11:58)    Pertinent Medications: MEDICATIONS  (STANDING):  acetaminophen   IVPB .. 1000 milliGRAM(s) IV Intermittent once  albuterol/ipratropium for Nebulization 3 milliLiter(s) Nebulizer every 6 hours  dextrose 5% + sodium chloride 0.9%. 1000 milliLiter(s) (75 mL/Hr) IV Continuous <Continuous>  gabapentin Solution 300 milliGRAM(s) Oral three times a day  heparin   Injectable 5000 Unit(s) SubCutaneous every 8 hours  HYDROmorphone PCA (5 mG/mL) 30 milliLiter(s) PCA Continuous PCA Continuous  influenza   Vaccine 0.5 milliLiter(s) IntraMuscular once  levothyroxine Injectable 40 MICROGram(s) IV Push at bedtime  LORazepam   Injectable 1 milliGRAM(s) IV Push every 4 hours  methadone   Solution 10 milliGRAM(s) Oral three times a day  methylPREDNISolone sodium succinate Injectable 40 milliGRAM(s) IV Push daily  nicotine - 21 mG/24Hr(s) Patch 1 Patch Transdermal daily  pantoprazole  Injectable 40 milliGRAM(s) IV Push two times a day  piperacillin/tazobactam IVPB.. 3.375 Gram(s) IV Intermittent every 8 hours  senna 2 Tablet(s) Oral at bedtime    MEDICATIONS  (PRN):  albuterol    90 MICROgram(s) HFA Inhaler 2 Puff(s) Inhalation every 6 hours PRN Bronchospasm  HYDROmorphone PCA (5 mG/mL) Rescue Clinician Bolus 2 milliGRAM(s) IV Push every 1 hour PRN Severe Pain (7 - 10)  naloxone Injectable 0.1 milliGRAM(s) IV Push every 3 minutes PRN For ANY of the following changes in patient status:  A. RR LESS THAN 10 breaths per minute, B. Oxygen saturation LESS THAN 90%, C. Sedation score of 6  ondansetron Injectable 4 milliGRAM(s) IV Push every 8 hours PRN Nausea and/or Vomiting  polyethylene glycol 3350 17 Gram(s) Oral daily PRN Constipation    Pertinent Labs: 02-21 Na134 mmol/L<L> Glu 153 mg/dL<H> K+ 3.9 mmol/L Cr  0.43 mg/dL<L> BUN 12 mg/dL 02-21 Phos 2.8 mg/dL 02-15 Alb 3.5 g/dL 02-12 PAB 10 mg/dL<L>    Weight: Height (cm): 182.9 (02-16 @ 06:00), 170.2 (02-10 @ 08:52), 182.9 (11-21 @ 14:45)  Weight (kg): 52.2 (02-16 @ 06:00), 56.2 (02-10 @ 08:52), 61.235 (11-21 @ 17:08)  BMI (kg/m2): 15.6 (02-16 @ 06:00), 19.4 (02-10 @ 08:52), 18.3 (11-21 @ 17:08)  Weight Assessment:      Physical Assessment, per flowsheets:  Edema:  Skin:      Estimated Needs:   [X] No change since previous assessment, based on dosing weight  lbs / kg   kcal daily @ kcal/kg,  gm protein daily @ gm/kg   [ ] recalculated, with consideration for, based on weight    Previous Nutrition Diagnosis:   [ ] Inadequate Energy Intake [ ]Inadequate Oral Intake [ ] Excessive Energy Intake   [ ] Underweight [ ] Increased Nutrient Needs [ ] Overweight/Obesity   [ ] Altered GI Function [ ] Unintended Weight Loss [ ] Food & Nutrition Related Knowledge Deficit [ ] Malnutrition   Nutrition Diagnosis is [ ] ongoing  [ ] resolved [ ] not applicable   New Nutrition Diagnosis: [ ] not applicable     Interventions:   1)   2)  3)     Monitor & Evaluate:  PO intake, tolerance to diet/supplement, nutrition related lab values, weight trends, BMs/GI distress, hydration status, skin integrity.    [place]| [id number] (Also available on TEAMS) Pt seen for Severe malnutrition follow up    Medical Course: 52 year old male with metastatic lung cancer, HTN, anxiety, chronic pain on percocet, prior substance abuse who was admitted to [facility] on 2/10/23 due to difficulty swallowing. 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 Pt is s/p trach and PEG and is undergoing palliative radiation    Nutrition Course: Pt A&Ox4, trached, communicates through writing in notepad. TF not running during time of visit. Pt now undergoing radiation therapy. Pt reports bad heart burn and feeling of fullness when tube feed running at goal rate, reports that he often asks to shut it off. Pt concerned about receiving enough calories when the rate lowered or stopped. Discussed option of TwoCal formula to meet needs at lower volume to try and reduce symptoms of heart burn, writes "I am willing to try anything". PPI increased to BID for heart burn. No complaints of diarrhea or constipation. Receiving bowel regimen.   Recommend Twocal HN @ 35ml/hr x24hrs to provide 840ml total formula, 1680kcal (32kcal/kg), 70g pro (1.3 g/kg), 588ml free water. Monitor tolerance and heart burn symptoms.    Diet Prescription: Diet, NPO with Tube Feed:   Tube Feeding Modality: Gastrostomy  Jevity 1.5 Jonathan (JEVITY1.5RTH)  Continuous  Starting Tube Feed Rate {mL per Hour}: 10  Increase Tube Feed Rate by (mL): 10     Every 6 hours  Until Goal Tube Feed Rate (mL per Hour): 50  Tube Feed Duration (in Hours): 24  Tube Feed Start Time: 12:00 (02-17-23 @ 11:58)    Pertinent Medications: MEDICATIONS  (STANDING):  acetaminophen   IVPB .. 1000 milliGRAM(s) IV Intermittent once  albuterol/ipratropium for Nebulization 3 milliLiter(s) Nebulizer every 6 hours  dextrose 5% + sodium chloride 0.9%. 1000 milliLiter(s) (75 mL/Hr) IV Continuous <Continuous>  gabapentin Solution 300 milliGRAM(s) Oral three times a day  heparin   Injectable 5000 Unit(s) SubCutaneous every 8 hours  HYDROmorphone PCA (5 mG/mL) 30 milliLiter(s) PCA Continuous PCA Continuous  influenza   Vaccine 0.5 milliLiter(s) IntraMuscular once  levothyroxine Injectable 40 MICROGram(s) IV Push at bedtime  LORazepam   Injectable 1 milliGRAM(s) IV Push every 4 hours  methadone   Solution 10 milliGRAM(s) Oral three times a day  methylPREDNISolone sodium succinate Injectable 40 milliGRAM(s) IV Push daily  nicotine - 21 mG/24Hr(s) Patch 1 Patch Transdermal daily  pantoprazole  Injectable 40 milliGRAM(s) IV Push two times a day  piperacillin/tazobactam IVPB.. 3.375 Gram(s) IV Intermittent every 8 hours  senna 2 Tablet(s) Oral at bedtime    MEDICATIONS  (PRN):  albuterol    90 MICROgram(s) HFA Inhaler 2 Puff(s) Inhalation every 6 hours PRN Bronchospasm  HYDROmorphone PCA (5 mG/mL) Rescue Clinician Bolus 2 milliGRAM(s) IV Push every 1 hour PRN Severe Pain (7 - 10)  naloxone Injectable 0.1 milliGRAM(s) IV Push every 3 minutes PRN For ANY of the following changes in patient status:  A. RR LESS THAN 10 breaths per minute, B. Oxygen saturation LESS THAN 90%, C. Sedation score of 6  ondansetron Injectable 4 milliGRAM(s) IV Push every 8 hours PRN Nausea and/or Vomiting  polyethylene glycol 3350 17 Gram(s) Oral daily PRN Constipation    Pertinent Labs: 02-21 Na134 mmol/L<L> Glu 153 mg/dL<H> K+ 3.9 mmol/L Cr  0.43 mg/dL<L> BUN 12 mg/dL 02-21 Phos 2.8 mg/dL 02-15 Alb 3.5 g/dL 02-12 PAB 10 mg/dL<L>    Weight: Height (cm): 182.9 (02-16 @ 06:00), 170.2 (02-10 @ 08:52), 182.9 (11-21 @ 14:45)  Weight (kg): 52.2 (02-16 @ 06:00), 56.2 (02-10 @ 08:52), 61.235 (11-21 @ 17:08)  BMI (kg/m2): 15.6 (02-16 @ 06:00), 19.4 (02-10 @ 08:52), 18.3 (11-21 @ 17:08)  Weight Assessment: No new weight to assess      Physical Assessment, per flowsheets:  Edema: No edema noted per RN flowsheets   Skin: Intact w/ no pressure injuries noted per RN flowsheets       Estimated Needs:   [X] No change since previous assessment   30-35 kcal/kg (8512-9139 kcal/d)   1.2-1.4 g/kg (62-72 g/d)    Previous Nutrition Diagnosis: severe protein calorie malnutrition  Nutrition Diagnosis is [x ] ongoing   New Nutrition Diagnosis: [x ] not applicable     Interventions:   1) Recommend change TF to Twocal HN @ 35ml/hr x24hrs. Initiate feeding at 15ml/hr and increase 10ml Q6hr to goal rate   2) Obtain updated weight. Obtain weekly weights  3) Free water flush deferred to medical team.  4) RD available prn.    Monitor & Evaluate:  Tolerance to enteral feeding, nutrition related lab values, weight trends, BMs/GI distress, hydration status, skin integrity.    J[place], [place]| [id number] (Also available on TEAMS)

## 2023-02-21 NOTE — PROGRESS NOTE ADULT - ASSESSMENT
52 year old male w/ a PMHX of stage IV lung CA, HTN, anxiety on Xanax, chronic pain on Percocet, and substance abuse was admitted to [facility] on 2/10/23 due to difficulty swallowing. Pt has had difficulty swallowing for th past week. Pt states it has been worsening since, states he is now unable to swallow pills or food. 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 Pt also developed stridor for the past week. Pt is transferred to [facility] for Palliative Radiation. Palliative care consulted for pain management.

## 2023-02-21 NOTE — PROGRESS NOTE ADULT - PROBLEM SELECTOR PLAN 4
Continue ativan 1mg q4hr  - Monitor respiratory status, hold if sedated Multifactorial 2/2 metastatic lung cancer, recent surgery, illness  - trend CBC  - goal Hb >7

## 2023-02-21 NOTE — PROGRESS NOTE ADULT - PROBLEM SELECTOR PLAN 3
- Can transition IV Ativan to PO via PEG - 1mg PO Q4hrs   - Family provides a lot of support to patient

## 2023-02-21 NOTE — PROGRESS NOTE ADULT - SUBJECTIVE AND OBJECTIVE BOX
[facility] Division of Hospital Medicine  Tejal Oswald MD  Available via MS Teams    SUBJECTIVE / OVERNIGHT EVENTS: No acute overnight events. Pt is non verbal 2/2 tracheostomy placement and communicates with paper and pen Pt notes abdominal discomfort with acid reflux-like symptoms that are improved with ativan and dilaudid. Denies chest pain, SOB, fevers, chills, cough.     ADDITIONAL REVIEW OF SYSTEMS: negative    MEDICATIONS  (STANDING):  acetaminophen   IVPB .. 1000 milliGRAM(s) IV Intermittent once  albuterol/ipratropium for Nebulization 3 milliLiter(s) Nebulizer every 6 hours  dextrose 5% + sodium chloride 0.9%. 1000 milliLiter(s) (75 mL/Hr) IV Continuous <Continuous>  gabapentin Solution 300 milliGRAM(s) Oral three times a day  heparin   Injectable 5000 Unit(s) SubCutaneous every 8 hours  HYDROmorphone PCA (5 mG/mL) 30 milliLiter(s) PCA Continuous PCA Continuous  influenza   Vaccine 0.5 milliLiter(s) IntraMuscular once  levothyroxine Injectable 40 MICROGram(s) IV Push at bedtime  LORazepam   Injectable 1 milliGRAM(s) IV Push every 4 hours  methadone   Solution 10 milliGRAM(s) Oral three times a day  methylPREDNISolone sodium succinate Injectable 40 milliGRAM(s) IV Push every 12 hours  nicotine - 21 mG/24Hr(s) Patch 1 Patch Transdermal daily  pantoprazole  Injectable 40 milliGRAM(s) IV Push two times a day  piperacillin/tazobactam IVPB.. 3.375 Gram(s) IV Intermittent every 8 hours  senna 2 Tablet(s) Oral at bedtime    MEDICATIONS  (PRN):  albuterol    90 MICROgram(s) HFA Inhaler 2 Puff(s) Inhalation every 6 hours PRN Bronchospasm  HYDROmorphone PCA (5 mG/mL) Rescue Clinician Bolus 2 milliGRAM(s) IV Push every 1 hour PRN Severe Pain (7 - 10)  naloxone Injectable 0.1 milliGRAM(s) IV Push every 3 minutes PRN For ANY of the following changes in patient status:  A. RR LESS THAN 10 breaths per minute, B. Oxygen saturation LESS THAN 90%, C. Sedation score of 6  ondansetron Injectable 4 milliGRAM(s) IV Push every 8 hours PRN Nausea and/or Vomiting  polyethylene glycol 3350 17 Gram(s) Oral daily PRN Constipation      I&O's Summary    20 Feb 2023 07:01  -  21 Feb 2023 07:00  --------------------------------------------------------  IN: 3215 mL / OUT: 0 mL / NET: 3215 mL    21 Feb 2023 07:01  -  21 Feb 2023 13:59  --------------------------------------------------------  IN: 380 mL / OUT: 0 mL / NET: 380 mL        PHYSICAL EXAM:  Vital Signs Last 24 Hrs  T(C): 37 (21 Feb 2023 10:02), Max: 37 (21 Feb 2023 10:02)  T(F): 98.6 (21 Feb 2023 10:02), Max: 98.6 (21 Feb 2023 10:02)  HR: 81 (21 Feb 2023 10:02) (75 - 88)  BP: 132/84 (21 Feb 2023 10:02) (109/75 - 137/81)  BP(mean): --  RR: 17 (21 Feb 2023 10:02) (16 - 18)  SpO2: 97% (21 Feb 2023 10:02) (97% - 100%)    Parameters below as of 21 Feb 2023 10:02  Patient On (Oxygen Delivery Method): tracheostomy collar      CONSTITUTIONAL: cachexia  EYES: PERRLA; conjunctiva and sclera clear  ENMT: Moist oral mucosa, no pharyngeal injection or exudates  NECK: + trach in place on trach collar, no bleeding or secretions noted  RESPIRATORY: Normal respiratory effort; lungs are clear to auscultation bilaterally  CARDIOVASCULAR: Regular rate and rhythm, normal S1 and S2, no murmur/rub/gallop; No lower extremity edema; Peripheral pulses are 2+ bilaterally  ABDOMEN: Nontender to palpation, normoactive bowel sounds, no rebound/guarding; No hepatosplenomegaly. + PEG, incision C/D/I  MUSCULOSKELETAL:  Normal gait; no clubbing or cyanosis of digits; no joint swelling or tenderness to palpation  PSYCH: A+O to person, place, and time; affect appropriate  NEUROLOGY: CN 2-12 are intact and symmetric; no gross sensory deficits   SKIN: No rashes; skin warm and well perfused    LABS:                        7.1    16.33 )-----------( 479      ( 21 Feb 2023 06:15 )             23.1     Hb Trend: 7.1<--, 7.8<--, 7.5<--, 9.0<--, 7.7<--  02-21    134<L>  |  95<L>  |  12  ----------------------------<  153<H>  3.9   |  27  |  0.43<L>    Ca    8.6      21 Feb 2023 06:15  Phos  2.8     02-21  Mg     1.60     02-21      Creatinine Trend: 0.43<--, 0.47<--, 0.50<--, 0.52<--, 0.53<--, 0.54<--    COVID-19 PCR: NotDetec (15 Feb 2023 18:47)  COVID-19 PCR: NotDetec (26 Sep 2022 10:41)  COVID-19 PCR: NotDetec (14 Sep 2022 13:16)      RADIOLOGY & ADDITIONAL TESTS:  N/A    COORDINATION OF CARE:  Consultant Communication and Details of Discussion (where applicable): d/w Palliative care- will need to follow up regarding discharge planning and transitioning pain medications

## 2023-02-21 NOTE — CHART NOTE - NSCHARTNOTEFT_GEN_A_CORE
Undergoing RT, right neck mass 8cm, +tracheostomy for airway protection.  5 fractions of RT to end on 2/24/23.

## 2023-02-22 NOTE — PROGRESS NOTE ADULT - SUBJECTIVE AND OBJECTIVE BOX
[facility] Division of Hospital Medicine  Tejal Oswald MD  Available via MS Teams    SUBJECTIVE / OVERNIGHT EVENTS: No acute overnight events. Pt requesting dilaudid pushes for anxiety. Notes improvement in acid reflux symptoms since restarting tube feeds. Denies shortness of breath, chest pain, abdominal pain, n/v/d.     ADDITIONAL REVIEW OF SYSTEMS: negative except for HPI    MEDICATIONS  (STANDING):  acetaminophen   IVPB .. 1000 milliGRAM(s) IV Intermittent once  albuterol/ipratropium for Nebulization 3 milliLiter(s) Nebulizer every 6 hours  dextrose 5% + sodium chloride 0.9%. 1000 milliLiter(s) (75 mL/Hr) IV Continuous <Continuous>  gabapentin Solution 600 milliGRAM(s) Oral three times a day  heparin   Injectable 5000 Unit(s) SubCutaneous every 8 hours  HYDROmorphone PCA (5 mG/mL) 30 milliLiter(s) PCA Continuous PCA Continuous  influenza   Vaccine 0.5 milliLiter(s) IntraMuscular once  levothyroxine 50 MICROGram(s) Oral daily  LORazepam     Tablet 1 milliGRAM(s) Oral every 6 hours  methadone   Solution 10 milliGRAM(s) Oral three times a day  methylPREDNISolone sodium succinate Injectable 40 milliGRAM(s) IV Push daily  nicotine - 21 mG/24Hr(s) Patch 1 Patch Transdermal daily  pantoprazole  Injectable 40 milliGRAM(s) IV Push two times a day  senna 2 Tablet(s) Oral at bedtime    MEDICATIONS  (PRN):  albuterol    90 MICROgram(s) HFA Inhaler 2 Puff(s) Inhalation every 6 hours PRN Bronchospasm  HYDROmorphone PCA (5 mG/mL) Rescue Clinician Bolus 2 milliGRAM(s) IV Push every 1 hour PRN Severe Pain (7 - 10)  hydrOXYzine hydrochloride Syrup 25 milliGRAM(s) Oral every 6 hours PRN Anxiety  naloxone Injectable 0.1 milliGRAM(s) IV Push every 3 minutes PRN For ANY of the following changes in patient status:  A. RR LESS THAN 10 breaths per minute, B. Oxygen saturation LESS THAN 90%, C. Sedation score of 6  ondansetron Injectable 4 milliGRAM(s) IV Push every 8 hours PRN Nausea and/or Vomiting  polyethylene glycol 3350 17 Gram(s) Oral daily PRN Constipation      I&O's Summary    21 Feb 2023 07:01  -  22 Feb 2023 07:00  --------------------------------------------------------  IN: 2430 mL / OUT: 0 mL / NET: 2430 mL    22 Feb 2023 07:01  -  22 Feb 2023 15:20  --------------------------------------------------------  IN: 150 mL / OUT: 0 mL / NET: 150 mL        PHYSICAL EXAM:  Vital Signs Last 24 Hrs  T(C): 36.5 (22 Feb 2023 11:15), Max: 36.8 (22 Feb 2023 08:51)  T(F): 97.7 (22 Feb 2023 11:15), Max: 98.3 (22 Feb 2023 08:51)  HR: 81 (22 Feb 2023 11:15) (66 - 91)  BP: 102/74 (22 Feb 2023 11:15) (101/71 - 113/76)  BP(mean): --  RR: 18 (22 Feb 2023 11:15) (16 - 18)  SpO2: 96% (22 Feb 2023 11:15) (94% - 100%)    Parameters below as of 22 Feb 2023 11:15  Patient On (Oxygen Delivery Method): tracheostomy collar      CONSTITUTIONAL: NAD, well-developed, well-groomed  EYES: PERRLA; conjunctiva and sclera clear  ENMT: +trach intact, on trach collar  RESPIRATORY: Normal respiratory effort; lungs are clear to auscultation bilaterally  CARDIOVASCULAR: Regular rate and rhythm, normal S1 and S2, no murmur/rub/gallop; No lower extremity edema; Peripheral pulses are 2+ bilaterally  ABDOMEN: Nontender to palpation, normoactive bowel sounds, no rebound/guarding; No hepatosplenomegaly; +PEG in place  MUSCULOSKELETAL:  no clubbing or cyanosis of digits; no joint swelling or tenderness to palpation  PSYCH: A+O to person, place, and time; affect appropriate; communicates with writing  NEUROLOGY: CN 2-12 are intact and symmetric; no gross sensory deficits   SKIN: No rashes; no palpable lesions    LABS:                        7.4    15.65 )-----------( 510      ( 22 Feb 2023 04:08 )             24.0     Hb Trend: 7.4<--, 7.1<--, 7.8<--, 7.5<--, 9.0<--  02-22    139  |  101  |  12  ----------------------------<  97  4.3   |  29  |  0.51    Ca    8.5      22 Feb 2023 04:08  Phos  2.8     02-22  Mg     1.60     02-22      Creatinine Trend: 0.51<--, 0.43<--, 0.47<--, 0.50<--, 0.52<--, 0.53<--            COVID-19 PCR: NotDetec (15 Feb 2023 18:47)  COVID-19 PCR: NotDetec (26 Sep 2022 10:41)  COVID-19 PCR: NotDetec (14 Sep 2022 13:16)      RADIOLOGY & ADDITIONAL TESTS:  New Imaging Personally Reviewed Today: N/A    COORDINATION OF CARE:  Consultant Communication and Details of Discussion (where applicable): d/w palliative- they reached out to behavioral health for recommendations for anxiety management. Also d/w Dr. Moore from onc, will see pt tomorrow

## 2023-02-22 NOTE — SPEAKING VALVE EVALUATION - COMMENTS
Patient attempted to be seen this AM for a Speaking Valve assessment. However, per discussion with RN, patient off unit for radiation. This service to follow up as schedule permits.
Per Hospitalist Note 1/21/23: "52M with metastatic lung cancer, HTN, anxiety, chronic pain on percocet, prior substance abuse who was admitted to [facility] on 2/10/23 due to difficulty swallowing. 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 Pt is s/p trach and PEG and is undergoing palliative radiation"     Per ENT Note 2/19/23: "52 year old male Stage IV lung cancer s/p chemo w/ b/l thyroid mass, p/w stridor 2/14, s/p trach 2/14"     Patient received upright in bed, AAOx4, aphonic upon digital occlusion of Shiley #6 CFS tracheostomy, therefore communicating with SLP via pen/paper. Trach collar in place and Spo2 between 95 and 100% throughout the evaluation.

## 2023-02-22 NOTE — PROGRESS NOTE ADULT - PROBLEM SELECTOR PLAN 8
Dvt ppx: Lovenox QD, hypercoagulable state d/t malignancy  Code status: DNR/ DNI ([place] in chart from [facility]), pt was evaluated by Palliative in the [facility]  palliative eval requested for symptom management as pt is saying he has high tolerance for narcotics as he is an ex drug addict  Diet: tube feeds  Dispo: pending     -case d/w pt, RN and ACP  -plan of care d/w Cousin Susan phone# [phone] on 2/17, updates provided and all questions answered, d/w pt, OK to discuss his care plan with his cousin Dvt ppx: Lovenox QD, hypercoagulable state d/t malignancy  Code status: DNR/ DNI ([place] in chart from [facility]), pt was evaluated by Palliative in the [facility]  palliative eval requested for symptom management as pt is saying he has high tolerance for narcotics as he is an ex drug addict  Diet: tube feeds  Dispo: pending     -case d/w pt, RN and ACP  -plan of care d/w Cousin Susan phone# [phone] on 2/17, updates provided and all questions answered, d/w pt, OK to discuss his care plan with his cousin. Also discussed with cousin, Padma at [phone]

## 2023-02-22 NOTE — PROGRESS NOTE ADULT - PROBLEM SELECTOR PLAN 1
- Initiated Methadone 10mg TID 2/21- QTc 450 2/10 - Repeat EKG 2/21 QTc 454  - PCA adjusted: PCA Dilaudid 5mg/1ml: DD 1.5mg, 20 min lockout, continuous 1mg, 4hr 22mg  - Bowel regimen while on opioids  - PRN Narcan

## 2023-02-22 NOTE — PROGRESS NOTE ADULT - PROBLEM SELECTOR PLAN 5
Progressive NSCLC with disease progression in the neck causing stridor and dysphagia  - palliative radiation in progress  - per heme onc at [facility] pt no longer candidate for DMT  - follow up hemeon recs  - pain management per palliative

## 2023-02-22 NOTE — SPEAKING VALVE EVALUATION - INCREASE IN WORK OF BREATHING DURING VALVE TRIAL
Patient with observed increased work of breathing, evident by use of accessory muscles (i.e. clavicular) and reported "I can't exhale"

## 2023-02-22 NOTE — PHYSICAL THERAPY INITIAL EVALUATION ADULT - MANUAL MUSCLE TESTING RESULTS, REHAB EVAL
metastatic disease.  Bilateral UE strength grossly 3+/5.  Bilateral  strength WFL.  Bilateral LE knee extension and ankle dorsiflexion strength 4/5./grossly assessed due to

## 2023-02-22 NOTE — PROGRESS NOTE ADULT - SUBJECTIVE AND OBJECTIVE BOX
[facility] Geriatrics and Palliative Care  Amy Palacio, Palliative Care Nurse Practitioner  Contact Info: Page [phone] (Including Nights/Weekends), Message on Microsoft Teams (Amy Palacio), or leave VM at Palliative Office [phone] (non-urgent)    SUBJECTIVE AND OBJECTIVE:  Indication for Geriatrics and Palliative Care Services/INTERVAL HPI: Complex symptom management.  Pt seen and examined this afternoon, shared that all medications are working, pain has improved slightly     OVERNIGHT EVENTS:  > 2/21: Pt required 35 demands, 36 attempts, 0 CAB. Totaling 81.5mg IV Dilaudid within 24hrs. IV Ativan liberated to Q4 hrs ATC by primary team over the weekend.  > 2/22: Pt required 28 demands, 49 attempts, 1 2mg CAB. Totaling 68mg IV Dilaudid within 24 hrs. Methadone initiated yesterday.    OVERNIGHT EVENTS:    DNR on chart:Yes  Yes      Allergies    No Known Allergies    Intolerances    MEDICATIONS  (STANDING):  acetaminophen   IVPB .. 1000 milliGRAM(s) IV Intermittent once  albuterol/ipratropium for Nebulization 3 milliLiter(s) Nebulizer every 6 hours  dextrose 5% + sodium chloride 0.9%. 1000 milliLiter(s) (75 mL/Hr) IV Continuous <Continuous>  gabapentin Solution 300 milliGRAM(s) Oral three times a day  heparin   Injectable 5000 Unit(s) SubCutaneous every 8 hours  HYDROmorphone PCA (5 mG/mL) 30 milliLiter(s) PCA Continuous PCA Continuous  influenza   Vaccine 0.5 milliLiter(s) IntraMuscular once  levothyroxine 50 MICROGram(s) Oral daily  LORazepam     Tablet 1 milliGRAM(s) Oral every 4 hours  methadone   Solution 10 milliGRAM(s) Oral three times a day  methylPREDNISolone sodium succinate Injectable 40 milliGRAM(s) IV Push daily  nicotine - 21 mG/24Hr(s) Patch 1 Patch Transdermal daily  pantoprazole  Injectable 40 milliGRAM(s) IV Push two times a day  senna 2 Tablet(s) Oral at bedtime    MEDICATIONS  (PRN):  albuterol    90 MICROgram(s) HFA Inhaler 2 Puff(s) Inhalation every 6 hours PRN Bronchospasm  HYDROmorphone PCA (5 mG/mL) Rescue Clinician Bolus 2 milliGRAM(s) IV Push every 1 hour PRN Severe Pain (7 - 10)  naloxone Injectable 0.1 milliGRAM(s) IV Push every 3 minutes PRN For ANY of the following changes in patient status:  A. RR LESS THAN 10 breaths per minute, B. Oxygen saturation LESS THAN 90%, C. Sedation score of 6  ondansetron Injectable 4 milliGRAM(s) IV Push every 8 hours PRN Nausea and/or Vomiting  polyethylene glycol 3350 17 Gram(s) Oral daily PRN Constipation        -------------------------------------------------------------------------------------------------------  ITEMS UNCHECKED ARE NOT PRESENT    PRESENT SYMPTOMS: [ ]Unable to self-report - see [ ] CPOT [ ] PAINADS [ ] RDOS  Source if other than patient:  [ ]Family   [ ]Team     Pain:  [x ]yes [ ]no  QOL impact - long hospital stay  Location - abdomen/back              Aggravating factors - movement  Quality - sharp  Radiation - none  Timing- constant   Severity (0-10 scale): 7  Minimal acceptable level (0-10 scale)/pain goal: 4    CPOT:    https://www.[facility].org/getattachment/[email or web address]/Critical-Care-Pain-Observation-Tool-(CPOT)    PAINAD Score: See PAINAD tool and score below       RDOS: See RDOS tool and score below   0 to 2  minimal or no respiratory distress   3  mild distress  4 to 6 moderate distress  >7 severe distress    Dyspnea:                           [ ]Mild [ ]Moderate [ ]Severe  Anxiety:                             [ ]Mild [ x]Moderate [ ]Severe  Fatigue:                             [ ]Mild [ ]Moderate [ ]Severe  Nausea:                             [ ]Mild [ ]Moderate [ ]Severe  Loss of appetite:              [ ]Mild [ ]Moderate [ ]Severe  Constipation:                    [ ]Mild [ ]Moderate [ ]Severe  Other Symptoms:  [x ]All other review of systems negative     Home Medications for Symptoms if present:    I Stop Reference no:     -------------------------------------------------------------------------------------------------------  PCSSQ[Palliative Care Spiritual Screening Question]   Severity (0-10):  Score of 4 or > indicate consideration of Chaplaincy referral.  Chaplaincy Referral: [ ] yes [ ] refused [ ] following [x ] Deferred     Caregiver [place]? : [ ] yes [ ] no [ ] Deferred [ x] Declined             Social work referral [ ] Patient & Family Centered Care Referral [ ]     Anticipatory Grief present?:  [ ] yes [ ] no  [x ] Deferred                  Social work referral [ ] Chaplaincy Referral [ ]    -------------------------------------------------------------------------------------------------------  PHYSICAL EXAM:  Vital Signs Last 24 Hrs  T(C): 36.5 (22 Feb 2023 11:15), Max: 36.9 (21 Feb 2023 14:00)  T(F): 97.7 (22 Feb 2023 11:15), Max: 98.5 (21 Feb 2023 14:00)  HR: 81 (22 Feb 2023 11:15) (66 - 91)  BP: 102/74 (22 Feb 2023 11:15) (101/71 - 153/78)  BP(mean): --  RR: 18 (22 Feb 2023 11:15) (16 - 18)  SpO2: 96% (22 Feb 2023 11:15) (94% - 100%)    Parameters below as of 22 Feb 2023 11:15  Patient On (Oxygen Delivery Method): tracheostomy collar     I&O's Summary    21 Feb 2023 07:01  -  22 Feb 2023 07:00  --------------------------------------------------------  IN: 2430 mL / OUT: 0 mL / NET: 2430 mL    22 Feb 2023 07:01  -  22 Feb 2023 13:11  --------------------------------------------------------  IN: 150 mL / OUT: 0 mL / NET: 150 mL       GENERAL: Sol cruz - pt communicates by writing   [ x]Cachexia  [X ]Alert  [ X]Oriented x4   [X ]Lethargic  [ ]Unarousable  [ ]Verbal  [X ]Non-Verbal    Behavioral:   [ X] Anxiety  [ ] Delirium [ ] Agitation [ ] Other    HEENT:  [ ]Normal   [ ]Dry mouth   [ X]ET Tube/Trach  [ ]Oral lesions    PULMONARY:   [ ]Clear [ ]Tachypnea  [ ]Audible excessive secretions   [ ]Rhonchi        [ ]Right [ ]Left [ ]Bilateral  [ ]Crackles        [ ]Right [ ]Left [ ]Bilateral  [ ]Wheezing     [ ]Right [ ]Left [ ]Bilateral  [ X]Diminished breath sounds [ ]right [ ]left [ ]bilateral    CARDIOVASCULAR:    [ X]Regular [ ]Irregular [ ]Tachy  [ ]Faheem [ ]Murmur [ ]Other    GASTROINTESTINAL:  [X ]Soft  [ ]Distended   [ X]+BS  [ ]Non tender [ X]Tender  [ ]Other [ X]PEG [ ]OGT/ NGT  Last BM: 2/21    GENITOURINARY:  [X ]Normal [ ] Incontinent   [ ]Oliguria/Anuria   [ ]Wall    MUSCULOSKELETAL:   [ ]Normal   [X ]Weakness  [ ]Bed/Wheelchair bound [ ]Edema    NEUROLOGIC:   [X ]No focal deficits  [ ]Cognitive impairment  [ ]Dysphagia [ ]Dysarthria [ ]Paresis [ ]Other     SKIN:   [X ]Normal  [ ]Rash  [ ]Other  [ ]Pressure ulcer(s)       Present on admission [ ]y [ ]n        -------------------------------------------------------------------------------------------------------  CRITICAL CARE:  [ ]Shock Present  [ ]Septic [ ]Cardiogenic [ ]Neurologic [ ]Hypovolemic  [ ]Vasopressors [ ]Inotropes  [ ]Respiratory failure present [ ]Mechanical Ventilation [ ]Non-invasive ventilatory support [ ]High-Flow   [ ]Acute  [ ]Chronic [ ]Hypoxic  [ ]Hypercarbic [ ]Other  [ ]Other organ failure     -------------------------------------------------------------------------------------------------------  LABS:                        7.4    15.65 )-----------( 510      ( 22 Feb 2023 04:08 )             24.0   02-22    139  |  101  |  12  ----------------------------<  97  4.3   |  29  |  0.51    Ca    8.5      22 Feb 2023 04:08  Phos  2.8     02-22  Mg     1.60     02-22    -------------------------------------------------------------------------------------------------------  RADIOLOGY & ADDITIONAL STUDIES: Reviewed.      -------------------------------------------------------------------------------------------------------  Protein Calorie Malnutrition Present: [ ]mild [ ]moderate [ ]severe [ ]underweight [ ]morbid obesity  https://www.[employer].org/vault/1700/web/files/ONC/Table_Clinical%20Characteristics%20to%20Document%20Malnutrition-White%20JV%20et%20al%202012.pdf    Height (cm): 182.9 (02-16-23 @ 06:00), 170.2 (02-10-23 @ 08:52), 182.9 (11-21-22 @ 14:45)  Weight (kg): 52.2 (02-16-23 @ 06:00), 56.2 (02-10-23 @ 08:52), 61.235 (11-21-22 @ 17:08)  BMI (kg/m2): 15.6 (02-16-23 @ 06:00), 19.4 (02-10-23 @ 08:52), 18.3 (11-21-22 @ 17:08)    Palliative Performance Status Version 2:   See PPSv2 tool and score below       [ ]PPSV2 < or = 30%  [ ]significant weight loss [ ]poor nutritional intake [ ]anasarcaPrealbumin, Serum: 10 mg/dL (02-12-23 @ 07:04)  [ ]Artificial Nutrition    Other REFERRALS:  [ ]Hospice  [ ]Child Life  [ ]Social Work  [ ]Case management [ ]Holistic Therapy     -------------------------------------------------------------------------------------------------------  Goals of Care Document:

## 2023-02-22 NOTE — PHYSICAL THERAPY INITIAL EVALUATION ADULT - FOLLOWS COMMANDS/ANSWERS QUESTIONS, REHAB EVAL
Pt communicates via writing on a notepad due to trach/75% of the time/able to follow multistep instructions

## 2023-02-22 NOTE — PHYSICAL THERAPY INITIAL EVALUATION ADULT - GENERAL OBSERVATIONS, REHAB EVAL
Pt received semi-supine in bed, +PCA pump, +pulse oximeter, +trach collar, in NAD.  Pt A&Ox4.  Pt agreeable to participate in PT evaluation.  Pt left semi-supine in bed, all lines/tube intact, call bell within reach, RN at bedside.

## 2023-02-22 NOTE — PHYSICAL THERAPY INITIAL EVALUATION ADULT - ADDITIONAL COMMENTS
Pt lives in a house on the first floor, without stairs.  Pt reports ambulating independently, without use of devices.  Pt lives alone.  Pt is independent with ADLs.  Pt bathes using a walk in Pt lives in a house on the first floor, without stairs.  Pt reports ambulating independently, without use of devices.  Pt lives alone.  Pt is independent with ADLs.  Pt bathes independently using a walk in shower.  Pt has not had any recent falls.

## 2023-02-22 NOTE — PHYSICAL THERAPY INITIAL EVALUATION ADULT - PHYSICAL ASSIST/NONPHYSICAL ASSIST: SIT/SUPINE, REHAB EVAL
Progress Note    Subjective:     Post-Operative Day: 3 Status Post left Total Hip Arthroplasty  Systemic or Specific Complaints:No Complaints, dressing saturated. Dressing changed no active bleeding. Zipline tightened. Objective:   VITALS:  BP (!) 147/66   Pulse 93   Temp 97.8 °F (36.6 °C) (Oral)   Resp 18   Ht 5' 3\" (1.6 m)   Wt 173 lb (78.5 kg)   SpO2 97%   BMI 30.65 kg/m²     General: alert, appears stated age, and cooperative   Wound: Wound clean and dry no evidence of infection. DVT Exam: No evidence of DVT seen on physical exam.     NVI in lower extremity. Thigh swollen but soft. Moving foot and ankle. Data Review  CBC:   Lab Results   Component Value Date/Time    WBC 8.7 09/28/2022 05:29 AM    RBC 3.71 09/28/2022 05:29 AM    RBC 0-2 07/24/2011 06:05 AM    HGB 11.1 09/28/2022 05:29 AM    HCT 34.0 09/28/2022 05:29 AM     09/28/2022 05:29 AM    INR 0.94 03/14/2019 12:53 PM     BMP:   Lab Results   Component Value Date/Time     02/10/2022 11:47 AM    K 4.6 02/10/2022 11:47 AM    K 3.6 08/30/2018 02:50 AM     02/10/2022 11:47 AM    CO2 22 02/10/2022 11:47 AM    BUN 18 02/10/2022 11:47 AM    CREATININE 0.95 02/10/2022 11:47 AM    GLUCOSE 111 02/10/2022 11:47 AM    GLUCOSE 105 07/25/2011 04:31 AM       Assessment:     Status Post left Total Hip Arthroplasty. Doing well postoperatively. Plan:      1:  Continue Total Hip Replacement protocol   2:  Continue Deep venous thrombosis prophylaxis  3:  Continue physical therapy with Total Hip precautions  4:  Continue Pain Control   5:  Awaiting precert for placement. verbal cues/1 person assist

## 2023-02-22 NOTE — PROGRESS NOTE ADULT - PROBLEM SELECTOR PLAN 3
Explained to pt that Ativan Q4 hours is not sustainable/ not for its intended use- discussed  consult, pt in agreement.   Spoke to Dr. Isaiah Aguilar regarding  consult - gave phone recommendations at this time. Full consult to follow.  - Recommended increasing Gabapentin to 600mg TID   - PRN Atarax 25mg Q6hrs for moderate anxiety    - c/w PO Ativan 1mg Q4 for now    - Family provides a lot of support to patient Explained to pt that Ativan Q4 hours is not sustainable/ not for its intended use- discussed  consult, pt in agreement.   Spoke to Dr. Isaiah Aguilar regarding  consult - gave phone recommendations at this time. Full consult to follow.  - Recommended increasing Gabapentin to 600mg TID   - PRN Atarax 25mg Q6hrs for moderate anxiety  (not to be given within 2 hours of Ativan)  - c/w PO Ativan 1mg Q6 (Hold for sedation)     - Family provides a lot of support to patient

## 2023-02-22 NOTE — PROGRESS NOTE ADULT - PROBLEM SELECTOR PLAN 2
S/P 4 cycles of single agent palliative Keytruda/ immunotherapy - had positive response, patient did not return for follow-up. Several weeks ago returned to office weeks ago with progressive disease restarted on treatment with Keytruda AND concurrent chemotherapy carboplatin and Alimta. Had progressive disease in the neck and had seen radiation therapy in consultation  - Transferred to [facility] for palliative RT to neck  - Trach placed 2/15 for airway protection & PEG placed 2/16 for nutrition/medication administration   - Patient states that he was under assumption that he would be getting more Keytruda after palliative RT. Please have Oncology ([facility]- Dr. Trammell) see patient to clarify if patient is a candidate for further DMT.

## 2023-02-22 NOTE — SPEAKING VALVE EVALUATION - DEFER SPEAKING VALVE USE
DEFER speaking valve use given evidence of air trapping, reduction in Spo2 and patient reports of increased work of breathing with valve use.

## 2023-02-22 NOTE — SPEAKING VALVE EVALUATION - DIAGNOSTIC IMPRESSIONS
SLP administered digital occlusion on tracheostomy hub and patient aphonic/unable to achieve voicing. SLP placed speaking valve on CUFFLESS tracheostomy hub and patient noted with harsh vocal quality with minimal vocal volume, though compared to digital occlusion/open tracheostomy. Patient tolerated speaking valve for ~5 minutes only (two times attempted, total of 10 minutes), though reported difficulty exhaling and noted with clavicular breathing. Speaking Valve removed and air trapping noted upin doffing of speaking valve. Patient's Spo2 returned to 100%. SLP called out to ISH Miller regarding results.

## 2023-02-22 NOTE — PROGRESS NOTE ADULT - ASSESSMENT
52M with metastatic lung cancer, HTN, anxiety, chronic pain on percocet, prior substance abuse who was admitted to [facility] on 2/10/23 due to difficulty swallowing. 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 Pt is s/p trach and PEG and is undergoing palliative radiation

## 2023-02-22 NOTE — PROGRESS NOTE ADULT - NS ATTEND AMEND GEN_ALL_CORE FT
52 year old male w/ a PMHX of stage IV lung CA, HTN, anxiety on Xanax, chronic pain on Percocet, and substance abuse was admitted to [facility] on 2/10/23 due to difficulty swallowing. 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 Pt is transferred to [facility] for Palliative Radiation.     Case discussed with Palliative NP, Amy Palacio. Continue methadone as above for long acting pain control and titrate down dilaudid pca. Patient appeared drowsy during conversation today. Explained our concern for patient's continued use of Ativan ATC causing patient to be lethargic and need for  consult to assist with long term management of patient's anxiety. Discussed case with Dr. Aguilar - see above for recs.   > PLEASE HAVE ONCOLOGY see patient inpatient. If patient is appropriate for hospice, would plan to have goc discussion before discharge. At this time, patient believes he is getting more Keytruda after he completes RT.   > Appreciate rad/onc recs - RT to end on 2/24   > Appreciate CT surgery recs     Thank you for allowing us to participate in your patient's care. We will continue to follow with you. Please page [phone] for any q's or c's.     Laura Calixto D.O.   Palliative Medicine.

## 2023-02-22 NOTE — PHYSICAL THERAPY INITIAL EVALUATION ADULT - PERTINENT HX OF CURRENT PROBLEM, REHAB EVAL
Pt is a 52 year old male presenting from [facility] with difficulty swallowing.  Pt found to have a large, necrotic retrotracheal mass with compression of the trachea, Left IJ vein and Left internal carotid artery.  Pt is s/p trach and PEG and is undergoing palliative radiation.  PMH significant for Stage IV Metastatic Lung Cancer.  Further PMH listed below.

## 2023-02-22 NOTE — SPEAKING VALVE EVALUATION - RECOMMENDATIONS
1. 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

## 2023-02-22 NOTE — PHYSICAL THERAPY INITIAL EVALUATION ADULT - PATIENT PROFILE REVIEW, REHAB EVAL
PT evaluate and treat orders received.  No Formal Activity Orders.  Consulted with RN Shekhar, pt cleared for PT evaluation./yes

## 2023-02-22 NOTE — CHART NOTE - NSCHARTNOTEFT_GEN_A_CORE
PA requested Bolus recommendations for pt when preparing for discharge, if pt tolerates continuous feeds of TwoCal HN @35ml/hr.   When preparing for discharge recommend Bolus feedings of 210ml Q6hr (4x/day) to provide 840ml total volume/day, 1680kcal (32kcal/kg), 70g pro (1.3 g/kg), 588ml free water. Pt new to TF Would recommend starting bolus feedings at half rate to monitor tolerance, increase bolus as tolerated to goal.

## 2023-02-22 NOTE — PROGRESS NOTE ADULT - ASSESSMENT
52 year old male w/ a PMHX of stage IV lung CA, HTN, anxiety on Xanax, chronic pain on Percocet, and substance abuse was admitted to [facility] on 2/10/23 due to difficulty swallowing. Pt has had difficulty swallowing for th past week. Pt states it has been worsening since, states he is now unable to swallow pills or food. 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 Pt also developed stridor for the past week. Pt is transferred to [facility] for Palliative Radiation. Palliative care consulted for pain management.

## 2023-02-23 NOTE — DISCHARGE NOTE PROVIDER - HOSPITAL COURSE
HPI:  52 year old male w/ a PMHX of stage IV lung CA, HTN, anxiety on Xanax, chronic pain on Percocet, and substance abuse was admitted to [facility] on 2/10/23 due to difficulty swallowing. Pt has had difficulty swallowing for th past week. Pt states it has been worsening since, states he is now unable to swallow pills or food. Pt was found to have large necrotic mass measuring at least 8.1 x 4.5 cm in the retrotracheal region with compression and displacement of the trachea   anteriorly and compression of the pharynx and proximal esophagus as well as the LEFT internal jugular vein and internal carotid artery posterior   laterally. Pt also developed stridor for the past week. Pt is transferred to [facility] for Palliative Radiation.   Denies chest pain, abdominal pain.     Hospital Course  Pt was admitted to medical service and then had tracheostomy placed by ENT on 2/14. Pt went to SICU and was transferred to floors the following day. PEG placed by thoracic surgery on 2/16. Pt is s/p 5 sessions of palliative radiation. Oncology was consulted and recommended _______. Palliative care was consulted for GOC and symptom management. The patient was started on PCA for pain control which was transitioned to _____. Psychiatry was consulted for anxiety and they recommended _____.    To Follow Up HPI:  52 year old male w/ a PMHX of stage IV lung CA, HTN, anxiety on Xanax, chronic pain on Percocet, and substance abuse was admitted to [facility] on 2/10/23 due to difficulty swallowing. Pt has had difficulty swallowing for th past week. Pt states it has been worsening since, states he is now unable to swallow pills or food. Pt was found to have large necrotic mass measuring at least 8.1 x 4.5 cm in the retrotracheal region with compression and displacement of the trachea   anteriorly and compression of the pharynx and proximal esophagus as well as the LEFT internal jugular vein and internal carotid artery posterior   laterally. Pt also developed stridor for the past week. Pt is transferred to [facility] for Palliative Radiation.   Denies chest pain, abdominal pain.     Hospital Course  Pt was admitted to medical service and then had tracheostomy placed by ENT on 2/14. Pt went to SICU and was transferred to floors the following day. PEG placed by thoracic surgery on 2/16. Pt is s/p 5 sessions of palliative radiation. Oncology was consulted and recommended Palliative care was consulted for GOC and symptom management. The patient was started on PCA for pain control which was transitioned to dilaudid per PEG and methadone.  Psychiatry was consulted for anxiety and they recommended ativan 1mg q4hr.    To Follow Up HPI:  52 year old male w/ a PMHX of stage IV lung CA, HTN, anxiety on Xanax, chronic pain on Percocet, and substance abuse was admitted to [facility] on 2/10/23 due to difficulty swallowing. Pt has had difficulty swallowing for th past week. Pt states it has been worsening since, states he is now unable to swallow pills or food. Pt was found to have large necrotic mass measuring at least 8.1 x 4.5 cm in the retrotracheal region with compression and displacement of the trachea   anteriorly and compression of the pharynx and proximal esophagus as well as the LEFT internal jugular vein and internal carotid artery posterior   laterally. Pt also developed stridor for the past week. Pt is transferred to [facility] for Palliative Radiation.   Denies chest pain, abdominal pain.     Hospital Course  Pt was admitted to medical service and then had tracheostomy placed by ENT on 2/14. Pt went to SICU and was transferred to floors the following day. PEG placed by thoracic surgery on 2/16. Pt is s/p 5 sessions of palliative radiation. Oncology was consulted and recommended Palliative care was consulted for GOC and symptom management. The patient was started on PCA for pain control which was transitioned to dilaudid per PEG and methadone.  Psychiatry was consulted for anxiety and they recommended ativan 1mg q4hr.      On 3/17/23, case was discussed with Dr. Nj, patient is medically cleared and optimized for discharge today. All medications were reviewed with attending, and sent to mutually agreed upon pharmacy.  Patient being discharged to hospice  care.   HPI:  Patient is a 53yo M with PMH of Stage IV lung ca, HTN, anxiety on Xanax, chronic pain on Percocet, and substance use disorder, who presented initially to [facility] with difficulty swallowing, found to have a large necrotic mass in the retrotracheal region. He underwent tracheostomy and PEG tube placement. He was transferred to [facility] for palliative radiation and received 5 sessions. Oncology stated that he is no longer a candidate for further treatment as an inpatient. He was started on pain medications and anxiolytics, with initial plan for dc to rehab and eventual transition to hospice. His course was complicated by recurrent aspiration events and pneumonia, GI bleeding, and hyponatremia. He was seen by the palliative care team and decision was made to decline further IVF, tube feedings, antibiotics, or invasive procedures. Patient asked to be transferred to inpatient hospice. He will be transferred to the Hospice [facility].       On 3/17/23, case was discussed with Dr. Nj, patient is medically cleared and optimized for discharge today. All medications were reviewed with attending, and sent to mutually agreed upon pharmacy.  Patient being discharged to hospice  care.   HPI:  Patient is a 53yo M with PMH of Stage IV lung ca, HTN, anxiety on Xanax, chronic pain on Percocet, and substance use disorder, who presented initially to [facility] with difficulty swallowing, found to have a large necrotic mass in the retrotracheal region. He underwent tracheostomy and PEG tube placement. He was transferred to [facility] for palliative radiation and received 5 sessions. Oncology stated that he is no longer a candidate for further treatment as an inpatient. He was started on pain medications and anxiolytics, with initial plan for dc to rehab and eventual transition to hospice. His course was complicated by recurrent aspiration events and pneumonia, GI bleeding, and hyponatremia. He was seen by the palliative care team and decision was made to decline further IVF, tube feedings, antibiotics, or invasive procedures. Patient asked to be transferred to inpatient hospice. He will be transferred to the Hospice [facility]. He will be continued on a hydromorphine gtt.      On 3/17/23, case was discussed with Dr. Nj, patient is medically cleared and optimized for discharge today. All medications were reviewed with attending, and sent to mutually agreed upon pharmacy.  Patient being discharged to hospice  care.   HPI:  Patient is a 51yo M with PMH of Stage IV lung ca, HTN, anxiety on Xanax, chronic pain on Percocet, and substance use disorder, who presented initially to [facility] with difficulty swallowing, found to have a large necrotic mass in the retrotracheal region. He underwent tracheostomy and PEG tube placement. He was transferred to [facility] for palliative radiation and received 5 sessions. Oncology stated that he is no longer a candidate for further treatment as an inpatient. He was started on pain medications and anxiolytics, with initial plan for dc to rehab and eventual transition to hospice. His course was complicated by recurrent aspiration events and pneumonia, GI bleeding, and hyponatremia. He was seen by the palliative care team and decision was made to decline further IVF, tube feedings, antibiotics, or invasive procedures. Patient asked to be transferred to inpatient hospice. He will be transferred to the Hospice [facility]. He will be continued on a hydromorphine gtt.      On 3/17/23, case was discussed with Dr. Nj, patient is medically cleared and optimized for discharge today. All medications were reviewed with attending, and sent to mutually agreed upon pharmacy.  Patient being discharged to hospice  care.

## 2023-02-23 NOTE — PROGRESS NOTE ADULT - PROBLEM SELECTOR PLAN 1
Dysphagia 2/2 large mass in the retrotracheal region with displacement of the trachea  anteriorly, compression of the pharynx and proximal esophagus  - keep patient NPO with PEG feeds pending speech and swallow follow up  - continue palliative radiation, to be completed on 2/24  - keep trach of same size and one size down at bedside at all times  - d/c steroids today  - continue pain control per palliative

## 2023-02-23 NOTE — PROGRESS NOTE ADULT - ASSESSMENT
52M with metastatic lung cancer, HTN, anxiety, chronic pain on percocet, prior substance abuse who was admitted to [facility] on 2/10/23 due to difficulty swallowing. 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 Pt is s/p trach and PEG and is undergoing palliative radiation

## 2023-02-23 NOTE — PROGRESS NOTE ADULT - SUBJECTIVE AND OBJECTIVE BOX
[facility] Geriatrics and Palliative Care  Amy Palacio, Palliative Care Nurse Practitioner  Contact Info: Page [phone] (Including Nights/Weekends), Message on Microsoft Teams (Amy Palacio), or leave VM at Palliative Office [phone] (non-urgent)    SUBJECTIVE AND OBJECTIVE:  Indication for Geriatrics and Palliative Care Services/INTERVAL HPI: Complex symptom management.  Pt seen and examined this afternoon, shared that all medications are working, pain has improved. Plan to decrease PCA pump, pt in agreement. PEG tube feeding at goal rate of 35cc/hr, pt tolerating well.     OVERNIGHT EVENTS:  > 2/21: Pt required 35 demands, 36 attempts, 0 CAB. Totaling 81.5mg IV Dilaudid within 24hrs. IV Ativan liberated to Q4 hrs ATC by primary team over the weekend.  > 2/22: Pt required 28 demands, 49 attempts, 1 2mg CAB. Totaling 68mg IV Dilaudid within 24 hrs. Methadone initiated yesterday.  > 2/23: Pt required 26 demands, 65 attempts, 1 2mg CAB. Totaling 64.75mg IV Dilaudid within 24 hrs.     DNR on chart:Yes  Yes      Allergies    No Known Allergies    Intolerances    MEDICATIONS  (STANDING):  acetaminophen   IVPB .. 1000 milliGRAM(s) IV Intermittent once  albuterol/ipratropium for Nebulization 3 milliLiter(s) Nebulizer every 6 hours  dextrose 5% + sodium chloride 0.9%. 1000 milliLiter(s) (75 mL/Hr) IV Continuous <Continuous>  gabapentin Solution 600 milliGRAM(s) Oral three times a day  heparin   Injectable 5000 Unit(s) SubCutaneous every 8 hours  HYDROmorphone PCA (5 mG/mL) 30 milliLiter(s) PCA Continuous PCA Continuous  influenza   Vaccine 0.5 milliLiter(s) IntraMuscular once  levothyroxine 50 MICROGram(s) Oral daily  LORazepam     Tablet 1 milliGRAM(s) Oral every 6 hours  magnesium sulfate  IVPB 1 Gram(s) IV Intermittent once  methadone   Solution 10 milliGRAM(s) Oral three times a day  methylPREDNISolone sodium succinate Injectable 40 milliGRAM(s) IV Push daily  nicotine - 21 mG/24Hr(s) Patch 1 Patch Transdermal daily  pantoprazole  Injectable 40 milliGRAM(s) IV Push two times a day  potassium chloride   Solution 40 milliEquivalent(s) Oral once  senna 2 Tablet(s) Oral at bedtime    MEDICATIONS  (PRN):  albuterol    90 MICROgram(s) HFA Inhaler 2 Puff(s) Inhalation every 6 hours PRN Bronchospasm  HYDROmorphone PCA (5 mG/mL) Rescue Clinician Bolus 2 milliGRAM(s) IV Push every 1 hour PRN Severe Pain (7 - 10)  hydrOXYzine hydrochloride Syrup 25 milliGRAM(s) Oral every 6 hours PRN Anxiety  naloxone Injectable 0.1 milliGRAM(s) IV Push every 3 minutes PRN For ANY of the following changes in patient status:  A. RR LESS THAN 10 breaths per minute, B. Oxygen saturation LESS THAN 90%, C. Sedation score of 6  ondansetron Injectable 4 milliGRAM(s) IV Push every 8 hours PRN Nausea and/or Vomiting  polyethylene glycol 3350 17 Gram(s) Oral daily PRN Constipation      -------------------------------------------------------------------------------------------------------  ITEMS UNCHECKED ARE NOT PRESENT    PRESENT SYMPTOMS: [ ]Unable to self-report - see [ ] CPOT [ ] PAINADS [ ] RDOS  Source if other than patient:  [ ]Family   [ ]Team     Pain:  [x ]yes [ ]no  QOL impact - long hospital stay  Location - abdomen/back              Aggravating factors - movement  Quality - sharp  Radiation - none  Timing- constant   Severity (0-10 scale): 7  Minimal acceptable level (0-10 scale)/pain goal: 4    CPOT:    https://[email or web address]/[email or web address]/Critical-Care-Pain-Observation-Tool-(CPOT)    PAINAD Score: See PAINAD tool and score below       RDOS: See RDOS tool and score below   0 to 2  minimal or no respiratory distress   3  mild distress  4 to 6 moderate distress  >7 severe distress    Dyspnea:                           [ ]Mild [ ]Moderate [ ]Severe  Anxiety:                             [ ]Mild [ X]Moderate [ ]Severe  Fatigue:                             [ ]Mild [ ]Moderate [ ]Severe  Nausea:                             [ ]Mild [ ]Moderate [ ]Severe  Loss of appetite:              [ ]Mild [ ]Moderate [ ]Severe  Constipation:                    [ ]Mild [ ]Moderate [ ]Severe  Other Symptoms:  [ X]All other review of systems negative     Home Medications for Symptoms if present:    I Stop Reference no:     -------------------------------------------------------------------------------------------------------  PCSSQ[Palliative Care Spiritual Screening Question]   Severity (0-10):0   Score of 4 or > indicate consideration of Chaplaincy referral.  Chaplaincy Referral: [ ] yes [ ] refused [ ] following [ X] Deferred     Caregiver [place]? : [ ] yes [ ] no [ ] Deferred [ X] Declined             Social work referral [ ] Patient & Family Centered Care Referral [ ]     Anticipatory Grief present?:  [ ] yes [ ] no  [X ] Deferred                  Social work referral [ ] Chaplaincy Referral [ ]    -------------------------------------------------------------------------------------------------------  PHYSICAL EXAM:  Vital Signs Last 24 Hrs  T(C): 36.9 (23 Feb 2023 10:00), Max: 36.9 (23 Feb 2023 02:25)  T(F): 98.4 (23 Feb 2023 10:00), Max: 98.5 (23 Feb 2023 02:25)  HR: 84 (23 Feb 2023 10:00) (57 - 96)  BP: 103/54 (23 Feb 2023 10:00) (100/66 - 105/77)  BP(mean): --  RR: 17 (23 Feb 2023 10:00) (15 - 18)  SpO2: 100% (23 Feb 2023 10:00) (92% - 100%)    Parameters below as of 23 Feb 2023 10:00  Patient On (Oxygen Delivery Method): tracheostomy collar     I&O's Summary    22 Feb 2023 07:01  -  23 Feb 2023 07:00  --------------------------------------------------------  IN: 2405 mL / OUT: 0 mL / NET: 2405 mL    23 Feb 2023 07:01  -  23 Feb 2023 10:51  --------------------------------------------------------  IN: 280 mL / OUT: 150 mL / NET: 130 mL       GENERAL: Trach collar - pt communicates by writing   [ x]Cachexia  [X ]Alert  [ X]Oriented x4   [X ]Lethargic  [ ]Unarousable  [ ]Verbal  [X ]Non-Verbal    Behavioral:   [ X] Anxiety  [ ] Delirium [ ] Agitation [ ] Other    HEENT:  [ ]Normal   [ ]Dry mouth   [ X]ET Tube/Trach  [ ]Oral lesions    PULMONARY:   [ ]Clear [ ]Tachypnea  [ ]Audible excessive secretions   [ ]Rhonchi        [ ]Right [ ]Left [ ]Bilateral  [ ]Crackles        [ ]Right [ ]Left [ ]Bilateral  [ ]Wheezing     [ ]Right [ ]Left [ ]Bilateral  [ X]Diminished breath sounds [ ]right [ ]left [ ]bilateral    CARDIOVASCULAR:    [ X]Regular [ ]Irregular [ ]Tachy  [ ]Faheem [ ]Murmur [ ]Other    GASTROINTESTINAL:  [X ]Soft  [ ]Distended   [ X]+BS  [ ]Non tender [ X]Tender  [ ]Other [ X]PEG [ ]OGT/ NGT  Last BM: 2/21    GENITOURINARY:  [X ]Normal [ ] Incontinent   [ ]Oliguria/Anuria   [ ]Wall    MUSCULOSKELETAL:   [ ]Normal   [X ]Weakness  [ ]Bed/Wheelchair bound [ ]Edema    NEUROLOGIC:   [X ]No focal deficits  [ ]Cognitive impairment  [ ]Dysphagia [ ]Dysarthria [ ]Paresis [ ]Other     SKIN:   [X ]Normal  [ ]Rash  [ ]Other  [ ]Pressure ulcer(s)       Present on admission [ ]y [ ]n    -------------------------------------------------------------------------------------------------------  CRITICAL CARE:  [ ]Shock Present  [ ]Septic [ ]Cardiogenic [ ]Neurologic [ ]Hypovolemic  [ ]Vasopressors [ ]Inotropes  [ ]Respiratory failure present [ ]Mechanical Ventilation [ ]Non-invasive ventilatory support [ ]High-Flow   [ ]Acute  [ ]Chronic [ ]Hypoxic  [ ]Hypercarbic [ ]Other  [ ]Other organ failure     -------------------------------------------------------------------------------------------------------  LABS:                        8.3    14.66 )-----------( 543      ( 23 Feb 2023 06:13 )             27.0   02-23    139  |  100  |  12  ----------------------------<  94  3.7   |  30  |  0.52    Ca    8.9      23 Feb 2023 06:13  Phos  3.2     02-23  Mg     1.70     02-23      -------------------------------------------------------------------------------------------------------  RADIOLOGY & ADDITIONAL STUDIES: Reviewed       -------------------------------------------------------------------------------------------------------  Protein Calorie Malnutrition Present: [ ]mild [ ]moderate [ ]severe [ ]underweight [ ]morbid obesity  https://www.andeal.org/vault/7070/web/files/ONC/Table_Clinical%20Characteristics%20to%20Document%20Malnutrition-White%20JV%20et%20al%202012.pdf    Height (cm): 182.9 (02-16-23 @ 06:00), 170.2 (02-10-23 @ 08:52), 182.9 (11-21-22 @ 14:45)  Weight (kg): 52.2 (02-16-23 @ 06:00), 56.2 (02-10-23 @ 08:52), 61.235 (11-21-22 @ 17:08)  BMI (kg/m2): 15.6 (02-16-23 @ 06:00), 19.4 (02-10-23 @ 08:52), 18.3 (11-21-22 @ 17:08)    Palliative Performance Status Version 2:   See PPSv2 tool and score below       [ ]PPSV2 < or = 30%  [ ]significant weight loss [ ]poor nutritional intake [ ]anasarcaPrealbumin, Serum: 10 mg/dL (02-12-23 @ 07:04)  [ ]Artificial Nutrition    Other REFERRALS:  [ ]Hospice  [ ]Child Life  [ ]Social Work  [ ]Case management [ ]Holistic Therapy     -------------------------------------------------------------------------------------------------------  Goals of Care Document:

## 2023-02-23 NOTE — PROGRESS NOTE ADULT - ASSESSMENT
52 year old male w/ a PMHX of stage IV lung CA, HTN, anxiety on Xanax, chronic pain on Percocet, and substance abuse was admitted to [facility] on 2/10/23 due to difficulty swallowing. Pt has had difficulty swallowing for th past week. Pt states it has been worsening since, states he is now unable to swallow pills or food. 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 Pt also developed stridor for the past week. Pt is transferred to [facility] for Palliative Radiation. Palliative care consulted for pain management.

## 2023-02-23 NOTE — DISCHARGE NOTE PROVIDER - NSDCCPCAREPLAN_GEN_ALL_CORE_FT
PRINCIPAL DISCHARGE DIAGNOSIS  Diagnosis: Lung cancer  Assessment and Plan of Treatment: You had a tracheostomy and PEG tube placed because of impending airway obstruction from a tumor. You had 5 sessions of radiation to the tumor.       PRINCIPAL DISCHARGE DIAGNOSIS  Diagnosis: Lung cancer  Assessment and Plan of Treatment: You had a tracheostomy and PEG tube placed because of impending airway obstruction from a tumor. You had 5 sessions of radiation to the tumor. For your pain, you are on dialudid through the PEG and methadone       PRINCIPAL DISCHARGE DIAGNOSIS  Diagnosis: Lung cancer  Assessment and Plan of Treatment: You had a tracheostomy and PEG tube  or feeding tube placed because of impending airway obstruction from a tumor. You had 5 sessions of radiation to the tumor. For your pain, you are on dialudid through the PEG and methadone.       PRINCIPAL DISCHARGE DIAGNOSIS  Diagnosis: Lung cancer  Assessment and Plan of Treatment: You had a tracheostomy and PEG tube  or feeding tube placed because of impending airway obstruction from a tumor. You had 5 sessions of radiation to the tumor. For your pain, palliative care made recommendations to keep you comfortable.

## 2023-02-23 NOTE — CONSULT NOTE ADULT - SUBJECTIVE AND OBJECTIVE BOX
Chief Complaint:  Patient is a 52y old  Male who presents with a chief complaint of Palliative Radiation (22 Feb 2023 15:19)      HPI: patient follows with oncologist at [facility] in [place]. He has known stage IV NSCLC. From notes, appears, that he was getting Keytruda (had high PDL-1) and then he developed a neck mass which made it difficult for him to breathe and swallow. He was then started on Chemo with Keytruda and appears that he had a dose at the end of january. Unclear what happened after that, but he is now undergoing palliative RT to neck for obstructing mass. Asked to see patient for plan or not for further systemic therapy. He says that he is feeling okay. He is getting PEG feeds.     Medications:  acetaminophen   IVPB .. 1000 milliGRAM(s) IV Intermittent once  albuterol    90 MICROgram(s) HFA Inhaler 2 Puff(s) Inhalation every 6 hours PRN  albuterol/ipratropium for Nebulization 3 milliLiter(s) Nebulizer every 6 hours  dextrose 5% + sodium chloride 0.9%. 1000 milliLiter(s) IV Continuous <Continuous>  gabapentin Solution 600 milliGRAM(s) Oral three times a day  heparin   Injectable 5000 Unit(s) SubCutaneous every 8 hours  HYDROmorphone PCA (5 mG/mL) 30 milliLiter(s) PCA Continuous PCA Continuous  HYDROmorphone PCA (5 mG/mL) Rescue Clinician Bolus 2 milliGRAM(s) IV Push every 1 hour PRN  hydrOXYzine hydrochloride Syrup 25 milliGRAM(s) Oral every 6 hours PRN  influenza   Vaccine 0.5 milliLiter(s) IntraMuscular once  levothyroxine 50 MICROGram(s) Oral daily  LORazepam     Tablet 1 milliGRAM(s) Oral every 6 hours  methadone   Solution 10 milliGRAM(s) Oral three times a day  methylPREDNISolone sodium succinate Injectable 40 milliGRAM(s) IV Push daily  naloxone Injectable 0.1 milliGRAM(s) IV Push every 3 minutes PRN  nicotine - 21 mG/24Hr(s) Patch 1 Patch Transdermal daily  ondansetron Injectable 4 milliGRAM(s) IV Push every 8 hours PRN  pantoprazole  Injectable 40 milliGRAM(s) IV Push two times a day  polyethylene glycol 3350 17 Gram(s) Oral daily PRN  senna 2 Tablet(s) Oral at bedtime        Allergies:  No Known Allergies    FAMILY HISTORY:  FH: rheumatic fever (Father)    PAST MEDICAL & SURGICAL HISTORY:  HTN (hypertension)      Smoker      Substance abuse      Admits to alcohol use      Lung cancer  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 He has fatigue. No sweats. No oral eating.	  	  ENMT:	No nosebleeds    Respiratory and Thorax: No cough, SOB  	  Cardiovascular:	No CP    Gastrointestinal:	No N/V/D/C, abd pain    Genitourinary:	No dysuria or hematuria    Musculoskeletal:	 No back pain, leg pain, swelling    Neurological:	No HA or dizziness.    Vitals:  Vital Signs Last 24 Hrs  T(C): 36.4 (23 Feb 2023 06:00), Max: 36.9 (23 Feb 2023 02:25)  T(F): 97.6 (23 Feb 2023 06:00), Max: 98.5 (23 Feb 2023 02:25)  HR: 62 (23 Feb 2023 06:00) (57 - 91)  BP: 100/66 (23 Feb 2023 06:00) (100/66 - 105/77)  BP(mean): --  RR: 18 (23 Feb 2023 06:00) (15 - 18)  SpO2: 100% (23 Feb 2023 06:00) (92% - 100%)    Parameters below as of 23 Feb 2023 06:00  Patient On (Oxygen Delivery Method): tracheostomy collar        Pex:  alert NAD  EOMI anicteric sclera  Neck Trach collar  Cv s1 S2 RRR  Lungs clear B/L anteriorly  abd soft NT ND +BS  No LE edema or tenderness    Labs:                        8.3    14.66 )-----------( 543      ( 23 Feb 2023 06:13 )             27.0     CBC Full  -  ( 23 Feb 2023 06:13 )  WBC Count : 14.66 K/uL  RBC Count : 3.07 M/uL  Hemoglobin : 8.3 g/dL  Hematocrit : 27.0 %  Platelet Count - Automated : 543 K/uL  Mean Cell Volume : 87.9 fL  Mean Cell Hemoglobin : 27.0 pg  Mean Cell Hemoglobin Concentration : 30.7 gm/dL  Auto Neutrophil # : x  Auto Lymphocyte # : x  Auto Monocyte # : x  Auto Eosinophil # : x  Auto Basophil # : x  Auto Neutrophil % : x  Auto Lymphocyte % : x  Auto Monocyte % : x  Auto Eosinophil % : x  Auto Basophil % : x    02-23    139  |  100  |  12  ----------------------------<  94  3.7   |  30  |  0.52    Ca    8.9      23 Feb 2023 06:13  Phos  3.2     02-23  Mg     1.70     02-23        [id number]

## 2023-02-23 NOTE — PROGRESS NOTE ADULT - PROBLEM SELECTOR PLAN 1
- Initiated Methadone 10mg TID 2/21- QTc 450 2/10 - Repeat EKG 2/21 QTc 454  - PCA adjusted: PCA Dilaudid 5mg/1ml: DD 1.5mg, 20 min lockout, continuous 1mg, 4hr 22mg  - Bowel regimen while on opioids  - PRN Narcan - Initiated Methadone 10mg TID 2/21- QTc 450 2/10 - Repeat EKG 2/21 QTc 454  - PCA adjusted: PCA Dilaudid 5mg/1ml: DD 1.5mg, 20 min lockout, continuous 0.5mg, 4hr 20mg  - Bowel regimen while on opioids  - PRN Narcan

## 2023-02-23 NOTE — DISCHARGE NOTE PROVIDER - DETAILS OF MALNUTRITION DIAGNOSIS/DIAGNOSES
This patient has been assessed with a concern for Malnutrition and was treated during this hospitalization for the following Nutrition diagnosis/diagnoses:     -  02/16/2023: Severe protein-calorie malnutrition   -  02/16/2023: Underweight (BMI < 19)

## 2023-02-23 NOTE — PROGRESS NOTE ADULT - SUBJECTIVE AND OBJECTIVE BOX
[facility] Division of Hospital Medicine  Tejal Oswald MD  Available via MS Teams    SUBJECTIVE / OVERNIGHT EVENTS: No acute overnight events. Pt requesting dilaudid. Pt seen with Palliative care team at bedside, plan is to decrease background rate to minimize daytime sleepiness. Denies chest pain, SOB, abd pain, n/v/d. Tolerating tube feeds    ADDITIONAL REVIEW OF SYSTEMS: Negative except as noted in HPI    MEDICATIONS  (STANDING):  acetaminophen   IVPB .. 1000 milliGRAM(s) IV Intermittent once  albuterol/ipratropium for Nebulization 3 milliLiter(s) Nebulizer every 6 hours  dextrose 5% + sodium chloride 0.9%. 1000 milliLiter(s) (75 mL/Hr) IV Continuous <Continuous>  gabapentin Solution 600 milliGRAM(s) Oral three times a day  heparin   Injectable 5000 Unit(s) SubCutaneous every 8 hours  HYDROmorphone PCA (5 mG/mL) 30 milliLiter(s) PCA Continuous PCA Continuous  influenza   Vaccine 0.5 milliLiter(s) IntraMuscular once  levothyroxine 50 MICROGram(s) Oral daily  LORazepam     Tablet 1 milliGRAM(s) Oral every 6 hours  methadone   Solution 10 milliGRAM(s) Oral three times a day  methylPREDNISolone sodium succinate Injectable 40 milliGRAM(s) IV Push daily  nicotine - 21 mG/24Hr(s) Patch 1 Patch Transdermal daily  pantoprazole  Injectable 40 milliGRAM(s) IV Push two times a day  potassium chloride   Solution 40 milliEquivalent(s) Oral once  senna 2 Tablet(s) Oral at bedtime    MEDICATIONS  (PRN):  albuterol    90 MICROgram(s) HFA Inhaler 2 Puff(s) Inhalation every 6 hours PRN Bronchospasm  HYDROmorphone PCA (5 mG/mL) Rescue Clinician Bolus 2 milliGRAM(s) IV Push every 1 hour PRN Severe Pain (7 - 10)  hydrOXYzine hydrochloride Syrup 25 milliGRAM(s) Oral every 6 hours PRN Anxiety  naloxone Injectable 0.1 milliGRAM(s) IV Push every 3 minutes PRN For ANY of the following changes in patient status:  A. RR LESS THAN 10 breaths per minute, B. Oxygen saturation LESS THAN 90%, C. Sedation score of 6  ondansetron Injectable 4 milliGRAM(s) IV Push every 8 hours PRN Nausea and/or Vomiting  polyethylene glycol 3350 17 Gram(s) Oral daily PRN Constipation      I&O's Summary    22 Feb 2023 07:01  -  23 Feb 2023 07:00  --------------------------------------------------------  IN: 2405 mL / OUT: 0 mL / NET: 2405 mL    23 Feb 2023 07:01  -  23 Feb 2023 13:27  --------------------------------------------------------  IN: 280 mL / OUT: 150 mL / NET: 130 mL        PHYSICAL EXAM:  Vital Signs Last 24 Hrs  T(C): 36.9 (23 Feb 2023 10:00), Max: 36.9 (23 Feb 2023 02:25)  T(F): 98.4 (23 Feb 2023 10:00), Max: 98.5 (23 Feb 2023 02:25)  HR: 84 (23 Feb 2023 10:00) (57 - 96)  BP: 103/54 (23 Feb 2023 10:00) (100/66 - 105/77)  BP(mean): --  RR: 17 (23 Feb 2023 10:00) (15 - 18)  SpO2: 100% (23 Feb 2023 10:00) (92% - 100%)    Parameters below as of 23 Feb 2023 10:00  Patient On (Oxygen Delivery Method): tracheostomy collar      CONSTITUTIONAL: NAD, cachexia  EYES: PERRLA; conjunctiva and sclera clear  ENMT: +trach, C/D/I  RESPIRATORY: Normal respiratory effort; lungs are clear to auscultation bilaterally  CARDIOVASCULAR: Regular rate and rhythm, normal S1 and S2, no murmur/rub/gallop; No lower extremity edema; Peripheral pulses are 2+ bilaterally  ABDOMEN: Nontender to palpation, normoactive bowel sounds, no rebound/guarding; No hepatosplenomegaly; +PEG  MUSCULOSKELETAL:  Normal gait; no clubbing or cyanosis of digits; no joint swelling or tenderness to palpation  PSYCH: A+O to person, place, and time; communicates via writing  NEUROLOGY: CN 2-12 are intact and symmetric; no gross sensory deficits   SKIN: No rashes; no palpable lesions    LABS:                        8.3    14.66 )-----------( 543      ( 23 Feb 2023 06:13 )             27.0     Hb Trend: 8.3<--, 7.4<--, 7.1<--, 7.8<--, 7.5<--  02-23    139  |  100  |  12  ----------------------------<  94  3.7   |  30  |  0.52    Ca    8.9      23 Feb 2023 06:13  Phos  3.2     02-23  Mg     1.70     02-23      Creatinine Trend: 0.52<--, 0.51<--, 0.43<--, 0.47<--, 0.50<--, 0.52<--            COVID-19 PCR: NotDetec (15 Feb 2023 18:47)  COVID-19 PCR: NotDetec (26 Sep 2022 10:41)  COVID-19 PCR: NotDetec (14 Sep 2022 13:16)      RADIOLOGY & ADDITIONAL TESTS:  New Imaging Personally Reviewed Today:  New Electrocardiogram Personally Reviewed Today:  Other Results Reviewed Today:   Prior or Outpatient Records Reviewed Today with Summary:    COORDINATION OF CARE:  Consultant Communication and Details of Discussion (where applicable):

## 2023-02-23 NOTE — PROGRESS NOTE ADULT - PROBLEM SELECTOR PLAN 8
Dvt ppx: Lovenox QD, hypercoagulable state d/t malignancy  Code status: DNR/ DNI ([place] in chart from [facility]), pt was evaluated by Palliative in the [facility]  palliative eval requested for symptom management as pt is saying he has high tolerance for narcotics as he is an ex drug addict  Diet: tube feeds  Dispo: pending     -case d/w pt, RN and ACP  -plan of care d/w Cousin Susan phone# [phone] on 2/17, updates provided and all questions answered, d/w pt, OK to discuss his care plan with his cousin. Also discussed with cousin, Padma at [phone]

## 2023-02-23 NOTE — DISCHARGE NOTE PROVIDER - NSDCMRMEDTOKEN_GEN_ALL_CORE_FT
albuterol 90 mcg/inh inhalation aerosol: 2 puff(s) inhaled every 6 hours, As needed, Bronchospasm  fentaNYL 50 mcg/hr transdermal film, extended release: 1 patch transdermal every 72 hours  folic acid 1 mg oral tablet: 1 tab(s) orally once a day  hydrALAZINE 20 mg/mL injectable solution: 5  injectable every 6 hours  ipratropium-albuterol 0.5 mg-2.5 mg/3 mL inhalation solution: 3 milliliter(s) inhaled once  levothyroxine 50 mcg (0.05 mg) oral tablet: 1 tab(s) orally once a day    nicotine 21 mg/24 hr transdermal film, extended release: 1 patch transdermal once a day  zolpidem 5 mg oral tablet: 1 tab(s) orally once a day (at bedtime)   albuterol 90 mcg/inh inhalation aerosol: 2 puff(s) inhaled every 6 hours, As needed, Bronchospasm  Ativan 2 mg/mL injectable solution: 1 milligram(s) intravenously 4 times a day MDD:4mg daily  bisacodyl 10 mg rectal suppository: 1 suppository(ies) rectal once a day, As needed, Constipation  Dilaudid 1 mg/mL injectable solution: 1 milligram(s) intravenously every 3 hours, as needed MDD:8mg daily  ferrous sulfate 300 mg/5 mL (60 mg/5 mL elemental iron) oral liquid: 5 milliliter(s) orally once a day  HYDROmorphone 2 mg/mL injectable solution: 2 milligram(s) intravenously every 4 hours, As Needed severe pain MDD:12mg total daily  hydrOXYzine hydrochloride 10 mg/5 mL oral syrup: 12.5 milliliter(s) orally every 6 hours, As needed, Anxiety  ipratropium-albuterol 0.5 mg-2.5 mg/3 mL inhalation solution: 3 milliliter(s) inhaled every 12 hours  levothyroxine 40 mcg (0.04 mg)/mL intravenous solution: 1 milliliter(s) intravenous once a day (at bedtime)  lidocaine 4% topical film: Apply topically to affected area once a day  nicotine 21 mg/24 hr transdermal film, extended release: 1 application transdermal once a day  pantoprazole 40 mg intravenous injection: 40 milligram(s) intravenous 2 times a day  QUEtiapine 25 mg oral tablet: 1 tab(s) orally once a day (at bedtime)   albuterol 90 mcg/inh inhalation aerosol: 2 puff(s) inhaled every 6 hours, As needed, Bronchospasm  Ativan 2 mg/mL injectable solution: 1 milligram(s) intravenously 4 times a day MDD:4mg daily  bisacodyl 10 mg rectal suppository: 1 suppository(ies) rectal once a day, As needed, Constipation  ferrous sulfate 300 mg/5 mL (60 mg/5 mL elemental iron) oral liquid: 5 milliliter(s) orally once a day  HYDROmorphone: 1 milligram(s) by continuous intravenous infusion   HYDROmorphone 100 mg/50 mL-D5% intravenous solution: 1 milliliter(s) intravenous every 3 hours, As needed, Severe Pain (7 - 10)  HYDROmorphone 2 mg/mL injectable solution: 2 milligram(s) intravenously every 4 hours, As Needed severe pain MDD:12mg total daily  hydrOXYzine hydrochloride 10 mg/5 mL oral syrup: 12.5 milliliter(s) orally every 6 hours, As needed, Anxiety  ipratropium-albuterol 0.5 mg-2.5 mg/3 mL inhalation solution: 3 milliliter(s) inhaled every 12 hours  levothyroxine 40 mcg (0.04 mg)/mL intravenous solution: 1 milliliter(s) intravenous once a day (at bedtime)  lidocaine 4% topical film: Apply topically to affected area once a day  nicotine 21 mg/24 hr transdermal film, extended release: 1 application transdermal once a day  pantoprazole 40 mg intravenous injection: 40 milligram(s) intravenous 2 times a day  QUEtiapine 25 mg oral tablet: 1 tab(s) orally once a day (at bedtime)

## 2023-02-23 NOTE — PROGRESS NOTE ADULT - PROBLEM SELECTOR PLAN 5
Progressive NSCLC with disease progression in the neck causing stridor and dysphagia  - palliative radiation in progress  - per heme onc at [place] pt no longer candidate for DMT  - follow up hemeon recs  - pain management per palliative

## 2023-02-23 NOTE — PROGRESS NOTE ADULT - PROBLEM SELECTOR PLAN 2
S/P 4 cycles of single agent palliative Keytruda/ immunotherapy - had positive response, patient did not return for follow-up. Several weeks ago returned to office weeks ago with progressive disease restarted on treatment with Keytruda AND concurrent chemotherapy carboplatin and Alimta. Had progressive disease in the neck and had seen radiation therapy in consultation  - Transferred to [facility] for palliative RT to neck  - Trach placed 2/15 for airway protection & PEG placed 2/16 for nutrition/medication administration   - Patient states that he was under assumption that he would be getting more Keytruda after palliative RT. Please have Oncology ([facility]- Dr. Trammell) see patient to clarify if patient is a candidate for further DMT.

## 2023-02-23 NOTE — DISCHARGE NOTE PROVIDER - CARE PROVIDER_API CALL
Garrett Bustillos  HEMATOLOGY  [address], [address]  [place] [address]  Phone: [phone]  Fax: [phone]  Established Patient  Follow Up Time:

## 2023-02-23 NOTE — PROGRESS NOTE ADULT - NS ATTEND AMEND GEN_ALL_CORE FT
52 year old male w/ a PMHX of stage IV lung CA, HTN, anxiety on Xanax, chronic pain on Percocet, and substance abuse was admitted to [facility] on 2/10/23 due to difficulty swallowing. 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 Pt is transferred to [facility] for Palliative Radiation.     Case discussed with Palliative NP, Amy Palacio. Continue methadone as above for long acting pain control and titrate down dilaudid pca. Plan to transition to PRN regimen likely tomorrow. Patient has medical cannabis that can help with pain and anxiety.   > Awaiting oncology recs.   > Appreciate rad/onc recs - RT to end on 2/24   > Appreciate CT surgery recs     Thank you for allowing us to participate in your patient's care. We will continue to follow with you. Please page [phone] for any q's or c's.     Laura Calixto D.O.   Palliative Medicine.

## 2023-02-23 NOTE — DISCHARGE NOTE PROVIDER - ATTENDING DISCHARGE PHYSICAL EXAMINATION:
PHYSICAL EXAM:  Vital Signs Last 24 Hrs  T(C): 36.9 (17 Mar 2023 09:30), Max: 37.2 (16 Mar 2023 14:00)  T(F): 98.4 (17 Mar 2023 09:30), Max: 99 (16 Mar 2023 14:00)  HR: 92 (17 Mar 2023 09:30) (92 - 114)  BP: 108/68 (17 Mar 2023 09:30) (108/68 - 126/83)  BP(mean): --  RR: 18 (17 Mar 2023 09:30) (18 - 18)  SpO2: 100% (17 Mar 2023 09:30) (96% - 100%)    Parameters below as of 17 Mar 2023 10:00  Patient On (Oxygen Delivery Method): tracheostomy collar      CONSTITUTIONAL: NAD, well-developed, well-groomed, cachectic, communicates by writing  EYES: PERRLA; conjunctiva and sclera clear  ENMT: Moist oral mucosa, no pharyngeal injection or exudates; normal dentition  NECK: Supple, no palpable masses; no thyromegaly; trach in place with overlying trach collar with mucous secretions  RESPIRATORY: Normal respiratory effort; lungs are clear to auscultation bilaterally  CARDIOVASCULAR: Regular rate and rhythm, normal S1 and S2, no murmur/rub/gallop; No lower extremity edema; Peripheral pulses are 2+ bilaterally  ABDOMEN: Nontender to palpation, normoactive bowel sounds, no rebound/guarding; No hepatosplenomegaly; PEG tube in place  MUSCULOSKELETAL: no clubbing or cyanosis of digits; no joint swelling or tenderness to palpation  PSYCH: A+O to person, place, and time; affect appropriate  NEUROLOGY: CN 2-12 are intact and symmetric; no gross sensory deficits   SKIN: No rashes; no palpable lesions

## 2023-02-23 NOTE — PROGRESS NOTE ADULT - PROBLEM SELECTOR PLAN 3
Explained to pt that Ativan Q4 hours is not sustainable/ not for its intended use- discussed  consult, pt in agreement.   Spoke to Dr. Isaiah Aguilar regarding  consult - gave phone recommendations at this time. Full consult to follow.  - Recommended increasing Gabapentin to 600mg TID   - PRN Atarax 25mg Q6hrs for moderate anxiety  (not to be given within 2 hours of Ativan)  - c/w PO Ativan 1mg Q6 (Hold for sedation)     - Family provides a lot of support to patient Explained to pt that Ativan Q4 hours is not sustainable/ not for its intended use- discussed  consult, pt in agreement.   Spoke to Dr. Isaiah Aguilar regarding  consult - gave phone recommendations at this time. Full consult to follow.  - Recommended increasing Gabapentin to 600mg TID   - PRN Atarax 25mg Q6hrs for moderate anxiety  (not to be given within 2 hours of Ativan)  - c/w PO Ativan 1mg Q6 (Hold for sedation)   - Pt has medical cannabis at home (gummies). Explained and recommended cannbis tincture as he has PEG tube now.     - Family provides a lot of support to patient

## 2023-02-23 NOTE — CONSULT NOTE ADULT - ASSESSMENT
Patient with metastatic NSCLC. Per epic records review, was diagnosed last year. Initially treated with Keytruda and progressed and then had a dose of Alimta with Keytruda. He was then admitted to [facility] due to neck mass. He has since had a PEG and a trach and he is undergoing palliative RT to his neck. It will complete tomorrow. Apparently, hospice has been recommended, but patient not willing for that. Overall picture would be consistent with hospice as he does not appear to be ready to tolerate systemic therapy. Will plan to speak with his primary oncologist today and get more details and get his recommendations and then will make further recommendations.    Anemia appears to be chronic and an AOCD picture due to malignancy. Hgb adequate and can monitor.    Thrombocytosis also has been present prior and is likely a reactive process.

## 2023-02-24 NOTE — PROGRESS NOTE ADULT - PROBLEM SELECTOR PLAN 1
Dysphagia 2/2 large mass in the retrotracheal region with displacement of the trachea  anteriorly, compression of the pharynx and proximal esophagus  - keep patient NPO with PEG feeds pending speech and swallow follow up  - continue palliative radiation, to be completed on today  - keep trach of same size and one size down at bedside at all times  - continue pain control per palliative

## 2023-02-24 NOTE — PROGRESS NOTE ADULT - ASSESSMENT
52M with metastatic lung cancer, HTN, anxiety, chronic pain on percocet, prior substance abuse who was admitted to [facility] on 2/10/23 due to difficulty swallowing. 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 Pt is s/p trach and PEG and is undergoing palliative radiation

## 2023-02-24 NOTE — PROGRESS NOTE ADULT - SUBJECTIVE AND OBJECTIVE BOX
Patient is a 52y old  Male who presents with a chief complaint of Palliative Radiation (24 Feb 2023 12:40)  Patient seen and family at bedside (HCP.) He says that he feels okay. he denies pain, cough, SOB, N/V/D, fever, HA, dizziness, bleeding. Remains on PEG feeds     Medication:   acetaminophen   IVPB .. 1000 milliGRAM(s) IV Intermittent once  albuterol    90 MICROgram(s) HFA Inhaler 2 Puff(s) Inhalation every 6 hours PRN  albuterol/ipratropium for Nebulization 3 milliLiter(s) Nebulizer every 6 hours  dextrose 5% + sodium chloride 0.9%. 1000 milliLiter(s) IV Continuous <Continuous>  gabapentin Solution 600 milliGRAM(s) Oral three times a day  heparin   Injectable 5000 Unit(s) SubCutaneous every 8 hours  HYDROmorphone PCA (5 mG/mL) 30 milliLiter(s) PCA Continuous PCA Continuous  HYDROmorphone PCA (5 mG/mL) Rescue Clinician Bolus 2 milliGRAM(s) IV Push every 1 hour PRN  hydrOXYzine hydrochloride Syrup 25 milliGRAM(s) Oral every 6 hours PRN  influenza   Vaccine 0.5 milliLiter(s) IntraMuscular once  levothyroxine 50 MICROGram(s) Oral daily  LORazepam     Tablet 1 milliGRAM(s) Oral every 6 hours  methadone   Solution 15 milliGRAM(s) Oral three times a day  naloxone Injectable 0.1 milliGRAM(s) IV Push every 3 minutes PRN  nicotine - 21 mG/24Hr(s) Patch 1 Patch Transdermal daily  ondansetron Injectable 4 milliGRAM(s) IV Push every 8 hours PRN  pantoprazole  Injectable 40 milliGRAM(s) IV Push two times a day  polyethylene glycol 3350 17 Gram(s) Oral daily PRN  senna 2 Tablet(s) Oral at bedtime      Physical exam    T(C): 36.4 (02-24-23 @ 14:00), Max: 36.9 (02-23-23 @ 22:00)  HR: 82 (02-24-23 @ 14:00) (61 - 95)  BP: 100/61 (02-24-23 @ 14:00) (100/61 - 117/66)  RR: 18 (02-24-23 @ 14:00) (16 - 19)  SpO2: 99% (02-24-23 @ 14:00) (94% - 100%)  Wt(kg): --    alert NAD  EOMI anicteric sclera  Trach collar  Cv  RRR  Resp non labored  abd soft NT ND  No LE edema or tenderness    Labs                      7.3    12.67 )-----------( 480      ( 24 Feb 2023 07:00 )             24.5       02-24    138  |  98  |  9   ----------------------------<  85  3.4<L>   |  32<H>  |  0.46<L>    Ca    8.8      24 Feb 2023 07:00  Phos  2.8     02-24  Mg     1.60     02-24            [id number]

## 2023-02-24 NOTE — PROGRESS NOTE ADULT - PROBLEM SELECTOR PLAN 5
Progressive NSCLC with disease progression in the neck causing stridor and dysphagia  - palliative radiation completed today  - per heme onc at [place] pt no longer candidate for DMT  - follow up hemeon recs  - pain management per palliative

## 2023-02-24 NOTE — PROGRESS NOTE ADULT - SUBJECTIVE AND OBJECTIVE BOX
[facility] Geriatrics and Palliative Care  Amy Palacio, Palliative Care Nurse Practitioner  Contact Info: Page [phone] (Including Nights/Weekends), Message on Microsoft Teams (Amy Palacio), or leave VM at Palliative Office [phone] (non-urgent)    SUBJECTIVE AND OBJECTIVE:  Indication for Geriatrics and Palliative Care Services/INTERVAL HPI: Symptom management.  Pt seen and examined this afternoon, with family at the bedside. Pt shared that all medications are working, pain has improved. Plan to decrease PCA pump, pt in agreement. Pt learning PEG/Trach care as per nursing.      OVERNIGHT EVENTS:  > 2/21: Pt required 35 demands, 36 attempts, 0 CAB. Totaling 81.5mg IV Dilaudid within 24hrs. IV Ativan liberated to Q4 hrs ATC by primary team over the weekend.  > 2/22: Pt required 28 demands, 49 attempts, 1 2mg CAB. Totaling 68mg IV Dilaudid within 24 hrs. Methadone initiated yesterday.  > 2/23: Pt required 26 demands, 65 attempts, 1 2mg CAB. Totaling 64.75mg IV Dilaudid within 24 hrs.   > 2/24: Pt required 33 demands, 61 attempts. Totaling 71.5mg IV Dilaudid within 24 hrs.    OVERNIGHT EVENTS:    DNR on chart:Yes  Yes      Allergies    No Known Allergies    Intolerances    MEDICATIONS  (STANDING):  acetaminophen   IVPB .. 1000 milliGRAM(s) IV Intermittent once  albuterol/ipratropium for Nebulization 3 milliLiter(s) Nebulizer every 6 hours  dextrose 5% + sodium chloride 0.9%. 1000 milliLiter(s) (75 mL/Hr) IV Continuous <Continuous>  gabapentin Solution 600 milliGRAM(s) Oral three times a day  heparin   Injectable 5000 Unit(s) SubCutaneous every 8 hours  HYDROmorphone PCA (5 mG/mL) 30 milliLiter(s) PCA Continuous PCA Continuous  influenza   Vaccine 0.5 milliLiter(s) IntraMuscular once  levothyroxine 50 MICROGram(s) Oral daily  LORazepam     Tablet 1 milliGRAM(s) Oral every 6 hours  methadone   Solution 15 milliGRAM(s) Oral three times a day  nicotine - 21 mG/24Hr(s) Patch 1 Patch Transdermal daily  pantoprazole  Injectable 40 milliGRAM(s) IV Push two times a day  senna 2 Tablet(s) Oral at bedtime    MEDICATIONS  (PRN):  albuterol    90 MICROgram(s) HFA Inhaler 2 Puff(s) Inhalation every 6 hours PRN Bronchospasm  HYDROmorphone PCA (5 mG/mL) Rescue Clinician Bolus 2 milliGRAM(s) IV Push every 1 hour PRN Severe Pain (7 - 10)  hydrOXYzine hydrochloride Syrup 25 milliGRAM(s) Oral every 6 hours PRN Anxiety  naloxone Injectable 0.1 milliGRAM(s) IV Push every 3 minutes PRN For ANY of the following changes in patient status:  A. RR LESS THAN 10 breaths per minute, B. Oxygen saturation LESS THAN 90%, C. Sedation score of 6  ondansetron Injectable 4 milliGRAM(s) IV Push every 8 hours PRN Nausea and/or Vomiting  polyethylene glycol 3350 17 Gram(s) Oral daily PRN Constipation        -------------------------------------------------------------------------------------------------------  ITEMS UNCHECKED ARE NOT PRESENT    PRESENT SYMPTOMS: [ ]Unable to self-report - see [ ] CPOT [ ] PAINADS [ ] RDOS  Source if other than patient:  [ ]Family   [ ]Team     Pain:  [x ]yes [ ]no  QOL impact - long hospital stay  Location - abdomen/back              Aggravating factors - movement  Quality - sharp  Radiation - none  Timing- constant   Severity (0-10 scale): 7  Minimal acceptable level (0-10 scale)/pain goal: 4    CPOT:    https://www.sccm.org/getattachment/oya11h88-0p5b-1h2q-7b7u-5058p5906l0r/Critical-Care-Pain-Observation-Tool-(CPOT)    PAINAD Score: See PAINAD tool and score below       RDOS: See RDOS tool and score below   0 to 2  minimal or no respiratory distress   3  mild distress  4 to 6 moderate distress  >7 severe distress    Dyspnea:                           [ ]Mild [ ]Moderate [ ]Severe  Anxiety:                             [ ]Mild [X ]Moderate [ ]Severe  Fatigue:                             [ ]Mild [ ]Moderate [ ]Severe  Nausea:                             [ ]Mild [ ]Moderate [ ]Severe  Loss of appetite:              [ ]Mild [ ]Moderate [ ]Severe  Constipation:                    [ ]Mild [ ]Moderate [ ]Severe  Other Symptoms:  [X ]All other review of systems negative     Home Medications for Symptoms if present:    I Stop Reference no:     -------------------------------------------------------------------------------------------------------  PCSSQ[Palliative Care Spiritual Screening Question]   Severity (0-10):0  Score of 4 or > indicate consideration of Chaplaincy referral.  Chaplaincy Referral: [ ] yes [ ] refused [ ] following [ X] Deferred     Caregiver [place]? : [ ] yes [ ] no [ ] Deferred [X ] Declined             Social work referral [ ] Patient & Family Centered Care Referral [ ]     Anticipatory Grief present?:  [ ] yes [ ] no  [X ] Deferred                  Social work referral [ ] Chaplaincy Referral [ ]    -------------------------------------------------------------------------------------------------------  PHYSICAL EXAM:  Vital Signs Last 24 Hrs  T(C): 36.4 (24 Feb 2023 14:00), Max: 36.9 (23 Feb 2023 22:00)  T(F): 97.6 (24 Feb 2023 14:00), Max: 98.4 (23 Feb 2023 22:00)  HR: 82 (24 Feb 2023 14:00) (61 - 95)  BP: 100/61 (24 Feb 2023 14:00) (100/61 - 117/66)  BP(mean): --  RR: 18 (24 Feb 2023 14:00) (16 - 19)  SpO2: 99% (24 Feb 2023 14:00) (94% - 100%)    Parameters below as of 24 Feb 2023 14:00  Patient On (Oxygen Delivery Method): tracheostomy collar     I&O's Summary    23 Feb 2023 07:01  -  24 Feb 2023 07:00  --------------------------------------------------------  IN: 2800 mL / OUT: 725 mL / NET: 2075 mL    24 Feb 2023 07:01  -  24 Feb 2023 15:42  --------------------------------------------------------  IN: 600 mL / OUT: 0 mL / NET: 600 mL       GENERAL: Trach collar - pt communicates by writing   [ x]Cachexia  [X ]Alert  [ X]Oriented x4   [X ]Lethargic  [ ]Unarousable  [ ]Verbal  [X ]Non-Verbal    Behavioral:   [ X] Anxiety  [ ] Delirium [ ] Agitation [ ] Other    HEENT:  [ ]Normal   [ ]Dry mouth   [ X]ET Tube/Trach  [ ]Oral lesions    PULMONARY:   [ ]Clear [ ]Tachypnea  [ ]Audible excessive secretions   [ ]Rhonchi        [ ]Right [ ]Left [ ]Bilateral  [ ]Crackles        [ ]Right [ ]Left [ ]Bilateral  [ ]Wheezing     [ ]Right [ ]Left [ ]Bilateral  [ X]Diminished breath sounds [ ]right [ ]left [ ]bilateral    CARDIOVASCULAR:    [ X]Regular [ ]Irregular [ ]Tachy  [ ]Faheem [ ]Murmur [ ]Other    GASTROINTESTINAL:  [X ]Soft  [ ]Distended   [ X]+BS  [ ]Non tender [ X]Tender  [ ]Other [ X]PEG [ ]OGT/ NGT  Last BM: 2/21    GENITOURINARY:  [X ]Normal [ ] Incontinent   [ ]Oliguria/Anuria   [ ]Wall    MUSCULOSKELETAL:   [ ]Normal   [X ]Weakness  [ ]Bed/Wheelchair bound [ ]Edema    NEUROLOGIC:   [X ]No focal deficits  [ ]Cognitive impairment  [ ]Dysphagia [ ]Dysarthria [ ]Paresis [ ]Other     SKIN:   [X ]Normal  [ ]Rash  [ ]Other  [ ]Pressure ulcer(s)       Present on admission [ ]y [ ]n  -------------------------------------------------------------------------------------------------------  CRITICAL CARE:  [ ]Shock Present  [ ]Septic [ ]Cardiogenic [ ]Neurologic [ ]Hypovolemic  [ ]Vasopressors [ ]Inotropes  [ ]Respiratory failure present [ ]Mechanical Ventilation [ ]Non-invasive ventilatory support [ ]High-Flow   [ ]Acute  [ ]Chronic [ ]Hypoxic  [ ]Hypercarbic [ ]Other  [ ]Other organ failure     -------------------------------------------------------------------------------------------------------  LABS:                        7.3    12.67 )-----------( 480      ( 24 Feb 2023 07:00 )             24.5   02-24    138  |  98  |  9   ----------------------------<  85  3.4<L>   |  32<H>  |  0.46<L>    Ca    8.8      24 Feb 2023 07:00  Phos  2.8     02-24  Mg     1.60     02-24  -------------------------------------------------------------------------------------------------------  RADIOLOGY & ADDITIONAL STUDIES: Reviewed       -------------------------------------------------------------------------------------------------------  Protein Calorie Malnutrition Present: [ ]mild [ ]moderate [ ]severe [ ]underweight [ ]morbid obesity  https://www.andeal.org/vault/3430/web/files/ONC/Table_Clinical%20Characteristics%20to%20Document%20Malnutrition-White%20JV%20et%20al%202012.pdf    Height (cm): 182.9 (02-16-23 @ 06:00), 170.2 (02-10-23 @ 08:52), 182.9 (11-21-22 @ 14:45)  Weight (kg): 52.2 (02-16-23 @ 06:00), 56.2 (02-10-23 @ 08:52), 61.235 (11-21-22 @ 17:08)  BMI (kg/m2): 15.6 (02-16-23 @ 06:00), 19.4 (02-10-23 @ 08:52), 18.3 (11-21-22 @ 17:08)    Palliative Performance Status Version 2:   See PPSv2 tool and score below       [ ]PPSV2 < or = 30%  [ ]significant weight loss [ ]poor nutritional intake [ ]anasarcaPrealbumin, Serum: 10 mg/dL (02-12-23 @ 07:04)  [ ]Artificial Nutrition    Other REFERRALS:  [ ]Hospice  [ ]Child Life  [ ]Social Work  [ ]Case management [ ]Holistic Therapy     -------------------------------------------------------------------------------------------------------  Goals of Care Document:

## 2023-02-24 NOTE — PROGRESS NOTE ADULT - PROBLEM SELECTOR PLAN 1
- Increased methadone 15mg TID - Repeat EKG 2/21 QTc 454  - PCA adjusted: PCA Dilaudid 5mg/1ml: DD 1.5mg, 30 min lockout, NO continuous , 4hr 12mg. CB 2mg Q1 hrs PRN  - Bowel regimen while on opioids  - PRN Narcan

## 2023-02-24 NOTE — PROGRESS NOTE ADULT - ASSESSMENT
52 year old male w/ a PMHX of stage IV lung CA, HTN, anxiety on Xanax, chronic pain on Percocet, and substance abuse was admitted to [facility] on 2/10/23 due to difficulty swallowing. Pt has had difficulty swallowing for th past week. Pt states it has been worsening since, states he is now unable to swallow pills or food. 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 Pt also developed stridor for the past week. Pt is transferred to [facility] for Palliative Radiation. Palliative care consulted for pain management.

## 2023-02-24 NOTE — PROGRESS NOTE ADULT - PROBLEM SELECTOR PLAN 2
S/P 4 cycles of single agent palliative Keytruda/ immunotherapy - had positive response, patient did not return for follow-up. Several weeks ago returned to office weeks ago with progressive disease restarted on treatment with Keytruda AND concurrent chemotherapy carboplatin and Alimta. Had progressive disease in the neck and had seen radiation therapy in consultation  - Transferred to [facility] for palliative RT to neck  - Trach placed 2/15 for airway protection & PEG placed 2/16 for nutrition/medication administration   - Patient states that he was under assumption that he would be getting more Keytruda after palliative RT. Oncology rec out patient follow up.

## 2023-02-24 NOTE — PROGRESS NOTE ADULT - PROBLEM SELECTOR PLAN 7
continue levothyroxine LUE swelling, near site of prior IV. No evidence of abscess on exam  - check duplex to evaluate for blood clot

## 2023-02-24 NOTE — PROGRESS NOTE ADULT - SUBJECTIVE AND OBJECTIVE BOX
[facility] Division of Hospital Medicine  Tejal Oswald MD  Available via MS Teams    SUBJECTIVE / OVERNIGHT EVENTS: No acute overnight events. Pt notes LUE swelling which started overnight. Denies chest pain, SOB, abd pain, n/v/d. I brought up discharge planning with patient and answered questions about hospice care.     ADDITIONAL REVIEW OF SYSTEMS: Negative otherwise per HPI    MEDICATIONS  (STANDING):  acetaminophen   IVPB .. 1000 milliGRAM(s) IV Intermittent once  albuterol/ipratropium for Nebulization 3 milliLiter(s) Nebulizer every 6 hours  dextrose 5% + sodium chloride 0.9%. 1000 milliLiter(s) (75 mL/Hr) IV Continuous <Continuous>  gabapentin Solution 600 milliGRAM(s) Oral three times a day  heparin   Injectable 5000 Unit(s) SubCutaneous every 8 hours  HYDROmorphone PCA (5 mG/mL) 30 milliLiter(s) PCA Continuous PCA Continuous  influenza   Vaccine 0.5 milliLiter(s) IntraMuscular once  levothyroxine 50 MICROGram(s) Oral daily  LORazepam     Tablet 1 milliGRAM(s) Oral every 6 hours  methadone   Solution 10 milliGRAM(s) Oral three times a day  nicotine - 21 mG/24Hr(s) Patch 1 Patch Transdermal daily  pantoprazole  Injectable 40 milliGRAM(s) IV Push two times a day  senna 2 Tablet(s) Oral at bedtime    MEDICATIONS  (PRN):  albuterol    90 MICROgram(s) HFA Inhaler 2 Puff(s) Inhalation every 6 hours PRN Bronchospasm  HYDROmorphone PCA (5 mG/mL) Rescue Clinician Bolus 2 milliGRAM(s) IV Push every 1 hour PRN Severe Pain (7 - 10)  hydrOXYzine hydrochloride Syrup 25 milliGRAM(s) Oral every 6 hours PRN Anxiety  naloxone Injectable 0.1 milliGRAM(s) IV Push every 3 minutes PRN For ANY of the following changes in patient status:  A. RR LESS THAN 10 breaths per minute, B. Oxygen saturation LESS THAN 90%, C. Sedation score of 6  ondansetron Injectable 4 milliGRAM(s) IV Push every 8 hours PRN Nausea and/or Vomiting  polyethylene glycol 3350 17 Gram(s) Oral daily PRN Constipation      I&O's Summary    23 Feb 2023 07:01  -  24 Feb 2023 07:00  --------------------------------------------------------  IN: 2800 mL / OUT: 725 mL / NET: 2075 mL        PHYSICAL EXAM:  Vital Signs Last 24 Hrs  T(C): 36.4 (24 Feb 2023 09:41), Max: 36.9 (23 Feb 2023 22:00)  T(F): 97.6 (24 Feb 2023 09:41), Max: 98.4 (23 Feb 2023 22:00)  HR: 65 (24 Feb 2023 09:41) (61 - 95)  BP: 117/66 (24 Feb 2023 09:41) (100/61 - 117/66)  BP(mean): --  RR: 18 (24 Feb 2023 09:41) (16 - 19)  SpO2: 100% (24 Feb 2023 09:41) (94% - 100%)    Parameters below as of 24 Feb 2023 09:41  Patient On (Oxygen Delivery Method): tracheostomy collar      CONSTITUTIONAL: NAD, cachexia  EYES: PERRLA; conjunctiva and sclera clear  ENMT: + trach, C/D/I  RESPIRATORY: Normal respiratory effort; lungs are clear to auscultation bilaterally  CARDIOVASCULAR: Regular rate and rhythm, normal S1 and S2, no murmur/rub/gallop; No lower extremity edema; Peripheral pulses are 2+ bilaterally  ABDOMEN: Nontender to palpation, normoactive bowel sounds, no rebound/guarding; No hepatosplenomegaly +PEG  MUSCULOSKELETAL:  no clubbing or cyanosis of digits; L forearm swelling, mild tenderness, no fluctuance erythema  PSYCH: A+O to person, place, and time; affect appropriate; communicates via writing  NEUROLOGY: CN 2-12 are intact and symmetric; no gross sensory deficits   SKIN: No rashes    LABS:                        7.3    12.67 )-----------( 480      ( 24 Feb 2023 07:00 )             24.5     Hb Trend: 7.3<--, 8.3<--, 7.4<--, 7.1<--, 7.8<--  02-24    138  |  98  |  9   ----------------------------<  85  3.4<L>   |  32<H>  |  0.46<L>    Ca    8.8      24 Feb 2023 07:00  Phos  2.8     02-24  Mg     1.60     02-24      Creatinine Trend: 0.46<--, 0.52<--, 0.51<--, 0.43<--, 0.47<--, 0.50<--            COVID-19 PCR: NotDetec (15 Feb 2023 18:47)  COVID-19 PCR: NotDetec (26 Sep 2022 10:41)  COVID-19 PCR: NotDetec (14 Sep 2022 13:16)      RADIOLOGY & ADDITIONAL TESTS:  N/A    COORDINATION OF CARE:  Consultant Communication and Details of Discussion (where applicable): d/w Palliative care- pt now potentially amenable to hospice services

## 2023-02-24 NOTE — PROGRESS NOTE ADULT - PROBLEM SELECTOR PLAN 3
Explained to pt that Ativan Q4 hours is not sustainable/ not for its intended use- discussed  consult, pt in agreement.   Spoke to Dr. Isaiah Aguilar regarding  consult - gave phone recommendations at this time. Full consult to follow.  - Recommended increasing Gabapentin to 600mg TID   - PRN Atarax 25mg Q6hrs for moderate anxiety  (not to be given within 2 hours of Ativan)  - c/w PO Ativan 1mg Q6 (Hold for sedation)   - Pt has medical cannabis at home (gummies). Explained and recommended cannabis tincture as he has PEG tube now.     - Family provides a lot of support to patient

## 2023-02-24 NOTE — PROGRESS NOTE ADULT - PROBLEM SELECTOR PLAN 8
Dvt ppx: Lovenox QD, hypercoagulable state d/t malignancy  Pt is DNR/DNI  Pt lives alone and family are unable to care for him in their homes. Per CM pt is not a candidate for LTC through insurance. Pt considering hospice vs home if able to care for trach and PEG. PT recommending outpt PT  Discussed with cousin, Padma, on 2/24. She is concerned that d/c home is unsafe.     Cousins- OK to share information  Padma: [phone]  Haylee: [phone] continue levothyroxine

## 2023-02-24 NOTE — PROGRESS NOTE ADULT - NS ATTEND AMEND GEN_ALL_CORE FT
52 year old male w/ a PMHX of stage IV lung CA, HTN, anxiety on Xanax, chronic pain on Percocet, and substance abuse was admitted to [facility] on 2/10/23 due to difficulty swallowing. 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 Pt is transferred to [facility] for Palliative Radiation.     Case discussed with Palliative NP, Amy Palacio. Met with family at bedside. Increase methadone and titrate down dilaudid pca. Patient has medical cannabis that can help with pain and anxiety.   > Appreciate oncology recs.   > Appreciate rad/onc recs - RT to end on 2/24   > GOC: Patient wants to go home but understands that his care needs are too great for him to be alone. Appreciate care coordination assistance with disposition.       Thank you for allowing us to participate in your patient's care. We will continue to follow with you. Please page [phone] for any q's or c's.     Laura Calixto D.O.   Palliative Medicine.

## 2023-02-24 NOTE — PROGRESS NOTE ADULT - ASSESSMENT
Spoke with patients primary oncologist, Dr. Tomlin. He was diagnosed with stage IV NSCLC. He had high PDL1 and he had a dose of Keytruda and he had a very good response, but then disappeared. When he went back he had a large neck mass. He got a dose of chemo with Keytruda and then was in the hospital and ultimately transferred here and had a PEG and a trach and is now completing a course of palliative RT to the neck. Discussed hospice as his other drs have and that would be appropriate. If he is not ready for that, then suggest rehab to see if he could get stronger so that he may be a candidate for more treatment and for that he would follow up with his primary oncologist. They understand all that was discussed and were given an opportunity to ask questions.    Anemia likely an AOCD process due to concurrent medical issues including malignancy and RT. Hgb lower and would consider transfusion.    thrombocytosis is likely reactive

## 2023-02-25 NOTE — PROGRESS NOTE ADULT - PROBLEM SELECTOR PLAN 3
Multifactorial 2/2 metastatic lung cancer, recent surgery, illness  - hgb 6.7 today. transfuse 1 unit PRBC   - goal Hb >7

## 2023-02-25 NOTE — PROGRESS NOTE ADULT - ASSESSMENT
Metastatic NSCLC - completed RT for neck mass. Patient is hospice appropriate but he is not sure if he wants that. he may just go to rehab.    Anemia - due to concurrent medical issues. Hgb was lower today and he was transfused and the repeat Hgb was higher. Manage supportively.    Reactive thrombocytosis is better today.

## 2023-02-25 NOTE — PROGRESS NOTE ADULT - PROBLEM SELECTOR PLAN 4
Progressive NSCLC with disease progression in the neck causing stridor and dysphagia  - palliative radiation completed today  - per [facility] onc at [place] pt no longer candidate for DMT  - follow up [facility] recs  - pain management per palliative- on Dilaudid PCA and methadone

## 2023-02-25 NOTE — PROGRESS NOTE ADULT - SUBJECTIVE AND OBJECTIVE BOX
Patient is a 52y old  Male who presents with a chief complaint of Palliative Radiation (25 Feb 2023 13:11)    nods that he feels okay. No pain. No cough of SOB. NO N/V/D. No fever. No bleeding.     Medication:   acetaminophen   IVPB .. 1000 milliGRAM(s) IV Intermittent once  albuterol    90 MICROgram(s) HFA Inhaler 2 Puff(s) Inhalation every 6 hours PRN  albuterol/ipratropium for Nebulization 3 milliLiter(s) Nebulizer every 6 hours  dextrose 5% + sodium chloride 0.9%. 1000 milliLiter(s) IV Continuous <Continuous>  gabapentin Solution 600 milliGRAM(s) Oral three times a day  heparin   Injectable 5000 Unit(s) SubCutaneous every 8 hours  HYDROmorphone PCA (5 mG/mL) 30 milliLiter(s) PCA Continuous PCA Continuous  hydrOXYzine hydrochloride Syrup 25 milliGRAM(s) Oral every 6 hours PRN  influenza   Vaccine 0.5 milliLiter(s) IntraMuscular once  levothyroxine 50 MICROGram(s) Oral daily  LORazepam     Tablet 1 milliGRAM(s) Oral every 6 hours  methadone   Solution 15 milliGRAM(s) Oral three times a day  naloxone Injectable 0.1 milliGRAM(s) IV Push every 3 minutes PRN  nicotine - 21 mG/24Hr(s) Patch 1 Patch Transdermal daily  ondansetron Injectable 4 milliGRAM(s) IV Push every 8 hours PRN  pantoprazole  Injectable 40 milliGRAM(s) IV Push two times a day  polyethylene glycol 3350 17 Gram(s) Oral daily PRN  senna 2 Tablet(s) Oral at bedtime      Physical exam    T(C): 36.7 (02-25-23 @ 22:15), Max: 36.9 (02-25-23 @ 09:45)  HR: 80 (02-25-23 @ 22:15) (75 - 87)  BP: 116/74 (02-25-23 @ 17:22) (102/62 - 121/76)  RR: 17 (02-25-23 @ 22:15) (17 - 19)  SpO2: 100% (02-25-23 @ 22:15) (98% - 100%)  Wt(kg): --    alert NAD  EOMI anicteric sclera  Trach   Cv s1 S2 RRR  Lungs clear B/L  abd soft NT ND +BS  No LE edema or tenderness    Labs                        7.8    12.85 )-----------( 364      ( 25 Feb 2023 18:52 )             24.3       02-25    136  |  98  |  8   ----------------------------<  95  4.0   |  29  |  0.46<L>    Ca    8.4      25 Feb 2023 06:35  Phos  3.0     02-25  Mg     1.50     02-25            [phone]

## 2023-02-25 NOTE — PROGRESS NOTE ADULT - PROBLEM SELECTOR PLAN 6
LUE swelling, near site of prior IV. No evidence of abscess on exam  - check duplex to evaluate for blood clot

## 2023-02-25 NOTE — PROGRESS NOTE ADULT - ASSESSMENT
52M with metastatic lung cancer, HTN, anxiety, chronic pain on percocet, prior substance abuse who was admitted to [facility] on 2/10/23 due to difficulty swallowing. 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 Pt is s/p trach and PEG and is undergoing palliative radiation

## 2023-02-25 NOTE — PROGRESS NOTE ADULT - PROBLEM SELECTOR PLAN 1
Dysphagia 2/2 large mass in the retrotracheal region with displacement of the trachea  anteriorly, compression of the pharynx and proximal esophagus  - keep patient NPO with PEG feeds pending speech and swallow follow up  - completed palliative radiation  - keep trach of same size and one size down at bedside at all times  - continue pain control per palliative

## 2023-02-25 NOTE — PROGRESS NOTE ADULT - PROBLEM SELECTOR PLAN 8
Dvt ppx: Lovenox QD, hypercoagulable state d/t malignancy  Pt is DNR/DNI  Pt lives alone and family are unable to care for him in their homes. Per CM pt is not a candidate for LTC through insurance. Pt considering hospice vs home if able to care for trach and PEG. PT recommending outpt PT      Cousins- OK to share information  Padma: [phone]  Haylee: [phone]

## 2023-02-25 NOTE — PROGRESS NOTE ADULT - SUBJECTIVE AND OBJECTIVE BOX
[facility] Division of Hospital Medicine  Marquis Simpson MD  Pager [phone]      Patient is a 52y old  Male who presents with a chief complaint of Palliative Radiation (24 Feb 2023 15:35)      SUBJECTIVE / OVERNIGHT EVENTS:    pt offers no new complaints. pain controlled. hgb low today received 1 unit PRBC    ADDITIONAL REVIEW OF SYSTEMS:    RESPIRATORY: No cough, wheezing, chills or hemoptysis; No shortness of breath  CARDIOVASCULAR: No chest pain, palpitations, dizziness, or leg swelling  GASTROINTESTINAL: No abdominal or epigastric pain. No nausea, vomiting, or hematemesis; No diarrhea or constipation. No melena or hematochezia.      MEDICATIONS  (STANDING):  acetaminophen   IVPB .. 1000 milliGRAM(s) IV Intermittent once  albuterol/ipratropium for Nebulization 3 milliLiter(s) Nebulizer every 6 hours  dextrose 5% + sodium chloride 0.9%. 1000 milliLiter(s) (75 mL/Hr) IV Continuous <Continuous>  gabapentin Solution 600 milliGRAM(s) Oral three times a day  heparin   Injectable 5000 Unit(s) SubCutaneous every 8 hours  HYDROmorphone PCA (5 mG/mL) 30 milliLiter(s) PCA Continuous PCA Continuous  influenza   Vaccine 0.5 milliLiter(s) IntraMuscular once  levothyroxine 50 MICROGram(s) Oral daily  LORazepam     Tablet 1 milliGRAM(s) Oral every 6 hours  methadone   Solution 15 milliGRAM(s) Oral three times a day  nicotine - 21 mG/24Hr(s) Patch 1 Patch Transdermal daily  pantoprazole  Injectable 40 milliGRAM(s) IV Push two times a day  senna 2 Tablet(s) Oral at bedtime    MEDICATIONS  (PRN):  albuterol    90 MICROgram(s) HFA Inhaler 2 Puff(s) Inhalation every 6 hours PRN Bronchospasm  hydrOXYzine hydrochloride Syrup 25 milliGRAM(s) Oral every 6 hours PRN Anxiety  naloxone Injectable 0.1 milliGRAM(s) IV Push every 3 minutes PRN For ANY of the following changes in patient status:  A. RR LESS THAN 10 breaths per minute, B. Oxygen saturation LESS THAN 90%, C. Sedation score of 6  ondansetron Injectable 4 milliGRAM(s) IV Push every 8 hours PRN Nausea and/or Vomiting  polyethylene glycol 3350 17 Gram(s) Oral daily PRN Constipation      CAPILLARY BLOOD GLUCOSE        I&O's Summary    24 Feb 2023 07:01  -  25 Feb 2023 07:00  --------------------------------------------------------  IN: 2980 mL / OUT: 0 mL / NET: 2980 mL    25 Feb 2023 07:01  -  25 Feb 2023 13:14  --------------------------------------------------------  IN: 270 mL / OUT: 0 mL / NET: 270 mL        PHYSICAL EXAM:  Vital Signs Last 24 Hrs  T(C): 36.9 (25 Feb 2023 09:45), Max: 36.9 (25 Feb 2023 09:45)  T(F): 98.4 (25 Feb 2023 09:45), Max: 98.4 (25 Feb 2023 09:45)  HR: 76 (25 Feb 2023 09:45) (73 - 90)  BP: 102/62 (25 Feb 2023 09:45) (100/61 - 136/76)  BP(mean): --  RR: 18 (25 Feb 2023 09:45) (18 - 19)  SpO2: 100% (25 Feb 2023 09:45) (98% - 100%)    Parameters below as of 25 Feb 2023 09:45  Patient On (Oxygen Delivery Method): tracheostomy collar      CONSTITUTIONAL: NAD, cachexia  EYES: PERRLA; conjunctiva and sclera clear  ENMT: + trach, C/D/I  RESPIRATORY: Normal respiratory effort; lungs are clear to auscultation bilaterally  CARDIOVASCULAR: Regular rate and rhythm, normal S1 and S2, no murmur/rub/gallop; No lower extremity edema; Peripheral pulses are 2+ bilaterally  ABDOMEN: Nontender to palpation, normoactive bowel sounds, no rebound/guarding; No hepatosplenomegaly +PEG  MUSCULOSKELETAL:  no clubbing or cyanosis of digits; L forearm swelling, mild tenderness, no fluctuance erythema  PSYCH: A+O to person, place, and time; affect appropriate; communicates via writing  NEUROLOGY: CN 2-12 are intact and symmetric; no gross sensory deficits   SKIN: No rashes    LABS:                        6.9    11.61 )-----------( 383      ( 25 Feb 2023 06:35 )             22.7     02-25    136  |  98  |  8   ----------------------------<  95  4.0   |  29  |  0.46<L>    Ca    8.4      25 Feb 2023 06:35  Phos  3.0     02-25  Mg     1.50     02-25                  RADIOLOGY & ADDITIONAL TESTS:  Results Reviewed:   Imaging Personally Reviewed:  Electrocardiogram Personally Reviewed:    COORDINATION OF CARE:  Care Discussed with Consultants/Other Providers [Y/N]:  Prior or Outpatient Records Reviewed [Y/N]:

## 2023-02-26 NOTE — PROGRESS NOTE ADULT - ASSESSMENT
Stage IV NSCLC - rehab vs hospice. Poor PS and he likely will mot be a candidate for further chemo.  S/P palliative RT for neck mass.    Anemia - due to concurrent medical issues. He was transfused the other day and Hgb today is higher and adequate. Monitor.

## 2023-02-26 NOTE — PROGRESS NOTE ADULT - PROBLEM SELECTOR PLAN 8
Dvt ppx: Lovenox QD, hypercoagulable state d/t malignancy  Pt is DNR/DNI  Pt lives alone and family are unable to care for him in their homes. Per CM pt is not a candidate for LTC through insurance. Pt considering hospice vs home if able to care for trach and PEG. PT recommending outpt PT      Cousins- OK to share information  Padma: [phone]  Haylee: [phone]

## 2023-02-26 NOTE — CHART NOTE - NSCHARTNOTEFT_GEN_A_CORE
HGB 6.6 today, sp 1 unit prbc yesterday.   BP 98/58    Will transfuse one unit as discussed with Dr. Calvin Romero, PA-C  Department of Medicine  Pager [phone]

## 2023-02-26 NOTE — PROGRESS NOTE ADULT - PROBLEM SELECTOR PLAN 3
Multifactorial 2/2 metastatic lung cancer, recent surgery, illness. no e/o acute bleed   - hgb 6.6 today likely dilutional- received 1 unit PRBC on 2/25, 2/26. post transfusion hgb 8.7   - cont to monitor. goal Hb >7

## 2023-02-26 NOTE — PROGRESS NOTE ADULT - PROBLEM SELECTOR PLAN 4
Progressive NSCLC with disease progression in the neck causing stridor and dysphagia  - palliative radiation completed today  - per [facility] onc at [place] pt no longer candidate for DMT  - follow up [facility] recs  - pain management per palliative- on Dilaudid PCA and methadone

## 2023-02-26 NOTE — PROGRESS NOTE ADULT - SUBJECTIVE AND OBJECTIVE BOX
Patient is a 52y old  Male who presents with a chief complaint of Palliative Radiation (26 Feb 2023 13:22)    Nods that he feels okay. No pain.     Medication:   acetaminophen   IVPB .. 1000 milliGRAM(s) IV Intermittent once  albuterol    90 MICROgram(s) HFA Inhaler 2 Puff(s) Inhalation every 6 hours PRN  albuterol/ipratropium for Nebulization 3 milliLiter(s) Nebulizer every 6 hours  dextrose 5% + sodium chloride 0.9%. 1000 milliLiter(s) IV Continuous <Continuous>  gabapentin Solution 600 milliGRAM(s) Oral three times a day  HYDROmorphone PCA (5 mG/mL) 30 milliLiter(s) PCA Continuous PCA Continuous  hydrOXYzine hydrochloride Syrup 25 milliGRAM(s) Oral every 6 hours PRN  influenza   Vaccine 0.5 milliLiter(s) IntraMuscular once  levothyroxine 50 MICROGram(s) Oral daily  LORazepam     Tablet 1 milliGRAM(s) Oral every 6 hours  methadone   Solution 15 milliGRAM(s) Oral three times a day  naloxone Injectable 0.1 milliGRAM(s) IV Push every 3 minutes PRN  nicotine - 21 mG/24Hr(s) Patch 1 Patch Transdermal daily  ondansetron Injectable 4 milliGRAM(s) IV Push every 8 hours PRN  pantoprazole  Injectable 40 milliGRAM(s) IV Push two times a day  polyethylene glycol 3350 17 Gram(s) Oral daily PRN  senna 2 Tablet(s) Oral at bedtime      Physical exam    T(C): 36.9 (02-26-23 @ 11:34), Max: 36.9 (02-26-23 @ 11:34)  HR: 75 (02-26-23 @ 11:34) (73 - 83)  BP: 100/63 (02-26-23 @ 11:34) (90/50 - 116/74)  RR: 17 (02-26-23 @ 11:34) (17 - 18)  SpO2: 100% (02-26-23 @ 11:34) (98% - 100%)  Wt(kg): --    alert NAD  EOMI anicteric sclera  Trach  Cv s1 S2 RRR  Lungs clear B/L    Labs                        8.6    11.81 )-----------( 309      ( 26 Feb 2023 12:04 )             26.8       02-26    134<L>  |  98  |  8   ----------------------------<  96  3.9   |  30  |  0.45<L>    Ca    8.1<L>      26 Feb 2023 05:58  Phos  3.2     02-26  Mg     1.60     02-26            [phone]

## 2023-02-26 NOTE — PROGRESS NOTE ADULT - SUBJECTIVE AND OBJECTIVE BOX
[facility] Division of Hospital Medicine  Marquis Simpson MD  Pager [phone]      Patient is a 52y old  Male who presents with a chief complaint of Palliative Radiation (25 Feb 2023 22:22)      SUBJECTIVE / OVERNIGHT EVENTS:    pt was found to have hgb 6.6 this am. Denies blood urine/stool. no abd/back pain. received 1 unit PRBC     ADDITIONAL REVIEW OF SYSTEMS:    RESPIRATORY: No cough, wheezing, chills or hemoptysis; No shortness of breath  CARDIOVASCULAR: No chest pain, palpitations, dizziness, or leg swelling  GASTROINTESTINAL: No abdominal or epigastric pain. No nausea, vomiting, or hematemesis; No diarrhea or constipation. No melena or hematochezia.      MEDICATIONS  (STANDING):  acetaminophen   IVPB .. 1000 milliGRAM(s) IV Intermittent once  albuterol/ipratropium for Nebulization 3 milliLiter(s) Nebulizer every 6 hours  dextrose 5% + sodium chloride 0.9%. 1000 milliLiter(s) (75 mL/Hr) IV Continuous <Continuous>  gabapentin Solution 600 milliGRAM(s) Oral three times a day  HYDROmorphone PCA (5 mG/mL) 30 milliLiter(s) PCA Continuous PCA Continuous  influenza   Vaccine 0.5 milliLiter(s) IntraMuscular once  levothyroxine 50 MICROGram(s) Oral daily  LORazepam     Tablet 1 milliGRAM(s) Oral every 6 hours  methadone   Solution 15 milliGRAM(s) Oral three times a day  nicotine - 21 mG/24Hr(s) Patch 1 Patch Transdermal daily  pantoprazole  Injectable 40 milliGRAM(s) IV Push two times a day  senna 2 Tablet(s) Oral at bedtime    MEDICATIONS  (PRN):  albuterol    90 MICROgram(s) HFA Inhaler 2 Puff(s) Inhalation every 6 hours PRN Bronchospasm  hydrOXYzine hydrochloride Syrup 25 milliGRAM(s) Oral every 6 hours PRN Anxiety  naloxone Injectable 0.1 milliGRAM(s) IV Push every 3 minutes PRN For ANY of the following changes in patient status:  A. RR LESS THAN 10 breaths per minute, B. Oxygen saturation LESS THAN 90%, C. Sedation score of 6  ondansetron Injectable 4 milliGRAM(s) IV Push every 8 hours PRN Nausea and/or Vomiting  polyethylene glycol 3350 17 Gram(s) Oral daily PRN Constipation      CAPILLARY BLOOD GLUCOSE        I&O's Summary    25 Feb 2023 07:01  -  26 Feb 2023 07:00  --------------------------------------------------------  IN: 2567 mL / OUT: 1450 mL / NET: 1117 mL    26 Feb 2023 07:01  -  26 Feb 2023 13:23  --------------------------------------------------------  IN: 690 mL / OUT: 0 mL / NET: 690 mL        PHYSICAL EXAM:  Vital Signs Last 24 Hrs  T(C): 36.9 (26 Feb 2023 11:34), Max: 36.9 (26 Feb 2023 11:34)  T(F): 98.5 (26 Feb 2023 11:34), Max: 98.5 (26 Feb 2023 11:34)  HR: 75 (26 Feb 2023 11:34) (73 - 83)  BP: 100/63 (26 Feb 2023 11:34) (90/50 - 116/74)  BP(mean): --  RR: 17 (26 Feb 2023 11:34) (17 - 18)  SpO2: 100% (26 Feb 2023 11:34) (98% - 100%)    Parameters below as of 26 Feb 2023 11:34  Patient On (Oxygen Delivery Method): tracheostomy collar        CONSTITUTIONAL: NAD, cachexia  EYES: PERRLA; conjunctiva and sclera clear  ENMT: + trach, C/D/I  RESPIRATORY: Normal respiratory effort; lungs are clear to auscultation bilaterally  CARDIOVASCULAR: Regular rate and rhythm, normal S1 and S2, no murmur/rub/gallop; No lower extremity edema; Peripheral pulses are 2+ bilaterally  ABDOMEN: Nontender to palpation, normoactive bowel sounds, no rebound/guarding; No hepatosplenomegaly +PEG  MUSCULOSKELETAL:  no clubbing or cyanosis of digits; L forearm swelling, mild tenderness, no fluctuance erythema  PSYCH: A+O to person, place, and time; affect appropriate; communicates via writing  NEUROLOGY: CN 2-12 are intact and symmetric; no gross sensory deficits   SKIN: No rashes    LABS:                        8.6    11.81 )-----------( 309      ( 26 Feb 2023 12:04 )             26.8     02-26    134<L>  |  98  |  8   ----------------------------<  96  3.9   |  30  |  0.45<L>    Ca    8.1<L>      26 Feb 2023 05:58  Phos  3.2     02-26  Mg     1.60     02-26                  RADIOLOGY & ADDITIONAL TESTS:  Results Reviewed:   Imaging Personally Reviewed:  Electrocardiogram Personally Reviewed:    COORDINATION OF CARE:  Care Discussed with Consultants/Other Providers [Y/N]:  Prior or Outpatient Records Reviewed [Y/N]:

## 2023-02-26 NOTE — PROGRESS NOTE ADULT - ASSESSMENT
52M with metastatic lung cancer, HTN, anxiety, chronic pain on percocet, prior substance abuse who was admitted to [facility] on 2/10/23 due to difficulty swallowing. 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 Pt is s/p trach and PEG and is undergoing palliative radiation

## 2023-02-27 NOTE — PROGRESS NOTE ADULT - PROBLEM SELECTOR PLAN 5
Was previously on Suboxone - pt self discontinued 3-4 weeks ago. Was previously on Suboxone - pt self discontinued 3-4 weeks ago.  Per I-stop he was receiving suboxone from Dr. Ospina.

## 2023-02-27 NOTE — PROGRESS NOTE ADULT - PROBLEM SELECTOR PLAN 8
Dvt ppx: Lovenox QD, hypercoagulable state d/t malignancy  Pt is DNR/DNI  Pt lives alone and family are unable to care for him in their homes. Per CM pt is not a candidate for LTC through insurance. Pt considering hospice vs home if able to care for trach and PEG. PT recommending outpt PT.  Family and patient are now considering inpatient hospice. Referral made. Need clarify regarding whether they are still wishing to pursue chemo/treatment options if offered by primary oncologist.    Cousins- OK to share information  Padma: [phone]  Haylee: [phone]

## 2023-02-27 NOTE — PROGRESS NOTE ADULT - SUBJECTIVE AND OBJECTIVE BOX
[facility] Division of [facility] Medicine  Miki NjDO  Available via MS Teams  In house pager [phone]    SUBJECTIVE / OVERNIGHT EVENTS:  No acute events overnight.  Patient expressing to myself and to palliative care attending (also present at bedside) significant anxiety.  Palliative care attending discussed changing IV dilaudid to per PEG solution, patient agreeable with the plan.  Patient's cousins were present shortly thereafter - discussed his current plans. Reached out to patient's oncologist outpatient regarding future plans, particularly with Keytruda. Awaiting response.  Later informed by PA that patient and family are asking for a referral to inpatient hospice.    ADDITIONAL REVIEW OF SYSTEMS:    MEDICATIONS  (STANDING):  acetaminophen   IVPB .. 1000 milliGRAM(s) IV Intermittent once  albuterol/ipratropium for Nebulization 3 milliLiter(s) Nebulizer every 6 hours  dextrose 5% + sodium chloride 0.9%. 1000 milliLiter(s) (75 mL/Hr) IV Continuous <Continuous>  gabapentin Solution 600 milliGRAM(s) Oral three times a day  heparin   Injectable 5000 Unit(s) SubCutaneous every 8 hours  influenza   Vaccine 0.5 milliLiter(s) IntraMuscular once  levothyroxine 50 MICROGram(s) Oral daily  LORazepam     Tablet 1 milliGRAM(s) Oral every 6 hours  methadone   Solution 15 milliGRAM(s) Oral three times a day  nicotine - 21 mG/24Hr(s) Patch 1 Patch Transdermal daily  pantoprazole  Injectable 40 milliGRAM(s) IV Push two times a day  senna 2 Tablet(s) Oral at bedtime    MEDICATIONS  (PRN):  albuterol    90 MICROgram(s) HFA Inhaler 2 Puff(s) Inhalation every 6 hours PRN Bronchospasm  HYDROmorphone   Tablet 4 milliGRAM(s) Oral every 4 hours PRN Moderate Pain (4 - 6)  HYDROmorphone   Tablet 6 milliGRAM(s) Oral every 4 hours PRN Severe Pain (7 - 10)  hydrOXYzine hydrochloride Syrup 25 milliGRAM(s) Oral every 6 hours PRN Anxiety  naloxone Injectable 0.1 milliGRAM(s) IV Push every 3 minutes PRN For ANY of the following changes in patient status:  A. RR LESS THAN 10 breaths per minute, B. Oxygen saturation LESS THAN 90%, C. Sedation score of 6  ondansetron Injectable 4 milliGRAM(s) IV Push every 8 hours PRN Nausea and/or Vomiting  polyethylene glycol 3350 17 Gram(s) Oral daily PRN Constipation      I&O's Summary    26 Feb 2023 07:01  -  27 Feb 2023 07:00  --------------------------------------------------------  IN: 3180 mL / OUT: 1400 mL / NET: 1780 mL        PHYSICAL EXAM:  Vital Signs Last 24 Hrs  T(C): 36.6 (27 Feb 2023 09:03), Max: 37.1 (26 Feb 2023 21:30)  T(F): 97.8 (27 Feb 2023 09:03), Max: 98.8 (26 Feb 2023 21:30)  HR: 74 (27 Feb 2023 09:03) (65 - 88)  BP: 89/50 (27 Feb 2023 09:03) (89/50 - 122/83)  BP(mean): --  RR: 19 (27 Feb 2023 09:03) (19 - 20)  SpO2: 98% (27 Feb 2023 09:03) (98% - 100%)    Parameters below as of 27 Feb 2023 09:03  Patient On (Oxygen Delivery Method): room air      CONSTITUTIONAL: NAD, well-developed, well-groomed, comfortable  EYES: PERRLA; conjunctiva and sclera clear  ENMT: Moist oral mucosa, no pharyngeal injection or exudates; normal dentition; trach in place with overlying trach collar  NECK: Supple, no palpable masses; no thyromegaly  RESPIRATORY: Normal respiratory effort; lungs are clear to auscultation bilaterally  CARDIOVASCULAR: Regular rate and rhythm, normal S1 and S2, no murmur/rub/gallop; No lower extremity edema; Peripheral pulses are 2+ bilaterally  ABDOMEN: Nontender to palpation, normoactive bowel sounds, no rebound/guarding; No hepatosplenomegaly; PEG in place  MUSCULOSKELETAL:  no clubbing or cyanosis of digits; no joint swelling or tenderness to palpation  PSYCH: A+O to person, place, and time; affect appropriate  NEUROLOGY: CN 2-12 are intact and symmetric; no gross sensory deficits   SKIN: No rashes; no palpable lesions    LABS:                        8.0    7.70  )-----------( 327      ( 27 Feb 2023 08:00 )             25.6     02-27    136  |  97<L>  |  6<L>  ----------------------------<  82  3.7   |  31  |  0.47<L>    Ca    8.5      27 Feb 2023 08:00  Phos  3.2     02-27  Mg     1.50     02-27                COVID-19 PCR: NotDetec (15 Feb 2023 18:47)  COVID-19 PCR: NotDetec (26 Sep 2022 10:41)  COVID-19 PCR: NotDetec (14 Sep 2022 13:16)      COMMUNICATION:  Care Discussed with Consultants/Other Providers and Details of Discussion: d/w medicine PA, palliative care attending and   Discussions with Patient/Family: d/w cousins at bedside

## 2023-02-27 NOTE — PROGRESS NOTE ADULT - SUBJECTIVE AND OBJECTIVE BOX
ENT ISSUE: Trach dislodgment     HPI: Pt seen and examined at bedside. 6cuffless tracheostomy tube noted to be completely dislodged from stoma. Tracheostomy tube was easily placed back into trachea and secure with posey trach ties . Placement confirmed with suction return and suction passing easily through tracheostomy tube.          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 1000 milliGRAM(s) IV Intermittent once  albuterol/ipratropium for Nebulization 3 milliLiter(s) Nebulizer every 6 hours  dextrose 5% + sodium chloride 0.9%. 1000 milliLiter(s) (75 mL/Hr) IV Continuous <Continuous>  gabapentin Solution 600 milliGRAM(s) Oral three times a day  heparin   Injectable 5000 Unit(s) SubCutaneous every 8 hours  influenza   Vaccine 0.5 milliLiter(s) IntraMuscular once  levothyroxine 50 MICROGram(s) Oral daily  LORazepam     Tablet 1 milliGRAM(s) Oral <User Schedule>  methadone   Solution 15 milliGRAM(s) Oral three times a day  nicotine - 21 mG/24Hr(s) Patch 1 Patch Transdermal daily  pantoprazole  Injectable 40 milliGRAM(s) IV Push two times a day  QUEtiapine 25 milliGRAM(s) Oral at bedtime  senna 2 Tablet(s) Oral at bedtime    MEDICATIONS  (PRN):  albuterol    90 MICROgram(s) HFA Inhaler 2 Puff(s) Inhalation every 6 hours PRN Bronchospasm  HYDROmorphone   Tablet 4 milliGRAM(s) Oral every 4 hours PRN Moderate Pain (4 - 6)  HYDROmorphone   Tablet 6 milliGRAM(s) Oral every 4 hours PRN Severe Pain (7 - 10)  hydrOXYzine hydrochloride Syrup 25 milliGRAM(s) Oral every 6 hours PRN Anxiety  naloxone Injectable 0.1 milliGRAM(s) IV Push every 3 minutes PRN For ANY of the following changes in patient status:  A. RR LESS THAN 10 breaths per minute, B. Oxygen saturation LESS THAN 90%, C. Sedation score of 6  ondansetron Injectable 4 milliGRAM(s) IV Push every 8 hours PRN Nausea and/or Vomiting  polyethylene glycol 3350 17 Gram(s) Oral daily PRN Constipation                  Vital Signs Last 24 Hrs  T(C): 36.6 (27 Feb 2023 18:00), Max: 37.1 (26 Feb 2023 21:30)  T(F): 97.9 (27 Feb 2023 18:00), Max: 98.8 (26 Feb 2023 21:30)  HR: 80 (27 Feb 2023 18:00) (65 - 88)  BP: 95/54 (27 Feb 2023 18:00) (89/50 - 115/75)  BP(mean): --  RR: 18 (27 Feb 2023 18:00) (18 - 19)  SpO2: 100% (27 Feb 2023 18:00) (98% - 100%)    Parameters below as of 27 Feb 2023 18:00  Patient On (Oxygen Delivery Method): tracheostomy collar                              8.0    7.70  )-----------( 327      ( 27 Feb 2023 08:00 )             25.6    02-27    136  |  97<L>  |  6<L>  ----------------------------<  82  3.7   |  31  |  0.47<L>    Ca    8.5      27 Feb 2023 08:00  Phos  3.2     02-27  Mg     1.50     02-27         PHYSICAL EXAM:  Gen: NAD  Skin: No rashes, bruises, or lesions  Head: Normocephalic, Atraumatic  Face: no edema, erythema, or fluctuance.   Eyes: no scleral injection   Nose: Nares bilaterally patent, no discharge   Neck: 6uncuffed tracheostomy tube inplace secured with posey trach ties. confirmed with suction return   Lymphatic: No lymphadenopathy

## 2023-02-27 NOTE — BH CONSULTATION LIAISON ASSESSMENT NOTE - HPI (INCLUDE ILLNESS QUALITY, SEVERITY, DURATION, TIMING, CONTEXT, MODIFYING FACTORS, ASSOCIATED SIGNS AND SYMPTOMS)
52M with metastatic lung cancer, HTN, anxiety, chronic pain on percocet, prior substance abuse who was admitted to [facility] on 2/10/23 due to difficulty swallowing. 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 Pt is s/p trach and PEG and is undergoing palliative radiation. Psych c/s for anxiety.    Patient calm but gets tearful when discussing overall goals, admits to anxiety, poor sleep, intermittent depressed mood though is happy to have family and friends visit no anhedonia, misses his cat Chuckles, discussed mortality and things that are meaningful to him the "time I have left". Openly talks about hospice, its meaning, and his intent to focus on his QOL. No acute SI/Hi noted. Worries about financial matters and addling his family with his "affairs" (car payments, rent, etc). No AH/VH delusions or paranoia. Feels he has good support because he is a "loveable ken". Had done well on seroquel 50mg before. Primary concern is pain. Says that he wants pain better controlled, particularly in AM.     Discussed risks/benefits of ativan, seroquel, gabapentin, atarax at length with patient and family at bedside. Patient amenable to hospice, family amenable to this too. Primary concern is pain/comfort.

## 2023-02-27 NOTE — PROGRESS NOTE ADULT - ASSESSMENT
52 year old male Stage IV lung cancer s/p chemo w/ b/l thyroid mass, p/w stridor 2/14, s/p trach 2/14. Tracheostomy tube 6UN dislodgement 2/27 and replaced at bedside and secured with posey trach ties and suction cath passed easily with + return to confirm placement

## 2023-02-27 NOTE — BH CONSULTATION LIAISON ASSESSMENT NOTE - MSE UNSTRUCTURED FT
Mental Status Exam:  Abdelrahman: well groomed, fair hygiene     Behavior: calm, cooperative, no psychomotor retardation/agitation  Motor: no tremors, EPS, or rigidity  Gait: did not assess, pt in bed  Speech: trache, no P/M valve   Mood: "okay"  Affect: euthymic, congruent  Thought process: clear, goal directed   Thought Content: denies paranoia, delusions   Perception: denies AH/VH  SI: denies  HI: denies  Insight: fair  Judgment: fair    Cognitive Exam:  Orientation: AOx3  Recall: intact  Attention: intact  Abstraction: intact

## 2023-02-27 NOTE — BH CONSULTATION LIAISON ASSESSMENT NOTE - NSBHCHARTREVIEWVS_PSY_A_CORE FT
Vital Signs Last 24 Hrs  T(C): 36.6 (27 Feb 2023 09:03), Max: 37.1 (26 Feb 2023 21:30)  T(F): 97.8 (27 Feb 2023 09:03), Max: 98.8 (26 Feb 2023 21:30)  HR: 79 (27 Feb 2023 15:49) (65 - 88)  BP: 89/50 (27 Feb 2023 09:03) (89/50 - 122/83)  BP(mean): --  RR: 19 (27 Feb 2023 09:03) (19 - 20)  SpO2: 98% (27 Feb 2023 15:49) (98% - 100%)    Parameters below as of 27 Feb 2023 09:03  Patient On (Oxygen Delivery Method): room air

## 2023-02-27 NOTE — PROGRESS NOTE ADULT - SUBJECTIVE AND OBJECTIVE BOX
[facility]iatric[facility] and Palliative Care  Laura Calixto, Palliative Care Attending  Contact Info: Page [phone] (including Nights/Weekends), message on Microsoft Teams (Laura Calixto), or leave  at Palliative Office [phone] (non-urgent)     SUBJECTIVE AND OBJECTIVE: Patient seen this AM with medicine attending. Pt reports anxiety and feeling like his "skin is crawling", requesting increase in ativan. Encouraged him to ask for PRN atarax to help with anxiety. Again explained that the Dilaudid is not for his anxiety. He indicated pain was better controlled. He is aware that dilaudid PCA will be removed today and encouraged him to take PO dilaudid via peg.     Indication for Geriatrics and Palliative Care Services/INTERVAL HPI: sx management     OVERNIGHT EVENTS:  > 2/21: Pt required 35 demands, 36 attempts, 0 CAB. Totaling 81.5mg IV Dilaudid within 24hrs. IV Ativan liberated to Q4 hrs ATC by primary team over the weekend.  > 2/22: Pt required 28 demands, 49 attempts, 1 2mg CAB. Totaling 68mg IV Dilaudid within 24 hrs. Methadone initiated yesterday.  > 2/23: Pt required 26 demands, 65 attempts, 1 2mg CAB. Totaling 64.75mg IV Dilaudid within 24 hrs.   > 2/24: Pt required 33 demands, 61 attempts. Totaling 71.5mg IV Dilaudid within 24 hrs.  > 2/27: Pt required 28 demands, 114 attempts, totaling 42mg IV dilaudid within 24 hours, atarax x1. Of note, he missed 4pm dose of methadone.     DNR on chart:Yes  Yes      Allergies    No Known Allergies    Intolerances    MEDICATIONS  (STANDING):  acetaminophen   IVPB .. 1000 milliGRAM(s) IV Intermittent once  albuterol/ipratropium for Nebulization 3 milliLiter(s) Nebulizer every 6 hours  dextrose 5% + sodium chloride 0.9%. 1000 milliLiter(s) (75 mL/Hr) IV Continuous <Continuous>  gabapentin Solution 600 milliGRAM(s) Oral three times a day  heparin   Injectable 5000 Unit(s) SubCutaneous every 8 hours  influenza   Vaccine 0.5 milliLiter(s) IntraMuscular once  levothyroxine 50 MICROGram(s) Oral daily  LORazepam     Tablet 1 milliGRAM(s) Oral every 6 hours  methadone   Solution 15 milliGRAM(s) Oral three times a day  nicotine - 21 mG/24Hr(s) Patch 1 Patch Transdermal daily  pantoprazole  Injectable 40 milliGRAM(s) IV Push two times a day  senna 2 Tablet(s) Oral at bedtime    MEDICATIONS  (PRN):  albuterol    90 MICROgram(s) HFA Inhaler 2 Puff(s) Inhalation every 6 hours PRN Bronchospasm  HYDROmorphone   Tablet 4 milliGRAM(s) Oral every 4 hours PRN Moderate Pain (4 - 6)  HYDROmorphone   Tablet 6 milliGRAM(s) Oral every 4 hours PRN Severe Pain (7 - 10)  hydrOXYzine hydrochloride Syrup 25 milliGRAM(s) Oral every 6 hours PRN Anxiety  naloxone Injectable 0.1 milliGRAM(s) IV Push every 3 minutes PRN For ANY of the following changes in patient status:  A. RR LESS THAN 10 breaths per minute, B. Oxygen saturation LESS THAN 90%, C. Sedation score of 6  ondansetron Injectable 4 milliGRAM(s) IV Push every 8 hours PRN Nausea and/or Vomiting  polyethylene glycol 3350 17 Gram(s) Oral daily PRN Constipation      ITEMS UNCHECKED ARE NOT PRESENT    PRESENT SYMPTOMS: [ ]Unable to self-report - see [ ] CPOT [ ] PAINADS [ ] RDOS  Source if other than patient:  [ ]Family   [ ]Team     Pain:  [x ]yes- improved  [ ]no  QOL impact - long hospital stay  Location - abdomen/back              Aggravating factors - movement  Quality - sharp  Radiation - none  Timing- constant   Severity (0-10 scale): 0  Minimal acceptable level (0-10 scale)/pain goal: 0    CPOT:    https://www.[facility].org/getattachment/[email or web address]/Critical-Care-Pain-Observation-Tool-(CPOT)    Dyspnea:                           [ ]Mild [ ]Moderate [ ]Severe  Anxiety:                             [ ]Mild [ ]Moderate [ x]Severe  Fatigue:                             [ ]Mild [ ]Moderate [ ]Severe  Nausea:                             [ ]Mild [ ]Moderate [ ]Severe  Loss of appetite:              [ ]Mild [ ]Moderate [ ]Severe  Constipation:                    [ ]Mild [ ]Moderate [ ]Severe  Other Symptoms:  [x ]All other review of systems negative     PCSSQ[Palliative Care Spiritual Screening Question]   Severity (0-10):  Score of 4 or > indicate consideration of Chaplaincy referral.  Chaplaincy Referral: [ ] yes [ ] refused [ ] following [x ] deferred    Caregiver [place]? : [ ] yes [ ] no [ ] Deferred [x ] Declined             Social work referral [ ] Patient & Family Centered Care Referral [ ]  Anticipatory Grief present?:  [ ] yes [ ] no  [x ] Deferred                  Social work referral [ ] Patient & Family Centered Care Referral [ ]      PHYSICAL EXAM:  Vital Signs Last 24 Hrs  T(C): 36.6 (27 Feb 2023 09:03), Max: 37.1 (26 Feb 2023 21:30)  T(F): 97.8 (27 Feb 2023 09:03), Max: 98.8 (26 Feb 2023 21:30)  HR: 74 (27 Feb 2023 09:03) (65 - 88)  BP: 89/50 (27 Feb 2023 09:03) (89/50 - 122/83)  BP(mean): --  RR: 19 (27 Feb 2023 09:03) (19 - 20)  SpO2: 98% (27 Feb 2023 09:03) (98% - 100%)    Parameters below as of 27 Feb 2023 09:03  Patient On (Oxygen Delivery Method): room air     I&O's Summary    26 Feb 2023 07:01  -  27 Feb 2023 07:00  --------------------------------------------------------  IN: 3180 mL / OUT: 1400 mL / NET: 1780 mL    GENERAL: Trach collar - pt communicates by writing   [ x]Cachexia  [X ]Alert  [ X]Oriented x4   [X ]Lethargic  [ ]Unarousable  [ ]Verbal  [X ]Non-Verbal    Behavioral:   [ X] Anxiety  [ ] Delirium [ ] Agitation [ ] Other    HEENT:  [ ]Normal   [ ]Dry mouth   [ X]ET Tube/Trach  [ ]Oral lesions    PULMONARY:   [ ]Clear [ ]Tachypnea  [ ]Audible excessive secretions   [ ]Rhonchi        [ ]Right [ ]Left [ ]Bilateral  [ ]Crackles        [ ]Right [ ]Left [ ]Bilateral  [ ]Wheezing     [ ]Right [ ]Left [ ]Bilateral  [ X]Diminished breath sounds [ ]right [ ]left [ ]bilateral    CARDIOVASCULAR:    [ X]Regular [ ]Irregular [ ]Tachy  [ ]Faheem [ ]Murmur [ ]Other    GASTROINTESTINAL:  [X ]Soft  [ ]Distended   [ X]+BS  [ ]Non tender [ X]Tender  [ ]Other [ X]PEG [ ]OGT/ NGT  Last BM: 2/26    GENITOURINARY:  [X ]Normal [ ] Incontinent   [ ]Oliguria/Anuria   [ ]Wall    MUSCULOSKELETAL:   [ ]Normal   [X ]Weakness  [ ]Bed/Wheelchair bound [ ]Edema    NEUROLOGIC:   [X ]No focal deficits  [ ]Cognitive impairment  [ ]Dysphagia [ ]Dysarthria [ ]Paresis [ ]Other     SKIN:   [X ]Normal  [ ]Rash  [ ]Other  [ ]Pressure ulcer(s)       Present on admission [ ]y [ ]n    LABS:                        8.0    7.70  )-----------( 327      ( 27 Feb 2023 08:00 )             25.6   02-27    136  |  97<L>  |  6<L>  ----------------------------<  82  3.7   |  31  |  0.47<L>    Ca    8.5      27 Feb 2023 08:00  Phos  3.2     02-27  Mg     1.50     02-27    RADIOLOGY & ADDITIONAL STUDIES: n/a     Protein Calorie Malnutrition Present: [ ]mild [ ]moderate [ ]severe [ ]underweight [ ]morbid obesity  https://www.andeal.org/vault/2440/web/files/ONC/Table_Clinical%20Characteristics%20to%20Document%20Malnutrition-White%20JV%20et%20al%202012.pdf    Height (cm): 182.9 (02-16-23 @ 06:00), 170.2 (02-10-23 @ 08:52), 182.9 (11-21-22 @ 14:45)  Weight (kg): 52.2 (02-16-23 @ 06:00), 56.2 (02-10-23 @ 08:52), 61.235 (11-21-22 @ 17:08)  BMI (kg/m2): 15.6 (02-16-23 @ 06:00), 19.4 (02-10-23 @ 08:52), 18.3 (11-21-22 @ 17:08)    [ ]PPSV2 < or = 30%  [ ]significant weight loss [ ]poor nutritional intake [ ]anasarca Prealbumin, Serum: 10 mg/dL (02-12-23 @ 07:04)  [ ]Artificial Nutrition    Other REFERRALS:  [ ]Hospice  [ ]Child Life  [ ]Social Work  [ ]Case management [ ]Holistic Therapy

## 2023-02-27 NOTE — PROGRESS NOTE ADULT - PROBLEM SELECTOR PLAN 3
Multifactorial 2/2 metastatic lung cancer, recent surgery, illness. no evidence of acute bleed   - received 1 unit PRBC on 2/25, 2/26. post transfusion hgb 8.7 - remains relatively stable at 8.0  - cont to monitor. goal Hb >7

## 2023-02-27 NOTE — CHART NOTE - NSCHARTNOTEFT_GEN_A_CORE
Notified by RN trach tube dislodged.     Chart reviewed, pt assessed at bedside, NAD, AOx4. Entire trach tube out, ostomy site patent no secretions discharge or bleeding noted. No e/o respiratory distress Satting 94% RA. Notified ENT who replaced trach tube at bedside. Pt tolerated procedure well. O2 sat 100% on trach collar.     -Will continue to monitor     Lucian Izaguirre PA-C  Department of Internal Medicine  In-House beeper number [phone]

## 2023-02-27 NOTE — PROGRESS NOTE ADULT - PROBLEM SELECTOR PLAN 3
Explained to pt that Ativan Q4 hours is not sustainable/ not for its intended use- discussed  consult, pt in agreement.   Spoke to Dr. Isaiah Aguilar regarding  consult - gave phone recommendations at this time.   - Recommended increasing Gabapentin to 600mg TID   - PRN Atarax 25mg Q6hrs for moderate anxiety  (not to be given within 2 hours of Ativan)  - c/w PO Ativan 1mg Q6 (Hold for sedation)   - Pt has medical cannabis at home (gummies). Explained and recommended cannabis tincture as he has PEG tube now.   - Family provides a lot of support to patient

## 2023-02-27 NOTE — PROGRESS NOTE ADULT - PROBLEM SELECTOR PLAN 2
S/P 4 cycles of single agent palliative Keytruda/ immunotherapy - had positive response, patient did not return for follow-up. Several weeks ago returned to office weeks ago with progressive disease restarted on treatment with Keytruda AND concurrent chemotherapy carboplatin and Alimta. Had progressive disease in the neck and had seen radiation therapy in consultation  - Transferred to [facility] for palliative RT to neck  - Trach placed 2/15 for airway protection & PEG placed 2/16 for nutrition/medication administration   - Patient states that he was under assumption that he would be getting more Keytruda after palliative RT. Oncology rec out patient follow up.

## 2023-02-27 NOTE — PROGRESS NOTE ADULT - PROBLEM SELECTOR PLAN 4
Progressive NSCLC with disease progression in the neck causing stridor and dysphagia  - palliative radiation completed today  - per hem/onc at [place] pt no longer candidate for DMT  - follow up hem/onc recs - appreciate outpatient oncology recs regarding whether patient should continue Keytruda or can explore other options  - pain management per palliative- on Dilaudid PCA and methadone; palliative planning to switch dilaudid PCA pump to per PEG solution

## 2023-02-27 NOTE — PROGRESS NOTE ADULT - PROBLEM SELECTOR PLAN 1
- Tracheostomy tube 6UN secured by posey ties - recommend 2 fingers underneath ties to create securement of tube   - Please call [phone] if ENT is needed - Tracheostomy tube 6UN secured by posey ties - recommend 2 fingers underneath ties to create securement of tube   - tracheostomy tube confirmed with suctioned   - Please call [phone] if ENT is needed

## 2023-02-27 NOTE — PROGRESS NOTE ADULT - PROBLEM SELECTOR PROBLEM 5
[facility]      OUTPATIENT OCCUPATIONAL THERAPY  EVALUATION  PLAN OF TREATMENT FOR OUTPATIENT REHABILITATION  (COMPLETE FOR INITIAL CLAIMS ONLY)  Patient's Last Name, First Name, M.I.  YOB: 1956  Abhijeet Mccauley                          Provider's Name  [facility] Medical Record No.  [id number]                               Onset Date:  05/18/21   Start of Care Date:     5/19/2021   Type:     ___PT   _X_OT   ___SLP Medical Diagnosis:                        L LE pain. Hx of Total knee revision      OT Diagnosis:  assess ADLs for safe discharge recommendations    Visits from SOC:  1   _________________________________________________________________________________  Plan of Treatment/Functional Goals    Planned Interventions: ADL retraining   Goals: See Occupational Therapy Goals on Care Plan in [facility] electronic health record.    Therapy Frequency: 1x eval and treat  Predicted Duration of Therapy Intervention:    _________________________________________________________________________________    I CERTIFY THE NEED FOR THESE SERVICES FURNISHED UNDER        THIS PLAN OF TREATMENT AND WHILE UNDER MY CARE     (Physician co-signature of this document indicates review and certification of the therapy plan).               ,      Referring Physician: Esthela Burger PA-C            Initial Assessment        See Occupational Therapy evaluation dated   in Epic electronic health record.                   Substance abuse

## 2023-02-27 NOTE — BH CONSULTATION LIAISON ASSESSMENT NOTE - CURRENT MEDICATION
MEDICATIONS  (STANDING):  acetaminophen   IVPB .. 1000 milliGRAM(s) IV Intermittent once  albuterol/ipratropium for Nebulization 3 milliLiter(s) Nebulizer every 6 hours  dextrose 5% + sodium chloride 0.9%. 1000 milliLiter(s) (75 mL/Hr) IV Continuous <Continuous>  gabapentin Solution 600 milliGRAM(s) Oral three times a day  heparin   Injectable 5000 Unit(s) SubCutaneous every 8 hours  influenza   Vaccine 0.5 milliLiter(s) IntraMuscular once  levothyroxine 50 MICROGram(s) Oral daily  LORazepam     Tablet 1 milliGRAM(s) Oral every 6 hours  methadone   Solution 15 milliGRAM(s) Oral three times a day  nicotine - 21 mG/24Hr(s) Patch 1 Patch Transdermal daily  pantoprazole  Injectable 40 milliGRAM(s) IV Push two times a day  senna 2 Tablet(s) Oral at bedtime    MEDICATIONS  (PRN):  albuterol    90 MICROgram(s) HFA Inhaler 2 Puff(s) Inhalation every 6 hours PRN Bronchospasm  HYDROmorphone   Tablet 4 milliGRAM(s) Oral every 4 hours PRN Moderate Pain (4 - 6)  HYDROmorphone   Tablet 6 milliGRAM(s) Oral every 4 hours PRN Severe Pain (7 - 10)  hydrOXYzine hydrochloride Syrup 25 milliGRAM(s) Oral every 6 hours PRN Anxiety  naloxone Injectable 0.1 milliGRAM(s) IV Push every 3 minutes PRN For ANY of the following changes in patient status:  A. RR LESS THAN 10 breaths per minute, B. Oxygen saturation LESS THAN 90%, C. Sedation score of 6  ondansetron Injectable 4 milliGRAM(s) IV Push every 8 hours PRN Nausea and/or Vomiting  polyethylene glycol 3350 17 Gram(s) Oral daily PRN Constipation

## 2023-02-27 NOTE — PROGRESS NOTE ADULT - SUBJECTIVE AND OBJECTIVE BOX
Patient is a 52y old  Male who presents with a chief complaint of Palliative Radiation (27 Feb 2023 15:24)  NO complaints    Medication:   acetaminophen   IVPB .. 1000 milliGRAM(s) IV Intermittent once  albuterol    90 MICROgram(s) HFA Inhaler 2 Puff(s) Inhalation every 6 hours PRN  albuterol/ipratropium for Nebulization 3 milliLiter(s) Nebulizer every 6 hours  dextrose 5% + sodium chloride 0.9%. 1000 milliLiter(s) IV Continuous <Continuous>  gabapentin Solution 600 milliGRAM(s) Oral three times a day  heparin   Injectable 5000 Unit(s) SubCutaneous every 8 hours  HYDROmorphone   Tablet 4 milliGRAM(s) Oral every 4 hours PRN  HYDROmorphone   Tablet 6 milliGRAM(s) Oral every 4 hours PRN  hydrOXYzine hydrochloride Syrup 25 milliGRAM(s) Oral every 6 hours PRN  influenza   Vaccine 0.5 milliLiter(s) IntraMuscular once  levothyroxine 50 MICROGram(s) Oral daily  LORazepam     Tablet 1 milliGRAM(s) Oral <User Schedule>  methadone   Solution 15 milliGRAM(s) Oral three times a day  naloxone Injectable 0.1 milliGRAM(s) IV Push every 3 minutes PRN  nicotine - 21 mG/24Hr(s) Patch 1 Patch Transdermal daily  ondansetron Injectable 4 milliGRAM(s) IV Push every 8 hours PRN  pantoprazole  Injectable 40 milliGRAM(s) IV Push two times a day  polyethylene glycol 3350 17 Gram(s) Oral daily PRN  QUEtiapine 25 milliGRAM(s) Oral at bedtime  senna 2 Tablet(s) Oral at bedtime      Physical exam    T(C): 36.6 (02-27-23 @ 18:00), Max: 37.1 (02-26-23 @ 21:30)  HR: 80 (02-27-23 @ 18:00) (65 - 88)  BP: 95/54 (02-27-23 @ 18:00) (89/50 - 115/75)  RR: 18 (02-27-23 @ 18:00) (18 - 19)  SpO2: 100% (02-27-23 @ 18:00) (98% - 100%)  Wt(kg): --    alert NAD  EOMI anicteric sclera  Trach  Cv s1 S2 RRR  Lungs clear B/L      Labs                        8.0    7.70  )-----------( 327      ( 27 Feb 2023 08:00 )             25.6       02-27    136  |  97<L>  |  6<L>  ----------------------------<  82  3.7   |  31  |  0.47<L>    Ca    8.5      27 Feb 2023 08:00  Phos  3.2     02-27  Mg     1.50     02-27            [id number]

## 2023-02-27 NOTE — BH CONSULTATION LIAISON ASSESSMENT NOTE - NSBHCONSULTFOLLOWAFTERCARE_PSY_A_CORE FT
f/u with psych at hospice if available  if home hospice and f/u care at [facility] then can f/u with myself

## 2023-02-27 NOTE — PROGRESS NOTE ADULT - ASSESSMENT
Anemia due ro malignancy and concurrent medical issues. Hgb adequate and no need for a transfusion today.    Stage IV NSCLC - not a candidate for systemic therapy at this time. If patient declining hospice for now then suggest rehab with out-pt f/u with primary oncologist Dr. Tomlin.

## 2023-02-27 NOTE — PROGRESS NOTE ADULT - ASSESSMENT
52M with metastatic lung cancer, HTN, anxiety, chronic pain on percocet, prior substance abuse who was admitted to [facility] on 2/10/23 due to difficulty swallowing. 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 Pt is s/p trach and PEG and is undergoing palliative radiation

## 2023-02-27 NOTE — PROGRESS NOTE ADULT - ASSESSMENT
52 year old male w/ a PMHX of stage IV lung CA, HTN, anxiety on Xanax, chronic pain on Percocet, and substance abuse was admitted to [facility] on 2/10/23 due to difficulty swallowing. Pt has had difficulty swallowing for th past week. Pt states it has been worsening since, states he is now unable to swallow pills or food. 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 Pt also developed stridor for the past week. Pt is transferred to [facility] for Palliative Radiation. Palliative care consulted for pain management.

## 2023-02-27 NOTE — BH CONSULTATION LIAISON ASSESSMENT NOTE - SUMMARY
52M with metastatic lung cancer, HTN, anxiety, chronic pain on percocet, prior substance abuse who was admitted to [facility] on 2/10/23 due to difficulty swallowing. 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 Pt is s/p trach and PEG and is undergoing palliative radiation. Psych c/s for anxiety.    Patient with anxiety stemming from pain and concerns about mortality. Amenable to hospice care. No SI/HI acutely but realistic about future. Risks/benefits of meds as below discussed with patient and family.     Recs:  - patient interested in hospice  - Begin Seroquel 25mg qHS for sleep/anxiety ppx. If not oversedated the next AM would then increase to 50mg qHS the next day  - Time ativan to 1mg at 10am, 2pm, 6pm, 10pm. OK to add an extra AM 1mg dose at 6am if necessary.   - OK to c/w Atarax PRN 25mg q6h for anxiety  - C/w Gabapentin 600mg TID, OK to increase to 800mg TID if tolerating   - re hospice: is there hospice that will accommodate visitation from patient's cat?   - will follow for support  - patient requesting weighting of dilaudid to AM as he reports his pain is worse in AM

## 2023-02-27 NOTE — PROGRESS NOTE ADULT - PROBLEM SELECTOR PLAN 1
- Continue methadone 15mg TID (2/24) - Repeat EKG 2/21 QTc 454   - d/c IV dilaudid PCA. Transition to PRNs dilaudid 4mg q4h prn moderate pain, dilaudid 6mg q4hr prn severe pain   - Bowel regimen while on opioids  - PRN Narcan - Continue methadone 15mg TID (2/24) - Repeat EKG 2/21 QTc 454   - d/c IV dilaudid PCA. Transition to PRNs dilaudid 4mg q4h prn moderate pain, dilaudid 6mg q4hr prn severe pain   - Bowel regimen while on opioids  - PRN Narcan  - Pt has outpatient pain management doctor at [facility] that he should follow up with Dr. Javad Bland. He should follow up with him for continued symptom management.

## 2023-02-28 NOTE — PROGRESS NOTE ADULT - ASSESSMENT
Panel Management Review    Composite cancer screening  Summary:    Patient is due/failing the following:   A1C and MAMMOGRAM    Action needed:   None at this time. Pt was in today for OV and was advised of HM due.       Questions for provider review:    None                                                                                                                                    Esteban Soria [facility]           Chart routed to none .           52M with metastatic lung cancer, HTN, anxiety, chronic pain on percocet, prior substance abuse who was admitted to [facility] on 2/10/23 due to difficulty swallowing. 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 Pt is s/p trach and PEG and is undergoing palliative radiation

## 2023-02-28 NOTE — PROGRESS NOTE ADULT - SUBJECTIVE AND OBJECTIVE BOX
[facility] Geriatrics and Palliative Care  Amy Palacio, Palliative Care Nurse Practitioner  Contact Info: Page [phone] (Including Nights/Weekends), Message on Microsoft Teams (Amy Palacio), or leave VM at Palliative Office [phone] (non-urgent)    SUBJECTIVE AND OBJECTIVE:  Indication for Geriatrics and Palliative Care Services/INTERVAL HPI: Complex pain management  Pt seen and examined this afternoon, shared that pain is well controlled. New medications from  consult are helping with insomnia. Pt in agreement for hospice referral     OVERNIGHT EVENTS: Pt used PRN Dilaudid 6mg PO x 2 within 24 hrs.     DNR on chart:Yes  Yes      Allergies    No Known Allergies    Intolerances    MEDICATIONS  (STANDING):  acetaminophen   IVPB .. 1000 milliGRAM(s) IV Intermittent once  albuterol/ipratropium for Nebulization 3 milliLiter(s) Nebulizer every 6 hours  dextrose 5% + sodium chloride 0.9%. 1000 milliLiter(s) (75 mL/Hr) IV Continuous <Continuous>  gabapentin Solution 600 milliGRAM(s) Oral three times a day  heparin   Injectable 5000 Unit(s) SubCutaneous every 8 hours  influenza   Vaccine 0.5 milliLiter(s) IntraMuscular once  levothyroxine 50 MICROGram(s) Oral daily  LORazepam     Tablet 1 milliGRAM(s) Oral <User Schedule>  methadone   Solution 15 milliGRAM(s) Oral three times a day  nicotine - 21 mG/24Hr(s) Patch 1 Patch Transdermal daily  pantoprazole  Injectable 40 milliGRAM(s) IV Push two times a day  QUEtiapine 25 milliGRAM(s) Oral at bedtime  senna 2 Tablet(s) Oral at bedtime    MEDICATIONS  (PRN):  albuterol    90 MICROgram(s) HFA Inhaler 2 Puff(s) Inhalation every 6 hours PRN Bronchospasm  HYDROmorphone   Tablet 4 milliGRAM(s) Oral every 4 hours PRN Moderate Pain (4 - 6)  HYDROmorphone   Tablet 6 milliGRAM(s) Oral every 4 hours PRN Severe Pain (7 - 10)  hydrOXYzine hydrochloride Syrup 25 milliGRAM(s) Oral every 6 hours PRN Anxiety  naloxone Injectable 0.1 milliGRAM(s) IV Push every 3 minutes PRN For ANY of the following changes in patient status:  A. RR LESS THAN 10 breaths per minute, B. Oxygen saturation LESS THAN 90%, C. Sedation score of 6  ondansetron Injectable 4 milliGRAM(s) IV Push every 8 hours PRN Nausea and/or Vomiting  polyethylene glycol 3350 17 Gram(s) Oral daily PRN Constipation      -------------------------------------------------------------------------------------------------------  ITEMS UNCHECKED ARE NOT PRESENT    PRESENT SYMPTOMS: [ ]Unable to self-report - see [ ] CPOT [ ] PAINADS [ ] RDOS  Source if other than patient:  [ ]Family   [ ]Team     Pain:  [x ]yes [ ]no  QOL impact - long hospital stay  Location - abdomen/back              Aggravating factors - movement  Quality - sharp  Radiation - none  Timing- constant   Severity (0-10 scale): 5  Minimal acceptable level (0-10 scale)/pain goal: 4    CPOT:    https://www.[facility].org/getattachment/[email or web address]/Critical-Care-Pain-Observation-Tool-(CPOT)    PAINAD Score: See PAINAD tool and score below       RDOS: See RDOS tool and score below   0 to 2  minimal or no respiratory distress   3  mild distress  4 to 6 moderate distress  >7 severe distress    Dyspnea:                           [ ]Mild [ ]Moderate [ ]Severe  Anxiety:                             [ ]Mild [ ]Moderate [ ]Severe  Fatigue:                             [ ]Mild [ ]Moderate [ ]Severe  Nausea:                             [ ]Mild [ ]Moderate [ ]Severe  Loss of appetite:              [ ]Mild [ ]Moderate [ ]Severe  Constipation:                    [ ]Mild [ ]Moderate [ ]Severe  Other Symptoms:  [ X]All other review of systems negative     Home Medications for Symptoms if present:    I Stop Reference no:     -------------------------------------------------------------------------------------------------------  PCSSQ[Palliative Care Spiritual Screening Question]   Severity (0-10): 0  Score of 4 or > indicate consideration of Chaplaincy referral.  Chaplaincy Referral: [ ] yes [ ] refused [ ] following [X] Deferred     Caregiver [place]? : [ ] yes [ ] no [ ] Deferred [X ] Declined             Social work referral [ ] Patient & Family Centered Care Referral [ ]     Anticipatory Grief present?:  [ ] yes [ ] no  [X ] Deferred                  Social work referral [ ] Chaplaincy Referral [ ]    -------------------------------------------------------------------------------------------------------  PHYSICAL EXAM:  Vital Signs Last 24 Hrs  T(C): 37.6 (28 Feb 2023 10:00), Max: 37.6 (28 Feb 2023 10:00)  T(F): 99.6 (28 Feb 2023 10:00), Max: 99.6 (28 Feb 2023 10:00)  HR: 90 (28 Feb 2023 10:34) (72 - 94)  BP: 103/61 (28 Feb 2023 10:00) (95/54 - 107/66)  BP(mean): --  RR: 19 (28 Feb 2023 10:00) (18 - 19)  SpO2: 92% (28 Feb 2023 10:34) (92% - 100%)    Parameters below as of 28 Feb 2023 10:34  Patient On (Oxygen Delivery Method): tracheostomy collar     I&O's Summary    27 Feb 2023 07:01  -  28 Feb 2023 07:00  --------------------------------------------------------  IN: 3240 mL / OUT: 0 mL / NET: 3240 mL    28 Feb 2023 07:01  -  28 Feb 2023 13:57  --------------------------------------------------------  IN: 270 mL / OUT: 0 mL / NET: 270 mL       GENERAL: Trach collar - pt communicates by writing   [ x]Cachexia  [X ]Alert  [ X]Oriented x4   [X ]Lethargic  [ ]Unarousable  [ ]Verbal  [X ]Non-Verbal    Behavioral:   [ X] Anxiety  [ ] Delirium [ ] Agitation [ ] Other    HEENT:  [ ]Normal   [ ]Dry mouth   [ X]ET Tube/Trach  [ ]Oral lesions    PULMONARY:   [ ]Clear [ ]Tachypnea  [ ]Audible excessive secretions   [ ]Rhonchi        [ ]Right [ ]Left [ ]Bilateral  [ ]Crackles        [ ]Right [ ]Left [ ]Bilateral  [ ]Wheezing     [ ]Right [ ]Left [ ]Bilateral  [ X]Diminished breath sounds [ ]right [ ]left [ ]bilateral    CARDIOVASCULAR:    [ X]Regular [ ]Irregular [ ]Tachy  [ ]Faheem [ ]Murmur [ ]Other    GASTROINTESTINAL:  [X ]Soft  [ ]Distended   [ X]+BS  [ ]Non tender [ X]Tender  [ ]Other [ X]PEG [ ]OGT/ NGT  Last BM: 2/28    GENITOURINARY:  [X ]Normal [ ] Incontinent   [ ]Oliguria/Anuria   [ ]Wall    MUSCULOSKELETAL:   [ ]Normal   [X ]Weakness  [ ]Bed/Wheelchair bound [ ]Edema    NEUROLOGIC:   [X ]No focal deficits  [ ]Cognitive impairment  [ ]Dysphagia [ ]Dysarthria [ ]Paresis [ ]Other     SKIN:   [X ]Normal  [ ]Rash  [ ]Other  [ ]Pressure ulcer(s)       Present on admission [ ]y [ ]n  -------------------------------------------------------------------------------------------------------  CRITICAL CARE:  [ ]Shock Present  [ ]Septic [ ]Cardiogenic [ ]Neurologic [ ]Hypovolemic  [ ]Vasopressors [ ]Inotropes  [ ]Respiratory failure present [ ]Mechanical Ventilation [ ]Non-invasive ventilatory support [ ]High-Flow   [ ]Acute  [ ]Chronic [ ]Hypoxic  [ ]Hypercarbic [ ]Other  [ ]Other organ failure     -------------------------------------------------------------------------------------------------------  LABS:                        7.7    6.79  )-----------( 325      ( 28 Feb 2023 06:00 )             24.2   02-28    132<L>  |  97<L>  |  7   ----------------------------<  128<H>  4.0   |  28  |  0.51    Ca    8.5      28 Feb 2023 06:00  Phos  3.1     02-28  Mg     1.60     02-28      -------------------------------------------------------------------------------------------------------  RADIOLOGY & ADDITIONAL STUDIES: Reviewed.      -------------------------------------------------------------------------------------------------------  Protein Calorie Malnutrition Present: [ ]mild [ ]moderate [ ]severe [ ]underweight [ ]morbid obesity  https://www.andeal.org/vault/1490/web/files/ONC/Table_Clinical%20Characteristics%20to%20Document%20Malnutrition-White%20JV%20et%20al%202012.pdf    Height (cm): 182.9 (02-16-23 @ 06:00), 170.2 (02-10-23 @ 08:52), 182.9 (11-21-22 @ 14:45)  Weight (kg): 52.2 (02-16-23 @ 06:00), 56.2 (02-10-23 @ 08:52), 61.235 (11-21-22 @ 17:08)  BMI (kg/m2): 15.6 (02-16-23 @ 06:00), 19.4 (02-10-23 @ 08:52), 18.3 (11-21-22 @ 17:08)    Palliative Performance Status Version 2:   See PPSv2 tool and score below       [ ]PPSV2 < or = 30%  [ ]significant weight loss [ ]poor nutritional intake [ ]anasarcaPrealbumin, Serum: 10 mg/dL (02-12-23 @ 07:04)  [ ]Artificial Nutrition    Other REFERRALS:  [ ]Hospice  [ ]Child Life  [ ]Social Work  [ ]Case management [ ]Holistic Therapy     -------------------------------------------------------------------------------------------------------  Goals of Care Document: [facility] Geriatrics and Palliative Care  Amy Palacio, Palliative Care Nurse Practitioner  Contact Info: Page [phone] (Including Nights/Weekends), Message on Microsoft Teams (Amy Palacio), or leave VM at Palliative Office [phone] (non-urgent)    SUBJECTIVE AND OBJECTIVE:  Indication for Geriatrics and Palliative Care Services/INTERVAL HPI: Complex pain management  Pt seen and examined this afternoon, shared that pain is well controlled. New medications from  consult are helping with insomnia. Pt in agreement for hospice referral     OVERNIGHT EVENTS: Pt used PRN Dilaudid 6mg PO x 2 within 24 hrs. Trach dislodged and replaced.     DNR on chart:Yes  Yes    Allergies    No Known Allergies    Intolerances    MEDICATIONS  (STANDING):  acetaminophen   IVPB .. 1000 milliGRAM(s) IV Intermittent once  albuterol/ipratropium for Nebulization 3 milliLiter(s) Nebulizer every 6 hours  dextrose 5% + sodium chloride 0.9%. 1000 milliLiter(s) (75 mL/Hr) IV Continuous <Continuous>  gabapentin Solution 600 milliGRAM(s) Oral three times a day  heparin   Injectable 5000 Unit(s) SubCutaneous every 8 hours  influenza   Vaccine 0.5 milliLiter(s) IntraMuscular once  levothyroxine 50 MICROGram(s) Oral daily  LORazepam     Tablet 1 milliGRAM(s) Oral <User Schedule>  methadone   Solution 15 milliGRAM(s) Oral three times a day  nicotine - 21 mG/24Hr(s) Patch 1 Patch Transdermal daily  pantoprazole  Injectable 40 milliGRAM(s) IV Push two times a day  QUEtiapine 25 milliGRAM(s) Oral at bedtime  senna 2 Tablet(s) Oral at bedtime    MEDICATIONS  (PRN):  albuterol    90 MICROgram(s) HFA Inhaler 2 Puff(s) Inhalation every 6 hours PRN Bronchospasm  HYDROmorphone   Tablet 4 milliGRAM(s) Oral every 4 hours PRN Moderate Pain (4 - 6)  HYDROmorphone   Tablet 6 milliGRAM(s) Oral every 4 hours PRN Severe Pain (7 - 10)  hydrOXYzine hydrochloride Syrup 25 milliGRAM(s) Oral every 6 hours PRN Anxiety  naloxone Injectable 0.1 milliGRAM(s) IV Push every 3 minutes PRN For ANY of the following changes in patient status:  A. RR LESS THAN 10 breaths per minute, B. Oxygen saturation LESS THAN 90%, C. Sedation score of 6  ondansetron Injectable 4 milliGRAM(s) IV Push every 8 hours PRN Nausea and/or Vomiting  polyethylene glycol 3350 17 Gram(s) Oral daily PRN Constipation      -------------------------------------------------------------------------------------------------------  ITEMS UNCHECKED ARE NOT PRESENT    PRESENT SYMPTOMS: [ ]Unable to self-report - see [ ] CPOT [ ] PAINADS [ ] RDOS  Source if other than patient:  [ ]Family   [ ]Team     Pain:  [x ]yes [ ]no  QOL impact - long hospital stay  Location - abdomen/back              Aggravating factors - movement  Quality - sharp  Radiation - none  Timing- constant   Severity (0-10 scale): 5  Minimal acceptable level (0-10 scale)/pain goal: 4    CPOT:    https://www.[facility].org/getattachment/[email or web address]/Critical-Care-Pain-Observation-Tool-(CPOT)    PAINAD Score: See PAINAD tool and score below       RDOS: See RDOS tool and score below   0 to 2  minimal or no respiratory distress   3  mild distress  4 to 6 moderate distress  >7 severe distress    Dyspnea:                           [ ]Mild [ ]Moderate [ ]Severe  Anxiety:                             [ ]Mild [ ]Moderate [ ]Severe  Fatigue:                             [ ]Mild [ ]Moderate [ ]Severe  Nausea:                             [ ]Mild [ ]Moderate [ ]Severe  Loss of appetite:              [ ]Mild [ ]Moderate [ ]Severe  Constipation:                    [ ]Mild [ ]Moderate [ ]Severe  Other Symptoms:  [ X]All other review of systems negative     -------------------------------------------------------------------------------------------------------  PCSSQ[Palliative Care Spiritual Screening Question]   Severity (0-10): 0  Score of 4 or > indicate consideration of Chaplaincy referral.  Chaplaincy Referral: [ ] yes [ ] refused [ ] following [X] Deferred     Caregiver [place]? : [ ] yes [ ] no [ ] Deferred [X ] Declined             Social work referral [ ] Patient & Family Centered Care Referral [ ]     Anticipatory Grief present?:  [ ] yes [ ] no  [X ] Deferred                  Social work referral [ ] Chaplaincy Referral [ ]    -------------------------------------------------------------------------------------------------------  PHYSICAL EXAM:  Vital Signs Last 24 Hrs  T(C): 37.6 (28 Feb 2023 10:00), Max: 37.6 (28 Feb 2023 10:00)  T(F): 99.6 (28 Feb 2023 10:00), Max: 99.6 (28 Feb 2023 10:00)  HR: 90 (28 Feb 2023 10:34) (72 - 94)  BP: 103/61 (28 Feb 2023 10:00) (95/54 - 107/66)  BP(mean): --  RR: 19 (28 Feb 2023 10:00) (18 - 19)  SpO2: 92% (28 Feb 2023 10:34) (92% - 100%)    Parameters below as of 28 Feb 2023 10:34  Patient On (Oxygen Delivery Method): tracheostomy collar     I&O's Summary    27 Feb 2023 07:01  -  28 Feb 2023 07:00  --------------------------------------------------------  IN: 3240 mL / OUT: 0 mL / NET: 3240 mL    28 Feb 2023 07:01  -  28 Feb 2023 13:57  --------------------------------------------------------  IN: 270 mL / OUT: 0 mL / NET: 270 mL       GENERAL: Sol cruz - pt communicates by writing   [ x]Cachexia  [X ]Alert  [ X]Oriented x4   [X ]Lethargic  [ ]Unarousable  [ ]Verbal  [X ]Non-Verbal    Behavioral:   [ X] Anxiety  [ ] Delirium [ ] Agitation [ ] Other    HEENT:  [ ]Normal   [ ]Dry mouth   [ X]ET Tube/Trach  [ ]Oral lesions    PULMONARY:   [ ]Clear [ ]Tachypnea  [ ]Audible excessive secretions   [ ]Rhonchi        [ ]Right [ ]Left [ ]Bilateral  [ ]Crackles        [ ]Right [ ]Left [ ]Bilateral  [ ]Wheezing     [ ]Right [ ]Left [ ]Bilateral  [ X]Diminished breath sounds [ ]right [ ]left [ ]bilateral    CARDIOVASCULAR:    [ X]Regular [ ]Irregular [ ]Tachy  [ ]Faheem [ ]Murmur [ ]Other    GASTROINTESTINAL:  [X ]Soft  [ ]Distended   [ X]+BS  [ ]Non tender [ X]Tender  [ ]Other [ X]PEG [ ]OGT/ NGT  Last BM: 2/28    GENITOURINARY:  [X ]Normal [ ] Incontinent   [ ]Oliguria/Anuria   [ ]Wall    MUSCULOSKELETAL:   [ ]Normal   [X ]Weakness  [ ]Bed/Wheelchair bound [ ]Edema    NEUROLOGIC:   [X ]No focal deficits  [ ]Cognitive impairment  [ ]Dysphagia [ ]Dysarthria [ ]Paresis [ ]Other     SKIN:   [X ]Normal  [ ]Rash  [ ]Other  [ ]Pressure ulcer(s)       Present on admission [ ]y [ ]n  -------------------------------------------------------------------------------------------------------  CRITICAL CARE:  [ ]Shock Present  [ ]Septic [ ]Cardiogenic [ ]Neurologic [ ]Hypovolemic  [ ]Vasopressors [ ]Inotropes  [ ]Respiratory failure present [ ]Mechanical Ventilation [ ]Non-invasive ventilatory support [ ]High-Flow   [ ]Acute  [ ]Chronic [ ]Hypoxic  [ ]Hypercarbic [ ]Other  [ ]Other organ failure     -------------------------------------------------------------------------------------------------------  LABS:                        7.7    6.79  )-----------( 325      ( 28 Feb 2023 06:00 )             24.2   02-28    132<L>  |  97<L>  |  7   ----------------------------<  128<H>  4.0   |  28  |  0.51    Ca    8.5      28 Feb 2023 06:00  Phos  3.1     02-28  Mg     1.60     02-28      -------------------------------------------------------------------------------------------------------  RADIOLOGY & ADDITIONAL STUDIES: Reviewed.      -------------------------------------------------------------------------------------------------------  Protein Calorie Malnutrition Present: [ ]mild [ ]moderate [ ]severe [ ]underweight [ ]morbid obesity  https://www.[employer].org/BuzzElement/2440/web/files/ONC/Table_Clinical%20Characteristics%20to%20Document%20Malnutrition-White%20JV%20et%20al%202012.pdf    Height (cm): 182.9 (02-16-23 @ 06:00), 170.2 (02-10-23 @ 08:52), 182.9 (11-21-22 @ 14:45)  Weight (kg): 52.2 (02-16-23 @ 06:00), 56.2 (02-10-23 @ 08:52), 61.235 (11-21-22 @ 17:08)  BMI (kg/m2): 15.6 (02-16-23 @ 06:00), 19.4 (02-10-23 @ 08:52), 18.3 (11-21-22 @ 17:08)    Palliative Performance Status Version 2:   See PPSv2 tool and score below       [ ]PPSV2 < or = 30%  [ ]significant weight loss [ ]poor nutritional intake [ ]anasarcaPrealbumin, Serum: 10 mg/dL (02-12-23 @ 07:04)  [ ]Artificial Nutrition    Other REFERRALS:  [ ]Hospice  [ ]Child Life  [ ]Social Work  [ ]Case management [ ]Holistic Therapy     -------------------------------------------------------------------------------------------------------

## 2023-02-28 NOTE — PROGRESS NOTE ADULT - PROBLEM SELECTOR PLAN 5
Continue ativan 1mg q4hr - schedule per  recs  - Monitor respiratory status, hold if sedated  - follow up  recs

## 2023-02-28 NOTE — PROGRESS NOTE ADULT - SUBJECTIVE AND OBJECTIVE BOX
[facility] Division of Hospital Medicine  Miki NjDO  Available via MS Teams  In house pager [phone]    SUBJECTIVE / OVERNIGHT EVENTS:  No acute events overnight.  Patient says he feels comfortable today. Appears very comfortable.   No acute complaints. He did not write anything in his notebook today, shook his head and indicated there's nothing else he wanted to discuss.    ADDITIONAL REVIEW OF SYSTEMS:    MEDICATIONS  (STANDING):  acetaminophen   IVPB .. 1000 milliGRAM(s) IV Intermittent once  albuterol/ipratropium for Nebulization 3 milliLiter(s) Nebulizer every 6 hours  dextrose 5% + sodium chloride 0.9%. 1000 milliLiter(s) (75 mL/Hr) IV Continuous <Continuous>  gabapentin Solution 600 milliGRAM(s) Oral three times a day  heparin   Injectable 5000 Unit(s) SubCutaneous every 8 hours  influenza   Vaccine 0.5 milliLiter(s) IntraMuscular once  levothyroxine 50 MICROGram(s) Oral daily  LORazepam     Tablet 1 milliGRAM(s) Oral <User Schedule>  methadone   Solution 15 milliGRAM(s) Oral three times a day  nicotine - 21 mG/24Hr(s) Patch 1 Patch Transdermal daily  pantoprazole  Injectable 40 milliGRAM(s) IV Push two times a day  QUEtiapine 25 milliGRAM(s) Oral at bedtime  senna 2 Tablet(s) Oral at bedtime    MEDICATIONS  (PRN):  albuterol    90 MICROgram(s) HFA Inhaler 2 Puff(s) Inhalation every 6 hours PRN Bronchospasm  HYDROmorphone   Tablet 4 milliGRAM(s) Oral every 4 hours PRN Moderate Pain (4 - 6)  HYDROmorphone   Tablet 6 milliGRAM(s) Oral every 4 hours PRN Severe Pain (7 - 10)  hydrOXYzine hydrochloride Syrup 25 milliGRAM(s) Oral every 6 hours PRN Anxiety  naloxone Injectable 0.1 milliGRAM(s) IV Push every 3 minutes PRN For ANY of the following changes in patient status:  A. RR LESS THAN 10 breaths per minute, B. Oxygen saturation LESS THAN 90%, C. Sedation score of 6  ondansetron Injectable 4 milliGRAM(s) IV Push every 8 hours PRN Nausea and/or Vomiting  polyethylene glycol 3350 17 Gram(s) Oral daily PRN Constipation      I&O's Summary    27 Feb 2023 07:01  -  28 Feb 2023 07:00  --------------------------------------------------------  IN: 3240 mL / OUT: 0 mL / NET: 3240 mL    28 Feb 2023 07:01  -  28 Feb 2023 15:27  --------------------------------------------------------  IN: 270 mL / OUT: 0 mL / NET: 270 mL        PHYSICAL EXAM:  Vital Signs Last 24 Hrs  T(C): 37.6 (28 Feb 2023 10:00), Max: 37.6 (28 Feb 2023 10:00)  T(F): 99.6 (28 Feb 2023 10:00), Max: 99.6 (28 Feb 2023 10:00)  HR: 90 (28 Feb 2023 10:34) (79 - 94)  BP: 103/61 (28 Feb 2023 10:00) (95/54 - 107/66)  BP(mean): --  RR: 19 (28 Feb 2023 10:00) (18 - 19)  SpO2: 92% (28 Feb 2023 10:34) (92% - 100%)    Parameters below as of 28 Feb 2023 10:34  Patient On (Oxygen Delivery Method): tracheostomy collar      CONSTITUTIONAL: NAD, well-developed, well-groomed  EYES: PERRLA; conjunctiva and sclera clear  ENMT: Moist oral mucosa, no pharyngeal injection or exudates; normal dentition; trach in place with overlying trach collar  NECK: Supple, no palpable masses; no thyromegaly  RESPIRATORY: Normal respiratory effort; lungs are clear to auscultation bilaterally  CARDIOVASCULAR: Regular rate and rhythm, normal S1 and S2, no murmur/rub/gallop; No lower extremity edema; Peripheral pulses are 2+ bilaterally  ABDOMEN: Nontender to palpation, normoactive bowel sounds, no rebound/guarding; No hepatosplenomegaly; PEG site with mild erythema, mild tenderness, without warmth, swelling, or drainage  MUSCULOSKELETAL: no clubbing or cyanosis of digits; no joint swelling or tenderness to palpation  PSYCH: A+O to person, place, and time; affect appropriate  NEUROLOGY: CN 2-12 are intact and symmetric; no gross sensory deficits   SKIN: No rashes; no palpable lesions    LABS:                        7.7    6.79  )-----------( 325      ( 28 Feb 2023 06:00 )             24.2     02-28    132<L>  |  97<L>  |  7   ----------------------------<  128<H>  4.0   |  28  |  0.51    Ca    8.5      28 Feb 2023 06:00  Phos  3.1     02-28  Mg     1.60     02-28                COVID-19 PCR: NotDetec (15 Feb 2023 18:47)  COVID-19 PCR: NotDetec (26 Sep 2022 10:41)  COVID-19 PCR: NotDetec (14 Sep 2022 13:16)

## 2023-02-28 NOTE — PROGRESS NOTE ADULT - PROBLEM SELECTOR PLAN 1
- Continue methadone 15mg TID (2/24) - Repeat EKG 2/21 QTc 454   - C/w PRN dilaudid 4mg q4h prn moderate pain, dilaudid 6mg q4hr prn severe pain   - Bowel regimen while on opioids  - PRN Narcan  - Pt has outpatient pain management doctor at [facility] that he should follow up with Dr. Javad Bland. He should follow up with him for continued symptom management. - Continue methadone 15mg TID (2/24) - Repeat EKG 2/21 QTc 454   - C/w PRN dilaudid 4mg q4h prn moderate pain, dilaudid 6mg q4hr prn severe pain   - Bowel regimen while on opioids  - PRN Narcan  - Pt now choosing to transition care to hospice at Nursing home

## 2023-02-28 NOTE — PROGRESS NOTE ADULT - ASSESSMENT
52 year old male w/ a PMHX of stage IV lung CA, HTN, anxiety on Xanax, chronic pain on Percocet, and substance abuse was admitted to [facility] on 2/10/23 due to difficulty swallowing. Pt has had difficulty swallowing for th past week. Pt states it has been worsening since, states he is now unable to swallow pills or food. 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 Pt also developed stridor for the past week. Pt is transferred to [facility] for Palliative Radiation. Palliative care consulted for pain management.

## 2023-02-28 NOTE — PROGRESS NOTE ADULT - NS ATTEND AMEND GEN_ALL_CORE FT
52 year old male w/ a PMHX of stage IV lung CA, HTN, anxiety on Xanax, chronic pain on Percocet, and substance abuse was admitted to [facility] on 2/10/23 due to difficulty swallowing. 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 Pt is transferred to [facility] for Palliative Radiation.     Case discussed with Palliative NP, Amy Palacio. Continue methadone 15mg tid and Dilaudid PRNs.   > Appreciate behavioral health consult in assistance with management of anxiety   > Appreciate oncology recs.   > Appreciate rad/onc recs - completed RT   > GOC: Patient now opting to transition care to hospice at nursing home. Appreciate care coordination team assistance with transition of care. Patient eager to leave hospital.     Thank you for allowing us to participate in your patient's care. Symptoms are managed. Goals are clear. Please page [phone] for any q's or c's.     Laura Calixto D.O.   Palliative Medicine.

## 2023-02-28 NOTE — PROGRESS NOTE ADULT - PROBLEM SELECTOR PLAN 8
Dvt ppx: Lovenox QD, hypercoagulable state d/t malignancy  Pt is DNR/DNI  Pt lives alone and family are unable to care for him in their homes. Per CM pt is not a candidate for LTC through insurance. Pt considering hospice vs home if able to care for trach and PEG. PT recommending outpt PT.  Family and patient are now considering inpatient hospice. Referral made. Per onc notes, no further treatment options at this time. Can follow up outpatient with primary onc if not pursuing hospice.    Cousins- OK to share information per patient  Padma: [phone]  Haylee: [phone]

## 2023-02-28 NOTE — PROGRESS NOTE ADULT - PROBLEM SELECTOR PLAN 6
ABELARDOST in chart - DNR/DNI  Please page for any questions, concerns or uncontrolled symptoms #[id number].  Pt in agreement for Hospice referral   Palliative to sign off at this time, please re-consult as needed.

## 2023-02-28 NOTE — PROGRESS NOTE ADULT - PROBLEM SELECTOR PLAN 2
S/P 4 cycles of single agent palliative Keytruda/ immunotherapy - had positive response, patient did not return for follow-up. Several weeks ago returned to office weeks ago with progressive disease restarted on treatment with Keytruda AND concurrent chemotherapy carboplatin and Alimta. Had progressive disease in the neck and had seen radiation therapy in consultation  - Transferred to [facility] for palliative RT to neck  - Trach placed 2/15 for airway protection & PEG placed 2/16 for nutrition/medication administration   - Patient states that he was under assumption that he would be getting more Keytruda after palliative RT. Oncology rec out patient follow up. S/P 4 cycles of single agent palliative Keytruda/ immunotherapy - had positive response, patient did not return for follow-up. Several weeks ago returned to office weeks ago with progressive disease restarted on treatment with Keytruda AND concurrent chemotherapy carboplatin and Alimta. Had progressive disease in the neck and had seen radiation therapy in consultation  - Transferred to [facility] for palliative RT to neck  - Trach placed 2/15 for airway protection & PEG placed 2/16 for nutrition/medication administration   - 2/28: Patient would like to transition care to hospice at nursing home.

## 2023-02-28 NOTE — PROGRESS NOTE ADULT - PROBLEM SELECTOR PLAN 3
Explained to pt that Ativan Q4 hours is not sustainable/ not for its intended use- discussed  consult, pt in agreement.   Spoke to Dr. Isaiah Aguilar regarding  consult - gave phone recommendations at this time.   - Recommended increasing Gabapentin to 600mg TID   - PRN Atarax 25mg Q6hrs for moderate anxiety  (not to be given within 2 hours of Ativan)  - c/w PO Ativan 1mg Q6 (Hold for sedation)   - Pt has medical cannabis at home (gummies). Explained and recommended cannabis tincture as he has PEG tube now.   - Family provides a lot of support to patient Appreciate Behavioral health recs   - Continue Gabapentin to 600mg TID   - PRN Atarax 25mg Q6hrs for moderate anxiety (not to be given within 2 hours of Ativan)  - c/w PO Ativan 1mg Q6 (Hold for sedation)   - Pt has medical cannabis at home (gummies). Explained and recommended cannabis tincture as he has PEG tube now.   - Family provides a lot of support to patient

## 2023-02-28 NOTE — PROGRESS NOTE ADULT - ASSESSMENT
Anemia due ro malignancy and concurrent medical issues. Hgb adequate and no need for a transfusion today.    Stage IV NSCLC - not a candidate for systemic therapy at this time. Palliative team seeing pt. considering hospice   further oncologic mgmt as outpt, if patient wishes further rx, with primary oncologist.  on palliative xrt to neck.

## 2023-02-28 NOTE — PROGRESS NOTE ADULT - PROBLEM SELECTOR PLAN 4
Progressive NSCLC with disease progression in the neck causing stridor and dysphagia  - palliative radiation completed today  - per hem/onc at [place] pt no longer candidate for DMT  - follow up hem/onc recs - appreciate outpatient oncology recs regarding whether patient should continue Keytruda or can explore other options  - pain management per palliative- on Dilaudid per PEG and methadone; palliative care managing meds  - BP borderline at times, likely with use of opioids, but currently tolerable

## 2023-02-28 NOTE — PROGRESS NOTE ADULT - SUBJECTIVE AND OBJECTIVE BOX
COSTA MELO  MRN-[id number]    Patient is a 52y old  Male who presents with a chief complaint of Palliative Radiation (27 Feb 2023 21:24)      Review of System    No new events overnight as per rn.  No f/c/s  Pt reports some neck pain.  No Ha or dizziness.  No cough or sob.  Reports some abdominal discomfort.  No dysuria or hematuria.    Current Meds  MEDICATIONS  (STANDING):  acetaminophen   IVPB .. 1000 milliGRAM(s) IV Intermittent once  albuterol/ipratropium for Nebulization 3 milliLiter(s) Nebulizer every 6 hours  dextrose 5% + sodium chloride 0.9%. 1000 milliLiter(s) (75 mL/Hr) IV Continuous <Continuous>  gabapentin Solution 600 milliGRAM(s) Oral three times a day  heparin   Injectable 5000 Unit(s) SubCutaneous every 8 hours  influenza   Vaccine 0.5 milliLiter(s) IntraMuscular once  levothyroxine 50 MICROGram(s) Oral daily  LORazepam     Tablet 1 milliGRAM(s) Oral <User Schedule>  methadone   Solution 15 milliGRAM(s) Oral three times a day  nicotine - 21 mG/24Hr(s) Patch 1 Patch Transdermal daily  pantoprazole  Injectable 40 milliGRAM(s) IV Push two times a day  QUEtiapine 25 milliGRAM(s) Oral at bedtime  senna 2 Tablet(s) Oral at bedtime    MEDICATIONS  (PRN):  albuterol    90 MICROgram(s) HFA Inhaler 2 Puff(s) Inhalation every 6 hours PRN Bronchospasm  HYDROmorphone   Tablet 4 milliGRAM(s) Oral every 4 hours PRN Moderate Pain (4 - 6)  HYDROmorphone   Tablet 6 milliGRAM(s) Oral every 4 hours PRN Severe Pain (7 - 10)  hydrOXYzine hydrochloride Syrup 25 milliGRAM(s) Oral every 6 hours PRN Anxiety  naloxone Injectable 0.1 milliGRAM(s) IV Push every 3 minutes PRN For ANY of the following changes in patient status:  A. RR LESS THAN 10 breaths per minute, B. Oxygen saturation LESS THAN 90%, C. Sedation score of 6  ondansetron Injectable 4 milliGRAM(s) IV Push every 8 hours PRN Nausea and/or Vomiting  polyethylene glycol 3350 17 Gram(s) Oral daily PRN Constipation      Vital Signs Last 24 Hrs  T(C): 37.1 (28 Feb 2023 06:00), Max: 37.4 (27 Feb 2023 22:00)  T(F): 98.7 (28 Feb 2023 06:00), Max: 99.3 (27 Feb 2023 22:00)  HR: 88 (28 Feb 2023 06:00) (72 - 94)  BP: 103/60 (28 Feb 2023 06:00) (89/50 - 107/66)  BP(mean): --  RR: 18 (28 Feb 2023 06:00) (18 - 19)  SpO2: 100% (28 Feb 2023 06:00) (95% - 100%)    Parameters below as of 28 Feb 2023 06:00  Patient On (Oxygen Delivery Method): tracheostomy collar    PHYSICAL EXAM:    Constitutional: NAD, on trach collar    Eyes: PERRLA EOMI, anicteric sclera    Heent :No oral sores, no pharyngeal injection. moist mucosa.    Neck: supple, no jvd, no LAD    Respiratory: CTA b/l     Cardiovascular: s1s2, no m/g/r    Gastrointestinal: soft, nt, nd, + BS    Neurological:Alert, but lethargic. moves all ext.    Skin: no rash on exposed skin    Lymph Nodes: no lymphadenopathy.    Lab  CBC Full  -  ( 28 Feb 2023 06:00 )  WBC Count : 6.79 K/uL  RBC Count : 2.85 M/uL  Hemoglobin : 7.7 g/dL  Hematocrit : 24.2 %  Platelet Count - Automated : 325 K/uL  Mean Cell Volume : 84.9 fL  Mean Cell Hemoglobin : 27.0 pg  Mean Cell Hemoglobin Concentration : 31.8 gm/dL  Auto Neutrophil # : 5.04 K/uL  Auto Lymphocyte # : 0.91 K/uL  Auto Monocyte # : 0.65 K/uL  Auto Eosinophil # : 0.15 K/uL  Auto Basophil # : 0.01 K/uL  Auto Neutrophil % : 74.3 %  Auto Lymphocyte % : 13.4 %  Auto Monocyte % : 9.6 %  Auto Eosinophil % : 2.2 %  Auto Basophil % : 0.1 %    02-28    132<L>  |  97<L>  |  7   ----------------------------<  128<H>  4.0   |  28  |  0.51    Ca    8.5      28 Feb 2023 06:00  Phos  3.1     02-28  Mg     1.60     02-28          Rad:    Assessment/Plan

## 2023-02-28 NOTE — PROGRESS NOTE ADULT - PROBLEM SELECTOR PLAN 5
Was previously on Suboxone - pt self discontinued 3-4 weeks ago.  Per I-stop he was receiving suboxone from Dr. Ospina.

## 2023-03-01 NOTE — PROGRESS NOTE ADULT - PROBLEM SELECTOR PLAN 3
- pain, erythema, and small purulent drainage from gastrostomy site  - febrile today 3/1  - wound care recs and instructions appreciated  - start empiric ceftriaxone and vancomycin (check MRSA PCR) for 5-7 days  - if within patient's goals, may warrant evaluation with CTAP with contrast to evaluate for abscess

## 2023-03-01 NOTE — ADVANCED PRACTICE NURSE CONSULT - RECOMMEDATIONS
Topical recommendations:     Sacrum/left inner buttock- apply LBF daily.     Continue low air loss bed therapy,  heel elevation with offloading boots while patient in bed, encourage to turn & reposition q2h with fluidized pillow.  Plan discussed with patient- educated on topical wound therapy to optimize wound healing. Questions answered.     Please contact Wound/Ostomy Care Service Line if we can be of further assistance (ext [phone]).   
Recommend oral antibiotics for suspected soft tissue infection of PEG site.   Recommend imaging to r/o fluid collection vs abscess    Topical recommendations:     Tracheostomy- Cleanse around trach site with NS. Pat dry. Apply Silicone foam dressing without border beneath trach collar, cut mid dressing to "Y" shape. Change foam dressing every shift and prn. Change trach ties every 3 days.     LUQ PEG- Cleanse q shift with NS, apply liquid barrier film beneath bob disc. Apply thin foam  dressing without border (Mepilex Lite)- cut to mid dressing with a 'Y' shape.   Secondary securement to abdomen taping in 'H' fashion with Steri-strips.     Continue low air loss bed therapy,  heel elevation with offloading boots while in bed, encourage to turn & reposition q2h with Z-flow fluidized pillow, continue moisture management with barrier creams as specified above & single breathable pad, continue measures to decrease friction/shear.   Plan discussed with patient and ACP at bedside.     Please contact Wound/Ostomy Care Service Line if we can be of further assistance (ext [phone]).

## 2023-03-01 NOTE — ADVANCED PRACTICE NURSE CONSULT - REASON FOR CONSULT
Attempted to see patient on Wound Care Rounds, patient off unit at this time in OR. Will follow up with patient for wound care recommendations. 
Patient seen on skin care rounds after wound care referral received for assessment of skin impairment and recommendations of topical management for PEG peritubular skin. Patient known to [facility] service line last seen on February 17th. Chart reviewed: WBC 6.41, H/H 6.8/21.8, platelets 267. Patient interviewed with notepad at bedside as nonverbal stating he has noticed pain to his PEG tube site around 3-4 days ago with a pain 8/10. Patient currently on palliative radiation for Lung CA with mets s/p PEG placement 2/16. 
Patient seen on skin care rounds after wound care referral received for assessment of skin impairment and recommendations of topical management for sacral wound. Patient h/o of stage IV lung CA, HTN, anxiety on Xanax, chronic pain on Percocet, and substance abuse was admitted to [facility] on 2/10/23 due to difficulty swallowing. 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 Pt is transferred to [facility] for Palliative Radiation followed by the ENT.

## 2023-03-01 NOTE — PROGRESS NOTE ADULT - SUBJECTIVE AND OBJECTIVE BOX
[facility] Division of Hospital Medicine  Miki NjDO  Available via MS Teams  In house pager [phone]    SUBJECTIVE / OVERNIGHT EVENTS:  No acute events overnight.  Saying he has pain at the PEG site.  Febrile this afternoon.      ADDITIONAL REVIEW OF SYSTEMS:    MEDICATIONS  (STANDING):  acetaminophen   IVPB .. 1000 milliGRAM(s) IV Intermittent once  acetaminophen   IVPB .. 1000 milliGRAM(s) IV Intermittent once  albuterol/ipratropium for Nebulization 3 milliLiter(s) Nebulizer every 6 hours  cefTRIAXone   IVPB 1000 milliGRAM(s) IV Intermittent every 24 hours  dextrose 5% + sodium chloride 0.9%. 1000 milliLiter(s) (75 mL/Hr) IV Continuous <Continuous>  gabapentin Solution 600 milliGRAM(s) Oral three times a day  heparin   Injectable 5000 Unit(s) SubCutaneous every 8 hours  influenza   Vaccine 0.5 milliLiter(s) IntraMuscular once  levothyroxine 50 MICROGram(s) Oral daily  LORazepam     Tablet 1 milliGRAM(s) Oral <User Schedule>  methadone   Solution 15 milliGRAM(s) Oral three times a day  nicotine - 21 mG/24Hr(s) Patch 1 Patch Transdermal daily  pantoprazole  Injectable 40 milliGRAM(s) IV Push two times a day  QUEtiapine 25 milliGRAM(s) Oral at bedtime  senna 2 Tablet(s) Oral at bedtime  vancomycin  IVPB 500 milliGRAM(s) IV Intermittent every 12 hours    MEDICATIONS  (PRN):  albuterol    90 MICROgram(s) HFA Inhaler 2 Puff(s) Inhalation every 6 hours PRN Bronchospasm  HYDROmorphone   Tablet 4 milliGRAM(s) Oral every 4 hours PRN Moderate Pain (4 - 6)  HYDROmorphone   Tablet 6 milliGRAM(s) Oral every 4 hours PRN Severe Pain (7 - 10)  hydrOXYzine hydrochloride Syrup 25 milliGRAM(s) Oral every 6 hours PRN Anxiety  naloxone Injectable 0.1 milliGRAM(s) IV Push every 3 minutes PRN For ANY of the following changes in patient status:  A. RR LESS THAN 10 breaths per minute, B. Oxygen saturation LESS THAN 90%, C. Sedation score of 6  ondansetron Injectable 4 milliGRAM(s) IV Push every 8 hours PRN Nausea and/or Vomiting  polyethylene glycol 3350 17 Gram(s) Oral daily PRN Constipation      I&O's Summary    28 Feb 2023 07:01  -  01 Mar 2023 07:00  --------------------------------------------------------  IN: 1200 mL / OUT: 0 mL / NET: 1200 mL    01 Mar 2023 07:01  -  01 Mar 2023 14:29  --------------------------------------------------------  IN: 0 mL / OUT: 300 mL / NET: -300 mL        PHYSICAL EXAM:  Vital Signs Last 24 Hrs  T(C): 39.1 (01 Mar 2023 14:00), Max: 39.1 (01 Mar 2023 14:00)  T(F): 102.4 (01 Mar 2023 14:00), Max: 102.4 (01 Mar 2023 14:00)  HR: 102 (01 Mar 2023 14:00) (72 - 105)  BP: 109/64 (01 Mar 2023 14:00) (92/53 - 111/66)  BP(mean): --  RR: 16 (01 Mar 2023 14:00) (12 - 18)  SpO2: 100% (01 Mar 2023 14:00) (92% - 100%)    Parameters below as of 01 Mar 2023 14:00  Patient On (Oxygen Delivery Method): tracheostomy collar    O2 Concentration (%): 28  CONSTITUTIONAL: NAD, well-developed, well-groomed, thin, comfortable  EYES: PERRLA; conjunctiva and sclera clear  ENMT: Moist oral mucosa, no pharyngeal injection or exudates; normal dentition; trach with overlying trach collar  NECK: Supple, no palpable masses; no thyromegaly  RESPIRATORY: Normal respiratory effort; lungs are clear to auscultation bilaterally  CARDIOVASCULAR: Regular rate and rhythm, normal S1 and S2, no murmur/rub/gallop; No lower extremity edema; Peripheral pulses are 2+ bilaterally  ABDOMEN: Nontender to palpation, normoactive bowel sounds, no rebound/guarding; No hepatosplenomegaly; PEG site with tenderness to palpation, erythema, and small purulent drainage  MUSCULOSKELETAL:   no clubbing or cyanosis of digits; no joint swelling or tenderness to palpation  PSYCH: A+O to person, place, and time; affect appropriate  NEUROLOGY: CN 2-12 are intact and symmetric; no gross sensory deficits   SKIN: No rashes; no palpable lesions    LABS:                        7.0    6.90  )-----------( 276      ( 01 Mar 2023 09:50 )             22.7     03-01    133<L>  |  97<L>  |  9   ----------------------------<  122<H>  3.8   |  27  |  0.53    Ca    8.1<L>      01 Mar 2023 06:40  Phos  3.0     03-01  Mg     1.60     03-01                COVID-19 PCR: NotDetec (15 Feb 2023 18:47)  COVID-19 PCR: NotDetec (26 Sep 2022 10:41)  COVID-19 PCR: NotDetec (14 Sep 2022 13:16)

## 2023-03-01 NOTE — PROGRESS NOTE ADULT - ASSESSMENT
Anemia due ro malignancy and concurrent medical issues.  Monitor cbc and provide transfusional support as needed.    Stage IV NSCLC - not a candidate for systemic therapy at this time.   Palliative team input noted.  treated palliative xrt to neck.

## 2023-03-01 NOTE — PROVIDER CONTACT NOTE (CRITICAL VALUE NOTIFICATION) - BACKGROUND
Subjective   Pt comes in with concern for SOA.  Pt reports he has felt smothering.  Worse with exertion.  He denies CP.  Pt reports he has COPD and CHF.  Denies fever.        History provided by:  Patient  Shortness of Breath   Severity:  Moderate  Timing:  Constant  Progression:  Worsening  Chronicity:  Chronic  Context: activity    Context: not animal exposure, not fumes, not known allergens, not occupational exposure, not pollens, not smoke exposure, not strong odors, not URI and not weather changes    Relieved by:  Nothing  Worsened by:  Activity  Ineffective treatments:  None tried  Associated symptoms: no abdominal pain, no chest pain, no claudication, no cough, no diaphoresis, no ear pain, no fever, no headaches, no hemoptysis, no neck pain, no PND, no rash, no sore throat, no sputum production, no syncope, no swollen glands, no vomiting and no wheezing    Risk factors: no recent alcohol use, no family hx of DVT, no hx of cancer, no hx of PE/DVT, no obesity, no oral contraceptive use, no prolonged immobilization, no recent surgery and no tobacco use        Review of Systems   Constitutional: Positive for activity change. Negative for chills, diaphoresis and fever.   HENT: Negative.  Negative for ear pain and sore throat.    Eyes: Negative.    Respiratory: Positive for chest tightness and shortness of breath. Negative for cough, hemoptysis, sputum production and wheezing.    Cardiovascular: Negative.  Negative for chest pain, claudication, leg swelling, syncope and PND.   Gastrointestinal: Negative.  Negative for abdominal pain and vomiting.   Endocrine: Negative.    Genitourinary: Negative.    Musculoskeletal: Negative.  Negative for neck pain.   Skin: Negative.  Negative for rash.   Allergic/Immunologic: Negative.    Neurological: Negative.  Negative for headaches.   Hematological: Negative.    Psychiatric/Behavioral: Negative.    All other systems reviewed and are negative.      Past Medical History: 
s/p trach 2/14 and PEg 2/16; found large necrotic mass in retrotracheal region with compression and displacement of trachea
  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 No pertinent family history.    Social History     Socioeconomic History   • Marital status: Single     Spouse name: Not on file   • Number of children: Not on file   • Years of education: Not on file   • Highest education level: Not on file   Tobacco Use   • Smoking status: Current Every Day Smoker   Substance and Sexual Activity   • Alcohol use: Yes     Alcohol/week: 14.4 oz     Types: 24 Cans of beer per week   • Drug use: No   • Sexual activity: Defer           Objective   Physical Exam   Constitutional: He is oriented to person, place, and time. He appears well-developed and well-nourished.  Non-toxic appearance. No distress. He is not intubated.   HENT:   Head: Atraumatic.   Mouth/Throat: Oropharynx is clear and moist. No oropharyngeal exudate.   Eyes: EOM are normal.   Neck: Normal range of motion. Neck supple. No tracheal deviation present.   Cardiovascular: Normal rate and normal heart sounds.   No murmur heard.  Pulmonary/Chest: Effort normal. No accessory muscle usage. He is not intubated. No respiratory distress. He has decreased breath sounds. He has no wheezes. He has no rhonchi. He has no rales. He exhibits no tenderness, no edema, no swelling and no retraction.   Abdominal: Soft. He exhibits distension. There is no tenderness. There is no guarding.   Musculoskeletal: Normal range of motion.        Right lower leg: Normal. He exhibits no edema.        Left lower leg: Normal. He exhibits no edema.   Neurological: He is alert and oriented to person, place, and time. He is not disoriented. No cranial nerve deficit. 
  Skin: Skin is warm and dry. Capillary refill takes less than 2 seconds. No cyanosis or erythema. No pallor.   Psychiatric: He has a normal mood and affect. His behavior is normal. His mood appears not anxious. He is not agitated.   Nursing note and vitals reviewed.      Procedures           ED Course  ED Course as of Jul 07 1819 Wed Jul 03, 2019   0712 Twelve-lead EKG performed at 0405 hrs.  Interpreted by me at 0407 hrs.  Normal sinus rhythm.  Single PVC.  Ventricular rate 94.  NE interval 186.  QRS duration 108.  .  QTc 472.  No pathologic blocks.  No sustained ectopy or dysrhythmia.  No acute ischemic ST segment elevation.  No pathologic T wave inversions.  No overt criteria for acute infarct/STEMI. ECG 12 Lead [TZ]   1006 Discussed Ct findings with patient will cover with antibiotics; also discussed that rectal bleeding yesica need colonscopy- pt is schedule for one 7/19/19 with Dr Mackey.  Pt also has new finding of hyponatremia- offered admission for evalaution- pt says he feels better and wants to go home due to the holiday. Repeat sodium shows improvement  will  advised pt to return to the ED if symptoms return or worsen. Also advised follow up with Dr Morris in 1 week  [MH]      ED Course User Index  [] Sofia Whiting DO  [TZ] Christopher Maldonado MD                  MDM  Number of Diagnoses or Management Options  Abdominal pain, unspecified abdominal location: new and requires workup  Hyponatremia: new and requires workup  Rectal bleeding: new and requires workup     Amount and/or Complexity of Data Reviewed  Clinical lab tests: ordered and reviewed  Tests in the radiology section of CPT®: ordered and reviewed  Tests in the medicine section of CPT®: reviewed and ordered  Decide to obtain previous medical records or to obtain history from someone other than the patient: yes  Independent visualization of images, tracings, or specimens: yes    Risk of Complications, Morbidity, and/or 
Mortality  Presenting problems: high  Diagnostic procedures: high  Management options: high          Final diagnoses:   Hyponatremia   Abdominal pain, unspecified abdominal location   Rectal bleeding            Sofia Whiting DO  07/07/19 0473    
Pt admitted for primary malignant neoplasm. PMH dysphagia, lung cancer, vocal cord paralysis
Pt admitted for primary malignant neoplasm. S/p trach placement 2/14, PEG placement 2/16. PMH  lung cancer.

## 2023-03-01 NOTE — SWALLOW BEDSIDE ASSESSMENT ADULT - SWALLOW EVAL: DIAGNOSIS
Green Dye Swallow Test: Patient provided puree and moderately thick liquids (2oz each) impregnated with green food color dye to assess for gross aspiration. Oral stage marked by adequate bolus containment, adequate bolus manipulation and anterior-posterior transport with adequate oral clearance post primary swallow. Pharyngeal stage marked by observed initiation of the pharyngeal swallow trigger, multiple swallows across PO trials (3-4 swallows per bolus) and inconsistent coughing across PO trials. RN provided immediate tracheal suctioning, and green dye return noted indicative of gross aspiration.

## 2023-03-01 NOTE — PROGRESS NOTE ADULT - SUBJECTIVE AND OBJECTIVE BOX
COSTA MELO  MRN-[id number]    Patient is a 52y old  Male who presents with a chief complaint of Palliative Radiation (28 Feb 2023 15:26)      Review of System  REVIEW OF SYSTEMS      General:	Denies fatigue, fevers, chills, sweats, decreased appetite.    Skin/Breast: denies pruritis, rash  	  Ophthalmologic: no change in vision or blurring  	  HEENT	Denies dry mouth, oral sores, dysphagia,  change in hearing.    Respiratory and Thorax:  cough, sob, wheeze, hemoptysis  	  Cardiovascular:	no cp , palp, orthopnea    Gastrointestinal:	no n/v/d constipation    Genitourinary:	no dysuria of frequency, no hematuria, no flank pain    Musculoskeletal:	no bone or joint pain. no muscle aches.     Neurological:	no change in sensory or motor function. no headache. no weakness.     Psychiatric:	no depression, no anxiety, insomnia.     Hematology/Lymphatics:	no bleeding or bruising        Current Meds  MEDICATIONS  (STANDING):  acetaminophen   IVPB .. 1000 milliGRAM(s) IV Intermittent once  albuterol/ipratropium for Nebulization 3 milliLiter(s) Nebulizer every 6 hours  dextrose 5% + sodium chloride 0.9%. 1000 milliLiter(s) (75 mL/Hr) IV Continuous <Continuous>  gabapentin Solution 600 milliGRAM(s) Oral three times a day  heparin   Injectable 5000 Unit(s) SubCutaneous every 8 hours  influenza   Vaccine 0.5 milliLiter(s) IntraMuscular once  levothyroxine 50 MICROGram(s) Oral daily  LORazepam     Tablet 1 milliGRAM(s) Oral <User Schedule>  methadone   Solution 15 milliGRAM(s) Oral three times a day  nicotine - 21 mG/24Hr(s) Patch 1 Patch Transdermal daily  pantoprazole  Injectable 40 milliGRAM(s) IV Push two times a day  QUEtiapine 25 milliGRAM(s) Oral at bedtime  senna 2 Tablet(s) Oral at bedtime    MEDICATIONS  (PRN):  albuterol    90 MICROgram(s) HFA Inhaler 2 Puff(s) Inhalation every 6 hours PRN Bronchospasm  HYDROmorphone   Tablet 4 milliGRAM(s) Oral every 4 hours PRN Moderate Pain (4 - 6)  HYDROmorphone   Tablet 6 milliGRAM(s) Oral every 4 hours PRN Severe Pain (7 - 10)  hydrOXYzine hydrochloride Syrup 25 milliGRAM(s) Oral every 6 hours PRN Anxiety  naloxone Injectable 0.1 milliGRAM(s) IV Push every 3 minutes PRN For ANY of the following changes in patient status:  A. RR LESS THAN 10 breaths per minute, B. Oxygen saturation LESS THAN 90%, C. Sedation score of 6  ondansetron Injectable 4 milliGRAM(s) IV Push every 8 hours PRN Nausea and/or Vomiting  polyethylene glycol 3350 17 Gram(s) Oral daily PRN Constipation      Vitals  Vital Signs Last 24 Hrs  T(C): 36.9 (01 Mar 2023 02:25), Max: 37.6 (28 Feb 2023 10:00)  T(F): 98.5 (01 Mar 2023 02:25), Max: 99.6 (28 Feb 2023 10:00)  HR: 96 (01 Mar 2023 02:25) (72 - 104)  BP: 106/62 (01 Mar 2023 02:25) (103/61 - 111/66)  BP(mean): --  RR: 16 (01 Mar 2023 02:25) (16 - 19)  SpO2: 98% (01 Mar 2023 02:25) (92% - 100%)    Parameters below as of 01 Mar 2023 02:25  Patient On (Oxygen Delivery Method): tracheostomy collar        Physical Exam  PHYSICAL EXAM:      Constitutional: NAD    Eyes: PERRLA EOMI, anicteric sclera    Heent :No oral sores, no pharyngeal injection. moist mucosa.    Neck: supple, no jvd, no LAD    Respiratory: CTA b/l     Cardiovascular: s1s2, no m/g/r    Gastrointestinal: soft, nt, nd, + BS    Extremities: no c/c/e    Neurological:A&O x 3 moves all ext.    Skin: no rash on exposed skin    Lymph Nodes: no lymphadenopathy.              Lab  CBC Full  -  ( 28 Feb 2023 06:00 )  WBC Count : 6.79 K/uL  RBC Count : 2.85 M/uL  Hemoglobin : 7.7 g/dL  Hematocrit : 24.2 %  Platelet Count - Automated : 325 K/uL  Mean Cell Volume : 84.9 fL  Mean Cell Hemoglobin : 27.0 pg  Mean Cell Hemoglobin Concentration : 31.8 gm/dL  Auto Neutrophil # : 5.04 K/uL  Auto Lymphocyte # : 0.91 K/uL  Auto Monocyte # : 0.65 K/uL  Auto Eosinophil # : 0.15 K/uL  Auto Basophil # : 0.01 K/uL  Auto Neutrophil % : 74.3 %  Auto Lymphocyte % : 13.4 %  Auto Monocyte % : 9.6 %  Auto Eosinophil % : 2.2 %  Auto Basophil % : 0.1 %    02-28    132<L>  |  97<L>  |  7   ----------------------------<  128<H>  4.0   |  28  |  0.51    Ca    8.5      28 Feb 2023 06:00  Phos  3.1     02-28  Mg     1.60     02-28          Rad:    Assessment/Plan   COSTA MELO  MRN-[id number]    Patient is a 52y old  Male who presents with a chief complaint of Palliative Radiation (28 Feb 2023 15:26)      Review of System    Resting comfortably  no events as per rn    Current Meds  MEDICATIONS  (STANDING):  acetaminophen   IVPB .. 1000 milliGRAM(s) IV Intermittent once  albuterol/ipratropium for Nebulization 3 milliLiter(s) Nebulizer every 6 hours  dextrose 5% + sodium chloride 0.9%. 1000 milliLiter(s) (75 mL/Hr) IV Continuous <Continuous>  gabapentin Solution 600 milliGRAM(s) Oral three times a day  heparin   Injectable 5000 Unit(s) SubCutaneous every 8 hours  influenza   Vaccine 0.5 milliLiter(s) IntraMuscular once  levothyroxine 50 MICROGram(s) Oral daily  LORazepam     Tablet 1 milliGRAM(s) Oral <User Schedule>  methadone   Solution 15 milliGRAM(s) Oral three times a day  nicotine - 21 mG/24Hr(s) Patch 1 Patch Transdermal daily  pantoprazole  Injectable 40 milliGRAM(s) IV Push two times a day  QUEtiapine 25 milliGRAM(s) Oral at bedtime  senna 2 Tablet(s) Oral at bedtime    MEDICATIONS  (PRN):  albuterol    90 MICROgram(s) HFA Inhaler 2 Puff(s) Inhalation every 6 hours PRN Bronchospasm  HYDROmorphone   Tablet 4 milliGRAM(s) Oral every 4 hours PRN Moderate Pain (4 - 6)  HYDROmorphone   Tablet 6 milliGRAM(s) Oral every 4 hours PRN Severe Pain (7 - 10)  hydrOXYzine hydrochloride Syrup 25 milliGRAM(s) Oral every 6 hours PRN Anxiety  naloxone Injectable 0.1 milliGRAM(s) IV Push every 3 minutes PRN For ANY of the following changes in patient status:  A. RR LESS THAN 10 breaths per minute, B. Oxygen saturation LESS THAN 90%, C. Sedation score of 6  ondansetron Injectable 4 milliGRAM(s) IV Push every 8 hours PRN Nausea and/or Vomiting  polyethylene glycol 3350 17 Gram(s) Oral daily PRN Constipation    Vital Signs Last 24 Hrs  T(C): 36.9 (01 Mar 2023 02:25), Max: 37.6 (28 Feb 2023 10:00)  T(F): 98.5 (01 Mar 2023 02:25), Max: 99.6 (28 Feb 2023 10:00)  HR: 96 (01 Mar 2023 02:25) (72 - 104)  BP: 106/62 (01 Mar 2023 02:25) (103/61 - 111/66)  BP(mean): --  RR: 16 (01 Mar 2023 02:25) (16 - 19)  SpO2: 98% (01 Mar 2023 02:25) (92% - 100%)    Parameters below as of 01 Mar 2023 02:25  Patient On (Oxygen Delivery Method): tracheostomy collar      PHYSICAL EXAM:     NAD      Lab  CBC Full  -  ( 28 Feb 2023 06:00 )  WBC Count : 6.79 K/uL  RBC Count : 2.85 M/uL  Hemoglobin : 7.7 g/dL  Hematocrit : 24.2 %  Platelet Count - Automated : 325 K/uL  Mean Cell Volume : 84.9 fL  Mean Cell Hemoglobin : 27.0 pg  Mean Cell Hemoglobin Concentration : 31.8 gm/dL  Auto Neutrophil # : 5.04 K/uL  Auto Lymphocyte # : 0.91 K/uL  Auto Monocyte # : 0.65 K/uL  Auto Eosinophil # : 0.15 K/uL  Auto Basophil # : 0.01 K/uL  Auto Neutrophil % : 74.3 %  Auto Lymphocyte % : 13.4 %  Auto Monocyte % : 9.6 %  Auto Eosinophil % : 2.2 %  Auto Basophil % : 0.1 %    02-28    132<L>  |  97<L>  |  7   ----------------------------<  128<H>  4.0   |  28  |  0.51    Ca    8.5      28 Feb 2023 06:00  Phos  3.1     02-28  Mg     1.60     02-28          Rad:    Assessment/Plan

## 2023-03-01 NOTE — SWALLOW BEDSIDE ASSESSMENT ADULT - SWALLOW EVAL: RECOMMENDED DIET
Continue with G-Tube Feedings for caloric/hydration/medication needs.
Continue with G-Tube feedings for caloric/hydration/medication needs.

## 2023-03-01 NOTE — CHART NOTE - NSCHARTNOTEFT_GEN_A_CORE
Pt noted to have hgb 6.8 this am, prbc transfusion ordered    Upon am rounding, pt reporting tenderness around peg site.   Superficial erythema around peg site.   Abdomen soft, nontender    Wound care called to evaluate area, recommended local wound care + antibiotics   Vanc/ CTX started per discussion with Dr. Nj     Pt then spiked temperature 102 this afternoon  Blood cultures, RVP, CT A/p and AXR ordered    Case discussed with Dr. Ondina Romero PA-C  Department of Medicine  Pager [phone]

## 2023-03-01 NOTE — PROGRESS NOTE ADULT - PROBLEM SELECTOR PLAN 4
Multifactorial 2/2 metastatic lung cancer, recent surgery, illness. no evidence of acute bleed   - received 1 unit PRBC on 2/25, 2/26.  - again with anemia, transfuse 1U PRBC today  - cont to monitor. goal Hb >7

## 2023-03-01 NOTE — PROVIDER CONTACT NOTE (OTHER) - ACTION/TREATMENT ORDERED:
provider made aware, will come to the bedside assess the patient. provider made aware, will come to the bedside assess the patient.  10:45AM OK to give Lorazepam as per provider

## 2023-03-01 NOTE — ADVANCED PRACTICE NURSE CONSULT - ASSESSMENT
General: Patient alert and oriented x4, ambulates independently. Trach collar in place, Cachectic with muscle wasting, large mass to neck.     LUQ PEG- peritubular skin with erythema and induration from 12oclock to 4oclock, pain upon palpation, slight increase in warmth, No pus or drainage able to be expressed. Slough noted from 10-3oclock extending 0.5cm firmly attached, moist. Small serous drainage noted, no odor. Suspect soft tissue infection- ACP provider called to bedside for wound assessment. Remaining peritubular skin with dried serous drainage able to be removed while cleansing and hyperpigmentation. Goals of care: Antibiotic management, autolytic debridement, protect periwound skin.     
Patient alert and oriented x4, ambulates independently. Cachectic with muscle wasting, large mass to neck.     Sacrum/left inner buttock with hypopigmentation, no open areas noted. Patient denying pain to site.

## 2023-03-01 NOTE — SWALLOW BEDSIDE ASSESSMENT ADULT - ADDITIONAL RECOMMENDATIONS
1. Reconsult this department as patient's condition becomes medically optimized.  2. This department to follow up as schedule permits for PO candidacy.
1. Reconsult this department as patient's respiratory status becomes optimized (i.e. downsize of trach, , decannulation)

## 2023-03-01 NOTE — PROVIDER CONTACT NOTE (CRITICAL VALUE NOTIFICATION) - SITUATION
pt critical value Hgb 6.8, Hct 21.8
Hematology called with critical lab value Hgb 6.6, Hct 20.9
Lab called to report Hgb 6.9

## 2023-03-01 NOTE — PROGRESS NOTE ADULT - ASSESSMENT
52M with metastatic lung cancer, HTN, anxiety, chronic pain on percocet, prior substance abuse who was admitted to [facility] on 2/10/23 due to difficulty swallowing. 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 Pt is s/p trach and PEG and is undergoing palliative radiation

## 2023-03-01 NOTE — SWALLOW BEDSIDE ASSESSMENT ADULT - COMMENTS
Per Hospitalist Note 2/28/23: "52M with metastatic lung cancer, HTN, anxiety, chronic pain on percocet, prior substance abuse who was admitted to [facility] on 2/10/23 due to difficulty swallowing. 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 Pt is s/p trach and PEG and is undergoing palliative radiation"    Patient is familiar to this service as the patient was seen for an initial Green Dye Swallow Test on 2/17/23 and a Speaking Valve Evaluation on 2/22/23. See consults    Patient received upright in bed, AAOX4. 6 CFS Tracheostomy noted on trach collar status. SpO2 noted at 100% throughout evaluation. Cousin Chet present at bedside.

## 2023-03-01 NOTE — PROGRESS NOTE ADULT - PROBLEM SELECTOR PLAN 1
Dysphagia 2/2 large mass in the retrotracheal region with displacement of the trachea anteriorly, compression of the pharynx and proximal esophagus  - keep patient NPO with PEG feeds  - appreciate SLP follow-up  - completed palliative radiation  - keep trach of same size and one size down at bedside at all times  - continue pain control per palliative

## 2023-03-02 NOTE — PROGRESS NOTE ADULT - PROBLEM SELECTOR PLAN 1
Verified patient with two identifiers. Spoke with patient regarding ECHO test results. Dysphagia 2/2 large mass in the retrotracheal region with displacement of the trachea anteriorly, compression of the pharynx and proximal esophagus  - keep patient NPO with PEG feeds  - appreciate SLP follow-up  - completed palliative radiation  - keep trach of same size and one size down at bedside at all times  - continue pain control per palliative

## 2023-03-02 NOTE — PROGRESS NOTE ADULT - ASSESSMENT
52M with metastatic lung cancer, HTN, anxiety, chronic pain on percocet, prior substance abuse who was admitted to [facility] on 2/10/23 due to difficulty swallowing. 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 Pt is s/p trach and PEG and is undergoing palliative radiation

## 2023-03-02 NOTE — PROGRESS NOTE ADULT - PROBLEM SELECTOR PLAN 3
- pain, erythema, and small purulent drainage from gastrostomy site  - new fever 3/1  - wound care recs and instructions appreciated  - c/w empiric ceftriaxone and vancomycin (check MRSA PCR) for 5-7 days  - patient agreable to CTAP with cont to check for abscess  - associated with mild hypotension, responsive to IVF

## 2023-03-02 NOTE — PROGRESS NOTE ADULT - ASSESSMENT
Anemia due ro malignancy and concurrent medical issues.  Monitor cbc and provide transfusional support as needed.  Consider stool guaiac    Stage IV NSCLC - not a candidate for systemic therapy at this time.   Palliative team input noted. Hopsice being considered.  treated palliative xrt to neck.     inpt mgmt as per med.

## 2023-03-02 NOTE — PROGRESS NOTE ADULT - SUBJECTIVE AND OBJECTIVE BOX
COSTA MELO  MRN-[id number]    Patient is a 52y old  Male who presents with a chief complaint of Palliative Radiation (01 Mar 2023 14:28)      Review of System  REVIEW OF SYSTEMS      General:	Denies fatigue, fevers, chills, sweats, decreased appetite.    Skin/Breast: denies pruritis, rash  	  Ophthalmologic: no change in vision or blurring  	  HEENT	Denies dry mouth, oral sores, dysphagia,  change in hearing.    Respiratory and Thorax:  cough, sob, wheeze, hemoptysis  	  Cardiovascular:	no cp , palp, orthopnea    Gastrointestinal:	no n/v/d constipation    Genitourinary:	no dysuria of frequency, no hematuria, no flank pain    Musculoskeletal:	no bone or joint pain. no muscle aches.     Neurological:	no change in sensory or motor function. no headache. no weakness.     Psychiatric:	no depression, no anxiety, insomnia.     Hematology/Lymphatics:	no bleeding or bruising        Current Meds  MEDICATIONS  (STANDING):  acetaminophen   IVPB .. 1000 milliGRAM(s) IV Intermittent once  albuterol/ipratropium for Nebulization 3 milliLiter(s) Nebulizer every 6 hours  cefTRIAXone   IVPB 1000 milliGRAM(s) IV Intermittent every 24 hours  dextrose 5% + sodium chloride 0.9%. 1000 milliLiter(s) (75 mL/Hr) IV Continuous <Continuous>  gabapentin Solution 600 milliGRAM(s) Oral three times a day  heparin   Injectable 5000 Unit(s) SubCutaneous every 8 hours  influenza   Vaccine 0.5 milliLiter(s) IntraMuscular once  levothyroxine 50 MICROGram(s) Oral daily  LORazepam     Tablet 1 milliGRAM(s) Oral <User Schedule>  methadone   Solution 15 milliGRAM(s) Oral three times a day  nicotine - 21 mG/24Hr(s) Patch 1 Patch Transdermal daily  pantoprazole  Injectable 40 milliGRAM(s) IV Push two times a day  QUEtiapine 25 milliGRAM(s) Oral at bedtime  senna 2 Tablet(s) Oral at bedtime  vancomycin  IVPB 750 milliGRAM(s) IV Intermittent every 12 hours    MEDICATIONS  (PRN):  albuterol    90 MICROgram(s) HFA Inhaler 2 Puff(s) Inhalation every 6 hours PRN Bronchospasm  HYDROmorphone   Tablet 4 milliGRAM(s) Oral every 4 hours PRN Moderate Pain (4 - 6)  HYDROmorphone   Tablet 6 milliGRAM(s) Oral every 4 hours PRN Severe Pain (7 - 10)  hydrOXYzine hydrochloride Syrup 25 milliGRAM(s) Oral every 6 hours PRN Anxiety  naloxone Injectable 0.1 milliGRAM(s) IV Push every 3 minutes PRN For ANY of the following changes in patient status:  A. RR LESS THAN 10 breaths per minute, B. Oxygen saturation LESS THAN 90%, C. Sedation score of 6  ondansetron Injectable 4 milliGRAM(s) IV Push every 8 hours PRN Nausea and/or Vomiting  polyethylene glycol 3350 17 Gram(s) Oral daily PRN Constipation      Vitals  Vital Signs Last 24 Hrs  T(C): 37.4 (02 Mar 2023 01:41), Max: 39.1 (01 Mar 2023 14:00)  T(F): 99.4 (02 Mar 2023 01:41), Max: 102.4 (01 Mar 2023 14:00)  HR: 88 (02 Mar 2023 01:41) (83 - 105)  BP: 96/58 (02 Mar 2023 01:41) (92/53 - 109/69)  BP(mean): --  RR: 17 (02 Mar 2023 01:41) (12 - 18)  SpO2: 100% (02 Mar 2023 01:41) (92% - 100%)    Parameters below as of 02 Mar 2023 01:41  Patient On (Oxygen Delivery Method): tracheostomy collar        Physical Exam  PHYSICAL EXAM:      Constitutional: NAD    Eyes: PERRLA EOMI, anicteric sclera    Heent :No oral sores, no pharyngeal injection. moist mucosa.    Neck: supple, no jvd, no LAD    Respiratory: CTA b/l     Cardiovascular: s1s2, no m/g/r    Gastrointestinal: soft, nt, nd, + BS    Extremities: no c/c/e    Neurological:A&O x 3 moves all ext.    Skin: no rash on exposed skin    Lymph Nodes: no lymphadenopathy.              Lab  CBC Full  -  ( 01 Mar 2023 21:58 )  WBC Count : 6.31 K/uL  RBC Count : 2.87 M/uL  Hemoglobin : 7.8 g/dL  Hematocrit : 24.7 %  Platelet Count - Automated : 267 K/uL  Mean Cell Volume : 86.1 fL  Mean Cell Hemoglobin : 27.2 pg  Mean Cell Hemoglobin Concentration : 31.6 gm/dL  Auto Neutrophil # : 4.47 K/uL  Auto Lymphocyte # : 0.96 K/uL  Auto Monocyte # : 0.68 K/uL  Auto Eosinophil # : 0.18 K/uL  Auto Basophil # : 0.01 K/uL  Auto Neutrophil % : 70.7 %  Auto Lymphocyte % : 15.2 %  Auto Monocyte % : 10.8 %  Auto Eosinophil % : 2.9 %  Auto Basophil % : 0.2 %    03-01    133<L>  |  97<L>  |  9   ----------------------------<  122<H>  3.8   |  27  |  0.53    Ca    8.1<L>      01 Mar 2023 06:40  Phos  3.0     03-01  Mg     1.60     03-01          Rad:    Assessment/Plan   COSTA MELO  MRN-[id number]    Patient is a 52y old  Male who presents with a chief complaint of Palliative Radiation (01 Mar 2023 14:28)      Review of System    seen with rn at bedside  fevers and low bp overnight.   currently comfortable on trach collar    Current Meds  MEDICATIONS  (STANDING):  acetaminophen   IVPB .. 1000 milliGRAM(s) IV Intermittent once  albuterol/ipratropium for Nebulization 3 milliLiter(s) Nebulizer every 6 hours  cefTRIAXone   IVPB 1000 milliGRAM(s) IV Intermittent every 24 hours  dextrose 5% + sodium chloride 0.9%. 1000 milliLiter(s) (75 mL/Hr) IV Continuous <Continuous>  gabapentin Solution 600 milliGRAM(s) Oral three times a day  heparin   Injectable 5000 Unit(s) SubCutaneous every 8 hours  influenza   Vaccine 0.5 milliLiter(s) IntraMuscular once  levothyroxine 50 MICROGram(s) Oral daily  LORazepam     Tablet 1 milliGRAM(s) Oral <User Schedule>  methadone   Solution 15 milliGRAM(s) Oral three times a day  nicotine - 21 mG/24Hr(s) Patch 1 Patch Transdermal daily  pantoprazole  Injectable 40 milliGRAM(s) IV Push two times a day  QUEtiapine 25 milliGRAM(s) Oral at bedtime  senna 2 Tablet(s) Oral at bedtime  vancomycin  IVPB 750 milliGRAM(s) IV Intermittent every 12 hours    MEDICATIONS  (PRN):  albuterol    90 MICROgram(s) HFA Inhaler 2 Puff(s) Inhalation every 6 hours PRN Bronchospasm  HYDROmorphone   Tablet 4 milliGRAM(s) Oral every 4 hours PRN Moderate Pain (4 - 6)  HYDROmorphone   Tablet 6 milliGRAM(s) Oral every 4 hours PRN Severe Pain (7 - 10)  hydrOXYzine hydrochloride Syrup 25 milliGRAM(s) Oral every 6 hours PRN Anxiety  naloxone Injectable 0.1 milliGRAM(s) IV Push every 3 minutes PRN For ANY of the following changes in patient status:  A. RR LESS THAN 10 breaths per minute, B. Oxygen saturation LESS THAN 90%, C. Sedation score of 6  ondansetron Injectable 4 milliGRAM(s) IV Push every 8 hours PRN Nausea and/or Vomiting  polyethylene glycol 3350 17 Gram(s) Oral daily PRN Constipation      Vital Signs Last 24 Hrs  T(C): 37.4 (02 Mar 2023 01:41), Max: 39.1 (01 Mar 2023 14:00)  T(F): 99.4 (02 Mar 2023 01:41), Max: 102.4 (01 Mar 2023 14:00)  HR: 88 (02 Mar 2023 01:41) (83 - 105)  BP: 96/58 (02 Mar 2023 01:41) (92/53 - 109/69)  BP(mean): --  RR: 17 (02 Mar 2023 01:41) (12 - 18)  SpO2: 100% (02 Mar 2023 01:41) (92% - 100%)    Parameters below as of 02 Mar 2023 01:41  Patient On (Oxygen Delivery Method): tracheostomy collar      PHYSICAL EXAM:     NAD    Lab  CBC Full  -  ( 01 Mar 2023 21:58 )  WBC Count : 6.31 K/uL  RBC Count : 2.87 M/uL  Hemoglobin : 7.8 g/dL  Hematocrit : 24.7 %  Platelet Count - Automated : 267 K/uL  Mean Cell Volume : 86.1 fL  Mean Cell Hemoglobin : 27.2 pg  Mean Cell Hemoglobin Concentration : 31.6 gm/dL  Auto Neutrophil # : 4.47 K/uL  Auto Lymphocyte # : 0.96 K/uL  Auto Monocyte # : 0.68 K/uL  Auto Eosinophil # : 0.18 K/uL  Auto Basophil # : 0.01 K/uL  Auto Neutrophil % : 70.7 %  Auto Lymphocyte % : 15.2 %  Auto Monocyte % : 10.8 %  Auto Eosinophil % : 2.9 %  Auto Basophil % : 0.2 %    03-01    133<L>  |  97<L>  |  9   ----------------------------<  122<H>  3.8   |  27  |  0.53    Ca    8.1<L>      01 Mar 2023 06:40  Phos  3.0     03-01  Mg     1.60     03-01          Rad:    Assessment/Plan

## 2023-03-02 NOTE — PROGRESS NOTE ADULT - PROBLEM SELECTOR PLAN 5
Progressive NSCLC with disease progression in the neck causing stridor and dysphagia  - palliative radiation completed today  - per hem/onc at [place] pt no longer candidate for DMT  - follow up hem/onc recs - appreciate outpatient oncology recs regarding whether patient should continue Keytruda or can explore other options  - pain management per palliative- on Dilaudid per PEG and methadone; palliative care managing meds  - BP borderline at times, likely with use of opioids, but currently tolerable

## 2023-03-02 NOTE — PROVIDER CONTACT NOTE (OTHER) - ACTION/TREATMENT ORDERED:
MD made aware. MD states will order 250mL bolus and OK for RN to give 6am medications (gabapentin, heparin, synthroid, methadone, protonix, and vancomycin)

## 2023-03-02 NOTE — PROGRESS NOTE ADULT - PROBLEM SELECTOR PLAN 4
Multifactorial 2/2 metastatic lung cancer, recent surgery, illness. no evidence of acute bleed   - received 1 unit PRBC on 2/25, 2/26.  - again with anemia,  - cont to monitor. goal Hb >7

## 2023-03-02 NOTE — PROGRESS NOTE ADULT - SUBJECTIVE AND OBJECTIVE BOX
[facility] Division of Hospital Medicine  Miki NjDO  Available via MS Teams  In house pager [phone]    SUBJECTIVE / OVERNIGHT EVENTS:  Overnight febrile and mildly hypotensive. Has been hypotensive in the past. Still mentating well and physically looks comfortable. Given IVF.  Says pain is about the same, particularly at the PEG site.    ADDITIONAL REVIEW OF SYSTEMS:    MEDICATIONS  (STANDING):  albuterol/ipratropium for Nebulization 3 milliLiter(s) Nebulizer every 6 hours  cefTRIAXone   IVPB 1000 milliGRAM(s) IV Intermittent every 24 hours  dextrose 5% + sodium chloride 0.9%. 1000 milliLiter(s) (75 mL/Hr) IV Continuous <Continuous>  gabapentin Solution 600 milliGRAM(s) Oral three times a day  heparin   Injectable 5000 Unit(s) SubCutaneous every 8 hours  influenza   Vaccine 0.5 milliLiter(s) IntraMuscular once  levothyroxine 50 MICROGram(s) Oral daily  LORazepam     Tablet 1 milliGRAM(s) Oral <User Schedule>  methadone   Solution 15 milliGRAM(s) Oral three times a day  nicotine - 21 mG/24Hr(s) Patch 1 Patch Transdermal daily  pantoprazole  Injectable 40 milliGRAM(s) IV Push two times a day  QUEtiapine 25 milliGRAM(s) Oral at bedtime  senna 2 Tablet(s) Oral at bedtime  vancomycin  IVPB 750 milliGRAM(s) IV Intermittent every 12 hours    MEDICATIONS  (PRN):  albuterol    90 MICROgram(s) HFA Inhaler 2 Puff(s) Inhalation every 6 hours PRN Bronchospasm  HYDROmorphone   Tablet 4 milliGRAM(s) Oral every 4 hours PRN Moderate Pain (4 - 6)  HYDROmorphone   Tablet 6 milliGRAM(s) Oral every 4 hours PRN Severe Pain (7 - 10)  hydrOXYzine hydrochloride Syrup 25 milliGRAM(s) Oral every 6 hours PRN Anxiety  naloxone Injectable 0.1 milliGRAM(s) IV Push every 3 minutes PRN For ANY of the following changes in patient status:  A. RR LESS THAN 10 breaths per minute, B. Oxygen saturation LESS THAN 90%, C. Sedation score of 6  ondansetron Injectable 4 milliGRAM(s) IV Push every 8 hours PRN Nausea and/or Vomiting  polyethylene glycol 3350 17 Gram(s) Oral daily PRN Constipation      I&O's Summary    01 Mar 2023 07:01  -  02 Mar 2023 07:00  --------------------------------------------------------  IN: 4019 mL / OUT: 500 mL / NET: 3519 mL        PHYSICAL EXAM:  Vital Signs Last 24 Hrs  T(C): 36.7 (02 Mar 2023 09:51), Max: 39.1 (01 Mar 2023 14:00)  T(F): 98 (02 Mar 2023 09:51), Max: 102.4 (01 Mar 2023 14:00)  HR: 72 (02 Mar 2023 10:58) (72 - 102)  BP: 102/60 (02 Mar 2023 09:51) (82/48 - 109/69)  BP(mean): --  RR: 17 (02 Mar 2023 09:51) (16 - 17)  SpO2: 100% (02 Mar 2023 09:51) (95% - 100%)    Parameters below as of 02 Mar 2023 10:58  Patient On (Oxygen Delivery Method): tracheostomy collar      CONSTITUTIONAL: NAD, well-developed, well-groomed  EYES: PERRLA; conjunctiva and sclera clear  ENMT: Moist oral mucosa, no pharyngeal injection or exudates; normal dentition; trach in place  NECK: Supple, no palpable masses; no thyromegaly  RESPIRATORY: Normal respiratory effort; lungs are clear to auscultation bilaterally  CARDIOVASCULAR: Regular rate and rhythm, normal S1 and S2, no murmur/rub/gallop; No lower extremity edema; Peripheral pulses are 2+ bilaterally  ABDOMEN: PEG tube site with tenderness on medial aspect with erythema and tenderness at site with small purulence; normoactive bowel sounds, no rebound/guarding; No hepatosplenomegaly  MUSCULOSKELETAL:  no clubbing or cyanosis of digits; no joint swelling or tenderness to palpation  PSYCH: A+O to person, place, and time; affect appropriate  NEUROLOGY: CN 2-12 are intact and symmetric; no gross sensory deficits   SKIN: No rashes; no palpable lesions    LABS:                        7.4    6.14  )-----------( 259      ( 02 Mar 2023 04:35 )             23.5     03-02    133<L>  |  97<L>  |  7   ----------------------------<  130<H>  3.6   |  25  |  0.46<L>    Ca    7.9<L>      02 Mar 2023 04:35  Phos  3.0     03-02  Mg     1.60     03-02                SARS-CoV-2: NotDetec (01 Mar 2023 15:00)  COVID-19 PCR: NotDetec (15 Feb 2023 18:47)  COVID-19 PCR: NotDetec (26 Sep 2022 10:41)  COVID-19 PCR: NotDetec (14 Sep 2022 13:16)

## 2023-03-02 NOTE — CHART NOTE - NSCHARTNOTEFT_GEN_A_CORE
Pt seen for severe malnutrition follow up     Medical Course: 52 year old male with metastatic lung cancer, HTN, anxiety, chronic pain on percocet, prior substance abuse who was admitted to [facility] on 2/10/23 due to difficulty swallowing. 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 Pt is s/p trach and PEG and is undergoing palliative radiation    Nutrition Course: Pt awake in bed, nodding yes and no to questions. NPO receiving all nutrition/hydration via Gtube. Pt transitioned to bolus feedings 2/28. Pt reports tolerated bolus feedings of Twocal HN, and reports that acid reflux from previous RD visit has resolved with this formula change. Denies any GI distress, besides discomfort around Gtube site which medical team is aware of and being treated. Last BM 3/1 per RN flowsheets. Swallow assessment completed on 3/1 with recommendations to continue enteral feedings for nutrition/hydration. May consider vitamin D supplement if not contraindicated 2/2 low vitamin D and aid in calcium absorption (low Ca).   Recommend continue current TF regimen of bolus feedings TwoCal HN 210ml Q6h to provide 840ml total formula, 1680kcal (32kcal/kg), 70g pro (1.3 g/kg), 588ml free water.     Diet Prescription: Diet, NPO with Tube Feed:   Tube Feeding Modality: Gastrostomy  TwoCal HN (TWOCALHNRTH)  Total Volume for 24 Hours (mL): 840  Bolus  Total Volume of Bolus (mL):  210  Total # of Feeds: 4  Tube Feed Frequency: Every 6 hours   Tube Feed Start Time: 10:42  Bolus Feed Rate (mL per Hour): 210   Bolus Feed Duration (in Hours): 0  Free Water Flush  Bolus   Total Volume per Flush (mL): 588   Frequency: Every 6 Hours   Total Daily Volume of Flush (mL): 588    Start Time: 15:00 (02-28-23 @ 10:42)    Pertinent Medications: MEDICATIONS  (STANDING):  albuterol/ipratropium for Nebulization 3 milliLiter(s) Nebulizer every 6 hours  cefTRIAXone   IVPB 1000 milliGRAM(s) IV Intermittent every 24 hours  dextrose 5% + sodium chloride 0.9%. 1000 milliLiter(s) (75 mL/Hr) IV Continuous <Continuous>  gabapentin Solution 600 milliGRAM(s) Oral three times a day  heparin   Injectable 5000 Unit(s) SubCutaneous every 8 hours  influenza   Vaccine 0.5 milliLiter(s) IntraMuscular once  levothyroxine 50 MICROGram(s) Oral daily  LORazepam     Tablet 1 milliGRAM(s) Oral <User Schedule>  methadone   Solution 15 milliGRAM(s) Oral three times a day  nicotine - 21 mG/24Hr(s) Patch 1 Patch Transdermal daily  pantoprazole  Injectable 40 milliGRAM(s) IV Push two times a day  QUEtiapine 25 milliGRAM(s) Oral at bedtime  senna 2 Tablet(s) Oral at bedtime  vancomycin  IVPB 750 milliGRAM(s) IV Intermittent every 12 hours    MEDICATIONS  (PRN):  albuterol    90 MICROgram(s) HFA Inhaler 2 Puff(s) Inhalation every 6 hours PRN Bronchospasm  HYDROmorphone   Tablet 4 milliGRAM(s) Oral every 4 hours PRN Moderate Pain (4 - 6)  HYDROmorphone   Tablet 6 milliGRAM(s) Oral every 4 hours PRN Severe Pain (7 - 10)  hydrOXYzine hydrochloride Syrup 25 milliGRAM(s) Oral every 6 hours PRN Anxiety  naloxone Injectable 0.1 milliGRAM(s) IV Push every 3 minutes PRN For ANY of the following changes in patient status:  A. RR LESS THAN 10 breaths per minute, B. Oxygen saturation LESS THAN 90%, C. Sedation score of 6  ondansetron Injectable 4 milliGRAM(s) IV Push every 8 hours PRN Nausea and/or Vomiting  polyethylene glycol 3350 17 Gram(s) Oral daily PRN Constipation    Pertinent Labs: 03-02 Na133 mmol/L<L> Glu 130 mg/dL<H> K+ 3.6 mmol/L Cr  0.46 mg/dL<L> BUN 7 mg/dL 03-02 Phos 3.0 mg/dL 02-12 PAB 10 mg/dL<L>    Weight: Height (cm): 182.9 (02-16 @ 06:00), 170.2 (02-10 @ 08:52)  Weight (kg): 52.2 (02-16 @ 06:00), 56.2 (02-10 @ 08:52)  BMI (kg/m2): 15.6 (02-16 @ 06:00), 19.4 (02-10 @ 08:52)  Weight Assessment: No new weight to assess.      Physical Assessment, per flowsheets:  Edema: +1 edema left arm.  Skin: Intact w/ no pressure injuries noted per wound note 3/1.    Estimated Needs:   [X] No change since previous assessment    Previous Nutrition Diagnosis: severe protein calorie malnutrition   Nutrition Diagnosis is [x ] ongoing   New Nutrition Diagnosis: [x ] not applicable     Interventions:   1) Recommend continue Bolus feeds of TwoCal HN 210ml Q6h (4x/day)   2) Free water flush deferred to MD  3) Consider vitamin D supplement 2/2 low Vit D and low calcium if not contraindicated  4) Obtain updated weight and monitor weekly weights   5) RD available prm    Monitor & Evaluate:  Tolerance to enteral feeding, nutrition related lab values, weight trends, BMs/GI distress, hydration status, skin integrity.    J[place]elyn [place]| [id number] (Also available on TEAMS)

## 2023-03-03 NOTE — PROGRESS NOTE ADULT - PROBLEM SELECTOR PLAN 5
Progressive NSCLC with disease progression in the neck causing stridor and dysphagia  - palliative radiation completed today  - per hem/onc at [place] pt no longer candidate for DMT  - follow up hem/onc recs - appreciate outpatient oncology recs regarding whether patient should continue Keytruda or can explore other options  - pain management per palliative- on Dilaudid per PEG and methadone; palliative care managing meds  - BP borderline at times, likely with use of opioids, but currently tolerable  Masses progressing on CTAP 3/3

## 2023-03-03 NOTE — PROGRESS NOTE ADULT - ASSESSMENT
Stage IV NSCLC s/p RT to neck for mass and has trach and PEG. Not a candidate for systemic therapy and may never be. Unclear at this time.    Anemia - Hgb lower. manage with supportive transfusions as needed.     care per medicine

## 2023-03-03 NOTE — PROGRESS NOTE ADULT - SUBJECTIVE AND OBJECTIVE BOX
Patient is a 52y old  Male who presents with a chief complaint of Palliative Radiation (03 Mar 2023 13:13)      Medication:   albuterol    90 MICROgram(s) HFA Inhaler 2 Puff(s) Inhalation every 6 hours PRN  albuterol/ipratropium for Nebulization 3 milliLiter(s) Nebulizer every 6 hours  cefTRIAXone   IVPB 1000 milliGRAM(s) IV Intermittent every 24 hours  dextrose 5% + sodium chloride 0.9%. 1000 milliLiter(s) IV Continuous <Continuous>  gabapentin Solution 600 milliGRAM(s) Oral three times a day  heparin   Injectable 5000 Unit(s) SubCutaneous every 8 hours  HYDROmorphone   Tablet 4 milliGRAM(s) Oral every 4 hours PRN  HYDROmorphone   Tablet 6 milliGRAM(s) Oral every 4 hours PRN  hydrOXYzine hydrochloride Syrup 25 milliGRAM(s) Oral every 6 hours PRN  influenza   Vaccine 0.5 milliLiter(s) IntraMuscular once  levothyroxine 50 MICROGram(s) Oral daily  LORazepam     Tablet 1 milliGRAM(s) Oral <User Schedule>  methadone   Solution 15 milliGRAM(s) Oral three times a day  naloxone Injectable 0.1 milliGRAM(s) IV Push every 3 minutes PRN  nicotine - 21 mG/24Hr(s) Patch 1 Patch Transdermal daily  ondansetron Injectable 4 milliGRAM(s) IV Push every 8 hours PRN  pantoprazole  Injectable 40 milliGRAM(s) IV Push two times a day  polyethylene glycol 3350 17 Gram(s) Oral daily PRN  QUEtiapine 25 milliGRAM(s) Oral at bedtime  senna 2 Tablet(s) Oral at bedtime  vancomycin  IVPB 1000 milliGRAM(s) IV Intermittent every 12 hours      Physical exam    T(C): 36.5 (03-03-23 @ 14:00), Max: 37.2 (03-03-23 @ 01:51)  HR: 80 (03-03-23 @ 14:00) (80 - 94)  BP: 95/55 (03-03-23 @ 14:00) (92/52 - 102/62)  RR: 18 (03-03-23 @ 14:00) (16 - 18)  SpO2: 100% (03-03-23 @ 14:00) (97% - 100%)  Wt(kg): --    resting NAD  Trach  resp non labored    Labs                              7.5    4.75  )-----------( 256      ( 03 Mar 2023 06:30 )             23.5       03-03    135  |  99  |  7   ----------------------------<  89  3.7   |  29  |  0.52    Ca    8.1<L>      03 Mar 2023 06:30  Phos  2.7     03-03  Mg     1.60     03-03            [id number]

## 2023-03-03 NOTE — PROGRESS NOTE ADULT - PROBLEM SELECTOR PLAN 3
- pain, erythema, and small purulent drainage from gastrostomy site  - new fever 3/1  - wound care recs and instructions appreciated  - c/w empiric ceftriaxone and vancomycin (check MRSA PCR) for 7 days  - associated with mild hypotension, responsive to IVF  - CTAP with superficial infection but no defined abscess; it does also show some possibly developing hip abscesses

## 2023-03-03 NOTE — PROGRESS NOTE ADULT - ASSESSMENT
52M with metastatic lung cancer, HTN, anxiety, chronic pain on percocet, prior substance abuse who was admitted to [facility] on 2/10/23 due to difficulty swallowing. 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 Pt is s/p trach and PEG and is undergoing palliative radiation

## 2023-03-03 NOTE — PROGRESS NOTE ADULT - SUBJECTIVE AND OBJECTIVE BOX
[facility] Division of Hospital Medicine  Miki Nj DO  Available via MS Teams  In house pager [phone]    SUBJECTIVE / OVERNIGHT EVENTS:  No acute events overnight.  Patient asking for medications to be scheduled at certain times and given together.  No acute complaints otherwise.    ADDITIONAL REVIEW OF SYSTEMS:    MEDICATIONS  (STANDING):  albuterol/ipratropium for Nebulization 3 milliLiter(s) Nebulizer every 6 hours  cefTRIAXone   IVPB 1000 milliGRAM(s) IV Intermittent every 24 hours  dextrose 5% + sodium chloride 0.9%. 1000 milliLiter(s) (75 mL/Hr) IV Continuous <Continuous>  gabapentin Solution 600 milliGRAM(s) Oral three times a day  heparin   Injectable 5000 Unit(s) SubCutaneous every 8 hours  influenza   Vaccine 0.5 milliLiter(s) IntraMuscular once  levothyroxine 50 MICROGram(s) Oral daily  LORazepam     Tablet 1 milliGRAM(s) Oral <User Schedule>  methadone   Solution 15 milliGRAM(s) Oral three times a day  nicotine - 21 mG/24Hr(s) Patch 1 Patch Transdermal daily  pantoprazole  Injectable 40 milliGRAM(s) IV Push two times a day  QUEtiapine 25 milliGRAM(s) Oral at bedtime  senna 2 Tablet(s) Oral at bedtime  vancomycin  IVPB 1000 milliGRAM(s) IV Intermittent every 12 hours    MEDICATIONS  (PRN):  albuterol    90 MICROgram(s) HFA Inhaler 2 Puff(s) Inhalation every 6 hours PRN Bronchospasm  HYDROmorphone   Tablet 4 milliGRAM(s) Oral every 4 hours PRN Moderate Pain (4 - 6)  HYDROmorphone   Tablet 6 milliGRAM(s) Oral every 4 hours PRN Severe Pain (7 - 10)  hydrOXYzine hydrochloride Syrup 25 milliGRAM(s) Oral every 6 hours PRN Anxiety  naloxone Injectable 0.1 milliGRAM(s) IV Push every 3 minutes PRN For ANY of the following changes in patient status:  A. RR LESS THAN 10 breaths per minute, B. Oxygen saturation LESS THAN 90%, C. Sedation score of 6  ondansetron Injectable 4 milliGRAM(s) IV Push every 8 hours PRN Nausea and/or Vomiting  polyethylene glycol 3350 17 Gram(s) Oral daily PRN Constipation      I&O's Summary    02 Mar 2023 07:01  -  03 Mar 2023 07:00  --------------------------------------------------------  IN: 3885 mL / OUT: 0 mL / NET: 3885 mL    03 Mar 2023 07:01  -  03 Mar 2023 13:14  --------------------------------------------------------  IN: 600 mL / OUT: 0 mL / NET: 600 mL        PHYSICAL EXAM:  Vital Signs Last 24 Hrs  T(C): 37.1 (03 Mar 2023 10:04), Max: 37.4 (02 Mar 2023 14:00)  T(F): 98.8 (03 Mar 2023 10:04), Max: 99.3 (02 Mar 2023 14:00)  HR: 80 (03 Mar 2023 11:14) (80 - 95)  BP: 92/52 (03 Mar 2023 10:04) (92/52 - 110/69)  BP(mean): --  RR: 17 (03 Mar 2023 10:04) (16 - 18)  SpO2: 97% (03 Mar 2023 10:04) (97% - 100%)    Parameters below as of 03 Mar 2023 10:04  Patient On (Oxygen Delivery Method): tracheostomy collar      CONSTITUTIONAL: NAD, well-developed, well-groomed  EYES: PERRLA; conjunctiva and sclera clear  ENMT: Moist oral mucosa, no pharyngeal injection or exudates; trach in place with overlying trach collar  NECK: Supple, no palpable masses; no thyromegaly  RESPIRATORY: Normal respiratory effort; lungs are clear to auscultation bilaterally  CARDIOVASCULAR: Regular rate and rhythm, normal S1 and S2, no murmur/rub/gallop; No lower extremity edema; Peripheral pulses are 2+ bilaterally  ABDOMEN: normoactive bowel sounds, no rebound/guarding; No hepatosplenomegaly; PEG tube site with some tenderness, small purulent drainage, mild erythema  MUSCULOSKELETAL:  no clubbing or cyanosis of digits; no joint swelling or tenderness to palpation  PSYCH: A+O to person, place, and time; affect appropriate  NEUROLOGY: CN 2-12 are intact and symmetric; no gross sensory deficits   SKIN: No rashes; no palpable lesions    LABS:                        7.5    4.75  )-----------( 256      ( 03 Mar 2023 06:30 )             23.5     03-03    135  |  99  |  7   ----------------------------<  89  3.7   |  29  |  0.52    Ca    8.1<L>      03 Mar 2023 06:30  Phos  2.7     03-03  Mg     1.60     03-03                Culture - Blood (collected 01 Mar 2023 16:55)  Source: .Blood Blood-Venous  Preliminary Report (02 Mar 2023 19:01):    No growth to date.    Culture - Blood (collected 01 Mar 2023 16:25)  Source: .Blood Blood  Preliminary Report (02 Mar 2023 19:01):    No growth to date.      SARS-CoV-2: NotDetec (01 Mar 2023 15:00)  COVID-19 PCR: NotDetec (15 Feb 2023 18:47)  COVID-19 PCR: NotDetec (26 Sep 2022 10:41)  COVID-19 PCR: NotDetec (14 Sep 2022 13:16)      RADIOLOGY & ADDITIONAL TESTS:  New Results Reviewed Today:   New Imaging Personally Reviewed Today:  New Electrocardiogram Personally Reviewed Today:  Prior or Outpatient Records Reviewed Today:    COMMUNICATION:  Care Discussed with Consultants/Other Providers and Details of Discussion:  Discussions with Patient/Family:  PCP Communication:

## 2023-03-03 NOTE — PROGRESS NOTE ADULT - PROBLEM SELECTOR PLAN 4
Multifactorial 2/2 metastatic lung cancer, recent surgery, illness. no evidence of acute bleed   - has required multiple PRBC transfusions  - cont to monitor. goal Hb >7

## 2023-03-03 NOTE — PROGRESS NOTE ADULT - PROBLEM SELECTOR PLAN 2
s/p zosyn course  CTAP 3/3 again reveals left-sided pleural effusion  Possibly related to repeat attempt at swallow evaluation 3/1, when he was febrile  Treat again with course of vanc and ceftriaxone since 3/1 for 7 days

## 2023-03-04 NOTE — PROGRESS NOTE ADULT - SUBJECTIVE AND OBJECTIVE BOX
Patient is a 52y old  Male who presents with a chief complaint of Palliative Radiation (04 Mar 2023 14:16)  In the restroom.    Medication:   acetaminophen    Suspension .. 650 milliGRAM(s) Oral every 6 hours  albuterol    90 MICROgram(s) HFA Inhaler 2 Puff(s) Inhalation every 6 hours PRN  albuterol/ipratropium for Nebulization 3 milliLiter(s) Nebulizer every 6 hours  cefTRIAXone   IVPB 1000 milliGRAM(s) IV Intermittent every 24 hours  dextrose 5% + sodium chloride 0.9%. 1000 milliLiter(s) IV Continuous <Continuous>  gabapentin Solution 600 milliGRAM(s) Oral three times a day  heparin   Injectable 5000 Unit(s) SubCutaneous every 8 hours  HYDROmorphone   Tablet 4 milliGRAM(s) Oral every 4 hours PRN  HYDROmorphone   Tablet 6 milliGRAM(s) Oral every 4 hours PRN  hydrOXYzine hydrochloride Syrup 25 milliGRAM(s) Oral every 6 hours PRN  influenza   Vaccine 0.5 milliLiter(s) IntraMuscular once  levothyroxine 50 MICROGram(s) Oral daily  lidocaine   4% Patch 1 Patch Transdermal daily PRN  LORazepam     Tablet 1 milliGRAM(s) Oral <User Schedule>  magnesium oxide 400 milliGRAM(s) Oral two times a day with meals  methadone   Solution 15 milliGRAM(s) Oral three times a day  naloxone Injectable 0.1 milliGRAM(s) IV Push every 3 minutes PRN  nicotine - 21 mG/24Hr(s) Patch 1 Patch Transdermal daily  ondansetron Injectable 4 milliGRAM(s) IV Push every 8 hours PRN  pantoprazole  Injectable 40 milliGRAM(s) IV Push two times a day  polyethylene glycol 3350 17 Gram(s) Oral daily PRN  QUEtiapine 25 milliGRAM(s) Oral at bedtime  senna 2 Tablet(s) Oral at bedtime  vancomycin  IVPB 1000 milliGRAM(s) IV Intermittent every 12 hours      Physical exam    T(C): 36.9 (03-04-23 @ 21:59), Max: 37.2 (03-04-23 @ 17:58)  HR: 86 (03-04-23 @ 21:59) (74 - 92)  BP: 92/58 (03-04-23 @ 21:59) (92/58 - 121/70)  RR: 18 (03-04-23 @ 21:59) (17 - 19)  SpO2: 100% (03-04-23 @ 21:59) (96% - 100%)  Wt(kg): --      Labs                         7.2    5.42  )-----------( 289      ( 04 Mar 2023 14:55 )             23.4       03-04    134<L>  |  98  |  6<L>  ----------------------------<  110<H>  3.8   |  27  |  0.44<L>    Ca    8.1<L>      04 Mar 2023 06:05  Phos  2.8     03-04  Mg     1.40     03-04            [phone]

## 2023-03-04 NOTE — PROGRESS NOTE ADULT - PROBLEM SELECTOR PLAN 5
Progressive NSCLC with disease progression in the neck causing stridor and dysphagia  - palliative radiation completed today  - per hem/onc at [facility] pt no longer candidate for DMT  - follow up hem/onc recs - appreciate outpatient oncology recs regarding whether patient should continue Keytruda or can explore other options  - pain management per palliative- on Dilaudid per PEG and methadone; palliative care managing meds  - BP borderline at times, likely with use of opioids, but currently tolerable  Masses progressing on CTAP 3/3

## 2023-03-04 NOTE — PROGRESS NOTE ADULT - PROBLEM SELECTOR PLAN 3
- pain, erythema, and small purulent drainage from gastrostomy site  - new fever 3/1  - wound care recs and instructions appreciated  - c/w empiric ceftriaxone and vancomycin (check MRSA PCR) for 7 days  - associated with mild hypotension, responsive to IVF  - CTAP with superficial infection but no defined abscess; it does also show some possibly developing hip abscesses  - pain in hips likely stemming from these abscesses - if ongoing pain and repeat fever, may consider surgical eval if within [facility]

## 2023-03-04 NOTE — PROGRESS NOTE ADULT - SUBJECTIVE AND OBJECTIVE BOX
[facility] Division of Hospital Medicine  Miki NjDO  Available via MS Teams  In house pager [phone]    SUBJECTIVE / OVERNIGHT EVENTS:  No acute events overnight.  Patient today reporting b/l hip/thigh pain.  Asking for adjustment in pain med regimen.    ADDITIONAL REVIEW OF SYSTEMS:    MEDICATIONS  (STANDING):  acetaminophen    Suspension .. 650 milliGRAM(s) Oral every 6 hours  albuterol/ipratropium for Nebulization 3 milliLiter(s) Nebulizer every 6 hours  cefTRIAXone   IVPB 1000 milliGRAM(s) IV Intermittent every 24 hours  dextrose 5% + sodium chloride 0.9%. 1000 milliLiter(s) (75 mL/Hr) IV Continuous <Continuous>  gabapentin Solution 600 milliGRAM(s) Oral three times a day  heparin   Injectable 5000 Unit(s) SubCutaneous every 8 hours  influenza   Vaccine 0.5 milliLiter(s) IntraMuscular once  levothyroxine 50 MICROGram(s) Oral daily  LORazepam     Tablet 1 milliGRAM(s) Oral <User Schedule>  magnesium oxide 400 milliGRAM(s) Oral two times a day with meals  methadone   Solution 15 milliGRAM(s) Oral three times a day  nicotine - 21 mG/24Hr(s) Patch 1 Patch Transdermal daily  pantoprazole  Injectable 40 milliGRAM(s) IV Push two times a day  QUEtiapine 25 milliGRAM(s) Oral at bedtime  senna 2 Tablet(s) Oral at bedtime  vancomycin  IVPB 1000 milliGRAM(s) IV Intermittent every 12 hours    MEDICATIONS  (PRN):  albuterol    90 MICROgram(s) HFA Inhaler 2 Puff(s) Inhalation every 6 hours PRN Bronchospasm  HYDROmorphone   Tablet 4 milliGRAM(s) Oral every 4 hours PRN Moderate Pain (4 - 6)  HYDROmorphone   Tablet 6 milliGRAM(s) Oral every 4 hours PRN Severe Pain (7 - 10)  hydrOXYzine hydrochloride Syrup 25 milliGRAM(s) Oral every 6 hours PRN Anxiety  lidocaine   4% Patch 1 Patch Transdermal daily PRN hip/ back pain  naloxone Injectable 0.1 milliGRAM(s) IV Push every 3 minutes PRN For ANY of the following changes in patient status:  A. RR LESS THAN 10 breaths per minute, B. Oxygen saturation LESS THAN 90%, C. Sedation score of 6  ondansetron Injectable 4 milliGRAM(s) IV Push every 8 hours PRN Nausea and/or Vomiting  polyethylene glycol 3350 17 Gram(s) Oral daily PRN Constipation      I&O's Summary    03 Mar 2023 07:01  -  04 Mar 2023 07:00  --------------------------------------------------------  IN: 3490 mL / OUT: 925 mL / NET: 2565 mL    04 Mar 2023 07:01  -  04 Mar 2023 14:16  --------------------------------------------------------  IN: 150 mL / OUT: 300 mL / NET: -150 mL        PHYSICAL EXAM:  Vital Signs Last 24 Hrs  T(C): 37.1 (04 Mar 2023 10:00), Max: 37.3 (03 Mar 2023 18:15)  T(F): 98.8 (04 Mar 2023 10:00), Max: 99.1 (03 Mar 2023 18:15)  HR: 78 (04 Mar 2023 11:54) (78 - 92)  BP: 96/53 (04 Mar 2023 10:00) (95/51 - 121/70)  BP(mean): --  RR: 19 (04 Mar 2023 10:00) (17 - 19)  SpO2: 96% (04 Mar 2023 11:54) (83% - 100%)    Parameters below as of 04 Mar 2023 11:54  Patient On (Oxygen Delivery Method): tracheostomy collar      CONSTITUTIONAL: NAD, well-developed, well-groomed  EYES: PERRLA; conjunctiva and sclera clear  ENMT: Moist oral mucosa, no pharyngeal injection or exudates; normal dentition  NECK: Supple, no palpable masses; no thyromegaly; trach with overlying trach collar  RESPIRATORY: Normal respiratory effort; lungs are clear to auscultation bilaterally  CARDIOVASCULAR: Regular rate and rhythm, normal S1 and S2, no murmur/rub/gallop; No lower extremity edema; Peripheral pulses are 2+ bilaterally  ABDOMEN: Nontender to palpation, normoactive bowel sounds, no rebound/guarding; No hepatosplenomegaly; PEG tube with some erythema at site but tenderness improving  MUSCULOSKELETAL: no clubbing or cyanosis of digits; no joint swelling or tenderness to palpation; b/l ASIS tenderness  PSYCH: A+O to person, place, and time; affect appropriate  NEUROLOGY: CN 2-12 are intact and symmetric; no gross sensory deficits   SKIN: No rashes; no palpable lesions    LABS:                        7.1    4.84  )-----------( 257      ( 04 Mar 2023 06:05 )             22.0     03-04    134<L>  |  98  |  6<L>  ----------------------------<  110<H>  3.8   |  27  |  0.44<L>    Ca    8.1<L>      04 Mar 2023 06:05  Phos  2.8     03-04  Mg     1.40     03-04                Culture - Blood (collected 01 Mar 2023 16:55)  Source: .Blood Blood-Venous  Preliminary Report (02 Mar 2023 19:01):    No growth to date.    Culture - Blood (collected 01 Mar 2023 16:25)  Source: .Blood Blood  Preliminary Report (02 Mar 2023 19:01):    No growth to date.      SARS-CoV-2: NotDetec (01 Mar 2023 15:00)  COVID-19 PCR: NotDetec (15 Feb 2023 18:47)  COVID-19 PCR: NotDetec (26 Sep 2022 10:41)  COVID-19 PCR: NotDetec (14 Sep 2022 13:16)        COMMUNICATION:  Care Discussed with Consultants/Other Providers and Details of Discussion: d/w medicine PA

## 2023-03-04 NOTE — PROGRESS NOTE ADULT - ASSESSMENT
Anemia due to malignancy and concurrent medical issues. Hgb low but relatively stable. Transfuse with PRBC for anemia related symptoms of Hgb < 7.    Stage IV NSCLC - hospice vs rehab but not a candidate for systemic therapy at this time.

## 2023-03-04 NOTE — PROGRESS NOTE ADULT - ASSESSMENT
52M with metastatic lung cancer, HTN, anxiety, chronic pain on percocet, prior substance abuse who was admitted to [facility] on 2/10/23 due to difficulty swallowing. 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 Pt is s/p trach and PEG and is undergoing palliative radiation

## 2023-03-05 NOTE — PROGRESS NOTE ADULT - SUBJECTIVE AND OBJECTIVE BOX
[facility] Division of Hospital Medicine  Miki NjDO  Available via MS Teams  In house pager [phone]    SUBJECTIVE / OVERNIGHT EVENTS:  No acute events overnight.  Patient concerned about drainage from PEG site.  Also says he has high opioid tolerance.    ADDITIONAL REVIEW OF SYSTEMS:    MEDICATIONS  (STANDING):  acetaminophen    Suspension .. 650 milliGRAM(s) Oral every 6 hours  albuterol/ipratropium for Nebulization 3 milliLiter(s) Nebulizer every 6 hours  cefTRIAXone   IVPB 1000 milliGRAM(s) IV Intermittent every 24 hours  dextrose 5% + sodium chloride 0.9%. 1000 milliLiter(s) (75 mL/Hr) IV Continuous <Continuous>  gabapentin Solution 600 milliGRAM(s) Oral three times a day  heparin   Injectable 5000 Unit(s) SubCutaneous every 8 hours  influenza   Vaccine 0.5 milliLiter(s) IntraMuscular once  levothyroxine 50 MICROGram(s) Oral daily  LORazepam     Tablet 1 milliGRAM(s) Oral <User Schedule>  methadone   Solution 15 milliGRAM(s) Oral three times a day  nicotine - 21 mG/24Hr(s) Patch 1 Patch Transdermal daily  pantoprazole  Injectable 40 milliGRAM(s) IV Push two times a day  QUEtiapine 25 milliGRAM(s) Oral at bedtime  senna 2 Tablet(s) Oral at bedtime  vancomycin  IVPB 1250 milliGRAM(s) IV Intermittent every 12 hours    MEDICATIONS  (PRN):  albuterol    90 MICROgram(s) HFA Inhaler 2 Puff(s) Inhalation every 6 hours PRN Bronchospasm  HYDROmorphone   Tablet 4 milliGRAM(s) Oral every 4 hours PRN Moderate Pain (4 - 6)  HYDROmorphone   Tablet 6 milliGRAM(s) Oral every 4 hours PRN Severe Pain (7 - 10)  hydrOXYzine hydrochloride Syrup 25 milliGRAM(s) Oral every 6 hours PRN Anxiety  lidocaine   4% Patch 1 Patch Transdermal daily PRN hip/ back pain  naloxone Injectable 0.1 milliGRAM(s) IV Push every 3 minutes PRN For ANY of the following changes in patient status:  A. RR LESS THAN 10 breaths per minute, B. Oxygen saturation LESS THAN 90%, C. Sedation score of 6  ondansetron Injectable 4 milliGRAM(s) IV Push every 8 hours PRN Nausea and/or Vomiting  polyethylene glycol 3350 17 Gram(s) Oral daily PRN Constipation      I&O's Summary    04 Mar 2023 07:01  -  05 Mar 2023 07:00  --------------------------------------------------------  IN: 1930 mL / OUT: 820 mL / NET: 1110 mL    05 Mar 2023 07:01  -  05 Mar 2023 13:13  --------------------------------------------------------  IN: 420 mL / OUT: 0 mL / NET: 420 mL        PHYSICAL EXAM:  Vital Signs Last 24 Hrs  T(C): 36.7 (05 Mar 2023 10:00), Max: 37.2 (04 Mar 2023 17:58)  T(F): 98 (05 Mar 2023 10:00), Max: 99 (04 Mar 2023 17:58)  HR: 76 (05 Mar 2023 10:00) (74 - 88)  BP: 90/52 (05 Mar 2023 10:00) (90/52 - 102/57)  BP(mean): --  RR: 18 (05 Mar 2023 10:00) (16 - 19)  SpO2: 100% (05 Mar 2023 10:00) (96% - 100%)    Parameters below as of 05 Mar 2023 10:00  Patient On (Oxygen Delivery Method): tracheostomy collar      CONSTITUTIONAL: NAD, well-developed, well-groomed  EYES: PERRLA; conjunctiva and sclera clear  ENMT: Moist oral mucosa, no pharyngeal injection or exudates  NECK: Supple, no palpable masses; no thyromegaly; trach with trach collar  RESPIRATORY: Normal respiratory effort; lungs are clear to auscultation bilaterally  CARDIOVASCULAR: Regular rate and rhythm, normal S1 and S2, no murmur/rub/gallop; No lower extremity edema; Peripheral pulses are 2+ bilaterally  ABDOMEN: Nontender to palpation, normoactive bowel sounds, no rebound/guarding; No hepatosplenomegaly; PEG site without erythema or tenderness, but with some dried yellowish drainage  MUSCULOSKELETAL:  no clubbing or cyanosis of digits; no joint swelling or tenderness to palpation  PSYCH: A+O to person, place, and time; affect appropriate  NEUROLOGY: CN 2-12 are intact and symmetric; no gross sensory deficits   SKIN: No rashes; no palpable lesions    LABS:                        7.5    5.60  )-----------( 311      ( 05 Mar 2023 07:24 )             24.5     03-05    136  |  98  |  10  ----------------------------<  124<H>  4.1   |  27  |  0.65    Ca    8.5      05 Mar 2023 07:24  Phos  2.7     03-05  Mg     1.60     03-05                SARS-CoV-2: NotDetec (01 Mar 2023 15:00)  COVID-19 PCR: NotDetec (15 Feb 2023 18:47)  COVID-19 PCR: NotDetec (26 Sep 2022 10:41)  COVID-19 PCR: NotDetec (14 Sep 2022 13:16)

## 2023-03-05 NOTE — PROGRESS NOTE ADULT - ASSESSMENT
Anemia due to malignancy and s/p RT to neck for mass and other medical issues. Hgb stable. Monitor and transfuse for Hgb < 7 or anemia related symptoms.    Stage IV NSCLC - primary oncologist has recommended hospice. Patient has been undecided. No plan for in patient systemic therapy. Out-pt f/u with primary oncologist.    Care per medical team. Supportive management from hem/onc perspective Will sign off and re consult as needed.

## 2023-03-05 NOTE — PROGRESS NOTE ADULT - ASSESSMENT
52M with metastatic lung cancer, HTN, anxiety, chronic pain on percocet, prior substance abuse who was admitted to [facility] on 2/10/23 due to difficulty swallowing. 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 Pt is s/p trach and PEG and is undergoing palliative radiation

## 2023-03-05 NOTE — PROGRESS NOTE ADULT - PROBLEM SELECTOR PLAN 5
Progressive NSCLC with disease progression in the neck causing stridor and dysphagia  - palliative radiation completed today  - per hem/onc at [place] pt no longer candidate for DMT  - follow up hem/onc recs - appreciate outpatient oncology recs regarding whether patient should continue Keytruda or can explore other options  - pain management per palliative- on Dilaudid per PEG and methadone; palliative care managing meds  - BP borderline at times, likely with use of opioids, but currently tolerable  Masses progressing on CTAP 3/3

## 2023-03-05 NOTE — PROGRESS NOTE ADULT - SUBJECTIVE AND OBJECTIVE BOX
Patient is a 52y old  Male who presents with a chief complaint of Palliative Radiation (05 Mar 2023 13:12)      Medication:   acetaminophen    Suspension .. 650 milliGRAM(s) Oral every 6 hours  albuterol    90 MICROgram(s) HFA Inhaler 2 Puff(s) Inhalation every 6 hours PRN  albuterol/ipratropium for Nebulization 3 milliLiter(s) Nebulizer every 6 hours  cefTRIAXone   IVPB 1000 milliGRAM(s) IV Intermittent every 24 hours  dextrose 5% + sodium chloride 0.9%. 1000 milliLiter(s) IV Continuous <Continuous>  gabapentin Solution 600 milliGRAM(s) Oral three times a day  heparin   Injectable 5000 Unit(s) SubCutaneous every 8 hours  HYDROmorphone   Tablet 4 milliGRAM(s) Oral every 4 hours PRN  HYDROmorphone   Tablet 6 milliGRAM(s) Oral every 4 hours PRN  hydrOXYzine hydrochloride Syrup 25 milliGRAM(s) Oral every 6 hours PRN  influenza   Vaccine 0.5 milliLiter(s) IntraMuscular once  levothyroxine 50 MICROGram(s) Oral daily  lidocaine   4% Patch 1 Patch Transdermal daily PRN  LORazepam     Tablet 1 milliGRAM(s) Oral <User Schedule>  methadone   Solution 15 milliGRAM(s) Oral three times a day  naloxone Injectable 0.1 milliGRAM(s) IV Push every 3 minutes PRN  nicotine - 21 mG/24Hr(s) Patch 1 Patch Transdermal daily  ondansetron Injectable 4 milliGRAM(s) IV Push every 8 hours PRN  pantoprazole  Injectable 40 milliGRAM(s) IV Push two times a day  polyethylene glycol 3350 17 Gram(s) Oral daily PRN  QUEtiapine 25 milliGRAM(s) Oral at bedtime  senna 2 Tablet(s) Oral at bedtime  vancomycin  IVPB 1250 milliGRAM(s) IV Intermittent every 12 hours      Physical exam    T(C): 36.7 (03-05-23 @ 10:00), Max: 37.2 (03-04-23 @ 17:58)  HR: 76 (03-05-23 @ 10:00) (74 - 88)  BP: 90/52 (03-05-23 @ 10:00) (90/52 - 102/57)  RR: 18 (03-05-23 @ 10:00) (16 - 19)  SpO2: 100% (03-05-23 @ 10:00) (97% - 100%)  Wt(kg): --    alert NAD  EOMI anicteric sclera  Trach  Cv s1 S2 RRR  Lungs clear B/L anteriorly  abd soft PEG  No LE edema or tenderness    Labs                        7.5    5.60  )-----------( 311      ( 05 Mar 2023 07:24 )             24.5       03-05    136  |  98  |  10  ----------------------------<  124<H>  4.1   |  27  |  0.65    Ca    8.5      05 Mar 2023 07:24  Phos  2.7     03-05  Mg     1.60     03-05            [phone]

## 2023-03-06 NOTE — PROGRESS NOTE ADULT - SUBJECTIVE AND OBJECTIVE BOX
[facility]  Division of Hospital Medicine  Barbara Lynne MD  Pager: [phone]      Patient is a 52y old  Male who presents with a chief complaint of Palliative Radiation (05 Mar 2023 16:03)      SUBJECTIVE / OVERNIGHT EVENTS: No acute events overnight. Patient concerned about drainage from PEG site. Examined and appears to have resolved   Reports he is in pain. Reviewed pain meds only received one time high dose   ADDITIONAL REVIEW OF SYSTEMS:    MEDICATIONS  (STANDING):  albuterol/ipratropium for Nebulization 3 milliLiter(s) Nebulizer every 6 hours  cefTRIAXone   IVPB 1000 milliGRAM(s) IV Intermittent every 24 hours  dextrose 5% + sodium chloride 0.9%. 1000 milliLiter(s) (75 mL/Hr) IV Continuous <Continuous>  gabapentin Solution 600 milliGRAM(s) Oral three times a day  heparin   Injectable 5000 Unit(s) SubCutaneous every 8 hours  influenza   Vaccine 0.5 milliLiter(s) IntraMuscular once  levothyroxine 50 MICROGram(s) Oral daily  LORazepam     Tablet 1 milliGRAM(s) Oral <User Schedule>  methadone   Solution 15 milliGRAM(s) Oral three times a day  nicotine - 21 mG/24Hr(s) Patch 1 Patch Transdermal daily  pantoprazole  Injectable 40 milliGRAM(s) IV Push two times a day  QUEtiapine 25 milliGRAM(s) Oral at bedtime  senna 2 Tablet(s) Oral at bedtime  vancomycin  IVPB 1250 milliGRAM(s) IV Intermittent every 12 hours    MEDICATIONS  (PRN):  albuterol    90 MICROgram(s) HFA Inhaler 2 Puff(s) Inhalation every 6 hours PRN Bronchospasm  HYDROmorphone   Tablet 4 milliGRAM(s) Oral every 4 hours PRN Moderate Pain (4 - 6)  HYDROmorphone   Tablet 6 milliGRAM(s) Oral every 4 hours PRN Severe Pain (7 - 10)  HYDROmorphone  Injectable 1 milliGRAM(s) IV Push every 3 hours PRN breakthrough pain  hydrOXYzine hydrochloride Syrup 25 milliGRAM(s) Oral every 6 hours PRN Anxiety  lidocaine   4% Patch 1 Patch Transdermal daily PRN hip/ back pain  naloxone Injectable 0.1 milliGRAM(s) IV Push every 3 minutes PRN For ANY of the following changes in patient status:  A. RR LESS THAN 10 breaths per minute, B. Oxygen saturation LESS THAN 90%, C. Sedation score of 6  ondansetron Injectable 4 milliGRAM(s) IV Push every 8 hours PRN Nausea and/or Vomiting  polyethylene glycol 3350 17 Gram(s) Oral daily PRN Constipation      CAPILLARY BLOOD GLUCOSE        I&O's Summary    05 Mar 2023 07:01  -  06 Mar 2023 07:00  --------------------------------------------------------  IN: 4028 mL / OUT: 0 mL / NET: 4028 mL    06 Mar 2023 07:01  -  06 Mar 2023 13:36  --------------------------------------------------------  IN: 360 mL / OUT: 0 mL / NET: 360 mL        PHYSICAL EXAM:  Vital Signs Last 24 Hrs  T(C): 37.3 (06 Mar 2023 10:32), Max: 37.3 (05 Mar 2023 14:00)  T(F): 99.2 (06 Mar 2023 10:32), Max: 99.2 (05 Mar 2023 14:00)  HR: 85 (06 Mar 2023 10:32) (76 - 97)  BP: 110/75 (06 Mar 2023 10:32) (90/51 - 110/75)  BP(mean): --  RR: 17 (06 Mar 2023 10:32) (17 - 18)  SpO2: 100% (06 Mar 2023 10:32) (96% - 100%)    Parameters below as of 06 Mar 2023 10:32  Patient On (Oxygen Delivery Method): tracheostomy collar      CONSTITUTIONAL: Middle age male in NAD, thin, well-groomed  NECK: Supple, no palpable masses; no thyromegaly; trach with trach collar  RESPIRATORY: Normal respiratory effort; lungs are clear to auscultation bilaterally  CARDIOVASCULAR: Regular rate and rhythm, normal S1 and S2, no murmur/rub/gallop; No lower extremity edema; Peripheral pulses are 2+ bilaterally  ABDOMEN: Nontender to palpation, normoactive bowel sounds, no rebound/guarding; No hepatosplenomegaly; PEG site without erythema or tenderness, but with some dried yellowish drainage  MUSCULOSKELETAL:  no clubbing or cyanosis of digits; no joint swelling or tenderness to palpation  PSYCH: A+O to person, place, and time; affect appropriate  NEUROLOGY: CN 2-12 are intact and symmetric; no gross sensory deficits   SKIN: No rashes; no palpable lesions      LABS:                        7.5    5.60  )-----------( 311      ( 05 Mar 2023 07:24 )             24.5     03-05    136  |  98  |  10  ----------------------------<  124<H>  4.1   |  27  |  0.65    Ca    8.5      05 Mar 2023 07:24  Phos  2.7     03-05  Mg     1.60     03-05                  RADIOLOGY & ADDITIONAL TESTS:  Results Reviewed:   Imaging Personally Reviewed:  Electrocardiogram Personally Reviewed:    COORDINATION OF CARE:  Care Discussed with Consultants/Other Providers [Y/N]:  Prior or Outpatient Records Reviewed [Y/N]:

## 2023-03-06 NOTE — PROGRESS NOTE ADULT - PROBLEM SELECTOR PLAN 5
Progressive NSCLC with disease progression in the neck causing stridor and dysphagia  - palliative radiation completed today  - per hem/onc at [place] pt no longer candidate for DMT  - follow up hem/onc recs - appreciate outpatient oncology recs regarding whether patient should continue Keytruda or can explore other options  - pain management per palliative- on Dilaudid per PEG and methadone; palliative care managing meds  - [ ] F/U pain control - added on dilaudid for IV breakthrough pain   - BP borderline at times, likely with use of opioids, but currently tolerable  Masses progressing on CTAP 3/3

## 2023-03-06 NOTE — PROGRESS NOTE ADULT - ASSESSMENT
52M with metastatic lung cancer, HTN, anxiety, chronic pain on percocet, prior substance abuse who was admitted to [facility] on 2/10/23 due to difficulty swallowing. 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 Pt is s/p trach and PEG and is undergoing palliative radiation

## 2023-03-06 NOTE — PROGRESS NOTE ADULT - PROBLEM SELECTOR PLAN 2
s/p zosyn course  CTAP 3/3 again reveals left-sided pleural effusion  Possibly related to repeat attempt at swallow evaluation 3/1, when he was febrile  Treat again with course of vanc and ceftriaxone since 3/1 for 7 days - until 3/8

## 2023-03-07 NOTE — PROGRESS NOTE ADULT - SUBJECTIVE AND OBJECTIVE BOX
[facility]  Division of Hospital Medicine  Barbara Lynne MD  Pager: [phone]      Patient is a 52y old  Male who presents with a chief complaint of Palliative Radiation (06 Mar 2023 13:30)      SUBJECTIVE / OVERNIGHT EVENTS: Patient received a transfusion yesterday   ADDITIONAL REVIEW OF SYSTEMS:    MEDICATIONS  (STANDING):  albuterol/ipratropium for Nebulization 3 milliLiter(s) Nebulizer every 6 hours  cefTRIAXone   IVPB 1000 milliGRAM(s) IV Intermittent every 24 hours  dextrose 5% + sodium chloride 0.9%. 1000 milliLiter(s) (75 mL/Hr) IV Continuous <Continuous>  gabapentin Solution 600 milliGRAM(s) Oral three times a day  heparin   Injectable 5000 Unit(s) SubCutaneous every 8 hours  influenza   Vaccine 0.5 milliLiter(s) IntraMuscular once  levothyroxine 50 MICROGram(s) Oral daily  LORazepam     Tablet 1 milliGRAM(s) Oral <User Schedule>  magnesium sulfate  IVPB 2 Gram(s) IV Intermittent once  methadone   Solution 15 milliGRAM(s) Oral three times a day  nicotine - 21 mG/24Hr(s) Patch 1 Patch Transdermal daily  pantoprazole  Injectable 40 milliGRAM(s) IV Push two times a day  QUEtiapine 25 milliGRAM(s) Oral at bedtime  senna 2 Tablet(s) Oral at bedtime  vancomycin  IVPB      vancomycin  IVPB 1500 milliGRAM(s) IV Intermittent every 12 hours    MEDICATIONS  (PRN):  albuterol    90 MICROgram(s) HFA Inhaler 2 Puff(s) Inhalation every 6 hours PRN Bronchospasm  HYDROmorphone   Tablet 4 milliGRAM(s) Oral every 4 hours PRN Moderate Pain (4 - 6)  HYDROmorphone   Tablet 6 milliGRAM(s) Oral every 4 hours PRN Severe Pain (7 - 10)  HYDROmorphone  Injectable 1 milliGRAM(s) IV Push every 3 hours PRN breakthrough pain  hydrOXYzine hydrochloride Syrup 25 milliGRAM(s) Oral every 6 hours PRN Anxiety  lidocaine   4% Patch 1 Patch Transdermal daily PRN hip/ back pain  naloxone Injectable 0.1 milliGRAM(s) IV Push every 3 minutes PRN For ANY of the following changes in patient status:  A. RR LESS THAN 10 breaths per minute, B. Oxygen saturation LESS THAN 90%, C. Sedation score of 6  ondansetron Injectable 4 milliGRAM(s) IV Push every 8 hours PRN Nausea and/or Vomiting  polyethylene glycol 3350 17 Gram(s) Oral daily PRN Constipation      CAPILLARY BLOOD GLUCOSE        I&O's Summary    06 Mar 2023 07:01  -  07 Mar 2023 07:00  --------------------------------------------------------  IN: 2756 mL / OUT: 950 mL / NET: 1806 mL        PHYSICAL EXAM:  Vital Signs Last 24 Hrs  T(C): 37.1 (07 Mar 2023 05:09), Max: 37.3 (06 Mar 2023 10:32)  T(F): 98.8 (07 Mar 2023 05:09), Max: 99.2 (06 Mar 2023 10:32)  HR: 85 (07 Mar 2023 05:09) (76 - 95)  BP: 102/63 (07 Mar 2023 05:09) (88/43 - 110/75)  BP(mean): --  RR: 18 (07 Mar 2023 05:09) (17 - 18)  SpO2: 100% (07 Mar 2023 05:09) (94% - 100%)    Parameters below as of 07 Mar 2023 05:09  Patient On (Oxygen Delivery Method): tracheostomy collar      CONSTITUTIONAL: NAD, well-developed, well-groomed  EYES: PERRLA; conjunctiva and sclera clear  ENMT: Moist oral mucosa, no pharyngeal injection or exudates; normal dentition  NECK: Supple, no palpable masses; no thyromegaly  RESPIRATORY: Normal respiratory effort; lungs are clear to auscultation bilaterally  CARDIOVASCULAR: Regular rate and rhythm, normal S1 and S2, no murmur/rub/gallop; No lower extremity edema; Peripheral pulses are 2+ bilaterally  ABDOMEN: Nontender to palpation, normoactive bowel sounds, no rebound/guarding; No hepatosplenomegaly  MUSCULOSKELETAL:  Normal gait; no clubbing or cyanosis of digits; no joint swelling or tenderness to palpation  PSYCH: A+O to person, place, and time; affect appropriate  NEUROLOGY: CN 2-12 are intact and symmetric; no gross sensory deficits   SKIN: No rashes; no palpable lesions    LABS:                        8.5    5.90  )-----------( 362      ( 07 Mar 2023 06:30 )             26.8     03-07    136  |  98  |  9   ----------------------------<  86  4.1   |  26  |  0.57    Ca    8.6      07 Mar 2023 06:30  Phos  3.5     03-07  Mg     1.50     03-07                  RADIOLOGY & ADDITIONAL TESTS:  Results Reviewed: < from: VA Duplex Ext Veins Upper Comp, Left. (03.06.23 @ 16:34) >  No evidence of deep venous thrombosis in the visualized  venous segments of the left upper extremity.  Superficial venous thrombosis is visualized within the  left cephalic and median cubital veins at antecubital  fossa/ left forearm (area of concern).    < end of copied text >    Imaging Personally Reviewed:  Electrocardiogram Personally Reviewed:    COORDINATION OF CARE:  Care Discussed with Consultants/Other Providers [Y/N]:  Prior or Outpatient Records Reviewed [Y/N]:   [facility]  Division of Hospital Medicine  Barbara Lynne MD  Pager: [phone]      Patient is a 52y old  Male who presents with a chief complaint of Palliative Radiation (06 Mar 2023 13:30)      SUBJECTIVE / OVERNIGHT EVENTS: Patient received a transfusion yesterday. Reports he does not like the tablet going through the PEG, asking for IV pain meds. Will follow up if able to put liquid dilaudid   ADDITIONAL REVIEW OF SYSTEMS:    MEDICATIONS  (STANDING):  albuterol/ipratropium for Nebulization 3 milliLiter(s) Nebulizer every 6 hours  cefTRIAXone   IVPB 1000 milliGRAM(s) IV Intermittent every 24 hours  dextrose 5% + sodium chloride 0.9%. 1000 milliLiter(s) (75 mL/Hr) IV Continuous <Continuous>  gabapentin Solution 600 milliGRAM(s) Oral three times a day  heparin   Injectable 5000 Unit(s) SubCutaneous every 8 hours  influenza   Vaccine 0.5 milliLiter(s) IntraMuscular once  levothyroxine 50 MICROGram(s) Oral daily  LORazepam     Tablet 1 milliGRAM(s) Oral <User Schedule>  magnesium sulfate  IVPB 2 Gram(s) IV Intermittent once  methadone   Solution 15 milliGRAM(s) Oral three times a day  nicotine - 21 mG/24Hr(s) Patch 1 Patch Transdermal daily  pantoprazole  Injectable 40 milliGRAM(s) IV Push two times a day  QUEtiapine 25 milliGRAM(s) Oral at bedtime  senna 2 Tablet(s) Oral at bedtime  vancomycin  IVPB      vancomycin  IVPB 1500 milliGRAM(s) IV Intermittent every 12 hours    MEDICATIONS  (PRN):  albuterol    90 MICROgram(s) HFA Inhaler 2 Puff(s) Inhalation every 6 hours PRN Bronchospasm  HYDROmorphone   Tablet 4 milliGRAM(s) Oral every 4 hours PRN Moderate Pain (4 - 6)  HYDROmorphone   Tablet 6 milliGRAM(s) Oral every 4 hours PRN Severe Pain (7 - 10)  HYDROmorphone  Injectable 1 milliGRAM(s) IV Push every 3 hours PRN breakthrough pain  hydrOXYzine hydrochloride Syrup 25 milliGRAM(s) Oral every 6 hours PRN Anxiety  lidocaine   4% Patch 1 Patch Transdermal daily PRN hip/ back pain  naloxone Injectable 0.1 milliGRAM(s) IV Push every 3 minutes PRN For ANY of the following changes in patient status:  A. RR LESS THAN 10 breaths per minute, B. Oxygen saturation LESS THAN 90%, C. Sedation score of 6  ondansetron Injectable 4 milliGRAM(s) IV Push every 8 hours PRN Nausea and/or Vomiting  polyethylene glycol 3350 17 Gram(s) Oral daily PRN Constipation      CAPILLARY BLOOD GLUCOSE        I&O's Summary    06 Mar 2023 07:01  -  07 Mar 2023 07:00  --------------------------------------------------------  IN: 2756 mL / OUT: 950 mL / NET: 1806 mL        PHYSICAL EXAM:  Vital Signs Last 24 Hrs  T(C): 37.1 (07 Mar 2023 05:09), Max: 37.3 (06 Mar 2023 10:32)  T(F): 98.8 (07 Mar 2023 05:09), Max: 99.2 (06 Mar 2023 10:32)  HR: 85 (07 Mar 2023 05:09) (76 - 95)  BP: 102/63 (07 Mar 2023 05:09) (88/43 - 110/75)  BP(mean): --  RR: 18 (07 Mar 2023 05:09) (17 - 18)  SpO2: 100% (07 Mar 2023 05:09) (94% - 100%)    Parameters below as of 07 Mar 2023 05:09  Patient On (Oxygen Delivery Method): tracheostomy collar    CONSTITUTIONAL: Middle age male in NAD, thin, well-groomed  NECK: Supple, no palpable masses; no thyromegaly; trach with trach collar  RESPIRATORY: Normal respiratory effort; lungs are clear to auscultation bilaterally  CARDIOVASCULAR: Regular rate and rhythm, normal S1 and S2, no murmur/rub/gallop; No lower extremity edema; Peripheral pulses are 2+ bilaterally  ABDOMEN: Nontender to palpation, normoactive bowel sounds, no rebound/guarding; No hepatosplenomegaly; PEG site without erythema or tenderness,  PSYCH: A+O to person, place, and time; affect appropriate  NEUROLOGY: no gross sensory deficits   SKIN: No rashes; no palpable lesions  LABS:                        8.5    5.90  )-----------( 362      ( 07 Mar 2023 06:30 )             26.8     03-07    136  |  98  |  9   ----------------------------<  86  4.1   |  26  |  0.57    Ca    8.6      07 Mar 2023 06:30  Phos  3.5     03-07  Mg     1.50     03-07                  RADIOLOGY & ADDITIONAL TESTS:  Results Reviewed: < from: VA Duplex Ext Veins Upper Comp, Left. (03.06.23 @ 16:34) >  No evidence of deep venous thrombosis in the visualized  venous segments of the left upper extremity.  Superficial venous thrombosis is visualized within the  left cephalic and median cubital veins at antecubital  fossa/ left forearm (area of concern).    < end of copied text >    Imaging Personally Reviewed:  Electrocardiogram Personally Reviewed:    COORDINATION OF CARE:  Care Discussed with Consultants/Other Providers [Y/N]:  Prior or Outpatient Records Reviewed [Y/N]:

## 2023-03-07 NOTE — PROGRESS NOTE ADULT - ASSESSMENT
52M with metastatic lung cancer, HTN, anxiety, chronic pain on percocet, prior substance abuse who was admitted to [facility] on 2/10/23 due to difficulty swallowing. 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 Pt is s/p trach and PEG and is undergoing palliative radiation

## 2023-03-07 NOTE — PROGRESS NOTE ADULT - PROBLEM SELECTOR PLAN 7
LUE swelling, near site of prior IV. No evidence of abscess on exam  - F/U ultrasound: No evidence of deep venous thrombosis in the visualized  venous segments of the left upper extremity. Superficial venous thrombosis is visualized within the  left cephalic and median cubital veins at antecubital fossa/ left forearm

## 2023-03-07 NOTE — PROGRESS NOTE ADULT - PROBLEM SELECTOR PLAN 5
Progressive NSCLC with disease progression in the neck causing stridor and dysphagia  - palliative radiation completed today  - per hem/onc at [place] pt no longer candidate for DMT  - follow up hem/onc recs - appreciate outpatient oncology recs regarding whether patient should continue Keytruda or can explore other options  - pain management per palliative- on Dilaudid per PEG and methadone; palliative care managing meds  - [ ] F/U pain control - added on dilaudid for IV breakthrough pain   - BP borderline at times, likely with use of opioids, but currently tolerable  Masses progressing on CTAP 3/3

## 2023-03-08 NOTE — PROGRESS NOTE ADULT - ASSESSMENT
52M with metastatic lung cancer, HTN, anxiety, chronic pain on percocet, prior substance abuse who was admitted to [facility] on 2/10/23 due to difficulty swallowing. 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 Pt is s/p trach and PEG and is undergoing palliative radiation

## 2023-03-08 NOTE — PROGRESS NOTE ADULT - SUBJECTIVE AND OBJECTIVE BOX
[facility]  Division of Hospital Medicine  Barbara Lynne MD  Pager: [phone]      Patient is a 52y old  Male who presents with a chief complaint of Palliative Radiation (07 Mar 2023 09:43)      SUBJECTIVE / OVERNIGHT EVENTS: Patient seen and examined at bedside. In brief asking about pain regimen. states he has pain all over. Encouraged him to use PRN pain regimen   ADDITIONAL REVIEW OF SYSTEMS:    MEDICATIONS  (STANDING):  albuterol/ipratropium for Nebulization 3 milliLiter(s) Nebulizer every 6 hours  gabapentin Solution 600 milliGRAM(s) Oral three times a day  heparin   Injectable 5000 Unit(s) SubCutaneous every 8 hours  influenza   Vaccine 0.5 milliLiter(s) IntraMuscular once  levothyroxine 50 MICROGram(s) Oral daily  LORazepam     Tablet 1 milliGRAM(s) Oral <User Schedule>  methadone   Solution 15 milliGRAM(s) Oral three times a day  nicotine - 21 mG/24Hr(s) Patch 1 Patch Transdermal daily  pantoprazole  Injectable 40 milliGRAM(s) IV Push two times a day  QUEtiapine 25 milliGRAM(s) Oral at bedtime  senna 2 Tablet(s) Oral at bedtime  vancomycin  IVPB      vancomycin  IVPB 1500 milliGRAM(s) IV Intermittent every 12 hours    MEDICATIONS  (PRN):  albuterol    90 MICROgram(s) HFA Inhaler 2 Puff(s) Inhalation every 6 hours PRN Bronchospasm  HYDROmorphone   Tablet 4 milliGRAM(s) Oral every 4 hours PRN Moderate Pain (4 - 6)  HYDROmorphone   Tablet 6 milliGRAM(s) Oral every 4 hours PRN Severe Pain (7 - 10)  HYDROmorphone  Injectable 1 milliGRAM(s) IV Push every 3 hours PRN breakthrough pain  hydrOXYzine hydrochloride Syrup 25 milliGRAM(s) Oral every 6 hours PRN Anxiety  lidocaine   4% Patch 1 Patch Transdermal daily PRN hip/ back pain  naloxone Injectable 0.1 milliGRAM(s) IV Push every 3 minutes PRN For ANY of the following changes in patient status:  A. RR LESS THAN 10 breaths per minute, B. Oxygen saturation LESS THAN 90%, C. Sedation score of 6  ondansetron Injectable 4 milliGRAM(s) IV Push every 8 hours PRN Nausea and/or Vomiting  polyethylene glycol 3350 17 Gram(s) Oral daily PRN Constipation      CAPILLARY BLOOD GLUCOSE      POCT Blood Glucose.: 109 mg/dL (08 Mar 2023 00:15)    I&O's Summary    07 Mar 2023 07:01  -  08 Mar 2023 07:00  --------------------------------------------------------  IN: 3878 mL / OUT: 900 mL / NET: 2978 mL    08 Mar 2023 07:01  -  08 Mar 2023 14:29  --------------------------------------------------------  IN: 240 mL / OUT: 250 mL / NET: -10 mL        PHYSICAL EXAM:  Vital Signs Last 24 Hrs  T(C): 37.2 (08 Mar 2023 14:00), Max: 37.2 (08 Mar 2023 14:00)  T(F): 98.9 (08 Mar 2023 14:00), Max: 98.9 (08 Mar 2023 14:00)  HR: 89 (08 Mar 2023 14:00) (81 - 90)  BP: 111/61 (08 Mar 2023 14:00) (99/54 - 111/61)  BP(mean): --  RR: 18 (08 Mar 2023 14:00) (16 - 19)  SpO2: 100% (08 Mar 2023 14:00) (99% - 100%)    Parameters below as of 08 Mar 2023 14:00  Patient On (Oxygen Delivery Method): tracheostomy collar      CONSTITUTIONAL: Middle age male in NAD, thin, well-groomed  NECK: Supple, no palpable masses; no thyromegaly; trach with trach collar  RESPIRATORY: Normal respiratory effort; lungs are clear to auscultation bilaterally  CARDIOVASCULAR: Regular rate and rhythm, normal S1 and S2, no murmur/rub/gallop; No lower extremity edema; Peripheral pulses are 2+ bilaterally  ABDOMEN: Nontender to palpation, normoactive bowel sounds, no rebound/guarding; No hepatosplenomegaly; PEG site without erythema or tenderness,  PSYCH: A+O to person, place, and time; affect appropriate  NEUROLOGY: no gross sensory deficits   SKIN: No rashes; no palpable lesions    LABS:                        8.2    6.39  )-----------( 411      ( 08 Mar 2023 06:11 )             26.0     03-08    133<L>  |  94<L>  |  9   ----------------------------<  136<H>  3.8   |  26  |  0.59    Ca    8.9      08 Mar 2023 06:11  Phos  3.7     03-08  Mg     1.70     03-08                  RADIOLOGY & ADDITIONAL TESTS:  Results Reviewed:   Imaging Personally Reviewed:  Electrocardiogram Personally Reviewed:    COORDINATION OF CARE:  Care Discussed with Consultants/Other Providers [Y/N]:  Prior or Outpatient Records Reviewed [Y/N]:

## 2023-03-08 NOTE — PROGRESS NOTE ADULT - PROBLEM SELECTOR PLAN 7
LUE swelling, near site of prior IV. No evidence of abscess on exam  - F/U ultrasound: No evidence of deep venous thrombosis in the visualized  venous segments of the left upper extremity. Superficial venous thrombosis is visualized within the  left cephalic and median cubital veins at antecubital fossa/ left forearm  - warm compress PRN

## 2023-03-08 NOTE — PROGRESS NOTE ADULT - PROBLEM SELECTOR PLAN 5
Progressive NSCLC with disease progression in the neck causing stridor and dysphagia  - palliative radiation completed today  - per hem/onc at [place] pt no longer candidate for DMT  - follow up hem/onc recs - appreciate outpatient oncology recs regarding whether patient should continue Keytruda or can explore other options  - pain management per palliative- on Dilaudid per PEG and methadone; palliative care managing meds  - [ ] F/U pain control - added on dilaudid for IV breakthrough pain - f/u with Palliative re: any changes   - BP borderline at times, likely with use of opioids, but currently tolerable  Masses progressing on CTAP 3/3

## 2023-03-09 NOTE — PROGRESS NOTE ADULT - SUBJECTIVE AND OBJECTIVE BOX
[facility]  Division of Hospital Medicine  Barbara Lynne MD  Pager: [phone]      Patient is a 52y old  Male who presents with a chief complaint of Palliative Radiation (08 Mar 2023 14:29)      SUBJECTIVE / OVERNIGHT EVENTS: Patient accepted to a facility awaiting auth. Discussed pain regimen with palliative   ADDITIONAL REVIEW OF SYSTEMS:    MEDICATIONS  (STANDING):  albuterol/ipratropium for Nebulization 3 milliLiter(s) Nebulizer every 6 hours  gabapentin Solution 600 milliGRAM(s) Oral three times a day  heparin   Injectable 5000 Unit(s) SubCutaneous every 8 hours  influenza   Vaccine 0.5 milliLiter(s) IntraMuscular once  levothyroxine 50 MICROGram(s) Oral daily  LORazepam     Tablet 1 milliGRAM(s) Oral <User Schedule>  methadone   Solution 15 milliGRAM(s) Oral three times a day  nicotine - 21 mG/24Hr(s) Patch 1 Patch Transdermal daily  pantoprazole  Injectable 40 milliGRAM(s) IV Push two times a day  QUEtiapine 25 milliGRAM(s) Oral at bedtime  senna 2 Tablet(s) Oral at bedtime    MEDICATIONS  (PRN):  albuterol    90 MICROgram(s) HFA Inhaler 2 Puff(s) Inhalation every 6 hours PRN Bronchospasm  HYDROmorphone   Tablet 4 milliGRAM(s) Oral every 4 hours PRN Moderate Pain (4 - 6)  HYDROmorphone   Tablet 6 milliGRAM(s) Oral every 4 hours PRN Severe Pain (7 - 10)  hydrOXYzine hydrochloride Syrup 25 milliGRAM(s) Oral every 6 hours PRN Anxiety  lidocaine   4% Patch 1 Patch Transdermal daily PRN hip/ back pain  ondansetron Injectable 4 milliGRAM(s) IV Push every 8 hours PRN Nausea and/or Vomiting  polyethylene glycol 3350 17 Gram(s) Oral daily PRN Constipation      CAPILLARY BLOOD GLUCOSE      POCT Blood Glucose.: 137 mg/dL (09 Mar 2023 06:06)    I&O's Summary    08 Mar 2023 07:01  -  09 Mar 2023 07:00  --------------------------------------------------------  IN: 990 mL / OUT: 1150 mL / NET: -160 mL    09 Mar 2023 07:01  -  09 Mar 2023 12:44  --------------------------------------------------------  IN: 0 mL / OUT: 0 mL / NET: 0 mL        PHYSICAL EXAM:  Vital Signs Last 24 Hrs  T(C): 37 (09 Mar 2023 10:00), Max: 37.5 (09 Mar 2023 06:00)  T(F): 98.6 (09 Mar 2023 10:00), Max: 99.5 (09 Mar 2023 06:00)  HR: 91 (09 Mar 2023 10:00) (84 - 94)  BP: 98/61 (09 Mar 2023 10:00) (98/61 - 114/73)  BP(mean): --  RR: 19 (09 Mar 2023 10:00) (16 - 19)  SpO2: 100% (09 Mar 2023 10:00) (99% - 100%)    Parameters below as of 09 Mar 2023 10:00  Patient On (Oxygen Delivery Method): tracheostomy collar      CONSTITUTIONAL: Middle age male in NAD, thin, well-groomed  NECK: Supple, no palpable masses; no thyromegaly; trach with trach collar  RESPIRATORY: Normal respiratory effort; lungs are clear to auscultation bilaterally  CARDIOVASCULAR: Regular rate and rhythm, normal S1 and S2, no murmur/rub/gallop; No lower extremity edema; Peripheral pulses are 2+ bilaterally  ABDOMEN: Nontender to palpation, normoactive bowel sounds, no rebound/guarding; No hepatosplenomegaly; PEG site without erythema or tenderness,  PSYCH: A+O to person, place, and time; affect appropriate  NEUROLOGY: no gross sensory deficits   SKIN: No rashes; no palpable lesions    LABS:                        7.3    6.86  )-----------( 431      ( 09 Mar 2023 06:02 )             23.5     03-09    129<L>  |  91<L>  |  9   ----------------------------<  132<H>  4.0   |  29  |  0.58    Ca    8.8      09 Mar 2023 06:02  Phos  3.2     03-09  Mg     1.40     03-09                  RADIOLOGY & ADDITIONAL TESTS:  Results Reviewed:   Imaging Personally Reviewed:  Electrocardiogram Personally Reviewed:    COORDINATION OF CARE:  Care Discussed with Consultants/Other Providers [Y/N]:  Prior or Outpatient Records Reviewed [Y/N]:

## 2023-03-09 NOTE — PROGRESS NOTE ADULT - ASSESSMENT
52M with metastatic lung cancer, HTN, anxiety, chronic pain on percocet, prior substance abuse who was admitted to [facility] on 2/10/23 due to difficulty swallowing. 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 Pt is s/p trach and PEG and is undergoing palliative radiation

## 2023-03-09 NOTE — PROGRESS NOTE ADULT - PROBLEM SELECTOR PLAN 5
Progressive NSCLC with disease progression in the neck causing stridor and dysphagia  - palliative radiation completed today  - per hem/onc at [place] pt no longer candidate for DMT  - follow up hem/onc recs - appreciate outpatient oncology recs regarding whether patient should continue Keytruda or can explore other options  - pain management per palliative- on Dilaudid per PEG and methadone; palliative care managing meds  - [ ] F/U pain control  discussed  with Palliative re: continue current regimen and dc IV dilaudid given has not been receiving   - BP borderline at times, likely with use of opioids, but currently tolerable  Masses progressing on CTAP 3/3

## 2023-03-10 NOTE — PROGRESS NOTE ADULT - PROBLEM SELECTOR PLAN 2
s/p zosyn course  CTAP 3/3 again reveals left-sided pleural effusion  Possibly related to repeat attempt at swallow evaluation 3/1, when he was febrile  s/p course of vanc and ceftriaxone since 3/1 for 7 days - until 3/8

## 2023-03-10 NOTE — PROGRESS NOTE ADULT - ASSESSMENT
52M with metastatic lung cancer, HTN, anxiety, chronic pain on percocet, prior substance abuse who was admitted to [facility] on 2/10/23 due to difficulty swallowing. 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 Pt is s/p trach and PEG and s.p  palliative radiation. Patient is awaiting facility with plan for hospice.

## 2023-03-10 NOTE — PROGRESS NOTE ADULT - SUBJECTIVE AND OBJECTIVE BOX
[facility]  Division of Hospital Medicine  Barbara Lynne MD  Pager: [phone]      Patient is a 52y old  Male who presents with a chief complaint of Palliative Radiation (09 Mar 2023 12:44)      SUBJECTIVE / OVERNIGHT EVENTS: Patient seen and examined at bedside. Discussed pain regimen again. Awaiting insurance auth for facility. Patient used 2 PRN dilaudid for severe pain   ADDITIONAL REVIEW OF SYSTEMS:    MEDICATIONS  (STANDING):  albuterol/ipratropium for Nebulization 3 milliLiter(s) Nebulizer every 6 hours  gabapentin Solution 600 milliGRAM(s) Oral three times a day  heparin   Injectable 5000 Unit(s) SubCutaneous every 8 hours  influenza   Vaccine 0.5 milliLiter(s) IntraMuscular once  levothyroxine 50 MICROGram(s) Oral daily  LORazepam     Tablet 1 milliGRAM(s) Oral <User Schedule>  methadone   Solution 15 milliGRAM(s) Oral three times a day  nicotine - 21 mG/24Hr(s) Patch 1 Patch Transdermal daily  pantoprazole  Injectable 40 milliGRAM(s) IV Push two times a day  QUEtiapine 25 milliGRAM(s) Oral at bedtime  senna 2 Tablet(s) Oral at bedtime    MEDICATIONS  (PRN):  albuterol    90 MICROgram(s) HFA Inhaler 2 Puff(s) Inhalation every 6 hours PRN Bronchospasm  HYDROmorphone   Tablet 4 milliGRAM(s) Oral every 4 hours PRN Moderate Pain (4 - 6)  HYDROmorphone   Tablet 6 milliGRAM(s) Oral every 4 hours PRN Severe Pain (7 - 10)  hydrOXYzine hydrochloride Syrup 25 milliGRAM(s) Oral every 6 hours PRN Anxiety  lidocaine   4% Patch 1 Patch Transdermal daily PRN hip/ back pain  ondansetron Injectable 4 milliGRAM(s) IV Push every 8 hours PRN Nausea and/or Vomiting  polyethylene glycol 3350 17 Gram(s) Oral daily PRN Constipation      CAPILLARY BLOOD GLUCOSE      POCT Blood Glucose.: 104 mg/dL (10 Mar 2023 06:06)  POCT Blood Glucose.: 98 mg/dL (09 Mar 2023 23:23)  POCT Blood Glucose.: 126 mg/dL (09 Mar 2023 17:33)    I&O's Summary    09 Mar 2023 07:01  -  10 Mar 2023 07:00  --------------------------------------------------------  IN: 1618 mL / OUT: 0 mL / NET: 1618 mL        PHYSICAL EXAM:  Vital Signs Last 24 Hrs  T(C): 36.8 (10 Mar 2023 11:32), Max: 37.1 (09 Mar 2023 22:00)  T(F): 98.2 (10 Mar 2023 11:32), Max: 98.7 (09 Mar 2023 22:00)  HR: 91 (10 Mar 2023 11:32) (82 - 92)  BP: 104/63 (10 Mar 2023 11:32) (102/55 - 147/72)  BP(mean): --  RR: 17 (10 Mar 2023 11:32) (17 - 20)  SpO2: 100% (10 Mar 2023 11:32) (98% - 100%)    Parameters below as of 10 Mar 2023 09:49  Patient On (Oxygen Delivery Method): tracheostomy collar      CONSTITUTIONAL: Middle age male in NAD, thin, well-groomed  NECK: Supple, no palpable masses; no thyromegaly; trach with trach collar  RESPIRATORY: Normal respiratory effort; lungs are clear to auscultation bilaterally  CARDIOVASCULAR: Regular rate and rhythm, normal S1 and S2, no murmur/rub/gallop; No lower extremity edema; Peripheral pulses are 2+ bilaterally  ABDOMEN: Nontender to palpation, normoactive bowel sounds, no rebound/guarding; No hepatosplenomegaly; PEG site without erythema or tenderness,  PSYCH: A+O to person, place, and time; affect appropriate  NEUROLOGY: no gross sensory deficits   SKIN: No rashes; no palpable lesions    LABS:                        7.5    7.67  )-----------( 496      ( 10 Mar 2023 06:11 )             24.3     03-10    131<L>  |  90<L>  |  11  ----------------------------<  94  4.1   |  30  |  0.58    Ca    8.7      10 Mar 2023 06:11  Phos  3.9     03-10  Mg     1.90     03-10                  RADIOLOGY & ADDITIONAL TESTS:  Results Reviewed:   Imaging Personally Reviewed:  Electrocardiogram Personally Reviewed:    COORDINATION OF CARE:  Care Discussed with Consultants/Other Providers [Y/N]:  Prior or Outpatient Records Reviewed [Y/N]:

## 2023-03-10 NOTE — CHART NOTE - NSCHARTNOTEFT_GEN_A_CORE
Source: Patient [x ]    Family [ ]     other [x ] ACP, chart review    Patient seen for severe malnutrition f/u. 52 year old male a PMH of metastatic lung cancer, HTN, anxiety, chronic pain on percocet, prior substance abuse who was admitted to [facility] on 2/10/23 due to difficulty swallowing. 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 Pt is s/p trach and PEG and s/p palliative radiation. Patient is awaiting facility with plan for hospice per chart.    Patient continues on bolus Two Jonathan HN @ 210 mL every 6 hrs. for total volume 840 mL. Provides 1,680 kcal, 70.1 g pro and 588 mL of fluid (TF free water). Patient reports vomiting periodically over the past 2 weeks while getting bolus feeds/flushes. Patient previously was getting 588 mL of flushes q 6 hrs. Discussed w/ ACP regarding flush change to 100 mL every 6 hrs. to promote tolerance and given low sodium levels. No edema or pressure injuries noted per RN flow sheet.    Diet : Diet, NPO with Tube Feed:   Tube Feeding Modality: Gastrostomy  TwoCal HN (TWOCALHNRTH)  Total Volume for 24 Hours (mL): 840  Bolus  Total Volume of Bolus (mL):  210  Total # of Feeds: 4  Tube Feed Frequency: Every 6 hours   Tube Feed Start Time: 10:42  Bolus Feed Rate (mL per Hour): 210   Bolus Feed Duration (in Hours): 0  Free Water Flush  Bolus   Total Volume per Flush (mL): 100   Frequency: Every 6 Hours   Total Daily Volume of Flush (mL): 400    Start Time: 15:00 (03-10-23 @ 16:00)    Current Weight:   % Weight Change    Pertinent Medications: MEDICATIONS  (STANDING):  albuterol/ipratropium for Nebulization 3 milliLiter(s) Nebulizer every 12 hours  gabapentin Solution 600 milliGRAM(s) Oral three times a day  heparin   Injectable 5000 Unit(s) SubCutaneous every 8 hours  influenza   Vaccine 0.5 milliLiter(s) IntraMuscular once  levothyroxine 50 MICROGram(s) Oral daily  LORazepam     Tablet 1 milliGRAM(s) Oral <User Schedule>  methadone   Solution 15 milliGRAM(s) Oral three times a day  nicotine - 21 mG/24Hr(s) Patch 1 Patch Transdermal daily  pantoprazole  Injectable 40 milliGRAM(s) IV Push two times a day  QUEtiapine 25 milliGRAM(s) Oral at bedtime  senna 2 Tablet(s) Oral at bedtime    MEDICATIONS  (PRN):  albuterol    90 MICROgram(s) HFA Inhaler 2 Puff(s) Inhalation every 6 hours PRN Bronchospasm  HYDROmorphone   Tablet 4 milliGRAM(s) Oral every 4 hours PRN Moderate Pain (4 - 6)  HYDROmorphone   Tablet 6 milliGRAM(s) Oral every 4 hours PRN Severe Pain (7 - 10)  hydrOXYzine hydrochloride Syrup 25 milliGRAM(s) Oral every 6 hours PRN Anxiety  lidocaine   4% Patch 1 Patch Transdermal daily PRN hip/ back pain  ondansetron Injectable 4 milliGRAM(s) IV Push every 8 hours PRN Nausea and/or Vomiting  polyethylene glycol 3350 17 Gram(s) Oral daily PRN Constipation    Pertinent Labs:  03-10 Na131 mmol/L<L> Glu 94 mg/dL K+ 4.1 mmol/L Cr  0.58 mg/dL BUN 11 mg/dL 03-10 Phos 3.9 mg/dL 02-12 PAB 10 mg/dL<L>      Skin:     Estimated Needs:   [ ] no change since previous assessment  [ ] recalculated:       Previous Nutrition Diagnosis:     [ ] Inadequate Energy Intake [ ]Inadequate Oral Intake [ ] Excessive Energy Intake     [ ] Underweight [ ] Increased Nutrient Needs [ ] Overweight/Obesity     [ ] Altered GI Function [ ] Unintended Weight Loss [ ] Food & Nutrition Related Knowledge Deficit [ ] Malnutrition          Nutrition Diagnosis is [ ] ongoing  [ ] resolved [ ] not applicable          New Nutrition Diagnosis: [ ] not applicable    [ ] Inadequate Protein Energy Intake [ ]Inadequate Oral Intake [ ] Excessive Energy Intake     [ ] Underweight [ ] Increased Nutrient Needs [ ] Overweight/Obesity     [ ] Altered GI Function [ ] Unintended Weight Loss [ ] Food & Nutrition Related Knowledge Deficit[ ] Limited Adherence to nutrition related recommendations [ ] Malnutrition  [ ] other: Free text       Related to:      As evidenced by:      Interventions:     Recommend    [ ] Change Diet To:    [ ] Nutrition Supplement    [ ] Nutrition Support    [ ] Other:        Monitoring and Evaluation:     [ ] PO intake [ ] Tolerance to diet prescription [ ] weights [ ] follow up per protocol    [ ] other: Source: Patient [x ]    Family [ ]     other [x ] ACP, chart review    Patient seen for severe malnutrition f/u. 52 year old male a PMH of metastatic lung cancer, HTN, anxiety, chronic pain on percocet, prior substance abuse who was admitted to [facility] on 2/10/23 due to difficulty swallowing. 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 Pt is s/p trach and PEG and s/p palliative radiation. Patient is awaiting facility with plan for hospice per chart.    Patient continues on bolus Two Jonathan HN @ 210 mL every 6 hrs. for total volume 840 mL. Provides 1,680 kcal, 70.1 g pro and 588 mL of fluid (TF free water). Patient reports vomiting periodically over the past 2 weeks while getting bolus feeds/flushes. Patient previously was getting 588 mL of flushes q 6 hrs. Discussed w/ ACP regarding flush change to 100 mL every 6 hrs. to promote tolerance and given low sodium levels. No edema or pressure injuries noted per RN flow sheet.    Diet : Diet, NPO with Tube Feed:   Tube Feeding Modality: Gastrostomy  TwoCal HN (TWOCALHNRTH)  Total Volume for 24 Hours (mL): 840  Bolus  Total Volume of Bolus (mL):  210  Total # of Feeds: 4  Tube Feed Frequency: Every 6 hours   Tube Feed Start Time: 10:42  Bolus Feed Rate (mL per Hour): 210   Bolus Feed Duration (in Hours): 0  Free Water Flush  Bolus   Total Volume per Flush (mL): 100   Frequency: Every 6 Hours   Total Daily Volume of Flush (mL): 400    Start Time: 15:00 (03-10-23 @ 16:00)    Current Weight: no new weight to assess  52.2 kg (2/16)    Pertinent Medications: MEDICATIONS  (STANDING):  albuterol/ipratropium for Nebulization 3 milliLiter(s) Nebulizer every 12 hours  gabapentin Solution 600 milliGRAM(s) Oral three times a day  heparin   Injectable 5000 Unit(s) SubCutaneous every 8 hours  influenza   Vaccine 0.5 milliLiter(s) IntraMuscular once  levothyroxine 50 MICROGram(s) Oral daily  LORazepam     Tablet 1 milliGRAM(s) Oral <User Schedule>  methadone   Solution 15 milliGRAM(s) Oral three times a day  nicotine - 21 mG/24Hr(s) Patch 1 Patch Transdermal daily  pantoprazole  Injectable 40 milliGRAM(s) IV Push two times a day  QUEtiapine 25 milliGRAM(s) Oral at bedtime  senna 2 Tablet(s) Oral at bedtime    MEDICATIONS  (PRN):  albuterol    90 MICROgram(s) HFA Inhaler 2 Puff(s) Inhalation every 6 hours PRN Bronchospasm  HYDROmorphone   Tablet 4 milliGRAM(s) Oral every 4 hours PRN Moderate Pain (4 - 6)  HYDROmorphone   Tablet 6 milliGRAM(s) Oral every 4 hours PRN Severe Pain (7 - 10)  hydrOXYzine hydrochloride Syrup 25 milliGRAM(s) Oral every 6 hours PRN Anxiety  lidocaine   4% Patch 1 Patch Transdermal daily PRN hip/ back pain  ondansetron Injectable 4 milliGRAM(s) IV Push every 8 hours PRN Nausea and/or Vomiting  polyethylene glycol 3350 17 Gram(s) Oral daily PRN Constipation    Pertinent Labs:  03-10 Na131 mmol/L<L> Glu 94 mg/dL K+ 4.1 mmol/L Cr  0.58 mg/dL BUN 11 mg/dL 03-10 Phos 3.9 mg/dL 02-12 PAB 10 mg/dL<L>    Estimated Needs: [x ] no change since previous assessment: based on ABW 52.2 kg  Calories - 1,563-1,823 kcal (30-35 kcal/kg)  Protein - 62.5-73 g pro (1.2-1.4 g/kg)    Previous Nutrition Diagnosis: Severe malnutrition    Nutrition Diagnosis is [ x] ongoing  [ ] resolved [ ] not applicable     Recommend  - continue tube feed provision as ordered  - obtain current weight to assess trend    Monitoring and Evaluation:   [x ] Tolerance to diet prescription [x ] weights [x ] follow up per protocol

## 2023-03-10 NOTE — CHART NOTE - NSCHARTNOTEFT_GEN_A_CORE
Notified by [facility]RO[facility] that patient and his family wants the patient to be transferred to [facility] for continuity of care. Family expressed dismay that during course of hospitalization patient had a peg tube and trach collar placed for dysphagia 2/2 stage 4 lung ca. I reached out to family member explaining at this point of the hospitalization process, we are currently at discharge planning.  has been working with patient and their family and found an accepting rehab facility, pending auth and a bed for the patient. I called transfer center and they recommend to use their transportation services, which will cost ~$1000 and is staffed with experienced providers, and that if they are accepted to [facility], they will find an attending physician over there to oversee patient's care, HOWEVER, in most cases in which a transfer is not medically warranted for a higher level of care, it will typically result in a denial to transfer. This was relayed family member Archana Johansen at [phone] and they want to proceed with the transfer. Per family member, they will prefer to hire a private ambulance. [ ] I will reach out to transfer center again to initiate the process.    Yoshi ACP [phone]

## 2023-03-10 NOTE — PROGRESS NOTE ADULT - PROBLEM SELECTOR PLAN 3
- pain, erythema, and small purulent drainage from gastrostomy site  - new fever 3/1  - wound care recs and instructions appreciated  - s/p  empiric ceftriaxone and vancomycin (check MRSA PCR) for 7 days  - associated with mild hypotension, responsive to IVF  - CTAP with superficial infection but no defined abscess; it does also show some possibly developing hip abscesses  - pain in hips likely stemming from these abscesses - if ongoing pain and repeat fever, may consider surgical eval if within [facility]

## 2023-03-10 NOTE — PROGRESS NOTE ADULT - PROBLEM SELECTOR PLAN 5
Progressive NSCLC with disease progression in the neck causing stridor and dysphagia  - palliative radiation completed today  - per hem/onc at [place] pt no longer candidate for DMT  - follow up hem/onc recs - appreciate outpatient oncology recs regarding whether patient should continue Keytruda or can explore other options  - pain management per palliative- on Dilaudid per PEG and methadone; palliative care managing meds  - pain control  discussed  with Palliative re: continue current regimen and dc IV Dilaudid given has not been receiving   - BP borderline at times, likely with use of opioids, but currently tolerable  Masses progressing on CTAP 3/3

## 2023-03-11 NOTE — PROGRESS NOTE ADULT - SUBJECTIVE AND OBJECTIVE BOX
[facility]  Division of Hospital Medicine  Barbara Lynne MD  Pager: [phone]      Patient is a 52y old  Male who presents with a chief complaint of Palliative Radiation (10 Mar 2023 11:50)      SUBJECTIVE / OVERNIGHT EVENTS: Yesterday, patient and patient's family requesting transfer to [facility]. Discussed that they need to find accepting physician and explained. Unlikely as patient is in the discharge face. Still expresses concern around pain regimen.   ADDITIONAL REVIEW OF SYSTEMS:    MEDICATIONS  (STANDING):  albuterol/ipratropium for Nebulization 3 milliLiter(s) Nebulizer every 12 hours  ferrous    sulfate Liquid 300 milliGRAM(s) Enteral Tube daily  gabapentin Solution 600 milliGRAM(s) Oral three times a day  heparin   Injectable 5000 Unit(s) SubCutaneous every 8 hours  influenza   Vaccine 0.5 milliLiter(s) IntraMuscular once  levothyroxine 50 MICROGram(s) Oral daily  LORazepam     Tablet 1 milliGRAM(s) Oral <User Schedule>  methadone   Solution 15 milliGRAM(s) Oral three times a day  nicotine - 21 mG/24Hr(s) Patch 1 Patch Transdermal daily  pantoprazole   Suspension 40 milliGRAM(s) Oral two times a day  QUEtiapine 25 milliGRAM(s) Oral at bedtime  senna 2 Tablet(s) Oral at bedtime    MEDICATIONS  (PRN):  albuterol    90 MICROgram(s) HFA Inhaler 2 Puff(s) Inhalation every 6 hours PRN Bronchospasm  HYDROmorphone   Tablet 4 milliGRAM(s) Oral every 4 hours PRN Moderate Pain (4 - 6)  HYDROmorphone   Tablet 6 milliGRAM(s) Oral every 4 hours PRN Severe Pain (7 - 10)  hydrOXYzine hydrochloride Syrup 25 milliGRAM(s) Oral every 6 hours PRN Anxiety  lidocaine   4% Patch 1 Patch Transdermal daily PRN hip/ back pain  ondansetron Injectable 4 milliGRAM(s) IV Push every 8 hours PRN Nausea and/or Vomiting  polyethylene glycol 3350 17 Gram(s) Oral daily PRN Constipation      CAPILLARY BLOOD GLUCOSE      POCT Blood Glucose.: 97 mg/dL (11 Mar 2023 12:10)  POCT Blood Glucose.: 116 mg/dL (11 Mar 2023 07:06)  POCT Blood Glucose.: 105 mg/dL (10 Mar 2023 23:41)  POCT Blood Glucose.: 100 mg/dL (10 Mar 2023 13:38)    I&O's Summary    10 Mar 2023 07:01  -  11 Mar 2023 07:00  --------------------------------------------------------  IN: 1308 mL / OUT: 500 mL / NET: 808 mL    11 Mar 2023 06:01  -  11 Mar 2023 12:18  --------------------------------------------------------  IN: 0 mL / OUT: 200 mL / NET: -200 mL        PHYSICAL EXAM:  Vital Signs Last 24 Hrs  T(C): 36.8 (11 Mar 2023 10:08), Max: 37.1 (10 Mar 2023 18:00)  T(F): 98.3 (11 Mar 2023 10:08), Max: 98.8 (10 Mar 2023 18:00)  HR: 83 (11 Mar 2023 10:08) (83 - 100)  BP: 90/51 (11 Mar 2023 10:08) (90/51 - 108/65)  BP(mean): --  RR: 17 (11 Mar 2023 10:08) (16 - 18)  SpO2: 100% (11 Mar 2023 10:08) (95% - 100%)    Parameters below as of 11 Mar 2023 10:08  Patient On (Oxygen Delivery Method): tracheostomy collar      CONSTITUTIONAL: Middle age male in NAD, thin, well-groomed  NECK: Supple, no palpable masses; no thyromegaly; trach with trach collar  RESPIRATORY: Normal respiratory effort; lungs are clear to auscultation bilaterally  CARDIOVASCULAR: Regular rate and rhythm, normal S1 and S2, no murmur/rub/gallop; No lower extremity edema; Peripheral pulses are 2+ bilaterally  ABDOMEN: Nontender to palpation, normoactive bowel sounds, no rebound/guarding; No hepatosplenomegaly; PEG site without erythema or tenderness,  PSYCH: A+O to person, place, and time; affect appropriate  NEUROLOGY: no gross sensory deficits   SKIN: No rashes; no palpable lesions    LABS:                        7.1    7.56  )-----------( 495      ( 11 Mar 2023 09:40 )             23.5     03-11    134<L>  |  93<L>  |  11  ----------------------------<  106<H>  4.2   |  28  |  0.64    Ca    8.5      11 Mar 2023 06:10  Phos  4.3     03-11  Mg     1.60     03-11                  RADIOLOGY & ADDITIONAL TESTS:  Results Reviewed: < from: Xray Abdomen 1 View PORTABLE -Urgent (Xray Abdomen 1 View PORTABLE -Urgent .) (03.11.23 @ 08:48) >  PEG tube in place.  Nonobstructive bowel gas pattern.    < end of copied text >    Imaging Personally Reviewed:  Electrocardiogram Personally Reviewed:    COORDINATION OF CARE:  Care Discussed with Consultants/Other Providers [Y/N]:  Prior or Outpatient Records Reviewed [Y/N]:

## 2023-03-11 NOTE — PROGRESS NOTE ADULT - ASSESSMENT
52M with metastatic lung cancer, HTN, anxiety, chronic pain on percocet, prior substance abuse who was admitted to [facility] on 2/10/23 due to difficulty swallowing. 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 Pt is s/p trach and PEG and s.p  palliative radiation. Patient is awaiting facility with plan for hospice.

## 2023-03-11 NOTE — CHART NOTE - NSCHARTNOTEFT_GEN_A_CORE
Spoke with transfer center and hospitalist from [place] --> transfer was denied since there is no medical need for the patient to be transferred. Informed patient. Attempted to inform family however no answer. Voicemail left. Spoke with transfer center and hospitalist from [place] --> transfer was denied since there is no medical need for the patient to be transferred. Informed patient. Attempted to inform family however no answer. Voicemail left.    Addendum: updated cousin Magda.

## 2023-03-11 NOTE — PROGRESS NOTE ADULT - PROBLEM SELECTOR PLAN 5
Progressive NSCLC with disease progression in the neck causing stridor and dysphagia  - palliative radiation completed today  - per hem/onc at [place] pt no longer candidate for DMT  - follow up hem/onc recs - appreciate outpatient oncology recs regarding whether patient should continue Keytruda or can explore other options  - pain management per palliative- on Dilaudid per PEG and methadone; palliative care managing meds  - pain control  discussed  with Palliative re: continue current regimen and dc IV Dilaudid given has not been receiving   - BP borderline at times, likely with use of opioids, but currently tolerable  Masses progressing on CTAP 3/3

## 2023-03-12 NOTE — PROGRESS NOTE ADULT - ASSESSMENT
52M with metastatic lung cancer, HTN, anxiety, chronic pain on percocet, prior substance abuse who was admitted to [facility] on 2/10/23 due to difficulty swallowing. 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 Pt is s/p trach and PEG and s.p  palliative radiation. Patient is awaiting facility with plan for hospice.

## 2023-03-12 NOTE — PROGRESS NOTE ADULT - PROBLEM SELECTOR PLAN 5
Progressive NSCLC with disease progression in the neck causing stridor and dysphagia  - palliative radiation completed today  - per hem/onc at [place] pt no longer candidate for DMT  - follow up hem/onc recs - appreciate outpatient oncology recs regarding whether patient should continue Keytruda or can explore other options  - pain management per palliative- on Dilaudid per PEG and methadone; palliative care managing meds  - pain control  discussed  with Palliative re: continue current regimen and dc IV Dilaudid given has not been receiving   - BP borderline at times, likely with use of opioids, but currently tolerable  Masses progressing on CTAP 3/3

## 2023-03-13 NOTE — CONSULT NOTE ADULT - ATTENDING COMMENTS
Mr. Tanner is a 52 yrs old male with metastatic lung cancer, HTN, anxiety, chronic pain on percocet, and prior substance abuse who was admitted to [facility] on 2/10/23 due to difficulty swallowing. He was found to have a large, necrotic retrotracheal mass with compression of the trachea, left IJ vein and L internal carotid artery, so was transferred to [facility] for palliative radiation. Hospital course complication w/ trach and PEG placement on 2/16 along with palliative radiation. Palliative care has been involved, patient is currently being evaluated for hospice awaiting facility placement. Pt. developed one episode of black stool on 3/12, described it as loose and sticky stool. Also developed abd pain at the PEG tube site, denied nausea, vomiting, fevers, and hematochezia. No dizziness or lightheadedness. Baseline hgb around 9-8 which has been gradually downtrending, he required total of 5 units of pRBCs during this hospital stay. Reported use of Motrin 3 times per week for several years. No AC or ASA use. GI consulted for anemia and melena.    Recommendations:   - Patient will need pulmonary clearance prior to the procedure.   - Keep him NPO after midnight.

## 2023-03-13 NOTE — PROGRESS NOTE ADULT - PROBLEM SELECTOR PLAN 5
Progressive NSCLC with disease progression in the neck causing stridor and dysphagia  - palliative radiation completed today  - per hem/onc at [place] pt no longer candidate for DMT  - follow up hem/onc recs - appreciate outpatient oncology recs regarding whether patient should continue Keytruda or can explore other options  - pain management per palliative- on Dilaudid per PEG and methadone; palliative care managing meds  - pain control  discussed  with Palliative re: continue current regimen and dc IV Dilaudid given has not been receiving   - BP borderline at times, likely with use of opioids, but currently tolerable  Masses progressing on CTAP 3/3

## 2023-03-13 NOTE — PROVIDER CONTACT NOTE (CRITICAL VALUE NOTIFICATION) - ACTION/TREATMENT ORDERED:
Provider made aware. Ordered 1 unit PRBC
repeat CBC ordered.
blood transfusion will be ordered.
Provider made aware. Administer 1 unit pRBC. Administer Midodrine.
provider to assess and will put in orders, will follow up

## 2023-03-13 NOTE — CONSULT NOTE ADULT - SUBJECTIVE AND OBJECTIVE BOX
HPI:    Allergies:  No Known Allergies      Home Medications:    Hospital Medications:  albuterol    90 MICROgram(s) HFA Inhaler 2 Puff(s) Inhalation every 6 hours PRN  albuterol/ipratropium for Nebulization 3 milliLiter(s) Nebulizer every 12 hours  aluminum hydroxide/magnesium hydroxide/simethicone Suspension 30 milliLiter(s) Oral every 6 hours PRN  ferrous    sulfate Liquid 300 milliGRAM(s) Enteral Tube daily  gabapentin Solution 600 milliGRAM(s) Oral three times a day  heparin   Injectable 5000 Unit(s) SubCutaneous every 8 hours  HYDROmorphone   Tablet 6 milliGRAM(s) Oral every 4 hours PRN  HYDROmorphone   Tablet 4 milliGRAM(s) Oral every 4 hours PRN  hydrOXYzine hydrochloride Syrup 25 milliGRAM(s) Oral every 6 hours PRN  influenza   Vaccine 0.5 milliLiter(s) IntraMuscular once  levothyroxine 50 MICROGram(s) Oral daily  lidocaine   4% Patch 1 Patch Transdermal daily PRN  LORazepam     Tablet 1 milliGRAM(s) Oral <User Schedule>  methadone   Solution 15 milliGRAM(s) Oral three times a day  nicotine - 21 mG/24Hr(s) Patch 1 Patch Transdermal daily  ondansetron Injectable 4 milliGRAM(s) IV Push every 8 hours PRN  pantoprazole  Injectable 40 milliGRAM(s) IV Push two times a day  polyethylene glycol 3350 17 Gram(s) Oral daily PRN  QUEtiapine 25 milliGRAM(s) Oral at bedtime  senna 2 Tablet(s) Oral at bedtime      PMHX/PSHX:  No pertinent past medical history    HTN (hypertension)    Smoker    Substance abuse    Admits to alcohol use    Lung cancer    Lung cancer    No significant past surgical history    Injury due to foreign body        Family history:  FH: rheumatic fever (Father)        Denies family history of colon cancer/polyps, stomach cancer/polyps, pancreatic cancer/masses, liver cancer/disease, ovarian cancer and endometrial cancer.    Social History:   Tob: Denies  EtOH: Denies  Illicit Drugs: Denies    ROS:     General:  No wt loss, fevers, chills, night sweats, fatigue  Eyes:  Good vision, no reported pain  ENT:  No sore throat, pain, runny nose, dysphagia  CV:  No pain, palpitations, hypo/hypertension  Pulm:  No dyspnea, cough, tachypnea, wheezing  GI:  see HPI  :  No pain, bleeding, incontinence, nocturia  Muscle:  No pain, weakness  Neuro:  No weakness, tingling, memory problems  Psych:  No fatigue, insomnia, mood problems, depression  Endocrine:  No polyuria, polydipsia, cold/heat intolerance  Heme:  No petechiae, ecchymosis, easy bruisability  Skin:  No rash, tattoos, scars, edema    PHYSICAL EXAM:     GENERAL:  No acute distress  HEENT:  NCAT, no scleral icterus   CHEST:  no respiratory distress  HEART:  Regular rate and rhythm  ABDOMEN:  Soft, non-tender, non-distended, normoactive bowel sounds,  no masses  EXTREMITIES: No edema  SKIN:  No rash/erythema/ecchymoses/petechiae/wounds/abscess/warm/dry  NEURO:  Alert and oriented x 3, no asterixis    Vital Signs:  Vital Signs Last 24 Hrs  T(C): 37.3 (13 Mar 2023 14:00), Max: 37.3 (13 Mar 2023 14:00)  T(F): 99.2 (13 Mar 2023 14:00), Max: 99.2 (13 Mar 2023 14:00)  HR: 92 (13 Mar 2023 14:00) (84 - 94)  BP: 102/63 (13 Mar 2023 14:00) (94/52 - 112/59)  BP(mean): --  RR: 17 (13 Mar 2023 14:00) (17 - 18)  SpO2: 99% (13 Mar 2023 14:00) (98% - 100%)    Parameters below as of 13 Mar 2023 14:00  Patient On (Oxygen Delivery Method): tracheostomy collar      Daily     Daily     LABS:                        6.3    9.82  )-----------( 567      ( 13 Mar 2023 06:27 )             20.9     Mean Cell Volume: 86.0 fL (03-13-23 @ 06:27)    03-13    134<L>  |  92<L>  |  11  ----------------------------<  114<H>  4.1   |  30  |  0.67    Ca    8.5      13 Mar 2023 06:27  Phos  4.2     03-13  Mg     1.70     03-13                                    6.3    9.82  )-----------( 567      ( 13 Mar 2023 06:27 )             20.9                         7.3    9.36  )-----------( 576      ( 12 Mar 2023 06:31 )             23.8                         7.1    7.56  )-----------( 495      ( 11 Mar 2023 09:40 )             23.5                         6.6    7.48  )-----------( 488      ( 11 Mar 2023 06:10 )             21.4       Imaging:             HPI:    Mr. Tanner is a 52 yrs old male with metastatic lung cancer, HTN, anxiety, chronic pain on percocet, and prior substance abuse who was admitted to [facility] on 2/10/23 due to difficulty swallowing. He was found to have a large, necrotic retrotracheal mass with compression of the trachea, left IJ vein and L internal carotid artery, so was transferred to [facility] for palliative radiation. Hospital course complication w/ trach and PEG placement on 2/16 along with palliative radiation. Palliative care has been involved, patient is currently being evaluated for hospice awaiting facility placement. Pt. developed one episode of black stool on 3/12, described it as loose and sticky stool. Also developed abd pain at the PEG tube site, denied nausea, vomiting, fevers, and hematochezia. No dizziness or lightheadedness. Baseline hgb around 9-8 which has been gradually downtrending, he required total of 5 units of pRBCs during this hospital stay. Reported use of Motrin 3 times per week for several years. No AC or ASA use. GI consulted for anemia and melena.     Allergies:  No Known Allergies    Home Medications:  · 	ipratropium-albuterol 0.5 mg-2.5 mg/3 mL inhalation solution: Last Dose Taken:  , 3 milliliter(s) inhaled once  · 	hydrALAZINE 20 mg/mL injectable solution: Last Dose Taken:  , 5  injectable every 6 hours  · 	zolpidem 5 mg oral tablet: Last Dose Taken:  , 1 tab(s) orally once a day (at bedtime)  · 	fentaNYL 50 mcg/hr transdermal film, extended release: Last Dose Taken:  , 1 patch transdermal every 72 hours  · 	albuterol 90 mcg/inh inhalation aerosol: Last Dose Taken:  , 2 puff(s) inhaled every 6 hours, As needed, Bronchospasm  · 	levothyroxine 50 mcg (0.05 mg) oral tablet: Last Dose Taken:  , 1 tab(s) orally once a day  	  · 	folic acid 1 mg oral tablet: Last Dose Taken:  , 1 tab(s) orally once a day  · 	nicotine 21 mg/24 hr transdermal film, extended release: Last Dose Taken:  , 1 patch transdermal once a day    Hospital Medications:  albuterol    90 MICROgram(s) HFA Inhaler 2 Puff(s) Inhalation every 6 hours PRN  albuterol/ipratropium for Nebulization 3 milliLiter(s) Nebulizer every 12 hours  aluminum hydroxide/magnesium hydroxide/simethicone Suspension 30 milliLiter(s) Oral every 6 hours PRN  ferrous    sulfate Liquid 300 milliGRAM(s) Enteral Tube daily  gabapentin Solution 600 milliGRAM(s) Oral three times a day  heparin   Injectable 5000 Unit(s) SubCutaneous every 8 hours  HYDROmorphone   Tablet 6 milliGRAM(s) Oral every 4 hours PRN  HYDROmorphone   Tablet 4 milliGRAM(s) Oral every 4 hours PRN  hydrOXYzine hydrochloride Syrup 25 milliGRAM(s) Oral every 6 hours PRN  influenza   Vaccine 0.5 milliLiter(s) IntraMuscular once  levothyroxine 50 MICROGram(s) Oral daily  lidocaine   4% Patch 1 Patch Transdermal daily PRN  LORazepam     Tablet 1 milliGRAM(s) Oral <User Schedule>  methadone   Solution 15 milliGRAM(s) Oral three times a day  nicotine - 21 mG/24Hr(s) Patch 1 Patch Transdermal daily  ondansetron Injectable 4 milliGRAM(s) IV Push every 8 hours PRN  pantoprazole  Injectable 40 milliGRAM(s) IV Push two times a day  polyethylene glycol 3350 17 Gram(s) Oral daily PRN  QUEtiapine 25 milliGRAM(s) Oral at bedtime  senna 2 Tablet(s) Oral at bedtime      PMHX/PSHX:  No pertinent past medical history    HTN (hypertension)    Smoker    Substance abuse    Admits to alcohol use    Lung cancer    Lung cancer    No significant past surgical history    Injury due to foreign body    Family history:  FH: rheumatic fever (Father), denied colon cancer.     Social History:   Tob: Denies  EtOH: Denies  Illicit Drugs: Denies    ROS:     General:  No wt loss, fevers, chills, night sweats, fatigue  Eyes:  Good vision, no reported pain  ENT:  No sore throat, pain, runny nose, dysphagia  CV:  No pain, palpitations, hypo/hypertension  Pulm:  No dyspnea, cough, tachypnea, wheezing  GI:  see HPI  :  No pain, bleeding, incontinence, nocturia  Muscle:  No pain, weakness  Neuro:  No weakness, tingling, memory problems  Psych:  No fatigue, insomnia, mood problems, depression  Endocrine:  No polyuria, polydipsia, cold/heat intolerance  Heme:  No petechiae, ecchymosis, easy bruisability  Skin:  No rash, tattoos, scars, edema    PHYSICAL EXAM:     GENERAL:  No acute distress, cachectic, chronically ill appearing. Sitting on the bed.   HEENT: Trach in place.   CHEST:  no respiratory distress  HEART:  Regular rate and rhythm  ABDOMEN:  Soft, has PEG in place in the LLQ, peg site with granulation tissue with mild erythema surrounding the PEG tube, flushed w/ sterile water, only bile, no evidence of blood, non-distended, well healed surgical scars midline, mild tenderness around the peg tube site, no palpable masses.   RECTUM: Declined it.   EXTREMITIES: No LE edema  SKIN:  Mild erythema w/ granulation tissue around the PEG tube site.   NEURO:  Alert and oriented x 3, no tremors.     Vital Signs:  Vital Signs Last 24 Hrs  T(C): 37.3 (13 Mar 2023 14:00), Max: 37.3 (13 Mar 2023 14:00)  T(F): 99.2 (13 Mar 2023 14:00), Max: 99.2 (13 Mar 2023 14:00)  HR: 92 (13 Mar 2023 14:00) (84 - 94)  BP: 102/63 (13 Mar 2023 14:00) (94/52 - 112/59)  BP(mean): --  RR: 17 (13 Mar 2023 14:00) (17 - 18)  SpO2: 99% (13 Mar 2023 14:00) (98% - 100%)    Parameters below as of 13 Mar 2023 14:00  Patient On (Oxygen Delivery Method): tracheostomy collar      Daily     Daily     LABS:                        6.3    9.82  )-----------( 567      ( 13 Mar 2023 06:27 )             20.9     Mean Cell Volume: 86.0 fL (03-13-23 @ 06:27)    03-13    134<L>  |  92<L>  |  11  ----------------------------<  114<H>  4.1   |  30  |  0.67    Ca    8.5      13 Mar 2023 06:27  Phos  4.2     03-13  Mg     1.70     03-13                                    6.3    9.82  )-----------( 567      ( 13 Mar 2023 06:27 )             20.9                         7.3    9.36  )-----------( 576      ( 12 Mar 2023 06:31 )             23.8                         7.1    7.56  )-----------( 495      ( 11 Mar 2023 09:40 )             23.5                         6.6    7.48  )-----------( 488      ( 11 Mar 2023 06:10 )             21.4       Imaging:    CT A/P on 3/3/23    COMPARISON: CT abdomen pelvis 2/11/2023, CT chest 2/10/2023    CONTRAST/COMPLICATIONS:  IV Contrast: Omnipaque 350  90 cc administered   10 cc discarded  Oral Contrast: NONE  Complications: None reported at time of study completion    PROCEDURE:  CT of the Abdomen and Pelvis was performed.  Sagittal and coronal reformats were performed.    FINDINGS:  LOWER CHEST: Patchy opacities in the left lower lobe. Increased size of   left lower lobe pulmonary nodules, measuring up to 0.6 cm, previously 0.3   cm (2, 8). Unchanged right lower lobe nodule, measuring 0.7 cm.    LIVER: Within normal limits.  BILE DUCTS: Normal caliber.  GALLBLADDER: Within normal limits.  SPLEEN: Within normal limits.  PANCREAS: Within normal limits.  ADRENALS: Heterogenous right adrenal mass increased in size measuring 6.3   x 4.6 x 7.7 cm (2, 30), previously measuring 5.8 x 3.9 x 6.0 cm.  KIDNEYS/URETERS: No hydronephrosis. Hypodense right renal lesion, too   small to characterize.    BLADDER: Within normal limits.  REPRODUCTIVE ORGANS: Prostate within normal limits.    BOWEL: Gastrostomy tube in place with ill-defined fluid in the left   rectus muscle medially along the catheter tract within the left abdominal   wall measuring approximately (2, 50). Increased size of mass associated   with a loop of small bowel in the left hemipelvis (series 2, image 102).   New mass associated with a loop of small bowel in the left anterior   pelvis (series 2, image 100). No bowel obstruction. Appendix is not   visualized. No evidence of inflammation in the pericecal region.  PERITONEUM: Small volume pelvic ascites.  VESSELS: Atherosclerotic changes.  RETROPERITONEUM/LYMPH NODES: Redemonstration of multiple heterogeneously   dense lesions within the right retroperitoneum the largest of which has   increased in size measuring 3.6 x 5.0 x 4.3 cm (2, 76) previously   measuring 3.2 x 4.0 x 3.1 cm. Mesenteric lymphadenopathy with increase   size and density of a mesenteric node in the left midabdomen (2, 72),   measuring 3.7 x 3.0 cm, previously 2.6 x 2.2 cm.  ABDOMINAL WALL: Similar-appearing heterogenous largely hypodense lesion   within the posterior medial right thigh with no evidence of bony erosion.   New small peripherally rim-enhancing fluid collections in the lateral   aspect of the right hip (2, 114), measuring 2.4 x 1.4 cm). Increased size   of subcutaneous mass in the right posterior pelvis (series 2, image 71),   measuring 1.9 cm, previously 1.4 cm. Droplet of air in the midline   subcutaneous tissues. Unchanged hypodense left gluteal lesion (series 2,   image 142), measuring 3.0 x 1.3 cm.  BONES: Degenerative changes. Possible mass associated with an area of   osseous erosion in the posterior portion of the left greater trochanter   (series 2, image 124).    IMPRESSION:  Gastrostomy tube in place with small fluid medially along the catheter   track within the left abdominal wall. No discrete rim enhancement to   suggest abscess at this time.    New small peripherally enhancing collections along the lateral right hip,   possibly developing abscesses.    Left lower lobe pneumonia.    Increased size of pulmonary nodules and masses in the right adrenal   gland, small bowel, retroperitoneum, mesentery, and subcutaneous tissues,   consistent with progression of disease.

## 2023-03-13 NOTE — PROGRESS NOTE ADULT - ASSESSMENT
52M with metastatic lung cancer, HTN, anxiety, chronic pain on percocet, prior substance abuse who was admitted to [facility] on 2/10/23 due to difficulty swallowing. 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 Pt is s/p trach and PEG and s.p  palliative radiation. Patient is awaiting facility with plan for hospice.

## 2023-03-13 NOTE — PROGRESS NOTE ADULT - SUBJECTIVE AND OBJECTIVE BOX
[facility] Division of Hospital Medicine  Miki Nj DO  Available via MS Teams  In house pager [phone]    SUBJECTIVE / OVERNIGHT EVENTS:  No acute events overnight.  Patient reporting abdominal pain, says it feels like gas.    Bedside RN and medicine NP reporting dark, tarry BMs.    ADDITIONAL REVIEW OF SYSTEMS:    MEDICATIONS  (STANDING):  albuterol/ipratropium for Nebulization 3 milliLiter(s) Nebulizer every 12 hours  ferrous    sulfate Liquid 300 milliGRAM(s) Enteral Tube daily  gabapentin Solution 600 milliGRAM(s) Oral three times a day  heparin   Injectable 5000 Unit(s) SubCutaneous every 8 hours  influenza   Vaccine 0.5 milliLiter(s) IntraMuscular once  levothyroxine 50 MICROGram(s) Oral daily  LORazepam     Tablet 1 milliGRAM(s) Oral <User Schedule>  methadone   Solution 15 milliGRAM(s) Oral three times a day  nicotine - 21 mG/24Hr(s) Patch 1 Patch Transdermal daily  pantoprazole  Injectable 40 milliGRAM(s) IV Push two times a day  QUEtiapine 25 milliGRAM(s) Oral at bedtime  senna 2 Tablet(s) Oral at bedtime    MEDICATIONS  (PRN):  albuterol    90 MICROgram(s) HFA Inhaler 2 Puff(s) Inhalation every 6 hours PRN Bronchospasm  aluminum hydroxide/magnesium hydroxide/simethicone Suspension 30 milliLiter(s) Oral every 6 hours PRN Dyspepsia  HYDROmorphone   Tablet 4 milliGRAM(s) Oral every 4 hours PRN Moderate Pain (4 - 6)  HYDROmorphone   Tablet 6 milliGRAM(s) Oral every 4 hours PRN Severe Pain (7 - 10)  hydrOXYzine hydrochloride Syrup 25 milliGRAM(s) Oral every 6 hours PRN Anxiety  lidocaine   4% Patch 1 Patch Transdermal daily PRN hip/ back pain  ondansetron Injectable 4 milliGRAM(s) IV Push every 8 hours PRN Nausea and/or Vomiting  polyethylene glycol 3350 17 Gram(s) Oral daily PRN Constipation      I&O's Summary    12 Mar 2023 07:01  -  13 Mar 2023 07:00  --------------------------------------------------------  IN: 1253 mL / OUT: 0 mL / NET: 1253 mL    13 Mar 2023 07:01  -  13 Mar 2023 15:02  --------------------------------------------------------  IN: 240 mL / OUT: 0 mL / NET: 240 mL        PHYSICAL EXAM:  Vital Signs Last 24 Hrs  T(C): 37.3 (13 Mar 2023 14:00), Max: 37.3 (13 Mar 2023 14:00)  T(F): 99.2 (13 Mar 2023 14:00), Max: 99.2 (13 Mar 2023 14:00)  HR: 92 (13 Mar 2023 14:00) (84 - 94)  BP: 102/63 (13 Mar 2023 14:00) (94/52 - 112/59)  BP(mean): --  RR: 17 (13 Mar 2023 14:00) (17 - 18)  SpO2: 99% (13 Mar 2023 14:00) (98% - 100%)    Parameters below as of 13 Mar 2023 14:00  Patient On (Oxygen Delivery Method): tracheostomy collar      CONSTITUTIONAL: NAD, well-developed, well-groomed  EYES: PERRLA; conjunctiva and sclera clear  ENMT: Moist oral mucosa, no pharyngeal injection or exudates; trach with overlying trach collar  NECK: Supple, no palpable masses; no thyromegaly  RESPIRATORY: Normal respiratory effort; lungs are clear to auscultation bilaterally  CARDIOVASCULAR: Regular rate and rhythm, normal S1 and S2, no murmur/rub/gallop; No lower extremity edema; Peripheral pulses are 2+ bilaterally  ABDOMEN: moderately TTP along midline/umbilicus, not rigid, normoactive bowel sounds, no rebound/guarding; No hepatosplenomegaly; PEG tube with mild yellowish drainage at point of insertion  MUSCULOSKELETAL:  Normal gait; no clubbing or cyanosis of digits; no joint swelling or tenderness to palpation  PSYCH: A+O to person, place, and time; affect appropriate  NEUROLOGY: CN 2-12 are intact and symmetric; no gross sensory deficits   SKIN: No rashes; no palpable lesions    LABS:                        6.3    9.82  )-----------( 567      ( 13 Mar 2023 06:27 )             20.9     03-13    134<L>  |  92<L>  |  11  ----------------------------<  114<H>  4.1   |  30  |  0.67    Ca    8.5      13 Mar 2023 06:27  Phos  4.2     03-13  Mg     1.70     03-13                COVID-19 PCR: NotDetec (13 Mar 2023 04:53)  COVID-19 PCR: NotDetec (08 Mar 2023 19:09)  SARS-CoV-2: NotDetec (01 Mar 2023 15:00)  COVID-19 PCR: NotDetec (15 Feb 2023 18:47)  COVID-19 PCR: NotDetec (26 Sep 2022 10:41)

## 2023-03-13 NOTE — CHART NOTE - NSCHARTNOTEFT_GEN_A_CORE
RN reports pt has abdominal pain since morning. Pt reports to diffuse abdominal pain since this morning. Describes as discomfort, worsens on standing up and leaning forward. He denies passing gas today. Last BM yesterday evening where he had 1 episode of loose bm, describing as "black, sticky stool." Pain level is moderate. Today pt found to be more anemic than usual with Hg 6.3.    Exam:    Neuro: A&Ox3, communicates via writing.  Lungs: CTA b/l.   Cardio: S1 S2 RRR  Abd: hard with tenderness in LLQ on palpation. +BS in all quads.    Plan:    R/o Acute Abdomen, GI Bleed, Diverticulitis, Bowel obstruction  -ordered CTA Abd+pelvis w/IV Con  -NPO except meds  -started IV Protonix 40mg BID  -House GI consulted by attending  -Transfusing currently PRBC 1 unit  -ordered GUIAC.    Will cont to monitor    Above discussed with attending (Dr. Nj) RN reports pt has abdominal pain since morning. Pt reports to diffuse abdominal pain since this morning. Describes as discomfort, worsens on standing up and leaning forward. He denies passing gas today. Last BM yesterday evening where he had 1 episode of loose bm, describing as "black, sticky stool." Pain level is moderate. Denies chest pain, sob, palpitations, nausea, vomiting, fever or chills. Today pt found to be more anemic than usual with Hg 6.3.    Exam:    Neuro: A&Ox3, communicates via writing.  Lungs: CTA b/l.   Cardio: S1 S2 RRR  Abd: hard with tenderness in LLQ on palpation. +BS in all quads.    Plan:    R/o Acute Abdomen, GI Bleed, Diverticulitis, Bowel obstruction  -ordered CTA Abd+pelvis w/IV Con  -NPO except meds  -started IV Protonix 40mg BID  -House GI consulted by attending  -Transfusing currently PRBC 1 unit  -ordered GUIAC.    Will cont to monitor    Above discussed with attending (Dr. Nj)

## 2023-03-13 NOTE — PROGRESS NOTE ADULT - PROBLEM SELECTOR PLAN 4
Multifactorial 2/2 metastatic lung cancer, recent surgery, illness  - has required multiple PRBC transfusions  - cont to monitor. goal Hb >7  - now with dark, tarry stools and ongoing anemia  - start IV PPI BID  - obtain CTA abd/pelvis though low suspicion for diverticulitis  - GI consulted, appreciate input - patient has accepted invasive medical interventions in the recent past

## 2023-03-13 NOTE — CONSULT NOTE ADULT - TIME BILLING
Direct patient Interaction and evaluation, chart review, image review, medical decision making and care coordination
Thao

## 2023-03-13 NOTE — CONSULT NOTE ADULT - ASSESSMENT
Impression:     #Melena  Likely related to upper GI bleed with differentials including gastric/duodenal/esophageal ulcers, dieulafoy lesions, marshall lesions, hemorrhagic gastritis, angioectasia, adenocarcinoma, NET, GIST, lymphoma, severe esophagitis, GAVE and gastric/esophageal varices. Could also be related to small bowel masses, however, unclear if its intraluminal based on the imaging. Less likely related to lower GI bleed, including diverticulosis, angioectasia etc.   - Based on chart review, prior hgb levels around 9-8, now gradual decline to high 6, required total of 5 units of pRBCs. No prior EGD or colonoscopies.   Patient will benefit from upper endoscopy. However, imaging showed large necrotic mass in the retrotracheal region around 8x4 cm which is compressing the proximal esophagus, might be difficult to intubate the esophagus.   - Discussed with the patient about the procedure, patient would like to discuss with the family before proceeding with EGD. Patient will need to rescind his DNR status for the procedure. He agreed to it.     Recommendations:   - Patient will need pulmonary clearance prior to the procedure.   - Keep him NPO after midnight.   - Will proceed with potential upper endoscopy tomorrow.   - Continue with IV PPI 40mg BID.   - Please obtain early 2 AM labs prior to the procedure.   - Monitor CBC Q12 hours.   - Transfuse if hgb < 7 or hgb < 8 in patients with underlying cardiac comorbidities.     GI will continue to follow.     All recommendations are tentative until note is attested by an attending.     Rama Ro, PGY-4  Gastroenterology/Hepatology Fellow  Available on Microsoft Teams  [phone] (Short Range Pager)  [phone] (Long Range Pager)    After 5pm, please contact the on-call GI fellow. [phone]

## 2023-03-13 NOTE — PROVIDER CONTACT NOTE (CRITICAL VALUE NOTIFICATION) - NAME OF MD/NP/PA/DO NOTIFIED:
[facility] #[id number]
Carmen Romero
[facility] #[id number]
Adela Travis
Carmen Romero

## 2023-03-14 NOTE — PROGRESS NOTE ADULT - ASSESSMENT
Impression:     #Melena  Likely related to upper GI bleed with differentials including gastric/duodenal/esophageal ulcers, dieulafoy lesions, marshall lesions, hemorrhagic gastritis, angioectasia, adenocarcinoma, NET, GIST, lymphoma, severe esophagitis, GAVE and gastric/esophageal varices. Could also be related to small bowel masses, however, unclear if its intraluminal based on the imaging. Less likely related to lower GI bleed, including diverticulosis, angioectasia etc.   - Based on chart review, prior hgb levels around 9-8, now gradual decline to high 6, required total of 5 units of pRBCs. No prior EGD or colonoscopies.   Patient will benefit from upper endoscopy. However, imaging showed large necrotic mass in the retrotracheal region around 8x4 cm which is compressing the proximal esophagus, might be difficult to intubate the esophagus.   - Patient will need to rescind his DNR status for the procedure. He agreed to it.   - hgb has been stable around 8.     Recommendations:   - Will hold off on the procedure as the patient is not medically optimized, currently concerned for possible pneumonia and has hyponatremia, will need Na level 130.   - Can resume regular diet for now.   - Will consider upper endoscopy sometime later this week if he is optimized for the procedure.   - Continue with IV PPI 40mg BID.   - Monitor CBC Q12 hours.   - Transfuse if hgb < 7 or hgb < 8 in patients with underlying cardiac comorbidities.     GI will continue to follow.     All recommendations are tentative until note is attested by an attending.     Rama Ro, PGY-4  Gastroenterology/Hepatology Fellow  Available on Microsoft Teams  [phone] (Short Range Pager)  [phone] (Long Range Pager)    After 5pm, please contact the on-call GI fellow. [phone]

## 2023-03-14 NOTE — PROGRESS NOTE ADULT - SUBJECTIVE AND OBJECTIVE BOX
[facility] Division of Hospital Medicine  Miki NjDO  Available via MS Teams  In house pager [phone]    SUBJECTIVE / OVERNIGHT EVENTS:  Episodes of desaturation overnight, seen by ENT PA - tracheoscopy done - trach in proper place, no ENT interventions; bilious material visualized in right mainstem.  Febrile overnight and tachycardic.  Was made NPO pending EGD.     Spoke to patient this morning - has not been getting pain meds as they were not given through PEG. continues to request IV pain meds.  Says abdominal pain is actually improved today.  I had spoken to him about endoscopy, and he seemed to be agreeable. I spoke to the pulmonology team (consulted for EGD clearance) and they said that the patient no longer wants to pursue EGD. He asked about hospice.     Plan has been to dc patient to rehab then transition to hospice. This has been delayed by his medical acuity.     If patient remains afebrile while treated for suspected aspiration pneumonia, and declines endoscopy while Hb remains relatively stable, may still pursue rehab placement with eventual hospice.      ADDITIONAL REVIEW OF SYSTEMS:    MEDICATIONS  (STANDING):  albuterol/ipratropium for Nebulization 3 milliLiter(s) Nebulizer every 12 hours  famotidine  IVPB 20 milliGRAM(s) IV Intermittent once  ferrous    sulfate Liquid 300 milliGRAM(s) Enteral Tube daily  gabapentin Solution 600 milliGRAM(s) Oral three times a day  heparin   Injectable 5000 Unit(s) SubCutaneous every 8 hours  influenza   Vaccine 0.5 milliLiter(s) IntraMuscular once  levothyroxine 50 MICROGram(s) Oral daily  LORazepam     Tablet 1 milliGRAM(s) Oral <User Schedule>  magnesium sulfate  IVPB 1 Gram(s) IV Intermittent once  methadone   Solution 15 milliGRAM(s) Oral three times a day  nicotine - 21 mG/24Hr(s) Patch 1 Patch Transdermal daily  pantoprazole  Injectable 40 milliGRAM(s) IV Push two times a day  piperacillin/tazobactam IVPB.. 3.375 Gram(s) IV Intermittent every 8 hours  QUEtiapine 25 milliGRAM(s) Oral at bedtime  senna 2 Tablet(s) Oral at bedtime  vancomycin  IVPB 750 milliGRAM(s) IV Intermittent every 12 hours    MEDICATIONS  (PRN):  albuterol    90 MICROgram(s) HFA Inhaler 2 Puff(s) Inhalation every 6 hours PRN Bronchospasm  aluminum hydroxide/magnesium hydroxide/simethicone Suspension 30 milliLiter(s) Oral every 6 hours PRN Dyspepsia  HYDROmorphone   Tablet 4 milliGRAM(s) Oral every 4 hours PRN Moderate Pain (4 - 6)  HYDROmorphone   Tablet 6 milliGRAM(s) Oral every 4 hours PRN Severe Pain (7 - 10)  hydrOXYzine hydrochloride Syrup 25 milliGRAM(s) Oral every 6 hours PRN Anxiety  lidocaine   4% Patch 1 Patch Transdermal daily PRN hip/ back pain  ondansetron Injectable 4 milliGRAM(s) IV Push every 8 hours PRN Nausea and/or Vomiting  polyethylene glycol 3350 17 Gram(s) Oral daily PRN Constipation      I&O's Summary    13 Mar 2023 07:01  -  14 Mar 2023 07:00  --------------------------------------------------------  IN: 3490 mL / OUT: 600 mL / NET: 2890 mL        PHYSICAL EXAM:  Vital Signs Last 24 Hrs  T(C): 36.8 (14 Mar 2023 10:00), Max: 38.9 (14 Mar 2023 00:05)  T(F): 98.3 (14 Mar 2023 10:00), Max: 102 (14 Mar 2023 00:05)  HR: 102 (14 Mar 2023 10:00) (92 - 133)  BP: 103/61 (14 Mar 2023 10:00) (96/57 - 134/80)  BP(mean): --  RR: 18 (14 Mar 2023 10:00) (16 - 20)  SpO2: 95% (14 Mar 2023 10:00) (86% - 100%)    Parameters below as of 14 Mar 2023 10:00  Patient On (Oxygen Delivery Method): tracheostomy collar  O2 Flow (L/min): 9  O2 Concentration (%): 35  CONSTITUTIONAL: NAD, well-developed, well-groomed, comfortable, communicating by writing in his notebook  EYES: PERRLA; conjunctiva and sclera clear  ENMT: Moist oral mucosa, no pharyngeal injection or exudates  NECK: Supple, no palpable masses; no thyromegaly; trach in place with overlying trach collar  RESPIRATORY: Normal respiratory effort; lungs are clear to auscultation bilaterally  CARDIOVASCULAR: Regular rate and rhythm, normal S1 and S2, no murmur/rub/gallop; No lower extremity edema; Peripheral pulses are 2+ bilaterally  ABDOMEN: Nontender to palpation, normoactive bowel sounds, no rebound/guarding; No hepatosplenomegaly; PEG in place without surrounding erythema or drainage  MUSCULOSKELETAL: no clubbing or cyanosis of digits; no joint swelling or tenderness to palpation  PSYCH: A+O to person, place, and time; affect appropriate  NEUROLOGY: CN 2-12 are intact and symmetric; no gross sensory deficits   SKIN: No rashes; no palpable lesions    LABS:                        8.0    13.74 )-----------( 528      ( 14 Mar 2023 00:50 )             25.0     03-14    129<L>  |  88<L>  |  14  ----------------------------<  121<H>  4.3   |  29  |  0.79    Ca    8.7      14 Mar 2023 00:50  Phos  5.1     03-14  Mg     1.40     03-14      PT/INR - ( 14 Mar 2023 00:50 )   PT: 15.6 sec;   INR: 1.34 ratio         PTT - ( 14 Mar 2023 00:50 )  PTT:27.9 sec      Urinalysis Basic - ( 14 Mar 2023 06:50 )    Color: Yellow / Appearance: Clear / S.019 / pH: x  Gluc: x / Ketone: Negative  / Bili: Negative / Urobili: <2 mg/dL   Blood: x / Protein: Trace / Nitrite: Negative   Leuk Esterase: Negative / RBC: x / WBC x   Sq Epi: x / Non Sq Epi: x / Bacteria: x        COVID-19 PCR: NotDetec (13 Mar 2023 12:15)  COVID-19 PCR: NotDetec (13 Mar 2023 04:53)  COVID-19 PCR: NotDetec (08 Mar 2023 19:09)  SARS-CoV-2: NotDetec (01 Mar 2023 15:00)  COVID-19 PCR: NotDetec (15 Feb 2023 18:47)  COVID-19 PCR: NotDetec (26 Sep 2022 10:41)

## 2023-03-14 NOTE — PROGRESS NOTE ADULT - PROBLEM SELECTOR PLAN 4
Multifactorial 2/2 metastatic lung cancer, recent surgery, illness  - has required multiple PRBC transfusions  - cont to monitor. goal Hb >7  - now with dark, tarry stools and ongoing anemia  - start IV PPI BID  - pending CTA abd/pelvis  - GI consulted, offering EGD when optimized (Na 130 or more, PNA treated) though patient at this time seems to be declining  - holding tube feeds for now, restart if GI no longer planning to pursue intervention  - can still give meds through PEG for now

## 2023-03-14 NOTE — PROGRESS NOTE ADULT - ASSESSMENT
52M with metastatic lung cancer, HTN, anxiety, chronic pain on percocet, prior substance abuse who was admitted to [facility] on 2/10/23 due to difficulty swallowing. 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 Pt is s/p trach and PEG and s.p  palliative radiation. Patient is awaiting facility with plan for eventual hospice.

## 2023-03-14 NOTE — CHART NOTE - NSCHARTNOTEFT_GEN_A_CORE
alerted by RN that pt was vomiting via trach. evaluated pt at bedside. denies chest pain, abdominal pain, shortness of breath    Vital Signs Last 24 Hrs  T(C): 38.9 (14 Mar 2023 00:05), Max: 38.9 (14 Mar 2023 00:05)  T(F): 102 (14 Mar 2023 00:05), Max: 102 (14 Mar 2023 00:05)  HR: 128 (14 Mar 2023 00:05) (85 - 133)  BP: 134/80 (13 Mar 2023 22:40) (95/59 - 134/80)  RR: 19 (14 Mar 2023 00:05) (16 - 20)  SpO2: 98% (14 Mar 2023 00:05) (86% - 100%)    Parameters below as of 14 Mar 2023 00:05  Patient On (Oxygen Delivery Method): tracheostomy collar  O2 Flow (L/min): 9  O2 Concentration (%): 35      CONSTITUTIONAL: NAD  EYES: PERRLA; conjunctiva and sclera clear  ENMT: MMM, trach with overlying trach collar  RESPIRATORY: Normal respiratory effort; lungs are clear to auscultation bilaterally  CARDIOVASCULAR: Regular rate and rhythm, normal S1 and S2, no murmur/rub/gallop; No lower extremity edema  ABDOMEN: nontender, no rebound/guarding; PEG tube with mild yellowish drainage at point of insertion  MUSCULOSKELETAL: no joint swelling or tenderness to palpation  NEUROLOGY: CN 2-12 are intact and symmetric; no gross sensory deficits       plan:  -zofran PRN  -acetaminophen IV for fever control  -blood culture x2  -lactate  -IVF  -empiric antibiotics-zosyn, vancomycin  -chest XR  -UA    will continue to monitor overnight.      Carly Joseph, [facility]  Medicine [id number] alerted by RN that pt was vomiting via trach. evaluated pt at bedside. denies chest pain, abdominal pain, shortness of breath    Vital Signs Last 24 Hrs  T(C): 38.9 (14 Mar 2023 00:05), Max: 38.9 (14 Mar 2023 00:05)  T(F): 102 (14 Mar 2023 00:05), Max: 102 (14 Mar 2023 00:05)  HR: 128 (14 Mar 2023 00:05) (85 - 133)  BP: 134/80 (13 Mar 2023 22:40) (95/59 - 134/80)  RR: 19 (14 Mar 2023 00:05) (16 - 20)  SpO2: 98% (14 Mar 2023 00:05) (86% - 100%)    Parameters below as of 14 Mar 2023 00:05  Patient On (Oxygen Delivery Method): tracheostomy collar  O2 Flow (L/min): 9  O2 Concentration (%): 35      CONSTITUTIONAL: NAD  EYES: PERRLA; conjunctiva and sclera clear  ENMT: MMM, trach with overlying trach collar  RESPIRATORY: Normal respiratory effort; lungs are clear to auscultation bilaterally  CARDIOVASCULAR: Regular rate and rhythm, normal S1 and S2, no murmur/rub/gallop; No lower extremity edema  ABDOMEN: nontender, no rebound/guarding; PEG tube with mild yellowish drainage at point of insertion  MUSCULOSKELETAL: no joint swelling or tenderness to palpation  NEUROLOGY: CN 2-12 are intact and symmetric; no gross sensory deficits       plan:  -zofran PRN  -acetaminophen IV for fever control  -blood culture x2  -lactate  -IVF  -empiric antibiotics-zosyn, vancomycin  -chest XR  -UA  -EKG: sinus tachycardia     will continue to monitor overnight.      Carly Joseph [facility]  Medicine [id number] alerted by RN that pt was vomiting via trach. evaluated pt at bedside. denies chest pain, abdominal pain, shortness of breath    Vital Signs Last 24 Hrs  T(C): 38.9 (14 Mar 2023 00:05), Max: 38.9 (14 Mar 2023 00:05)  T(F): 102 (14 Mar 2023 00:05), Max: 102 (14 Mar 2023 00:05)  HR: 128 (14 Mar 2023 00:05) (85 - 133)  BP: 134/80 (13 Mar 2023 22:40) (95/59 - 134/80)  RR: 19 (14 Mar 2023 00:05) (16 - 20)  SpO2: 98% (14 Mar 2023 00:05) (86% - 100%)    Parameters below as of 14 Mar 2023 00:05  Patient On (Oxygen Delivery Method): tracheostomy collar  O2 Flow (L/min): 9  O2 Concentration (%): 35      CONSTITUTIONAL: NAD  EYES: PERRLA; conjunctiva and sclera clear  ENMT: MMM, trach with overlying trach collar  RESPIRATORY: Normal respiratory effort; lungs are clear to auscultation bilaterally  CARDIOVASCULAR: Regular rate and rhythm, normal S1 and S2, no murmur/rub/gallop; No lower extremity edema  ABDOMEN: nontender, no rebound/guarding; PEG tube with mild yellowish drainage at point of insertion  MUSCULOSKELETAL: no joint swelling or tenderness to palpation  NEUROLOGY: CN 2-12 are intact and symmetric; no gross sensory deficits       plan:  -zofran PRN  -acetaminophen IV for fever control  -blood culture x2  -lactate  -IVF  -empiric antibiotics-zosyn, vancomycin  -chest XR  -UA  -EKG: sinus tachycardia   -suction PRN    will continue to monitor overnight.      Carly Joseph [facility]  Medicine [id number]

## 2023-03-14 NOTE — CONSULT NOTE ADULT - CONSULT REQUESTED DATE/TIME
13-Mar-2023 17:40
14-Mar-2023 09:23
15-Feb-2023
14-Feb-2023
22-Feb-2023
14-Feb-2023 19:19
16-Feb-2023 08:00

## 2023-03-14 NOTE — PROGRESS NOTE ADULT - SUBJECTIVE AND OBJECTIVE BOX
Gastroenterology/Hepatology Progress Note      Interval Events:     Pt. has leukocytosis and had increased mucus from the trach. Reported he has chills, has diffuse abd pain which has improved. No nausea, vomiting, had brown stool yesterday. CXR showed developing pneumonia and he was started on Abx.     Allergies:  No Known Allergies      Hospital Medications:  albuterol    90 MICROgram(s) HFA Inhaler 2 Puff(s) Inhalation every 6 hours PRN  albuterol/ipratropium for Nebulization 3 milliLiter(s) Nebulizer every 12 hours  aluminum hydroxide/magnesium hydroxide/simethicone Suspension 30 milliLiter(s) Oral every 6 hours PRN  ferrous    sulfate Liquid 300 milliGRAM(s) Enteral Tube daily  gabapentin Solution 600 milliGRAM(s) Oral three times a day  heparin   Injectable 5000 Unit(s) SubCutaneous every 8 hours  HYDROmorphone   Tablet 4 milliGRAM(s) Oral every 4 hours PRN  HYDROmorphone   Tablet 6 milliGRAM(s) Oral every 4 hours PRN  hydrOXYzine hydrochloride Syrup 25 milliGRAM(s) Oral every 6 hours PRN  influenza   Vaccine 0.5 milliLiter(s) IntraMuscular once  levothyroxine 50 MICROGram(s) Oral daily  lidocaine   4% Patch 1 Patch Transdermal daily PRN  LORazepam     Tablet 1 milliGRAM(s) Oral <User Schedule>  magnesium sulfate  IVPB 1 Gram(s) IV Intermittent once  methadone   Solution 15 milliGRAM(s) Oral three times a day  nicotine - 21 mG/24Hr(s) Patch 1 Patch Transdermal daily  ondansetron Injectable 4 milliGRAM(s) IV Push every 8 hours PRN  pantoprazole  Injectable 40 milliGRAM(s) IV Push two times a day  piperacillin/tazobactam IVPB.- 3.375 Gram(s) IV Intermittent once  piperacillin/tazobactam IVPB.. 3.375 Gram(s) IV Intermittent every 8 hours  polyethylene glycol 3350 17 Gram(s) Oral daily PRN  QUEtiapine 25 milliGRAM(s) Oral at bedtime  senna 2 Tablet(s) Oral at bedtime  vancomycin  IVPB 750 milliGRAM(s) IV Intermittent every 12 hours      ROS: 14 point ROS negative unless otherwise state in subjective    PHYSICAL EXAM:   Vital Signs:  Vital Signs Last 24 Hrs  T(C): 36.8 (14 Mar 2023 10:00), Max: 38.9 (14 Mar 2023 00:05)  T(F): 98.3 (14 Mar 2023 10:00), Max: 102 (14 Mar 2023 00:05)  HR: 102 (14 Mar 2023 10:00) (92 - 133)  BP: 103/61 (14 Mar 2023 10:00) (96/57 - 134/80)  BP(mean): --  RR: 18 (14 Mar 2023 10:00) (16 - 20)  SpO2: 95% (14 Mar 2023 10:00) (86% - 100%)    Parameters below as of 14 Mar 2023 10:00  Patient On (Oxygen Delivery Method): tracheostomy collar  O2 Flow (L/min): 9  O2 Concentration (%): 35  Daily     Daily     PHYSICAL EXAM:     GENERAL:  No acute distress, cachectic, chronically ill appearing. Sitting on the bed.   HEENT: Trach in place.   CHEST:  no respiratory distress  ABDOMEN:  Soft, has PEG in place in the LLQ, peg site with granulation tissue with mild erythema surrounding the PEG tube, flushed w/ sterile water, only bile, no evidence of blood, non-distended, well healed surgical scars midline, mild tenderness around the peg tube site, no palpable masses.   EXTREMITIES: No LE edema  SKIN:  Mild erythema w/ granulation tissue around the PEG tube site.   NEURO:  Alert and oriented x 3, no tremors.     LABS:                        8.0    13.74 )-----------( 528      ( 14 Mar 2023 00:50 )             25.0     Mean Cell Volume: 84.2 fL (14-23 @ 00:50)    -14    129<L>  |  88<L>  |  14  ----------------------------<  121<H>  4.3   |  29  |  0.79    Ca    8.7      14 Mar 2023 00:50  Phos  5.1     03-14  Mg     1.40     03-14        PT/INR - ( 14 Mar 2023 00:50 )   PT: 15.6 sec;   INR: 1.34 ratio         PTT - ( 14 Mar 2023 00:50 )  PTT:27.9 sec  Urinalysis Basic - ( 14 Mar 2023 06:50 )    Color: Yellow / Appearance: Clear / S.019 / pH: x  Gluc: x / Ketone: Negative  / Bili: Negative / Urobili: <2 mg/dL   Blood: x / Protein: Trace / Nitrite: Negative   Leuk Esterase: Negative / RBC: x / WBC x   Sq Epi: x / Non Sq Epi: x / Bacteria: x            Imaging:      CT A/P on 3/3/23    COMPARISON: CT abdomen pelvis 2023, CT chest 2/10/2023    CONTRAST/COMPLICATIONS:  IV Contrast: Omnipaque 350  90 cc administered   10 cc discarded  Oral Contrast: NONE  Complications: None reported at time of study completion    PROCEDURE:  CT of the Abdomen and Pelvis was performed.  Sagittal and coronal reformats were performed.    FINDINGS:  LOWER CHEST: Patchy opacities in the left lower lobe. Increased size of   left lower lobe pulmonary nodules, measuring up to 0.6 cm, previously 0.3   cm (2, 8). Unchanged right lower lobe nodule, measuring 0.7 cm.    LIVER: Within normal limits.  BILE DUCTS: Normal caliber.  GALLBLADDER: Within normal limits.  SPLEEN: Within normal limits.  PANCREAS: Within normal limits.  ADRENALS: Heterogenous right adrenal mass increased in size measuring 6.3   x 4.6 x 7.7 cm (2, 30), previously measuring 5.8 x 3.9 x 6.0 cm.  KIDNEYS/URETERS: No hydronephrosis. Hypodense right renal lesion, too   small to characterize.    BLADDER: Within normal limits.  REPRODUCTIVE ORGANS: Prostate within normal limits.    BOWEL: Gastrostomy tube in place with ill-defined fluid in the left   rectus muscle medially along the catheter tract within the left abdominal   wall measuring approximately (2, 50). Increased size of mass associated   with a loop of small bowel in the left hemipelvis (series 2, image 102).   New mass associated with a loop of small bowel in the left anterior   pelvis (series 2, image 100). No bowel obstruction. Appendix is not   visualized. No evidence of inflammation in the pericecal region.  PERITONEUM: Small volume pelvic ascites.  VESSELS: Atherosclerotic changes.  RETROPERITONEUM/LYMPH NODES: Redemonstration of multiple heterogeneously   dense lesions within the right retroperitoneum the largest of which has   increased in size measuring 3.6 x 5.0 x 4.3 cm (2, 76) previously   measuring 3.2 x 4.0 x 3.1 cm. Mesenteric lymphadenopathy with increase   size and density of a mesenteric node in the left midabdomen (2, 72),   measuring 3.7 x 3.0 cm, previously 2.6 x 2.2 cm.  ABDOMINAL WALL: Similar-appearing heterogenous largely hypodense lesion   within the posterior medial right thigh with no evidence of bony erosion.   New small peripherally rim-enhancing fluid collections in the lateral   aspect of the right hip (2, 114), measuring 2.4 x 1.4 cm). Increased size   of subcutaneous mass in the right posterior pelvis (series 2, image 71),   measuring 1.9 cm, previously 1.4 cm. Droplet of air in the midline   subcutaneous tissues. Unchanged hypodense left gluteal lesion (series 2,   image 142), measuring 3.0 x 1.3 cm.  BONES: Degenerative changes. Possible mass associated with an area of   osseous erosion in the posterior portion of the left greater trochanter   (series 2, image 124).    IMPRESSION:  Gastrostomy tube in place with small fluid medially along the catheter   track within the left abdominal wall. No discrete rim enhancement to   suggest abscess at this time.    New small peripherally enhancing collections along the lateral right hip,   possibly developing abscesses.    Left lower lobe pneumonia.    Increased size of pulmonary nodules and masses in the right adrenal   gland, small bowel, retroperitoneum, mesentery, and subcutaneous tissues,   consistent with progression of disease.

## 2023-03-14 NOTE — PROVIDER CONTACT NOTE (OTHER) - ACTION/TREATMENT ORDERED:
fio2 increased to 35 & 9L, pt now sat 95 and above, ekg done, ACP came to bedside, chest xray ordered

## 2023-03-14 NOTE — CONSULT NOTE ADULT - ASSESSMENT
#Preoperative evaluation  #Stage IV NSCLC  #Retrotracheal lesions with mass effect  #S/p tracheostomy and PEG.   #Aspiration pneumonia    RECS  -Appreciate ENT confirmation of tracheostomy tube.   -Bilious material noted in the airway.  -Abx for aspiration pneumonia  -Repeat Bcx at lab.  -Check sputum culture.  -Duonebs q6 standing followed by hypersal 3% nebs standing.  -Check vest TID.   -Plan for EGD with GI today. Based on ARISCAT scoring system. Patient is intermediate to high risk for in hospital post-operative pulmonary complications including respiratory failure, infection, pleural effusion atelectasis, PTX, bronchospasm, or repeat aspiration.     Dr. Henri Bonilla, DO  Pulmonary and Critical Care Medicine Fellow   Available via Microsoft Teams - **Preferred**  Pulmonary Spectra #[id number] ([facility]) / [phone] ([facility])  Pager:  [phone]   52 year old male w/ a PMHX of Stage IV adenocarcinoma of the lung, with recently found retrotracheal mass (8.1 x 4.5 cm) s/p trach and peg transferred from OSH for palliative radiation. Course complicated by aspiration pneumonia and melena. Pulmonary consulted for preoperative evaluation for EGD however patient is not agreeable for procedure at this time and favors transition to hospice.     #Preoperative evaluation  #Stage IV NSCLC  #Retrotracheal lesions with mass effect  #S/p tracheostomy and PEG.   #Aspiration pneumonia    RECS  -Patient requesting IV pain medications.   -Would start antireflux medication.   -Appreciate ENT confirmation of tracheostomy tube. Bilious material noted in the airway.  -Abx for aspiration pneumonia is reasonable. Would send sputum cx to guide abx regimen.  -Repeat Bcx at lab.  -Duonebs q6 standing.  -Patient is not agreeable for EGD procedure at this time and favors transition to hospice. Would clarify GOC prior to proceed with additional testing / procedures.     Dr. Henri Bonilla, DO  Pulmonary and Critical Care Medicine Fellow   Available via Microsoft Teams - **Preferred**  Pulmonary Spectra #[id number] ([facility]) / [phone] ([facility])  Pager:  [phone]

## 2023-03-14 NOTE — CONSULT NOTE ADULT - ATTENDING COMMENTS
52M PMH Stage IV lung adenocarcinoma s/p Keytruda and RT with recently found retrotracheal mass s/p trach/PEG who was transferred to [facility] for palliative radiation with hospital course complicated by aspiration pneumonia and melena. Pulm consulted for pre-op evaluation. However, patient currently declining endoscopy at this time.     Assessment:  Stage IV Lung Adenocarcinoma  Aspiration pneumonia  Retrotracheal mass  S/p trach/PEG  Bone metastases    Plan:  - Respiratory status stable on trach collar with no hypoxemia   - Patient declining endoscopy at this time with cousins present at bedside  - Patient and family requesting palliative care evaluation and inpatient hospice referral given advanced malignancy  - Pain control with PO/IV opioids  - PPI bid and Maalox prn   - Pulmonary will sign off given patient requesting comfort measures and hospice referral, please call back with any questions or concerns 52M PMH Stage IV lung adenocarcinoma s/p Keytruda and RT with recently found retrotracheal mass s/p trach/PEG who was transferred to [facility] for palliative radiation with hospital course complicated by aspiration pneumonia and melena. Pulm consulted for pre-op evaluation. However, patient currently declining endoscopy at this time.     Assessment:  Stage IV Lung Adenocarcinoma  Aspiration pneumonia  Retrotracheal mass  S/p trach/PEG  Bone metastases    Plan:  - Respiratory status stable on trach collar with no hypoxemia   - Continue empiric antibiotics for aspiration pneumonia  - Patient declining endoscopy at this time with cousins present at bedside  - Patient and family requesting palliative care evaluation and inpatient hospice referral given advanced malignancy  - Pain control with PO/IV opioids  - PPI bid and Maalox prn   - DVT ppx with HSQ  - Pulmonary will sign off given patient requesting comfort measures and hospice referral, please call back with any questions or concerns

## 2023-03-14 NOTE — CONSULT NOTE ADULT - SUBJECTIVE AND OBJECTIVE BOX
PULMONARY SERVICE INITIAL CONSULT NOTE    HPI:  52 year old male w/ a PMHX of Stage IV adenocarcinoma of the lung, with recently found retrotracheal mass (8.1 x 4.5 cm) s/p trach and peg transferred from OSH for palliative radiation.       (2023 13:39)      Patient seen and examined at bedside.     REVIEW OF SYSTEMS:  All additional ROS negative.    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 3.375 Gram(s) IV Intermittent every 8 hours  vancomycin  IVPB 750 milliGRAM(s) IV Intermittent every 12 hours    Anticoagulants:  heparin   Injectable 5000 Unit(s) SubCutaneous every 8 hours    Onc:    GI/:  aluminum hydroxide/magnesium hydroxide/simethicone Suspension 30 milliLiter(s) Oral every 6 hours PRN  pantoprazole  Injectable 40 milliGRAM(s) IV Push two times a day  polyethylene glycol 3350 17 Gram(s) Oral daily PRN  senna 2 Tablet(s) Oral at bedtime    Endocrine:  levothyroxine 50 MICROGram(s) Oral daily    Cardiac:    Other Medications:  ferrous    sulfate Liquid 300 milliGRAM(s) Enteral Tube daily  gabapentin Solution 600 milliGRAM(s) Oral three times a day  HYDROmorphone   Tablet 4 milliGRAM(s) Oral every 4 hours PRN  HYDROmorphone   Tablet 6 milliGRAM(s) Oral every 4 hours PRN  hydrOXYzine hydrochloride Syrup 25 milliGRAM(s) Oral every 6 hours PRN  influenza   Vaccine 0.5 milliLiter(s) IntraMuscular once  lidocaine   4% Patch 1 Patch Transdermal daily PRN  LORazepam     Tablet 1 milliGRAM(s) Oral <User Schedule>  magnesium sulfate  IVPB 1 Gram(s) IV Intermittent once  methadone   Solution 15 milliGRAM(s) Oral three times a day  nicotine - 21 mG/24Hr(s) Patch 1 Patch Transdermal daily  ondansetron Injectable 4 milliGRAM(s) IV Push every 8 hours PRN  QUEtiapine 25 milliGRAM(s) Oral at bedtime      Allergies    No Known Allergies    Intolerances        PHYSICAL EXAM  Vital Signs Last 24 Hrs  T(C): 36.8 (14 Mar 2023 10:00), Max: 38.9 (14 Mar 2023 00:05)  T(F): 98.3 (14 Mar 2023 10:00), Max: 102 (14 Mar 2023 00:05)  HR: 102 (14 Mar 2023 10:00) (92 - 133)  BP: 103/61 (14 Mar 2023 10:00) (96/57 - 134/80)  BP(mean): --  RR: 18 (14 Mar 2023 10:00) (16 - 20)  SpO2: 95% (14 Mar 2023 10:00) (86% - 100%)    Parameters below as of 14 Mar 2023 10:00  Patient On (Oxygen Delivery Method): tracheostomy collar  O2 Flow (L/min): 9  O2 Concentration (%): 35    03-13 @ 07:01  -  03-14 @ 07:00  --------------------------------------------------------  IN: 3490 mL / OUT: 600 mL / NET: 2890 mL          CONSTITUTIONAL: No acute distress.   HEENT:  Conjunctiva clear B/L.  Moist oral mucosa.   Cardiovascular: RRR with no murmurs. No JVD noted. No lower extremity edema B/L. Extremities are warm and well perfused.    Respiratory: Lungs CTAB. No wrr. No accessory muscle use.   Gastrointestinal:  Soft, nontender. Non-distended. Non-rigid.    Neurologic:  Alert and awake. Moving all extremities. Following commands.    Skin:  No gross rashes notes.      LABS:      CBC Full  -  ( 14 Mar 2023 00:50 )  WBC Count : 13.74 K/uL  RBC Count : 2.97 M/uL  Hemoglobin : 8.0 g/dL  Hematocrit : 25.0 %  Platelet Count - Automated : 528 K/uL  Mean Cell Volume : 84.2 fL  Mean Cell Hemoglobin : 26.9 pg  Mean Cell Hemoglobin Concentration : 32.0 gm/dL  Auto Neutrophil # : 9.70 K/uL  Auto Lymphocyte # : 2.72 K/uL  Auto Monocyte # : 1.13 K/uL  Auto Eosinophil # : 0.06 K/uL  Auto Basophil # : 0.02 K/uL  Auto Neutrophil % : 70.7 %  Auto Lymphocyte % : 19.8 %  Auto Monocyte % : 8.2 %  Auto Eosinophil % : 0.4 %  Auto Basophil % : 0.1 %    03-14    129<L>  |  88<L>  |  14  ----------------------------<  121<H>  4.3   |  29  |  0.79    Ca    8.7      14 Mar 2023 00:50  Phos  5.1     03-14  Mg     1.40     03-14      PT/INR - ( 14 Mar 2023 00:50 )   PT: 15.6 sec;   INR: 1.34 ratio         PTT - ( 14 Mar 2023 00:50 )  PTT:27.9 sec      Urinalysis Basic - ( 14 Mar 2023 06:50 )    Color: Yellow / Appearance: Clear / S.019 / pH: x  Gluc: x / Ketone: Negative  / Bili: Negative / Urobili: <2 mg/dL   Blood: x / Protein: Trace / Nitrite: Negative   Leuk Esterase: Negative / RBC: x / WBC x   Sq Epi: x / Non Sq Epi: x / Bacteria: x                RADIOLOGY & ADDITIONAL STUDIES:   PULMONARY SERVICE INITIAL CONSULT NOTE    HPI:  52 year old male w/ a PMHX of Stage IV adenocarcinoma of the lung, with recently found retrotracheal mass (8.1 x 4.5 cm) s/p trach and peg transferred from OSH for palliative radiation. Course complicated by aspiration pneumonia and melena. Patient reports musculoskeletal type pain, reflux, and dyspnea at times. Patient endorses that he wants to pursue hospice. Patient seen and examined at bedside. States his breathing is stable. Again reports pain in his hips and some gerd like symptoms.     REVIEW OF SYSTEMS:  All additional ROS negative.    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     MEDICATIONS:  Pulmonary:  albuterol    90 MICROgram(s) HFA Inhaler 2 Puff(s) Inhalation every 6 hours PRN  albuterol/ipratropium for Nebulization 3 milliLiter(s) Nebulizer every 12 hours    Antimicrobials:  piperacillin/tazobactam IVPB.- 3.375 Gram(s) IV Intermittent once  piperacillin/tazobactam IVPB.. 3.375 Gram(s) IV Intermittent every 8 hours  vancomycin  IVPB 750 milliGRAM(s) IV Intermittent every 12 hours    Anticoagulants:  heparin   Injectable 5000 Unit(s) SubCutaneous every 8 hours    Onc:    GI/:  aluminum hydroxide/magnesium hydroxide/simethicone Suspension 30 milliLiter(s) Oral every 6 hours PRN  pantoprazole  Injectable 40 milliGRAM(s) IV Push two times a day  polyethylene glycol 3350 17 Gram(s) Oral daily PRN  senna 2 Tablet(s) Oral at bedtime    Endocrine:  levothyroxine 50 MICROGram(s) Oral daily    Cardiac:    Other Medications:  ferrous    sulfate Liquid 300 milliGRAM(s) Enteral Tube daily  gabapentin Solution 600 milliGRAM(s) Oral three times a day  HYDROmorphone   Tablet 4 milliGRAM(s) Oral every 4 hours PRN  HYDROmorphone   Tablet 6 milliGRAM(s) Oral every 4 hours PRN  hydrOXYzine hydrochloride Syrup 25 milliGRAM(s) Oral every 6 hours PRN  influenza   Vaccine 0.5 milliLiter(s) IntraMuscular once  lidocaine   4% Patch 1 Patch Transdermal daily PRN  LORazepam     Tablet 1 milliGRAM(s) Oral <User Schedule>  magnesium sulfate  IVPB 1 Gram(s) IV Intermittent once  methadone   Solution 15 milliGRAM(s) Oral three times a day  nicotine - 21 mG/24Hr(s) Patch 1 Patch Transdermal daily  ondansetron Injectable 4 milliGRAM(s) IV Push every 8 hours PRN  QUEtiapine 25 milliGRAM(s) Oral at bedtime      Allergies    No Known Allergies    Intolerances        PHYSICAL EXAM  Vital Signs Last 24 Hrs  T(C): 36.8 (14 Mar 2023 10:00), Max: 38.9 (14 Mar 2023 00:05)  T(F): 98.3 (14 Mar 2023 10:00), Max: 102 (14 Mar 2023 00:05)  HR: 102 (14 Mar 2023 10:00) (92 - 133)  BP: 103/61 (14 Mar 2023 10:00) (96/57 - 134/80)  BP(mean): --  RR: 18 (14 Mar 2023 10:00) (16 - 20)  SpO2: 95% (14 Mar 2023 10:00) (86% - 100%)    Parameters below as of 14 Mar 2023 10:00  Patient On (Oxygen Delivery Method): tracheostomy collar  O2 Flow (L/min): 9  O2 Concentration (%): 35    03-13 @ 07:01  -  03-14 @ 07:00  --------------------------------------------------------  IN: 3490 mL / OUT: 600 mL / NET: 2890 mL          CONSTITUTIONAL: No acute distress.   HEENT:  Uncuffed trach. Greenish secretions noted in suction tubing.   Cardiovascular: RRR with no murmurs. No JVD noted. No lower extremity edema B/L. Extremities are warm and well perfused.    Respiratory: Rhonchi b/l. No wheezing or rales. No accessory muscle use.   Gastrointestinal:  Soft, nontender. Non-distended. Non-rigid.    Neurologic:  Alert and awake. Moving all extremities. Following commands.          LABS:      CBC Full  -  ( 14 Mar 2023 00:50 )  WBC Count : 13.74 K/uL  RBC Count : 2.97 M/uL  Hemoglobin : 8.0 g/dL  Hematocrit : 25.0 %  Platelet Count - Automated : 528 K/uL  Mean Cell Volume : 84.2 fL  Mean Cell Hemoglobin : 26.9 pg  Mean Cell Hemoglobin Concentration : 32.0 gm/dL  Auto Neutrophil # : 9.70 K/uL  Auto Lymphocyte # : 2.72 K/uL  Auto Monocyte # : 1.13 K/uL  Auto Eosinophil # : 0.06 K/uL  Auto Basophil # : 0.02 K/uL  Auto Neutrophil % : 70.7 %  Auto Lymphocyte % : 19.8 %  Auto Monocyte % : 8.2 %  Auto Eosinophil % : 0.4 %  Auto Basophil % : 0.1 %    03-14    129<L>  |  88<L>  |  14  ----------------------------<  121<H>  4.3   |  29  |  0.79    Ca    8.7      14 Mar 2023 00:50  Phos  5.1     03-14  Mg     1.40     03-14      PT/INR - ( 14 Mar 2023 00:50 )   PT: 15.6 sec;   INR: 1.34 ratio         PTT - ( 14 Mar 2023 00:50 )  PTT:27.9 sec      Urinalysis Basic - ( 14 Mar 2023 06:50 )    Color: Yellow / Appearance: Clear / S.019 / pH: x  Gluc: x / Ketone: Negative  / Bili: Negative / Urobili: <2 mg/dL   Blood: x / Protein: Trace / Nitrite: Negative   Leuk Esterase: Negative / RBC: x / WBC x   Sq Epi: x / Non Sq Epi: x / Bacteria: x                RADIOLOGY & ADDITIONAL STUDIES:

## 2023-03-14 NOTE — PROGRESS NOTE ADULT - PROBLEM SELECTOR PLAN 5
Progressive NSCLC with disease progression in the neck causing stridor and dysphagia  - palliative radiation completed today  - per hem/onc at [place] pt no longer candidate for DMT  - follow up hem/onc recs - appreciate outpatient oncology recs regarding whether patient should continue Keytruda or can explore other options  - pain management per palliative- on Dilaudid per PEG and methadone; palliative care managing meds  - pain control  discussed  with Palliative re: continue current regimen and dc IV Dilaudid given has not been receiving   - BP borderline at times, likely with use of opioids, but currently tolerable  Masses progressing on CTAP 3/3

## 2023-03-14 NOTE — PROGRESS NOTE ADULT - PROBLEM SELECTOR PLAN 2
s/p zosyn course  CTAP 3/3 again reveals left-sided pleural effusion  Possibly related to repeat attempt at swallow evaluation 3/1, when he was febrile  s/p course of vanc and ceftriaxone since 3/1 for 7 days    Now again with repeat fever 3/14 AM, with tracheoscopy with bilious secretions - again suspicious for aspiration PNA  Offer course of vanc and zosyn

## 2023-03-14 NOTE — CHART NOTE - NSCHARTNOTEFT_GEN_A_CORE
ENT notified about acute episodes of desaturation     Tracheoscopy    Scope advanced through trach tube.  Trach tube in proper position, centered in trachea, healthy mucosa noted. No tracheomalacia noted. No obstruction noted. Carinal bifurcation visualized. Bilious material/substance noted in the right main stem bronchus.       - Trach noted to be in proper place, no current ENT interventions   - Due to the bilious material visualized in Right main stem would recommend CXR c/f aspiration

## 2023-03-14 NOTE — CONSULT NOTE ADULT - REASON FOR ADMISSION
Palliative Radiation

## 2023-03-14 NOTE — PROGRESS NOTE ADULT - PROBLEM SELECTOR PLAN 3
- pain, erythema, and small purulent drainage from gastrostomy site - resolved  - wound care recs and instructions appreciated  - CTAP with superficial infection but no defined abscess; it does also show some possibly developing hip abscesses  - pain in hips likely stemming from these abscesses - if ongoing pain and repeat fever, may consider surgical eval if within [facility]

## 2023-03-15 NOTE — PROVIDER CONTACT NOTE (OTHER) - ASSESSMENT
Pt a&o3 with c/o pain on R upper gluteal, cyst was seen
x1 emesis dark green from trach, desat to 82-86, tachy 110-120s, Pt temp 101.5 rectal. Denies SOB, chest pain/pain
ao4, VSS, o2 at 100% via trach collar, no c/o of resp distress, on 
patient aox3, vitals signs stable no signs of respiratory distress. Concern about  copious amount of greenish secretion from Trach stoma
pt has fentanyl patch on his right arm prior to hospital admit
A&O4, VS stable, pt stable, pt currently on PCA pump for pain. PEG intact and flushing w/o resistance. Tube feeds were held since evening due to complaints of heartburn. Pt still refusing tube feeds, pt states in own words "wants stomach to settle". Pt was given ordered medications. Pt currently not on IV fluids.
Pt had 3 IV infiltration after couple hours of placement. New IVs placed on 3/7, pt is now complaining of pain and redness at IV sites.
A&O 4, temp 100.4, BP 82/48, HR 89 O2 99
asymptomatic
rectal temp 102, -133s. Bilious fluid coming out of G tube
Increased yellow green sputum coming out of trach & peg. Pt tachy, and c/o of resp distress
temp 102.4, 
Patient's BP89/50 P74 o2 sat 98 on trach collar. Patient asymptomatic.
Pt denies dizziness, weakness, or blurred vision
dark emesis via trach, & peg site + desat to 82-86 fio2 98% & 5L, denies sob, chest pain or pain.

## 2023-03-15 NOTE — PROGRESS NOTE ADULT - PROBLEM SELECTOR PLAN 1
Dysphagia 2/2 large mass in the retrotracheal region with displacement of the trachea anteriorly, compression of the pharynx and proximal esophagus  - keep patient NPO with PEG feeds (if cleared from GI perspective)  - appreciate SLP follow-up  - completed palliative radiation  - keep trach of same size and one size down at bedside at all times

## 2023-03-15 NOTE — PROGRESS NOTE ADULT - PROBLEM SELECTOR PLAN 9
Dvt ppx: Lovenox QD, hypercoagulable state d/t malignancy  Pt is DNR/DNI  Pt lives alone and family are unable to care for him in their homes. Per CM pt is not a candidate for LTC through insurance. Pt considering hospice vs home if able to care for trach and PEG. PT recommending outpt PT.  Family and patient are now considering inpatient hospice. Referral made. Likely plan will be rehab with transition to hospice. Per onc notes, no further treatment options at this time. Can follow up outpatient with primary onc if not pursuing hospice.  Family requesting transfer to [facility] on 3/11, awaiting auth for [facility]   Cousins- OK to share information per patient  Padma: [phone]  Haylee: [phone]
Dvt ppx: Lovenox QD, hypercoagulable state d/t malignancy  Pt is DNR/DNI  Pt lives alone and family are unable to care for him in their homes. Per CM pt is not a candidate for LTC through insurance. Pt considering hospice vs home if able to care for trach and PEG. PT recommending outpt PT.  Family and patient are now considering inpatient hospice. Referral made, but unsuccessful. Likely plan will be rehab with transition to hospice - has been accepted a facility but family wish for other options as it far from where they live. Per onc notes, no further treatment options at this time. Can follow up outpatient with primary onc if not pursuing hospice.  Family requesting transfer to [facility] - denied on 3/11, awaiting auth for [facility]   Cousins- OK to share information per patient  Padma: [phone]  Haylee: [phone]
Dvt ppx: Lovenox QD, hypercoagulable state d/t malignancy  Pt is DNR/DNI  Pt lives alone and family are unable to care for him in their homes. Per CM pt is not a candidate for LTC through insurance. Pt considering hospice vs home if able to care for trach and PEG. PT recommending outpt PT.  Family and patient are now considering inpatient hospice. Referral made. Likely plan will be rehab with transition to hospice. Per onc notes, no further treatment options at this time. Can follow up outpatient with primary onc if not pursuing hospice.    Cousins- OK to share information per patient  Padma: [phone]  Haylee: [phone]
Dvt ppx: Lovenox QD, hypercoagulable state d/t malignancy  Pt is DNR/DNI  Pt lives alone and family are unable to care for him in their homes. Per CM pt is not a candidate for LTC through insurance. Pt considering hospice vs home if able to care for trach and PEG. PT recommending outpt PT.  Family and patient are now considering inpatient hospice. Referral made. Per onc notes, no further treatment options at this time. Can follow up outpatient with primary onc if not pursuing hospice.    Cousins- OK to share information per patient  Padma: [phone]  Haylee: [phone]
Dvt ppx: Lovenox QD, hypercoagulable state d/t malignancy  Pt is DNR/DNI  Pt lives alone and family are unable to care for him in their homes. Per CM pt is not a candidate for LTC through insurance. Pt considering hospice vs home if able to care for trach and PEG. PT recommending outpt PT.  Family and patient are now considering inpatient hospice. Referral made. Likely plan will be rehab with transition to hospice. Per onc notes, no further treatment options at this time. Can follow up outpatient with primary onc if not pursuing hospice.  Family requesting transfer to [facility] on 3/11, awaiting auth for [facility]   Cousins- OK to share information per patient  Padma: [phone]  Haylee: [phone]
Dvt ppx: Lovenox QD, hypercoagulable state d/t malignancy  Pt is DNR/DNI  Pt lives alone and family are unable to care for him in their homes. Per CM pt is not a candidate for LTC through insurance. Pt considering hospice vs home if able to care for trach and PEG. PT recommending outpt PT.  Family and patient are now considering inpatient hospice. Referral made. Likely plan will be rehab with transition to hospice. Per onc notes, no further treatment options at this time. Can follow up outpatient with primary onc if not pursuing hospice.    Cousins- OK to share information per patient  Padma: [phone]  Haylee: [phone]
Dvt ppx: Lovenox QD, hypercoagulable state d/t malignancy  Pt is DNR/DNI  Pt lives alone and family are unable to care for him in their homes. Per CM pt is not a candidate for LTC through insurance. Pt considering hospice vs home if able to care for trach and PEG. PT recommending outpt PT.  Family and patient are now considering inpatient hospice. Referral made. Likely plan will be rehab with transition to hospice. Per onc notes, no further treatment options at this time. Can follow up outpatient with primary onc if not pursuing hospice.    Cousins- OK to share information per patient  Padma: [phone]  Haylee: [phone]
Dvt ppx: Lovenox QD, hypercoagulable state d/t malignancy  Pt is DNR/DNI  Pt lives alone and family are unable to care for him in their homes. Per CM pt is not a candidate for LTC through insurance. Pt considering hospice vs home if able to care for trach and PEG. PT recommending outpt PT.  Family and patient are now considering inpatient hospice. Referral made. Likely plan will be rehab with transition to hospice. Per onc notes, no further treatment options at this time. Can follow up outpatient with primary onc if not pursuing hospice.    Cousins- OK to share information per patient  Padma: [phone]  Haylee: [phone]
Dvt ppx: Lovenox QD, hypercoagulable state d/t malignancy  Pt is DNR/DNI  Pt lives alone and family are unable to care for him in their homes. Per CM pt is not a candidate for LTC through insurance. Pt considering hospice vs home if able to care for trach and PEG. PT recommending outpt PT.  Family and patient are now considering inpatient hospice. Referral made. Likely plan will be rehab with transition to hospice. Per onc notes, no further treatment options at this time. Can follow up outpatient with primary onc if not pursuing hospice.    Cousins- OK to share information per patient  Padma: [phone]  Haylee: [phone]
Dvt ppx: Lovenox QD, hypercoagulable state d/t malignancy  Pt is DNR/DNI  Pt lives alone and family are unable to care for him in their homes. Per CM pt is not a candidate for LTC through insurance. Pt considering hospice vs home if able to care for trach and PEG. PT recommending outpt PT.  Family and patient are now considering inpatient hospice. Referral made. Likely plan will be rehab with transition to hospice. Per onc notes, no further treatment options at this time. Can follow up outpatient with primary onc if not pursuing hospice.    Cousins- OK to share information per patient  Padma: [phone]  Haylee: [phone]
Dvt ppx: Lovenox QD, hypercoagulable state d/t malignancy  Pt is DNR/DNI  Pt lives alone and family are unable to care for him in their homes. Per CM pt is not a candidate for LTC through insurance. Pt considering hospice vs home if able to care for trach and PEG. PT recommending outpt PT.  Family and patient are now considering inpatient hospice. Referral made. Likely plan will be rehab with transition to hospice. Per onc notes, no further treatment options at this time. Can follow up outpatient with primary onc if not pursuing hospice.    Cousins- OK to share information per patient  Padma: [phone]  Haylee: [phone]
Dvt ppx: Lovenox QD, hypercoagulable state d/t malignancy  Pt is DNR/DNI  Pt lives alone and family are unable to care for him in their homes. Per CM pt is not a candidate for LTC through insurance. Pt considering hospice vs home if able to care for trach and PEG. PT recommending outpt PT.  Family and patient are now considering inpatient hospice. Referral made. Per onc notes, no further treatment options at this time. Can follow up outpatient with primary onc if not pursuing hospice.    Cousins- OK to share information per patient  Padma: [phone]  Haylee: [phone]
Dvt ppx: Lovenox QD, hypercoagulable state d/t malignancy  Pt is DNR/DNI  Pt lives alone and family are unable to care for him in their homes. Per CM pt is not a candidate for LTC through insurance. Pt considering hospice vs home if able to care for trach and PEG. PT recommending outpt PT.  Family and patient are now considering inpatient hospice. Referral made. Per onc notes, no further treatment options at this time. Can follow up outpatient with primary onc if not pursuing hospice.    Cousins- OK to share information per patient  Padma: [phone]  Haylee: [phone]
Dvt ppx: Lovenox QD, hypercoagulable state d/t malignancy  Pt is DNR/DNI  Pt lives alone and family are unable to care for him in their homes. Per CM pt is not a candidate for LTC through insurance. Pt considering hospice vs home if able to care for trach and PEG. PT recommending outpt PT.  Family and patient are now considering inpatient hospice. Referral made. Per onc notes, no further treatment options at this time. Can follow up outpatient with primary onc if not pursuing hospice.    Cousins- OK to share information per patient  Padma: [phone]  Haylee: [phone]
Dvt ppx: Lovenox QD, hypercoagulable state d/t malignancy  Pt is DNR/DNI  Pt lives alone and family are unable to care for him in their homes. Per CM pt is not a candidate for LTC through insurance. Pt considering hospice vs home if able to care for trach and PEG. PT recommending outpt PT.  Family and patient are now considering inpatient hospice. Referral made. Likely plan will be rehab with transition to hospice. Per onc notes, no further treatment options at this time. Can follow up outpatient with primary onc if not pursuing hospice.  Family requesting transfer to [facility] on 3/11, awaiting auth for [facility]   Cousins- OK to share information per patient  Padma: [phone]  Haylee: [phone]
Dvt ppx: Lovenox QD, hypercoagulable state d/t malignancy  Pt is DNR/DNI  Pt lives alone and family are unable to care for him in their homes. Per CM pt is not a candidate for LTC through insurance. Pt considering hospice vs home if able to care for trach and PEG. PT recommending outpt PT  Discussed with cousin, Padma, on 2/24. She is concerned that d/c home is unsafe.     Cousins- OK to share information  Padma: [phone]  Haylee: [phone]

## 2023-03-15 NOTE — PROVIDER CONTACT NOTE (OTHER) - RECOMMENDATIONS
provider contact
Provider aware
Bolus of fluids
[facility] [address] Carly Joseph aware
make [facility] [address], Thong gannon
Provider made aware. As per provider, it was seen on CT and pt is on antibiotics.
[facility] [address] Carly Joseph aware
make [facility] [address], Thong gannon
MD made aware
MD made aware.
Provider made aware
Provider will be made aware
[facility] [address] Mary Irby aware

## 2023-03-15 NOTE — PROVIDER CONTACT NOTE (OTHER) - ACTION/TREATMENT ORDERED:
acp aware, zofran given, o2 increased 98% fio2 & 8L. Aspirated peg fluid. as ordered acp aware, zofran given, o2 increased 98% fio2 & 8L. Aspirated peg fluid. as ordered. inner cannula cleaned, suctioned, mepilex changed on peg & trach

## 2023-03-15 NOTE — PROGRESS NOTE ADULT - PROBLEM SELECTOR PLAN 5
Progressive NSCLC with disease progression in the neck causing stridor and dysphagia  - palliative radiation completed today  - per hem/onc at [place] pt no longer candidate for DMT  - follow up hem/onc recs - appreciate outpatient oncology recs regarding whether patient should continue Keytruda or can explore other options  - pain management per palliative- on Dilaudid per PEG and methadone; palliative care managing meds  - pain control  discussed  with Palliative re: continue current regimen and dc IV Dilaudid given has not been receiving   - BP borderline at times, likely with use of opioids, but currently tolerable  Masses progressing on CTAP 3/3

## 2023-03-15 NOTE — PROGRESS NOTE ADULT - SUBJECTIVE AND OBJECTIVE BOX
Gastroenterology/Hepatology Progress Note    Interval Events:   Pt w/ bilious emesis overnight. Subsequently febrile, tachycardic.  Hgb 8>7.5    Allergies:  No Known Allergies    Hospital Medications:  albuterol    90 MICROgram(s) HFA Inhaler 2 Puff(s) Inhalation every 6 hours PRN  albuterol/ipratropium for Nebulization 3 milliLiter(s) Nebulizer every 12 hours  aluminum hydroxide/magnesium hydroxide/simethicone Suspension 30 milliLiter(s) Oral every 6 hours PRN  ferrous    sulfate Liquid 300 milliGRAM(s) Enteral Tube daily  gabapentin Solution 600 milliGRAM(s) Oral three times a day  heparin   Injectable 5000 Unit(s) SubCutaneous every 8 hours  HYDROmorphone   Tablet 4 milliGRAM(s) Oral every 4 hours PRN  HYDROmorphone   Tablet 6 milliGRAM(s) Oral every 4 hours PRN  hydrOXYzine hydrochloride Syrup 25 milliGRAM(s) Oral every 6 hours PRN  influenza   Vaccine 0.5 milliLiter(s) IntraMuscular once  levothyroxine 50 MICROGram(s) Oral daily  lidocaine   4% Patch 1 Patch Transdermal daily PRN  LORazepam     Tablet 1 milliGRAM(s) Oral <User Schedule>  methadone   Solution 15 milliGRAM(s) Oral three times a day  nicotine - 21 mG/24Hr(s) Patch 1 Patch Transdermal daily  ondansetron Injectable 4 milliGRAM(s) IV Push every 8 hours PRN  pantoprazole  Injectable 40 milliGRAM(s) IV Push two times a day  piperacillin/tazobactam IVPB.. 3.375 Gram(s) IV Intermittent every 8 hours  polyethylene glycol 3350 17 Gram(s) Oral daily PRN  QUEtiapine 25 milliGRAM(s) Oral at bedtime  senna 2 Tablet(s) Oral at bedtime  sodium chloride 0.9%. 1000 milliLiter(s) IV Continuous <Continuous>  vancomycin  IVPB 750 milliGRAM(s) IV Intermittent every 12 hours      ROS: 14 point ROS negative unless otherwise state in subjective    PHYSICAL EXAM:   Vital Signs:  Vital Signs Last 24 Hrs  T(C): 36.7 (15 Mar 2023 10:41), Max: 38.6 (15 Mar 2023 02:13)  T(F): 98.1 (15 Mar 2023 10:41), Max: 101.5 (15 Mar 2023 02:13)  HR: 98 (15 Mar 2023 10:41) (93 - 122)  BP: 125/80 (15 Mar 2023 10:41) (105/67 - 125/80)  BP(mean): --  RR: 18 (15 Mar 2023 10:41) (17 - 18)  SpO2: 100% (15 Mar 2023 10:41) (84% - 100%)    Parameters below as of 15 Mar 2023 10:41  Patient On (Oxygen Delivery Method): tracheostomy collar      Daily     Daily     GENERAL:  No acute distress  HEENT:  NCAT, no scleral icterus  CHEST: no resp distress  HEART:  RRR  ABDOMEN:  Soft, non-tender, non-distended   EXTREMITIES:  No cyanosis, clubbing, or edema  SKIN:  No rash/erythema/ecchymoses/petechiae   NEURO:  Alert and oriented x 3     LABS:                        7.5    12.65 )-----------( 567      ( 15 Mar 2023 05:45 )             23.4     Mean Cell Volume: 83.0 fL (03-15-23 @ 05:45)    03-15    129<L>  |  89<L>  |  18  ----------------------------<  98  4.2   |  28  |  0.92    Ca    8.6      15 Mar 2023 05:45  Phos  5.0     03-15  Mg     2.00     03-15        PT/INR - ( 14 Mar 2023 00:50 )   PT: 15.6 sec;   INR: 1.34 ratio         PTT - ( 14 Mar 2023 00:50 )  PTT:27.9 sec  Urinalysis Basic - ( 14 Mar 2023 06:50 )    Color: Yellow / Appearance: Clear / S.019 / pH: x  Gluc: x / Ketone: Negative  / Bili: Negative / Urobili: <2 mg/dL   Blood: x / Protein: Trace / Nitrite: Negative   Leuk Esterase: Negative / RBC: x / WBC x   Sq Epi: x / Non Sq Epi: x / Bacteria: x            Imaging:

## 2023-03-15 NOTE — PROVIDER CONTACT NOTE (OTHER) - SITUATION
After midnight bolus tube feed via peg, pt had increased secretions; thick, tan, looked like tube feed coming out of trach
patient coughing up copious amount of greenish secretion from Trach stoma
IV fluids for patient since patient is currently not on tube feeds and has PCA pump?
Pt am vitals taken. Pt BP 90/50.
dark emesis via trach, & peg site + desat to 82-86
temp 100.4, BP 82/48, HR 89 O2 99
BP 92/53,HR 91, due for Lorazepam
Increased yellow green sputum coming out of trach & peg
Patient's BP89/50 P74 o2 sat 98 on trach collar. Patient asymptomatic.
pt has order for fentanyl patch but already has one on from home. fentanyl patch was applied on 2/14 at 11:00am as per the patient
Redness around IV site
temp 102.4, 
movable cyst/boil noted on pt right upper gluteal
rectal temp 102, -133s. Bilious fluid coming out of G tube
x1 emesis dark green from trach, desat to 82-86, tachy 110-120s, Pt temp 101.5 rectal. Denies SOB, chest pain/pain

## 2023-03-15 NOTE — PROGRESS NOTE ADULT - PROBLEM SELECTOR PROBLEM 5
HPI:    Patient ID: Arlen Guzman is a 48year old male.     HPI  Arlen Guzman is a 48year old male who presents for a complete physical exam.   HPI:   Pt complains of nothing  Denies hypoglycemia  Denies cp or sob  Denies edema    PAST MEDICAL, SOCIAL, FAM TAKE 1 TABLET BY MOUTH TWO TIMES A DAY WITH MEALS Disp: 60 tablet Rfl: 5   Sildenafil Citrate (VIAGRA) 100 MG Oral Tab Take 1 tablet (100 mg total) by mouth as needed for Erectile Dysfunction.  Disp: 6 tablet Rfl: 0   atorvastatin 40 MG Oral Tab Take 1 tabl GENERAL: feels well otherwise  SKIN: denies any unusual skin lesions  EYES:denies blurred vision or double vision  HEENT: denies nasal congestion, sinus pain or ST  LUNGS: denies shortness of breath with exertion  CARDIOVASCULAR: denies chest pain on exe handouts given for: exercise, low fat diet, testicular self exam and prostate cancer screening. The patient indicates understanding of these issues and agrees to the plan. The patient is asked to return for CPX in 15 m but rtc in 6 m for med visit.     Rev INTERNAL       #[id number] Lung cancer

## 2023-03-15 NOTE — PROVIDER CONTACT NOTE (OTHER) - DATE AND TIME:
01-Mar-2023 14:14
13-Mar-2023 22:45
07-Mar-2023 23:30
13-Mar-2023 22:45
15-Mar-2023 22:50
14-Mar-2023 17:54
15-Feb-2023 23:00
15-Mar-2023 02:05
26-Feb-2023 05:15
11-Mar-2023 00:10
15-Mar-2023 11:32
01-Mar-2023 10:35
02-Mar-2023 05:49
19-Feb-2023 22:30
27-Feb-2023 10:10

## 2023-03-15 NOTE — PROVIDER CONTACT NOTE (OTHER) - ACTION/TREATMENT ORDERED:
Acp came to bedside, rectal temp & IV tylenol given + chest xray, pt suctioned, fio2 increased to 98% and 10L, pt now sat 100% via trach collar.

## 2023-03-15 NOTE — PROVIDER CONTACT NOTE (OTHER) - BACKGROUND
Patient with hx of lung CA  and retrotracheal mass and on PCA pump
Primary malignant neoplasm.
Pt admitted primary malignant neoplasm. S/p trach placement 2/14, PEG placement 2/16. Lung cancer, HT N.
Stage IV lung cancer, HTN, substance abuse, s/p trach 2/14, s/p PEG placement 2/16
h/o stage IV lung Ca, HTN, anxiety, chronic pain and substance abuse
s/p trach
s/p trach
stage IV lung CA, anxiety, HTN, substance abuse
Primary malignant neoplasm.
s/p Trach, hx of lung ca
s/p trach
s/p tracheostomy 2/14, s/p peg placement 2/16 with tube feeds initiated 2/17
pmh of stage IV lung CA, ETOH abuse, substance abuse, HTN,.
s/p trach

## 2023-03-15 NOTE — PROGRESS NOTE ADULT - SUBJECTIVE AND OBJECTIVE BOX
[facility] Division of Hospital Medicine  Miki NjDO  Available via MS Teams  In house pager [phone]    SUBJECTIVE / OVERNIGHT EVENTS:  Overnight patient with episode of bilious emesis seemingly also from trach site.  Desaturation also noted at that time.  this morning comfortable in bed after CT scan performed (pending read)  We discussed his current situation including the suspected infections present - potentially aspiration PNA (though not visualized on recent CXR), potentially enteric in nature, potentially also from known b/l thigh skin ?abscesses.  He has been given antibiotics and we discussed whether he would like to continue these - he at this time does not wish to withdraw treatment with antibiotics or fluids.  He asked me if we have a pain management service as he feels his pain is not well-controlled. I was told pain management does not treat cancer-related pain in inpatients.  Mr Tanner's goal is still to go to hospice. I spoke to the  - he has not successfully been accepted to hospice facilities, and is not at this time an inpatient hospice candidate. He has been accepted to a facility but it is far from where his family lives; other facilities have not accepted him. The goal is to get him to this facility and hopefully transition to hospice afterward.   I explained to Mr Tanner that the ability to get him to the facility will be limited if we offer him IV opioids, and he seemed to understand.   He asked if the dose of the medication could potentially be increased.     GI team have offered him endoscopy for GIB when optimized, but he has indicated to me that he does not want to pursue procedures at this time. He is not open to withdrawing other care such as antibiotics or IV fluids, and so it is difficult for him to be placed to inpatient hospice. He understands this as well.    ADDITIONAL REVIEW OF SYSTEMS:    MEDICATIONS  (STANDING):  albuterol/ipratropium for Nebulization 3 milliLiter(s) Nebulizer every 12 hours  ferrous    sulfate Liquid 300 milliGRAM(s) Enteral Tube daily  gabapentin Solution 600 milliGRAM(s) Oral three times a day  heparin   Injectable 5000 Unit(s) SubCutaneous every 8 hours  influenza   Vaccine 0.5 milliLiter(s) IntraMuscular once  levothyroxine 50 MICROGram(s) Oral daily  LORazepam     Tablet 1 milliGRAM(s) Oral <User Schedule>  methadone   Solution 15 milliGRAM(s) Oral three times a day  nicotine - 21 mG/24Hr(s) Patch 1 Patch Transdermal daily  pantoprazole  Injectable 40 milliGRAM(s) IV Push two times a day  piperacillin/tazobactam IVPB.. 3.375 Gram(s) IV Intermittent every 8 hours  QUEtiapine 25 milliGRAM(s) Oral at bedtime  senna 2 Tablet(s) Oral at bedtime  sodium chloride 0.9%. 1000 milliLiter(s) (75 mL/Hr) IV Continuous <Continuous>  vancomycin  IVPB 750 milliGRAM(s) IV Intermittent every 12 hours    MEDICATIONS  (PRN):  albuterol    90 MICROgram(s) HFA Inhaler 2 Puff(s) Inhalation every 6 hours PRN Bronchospasm  aluminum hydroxide/magnesium hydroxide/simethicone Suspension 30 milliLiter(s) Oral every 6 hours PRN Dyspepsia  HYDROmorphone   Tablet 4 milliGRAM(s) Oral every 4 hours PRN Moderate Pain (4 - 6)  HYDROmorphone   Tablet 6 milliGRAM(s) Oral every 4 hours PRN Severe Pain (7 - 10)  hydrOXYzine hydrochloride Syrup 25 milliGRAM(s) Oral every 6 hours PRN Anxiety  lidocaine   4% Patch 1 Patch Transdermal daily PRN hip/ back pain  ondansetron Injectable 4 milliGRAM(s) IV Push every 8 hours PRN Nausea and/or Vomiting  polyethylene glycol 3350 17 Gram(s) Oral daily PRN Constipation      I&O's Summary    14 Mar 2023 07:01  -  15 Mar 2023 07:00  --------------------------------------------------------  IN: 800 mL / OUT: 201 mL / NET: 599 mL        PHYSICAL EXAM:  Vital Signs Last 24 Hrs  T(C): 36.7 (15 Mar 2023 10:41), Max: 38.6 (15 Mar 2023 02:13)  T(F): 98.1 (15 Mar 2023 10:41), Max: 101.5 (15 Mar 2023 02:13)  HR: 98 (15 Mar 2023 10:41) (93 - 122)  BP: 125/80 (15 Mar 2023 10:41) (105/67 - 125/80)  BP(mean): --  RR: 18 (15 Mar 2023 10:41) (17 - 18)  SpO2: 100% (15 Mar 2023 10:41) (84% - 100%)    Parameters below as of 15 Mar 2023 10:41  Patient On (Oxygen Delivery Method): tracheostomy collar      CONSTITUTIONAL: NAD, well-developed, well-groomed, cachectic  EYES: PERRLA; conjunctiva and sclera clear  ENMT: Moist oral mucosa, no pharyngeal injection or exudates; normal dentition  NECK: Supple, no palpable masses; no thyromegaly; trach in place with dark yellow sputum on suctioning  RESPIRATORY: Normal respiratory effort; lungs are clear to auscultation bilaterally  CARDIOVASCULAR: Regular rate and rhythm, normal S1 and S2, no murmur/rub/gallop; No lower extremity edema; Peripheral pulses are 2+ bilaterally  ABDOMEN: Nontender to palpation, normoactive bowel sounds, no rebound/guarding; No hepatosplenomegaly; PEG tube in place without surrounding drainage  MUSCULOSKELETAL:   no clubbing or cyanosis of digits; no joint swelling or tenderness to palpation  PSYCH: A+O to person, place, and time; affect appropriate  NEUROLOGY: CN 2-12 are intact and symmetric; no gross sensory deficits   SKIN: No rashes; no palpable lesions    LABS:                        7.5    12.65 )-----------( 567      ( 15 Mar 2023 05:45 )             23.4     03-15    129<L>  |  89<L>  |  18  ----------------------------<  98  4.2   |  28  |  0.92    Ca    8.6      15 Mar 2023 05:45  Phos  5.0     03-15  Mg     2.00     03-15      PT/INR - ( 14 Mar 2023 00:50 )   PT: 15.6 sec;   INR: 1.34 ratio         PTT - ( 14 Mar 2023 00:50 )  PTT:27.9 sec      Urinalysis Basic - ( 14 Mar 2023 06:50 )    Color: Yellow / Appearance: Clear / S.019 / pH: x  Gluc: x / Ketone: Negative  / Bili: Negative / Urobili: <2 mg/dL   Blood: x / Protein: Trace / Nitrite: Negative   Leuk Esterase: Negative / RBC: x / WBC x   Sq Epi: x / Non Sq Epi: x / Bacteria: x        Culture - Sputum (collected 14 Mar 2023 19:50)  Source: .Sputum Sputum  Gram Stain (15 Mar 2023 07:21):    Numerous polymorphonuclear leukocytes per low power field    Rare Squamous epithelial cells per low power field    Moderate Gram Positive Rods seen per oil power field    Moderate Gram Negative Rods seen per oil power field    Culture - Blood (collected 14 Mar 2023 01:00)  Source: .Blood Blood  Preliminary Report (15 Mar 2023 07:01):    No growth to date.    Culture - Blood (collected 14 Mar 2023 00:50)  Source: .Blood Blood  Preliminary Report (15 Mar 2023 07:01):    No growth to date.      COVID-19 PCR: NotDetec (13 Mar 2023 12:15)  COVID-19 PCR: NotDetec (13 Mar 2023 04:53)  COVID-19 PCR: NotDetec (08 Mar 2023 19:09)  SARS-CoV-2: NotDetec (01 Mar 2023 15:00)  COVID-19 PCR: NotDetec (15 Feb 2023 18:47)  COVID-19 PCR: NotDetec (26 Sep 2022 10:41)

## 2023-03-15 NOTE — PROVIDER CONTACT NOTE (OTHER) - NAME OF MD/NP/PA/DO NOTIFIED:
[facility] [id number] Carly Joseph
Ron [id number]
Carmen Romero
Carmen Romero
[facility] [id number], Thong CARDENAS
Kassandra Bunch ACP
Nerissa Phoenix
[facility] [id number], Thong CARDENAS
Graciela Felix
Lizett LOZANO x [phone]
[facility] [address] Mary Irby
[facility] [address] Carly Joseph
Bere Kapoor
Oriana Cummings
azeem brewer

## 2023-03-15 NOTE — PROGRESS NOTE ADULT - ASSESSMENT
Impression:     #Melena  Likely related to upper GI bleed with differentials including gastric/duodenal/esophageal ulcers, dieulafoy lesions, marshall lesions, hemorrhagic gastritis, angioectasia, adenocarcinoma, NET, GIST, lymphoma, severe esophagitis, GAVE and gastric/esophageal varices. Could also be related to small bowel masses, however, unclear if its intraluminal based on the imaging. Less likely related to lower GI bleed, including diverticulosis, angioectasia etc.   - Based on chart review, prior hgb levels around 9-8, now gradual decline to high 6, required total of 5 units of pRBCs. No prior EGD or colonoscopies.   Patient will benefit from upper endoscopy. However, imaging showed large necrotic mass in the retrotracheal region around 8x4 cm which is compressing the proximal esophagus, might be difficult to intubate the esophagus.   - Patient will need to rescind his DNR status for the procedure. He agreed to it.   - hgb has been stable around 8.     Recommendations:   - Will hold off on the procedure as the patient is not medically optimized, currently concerned for possible pneumonia and has hyponatremia, will need Na level 130.   - consider abdominal Xray if continues to have emesis  - Will consider upper endoscopy sometime later this week if he is optimized for the procedure.   - Continue with IV PPI 40mg BID.   - Monitor CBC Q12 hours.   - Transfuse if hgb < 7 or hgb < 8 in patients with underlying cardiac comorbidities.     GI will continue to follow.     All recommendations are tentative until note is attested by an attending.     Rama Ro, PGY-4  Gastroenterology/Hepatology Fellow  Available on Microsoft Teams  [phone] (Short Range Pager)  [phone] (Long Range Pager)    After 5pm, please contact the on-call GI fellow. [phone]

## 2023-03-15 NOTE — PROGRESS NOTE ADULT - PROBLEM SELECTOR PLAN 3
- pain, erythema, and small purulent drainage from gastrostomy site - resolved  - wound care recs and instructions appreciated  - CTAP with superficial infection but no defined abscess; it does also show some possibly developing hip abscesses  - pain in hips likely stemming from these abscesses - may consider surgical eval if within [facility]    #Cancer-related pain  - appreciate palliative f/u to assess if po pain medication doses can be safely increased

## 2023-03-15 NOTE — PROVIDER CONTACT NOTE (OTHER) - REASON
Temp 100.4, BP 82/48
Increased yellow green sputum coming out of trach & peg
fentanyl patch
IV fluids for patient since patient is currently not on tube feeds and has PCA pump?
dark emesis via trach, & peg site + desat to 82-86
temp 102.4, 
Febrile, tachy, desat & emesis via trach
Pt hypotensive
cyst
BP 92/53,HR 91, due for Lorazepam
Increased yellow green sputum coming out of trach & peg
Tube feed secreation coming out of trach
patient coughing up copious amount of greenish secretion from Trach stoma
Patient's BP 89/50
Redness around IV site

## 2023-03-15 NOTE — CHART NOTE - NSCHARTNOTEFT_GEN_A_CORE
Notified by RN that patient had episode of bilious vomiting which also came out of trach. Patient seen at bedside by provider, patient in NAD resting comfortably. Patient cleaned prior to arrival, but per RN -small amount of bilious vomit came from inside trach and was suctioned immediately. Patient desat to low 80s but now satting 100%, no accessory muscle use or increased WOB. Patient also noted to have HR of 119bpm and rectal tempt of 101.5. Patient denies chest pain, shortness of breath, nausea, abdominal pain, chills.    Vital Signs Last 24 Hrs  T(C): 36.7 (15 Mar 2023 02:19), Max: 38.6 (15 Mar 2023 02:13)  T(F): 98 (15 Mar 2023 02:19), Max: 101.5 (15 Mar 2023 02:13)  HR: 110 (15 Mar 2023 02:19) (94 - 122)  BP: 121/74 (15 Mar 2023 02:19) (103/61 - 121/82)  BP(mean): --  RR: 18 (15 Mar 2023 02:19) (17 - 19)  SpO2: 98% (15 Mar 2023 02:19) (84% - 100%)    Parameters below as of 15 Mar 2023 02:19  Patient On (Oxygen Delivery Method): tracheostomy collar    General: NAD, warm to touch  Cards: S1/S2, no murmurs, +tachy  Pulm: BL rhonchi L > R  Abdomen: Soft, NTND. BS (+)   Extremities: No pedal edema.     Plan:  -continue to hold TF, HOB elevated, suction   -CXR   -cw zanc and zosyn for aspiration PNA   -Tylenol PRN for fever  -Zofran PRN for nausea   -titrate O2 as tolerated   -aspiration precations   -continue to monitor closely on  Notified by RN that patient had episode of bilious vomiting which also came out of trach. Patient seen at bedside by provider, patient in NAD resting comfortably sp event. Patient cleaned prior to arrival, but per RN -small amount of bilious vomit came from inside trach and was suctioned immediately. Patient desat to low 80s but now satting 100%, no accessory muscle use or increased WOB. Patient also noted to have HR of 119bpm and rectal tempt of 101.5. Patient denies chest pain, shortness of breath, nausea, abdominal pain, chills.    Vital Signs Last 24 Hrs  T(C): 36.7 (15 Mar 2023 02:19), Max: 38.6 (15 Mar 2023 02:13)  T(F): 98 (15 Mar 2023 02:19), Max: 101.5 (15 Mar 2023 02:13)  HR: 110 (15 Mar 2023 02:19) (94 - 122)  BP: 121/74 (15 Mar 2023 02:19) (103/61 - 121/82)  BP(mean): --  RR: 18 (15 Mar 2023 02:19) (17 - 19)  SpO2: 98% (15 Mar 2023 02:19) (84% - 100%)    Parameters below as of 15 Mar 2023 02:19  Patient On (Oxygen Delivery Method): tracheostomy collar    General: NAD, warm to touch  Cards: S1/S2, no murmurs, +tachy  Pulm: BL rhonchi L > R  Abdomen: Soft, NTND. BS (+)   Extremities: No pedal edema.     Plan:  -continue to hold TF, HOB elevated, suction   -CXR   -cw zanc and zosyn for aspiration PNA   -Tylenol PRN for fever  -Zofran PRN for nausea   -titrate O2 as tolerated   -aspiration precautions   -continue to monitor closely on  Notified by RN that patient had episode of bilious vomiting which also came out of trach. Patient seen at bedside by provider, patient in NAD resting comfortably sp event. Patient cleaned prior to arrival, but per RN -small amount of bilious vomit came from inside trach and was suctioned immediately. Patient desat to low 80s but now satting 100%, no accessory muscle use or increased WOB. Patient also noted to have HR of 119bpm and rectal tempt of 101.5. Patient denies chest pain, shortness of breath, nausea, abdominal pain, chills.    Vital Signs Last 24 Hrs  T(C): 36.7 (15 Mar 2023 02:19), Max: 38.6 (15 Mar 2023 02:13)  T(F): 98 (15 Mar 2023 02:19), Max: 101.5 (15 Mar 2023 02:13)  HR: 110 (15 Mar 2023 02:19) (94 - 122)  BP: 121/74 (15 Mar 2023 02:19) (103/61 - 121/82)  BP(mean): --  RR: 18 (15 Mar 2023 02:19) (17 - 19)  SpO2: 98% (15 Mar 2023 02:19) (84% - 100%)    Parameters below as of 15 Mar 2023 02:19  Patient On (Oxygen Delivery Method): tracheostomy collar    General: NAD, warm to touch  Cards: S1/S2, no murmurs, +tachy  Pulm: +TC, BL rhonchi L > R  Abdomen: +PEG, Soft, NTND. BS (+)   Extremities: No pedal edema.     Plan:  -continue to hold TF, HOB elevated, suction   -CXR   -cw zanc and zosyn for aspiration PNA   -Tylenol PRN for fever  -Zofran PRN for nausea   -titrate O2 as tolerated   -aspiration precautions   -continue to monitor closely on

## 2023-03-16 NOTE — PROGRESS NOTE ADULT - ATTENDING COMMENTS
52 year old male with metastatic lung cancer who was consulted for melena  - Based on chart review, prior hgb levels around 9-8, now gradual decline to high 6, required total of 5 units of pRBCs. No prior EGD or colonoscopies.     Recommendations:   - Will hold off on the procedure as the patient is not medically optimized, currently concerned for possible pneumonia and has hyponatremia, will need Na level 130.   Will consider upper endoscopy sometime later this week if he is optimized for the procedure.
52 year old male with metastatic lung cancer. Pt with melena but now has probable aspiration Pneumonia.    Plan for EGD when stable.
I agree with the detailed interval history, physical, and plan, which I have reviewed and edited where appropriate'; also agree with notes/assessment with my team on service.  I have personally examined the patient.  I was physically present for the key portions of the evaluation and management (E/M) service provided.  I reviewed all the pertinent data.  The patient is a critical care patient with life threatening hemodynamic and metabolic instability in SICU.  The SICU team has a constant risk benefit analyzes discussion and coordinating care with the primary team and all consultants.   The patient is in SICU with the chief complaint and diagnosis mentioned in the note.   The plan will be specified in the note.  52 year old male with a history of stage 4 lung cancer; s/p open trach in SICU for hemodynamic monitoring.  EXAM  NEURO: NAD  HEENT: s/p trach  RESPIRATORY: clear  CARDIO: RR  ABDOMEN: soft, nontender  EXTREMITIES: normal strength  NEUROLOGIC   - tylenol ATC  RESPIRATORY   - Monitor SpO2 goal >92%  CARDIOVASCULAR   - Monitor hemodynamics   GASTROINTESTINAL   - Diet: NPO  /RENAL   - IV fluids: d5NS @ 75  HEMATOLOGIC  - DVT ppx: Lovenox & SCD's  INFECTIOUS DISEASE  - zosyn  ENDOCRINE  - Monitor glucose    DISPO: PACU-SICU    Critical Care Diagnoses: post op care.
Patient with reported intolerance of TFs through PEG overnight. Per nursing, pt developed significant output from the trach after feed initiation, so now on hold. However, pt denies having vomiting or regurgitation. No reported melena.    Recommendations:   - patient currently declining endoscopic work up, even if he were to be medically optimized. Hospice referral placed per documentation.  - If further work-up is within GOC, can consider abdominal Xray to rule out any obstruction causing inability to tolerate TFs, as well as Xray esophagram to rule out tracheo-esophageal fistula given concern for feeds coming out of trach

## 2023-03-16 NOTE — PROGRESS NOTE ADULT - CONVERSATION DETAILS
Referral to palliative care for complex decision making and symptom management in setting of advanced malignancy. Palliative re-consulted for further GOC and symptom management. Prior MOLST in place- DNR/DNI. Pt was awaiting nursing home placement with hospice. Chart reviewed. Met with patient and family Archana (hcp) and cousin at the bedside. Extensive conversation regarding pts medical care and treatment options moving forward. GI was consulted due to new GI bleed, offered endoscopy - pt and family not interested in pursing. Pt also experiencing nausea and vomiting with tube feeds and medication administration via PEG- pt wishes to no longer continue with feeds. All symptom medication transitioned to IV. Pt in agreement to discontinue IV antibiotics - attending made aware. Educated family on the philosophy of hospice care and explained the various settings and criteria in which hospice can be delivered (home vs. nursing home vs. inpatient hospice). Discussed the services provided under hospice services emphasizing the goal of elevating patient's quality of life and optimizing symptom management and avoiding unnecessary future hospitalizations. In addition to symptom management expertise, hospice provides supportive counseling services, nutritional support and chaplaincy services. Pt and family in agreement for inpatient hospice referral. Emotional support provided. Will continue to follow for symptom management.

## 2023-03-16 NOTE — PROGRESS NOTE ADULT - PROBLEM SELECTOR PLAN 8
Dvt ppx: Lovenox QD, hypercoagulable state d/t malignancy  Pt is DNR/DNI  Cousins- OK to share information per patient  Padma: [phone]  Haylee: [phone]    Patient chooses to pursue inpatient hospice at this time, referral made.   No longer tolerating tube feedings and declines them at this time, declining endoscopic intervention, declining use of IV antibiotics or IV fluids. Requiring IV opioids.

## 2023-03-16 NOTE — CHART NOTE - NSCHARTNOTEFT_GEN_A_CORE
Source: Patient [X ]    Family [ ]     other [ X] electronic chart, RN    Diet : Diet, NPO (03-15-23 @ 22:53)    Nutrition follow-up note. Per chart review, 52 year old male with medical history of stage IV lung CA, HTN, anxiety on Xanax, chronic pain on Percocet, and substance abuse was admitted to [facility] on 2/10/23 due to difficulty swallowing. Pt has had difficulty swallowing for th past week. Pt states it has been worsening since, states he is now unable to swallow pills or food. 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 Pt also developed stridor for the past week. Pt is transferred to [facility] for Palliative Radiation.     Patient was previously maintained on PEG feeds and stopped on 3/13 due to emesis. Patient continues to have emesis despite NPO status with unclear source as per ACP during IDR. At time time, no plans to re-start tube feeding. Pt also with melena GI rec scope however, pt and family refusing further intervention at this time.   Now plan for transition to hospice per Goals of Care discussion. Current medical condition precludes nutrition intervention at this time.     Weight: 52.2kg 2/14      Pertinent Medications: albuterol/ipratropium for Nebulization  ferrous    sulfate Liquid  gabapentin Solution  levothyroxine  LORazepam   Injectable  methadone Injectable  pantoprazole  Injectable  QUEtiapine  senna    Pertinent Labs:  03-16 Na128 mmol/L<L> Glu 92 mg/dL K+ 4.1 mmol/L Cr  0.85 mg/dL BUN 16 mg/dL 03-16 Phos 4.1 mg/dL      Skin: pressure injury to sacrum per nursing flowsheet.    Estimated Needs:   [X ] no change since previous assessment  [ ] recalculated:       Previous Nutrition Diagnosis:     [X ] Malnutrition, severe     Nutrition Diagnosis is [X ] ongoing  [ ] resolved [ ] not applicable       Additional Recommendations:     1. Defer Nutrition Plan of Care to MD based on GOC discussion and decisions of Pt's family;  2. Monitor labs, weights, hydration status;   RDN remains available, reconsult services as needed.

## 2023-03-16 NOTE — PROGRESS NOTE ADULT - PROBLEM SELECTOR PLAN 4
Offered EGD by GI once medically optimized, but declining interventions at this time.  Can offer blood draws and transfusions if patient still wishes, but at this time patient is choosing to pursue inpatient hospice.

## 2023-03-16 NOTE — PROGRESS NOTE ADULT - PROBLEM SELECTOR PLAN 5
Progressive NSCLC with disease progression in the neck causing stridor and dysphagia  - palliative radiation completed today  - per hem/onc at [place] pt no longer candidate for DMT  - Masses progressing on CTAP 3/3    Choosing to pursue inpatient hospice.

## 2023-03-16 NOTE — PROGRESS NOTE ADULT - ASSESSMENT
52M with metastatic lung cancer, HTN, anxiety, chronic pain on percocet, prior substance abuse who was admitted to [facility] on 2/10/23 due to difficulty swallowing. 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 Pt is s/p trach and PEG and s.p  palliative radiation. Patient is awaiting facility with plan for eventual hospice.

## 2023-03-16 NOTE — PROGRESS NOTE ADULT - PROBLEM SELECTOR PLAN 2
Multiple episodes of emesis and recurrent aspiration events with resultant pneumonia.  Now declining further antibiotics.

## 2023-03-16 NOTE — PROGRESS NOTE ADULT - PROBLEM SELECTOR PLAN 3
Pain stemming from tube, from overall abdomen, and from cancer.    #Cancer-related pain  - offer IV pain medications for symptom management

## 2023-03-16 NOTE — CHART NOTE - NSCHARTNOTEFT_GEN_A_CORE
Notified by RN that patient w/ dark secretions from trach and PEG. Patient seen at bedside by provider. Patient already suctioned and cleaned by RN. Patient in NAD, satting 98%. Patient denies shortness of breath, abdominal pain, nausea. Patient does not experience symptoms prior to the increase in secretions/emesis. Discussed w/ patient and RN -no inciting event. Received meds through PEG 1 hour earlier.     Vital Signs Last 24 Hrs  T(C): 37 (15 Mar 2023 23:15), Max: 38.6 (15 Mar 2023 02:13)  T(F): 98.6 (15 Mar 2023 23:15), Max: 101.5 (15 Mar 2023 02:13)  HR: 110 (15 Mar 2023 23:15) (88 - 122)  BP: 109/64 (15 Mar 2023 23:15) (108/60 - 126/77)  BP(mean): --  RR: 18 (15 Mar 2023 23:15) (17 - 18)  SpO2: 98% (15 Mar 2023 23:15) (84% - 100%)    Parameters below as of 15 Mar 2023 23:15  Patient On (Oxygen Delivery Method): tracheostomy collar  O2 Flow (L/min): 98  O2 Concentration (%): 8    General: NAD  Cards: S1/S2, no murmurs, +tachy  Pulm: +TC- +black staining, BL rhonchi L > R  Abdomen: +PEG, Soft, NTND. BS (+)   Extremities: No pedal edema.     Plan:  -Strict NPO, nothing through PEG at this time  -medications including pain reg temporarily switched to IV   -GI following, recs appreciated  -black/dark secretions likely in setting of ferrous sulfate supplementation, does not appear bloody   -Zofran PRN   -elevate HOB, aspiration precautions, suction PRN

## 2023-03-16 NOTE — PROGRESS NOTE ADULT - ASSESSMENT
52 year old male w/ a PMHX of stage IV lung CA, HTN, anxiety on Xanax, chronic pain on Percocet, and substance abuse was admitted to [facility] on 2/10/23 due to difficulty swallowing. Pt has had difficulty swallowing for th past week. Pt states it has been worsening since, states he is now unable to swallow pills or food. 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 Pt also developed stridor for the past week. Pt is transferred to [facility] for Palliative Radiation. Palliative care consulted for pain management.

## 2023-03-16 NOTE — PROGRESS NOTE ADULT - TIME BILLING
Thao
Time-based billing (NON-critical care).     35 minutes spent on total encounter; more than 50% of the visit was spent counseling and / or coordinating care by the attending physician.  The necessity of the time spent during the encounter on this date of service was due to:     review of laboratory data, radiology results, consultants' recommendations, documentation in [place], discussion with patient/ACP and interdisciplinary staff (such as , social workers, etc). Interventions were performed as documented above.
Time-based billing (NON-critical care).     50 minutes spent on total encounter; more than 50% of the visit was spent counseling and / or coordinating care by the attending physician.  The necessity of the time spent during the encounter on this date of service was due to:     review of laboratory data, radiology results, consultants' recommendations, documentation in [place], discussion with patient/ACP and interdisciplinary staff (such as , social workers, etc). Interventions were performed as documented above.
Time-based billing (NON-critical care).     50 minutes spent on total encounter; more than 50% of the visit was spent counseling and / or coordinating care by the attending physician.  The necessity of the time spent during the encounter on this date of service was due to:     review of laboratory data, radiology results, consultants' recommendations, documentation in [place], discussion with patient, ACP and interdisciplinary staff (such as , social workers, etc). Interventions were performed as documented above.
Time-based billing (NON-critical care).     50 minutes spent on total encounter; more than 50% of the visit was spent counseling and / or coordinating care by the attending physician.  The necessity of the time spent during the encounter on this date of service was due to:     review of laboratory data, radiology results, consultants' recommendations, documentation in [place], discussion with patient, family, ACP and interdisciplinary staff (such as , social workers, etc). Interventions were performed as documented above.
Time-based billing (NON-critical care).     50 minutes spent on total encounter; more than 50% of the visit was spent counseling and / or coordinating care by the attending physician.  The necessity of the time spent during the encounter on this date of service was due to:     review of laboratory data, radiology results, consultants' recommendations, documentation in [place], discussion with patient/ACP and interdisciplinary staff (such as , social workers, etc). Interventions were performed as documented above.
Time-based billing (NON-critical care).     50 minutes spent on total encounter; more than 50% of the visit was spent counseling and / or coordinating care by the attending physician.  The necessity of the time spent during the encounter on this date of service was due to:     review of laboratory data, radiology results, consultants' recommendations, documentation in [place], discussion with patient, pulm team, medicine PA, and interdisciplinary staff (such as , social workers, etc). Interventions were performed as documented above.
Time-based billing (NON-critical care).     50 minutes spent on total encounter; more than 50% of the visit was spent counseling and / or coordinating care by the attending physician.  The necessity of the time spent during the encounter on this date of service was due to:     review of laboratory data, radiology results, consultants' recommendations, documentation in [place], discussion with patient/ACP and interdisciplinary staff (such as , social workers, etc). Interventions were performed as documented above.
Time-based billing (NON-critical care).     50 minutes spent on total encounter; more than 50% of the visit was spent counseling and / or coordinating care by the attending physician.  The necessity of the time spent during the encounter on this date of service was due to:     review of laboratory data, radiology results, consultants' recommendations, documentation in [place], discussion with patient/ACP and interdisciplinary staff (such as , social workers, etc). Interventions were performed as documented above.

## 2023-03-16 NOTE — PROGRESS NOTE ADULT - NS ATTEND AMEND GEN_ALL_CORE FT
52 year old male w/ a PMHX of stage IV lung CA, HTN, anxiety on Xanax, chronic pain on Percocet, and substance abuse was admitted to [facility] on 2/10/23 due to difficulty swallowing. 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 Pt is transferred to [facility] for Palliative Radiation.     Palliative team reconsulted for symptom management as patient now with PO access. PEG tube site bleeding and patient declining further GI workup. Declining artificial nutrition, IVF and IV antibiotics.   > Transition PO to IV symptom medications as above. Can d/c unnecessary PO meds.   > GOC: Patient now inpatient hospice appropriate. Explained hospice criteria extensively to patient's family at bedside.     Thank you for allowing us to participate in your patient's care. Please page [phone] for any q's or c's.     Laura Calixto D.O.   Palliative Medicine. 52 year old male w/ a PMHX of stage IV lung CA, HTN, anxiety on Xanax, chronic pain on Percocet, and substance abuse was admitted to [facility] on 2/10/23 due to difficulty swallowing. 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 Pt is transferred to [facility] for Palliative Radiation.     Palliative team reconsulted for symptom management as patient now without PO access. PEG tube site bleeding and patient declining further GI workup. Declining artificial nutrition, IVF and IV antibiotics.   > Transition PO to IV symptom medications as above. Can d/c unnecessary PO meds.   > GOC: Patient now inpatient hospice appropriate. Explained hospice criteria extensively to patient's family at bedside.     Thank you for allowing us to participate in your patient's care. Please page [phone] for any q's or c's.     Laura Calixto D.O.   Palliative Medicine.

## 2023-03-16 NOTE — PROGRESS NOTE ADULT - PROBLEM SELECTOR PLAN 1
Dysphagia 2/2 large mass in the retrotracheal region with displacement of the trachea anteriorly, compression of the pharynx and proximal esophagus  Completed palliative radiation  Now not tolerating tube feeds, declining further tube feeds.

## 2023-03-16 NOTE — PROGRESS NOTE ADULT - NSPROGADDITIONALINFOA_GEN_ALL_CORE
d/w medicine PA
Discussed with medicine PA, , family at bedside, and palliative care attending.
d/w medicine PA
d/w medicine PA

## 2023-03-16 NOTE — PROGRESS NOTE ADULT - PROBLEM SELECTOR PLAN 3
- Transitioned PO Ativan to IV 1mg - scheduled 10am,2pm,6pm,10pm  - Family provides a lot of support to patient

## 2023-03-16 NOTE — PROGRESS NOTE ADULT - PROBLEM SELECTOR PLAN 4
- c/w IV Zofran 4mg Q8 hrs - can liberate to 8mg Q8h if no improvement - c/w IV Zofran 4mg Q8 hrs prn

## 2023-03-16 NOTE — PROGRESS NOTE ADULT - PROBLEM SELECTOR PLAN 2
S/P 4 cycles of single agent palliative Keytruda/ immunotherapy - had positive response, patient did not return for follow-up. Several weeks ago returned to office weeks ago with progressive disease restarted on treatment with Keytruda AND concurrent chemotherapy carboplatin and Alimta. Had progressive disease in the neck and had seen radiation therapy in consultation  - Transferred to [facility] for palliative RT to neck  - Trach placed 2/15 for airway protection & PEG placed 2/16 for nutrition/medication administration   - 2/28: Patient would like to transition care to hospice at nursing home.  - 3/16: Pt now inpt appropriate - hospice referral sent to [facility].

## 2023-03-16 NOTE — PROGRESS NOTE ADULT - SUBJECTIVE AND OBJECTIVE BOX
[facility] and Palliative [facility]  Amy Palacio, Palliative Care Nurse Practitioner  Contact Info: Page [phone] (Including Nights/Weekends), Message on Microsoft Teams (Amy Palacio), or leave VM at Palliative Office [phone] (non-urgent)    SUBJECTIVE AND OBJECTIVE:  Indication for Geriatrics and Palliative Care Services/INTERVAL HPI: Re-consulted for pain/GOC.  Pt seen this AM with family at the bedside. Pt c/o nausea, unable to tolerate tube feeds. No plans to re-start tube feeding. Pt also with melena GI rec scope but pt and family refusing further intervention at this time.     OVERNIGHT EVENTS: Pt required PO Dilaudid 6mg x2. Pt unable to tolerate tube feeds/medications via PEG tube 2/2 nausea/vomiting/melena. IV dilaudid 1mg x 1.     DNR on chart:Yes  Yes      Allergies    No Known Allergies    Intolerances    MEDICATIONS  (STANDING):  albuterol/ipratropium for Nebulization 3 milliLiter(s) Nebulizer every 12 hours  ferrous    sulfate Liquid 300 milliGRAM(s) Enteral Tube daily  gabapentin Solution 600 milliGRAM(s) Oral three times a day  heparin   Injectable 5000 Unit(s) SubCutaneous every 8 hours  influenza   Vaccine 0.5 milliLiter(s) IntraMuscular once  levothyroxine 50 MICROGram(s) Oral daily  LORazepam   Injectable 1 milliGRAM(s) IV Push <User Schedule>  methadone Injectable 7.5 milliGRAM(s) IV Push every 8 hours  nicotine - 21 mG/24Hr(s) Patch 1 Patch Transdermal daily  pantoprazole  Injectable 40 milliGRAM(s) IV Push two times a day  piperacillin/tazobactam IVPB.. 3.375 Gram(s) IV Intermittent every 8 hours  QUEtiapine 25 milliGRAM(s) Oral at bedtime  senna 2 Tablet(s) Oral at bedtime  vancomycin  IVPB 750 milliGRAM(s) IV Intermittent every 12 hours    MEDICATIONS  (PRN):  albuterol    90 MICROgram(s) HFA Inhaler 2 Puff(s) Inhalation every 6 hours PRN Bronchospasm  aluminum hydroxide/magnesium hydroxide/simethicone Suspension 30 milliLiter(s) Oral every 6 hours PRN Dyspepsia  HYDROmorphone  Injectable 1 milliGRAM(s) IV Push every 4 hours PRN Severe Pain (7 - 10)  hydrOXYzine hydrochloride Syrup 25 milliGRAM(s) Oral every 6 hours PRN Anxiety  lidocaine   4% Patch 1 Patch Transdermal daily PRN hip/ back pain  ondansetron Injectable 4 milliGRAM(s) IV Push every 8 hours PRN Nausea and/or Vomiting  polyethylene glycol 3350 17 Gram(s) Oral daily PRN Constipation    -------------------------------------------------------------------------------------------------------  ITEMS UNCHECKED ARE NOT PRESENT    PRESENT SYMPTOMS: [ ]Unable to self-report - see [ ] CPOT [ ] PAINADS [ ] RDOS  Source if other than patient:  [ ]Family   [ ]Team     Pain:  [x ]yes- improved  [ ]no  QOL impact - long hospital stay  Location - abdomen/back/trach              Aggravating factors - movement  Quality - sharp  Radiation - none  Timing- constant   Severity (0-10 scale): 7  Minimal acceptable level (0-10 scale)/pain goal: 0      CPOT:    https://www.[facility].org/getattachment/[email or web address]/Critical-Care-Pain-Observation-Tool-(CPOT)    PAINAD Score: See PAINAD tool and score below       RDOS: See RDOS tool and score below   0 to 2  minimal or no respiratory distress   3  mild distress  4 to 6 moderate distress  >7 severe distress    Dyspnea:                           [ ]Mild [ ]Moderate [ ]Severe  Anxiety:                             [X]Mild [ ]Moderate [ ]Severe  Fatigue:                             [ ]Mild [ ]Moderate [ ]Severe  Nausea:                             [ ]Mild [ ]Moderate [ ]Severe  Loss of appetite:              [ ]Mild [ ]Moderate [ ]Severe  Constipation:                    [ ]Mild [ ]Moderate [ ]Severe  Other Symptoms:  [X ]All other review of systems negative     Home Medications for Symptoms if present:    I Stop Reference no:     -------------------------------------------------------------------------------------------------------  PCSSQ[Palliative Care Spiritual Screening Question]   Severity (0-10):  Score of 4 or > indicate consideration of Chaplaincy referral.  Chaplaincy Referral: [ ] yes [X ] refused [ ] following [ ] Deferred     Caregiver [place]? : [ ] yes [ ] no [ ] Deferred [X ] Declined             Social work referral [ ] Patient & Family Centered Care Referral [ ]     Anticipatory Grief present?:  [ ] yes [ ] no  [ X] Deferred                  Social work referral [ ] Chaplaincy Referral [ ]    -------------------------------------------------------------------------------------------------------  PHYSICAL EXAM:  Vital Signs Last 24 Hrs  T(C): 36.9 (16 Mar 2023 10:14), Max: 37.9 (15 Mar 2023 19:00)  T(F): 98.4 (16 Mar 2023 10:14), Max: 100.3 (15 Mar 2023 19:00)  HR: 95 (16 Mar 2023 10:14) (88 - 120)  BP: 106/68 (16 Mar 2023 10:14) (106/67 - 126/77)  BP(mean): --  RR: 17 (16 Mar 2023 10:14) (17 - 19)  SpO2: 100% (16 Mar 2023 10:14) (85% - 100%)    Parameters below as of 16 Mar 2023 10:14  Patient On (Oxygen Delivery Method): tracheostomy collar     I&O's Summary    15 Mar 2023 07:01  -  16 Mar 2023 07:00  --------------------------------------------------------  IN: 2225 mL / OUT: 600 mL / NET: 1625 mL       GENERAL: Sol collar - pt communicates by writing   [ x]Cachexia  [X ]Alert  [ X]Oriented x3   [X ]Lethargic  [ ]Unarousable  [ ]Verbal  [X ]Non-Verbal    Behavioral:   [ X] Anxiety  [ ] Delirium [ ] Agitation [ ] Other    HEENT:  [ ]Normal   [ ]Dry mouth   [ X]ET Tube/Trach  [ ]Oral lesions    PULMONARY:   [ ]Clear [ ]Tachypnea  [ ]Audible excessive secretions   [ ]Rhonchi        [ ]Right [ ]Left [ ]Bilateral  [ ]Crackles        [ ]Right [ ]Left [ ]Bilateral  [ ]Wheezing     [ ]Right [ ]Left [ ]Bilateral  [ X]Diminished breath sounds [ ]right [ ]left [ ]bilateral    CARDIOVASCULAR:    [ X]Regular [ ]Irregular [ ]Tachy  [ ]Faheem [ ]Murmur [ ]Other    GASTROINTESTINAL:  [X ]Soft  [ ]Distended   [ X]+BS  [ ]Non tender [ X]Tender  [ ]Other [ X]PEG [ ]OGT/ NGT  Last BM: 3/15    GENITOURINARY:  [X ]Normal [ ] Incontinent   [ ]Oliguria/Anuria   [ ]Wall    MUSCULOSKELETAL:   [ ]Normal   [ ]Weakness  [ X]Bed/Wheelchair bound [ ]Edema    NEUROLOGIC:   [X ]No focal deficits  [ ]Cognitive impairment  [ ]Dysphagia [ ]Dysarthria [ ]Paresis [ ]Other     SKIN:   [ ]Normal  [ ]Rash  [ ]Other  [X ]Pressure ulcer(s)       Present on admission [ ]y [ X]n  -------------------------------------------------------------------------------------------------------  CRITICAL CARE:  [ ]Shock Present  [ ]Septic [ ]Cardiogenic [ ]Neurologic [ ]Hypovolemic  [ ]Vasopressors [ ]Inotropes  [ ]Respiratory failure present [ ]Mechanical Ventilation [ ]Non-invasive ventilatory support [ ]High-Flow   [ ]Acute  [ ]Chronic [ ]Hypoxic  [ ]Hypercarbic [ ]Other  [ ]Other organ failure     -------------------------------------------------------------------------------------------------------  LABS:                        7.0    13.30 )-----------( 544      ( 16 Mar 2023 06:15 )             22.6   03-16    128<L>  |  91<L>  |  16  ----------------------------<  92  4.1   |  26  |  0.85    Ca    8.6      16 Mar 2023 06:15  Phos  4.1     03-16  Mg     2.80     03-16      -------------------------------------------------------------------------------------------------------  RADIOLOGY & ADDITIONAL STUDIES: < from: CT Abdomen and Pelvis w/ IV Cont (03.03.23 @ 10:17) >  IMPRESSION:  Gastrostomy tube in place with small fluid medially along the catheter   track within the left abdominal wall. No discrete rim enhancement to   suggest abscess at this time.    New small peripherally enhancing collections along the lateral right hip,   possibly developing abscesses.    Left lower lobe pneumonia.    Increased size of pulmonary nodules and masses in the right adrenal   gland, small bowel, retroperitoneum, mesentery, and subcutaneous tissues,   consistent with progression of disease.    --- End of Report ---    < end of copied text >    -------------------------------------------------------------------------------------------------------  Protein Calorie Malnutrition Present: [ ]mild [ ]moderate [ ]severe [ ]underweight [ ]morbid obesity  [email or web address]_Clinical%20Characteristics%20to%20Document%20Malnutrition-White%20JV%20et%20al%202012.pdf    Height (cm): 182.9 (02-16-23 @ 06:00), 170.2 (02-10-23 @ 08:52), 182.9 (11-21-22 @ 14:45)  Weight (kg): 52.2 (02-16-23 @ 06:00), 56.2 (02-10-23 @ 08:52), 61.235 (11-21-22 @ 17:08)  BMI (kg/m2): 15.6 (02-16-23 @ 06:00), 19.4 (02-10-23 @ 08:52), 18.3 (11-21-22 @ 17:08)    Palliative Performance Status Version 2:   See PPSv2 tool and score below       [ ]PPSV2 < or = 30%  [ ]significant weight loss [ ]poor nutritional intake [ ]anasarca[ ]Artificial Nutrition    Other REFERRALS:  [ ]Hospice  [ ]Child Life  [ ]Social Work  [ ]Case management [ ]Holistic Therapy     -------------------------------------------------------------------------------------------------------  Goals of Care Document: [facility]iatric[facility] and Palliative Care  Aym Palacio, Palliative Care Nurse Practitioner  Contact Info: Page [phone] (Including Nights/Weekends), Message on Microsoft Teams (Amy Palacio), or leave VM at Palliative Office [phone] (non-urgent)    SUBJECTIVE AND OBJECTIVE:  Indication for Geriatrics and Palliative Care Services/INTERVAL HPI: Re-consulted for pain/GOC.  Pt seen this AM with family at the bedside. Pt c/o nausea, unable to tolerate tube feeds. No plans to re-start tube feeding. Pt also with melena GI rec scope but pt and family refusing further intervention at this time.     OVERNIGHT EVENTS: Pt required PO Dilaudid 6mg x2. Pt unable to tolerate tube feeds/medications via PEG tube 2/2 nausea/vomiting/melena. IV dilaudid 1mg x 1.     DNR on chart:Yes  Yes    Allergies    No Known Allergies    Intolerances    MEDICATIONS  (STANDING):  albuterol/ipratropium for Nebulization 3 milliLiter(s) Nebulizer every 12 hours  ferrous    sulfate Liquid 300 milliGRAM(s) Enteral Tube daily  gabapentin Solution 600 milliGRAM(s) Oral three times a day  heparin   Injectable 5000 Unit(s) SubCutaneous every 8 hours  influenza   Vaccine 0.5 milliLiter(s) IntraMuscular once  levothyroxine 50 MICROGram(s) Oral daily  LORazepam   Injectable 1 milliGRAM(s) IV Push <User Schedule>  methadone Injectable 7.5 milliGRAM(s) IV Push every 8 hours  nicotine - 21 mG/24Hr(s) Patch 1 Patch Transdermal daily  pantoprazole  Injectable 40 milliGRAM(s) IV Push two times a day  piperacillin/tazobactam IVPB.. 3.375 Gram(s) IV Intermittent every 8 hours  QUEtiapine 25 milliGRAM(s) Oral at bedtime  senna 2 Tablet(s) Oral at bedtime  vancomycin  IVPB 750 milliGRAM(s) IV Intermittent every 12 hours    MEDICATIONS  (PRN):  albuterol    90 MICROgram(s) HFA Inhaler 2 Puff(s) Inhalation every 6 hours PRN Bronchospasm  aluminum hydroxide/magnesium hydroxide/simethicone Suspension 30 milliLiter(s) Oral every 6 hours PRN Dyspepsia  HYDROmorphone  Injectable 1 milliGRAM(s) IV Push every 4 hours PRN Severe Pain (7 - 10)  hydrOXYzine hydrochloride Syrup 25 milliGRAM(s) Oral every 6 hours PRN Anxiety  lidocaine   4% Patch 1 Patch Transdermal daily PRN hip/ back pain  ondansetron Injectable 4 milliGRAM(s) IV Push every 8 hours PRN Nausea and/or Vomiting  polyethylene glycol 3350 17 Gram(s) Oral daily PRN Constipation    -------------------------------------------------------------------------------------------------------  ITEMS UNCHECKED ARE NOT PRESENT    PRESENT SYMPTOMS: [ ]Unable to self-report - see [ ] CPOT [ ] PAINADS [ ] RDOS  Source if other than patient:  [ ]Family   [ ]Team     Pain:  [x ]yes- improved  [ ]no  QOL impact - long hospital stay  Location - abdomen/back/trach              Aggravating factors - movement  Quality - sharp  Radiation - none  Timing- constant   Severity (0-10 scale): 7  Minimal acceptable level (0-10 scale)/pain goal: 0      CPOT:    https://www.[facility].org/getattachment/[email or web address]/Critical-Care-Pain-Observation-Tool-(CPOT)    PAINAD Score: See PAINAD tool and score below       RDOS: See RDOS tool and score below   0 to 2  minimal or no respiratory distress   3  mild distress  4 to 6 moderate distress  >7 severe distress    Dyspnea:                           [ ]Mild [ ]Moderate [ ]Severe  Anxiety:                             [X]Mild [ ]Moderate [ ]Severe  Fatigue:                             [ ]Mild [ ]Moderate [ ]Severe  Nausea:                             [ ]Mild [ ]Moderate [ ]Severe  Loss of appetite:              [ ]Mild [ ]Moderate [ ]Severe  Constipation:                    [ ]Mild [ ]Moderate [ ]Severe  Other Symptoms:  [X ]All other review of systems negative     Home Medications for Symptoms if present:    I Stop Reference no:     -------------------------------------------------------------------------------------------------------  PCSSQ[Palliative Care Spiritual Screening Question]   Severity (0-10):  Score of 4 or > indicate consideration of Chaplaincy referral.  Chaplaincy Referral: [ ] yes [X ] refused [ ] following [ ] Deferred     Caregiver [place]? : [ ] yes [ ] no [ ] Deferred [X ] Declined             Social work referral [ ] Patient & Family Centered Care Referral [ ]     Anticipatory Grief present?:  [ ] yes [ ] no  [ X] Deferred                  Social work referral [ ] Chaplaincy Referral [ ]    -------------------------------------------------------------------------------------------------------  PHYSICAL EXAM:  Vital Signs Last 24 Hrs  T(C): 36.9 (16 Mar 2023 10:14), Max: 37.9 (15 Mar 2023 19:00)  T(F): 98.4 (16 Mar 2023 10:14), Max: 100.3 (15 Mar 2023 19:00)  HR: 95 (16 Mar 2023 10:14) (88 - 120)  BP: 106/68 (16 Mar 2023 10:14) (106/67 - 126/77)  BP(mean): --  RR: 17 (16 Mar 2023 10:14) (17 - 19)  SpO2: 100% (16 Mar 2023 10:14) (85% - 100%)    Parameters below as of 16 Mar 2023 10:14  Patient On (Oxygen Delivery Method): tracheostomy collar     I&O's Summary    15 Mar 2023 07:01  -  16 Mar 2023 07:00  --------------------------------------------------------  IN: 2225 mL / OUT: 600 mL / NET: 1625 mL       GENERAL: Sol collar - pt communicates by writing   [ x]Cachexia  [X ]Alert  [ X]Oriented x3   [X ]Lethargic  [ ]Unarousable  [ ]Verbal  [X ]Non-Verbal    Behavioral:   [ X] Anxiety  [ ] Delirium [ ] Agitation [ ] Other    HEENT:  [ ]Normal   [ ]Dry mouth   [ X]ET Tube/Trach  [ ]Oral lesions    PULMONARY:   [ ]Clear [ ]Tachypnea  [ ]Audible excessive secretions   [ ]Rhonchi        [ ]Right [ ]Left [ ]Bilateral  [ ]Crackles        [ ]Right [ ]Left [ ]Bilateral  [ ]Wheezing     [ ]Right [ ]Left [ ]Bilateral  [ X]Diminished breath sounds [ ]right [ ]left [ ]bilateral    CARDIOVASCULAR:    [ X]Regular [ ]Irregular [ ]Tachy  [ ]Faheem [ ]Murmur [ ]Other    GASTROINTESTINAL:  [X ]Soft  [ ]Distended   [ X]+BS  [ ]Non tender [ X]Tender  [ ]Other [ X]PEG [ ]OGT/ NGT  Last BM: 3/15    GENITOURINARY:  [X ]Normal [ ] Incontinent   [ ]Oliguria/Anuria   [ ]Wall    MUSCULOSKELETAL:   [ ]Normal   [ ]Weakness  [ X]Bed/Wheelchair bound [ ]Edema    NEUROLOGIC:   [X ]No focal deficits  [ ]Cognitive impairment  [ ]Dysphagia [ ]Dysarthria [ ]Paresis [ ]Other     SKIN:   [ ]Normal  [ ]Rash  [ ]Other  [X ]Pressure ulcer(s)       Present on admission [ ]y [ X]n  -------------------------------------------------------------------------------------------------------  CRITICAL CARE:  [ ]Shock Present  [ ]Septic [ ]Cardiogenic [ ]Neurologic [ ]Hypovolemic  [ ]Vasopressors [ ]Inotropes  [ ]Respiratory failure present [ ]Mechanical Ventilation [ ]Non-invasive ventilatory support [ ]High-Flow   [ ]Acute  [ ]Chronic [ ]Hypoxic  [ ]Hypercarbic [ ]Other  [ ]Other organ failure     -------------------------------------------------------------------------------------------------------  LABS:                        7.0    13.30 )-----------( 544      ( 16 Mar 2023 06:15 )             22.6   03-16    128<L>  |  91<L>  |  16  ----------------------------<  92  4.1   |  26  |  0.85    Ca    8.6      16 Mar 2023 06:15  Phos  4.1     03-16  Mg     2.80     03-16      -------------------------------------------------------------------------------------------------------  RADIOLOGY & ADDITIONAL STUDIES: < from: CT Abdomen and Pelvis w/ IV Cont (03.03.23 @ 10:17) >  IMPRESSION:  Gastrostomy tube in place with small fluid medially along the catheter   track within the left abdominal wall. No discrete rim enhancement to   suggest abscess at this time.    New small peripherally enhancing collections along the lateral right hip,   possibly developing abscesses.    Left lower lobe pneumonia.    Increased size of pulmonary nodules and masses in the right adrenal   gland, small bowel, retroperitoneum, mesentery, and subcutaneous tissues,   consistent with progression of disease.    --- End of Report ---    < end of copied text >    -------------------------------------------------------------------------------------------------------  Protein Calorie Malnutrition Present: [ ]mild [ ]moderate [ ]severe [ ]underweight [ ]morbid obesity  [email or web address]/files/ONC/Table_Clinical%20Characteristics%20to%20Document%20Malnutrition-White%20JV%20et%20al%202012.pdf    Height (cm): 182.9 (02-16-23 @ 06:00), 170.2 (02-10-23 @ 08:52), 182.9 (11-21-22 @ 14:45)  Weight (kg): 52.2 (02-16-23 @ 06:00), 56.2 (02-10-23 @ 08:52), 61.235 (11-21-22 @ 17:08)  BMI (kg/m2): 15.6 (02-16-23 @ 06:00), 19.4 (02-10-23 @ 08:52), 18.3 (11-21-22 @ 17:08)    Palliative Performance Status Version 2:   See PPSv2 tool and score below       [ ]PPSV2 < or = 30%  [ ]significant weight loss [ ]poor nutritional intake [ ]anasarca[ ]Artificial Nutrition    Other REFERRALS:  [ ]Hospice  [ ]Child Life  [ ]Social Work  [ ]Case management [ ]Holistic Therapy     -------------------------------------------------------------------------------------------------------  Goals of Care Document:

## 2023-03-16 NOTE — PROGRESS NOTE ADULT - SUBJECTIVE AND OBJECTIVE BOX
Gastroenterology/Hepatology Progress Note    Interval Events: Patient with reported intolerance of TFs through PEG overnight. Per nursing, pt developed significant output from the trach after feed initiation, so now on hold. However, pt denies having vomiting or regurgitation.  No reported melena    Allergies:  No Known Allergies    Hospital Medications:  albuterol    90 MICROgram(s) HFA Inhaler 2 Puff(s) Inhalation every 6 hours PRN  albuterol/ipratropium for Nebulization 3 milliLiter(s) Nebulizer every 12 hours  aluminum hydroxide/magnesium hydroxide/simethicone Suspension 30 milliLiter(s) Oral every 6 hours PRN  bisacodyl Suppository 10 milliGRAM(s) Rectal daily PRN  ferrous    sulfate Liquid 300 milliGRAM(s) Enteral Tube daily  gabapentin Solution 600 milliGRAM(s) Oral three times a day  heparin   Injectable 5000 Unit(s) SubCutaneous every 8 hours  HYDROmorphone  Injectable 1 milliGRAM(s) IV Push every 3 hours PRN  HYDROmorphone  Injectable 2 milliGRAM(s) IV Push every 3 hours PRN  hydrOXYzine hydrochloride Syrup 25 milliGRAM(s) Oral every 6 hours PRN  influenza   Vaccine 0.5 milliLiter(s) IntraMuscular once  levothyroxine 50 MICROGram(s) Oral daily  lidocaine   4% Patch 1 Patch Transdermal daily PRN  LORazepam   Injectable 1 milliGRAM(s) IV Push <User Schedule>  methadone Injectable 7.5 milliGRAM(s) IV Push every 8 hours  nicotine - 21 mG/24Hr(s) Patch 1 Patch Transdermal daily  ondansetron Injectable 4 milliGRAM(s) IV Push every 8 hours PRN  pantoprazole  Injectable 40 milliGRAM(s) IV Push two times a day  QUEtiapine 25 milliGRAM(s) Oral at bedtime  senna 2 Tablet(s) Oral at bedtime    ROS: 14 point ROS negative unless otherwise state in subjective    PHYSICAL EXAM:   Vital Signs:  Vital Signs Last 24 Hrs  T(C): 36.9 (16 Mar 2023 10:14), Max: 37.9 (15 Mar 2023 19:00)  T(F): 98.4 (16 Mar 2023 10:14), Max: 100.3 (15 Mar 2023 19:00)  HR: 95 (16 Mar 2023 10:14) (88 - 120)  BP: 106/68 (16 Mar 2023 10:14) (106/67 - 126/77)  BP(mean): --  RR: 17 (16 Mar 2023 10:14) (17 - 19)  SpO2: 100% (16 Mar 2023 10:14) (85% - 100%)    Parameters below as of 16 Mar 2023 11:15  Patient On (Oxygen Delivery Method): tracheostomy collar    Daily     Daily     GENERAL:  No acute distress  HEENT:  NCAT, no scleral icterus  CHEST: no resp distress  HEART:  RRR  ABDOMEN:  Soft, non-tender, non-distended. PEG in place   EXTREMITIES:  No cyanosis, clubbing, or edema  SKIN:  No rash/erythema/ecchymoses   NEURO:  Alert and oriented x 3     LABS:                        7.0    13.30 )-----------( 544      ( 16 Mar 2023 06:15 )             22.6     Mean Cell Volume: 85.6 fL (03-16-23 @ 06:15)    03-16    128<L>  |  91<L>  |  16  ----------------------------<  92  4.1   |  26  |  0.85    Ca    8.6      16 Mar 2023 06:15  Phos  4.1     03-16  Mg     2.80     03-16        Imaging:  reviewed

## 2023-03-16 NOTE — PROGRESS NOTE ADULT - PROBLEM SELECTOR PLAN 7
MOLST in chart - DNR/DNI  Please see GOC  Please page for any questions, concerns or uncontrolled symptoms #[id number].  Pt in agreement for Hospice referral RONY in chart - DNR/DNI, Hospice referral (now inpatient appropriate)   Please see GOC attached (3/16)- No further artificial nutrition as patient declining endoscopy, therefore cannot use peg tube, declining further IV abx, IVF.

## 2023-03-16 NOTE — PROGRESS NOTE ADULT - SUBJECTIVE AND OBJECTIVE BOX
[facility] Division of Hospital Medicine  Mikijordyn NjDO  Available via MS Teams  In house pager [phone]    SUBJECTIVE / OVERNIGHT EVENTS:  Overnight patient with emesis and secretions from both peg and trach.  Made strictly NPO, no use of peg tube. Switched to IV pain medications.  This morning patient continues to say he does not want to pursue invasive measures such as endoscopy.  Family (2 cousins) at bedside - they are hoping for him to be made comfortable. The patient seems to agree, wishes to pursue inpatient hospice.    He spoke to the palliative care team - asserted that he did not want antibiotics, IV fluids, or tube feeds. He wishes to pursue inpatient hospice.    ADDITIONAL REVIEW OF SYSTEMS:    MEDICATIONS  (STANDING):  albuterol/ipratropium for Nebulization 3 milliLiter(s) Nebulizer every 12 hours  ferrous    sulfate Liquid 300 milliGRAM(s) Enteral Tube daily  gabapentin Solution 600 milliGRAM(s) Oral three times a day  heparin   Injectable 5000 Unit(s) SubCutaneous every 8 hours  influenza   Vaccine 0.5 milliLiter(s) IntraMuscular once  levothyroxine 50 MICROGram(s) Oral daily  LORazepam   Injectable 1 milliGRAM(s) IV Push <User Schedule>  methadone Injectable 7.5 milliGRAM(s) IV Push every 8 hours  nicotine - 21 mG/24Hr(s) Patch 1 Patch Transdermal daily  pantoprazole  Injectable 40 milliGRAM(s) IV Push two times a day  piperacillin/tazobactam IVPB.. 3.375 Gram(s) IV Intermittent every 8 hours  QUEtiapine 25 milliGRAM(s) Oral at bedtime  senna 2 Tablet(s) Oral at bedtime  vancomycin  IVPB 750 milliGRAM(s) IV Intermittent every 12 hours    MEDICATIONS  (PRN):  albuterol    90 MICROgram(s) HFA Inhaler 2 Puff(s) Inhalation every 6 hours PRN Bronchospasm  aluminum hydroxide/magnesium hydroxide/simethicone Suspension 30 milliLiter(s) Oral every 6 hours PRN Dyspepsia  bisacodyl Suppository 10 milliGRAM(s) Rectal daily PRN Constipation  HYDROmorphone  Injectable 1 milliGRAM(s) IV Push every 3 hours PRN Moderate Pain (4 - 6)  HYDROmorphone  Injectable 2 milliGRAM(s) IV Push every 3 hours PRN Severe Pain (7 - 10)  hydrOXYzine hydrochloride Syrup 25 milliGRAM(s) Oral every 6 hours PRN Anxiety  lidocaine   4% Patch 1 Patch Transdermal daily PRN hip/ back pain  ondansetron Injectable 4 milliGRAM(s) IV Push every 8 hours PRN Nausea and/or Vomiting      I&O's Summary    15 Mar 2023 07:01  -  16 Mar 2023 07:00  --------------------------------------------------------  IN: 2225 mL / OUT: 600 mL / NET: 1625 mL    16 Mar 2023 07:01  -  16 Mar 2023 13:40  --------------------------------------------------------  IN: 0 mL / OUT: 1 mL / NET: -1 mL        PHYSICAL EXAM:  Vital Signs Last 24 Hrs  T(C): 36.9 (16 Mar 2023 10:14), Max: 37.9 (15 Mar 2023 19:00)  T(F): 98.4 (16 Mar 2023 10:14), Max: 100.3 (15 Mar 2023 19:00)  HR: 95 (16 Mar 2023 10:14) (88 - 120)  BP: 106/68 (16 Mar 2023 10:14) (106/67 - 126/77)  BP(mean): --  RR: 17 (16 Mar 2023 10:14) (17 - 19)  SpO2: 100% (16 Mar 2023 10:14) (85% - 100%)    Parameters below as of 16 Mar 2023 11:15  Patient On (Oxygen Delivery Method): tracheostomy collar      CONSTITUTIONAL: NAD, well-developed, well-groomed, cachectic  EYES: PERRLA; conjunctiva and sclera clear  ENMT: Moist oral mucosa, no pharyngeal injection or exudates; normal dentition  NECK: Supple, no palpable masses; no thyromegaly, trach in place with secretions  RESPIRATORY: Normal respiratory effort; lungs are clear to auscultation bilaterally  CARDIOVASCULAR: Regular rate and rhythm, normal S1 and S2, no murmur/rub/gallop; No lower extremity edema; Peripheral pulses are 2+ bilaterally  ABDOMEN: Nontender to palpation, normoactive bowel sounds, no rebound/guarding; No hepatosplenomegaly; peg tube in place without surrounding drainage  MUSCULOSKELETAL:  Normal gait; no clubbing or cyanosis of digits; no joint swelling or tenderness to palpation  PSYCH: A+O to person, place, and time; affect appropriate  NEUROLOGY: CN 2-12 are intact and symmetric; no gross sensory deficits   SKIN: No rashes; no palpable lesions    LABS:                        7.0    13.30 )-----------( 544      ( 16 Mar 2023 06:15 )             22.6     03-16    128<L>  |  91<L>  |  16  ----------------------------<  92  4.1   |  26  |  0.85    Ca    8.6      16 Mar 2023 06:15  Phos  4.1     03-16  Mg     2.80     03-16                Culture - Sputum (collected 14 Mar 2023 19:50)  Source: .Sputum Sputum  Gram Stain (15 Mar 2023 07:21):    Numerous polymorphonuclear leukocytes per low power field    Rare Squamous epithelial cells per low power field    Moderate Gram Positive Rods seen per oil power field    Moderate Gram Negative Rods seen per oil power field  Preliminary Report (15 Mar 2023 19:53):    Normal Respiratory Kyara present    Culture - Blood (collected 14 Mar 2023 01:00)  Source: .Blood Blood  Preliminary Report (15 Mar 2023 07:01):    No growth to date.    Culture - Blood (collected 14 Mar 2023 00:50)  Source: .Blood Blood  Preliminary Report (15 Mar 2023 07:01):    No growth to date.      COVID-19 PCR: NotDetec (13 Mar 2023 12:15)  COVID-19 PCR: NotDetec (13 Mar 2023 04:53)  COVID-19 PCR: NotDetec (08 Mar 2023 19:09)  SARS-CoV-2: NotDetec (01 Mar 2023 15:00)  COVID-19 PCR: NotDetec (15 Feb 2023 18:47)  COVID-19 PCR: NotDetec (26 Sep 2022 10:41)

## 2023-03-16 NOTE — PROGRESS NOTE ADULT - PROBLEM SELECTOR PLAN 1
Pt unable to tolerate medication/feeds via PEG tube. All symptom medications transitioned to IV   - IV methadone 15mg TID  - IV Dilaudid 1mg Q3 PRN moderate pain, IV Dilaudid 2mg Q3 PRN severe pain  - Bowel regimen while on opioids  - PRN Narcan Pt unable to tolerate medication/feeds via PEG tube. All symptom medications transitioned to IV   - Transitioned 15mg PO methadone tid to 7.5mg IV methadone tid. Discussed with hospice team this afternoon regarding symptom medications. IV methadone is unable to be done at the [facility]. Plan to transition to dilaudid gtt tomorrow.   - IV Dilaudid 1mg Q3 PRN moderate pain, IV Dilaudid 2mg Q3 PRN severe pain  - Bowel regimen while on opioids  - PRN Narcan

## 2023-03-17 NOTE — PROGRESS NOTE ADULT - PROVIDER SPECIALTY LIST ADULT
ENT
Heme/Onc
Hospitalist
Hospitalist
Palliative Care
Thoracic Surgery
ENT
ENT
Gastroenterology
Gastroenterology
Heme/Onc
Hospitalist
ENT
Gastroenterology
Heme/Onc
Palliative Care
Palliative Care
CT Surgery
Heme/Onc
Heme/Onc
Hospitalist
SICU
Hospitalist
ENT
Hospitalist
Palliative Care
Palliative Care
Hospitalist
Palliative Care
Hospitalist
Hospitalist
Palliative Care
Hospitalist
Palliative Care
Hospitalist
Palliative Care

## 2023-03-17 NOTE — PROGRESS NOTE ADULT - PROBLEM SELECTOR PROBLEM 2
Pneumonia, aspiration
Lung cancer
Pneumonia, aspiration
Pneumonia, aspiration
Lung cancer
Lung cancer
Pneumonia, aspiration
Tracheostomy present
Lung cancer
Lung cancer
Pneumonia, aspiration
Pneumonia, aspiration
Lung cancer
Pneumonia, aspiration
Lung cancer
Pneumonia, aspiration
Lung cancer
Lung cancer
Pneumonia, aspiration

## 2023-03-17 NOTE — PROGRESS NOTE ADULT - PROBLEM SELECTOR PLAN 6
RONY in chart - DNR/DNI, Hospice referral (now inpatient appropriate)   Please see GOC attached (3/16)- No further artificial nutrition as patient declining endoscopy, therefore cannot use peg tube, declining further IV abx, IVF.  > 3/17: awaiting bed at inpatient Hospice

## 2023-03-17 NOTE — PROGRESS NOTE ADULT - ASSESSMENT
52M with metastatic lung cancer, HTN, anxiety, chronic pain on percocet, prior substance abuse who was admitted to [facility] on 2/10/23 due to difficulty swallowing. 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 Pt is s/p trach and PEG and s.p  palliative radiation. Patient is awaiting facility with plan for eventual hospice.

## 2023-03-17 NOTE — PROGRESS NOTE ADULT - SUBJECTIVE AND OBJECTIVE BOX
[facility] Geriatrics and Palliative Care  Laura Calixto Palliative Care Attending  Contact Info: Page [phone] (including Nights/Weekends), message on Microsoft Teams (Laura Calixto), or leave  at Palliative Office [phone] (non-urgent)     SUBJECTIVE AND OBJECTIVE: Patient seen this AM sitting up in bed, nodded that pain was controlled. Discussed transitioning IV methadone to IV dilaudid gtt and patient amenable.     Indication for Geriatrics and Palliative Care Services/INTERVAL HPI: sx management     OVERNIGHT EVENTS:  > 3/17: Over the past 24 hours, patient required PRNs of 2mg IV dilaudid x5, IV dilaudid 1mg x1.     DNR on chart:Yes  Yes      Allergies    No Known Allergies    Intolerances    MEDICATIONS  (STANDING):  albuterol/ipratropium for Nebulization 3 milliLiter(s) Nebulizer every 12 hours  ferrous    sulfate Liquid 300 milliGRAM(s) Enteral Tube daily  gabapentin Solution 600 milliGRAM(s) Oral three times a day  heparin   Injectable 5000 Unit(s) SubCutaneous every 8 hours  HYDROmorphone Infusion 1 mG/Hr (1 mL/Hr) IV Continuous <Continuous>  influenza   Vaccine 0.5 milliLiter(s) IntraMuscular once  levothyroxine Injectable 40 MICROGram(s) IV Push at bedtime  LORazepam   Injectable 1 milliGRAM(s) IV Push <User Schedule>  nicotine - 21 mG/24Hr(s) Patch 1 Patch Transdermal daily  pantoprazole  Injectable 40 milliGRAM(s) IV Push two times a day  QUEtiapine 25 milliGRAM(s) Oral at bedtime  senna 2 Tablet(s) Oral at bedtime    MEDICATIONS  (PRN):  albuterol    90 MICROgram(s) HFA Inhaler 2 Puff(s) Inhalation every 6 hours PRN Bronchospasm  aluminum hydroxide/magnesium hydroxide/simethicone Suspension 30 milliLiter(s) Oral every 6 hours PRN Dyspepsia  bisacodyl Suppository 10 milliGRAM(s) Rectal daily PRN Constipation  HYDROmorphone  Injectable 1 milliGRAM(s) IV Push every 3 hours PRN Moderate Pain (4 - 6)  HYDROmorphone  Injectable 2 milliGRAM(s) IV Push every 3 hours PRN Severe Pain (7 - 10)  hydrOXYzine hydrochloride Syrup 25 milliGRAM(s) Oral every 6 hours PRN Anxiety  lidocaine   4% Patch 1 Patch Transdermal daily PRN hip/ back pain  ondansetron Injectable 4 milliGRAM(s) IV Push every 8 hours PRN Nausea and/or Vomiting      ITEMS UNCHECKED ARE NOT PRESENT    PRESENT SYMPTOMS: [ ]Unable to self-report - see [ ] CPOT [ ] PAINADS [ ] RDOS  Source if other than patient:  [ ]Family   [ ]Team     Pain:  [x ]yes- improved  [ ]no  QOL impact - long hospital stay  Location - abdomen/back/trach              Aggravating factors - movement  Quality - sharp  Radiation - none  Timing- constant   Severity (0-10 scale): 7  Minimal acceptable level (0-10 scale)/pain goal: 0    CPOT:    https://[email or web address]/getattachment/[email or web address]/Critical-Care-Pain-Observation-Tool-(CPOT)    Dyspnea:                           [ ]Mild [ ]Moderate [ ]Severe  Anxiety:                             [ ]Mild [ ]Moderate [ ]Severe  Fatigue:                             [ ]Mild [ ]Moderate [ ]Severe  Nausea:                             [ ]Mild [ ]Moderate [ ]Severe  Loss of appetite:              [ ]Mild [ ]Moderate [ ]Severe  Constipation:                    [ ]Mild [ ]Moderate [ ]Severe  Other Symptoms:  [x ]All other review of systems negative     PCSSQ[Palliative Care Spiritual Screening Question]   Severity (0-10):  Score of 4 or > indicate consideration of Chaplaincy referral.  Chaplaincy Referral: [ ] yes [X ] refused [ ] following [ ] Deferred     Caregiver [place]? : [ ] yes [ ] no [ ] Deferred [X ] Declined             Social work referral [ ] Patient & Family Centered Care Referral [ ]     Anticipatory Grief present?:  [ ] yes [ ] no  [ X] Deferred                  Social work referral [ ] Chaplaincy Referral [ ]      PHYSICAL EXAM:  Vital Signs Last 24 Hrs  T(C): 36.9 (17 Mar 2023 09:30), Max: 37.2 (16 Mar 2023 14:00)  T(F): 98.4 (17 Mar 2023 09:30), Max: 99 (16 Mar 2023 14:00)  HR: 92 (17 Mar 2023 09:30) (92 - 114)  BP: 108/68 (17 Mar 2023 09:30) (108/68 - 126/83)  BP(mean): --  RR: 18 (17 Mar 2023 09:30) (18 - 18)  SpO2: 100% (17 Mar 2023 09:30) (96% - 100%)    Parameters below as of 17 Mar 2023 10:00  Patient On (Oxygen Delivery Method): tracheostomy collar     I&O's Summary    16 Mar 2023 07:01  -  17 Mar 2023 07:00  --------------------------------------------------------  IN: 400 mL / OUT: 301 mL / NET: 99 mL       GENERAL: Trach collar - pt communicates by writing or nodding head   [ x]Cachexia  [X ]Alert  [ X]Oriented x3   [X ]Lethargic  [ ]Unarousable  [ ]Verbal  [X ]Non-Verbal    Behavioral:   [ X] Anxiety  [ ] Delirium [ ] Agitation [ ] Other    HEENT:  [ ]Normal   [ ]Dry mouth   [ X]ET Tube/Trach  [ ]Oral lesions    PULMONARY:   [ ]Clear [ ]Tachypnea  [ ]Audible excessive secretions   [ ]Rhonchi        [ ]Right [ ]Left [ ]Bilateral  [ ]Crackles        [ ]Right [ ]Left [ ]Bilateral  [ ]Wheezing     [ ]Right [ ]Left [ ]Bilateral  [ X]Diminished breath sounds [ ]right [ ]left [ ]bilateral    CARDIOVASCULAR:    [ X]Regular [ ]Irregular [ ]Tachy  [ ]Faheem [ ]Murmur [ ]Other    GASTROINTESTINAL:  [X ]Soft  [ ]Distended   [ X]+BS  [ ]Non tender [ X]Tender  [ ]Other [ X]PEG- not in use due to bleeding [ ]OGT/ NGT  Last BM: 3/15    GENITOURINARY:  [X ]Normal [ ] Incontinent   [ ]Oliguria/Anuria   [ ]Wall    MUSCULOSKELETAL:   [ ]Normal   [ ]Weakness  [ X]Bed/Wheelchair bound [ ]Edema    NEUROLOGIC:   [X ]No focal deficits  [ ]Cognitive impairment  [ ]Dysphagia [ ]Dysarthria [ ]Paresis [ ]Other     SKIN:   [ ]Normal  [ ]Rash  [ ]Other  [X ]Pressure ulcer(s)       Present on admission [ ]y [ X]n    LABS:                        7.0    13.30 )-----------( 544      ( 16 Mar 2023 06:15 )             22.6   03-16    128<L>  |  91<L>  |  16  ----------------------------<  92  4.1   |  26  |  0.85    Ca    8.6      16 Mar 2023 06:15  Phos  4.1     03-16  Mg     2.80     03-16    RADIOLOGY & ADDITIONAL STUDIES: n/a    Protein Calorie Malnutrition Present: [ ]mild [ ]moderate [ x]severe [ ]underweight [ ]morbid obesity  https://www.andeal.org/vault/2440/web/files/ONC/Table_Clinical%20Characteristics%20to%20Document%20Malnutrition-White%20JV%20et%20al%202012.pdf    Height (cm): 182.9 (02-16-23 @ 06:00), 170.2 (02-10-23 @ 08:52), 182.9 (11-21-22 @ 14:45)  Weight (kg): 52.2 (02-16-23 @ 06:00), 56.2 (02-10-23 @ 08:52), 61.235 (11-21-22 @ 17:08)  BMI (kg/m2): 15.6 (02-16-23 @ 06:00), 19.4 (02-10-23 @ 08:52), 18.3 (11-21-22 @ 17:08)    [ ]PPSV2 < or = 30%  [ ]significant weight loss [ ]poor nutritional intake [ ]anasarca[ ]Artificial Nutrition    Other REFERRALS:  [ x]Hospice  [ ]Child Life  [ ]Social Work  [ ]Case management [ ]Holistic Therapy

## 2023-03-17 NOTE — PROGRESS NOTE ADULT - PROBLEM SELECTOR PLAN 1
Pt unable to tolerate medication/feeds via PEG tube. All symptom medications transitioned to IV   - Transitioned 7.5mg IV methadone tid to continuous dilaudid gtt @1mg.   - IV Dilaudid 1mg Q3 PRN moderate pain, IV Dilaudid 2mg Q3 PRN severe pain  - Bowel regimen while on opioids  - PRN Narcan

## 2023-03-17 NOTE — DISCHARGE NOTE NURSING/CASE MANAGEMENT/SOCIAL WORK - NSDCPEEMAIL_GEN_ALL_CORE
[facility] email [email or web address]

## 2023-03-17 NOTE — PROGRESS NOTE ADULT - PROBLEM SELECTOR PROBLEM 1
Dysphagia
Neoplasm related pain
Dysphagia
Neoplasm related pain
Dysphagia
Dysphagia
Vocal cord paralysis
Dysphagia
Dysphagia
Neoplasm related pain
Tracheostomy present
Dysphagia
Neoplasm related pain
Dysphagia
Neoplasm related pain
Dysphagia
Neoplasm related pain
Dysphagia
Dysphagia
Neoplasm related pain
Dysphagia
Neoplasm related pain
Dysphagia
Neoplasm related pain
Dysphagia

## 2023-03-17 NOTE — CHART NOTE - NSCHARTNOTEFT_GEN_A_CORE
Patient declining EGD and further GI work up, plan for transfer to inpatient hospice today. GI will sign off, please call back if needed.    Candelaria Kwon, PGY5  Gastroenterology/Hepatology Fellow  Available on Microsoft Teams  [phone] ([employer] Short Range Pager)  [phone] (Long Range Pager)    After 5pm, please contact the on-call GI fellow. [phone]

## 2023-03-17 NOTE — CHART NOTE - NSCHARTNOTESELECT_GEN_ALL_CORE
ENT/Event Note
Event Note
Event Note
GI/Event Note
Nutrition Services
Nutrition Services
palliative care/Event Note
Abd pain/Event Note
ENT/Event Note
Event Note
I-stop/Event Note
Nutrition Follow-up Note/Nutrition Services
Nutrition Services
Nutrition Services
Rad Onc/Event Note
Thoracic Surgery/Event Note
Thoracic Surgery/Event Note
Trach dislodgement/Event Note
palliative care/Event Note
rad onc/Event Note

## 2023-03-17 NOTE — DISCHARGE NOTE NURSING/CASE MANAGEMENT/SOCIAL WORK - NSDCPEFALRISK_GEN_ALL_CORE
For information on Fall & Injury Prevention, visit: [email or web address] OR  [email or web address] OR  https://www.cdc.gov/steadi/patient.html

## 2023-03-17 NOTE — PROGRESS NOTE ADULT - REASON FOR ADMISSION
Palliative Radiation

## 2023-03-17 NOTE — PROGRESS NOTE ADULT - PROBLEM SELECTOR PLAN 5
Progressive NSCLC with disease progression in the neck causing stridor and dysphagia  - palliative radiation completed today  - per hem/onc at [place] pt no longer candidate for DMT  - Masses progressing on CTAP 3/3    Choosing to pursue inpatient hospice.

## 2023-03-17 NOTE — DISCHARGE NOTE NURSING/CASE MANAGEMENT/SOCIAL WORK - PATIENT PORTAL LINK FT
You can access the FollowMyHealth Patient Portal offered by [facility] by registering at the following website: [email or web address]. By joining [employer]’s FollowMyHealth portal, you will also be able to view your health information using other applications (apps) compatible with our system.

## 2023-03-17 NOTE — PROGRESS NOTE ADULT - PROBLEM SELECTOR PLAN 2
S/P 4 cycles of single agent palliative Keytruda/ immunotherapy - had positive response, patient did not return for follow-up. Several weeks ago returned to office weeks ago with progressive disease restarted on treatment with Keytruda AND concurrent chemotherapy carboplatin and Alimta. Had progressive disease in the neck and had seen radiation therapy in consultation  - Transferred to [facility] for palliative RT to neck  - Trach placed 2/15 for airway protection & PEG placed 2/16 for nutrition/medication administration   - 2/28: Patient would like to transition care to hospice at nursing home.  - 3/16: Pt now inpt appropriate - hospice referral sent to [facility].

## 2023-03-17 NOTE — PROGRESS NOTE ADULT - PROBLEM SELECTOR PLAN 8
Dvt ppx: Lovenox QD, hypercoagulable state d/t malignancy  Pt is DNR/DNI  Cousins- OK to share information per patient  Padma: [phone]  Haylee: [phone]    Patient chooses to pursue inpatient hospice at this time, accepted to [facility] today.  No longer tolerating tube feedings and declines them at this time, declining endoscopic intervention, declining use of IV antibiotics or IV fluids. Requiring IV opioids.

## 2023-03-17 NOTE — PROGRESS NOTE ADULT - SUBJECTIVE AND OBJECTIVE BOX
[facility] Division of Hospital Medicine  Miki NjDO  Available via MS Teams  In house pager [phone]    SUBJECTIVE / OVERNIGHT EVENTS:  No acute events overnight.  Patient asking about pain meds and ativan.   Aware he is going to inpatient hospice today and agrees with plan. Ok with iv dilaudid gtt.    ADDITIONAL REVIEW OF SYSTEMS:    MEDICATIONS  (STANDING):  albuterol/ipratropium for Nebulization 3 milliLiter(s) Nebulizer every 12 hours  ferrous    sulfate Liquid 300 milliGRAM(s) Enteral Tube daily  gabapentin Solution 600 milliGRAM(s) Oral three times a day  heparin   Injectable 5000 Unit(s) SubCutaneous every 8 hours  HYDROmorphone Infusion 1 mG/Hr (1 mL/Hr) IV Continuous <Continuous>  influenza   Vaccine 0.5 milliLiter(s) IntraMuscular once  levothyroxine Injectable 40 MICROGram(s) IV Push at bedtime  LORazepam   Injectable 1 milliGRAM(s) IV Push <User Schedule>  nicotine - 21 mG/24Hr(s) Patch 1 Patch Transdermal daily  pantoprazole  Injectable 40 milliGRAM(s) IV Push two times a day  QUEtiapine 25 milliGRAM(s) Oral at bedtime  senna 2 Tablet(s) Oral at bedtime    MEDICATIONS  (PRN):  albuterol    90 MICROgram(s) HFA Inhaler 2 Puff(s) Inhalation every 6 hours PRN Bronchospasm  aluminum hydroxide/magnesium hydroxide/simethicone Suspension 30 milliLiter(s) Oral every 6 hours PRN Dyspepsia  bisacodyl Suppository 10 milliGRAM(s) Rectal daily PRN Constipation  HYDROmorphone  Injectable 1 milliGRAM(s) IV Push every 3 hours PRN Moderate Pain (4 - 6)  HYDROmorphone  Injectable 2 milliGRAM(s) IV Push every 3 hours PRN Severe Pain (7 - 10)  hydrOXYzine hydrochloride Syrup 25 milliGRAM(s) Oral every 6 hours PRN Anxiety  lidocaine   4% Patch 1 Patch Transdermal daily PRN hip/ back pain  ondansetron Injectable 4 milliGRAM(s) IV Push every 8 hours PRN Nausea and/or Vomiting      I&O's Summary    16 Mar 2023 07:01  -  17 Mar 2023 07:00  --------------------------------------------------------  IN: 400 mL / OUT: 301 mL / NET: 99 mL        PHYSICAL EXAM:  Vital Signs Last 24 Hrs  T(C): 36.9 (17 Mar 2023 09:30), Max: 37.2 (16 Mar 2023 14:00)  T(F): 98.4 (17 Mar 2023 09:30), Max: 99 (16 Mar 2023 14:00)  HR: 92 (17 Mar 2023 09:30) (92 - 114)  BP: 108/68 (17 Mar 2023 09:30) (108/68 - 126/83)  BP(mean): --  RR: 18 (17 Mar 2023 09:30) (18 - 18)  SpO2: 100% (17 Mar 2023 09:30) (96% - 100%)    Parameters below as of 17 Mar 2023 10:00  Patient On (Oxygen Delivery Method): tracheostomy collar      CONSTITUTIONAL: NAD, well-developed, well-groomed, cachectic  EYES: PERRLA; conjunctiva and sclera clear  ENMT: Moist oral mucosa, no pharyngeal injection or exudates; normal dentition  NECK: Supple, no palpable masses; no thyromegaly; trach in place with overlying trach collar with mucous secretions  RESPIRATORY: Normal respiratory effort; lungs are clear to auscultation bilaterally  CARDIOVASCULAR: Regular rate and rhythm, normal S1 and S2, no murmur/rub/gallop; No lower extremity edema; Peripheral pulses are 2+ bilaterally  ABDOMEN: Nontender to palpation, normoactive bowel sounds, no rebound/guarding; No hepatosplenomegaly; PEG tube in place  MUSCULOSKELETAL: no clubbing or cyanosis of digits; no joint swelling or tenderness to palpation  PSYCH: A+O to person, place, and time; affect appropriate  NEUROLOGY: CN 2-12 are intact and symmetric; no gross sensory deficits   SKIN: No rashes; no palpable lesions    LABS:                        7.0    13.30 )-----------( 544      ( 16 Mar 2023 06:15 )             22.6     03-16    128<L>  |  91<L>  |  16  ----------------------------<  92  4.1   |  26  |  0.85    Ca    8.6      16 Mar 2023 06:15  Phos  4.1     03-16  Mg     2.80     03-16                Culture - Sputum (collected 14 Mar 2023 19:50)  Source: .Sputum Sputum  Gram Stain (15 Mar 2023 07:21):    Numerous polymorphonuclear leukocytes per low power field    Rare Squamous epithelial cells per low power field    Moderate Gram Positive Rods seen per oil power field    Moderate Gram Negative Rods seen per oil power field  Final Report (16 Mar 2023 16:21):    Normal Respiratory Kyara present      COVID-19 PCR: NotDetec (13 Mar 2023 12:15)  COVID-19 PCR: NotDetec (13 Mar 2023 04:53)  COVID-19 PCR: NotDetec (08 Mar 2023 19:09)  SARS-CoV-2: NotDetec (01 Mar 2023 15:00)  COVID-19 PCR: NotDetec (15 Feb 2023 18:47)  COVID-19 PCR: NotDetec (26 Sep 2022 10:41)        COMMUNICATION:  Care Discussed with Consultants/Other Providers and Details of Discussion: d/w medicine PA, palliative care attending, social work

## 2023-03-17 NOTE — PROGRESS NOTE ADULT - NUTRITIONAL ASSESSMENT
This patient has been assessed with a concern for Malnutrition and has been determined to have a diagnosis/diagnoses of Severe protein-calorie malnutrition and Underweight (BMI < 19).    This patient is being managed with:   Diet NPO with Tube Feed-  Tube Feeding Modality: Gastrostomy  Jevity 1.5 Jonathan (JEVITY1.5RTH)  Continuous  Starting Tube Feed Rate {mL per Hour}: 10  Increase Tube Feed Rate by (mL): 10     Every 6 hours  Until Goal Tube Feed Rate (mL per Hour): 50  Tube Feed Duration (in Hours): 24  Tube Feed Start Time: 12:00  Entered: Feb 17 2023 11:58AM    
This patient has been assessed with a concern for Malnutrition and has been determined to have a diagnosis/diagnoses of Severe protein-calorie malnutrition and Underweight (BMI < 19).    This patient is being managed with:   Diet NPO with Tube Feed-  Tube Feeding Modality: Gastrostomy  Jevity 1.5 Jonathan (JEVITY1.5RTH)  Continuous  Starting Tube Feed Rate {mL per Hour}: 10  Increase Tube Feed Rate by (mL): 10     Every 6 hours  Until Goal Tube Feed Rate (mL per Hour): 50  Tube Feed Duration (in Hours): 24  Tube Feed Start Time: 12:00  Entered: Feb 17 2023 11:58AM    
This patient has been assessed with a concern for Malnutrition and has been determined to have a diagnosis/diagnoses of Severe protein-calorie malnutrition and Underweight (BMI < 19).    This patient is being managed with:   Diet NPO with Tube Feed-  Tube Feeding Modality: Gastrostomy  TwoCal HN (TWOCALHNRTH)  Total Volume for 24 Hours (mL): 840  Bolus  Total Volume of Bolus (mL):  210  Total # of Feeds: 4  Tube Feed Frequency: Every 6 hours   Tube Feed Start Time: 10:42  Bolus Feed Rate (mL per Hour): 210   Bolus Feed Duration (in Hours): 0  Free Water Flush  Bolus   Total Volume per Flush (mL): 100   Frequency: Every 6 Hours   Total Daily Volume of Flush (mL): 400    Start Time: 15:00  Entered: Mar 10 2023  4:00PM    
This patient has been assessed with a concern for Malnutrition and has been determined to have a diagnosis/diagnoses of Severe protein-calorie malnutrition and Underweight (BMI < 19).    This patient is being managed with:   Diet NPO with Tube Feed-  Tube Feeding Modality: Gastrostomy  TwoCal HN (TWOCALHNRTH)  Total Volume for 24 Hours (mL): 840  Bolus  Total Volume of Bolus (mL):  210  Total # of Feeds: 4  Tube Feed Frequency: Every 6 hours   Tube Feed Start Time: 15:00  Bolus Feed Rate (mL per Hour): 210   Bolus Feed Duration (in Hours): 4  Free Water Flush  Bolus   Total Volume per Flush (mL): 588   Frequency: Every 6 Hours   Total Daily Volume of Flush (mL): 588    Start Time: 15:00  Entered: Feb 23 2023  2:19PM    
This patient has been assessed with a concern for Malnutrition and has been determined to have a diagnosis/diagnoses of Severe protein-calorie malnutrition and Underweight (BMI < 19).    This patient is being managed with:   Diet NPO with Tube Feed-  Tube Feeding Modality: Gastrostomy  TwoCal HN (TWOCALHNRTH)  Total Volume for 24 Hours (mL): 840  Bolus  Total Volume of Bolus (mL):  210  Total # of Feeds: 4  Tube Feed Frequency: Every 6 hours   Tube Feed Start Time: 10:42  Bolus Feed Rate (mL per Hour): 210   Bolus Feed Duration (in Hours): 0  Free Water Flush  Bolus   Total Volume per Flush (mL): 588   Frequency: Every 6 Hours   Total Daily Volume of Flush (mL): 588    Start Time: 15:00  Entered: Feb 28 2023 10:42AM    
This patient has been assessed with a concern for Malnutrition and has been determined to have a diagnosis/diagnoses of Severe protein-calorie malnutrition and Underweight (BMI < 19).    This patient is being managed with:   Diet NPO with Tube Feed-  Tube Feeding Modality: Gastrostomy  TwoCal HN (TWOCALHNRTH)  Total Volume for 24 Hours (mL): 840  Bolus  Total Volume of Bolus (mL):  210  Total # of Feeds: 4  Tube Feed Frequency: Every 6 hours   Tube Feed Start Time: 15:00  Bolus Feed Rate (mL per Hour): 210   Bolus Feed Duration (in Hours): 4  Free Water Flush  Bolus   Total Volume per Flush (mL): 588   Frequency: Every 6 Hours   Total Daily Volume of Flush (mL): 588    Start Time: 15:00  Entered: Feb 23 2023  2:19PM    
This patient has been assessed with a concern for Malnutrition and has been determined to have a diagnosis/diagnoses of Severe protein-calorie malnutrition and Underweight (BMI < 19).    This patient is being managed with:   Diet NPO with Tube Feed-  Tube Feeding Modality: Gastrostomy  Jevity 1.5 Jonathan (JEVITY1.5RTH)  Continuous  Starting Tube Feed Rate {mL per Hour}: 10  Increase Tube Feed Rate by (mL): 10     Every 6 hours  Until Goal Tube Feed Rate (mL per Hour): 50  Tube Feed Duration (in Hours): 24  Tube Feed Start Time: 12:00  Entered: Feb 17 2023 11:58AM    
This patient has been assessed with a concern for Malnutrition and has been determined to have a diagnosis/diagnoses of Severe protein-calorie malnutrition and Underweight (BMI < 19).    This patient is being managed with:   Diet NPO with Tube Feed-  Tube Feeding Modality: Gastrostomy  TwoCal HN (TWOCALHNRTH)  Total Volume for 24 Hours (mL): 840  Bolus  Total Volume of Bolus (mL):  210  Total # of Feeds: 4  Tube Feed Frequency: Every 6 hours   Tube Feed Start Time: 15:00  Bolus Feed Rate (mL per Hour): 210   Bolus Feed Duration (in Hours): 4  Free Water Flush  Bolus   Total Volume per Flush (mL): 588   Frequency: Every 6 Hours   Total Daily Volume of Flush (mL): 588    Start Time: 15:00  Entered: Feb 23 2023  2:19PM    
This patient has been assessed with a concern for Malnutrition and has been determined to have a diagnosis/diagnoses of Severe protein-calorie malnutrition and Underweight (BMI < 19).    This patient is being managed with:   Diet NPO-  Entered: Feb 14 2023  1:28PM    
This patient has been assessed with a concern for Malnutrition and has been determined to have a diagnosis/diagnoses of Severe protein-calorie malnutrition and Underweight (BMI < 19).    This patient is being managed with:   Diet NPO with Tube Feed-  Tube Feeding Modality: Gastrostomy  TwoCal HN (TWOCALHNRTH)  Total Volume for 24 Hours (mL): 840  Bolus  Total Volume of Bolus (mL):  210  Total # of Feeds: 4  Tube Feed Frequency: Every 6 hours   Tube Feed Start Time: 10:42  Bolus Feed Rate (mL per Hour): 210   Bolus Feed Duration (in Hours): 0  Free Water Flush  Bolus   Total Volume per Flush (mL): 588   Frequency: Every 6 Hours   Total Daily Volume of Flush (mL): 588    Start Time: 15:00  Entered: Feb 28 2023 10:42AM    
This patient has been assessed with a concern for Malnutrition and has been determined to have a diagnosis/diagnoses of Severe protein-calorie malnutrition and Underweight (BMI < 19).    This patient is being managed with:   Diet NPO-  Entered: Mar 15 2023 10:54PM    
This patient has been assessed with a concern for Malnutrition and has been determined to have a diagnosis/diagnoses of Severe protein-calorie malnutrition and Underweight (BMI < 19).    This patient is being managed with:   Diet NPO with Tube Feed-  Tube Feeding Modality: Gastrostomy  TwoCal HN (TWOCALHNRTH)  Total Volume for 24 Hours (mL): 840  Bolus  Total Volume of Bolus (mL):  210  Total # of Feeds: 4  Tube Feed Frequency: Every 6 hours   Tube Feed Start Time: 15:00  Bolus Feed Rate (mL per Hour): 210   Bolus Feed Duration (in Hours): 4  Free Water Flush  Bolus   Total Volume per Flush (mL): 588   Frequency: Every 6 Hours   Total Daily Volume of Flush (mL): 588    Start Time: 15:00  Entered: Feb 23 2023  2:19PM    
This patient has been assessed with a concern for Malnutrition and has been determined to have a diagnosis/diagnoses of Severe protein-calorie malnutrition and Underweight (BMI < 19).    This patient is being managed with:   Diet NPO with Tube Feed-  Tube Feeding Modality: Gastrostomy  Jevity 1.5 Jonathan (JEVITY1.5RTH)  Continuous  Starting Tube Feed Rate {mL per Hour}: 10  Increase Tube Feed Rate by (mL): 10     Every 6 hours  Until Goal Tube Feed Rate (mL per Hour): 50  Tube Feed Duration (in Hours): 24  Tube Feed Start Time: 12:00  Entered: Feb 17 2023 11:58AM    
This patient has been assessed with a concern for Malnutrition and has been determined to have a diagnosis/diagnoses of Severe protein-calorie malnutrition and Underweight (BMI < 19).    This patient is being managed with:   Diet NPO with Tube Feed-  Tube Feeding Modality: Gastrostomy  TwoCal HN (TWOCALHNRTH)  Total Volume for 24 Hours (mL): 840  Bolus  Total Volume of Bolus (mL):  210  Total # of Feeds: 4  Tube Feed Frequency: Every 6 hours   Tube Feed Start Time: 10:42  Bolus Feed Rate (mL per Hour): 210   Bolus Feed Duration (in Hours): 0  Free Water Flush  Bolus   Total Volume per Flush (mL): 588   Frequency: Every 6 Hours   Total Daily Volume of Flush (mL): 588    Start Time: 15:00  Entered: Feb 28 2023 10:42AM    
This patient has been assessed with a concern for Malnutrition and has been determined to have a diagnosis/diagnoses of Severe protein-calorie malnutrition and Underweight (BMI < 19).    This patient is being managed with:   Diet NPO with Tube Feed-  Tube Feeding Modality: Gastrostomy  Jevity 1.5 Jonathan (JEVITY1.5RTH)  Continuous  Starting Tube Feed Rate {mL per Hour}: 10  Increase Tube Feed Rate by (mL): 10     Every 6 hours  Until Goal Tube Feed Rate (mL per Hour): 50  Tube Feed Duration (in Hours): 24  Tube Feed Start Time: 12:00  Entered: Feb 17 2023 11:58AM    
This patient has been assessed with a concern for Malnutrition and has been determined to have a diagnosis/diagnoses of Severe protein-calorie malnutrition and Underweight (BMI < 19).    This patient is being managed with:   Diet NPO with Tube Feed-  Tube Feeding Modality: Gastrostomy  TwoCal HN (TWOCALHNRTH)  Total Volume for 24 Hours (mL): 840  Bolus  Total Volume of Bolus (mL):  210  Total # of Feeds: 4  Tube Feed Frequency: Every 6 hours   Tube Feed Start Time: 10:42  Bolus Feed Rate (mL per Hour): 210   Bolus Feed Duration (in Hours): 0  Free Water Flush  Bolus   Total Volume per Flush (mL): 588   Frequency: Every 6 Hours   Total Daily Volume of Flush (mL): 588    Start Time: 15:00  Entered: Feb 28 2023 10:42AM    
This patient has been assessed with a concern for Malnutrition and has been determined to have a diagnosis/diagnoses of Severe protein-calorie malnutrition and Underweight (BMI < 19).    This patient is being managed with:   Diet NPO with Tube Feed-  Tube Feeding Modality: Gastrostomy  TwoCal HN (TWOCALHNRTH)  Total Volume for 24 Hours (mL): 840  Bolus  Total Volume of Bolus (mL):  210  Total # of Feeds: 4  Tube Feed Frequency: Every 6 hours   Tube Feed Start Time: 10:42  Bolus Feed Rate (mL per Hour): 210   Bolus Feed Duration (in Hours): 0  Free Water Flush  Bolus   Total Volume per Flush (mL): 588   Frequency: Every 6 Hours   Total Daily Volume of Flush (mL): 588    Start Time: 15:00  Entered: Feb 28 2023 10:42AM    
This patient has been assessed with a concern for Malnutrition and has been determined to have a diagnosis/diagnoses of Severe protein-calorie malnutrition and Underweight (BMI < 19).    This patient is being managed with:   Diet NPO-  Entered: Feb 14 2023  1:28PM    
This patient has been assessed with a concern for Malnutrition and has been determined to have a diagnosis/diagnoses of Severe protein-calorie malnutrition and Underweight (BMI < 19).    This patient is being managed with:   Diet NPO with Tube Feed-  Tube Feeding Modality: Gastrostomy  TwoCal HN (TWOCALHNRTH)  Total Volume for 24 Hours (mL): 840  Bolus  Total Volume of Bolus (mL):  210  Total # of Feeds: 4  Tube Feed Frequency: Every 6 hours   Tube Feed Start Time: 15:00  Bolus Feed Rate (mL per Hour): 210   Bolus Feed Duration (in Hours): 4  Free Water Flush  Bolus   Total Volume per Flush (mL): 588   Frequency: Every 6 Hours   Total Daily Volume of Flush (mL): 588    Start Time: 15:00  Entered: Feb 23 2023  2:19PM    
This patient has been assessed with a concern for Malnutrition and has been determined to have a diagnosis/diagnoses of Severe protein-calorie malnutrition and Underweight (BMI < 19).    This patient is being managed with:   Diet NPO with Tube Feed-  Tube Feeding Modality: Gastrostomy  TwoCal HN (TWOCALHNRTH)  Total Volume for 24 Hours (mL): 840  Bolus  Total Volume of Bolus (mL):  210  Total # of Feeds: 4  Tube Feed Frequency: Every 6 hours   Tube Feed Start Time: 10:42  Bolus Feed Rate (mL per Hour): 210   Bolus Feed Duration (in Hours): 0  Free Water Flush  Bolus   Total Volume per Flush (mL): 588   Frequency: Every 6 Hours   Total Daily Volume of Flush (mL): 588    Start Time: 15:00  Entered: Feb 28 2023 10:42AM    
This patient has been assessed with a concern for Malnutrition and has been determined to have a diagnosis/diagnoses of Severe protein-calorie malnutrition and Underweight (BMI < 19).    This patient is being managed with:   Diet NPO with Tube Feed-  Tube Feeding Modality: Gastrostomy  TwoCal HN (TWOCALHNRTH)  Total Volume for 24 Hours (mL): 840  Continuous  Starting Tube Feed Rate {mL per Hour}: 15  Increase Tube Feed Rate by (mL): 10     Every 6 hours  Until Goal Tube Feed Rate (mL per Hour): 35  Tube Feed Duration (in Hours): 24  Tube Feed Start Time: 12:00  Entered: Feb 21 2023  6:19PM    
This patient has been assessed with a concern for Malnutrition and has been determined to have a diagnosis/diagnoses of Severe protein-calorie malnutrition and Underweight (BMI < 19).    This patient is being managed with:   Diet NPO with Tube Feed-  Tube Feeding Modality: Gastrostomy  TwoCal HN (TWOCALHNRTH)  Total Volume for 24 Hours (mL): 840  Bolus  Total Volume of Bolus (mL):  210  Total # of Feeds: 4  Tube Feed Frequency: Every 6 hours   Tube Feed Start Time: 10:42  Bolus Feed Rate (mL per Hour): 210   Bolus Feed Duration (in Hours): 0  Free Water Flush  Bolus   Total Volume per Flush (mL): 100   Frequency: Every 6 Hours   Total Daily Volume of Flush (mL): 400    Start Time: 15:00  Entered: Mar 10 2023  4:00PM    
This patient has been assessed with a concern for Malnutrition and has been determined to have a diagnosis/diagnoses of Severe protein-calorie malnutrition and Underweight (BMI < 19).    This patient is being managed with:   Diet NPO-  Except Medications  Entered: Mar 13 2023  3:03PM    
This patient has been assessed with a concern for Malnutrition and has been determined to have a diagnosis/diagnoses of Severe protein-calorie malnutrition and Underweight (BMI < 19).    This patient is being managed with:   Diet NPO with Tube Feed-  Tube Feeding Modality: Gastrostomy  TwoCal HN (TWOCALHNRTH)  Total Volume for 24 Hours (mL): 840  Bolus  Total Volume of Bolus (mL):  210  Total # of Feeds: 4  Tube Feed Frequency: Every 6 hours   Tube Feed Start Time: 10:42  Bolus Feed Rate (mL per Hour): 210   Bolus Feed Duration (in Hours): 0  Free Water Flush  Bolus   Total Volume per Flush (mL): 588   Frequency: Every 6 Hours   Total Daily Volume of Flush (mL): 588    Start Time: 15:00  Entered: Feb 28 2023 10:42AM    
This patient has been assessed with a concern for Malnutrition and has been determined to have a diagnosis/diagnoses of Severe protein-calorie malnutrition and Underweight (BMI < 19).    This patient is being managed with:   Diet NPO with Tube Feed-  Tube Feeding Modality: Gastrostomy  TwoCal HN (TWOCALHNRTH)  Total Volume for 24 Hours (mL): 840  Bolus  Total Volume of Bolus (mL):  210  Total # of Feeds: 4  Tube Feed Frequency: Every 6 hours   Tube Feed Start Time: 10:42  Bolus Feed Rate (mL per Hour): 210   Bolus Feed Duration (in Hours): 0  Free Water Flush  Bolus   Total Volume per Flush (mL): 588   Frequency: Every 6 Hours   Total Daily Volume of Flush (mL): 588    Start Time: 15:00  Entered: Feb 28 2023 10:42AM    
This patient has been assessed with a concern for Malnutrition and has been determined to have a diagnosis/diagnoses of Severe protein-calorie malnutrition and Underweight (BMI < 19).    This patient is being managed with:   Diet NPO with Tube Feed-  Tube Feeding Modality: Gastrostomy  TwoCal HN (TWOCALHNRTH)  Total Volume for 24 Hours (mL): 840  Continuous  Starting Tube Feed Rate {mL per Hour}: 15  Increase Tube Feed Rate by (mL): 10     Every 6 hours  Until Goal Tube Feed Rate (mL per Hour): 35  Tube Feed Duration (in Hours): 24  Tube Feed Start Time: 12:00  Entered: Feb 21 2023  6:19PM    
This patient has been assessed with a concern for Malnutrition and has been determined to have a diagnosis/diagnoses of Severe protein-calorie malnutrition and Underweight (BMI < 19).    This patient is being managed with:   Diet NPO-  Entered: Mar 15 2023 10:54PM    
This patient has been assessed with a concern for Malnutrition and has been determined to have a diagnosis/diagnoses of Severe protein-calorie malnutrition and Underweight (BMI < 19).    This patient is being managed with:   Diet NPO with Tube Feed-  Tube Feeding Modality: Gastrostomy  TwoCal HN (TWOCALHNRTH)  Total Volume for 24 Hours (mL): 840  Bolus  Total Volume of Bolus (mL):  210  Total # of Feeds: 4  Tube Feed Frequency: Every 6 hours   Tube Feed Start Time: 10:42  Bolus Feed Rate (mL per Hour): 210   Bolus Feed Duration (in Hours): 0  Free Water Flush  Bolus   Total Volume per Flush (mL): 100   Frequency: Every 6 Hours   Total Daily Volume of Flush (mL): 400    Start Time: 15:00  Entered: Mar 10 2023  4:00PM    
This patient has been assessed with a concern for Malnutrition and has been determined to have a diagnosis/diagnoses of Severe protein-calorie malnutrition and Underweight (BMI < 19).    This patient is being managed with:   Diet NPO with Tube Feed-  Tube Feeding Modality: Gastrostomy  TwoCal HN (TWOCALHNRTH)  Total Volume for 24 Hours (mL): 840  Bolus  Total Volume of Bolus (mL):  210  Total # of Feeds: 4  Tube Feed Frequency: Every 6 hours   Tube Feed Start Time: 10:42  Bolus Feed Rate (mL per Hour): 210   Bolus Feed Duration (in Hours): 0  Free Water Flush  Bolus   Total Volume per Flush (mL): 588   Frequency: Every 6 Hours   Total Daily Volume of Flush (mL): 588    Start Time: 15:00  Entered: Feb 28 2023 10:42AM    
This patient has been assessed with a concern for Malnutrition and has been determined to have a diagnosis/diagnoses of Severe protein-calorie malnutrition and Underweight (BMI < 19).    This patient is being managed with:   Diet NPO-  Except Medications  Entered: Mar 13 2023  3:03PM

## 2023-03-17 NOTE — DISCHARGE NOTE NURSING/CASE MANAGEMENT/SOCIAL WORK - NSDCPEWEB_GEN_ALL_CORE
[employer] for Tobacco Control website --- [email or web address] --- [email or web address]

## 2023-03-17 NOTE — PROGRESS NOTE ADULT - ASSESSMENT
incomplete note   transitioned methadone to iv dilaudid gtt  possible transition to hospice inn today  52 year old male w/ a PMHX of stage IV lung CA, HTN, anxiety on Xanax, chronic pain on Percocet, and substance abuse was admitted to [facility] on 2/10/23 due to difficulty swallowing. Pt has had difficulty swallowing for th past week. Pt states it has been worsening since, states he is now unable to swallow pills or food. 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 Pt also developed stridor for the past week. Pt is transferred to [facility] for Palliative Radiation. Palliative care consulted for pain management.

## 2023-04-26 NOTE — PROGRESS NOTE ADULT - ASSESSMENT
51 y/o M presents with hemoptysis     Hemoptysis & Dyspnea  Acute hypoxemic respiratory distress secondary to CAP vs. Pneumonitis / alveolar hemorrhage  NSMLC Adenocarcinoma   - CTA: No pulmonary embolus; Approximately 7.7 x 4.6 cm left upper lobe mass is stable; Increased interlobular septal thickening, patchy groundglass and solid opacities within left upper lobe and left lower lobe which can represent pneumonia, pneumonitis/alveolar hemorrhage  - Lactate 3.2-->2.2-->1.0  - Continue supplemental O2 to maintain SpO2 > 92%   - s/p Zosyn & Vanco in the ER; Continue Zosyn, Started on Azithromycin by ID   - Continue Albuterol PRN, Symbicort, & Spiriva  - Tessalon Pearle PRN   - ID consult - Dr. Archibald   - Pulmonology consult - Dr. Akbar   - Oncology consult- Dr. Trammell     Acute EtOH Withdrawal  - Drinks 2 pints of Vodka Daily, last drink 9/14/22 at 6am  - Ativan Taper/CIWA Protocol  - SW Consult  - May require IP Substance Abuse REhab Prior to Cancer Tx.  - HOLD Xanax while on Ativan Taper   - Thiamine, Folate, Multivitamin     Electrolyte Abnormality   - Due to EtOH Consumption  - Supplement Lytes as needed  - Trend BMP    EKG Abnormality  - ST & T wave abnormality, consider inferior ischemia  - Repeat EKG  - Troponin negative x 2   - Check BNP    Hypothyroidism, new  - TSH low, Free T4 high  - Start low dose Synthroid  - F/U OP in 4 weeks for repeat TFTs   - From NM PET/CT Onc FDG Skull to Thigh, Inital (08.13.22 @ 13:45): There is an approximately symmetric enlargement of bilateral thyroid lobes with heterogeneous FDG avidity, significantly more prominent on the left with SUV 37.2 (image 70). Physiologic FDG activity in visualized brain, major salivary glands, and neck muscles.    Elevated alkaline phosphatase, Transaminitis, Hyperbilirubinemia, likely secondary to alcohol use   Hx of Liver Steatosis   - Check Ammonia   - Trend LFTs  - Check Abdominal US     Normocytic anemia   - Characterize and Trend     Hyperglycemia   - Check A1c     Hx of Opiate Abuse & Dependence  - Continue Suboxone   - Continue Seroquel 50mg at HS    Nicotine Use Disorder  - NRT   - Counseled on smoking cessation    HTN   - Continue Lisinopril with hold parameters     DVT Prophylaxis:  Lovenox 40 mg subcutaneous daily     Code status: Full code (Pt agrees to chest compressions and intubation if required).     Dispo: Continue IV antibiotics, f/u Abdominal US, f/u outstanding labs    Instructions (Optional): The following are to be performed next visit: Intralesional Kenalog\\nReason for Deferral: prior authorization needed and procedure to be performed at next visit. \\nInstructions: Plan to use (3) cc's to inject (6) tender areas  of hair loss on the (scalp). Projecting to use this treatment method of injections for the next (6) of visits. \\n\\nTreatment using intralesional Kenalog is medically necessary due to tender, inflamed areas of hair loss affecting both patients grooming and personal life. \\nPatient has tried and failed topical steroids and hair growth prescribed serums over the last 6 months with no improvement. \\n\\nThe risks of atrophy were reviewed with the patient. This procedure is medically necessary because the lesions are: inflamed, irritated, and painful. Lenard Baires X Size Of Lesion In Cm (Optional): 0 Procedure To Be Performed At Next Visit: Intralesional Kenalog Introduction Text (Please End With A Colon): The following is to be performed next visit: Detail Level: Detailed

## 2023-07-19 NOTE — PHYSICAL THERAPY INITIAL EVALUATION ADULT - AMBULATION SKILLS, REHAB EVAL
independent Posterior Auricular Interpolation Flap Text: We discussed various closure modalities with the patient, including healing by second intention, primary closure, skin graft and various flaps.  The location and configuration of the defect indicated that a post-auricular interpolation flap would result in the least disturbance of the position and function of the surrounding anatomic structures, and provide the best result.  The technique, its benefits, alternatives and risks were discussed with the patient.  The patient underwent the procedure as follows: The patient was positioned supine on the operating room table.  The area of the defect and the surrounding skin were anesthetized with buffered 1% lidocaine with epinephrine.  The area was washed with chlorhexidine.  Sterile drapes were applied. \\n\\nA decision was made to reconstruct the defect utilizing an interpolation axial flap and a staged reconstruction.  A telfa template was made of the defect.  This telfa template was then used to outline the posterior auricular interpolation flap.  The donor area for the pedicle flap was then injected with anesthesia.  The flap was excised through the skin and subcutaneous tissue down to the layer of the underlying musculature.  The pedicle flap was carefully excised within this deep plane to maintain its blood supply.  The edges of the donor site were undermined.   The donor site was closed in a primary fashion.  The pedicle was then rotated into position and sutured.  The flap was carried over to close the defect. Once the tube was sutured into place, adequate blood supply was confirmed with blanching and refill.  The pedicle was then wrapped with xeroform gauze and dressed appropriately with a telfa and gauze bandage to ensure continued blood supply and protect the attached pedicle.  The second stage of the flap (takedown) would occur after sufficient blood supply has been established, at about 3 weeks.

## 2024-04-29 NOTE — PATIENT PROFILE ADULT - NSTOBACCOCESSATIONEDU3_GEN_A_NUR
CPX 3/11/24. FILLED  
Learning behavioral activities to cope with urges.  For example, distraction and changing routines

## 2024-05-09 NOTE — PROGRESS NOTE ADULT - SUBJECTIVE AND OBJECTIVE BOX
Date of service: 09-19-22 @ 10:06    Sitting in bed in NAD  Cough is much improved  No hemoptysis  Has low back pain    ROS: no fever or chills; denies dizziness, no HA, no abdominal pain, no diarrhea or constipation; no dysuria, no legs pain, no rashes    MEDICATIONS  (STANDING):  ascorbic acid 500 milliGRAM(s) Oral daily  azithromycin   Tablet 500 milliGRAM(s) Oral daily  budesonide  80 MICROgram(s)/formoterol 4.5 MICROgram(s) Inhaler 2 Puff(s) Inhalation two times a day  buprenorphine 8 mG/naloxone 2 mG SL Film 1 Film(s) SubLingual daily  enoxaparin Injectable 40 milliGRAM(s) SubCutaneous every 24 hours  ergocalciferol 27839 Unit(s) Oral every week  ferrous    sulfate 325 milliGRAM(s) Oral daily  folic acid 1 milliGRAM(s) Oral daily  influenza   Vaccine 0.5 milliLiter(s) IntraMuscular once  levothyroxine 25 MICROGram(s) Oral daily  lidocaine   4% Patch 1 Patch Transdermal daily  lisinopril 40 milliGRAM(s) Oral daily  multivitamin 1 Tablet(s) Oral daily  nicotine - 21 mG/24Hr(s) Patch 1 Patch Transdermal daily  piperacillin/tazobactam IVPB.. 3.375 Gram(s) IV Intermittent every 8 hours  QUEtiapine 50 milliGRAM(s) Oral at bedtime  sodium chloride 0.9% with potassium chloride 20 mEq/L 1000 milliLiter(s) (75 mL/Hr) IV Continuous <Continuous>  tiotropium 18 MICROgram(s) Capsule 1 Capsule(s) Inhalation daily    Vital Signs Last 24 Hrs  T(C): 36.9 (19 Sep 2022 08:18), Max: 36.9 (18 Sep 2022 17:46)  T(F): 98.4 (19 Sep 2022 08:18), Max: 98.4 (18 Sep 2022 17:46)  HR: 96 (19 Sep 2022 08:18) (87 - 99)  BP: 137/94 (19 Sep 2022 08:18) (133/96 - 145/98)  BP(mean): --  RR: 18 (19 Sep 2022 08:18) (18 - 18)  SpO2: 95% (19 Sep 2022 08:18) (95% - 100%)    Parameters below as of 19 Sep 2022 08:18  Patient On (Oxygen Delivery Method): room air     Physical exam:    Constitutional:  No acute distress  HEENT: NC/AT, EOMI, PERRLA, conjunctivae clear; ears and nose atraumatic  Neck: supple; thyroid not palpable  Back: no tenderness  Respiratory: respiratory effort normal; few crackles at bases  Cardiovascular: S1S2 regular, no murmurs  Abdomen: soft, not tender, not distended, positive BS  Genitourinary: no suprapubic tenderness  Lymphatic: no LN palpable  Musculoskeletal: no muscle tenderness, no joint swelling or tenderness  Extremities: no pedal edema  Neurological/ Psychiatric: AxOx3, judgement and insight normal; moving all extremities  Skin: no rashes; no palpable lesions    Labs: reviewed                        9.1    9.03  )-----------( 245      ( 19 Sep 2022 06:24 )             25.8     09-19    132<L>  |  100  |  7   ----------------------------<  110<H>  4.3   |  25  |  0.68    Ca    9.1      19 Sep 2022 06:24  Phos  2.7     09-18  Mg     2.0     09-18    TPro  6.4  /  Alb  2.5<L>  /  TBili  0.9  /  DBili  x   /  AST  39<H>  /  ALT  40  /  AlkPhos  238<H>  09-18                        9.1    6.37  )-----------( 219      ( 15 Sep 2022 06:32 )             27.4     09-15    131<L>  |  92<L>  |  12  ----------------------------<  158<H>  3.2<L>   |  33<H>  |  0.50    Ca    8.6      15 Sep 2022 06:32  Phos  1.6     09-15  Mg     1.8     09-15    TPro  5.8<L>  /  Alb  2.2<L>  /  TBili  2.4<H>  /  DBili  x   /  AST  360<H>  /  ALT  66  /  AlkPhos  377<H>  09-15     LIVER FUNCTIONS - ( 15 Sep 2022 06:32 )  Alb: 2.2 g/dL / Pro: 5.8 gm/dL / ALK PHOS: 377 U/L / ALT: 66 U/L / AST: 360 U/L / GGT: x           COVID-19 PCR: NotDetec (09-14-22 @ 13:16)    Radiology: all available radiological tests reviewed    < from: CT Angio Chest PE Protocol w/ IV Cont (09.14.22 @ 13:31) >  No pulmonary embolus. No contrast extravasation.    Approximately 7.7 x 4.6 cm left upper lobe mass is stable. Increased   interlobular septal thickening, patchy groundglass and solid opacities   within left upper lobe and left lower lobe which can represent pneumonia, pneumonitis/alveolar hemorrhage.  < end of copied text >      Advanced directives addressed: full resuscitation FLORENCE Mccarthy/rolling walker/straight cane

## 2024-08-09 NOTE — ED STATDOCS - NS ED MD DISPO ISOLATION TYPES
"Subjective:       Patient ID: Alondra Snyder is a 62 y.o. female.    Chief Complaint:  "I did well with the treatment"    Diagnosis: Recurrent metastatic rectal cancer                    Stage IIIA rectal carcinoma 3/15 (T2 N1b M0); 3/28+ nodes     Treatment History  Oxaliplatin/Xeloda x6 completed 8/15  --> Xeloda/XRT completed 11/11/15    Current Treatment: FOLFIRI + Bevacizumab s/p 1 cycle     Clinical History: Patient was referred for a first time screening colonoscopy at the age of 52 by her gynecologist.  She had no abdominal symptoms or complaints, changes in her bowel habits, melena or hematochezia.  Colonoscopy revealed a malignant appearing polyp at the rectosigmoid junction.  Biopsy was positive for adenocarcinoma.  Preoperative CEA level was normal 2 ng/mL.  Chest x-ray was unremarkable.  CT A/P showed a large solid left adnexal mass measuring 4.9 x 6.1 cm.  There was no specific abnormality in the sigmoid colon or rectum and no evidence of metastatic disease.  Pelvic ultrasound confirmed that the lesion was a uterine fibroid.  She underwent a laparoscopic resection with primary reanastomosis 3/18/15.  Final pathology showed a 2 cm moderately differentiated adenocarcinoma invading into but not through the muscularis propria.  3 out of 28 lymph nodes were positive for metastatic involvement. All resection margins were clear.  At the time of surgery, the lesion was delineated to be in the upper rectum. She recovered from her surgery uneventfully.  She completed adjuvant chemotherapy and radiation as documented above. Her Mediport catheter was removed 10/17.    She was lost to follow-up from 9/19 until 6/21/23 due to the COVID-19 pandemic.  She reported no significant problems or health issues in the interim.  She had a screening colonoscopy 1/2/20 that showed a single polyp in the ascending colon which was removed with pathology showing benign polypoid colonic mucosa with no evidence of adenoma.  " "Follow-up exam was recommended in 5 years.    Laboratory testing 5/31/24 prior to her annual surveillance visit showed an elevated CEA level of 10.16 ng/mL.  Testing was repeated on 6/6/24 with a level of 9.84 ng/mL.  CT C/A/P 6/10/24 showed a 7.8 cm lobulated soft tissue mass in the left upper lobe extending from the left hilum to the pleural margin.  There was a 1.5 cm right hilar node and small AP window nodes.  There was a stable nodular soft tissue density adjacent to the left uterine margin unchanged from 2019.  CT-guided biopsy 6/17/24 revealed a metastatic adenocarcinoma consistent with colorectal primary.  She had left on a trip to [place] when the results came back.  She arranged to be seen at [facility] while she was in [place].    Workup at [facility]  CT-PET scan 7/12/24:  Large DIANA hypermetabolic mass measuring up to 8.1 cm with central necrosis.  Multiple hypermetabolic hepatic metastases.  MRI abdomen 7/12/24:  At least 7 bipolar hepatic metastases, largest 3.0 cm segment MIGUEL.  MRI brain 7/12/24:  No metastatic disease.    Molecular testing:  HER2 negative.  Pathology QNS for additional molecular studies.    Tempus: +KRAS, FRANCESCO       + TP53, APC            Interval History  Ms. Snyder is here today for an on treatment follow-up visit, accompanied by her significant other.  She feels well overall.  She received her first cycle of palliative chemotherapy with FOLFIRI + Bevacizumab 7/31/24.  Treatment was very tolerated with essentially no diarrhea or nausea.  She did have mild fatigue and flu-like symptoms for a few days.  She just "did not feel like herself" and felt the need to rest more.  She is back to baseline today.  She did not have fever.  Her cough has essentially resolved.  Laboratory in the office today shows stable, mild thrombocytosis and a mildly elevated alkaline phosphatase level.   Laboratory results, including recent [facility] panel, reviewed and discussed today with the patient.  " "    Review of Systems   Constitutional:  Positive for fatigue. Negative for appetite change, fever and unexpected weight change.   HENT:  Negative for mouth sores, sore throat and trouble swallowing.    Eyes: Negative.  Negative for visual disturbance.   Respiratory:  Negative for cough, shortness of breath and wheezing.    Cardiovascular:  Negative for chest pain, palpitations and leg swelling.   Gastrointestinal:  Negative for abdominal distention, abdominal pain, constipation, diarrhea, nausea and vomiting.   Genitourinary:  Negative for dysuria, frequency and urgency.   Musculoskeletal:  Negative for arthralgias and back pain.   Integumentary:  Negative for pallor and rash.   Neurological:  Negative for dizziness, weakness, numbness and headaches.   Hematological:  Negative for adenopathy. Does not bruise/bleed easily.   Psychiatric/Behavioral:  Negative for confusion and dysphoric mood. The patient is nervous/anxious.        PMHx:  Endometriosis, anxiety/depression  PSHx:  Tonsils, sinus surgery, right oophorectomy, partial colectomy  SH:  Lifetime nonsmoker, occasional alcohol use.  Lives in [place] with her significant other.  Works as a .  FH:  Mother had kidney cancer.  A maternal aunt and 1st cousin had breast cancer.    Objective:        /79   Pulse 62   Temp 97.8 °F (36.6 °C)   Resp 15   Ht 5' 7" (1.702 m)   Wt 79.1 kg (174 lb 6.4 oz)   SpO2 99%   BMI 27.31 kg/m²    Physical Exam  Constitutional:       Comments: Well-developed white female in NAD   HENT:      Head: Normocephalic.      Mouth/Throat:      Mouth: Mucous membranes are moist.      Pharynx: Oropharynx is clear. No posterior oropharyngeal erythema.      Comments: Dry, healing lesion lower lip  Eyes:      General: No scleral icterus.     Extraocular Movements: Extraocular movements intact.      Pupils: Pupils are equal, round, and reactive to light.   Cardiovascular:      Rate and Rhythm: Normal rate and " regular rhythm.      Heart sounds: No murmur heard.     Comments: MediPort catheter right chest wall and MediPort removal incision left chest wall.  Pulmonary:      Comments: Lungs clear to auscultation  Abdominal:      General: Bowel sounds are normal. There is no distension.      Palpations: Abdomen is soft.      Tenderness: There is no abdominal tenderness.      Comments: Well-healed midline incision below umbilicus and laparoscopic sites. No palpable masses or organomegaly.   Musculoskeletal:         General: No swelling or tenderness. Normal range of motion.      Cervical back: Neck supple. No tenderness.   Skin:     General: Skin is warm and dry.      Findings: No rash.   Neurological:      General: No focal deficit present.      Mental Status: She is alert and oriented to person, place, and time.      Cranial Nerves: No cranial nerve deficit.      Motor: No weakness.       ECOG SCORE    1 - Restricted in strenuous activity-ambulatory and able to carry out work of a light nature          LABORATORY  Recent Results (from the past 168 hour(s))   Comprehensive Metabolic Panel    Collection Time: 08/12/24  8:12 AM   Result Value Ref Range    Sodium 139 136 - 145 mmol/L    Potassium 4.5 3.5 - 5.1 mmol/L    Chloride 104 98 - 107 mmol/L    CO2 26 23 - 31 mmol/L    Glucose 100 82 - 115 mg/dL    Blood Urea Nitrogen 6.4 (L) 9.8 - 20.1 mg/dL    Creatinine 0.60 0.55 - 1.02 mg/dL    Calcium 9.3 8.4 - 10.2 mg/dL    Protein Total 6.6 5.8 - 7.6 gm/dL    Albumin 3.0 (L) 3.4 - 4.8 g/dL    Globulin 3.6 (H) 2.4 - 3.5 gm/dL    Albumin/Globulin Ratio 0.8 (L) 1.1 - 2.0 ratio    Bilirubin Total 0.3 <=1.5 mg/dL     (H) 40 - 150 unit/L    ALT 10 0 - 55 unit/L    AST 16 5 - 34 unit/L    eGFR >60 mL/min/1.73/m2    Anion Gap 9.0 mEq/L    BUN/Creatinine Ratio 11    CBC with Differential    Collection Time: 08/12/24  8:12 AM   Result Value Ref Range    WBC 5.05 4.50 - 11.50 x10(3)/mcL    RBC 4.40 4.20 - 5.40 x10(6)/mcL    Hgb 12.4  12.0 - 16.0 g/dL    Hct 38.0 37.0 - 47.0 %    MCV 86.4 80.0 - 94.0 fL    MCH 28.2 27.0 - 31.0 pg    MCHC 32.6 (L) 33.0 - 36.0 g/dL    RDW 12.8 11.5 - 17.0 %    Platelet 545 (H) 130 - 400 x10(3)/mcL    MPV 8.0 7.4 - 10.4 fL    Neut % 43.4 %    Lymph % 40.8 %    Mono % 8.9 %    Eos % 6.3 %    Basophil % 0.4 %    Lymph # 2.06 0.6 - 4.6 x10(3)/mcL    Neut # 2.19 2.1 - 9.2 x10(3)/mcL    Mono # 0.45 0.1 - 1.3 x10(3)/mcL    Eos # 0.32 0 - 0.9 x10(3)/mcL    Baso # 0.02 <=0.2 x10(3)/mcL    IG# 0.01 0 - 0.04 x10(3)/mcL    IG% 0.2 %              Assessment:   Metastatic rectal cancer      Plan:   Continue palliative treatment with FOLFIRI + Bevacizumab.  Cycle 2 is due 8/14/24.  Supportive management of treatment induced nausea and diarrhea discussed today.  Patient has a prescription for Zofran to use as needed.  Add Compazine or Zyprexia if needed.  Urine disptick for protein every 2 weeks.  Monitor for treatment induced HTN.  RTC 2 weeks for on treatment follow-up with CBC, CMP, and CEA.  Plan for restaging after 4-5 cycles of therapy.  Patient in agreement with plan of care as outlined above.  All questions answered to the satisfaction of the patient.    VARINDER XIE, FNP-C  [facility]       Other Physicians  Dr. Jaimee Sanches       None

## 2024-10-16 NOTE — PROGRESS NOTE ADULT - ASSESSMENT
Impression:     #Melena  Likely related to upper GI bleed with differentials including gastric/duodenal/esophageal ulcers, dieulafoy lesions, marshall lesions, hemorrhagic gastritis, angioectasia, adenocarcinoma, NET, GIST, lymphoma, severe esophagitis, GAVE and gastric/esophageal varices. Could also be related to small bowel masses, however, unclear if its intraluminal based on the imaging. Less likely related to lower GI bleed, including diverticulosis, angioectasia etc.   - Based on chart review, prior hgb levels around 9-8, now gradual decline to high 6, required total of 5 units of pRBCs. No prior EGD or colonoscopies.   Patient will benefit from upper endoscopy. However, imaging showed large necrotic mass in the retrotracheal region around 8x4 cm which is compressing the proximal esophagus, might be difficult to intubate the esophagus.   - Patient will need to rescind his DNR status for the procedure. He agreed to it.   - hgb has been stable around 8.     Recommendations:   - patient currently declining endoscopic work up, even if he were to be medically optimized. Hospice referral placed per documentation.  - If further work-up is within GOC, can consider abdominal Xray to rule out any obstruction causing inability to tolerate TFs, as well as Xray esophagram to rule out tracheo-esophageal fistula given concern for feeds coming out of trach  - Continue with IV PPI 40mg BID.   - Trend CBC  - Transfuse if hgb < 7 or hgb < 8 in patients with underlying cardiac comorbidities.     All recommendations are tentative until note is attested by attending.     Candelaria Kwon, PGY5  Gastroenterology/Hepatology Fellow  Available on Microsoft Teams  [phone] ([employer] Short Range Pager)  [phone] (Long Range Pager)    After 5pm, please contact the on-call GI fellow. [phone] Not performed 2* to fluctuating alertness and low tolerance for activity at this time

## 2024-11-14 NOTE — ED ADULT TRIAGE NOTE - DIRECT TO ROOM CARE INITIATED:
Problem: Occupational Therapy - Adult  Goal: By Discharge: Performs self-care activities at highest level of function for planned discharge setting.  See evaluation for individualized goals.  Description: FUNCTIONAL STATUS PRIOR TO ADMISSION:  Patient active and independent without DME/AE. Works full time as a .     HOME SUPPORT: Patient lived with wife but didn't require assistance.    Occupational Therapy Goals:  Initiated 11/13/2024    1.  Patient will perform ADLs standing 5 mins without fatigue or LOB with Supervision within 7 days.  2.  Patient will perform upper body ADLs with Contact Guard Assist within 7 days.  3.  Patient will perform lower body ADLs with Contact Guard Assist within 7 days.    4.  Patient will perform gathering ADL items high and low 2/2 with Contact Guard Assist within 7 days.  5.  Patient will perform toilet transfers with Contact Guard Assist within 7 days.  6.  Patient will perform all aspects of toileting with Contact Guard Assist within 7 days.  7.  Patient will participate in cardiac/sternal upper extremity therapeutic exercise/activities to increase independence with ADLs with supervision/set-up for 5 minutes within 7 days.   8.  Patient will adhere to Move in the Tube precautions during functional tasks with verbal cues within 7 days.    Outcome: Progressing   OCCUPATIONAL THERAPY TREATMENT  Patient: Pedro Montes De Oca (65 y.o. male)  Date: 11/14/2024  Primary Diagnosis: Disease of cardiovascular system [I25.10]  CAD in native artery [I25.10]  Procedure(s) (LRB):  CORONARY ARTERY BYPASS GRAFTING X 3, RESVGH, LEFT RADIAL ARTERY HARVEST, ECC, VANESSA AND EPIAORTIC U/S BY DR PAINTER (N/A) 2 Days Post-Op   Precautions: Fall Risk (Move in the Tube)                Chart, occupational therapy assessment, plan of care, and goals were reviewed.      ASSESSMENT  Patient continues to benefit from skilled OT services and is progressing towards goals. Patient received OOB in chair,  Intervention:  Feeding: .  - Drink to Mouth : Independent and Seated in chair                              Patient instructed and educated on mindful movement principles based on “Move in The Tube” maintaining bilateral elbows close to rib cage when performing any load-bearing activity.  Educated on applying this concept when getting in/out of bed, pushing up from a chair, opening a door, or lifting a box.  Patient has been provided handouts with diagrams of each correct/incorrect method of performing each of the above tasks.     Patient instructed on the ability to utilize upper extremities “outside the tube” when doing any non-load bearing activity such as washing hair/body, brushing teeth, retrieving clothing items, or scratching your back. Patient encouraged to also perform upper extremity exercises \"outside of the tube\" to prevent scar tissue formation around sternal incision site.    Patient instructed in detail about activities to heed with caution, allowing pain to be the guide. These activities include but are not limited to: mowing the lawn, riding a bike, walking a dog, lifting a child, workshop hobbies, golfing, sexual activity, vacuuming, fishing, scrubbing the floors, and moving furniture. Patient was given the “Keep Your Move in the Tube” handout to describe each of these activities in detail.     Increase activity tolerance for home, work, and sexual intercourse by pacing self with increasing the arm exercises, sitting duration, frequency OOB, walking, standing, and ADLs. Instructed and indicated understanding of s/s of too much activity, how to respond to s/s safely.    Patient instructed on no asymmetrical reaching over head to ensure BUEs are lifted together above 90* of shoulder flexion.    Pt instructed that they may have to adjust home setup to increase ease with items closer to waist height to prevent deep bending and asymmetrical UE WB/pushing for stabilization during bending.  11-Jan-2022 09:44

## 2024-12-03 NOTE — PROGRESS NOTE ADULT - PROBLEM SELECTOR PLAN 1
The loop recorder was advanced into the 45 degree position with gentle, continuous, forward pressure. Dysphagia sec to large mass  8.1 x 4.5 cm in the retrotracheal region with displacement of the trachea  anteriorly , compression of the pharynx and proximal esophagus  Keep patient NPO -  was on  mIVF d5ns , will DC as pt has been tolerating Tube feeding   [facility] consulted for palliative radiation, CT sim on 2/15 ,  plan for 5 fractions to neck mass , first treatment on 2/17   ENT consulted, s/p tracheostomy on 2/14, plan to change to cuffless trach in 5 days per ENT team, trach care per ENT, ENT rec keep trach of same size and one size down at bedside at all times and to consult SLP for speaking valve use (should only be used as tolerated during the day)  pt seen by palliative for pain management , started on PCA , Pain controlled with current regimen   patient with severe protein calorie malnutrition-  started  PEG feeding , tolerating well   SLP eval.   Continue with Solu-Medrol 40mg Q8H- started in HH- will d/w ENT regarding weaning steroids Dysphagia sec to large mass  8.1 x 4.5 cm in the retrotracheal region with displacement of the trachea  anteriorly , compression of the pharynx and proximal esophagus  Keep patient NPO -  was on  mIVF d5ns , will DC as pt has been tolerating Tube feeding   [facility] consulted for palliative radiation, CT sim on 2/15 ,  plan for 5 fractions to neck mass , first treatment on 2/17   ENT consulted, s/p tracheostomy on 2/14, plan to change to cuffless trach in 5 days per ENT team, trach care per ENT, ENT rec keep trach of same size and one size down at bedside at all times and to consult SLP for speaking valve use (should only be used as tolerated during the day)  pt seen by palliative for pain management , started on PCA , Pain controlled with current regimen   patient with severe protein calorie malnutrition-  started  PEG feeding , tolerating well   SLP eval.   pt started on Solu-Medrol 40mg Q8H-  HH- d/w ENT , agree with  weaning steroids, decreased to BID , can change to once daily from 2/21

## 2025-04-02 NOTE — DISCHARGE NOTE PROVIDER - [id number]_NEU_ALL_CORE
Endocrine Refill protocol for oral and injectable diabetic medications    Protocol Criteria:  PASSED  Reason: N/A    If all below requirements are met, send a 90-day supply with 1 refill per provider protocol.    Verify appointment with Endocrinology completed in the last 6 months or scheduled in the next 3 months.  Verify A1C has been completed within the last 6 months and is below 8.5%     Last completed office visit: 11/13/2024 Imani Carmichael APRN   Next scheduled Follow up: no future appt     Last A1c result: Last A1c value was 5.7% done 11/13/2024.      No difficulties

## (undated) DEVICE — SUT SILK 3-0 18" TIES

## (undated) DEVICE — DRAPE IOBAN 33" X 23"

## (undated) DEVICE — WARMING BLANKET LOWER ADULT

## (undated) DEVICE — LIGASURE SMALL JAW

## (undated) DEVICE — DRSG TEGADERM 2.5X3"

## (undated) DEVICE — DRAPE 3/4 SHEET 52X76"

## (undated) DEVICE — DRSG TELFA 3 X 8

## (undated) DEVICE — NDL HYPO SAFE 22G X 1" (BLACK)

## (undated) DEVICE — SOL INJ NS 0.9% 1000ML

## (undated) DEVICE — SUT SILK 2-0 30" SH

## (undated) DEVICE — DRSG BENZOIN 0.6CC

## (undated) DEVICE — DRSG CURITY GAUZE SPONGE 4 X 4" 12-PLY

## (undated) DEVICE — DRSG ALLEVYN LIFE 4 X 8 (PINK)

## (undated) DEVICE — BAG URINE W METER 2L

## (undated) DEVICE — SUT MONOCRYL 4-0 27" PS-2 UNDYED

## (undated) DEVICE — DRSG STERISTRIPS 0.5 X 4"

## (undated) DEVICE — VENODYNE/SCD SLEEVE CALF MEDIUM

## (undated) DEVICE — SYR LUER LOK 20CC

## (undated) DEVICE — BLADE SURGICAL #11 CARBON

## (undated) DEVICE — GLV 7 PROTEXIS (BLUE)

## (undated) DEVICE — NERVE STIMULATOR/LOCATOR HEAD AND NECK MODEL

## (undated) DEVICE — SYR LUER LOK 5CC

## (undated) DEVICE — SUT VICRYL 3-0 27" SH UNDYED

## (undated) DEVICE — DRAPE SPLIT SHEET 77" X 108"

## (undated) DEVICE — DRAPE THYROID 77" X 123"

## (undated) DEVICE — BIPOLAR FORCEP STRYKER STANDARD 7" X 0.5MM (YELLOW)

## (undated) DEVICE — PACK GENERAL MINOR

## (undated) DEVICE — WARMING BLANKET FULL ADULT

## (undated) DEVICE — STAPLER SKIN VISI-STAT 35 WIDE

## (undated) DEVICE — PACK HEAD & NECK

## (undated) DEVICE — SUT SILK 2-0 18" TIES

## (undated) DEVICE — LIGASURE EXACT DISSECTOR

## (undated) DEVICE — LUBRICATING JELLY ONESHOT 1.25OZ

## (undated) DEVICE — ELCTR GROUNDING PAD ADULT COVIDIEN

## (undated) DEVICE — DRAPE TOWEL BLUE 17" X 24"

## (undated) DEVICE — GLV 7 PROTEXIS (WHITE)

## (undated) DEVICE — CANISTER DISPOSABLE THIN WALL 3000CC

## (undated) DEVICE — SYR LUER LOK 10CC

## (undated) DEVICE — TRACH TIE MEDIUM

## (undated) DEVICE — ELCTR BOVIE TIP BLADE INSULATED 2.75" EDGE

## (undated) DEVICE — DRAPE FLUID WARMER 44 X 44"

## (undated) DEVICE — POSITIONER FOAM EGG CRATE ULNAR 2PCS (PINK)

## (undated) DEVICE — PROTECTOR HEEL / ELBOW FLUFFY

## (undated) DEVICE — LABELS BLANK W PEN

## (undated) DEVICE — DRAPE TOWEL BLUE STICKY

## (undated) DEVICE — SOL IRR POUR H2O 500ML

## (undated) DEVICE — DRAPE INSTRUMENT POUCH 6.75" X 11"

## (undated) DEVICE — TUBING SUCTION NONCONDUCTIVE 6MM X 12FT

## (undated) DEVICE — ELCTR BOVIE PENCIL SMOKE EVACUATION

## (undated) DEVICE — GOWN XL

## (undated) DEVICE — DRAPE MAGNETIC INSTRUMENT MEDIUM

## (undated) DEVICE — PREP BETADINE SPONGE STICKS

## (undated) DEVICE — NDL HYPO REGULAR BEVEL 25G X 1.5" (BLUE)

## (undated) DEVICE — DRAPE COVER SNAP 36X30"

## (undated) DEVICE — DURABLE MEDICAL EQUIPMENT: Type: DURABLE MEDICAL EQUIPMENT

## (undated) DEVICE — SUT PROLENE 2-0 30" CT-2

## (undated) DEVICE — SPONGE PEANUT AUTO COUNT